# Patient Record
Sex: FEMALE | Race: WHITE | NOT HISPANIC OR LATINO | Employment: OTHER | URBAN - NONMETROPOLITAN AREA
[De-identification: names, ages, dates, MRNs, and addresses within clinical notes are randomized per-mention and may not be internally consistent; named-entity substitution may affect disease eponyms.]

---

## 2017-01-17 ENCOUNTER — ANTICOAGULATION VISIT (OUTPATIENT)
Dept: CARDIOLOGY | Facility: CLINIC | Age: 72
End: 2017-01-17

## 2017-01-17 DIAGNOSIS — I48.0 PAF (PAROXYSMAL ATRIAL FIBRILLATION) (HCC): ICD-10-CM

## 2017-01-19 ENCOUNTER — ANTICOAGULATION VISIT (OUTPATIENT)
Dept: CARDIOLOGY | Facility: CLINIC | Age: 72
End: 2017-01-19

## 2017-01-19 DIAGNOSIS — I48.0 PAF (PAROXYSMAL ATRIAL FIBRILLATION) (HCC): ICD-10-CM

## 2017-01-19 LAB — INR PPP: 2

## 2017-02-28 ENCOUNTER — ANTICOAGULATION VISIT (OUTPATIENT)
Dept: CARDIOLOGY | Facility: CLINIC | Age: 72
End: 2017-02-28

## 2017-02-28 DIAGNOSIS — I48.0 PAF (PAROXYSMAL ATRIAL FIBRILLATION) (HCC): ICD-10-CM

## 2017-03-07 ENCOUNTER — ANTICOAGULATION VISIT (OUTPATIENT)
Dept: CARDIOLOGY | Facility: CLINIC | Age: 72
End: 2017-03-07

## 2017-03-07 DIAGNOSIS — I48.0 PAF (PAROXYSMAL ATRIAL FIBRILLATION) (HCC): ICD-10-CM

## 2017-03-07 LAB — INR PPP: 1.8

## 2017-03-16 ENCOUNTER — ANTICOAGULATION VISIT (OUTPATIENT)
Dept: CARDIOLOGY | Facility: CLINIC | Age: 72
End: 2017-03-16

## 2017-03-16 DIAGNOSIS — I48.0 PAF (PAROXYSMAL ATRIAL FIBRILLATION) (HCC): ICD-10-CM

## 2017-03-20 ENCOUNTER — ANTICOAGULATION VISIT (OUTPATIENT)
Dept: CARDIOLOGY | Facility: CLINIC | Age: 72
End: 2017-03-20

## 2017-03-20 DIAGNOSIS — I48.0 PAF (PAROXYSMAL ATRIAL FIBRILLATION) (HCC): ICD-10-CM

## 2017-03-20 LAB — INR PPP: 2.7

## 2017-03-24 ENCOUNTER — OFFICE VISIT (OUTPATIENT)
Dept: CARDIOLOGY | Facility: CLINIC | Age: 72
End: 2017-03-24

## 2017-03-24 VITALS
WEIGHT: 145 LBS | HEIGHT: 62 IN | BODY MASS INDEX: 26.68 KG/M2 | SYSTOLIC BLOOD PRESSURE: 110 MMHG | DIASTOLIC BLOOD PRESSURE: 54 MMHG | HEART RATE: 68 BPM

## 2017-03-24 DIAGNOSIS — I48.0 PAROXYSMAL ATRIAL FIBRILLATION (HCC): Primary | ICD-10-CM

## 2017-03-24 DIAGNOSIS — Z95.1 S/P CABG X 4: ICD-10-CM

## 2017-03-24 DIAGNOSIS — I10 ESSENTIAL HYPERTENSION: ICD-10-CM

## 2017-03-24 PROCEDURE — 99214 OFFICE O/P EST MOD 30 MIN: CPT | Performed by: INTERNAL MEDICINE

## 2017-03-24 PROCEDURE — 93000 ELECTROCARDIOGRAM COMPLETE: CPT | Performed by: INTERNAL MEDICINE

## 2017-03-24 RX ORDER — CALCITRIOL 0.25 UG/1
CAPSULE, LIQUID FILLED ORAL
Refills: 11 | COMMUNITY
Start: 2017-02-09 | End: 2020-01-03

## 2017-03-24 RX ORDER — WARFARIN SODIUM 5 MG/1
TABLET ORAL
Refills: 11 | COMMUNITY
Start: 2017-03-05 | End: 2017-04-30 | Stop reason: SDUPTHER

## 2017-03-24 NOTE — PROGRESS NOTES
Sondra Connolly  [id number]  1945  72 y.o.  female    Referring Provider: Thomas Astudillo MD    Reason for Follow-up Visit: PAF  Overall doing well  Denies any chest pain  No excessive shortness of breath  Compliant with medications    History of present illness:  Sondra Connolly is a 72 y.o. yo female with history of PAF who presents today for   Chief Complaint   Patient presents with   • Atrial Fibrillation     1 YEAR F/U    .    History  Past Medical History:   Diagnosis Date   • A-fib    • Angina pectoris    • Anxiety    • Arthritis    • Atherosclerosis of coronary artery bypass graft    • CAD (coronary artery disease)    • Carotid artery occlusion without infarction    • Chronic kidney disease    • Chronic renal impairment      unspecified stage   • Diabetes mellitus    • Gastritis    • Hemorrhoids    • Hyperlipidemia    • Hypertension    • Lung disease     chronic   • LV dysfunction    • MI (myocardial infarction)    • MI (myocardial infarction)    • Obesity    • Palpitations    • PVD (peripheral vascular disease)    • Renal failure, acute    ,   Past Surgical History:   Procedure Laterality Date   • APPENDECTOMY     • CARDIAC CATHETERIZATION  05/01/2009    Left-Heart Skin   • CAROTID ENDARTERECTOMY     • CATARACT EXTRACTION     • COLONOSCOPY     • CORONARY ARTERY BYPASS GRAFT  07/2007    Four   • ENDOSCOPY  10/02/2013   • HYSTERECTOMY     • SKIN CANCER EXCISION     • THROMBOENDARTERECTOMY     ,   Family History   Problem Relation Age of Onset   • Heart disease Mother    • Heart disease Father    • Heart disease Brother    • Heart disease Brother    ,   Social History   Substance Use Topics   • Smoking status: Never Smoker   • Smokeless tobacco: Never Used      Comment: Non smoker; no tobacco use   • Alcohol use Yes      Comment: occ   ,     Medications  Current Outpatient Prescriptions   Medication Sig Dispense Refill   • calcium citrate-vitamin d (CITRACAL) 200-250 MG-UNIT tablet tablet Take  by mouth  "Daily.     • cholecalciferol (VITAMIN D3) 1000 UNITS tablet Take 1,000 Units by mouth Daily.     • digoxin (LANOXIN) 250 MCG tablet Take 1 tablet by mouth Daily. 30 tablet 5   • diltiaZEM CD (CARDIZEM CD) 240 MG 24 hr capsule Take 1 capsule by mouth Daily. 30 capsule 5   • FERROUS SULFATE PO Take  by mouth.     • furosemide (LASIX) 20 MG tablet Take 1 tablet by mouth Daily. 30 tablet 5   • metoprolol tartrate (LOPRESSOR) 25 MG tablet Take 1 tablet by mouth 2 (Two) Times a Day. 1/2 tablet 30 tablet 5   • Multiple Vitamins-Minerals (MULTIVITAMIN ADULT PO) Take  by mouth.     • pravastatin (PRAVACHOL) 40 MG tablet Take 1 tablet by mouth Daily. 30 tablet 5   • warfarin (COUMADIN) 5 MG tablet TAKE 1 TABLET EVERY DAY AND 1/5 THE NEXT DAY  11   • calcitriol (ROCALTROL) 0.25 MCG capsule TAKE 1 CAPSULE BY MOUTH EVERY MONDAY, WEDNESDAY, FRIDAY  11     No current facility-administered medications for this visit.        Allergies:  Buffered aspirin and Salicylates    Review of Systems  Review of Systems   Constitution: Negative.   HENT: Negative.    Eyes: Negative.    Cardiovascular: Negative for chest pain, claudication, cyanosis, dyspnea on exertion, irregular heartbeat, leg swelling, near-syncope, orthopnea, palpitations, paroxysmal nocturnal dyspnea and syncope.   Respiratory: Negative.    Endocrine: Negative.    Hematologic/Lymphatic: Negative.    Skin: Negative.    Gastrointestinal: Negative for anorexia.   Genitourinary: Negative.    Neurological: Negative.    Psychiatric/Behavioral: Negative.        Objective     Physical Exam:  /54 (BP Location: Left arm, Patient Position: Sitting, Cuff Size: Adult)  Pulse 68  Ht 62\" (157.5 cm)  Wt 145 lb (65.8 kg)  BMI 26.52 kg/m2  Physical Exam   Constitutional: She appears well-developed.   HENT:   Head: Normocephalic.   Neck: Normal carotid pulses and no JVD present. No tracheal tenderness present. Carotid bruit is not present. No tracheal deviation and no edema present. "   Cardiovascular: Normal pulses.  An irregularly irregular rhythm present.   Murmur heard.   Systolic murmur is present with a grade of 2/6   Pulmonary/Chest: Effort normal. No stridor.   Abdominal: Soft.   Neurological: She is alert. She has normal strength. No cranial nerve deficit or sensory deficit.   Skin: Skin is warm.   Psychiatric: She has a normal mood and affect. Her speech is normal and behavior is normal.       Results Review:       ECG 12 Lead  Date/Time: 3/24/2017 10:39 AM  Performed by: SEEMA MORENO  Authorized by: SEEMA MORENO   Comparison: compared with previous ECG from 1/11/2016  Comparison to previous ECG: Atrial fibrillation replaced sinus rhythm  Rhythm: atrial fibrillation  Rate: normal  T depression: V4, V5 and V6  QRS axis: normal              Assessment/Plan   Patient Active Problem List   Diagnosis   • Paroxysmal atrial fibrillation   • Essential hypertension   • S/P CABG x 4       No palpitations. No significant pedal edema. Compliant with medications and diet. Latest labs and medications reviewed.    Plan:    She does not want DC cardioversion  Close follow up with you as scheduled.  Intensive factor modifications.  See order list.    Counseled regarding disease appropriate diet, fluid, caffeine, stimulants and sodium intake as well as importance of compliance to diet, exercise and regular follow up.  Avoid NSAIDS and COX2 inhibitors. Use Acetaminophen PRN.    Return in about 3 months (around 6/24/2017).

## 2017-04-10 ENCOUNTER — ANTICOAGULATION VISIT (OUTPATIENT)
Dept: CARDIOLOGY | Facility: CLINIC | Age: 72
End: 2017-04-10

## 2017-04-14 ENCOUNTER — ANTICOAGULATION VISIT (OUTPATIENT)
Dept: CARDIOLOGY | Facility: CLINIC | Age: 72
End: 2017-04-14

## 2017-04-14 LAB — INR PPP: 2.6

## 2017-05-01 RX ORDER — WARFARIN SODIUM 5 MG/1
TABLET ORAL
Qty: 30 TABLET | Refills: 9 | Status: SHIPPED | OUTPATIENT
Start: 2017-05-01 | End: 2017-08-07 | Stop reason: SDUPTHER

## 2017-05-03 ENCOUNTER — LAB REQUISITION (OUTPATIENT)
Dept: LAB | Facility: HOSPITAL | Age: 72
End: 2017-05-03

## 2017-05-03 DIAGNOSIS — Z00.00 ENCOUNTER FOR GENERAL ADULT MEDICAL EXAMINATION WITHOUT ABNORMAL FINDINGS: ICD-10-CM

## 2017-05-03 PROCEDURE — 87086 URINE CULTURE/COLONY COUNT: CPT | Performed by: INTERNAL MEDICINE

## 2017-05-05 LAB — BACTERIA SPEC AEROBE CULT: ABNORMAL

## 2017-05-19 ENCOUNTER — ANTICOAGULATION VISIT (OUTPATIENT)
Dept: CARDIOLOGY | Facility: CLINIC | Age: 72
End: 2017-05-19

## 2017-05-31 RX ORDER — DIGOXIN 250 MCG
TABLET ORAL
Qty: 30 TABLET | Refills: 11 | Status: SHIPPED | OUTPATIENT
Start: 2017-05-31 | End: 2017-08-04 | Stop reason: SDUPTHER

## 2017-05-31 RX ORDER — DILTIAZEM HYDROCHLORIDE 240 MG/1
240 CAPSULE, COATED, EXTENDED RELEASE ORAL DAILY
Qty: 30 CAPSULE | Refills: 11 | Status: SHIPPED | OUTPATIENT
Start: 2017-05-31 | End: 2017-08-04 | Stop reason: SDUPTHER

## 2017-05-31 RX ORDER — FUROSEMIDE 20 MG/1
TABLET ORAL
Qty: 30 TABLET | Refills: 11 | Status: SHIPPED | OUTPATIENT
Start: 2017-05-31 | End: 2017-08-04 | Stop reason: SDUPTHER

## 2017-06-05 DIAGNOSIS — I48.91 ATRIAL FIBRILLATION, UNSPECIFIED TYPE (HCC): Primary | ICD-10-CM

## 2017-06-05 LAB — INR PPP: 2.9

## 2017-06-06 ENCOUNTER — ANTICOAGULATION VISIT (OUTPATIENT)
Dept: CARDIOLOGY | Facility: CLINIC | Age: 72
End: 2017-06-06

## 2017-06-23 ENCOUNTER — OFFICE VISIT (OUTPATIENT)
Dept: CARDIOLOGY | Facility: CLINIC | Age: 72
End: 2017-06-23

## 2017-06-23 VITALS
OXYGEN SATURATION: 98 % | WEIGHT: 142 LBS | BODY MASS INDEX: 26.13 KG/M2 | HEART RATE: 61 BPM | DIASTOLIC BLOOD PRESSURE: 68 MMHG | SYSTOLIC BLOOD PRESSURE: 100 MMHG | HEIGHT: 62 IN

## 2017-06-23 DIAGNOSIS — I10 ESSENTIAL HYPERTENSION: ICD-10-CM

## 2017-06-23 DIAGNOSIS — I48.0 PAROXYSMAL ATRIAL FIBRILLATION (HCC): Primary | ICD-10-CM

## 2017-06-23 DIAGNOSIS — I48.20 CHRONIC ATRIAL FIBRILLATION (HCC): ICD-10-CM

## 2017-06-23 DIAGNOSIS — Z95.1 S/P CABG X 4: ICD-10-CM

## 2017-06-23 PROCEDURE — 93000 ELECTROCARDIOGRAM COMPLETE: CPT | Performed by: INTERNAL MEDICINE

## 2017-06-23 PROCEDURE — 99214 OFFICE O/P EST MOD 30 MIN: CPT | Performed by: INTERNAL MEDICINE

## 2017-06-23 NOTE — PROGRESS NOTES
Sondra Connolly  [id number]  1945  72 y.o.  female    Referring Provider: Thomas Astudillo MD    Reason for Follow-up Visit: PAF  Overall doing well  Denies any chest pain  No excessive shortness of breath  Compliant with medications    History of present illness:  Sondra Connolly is a 72 y.o. yo female with history of PAF who presents today for   Chief Complaint   Patient presents with   • Atrial Fibrillation     3 mo f/u   • Coronary Artery Disease   .    History  Past Medical History:   Diagnosis Date   • A-fib    • Angina pectoris    • Anxiety    • Arthritis    • Atherosclerosis of coronary artery bypass graft    • CAD (coronary artery disease)    • Carotid artery occlusion without infarction    • CHF (congestive heart failure)    • Chronic kidney disease    • Chronic renal impairment      unspecified stage   • Colon polyp    • Diabetes mellitus    • Fibrocystic breast disease    • Gastritis    • Gastritis    • GI bleed    • Hemorrhoids    • Hyperlipidemia    • Hypertension    • Lung disease     chronic   • LV dysfunction    • MI (myocardial infarction)    • MI (myocardial infarction)    • Obesity    • Palpitations    • Peptic ulcer    • PVD (peripheral vascular disease)    • Renal failure, acute    ,   Past Surgical History:   Procedure Laterality Date   • APPENDECTOMY     • APPENDECTOMY     • CARDIAC CATHETERIZATION  05/01/2009    Left-Heart Skin   • CAROTID ENDARTERECTOMY     • CATARACT EXTRACTION     • CATARACT EXTRACTION     • COLONOSCOPY     • CORONARY ARTERY BYPASS GRAFT  07/2007    Four   • ENDOSCOPY  10/02/2013   • ENDOSCOPY  10/02/2013   • HYSTERECTOMY     • SKIN CANCER EXCISION     • THROMBOENDARTERECTOMY     • TONSILLECTOMY     ,   Family History   Problem Relation Age of Onset   • Heart disease Mother    • Heart disease Father    • Heart disease Brother    • Heart disease Brother    ,   Social History   Substance Use Topics   • Smoking status: Never Smoker   • Smokeless tobacco: Never Used       "Comment: Non smoker; no tobacco use   • Alcohol use Yes      Comment: occ   ,     Medications  Current Outpatient Prescriptions   Medication Sig Dispense Refill   • calcitriol (ROCALTROL) 0.25 MCG capsule TAKE 1 CAPSULE BY MOUTH EVERY MONDAY, WEDNESDAY, FRIDAY  11   • calcium citrate-vitamin d (CITRACAL) 200-250 MG-UNIT tablet tablet Take  by mouth Daily.     • cholecalciferol (VITAMIN D3) 1000 UNITS tablet Take 1,000 Units by mouth Daily.     • digoxin (LANOXIN) 250 MCG tablet TAKE 1 TABLET BY MOUTH DAILY. 30 tablet 11   • diltiaZEM CD (CARDIZEM CD) 240 MG 24 hr capsule Take 1 capsule by mouth Daily. 30 capsule 11   • FERROUS SULFATE PO Take  by mouth.     • furosemide (LASIX) 20 MG tablet TAKE 1 TABLET BY MOUTH DAILY. 30 tablet 11   • metoprolol tartrate (LOPRESSOR) 25 MG tablet TAKE 1/2 TABLET BY MOUTH TWICE A DAY 30 tablet 11   • Multiple Vitamins-Minerals (MULTIVITAMIN ADULT PO) Take  by mouth.     • pravastatin (PRAVACHOL) 40 MG tablet Take 1 tablet by mouth Daily. 30 tablet 5   • warfarin (COUMADIN) 5 MG tablet TAKE 1 TABLET EVERY DAY 30 tablet 9     No current facility-administered medications for this visit.        Allergies:  Buffered aspirin; Demerol [meperidine]; and Salicylates    Review of Systems  Review of Systems   Constitution: Positive for weakness and malaise/fatigue.   HENT: Negative.    Eyes: Negative.    Cardiovascular: Negative for chest pain, claudication, cyanosis, dyspnea on exertion, irregular heartbeat, leg swelling, near-syncope, orthopnea, palpitations, paroxysmal nocturnal dyspnea and syncope.   Respiratory: Negative.    Endocrine: Negative.    Hematologic/Lymphatic: Negative.    Skin: Negative.    Gastrointestinal: Negative for anorexia.   Genitourinary: Negative.    Psychiatric/Behavioral: Negative.        Objective     Physical Exam:  /68  Pulse 61  Ht 62\" (157.5 cm)  Wt 142 lb (64.4 kg)  SpO2 98%  BMI 25.97 kg/m2  Physical Exam   Constitutional: She appears " well-developed.   HENT:   Head: Normocephalic.   Neck: Normal carotid pulses and no JVD present. No tracheal tenderness present. Carotid bruit is not present. No tracheal deviation and no edema present.   Cardiovascular: Normal pulses.  An irregularly irregular rhythm present.   Murmur heard.   Systolic murmur is present with a grade of 2/6   Pulmonary/Chest: Effort normal. No stridor.   Abdominal: Soft.   Neurological: She is alert. She has normal strength. No cranial nerve deficit or sensory deficit.   Skin: Skin is warm.   Psychiatric: She has a normal mood and affect. Her speech is normal and behavior is normal.       Results Review:       ECG 12 Lead  Date/Time: 6/23/2017 10:34 AM  Performed by: SEEMA MORENO  Authorized by: SEEMA MORENO   Comparison: compared with previous ECG from 3/24/2017  Similar to previous ECG  Rhythm: atrial fibrillation  Rate: normal  T depression: II, III, aVF, V5 and V6  QRS axis: right  Clinical impression: abnormal ECG            Assessment/Plan   Patient Active Problem List   Diagnosis   • Paroxysmal atrial fibrillation   • Essential hypertension   • S/P CABG x 4   • A-fib       No palpitations. No significant pedal edema. Compliant with medications and diet. Latest labs and medications reviewed.    Plan:  Patient is asked to monitor BP at home or work, several times per month and return with written values at next office visit.  Patient is asked to monitor BP at home or work, several times per month and return with written values at next office visit.  Keep LDL below 70 mg/dl   Monitor INR  She does not want DC cardioversion  Close follow up with you as scheduled.  Intensive factor modifications.  See order list.    Counseled regarding disease appropriate diet, fluid, caffeine, stimulants and sodium intake as well as importance of compliance to diet, exercise and regular follow up.  Avoid NSAIDS and COX2 inhibitors. Use Acetaminophen PRN.    Return in about 9 months (around  3/23/2018).

## 2017-07-13 ENCOUNTER — ANTICOAGULATION VISIT (OUTPATIENT)
Dept: CARDIOLOGY | Facility: CLINIC | Age: 72
End: 2017-07-13

## 2017-07-21 ENCOUNTER — ANTICOAGULATION VISIT (OUTPATIENT)
Dept: CARDIOLOGY | Facility: CLINIC | Age: 72
End: 2017-07-21

## 2017-07-25 ENCOUNTER — ANTICOAGULATION VISIT (OUTPATIENT)
Dept: CARDIOLOGY | Facility: CLINIC | Age: 72
End: 2017-07-25

## 2017-07-25 LAB — INR PPP: 3.5

## 2017-08-02 ENCOUNTER — TRANSCRIBE ORDERS (OUTPATIENT)
Dept: ADMINISTRATIVE | Facility: HOSPITAL | Age: 72
End: 2017-08-02

## 2017-08-02 ENCOUNTER — TRANSCRIBE ORDERS (OUTPATIENT)
Dept: GENERAL RADIOLOGY | Facility: HOSPITAL | Age: 72
End: 2017-08-02

## 2017-08-02 ENCOUNTER — HOSPITAL ENCOUNTER (OUTPATIENT)
Dept: GENERAL RADIOLOGY | Facility: HOSPITAL | Age: 72
Discharge: HOME | End: 2017-08-02
Admitting: INTERNAL MEDICINE

## 2017-08-02 DIAGNOSIS — Z12.31 ENCOUNTER FOR SCREENING MAMMOGRAM FOR MALIGNANT NEOPLASM OF BREAST: Primary | ICD-10-CM

## 2017-08-02 DIAGNOSIS — Z78.0 ASYMPTOMATIC POSTMENOPAUSAL STATUS (AGE-RELATED) (NATURAL): Primary | ICD-10-CM

## 2017-08-02 PROCEDURE — 77080 DXA BONE DENSITY AXIAL: CPT

## 2017-08-04 RX ORDER — DIGOXIN 250 MCG
250 TABLET ORAL
Qty: 90 TABLET | Refills: 3 | Status: SHIPPED | OUTPATIENT
Start: 2017-08-04 | End: 2018-09-05 | Stop reason: SDUPTHER

## 2017-08-04 RX ORDER — PRAVASTATIN SODIUM 40 MG
40 TABLET ORAL DAILY
Qty: 90 TABLET | Refills: 3 | Status: SHIPPED | OUTPATIENT
Start: 2017-08-04 | End: 2018-09-05 | Stop reason: SDUPTHER

## 2017-08-04 RX ORDER — DILTIAZEM HYDROCHLORIDE 240 MG/1
240 CAPSULE, COATED, EXTENDED RELEASE ORAL DAILY
Qty: 90 CAPSULE | Refills: 3 | Status: SHIPPED | OUTPATIENT
Start: 2017-08-04 | End: 2018-08-08 | Stop reason: SDUPTHER

## 2017-08-04 RX ORDER — FUROSEMIDE 20 MG/1
20 TABLET ORAL DAILY
Qty: 90 TABLET | Refills: 3 | Status: SHIPPED | OUTPATIENT
Start: 2017-08-04 | End: 2018-09-05 | Stop reason: SDUPTHER

## 2017-08-07 RX ORDER — WARFARIN SODIUM 5 MG/1
5 TABLET ORAL
Qty: 90 TABLET | Refills: 3 | Status: SHIPPED | OUTPATIENT
Start: 2017-08-07 | End: 2018-09-05 | Stop reason: SDUPTHER

## 2017-08-08 ENCOUNTER — APPOINTMENT (OUTPATIENT)
Dept: MAMMOGRAPHY | Facility: HOSPITAL | Age: 72
End: 2017-08-08

## 2017-08-16 ENCOUNTER — ANTICOAGULATION VISIT (OUTPATIENT)
Dept: CARDIOLOGY | Facility: CLINIC | Age: 72
End: 2017-08-16

## 2017-08-25 ENCOUNTER — ANTICOAGULATION VISIT (OUTPATIENT)
Dept: CARDIOLOGY | Facility: CLINIC | Age: 72
End: 2017-08-25

## 2017-08-28 ENCOUNTER — ANTICOAGULATION VISIT (OUTPATIENT)
Dept: CARDIOLOGY | Facility: CLINIC | Age: 72
End: 2017-08-28

## 2017-08-28 LAB — INR PPP: 3.9

## 2017-09-01 ENCOUNTER — ANTICOAGULATION VISIT (OUTPATIENT)
Dept: CARDIOLOGY | Facility: CLINIC | Age: 72
End: 2017-09-01

## 2017-09-06 ENCOUNTER — TRANSCRIBE ORDERS (OUTPATIENT)
Dept: ADMINISTRATIVE | Facility: HOSPITAL | Age: 72
End: 2017-09-06

## 2017-09-06 ENCOUNTER — ANTICOAGULATION VISIT (OUTPATIENT)
Dept: CARDIOLOGY | Facility: CLINIC | Age: 72
End: 2017-09-06

## 2017-09-06 DIAGNOSIS — Z12.31 ENCOUNTER FOR SCREENING MAMMOGRAM FOR BREAST CANCER: Primary | ICD-10-CM

## 2017-09-06 LAB — INR PPP: 1.4

## 2017-09-08 ENCOUNTER — HOSPITAL ENCOUNTER (OUTPATIENT)
Dept: MAMMOGRAPHY | Facility: HOSPITAL | Age: 72
Discharge: HOME | End: 2017-09-08
Admitting: INTERNAL MEDICINE

## 2017-09-08 DIAGNOSIS — Z12.31 ENCOUNTER FOR SCREENING MAMMOGRAM FOR BREAST CANCER: ICD-10-CM

## 2017-09-08 PROCEDURE — G0202 SCR MAMMO BI INCL CAD: HCPCS

## 2017-09-08 PROCEDURE — 77063 BREAST TOMOSYNTHESIS BI: CPT

## 2017-09-13 LAB — INR PPP: 2.7

## 2017-09-22 ENCOUNTER — ANTICOAGULATION VISIT (OUTPATIENT)
Dept: CARDIOLOGY | Facility: CLINIC | Age: 72
End: 2017-09-22

## 2017-09-29 ENCOUNTER — ANTICOAGULATION VISIT (OUTPATIENT)
Dept: CARDIOLOGY | Facility: CLINIC | Age: 72
End: 2017-09-29

## 2017-09-29 LAB — INR PPP: 3.3

## 2017-10-17 LAB — INR PPP: 3.9

## 2017-10-18 ENCOUNTER — ANTICOAGULATION VISIT (OUTPATIENT)
Dept: CARDIOLOGY | Facility: CLINIC | Age: 72
End: 2017-10-18

## 2017-12-21 ENCOUNTER — ANTICOAGULATION VISIT (OUTPATIENT)
Dept: CARDIOLOGY | Facility: CLINIC | Age: 72
End: 2017-12-21

## 2018-01-03 LAB — INR PPP: 3.7

## 2018-01-05 ENCOUNTER — ANTICOAGULATION VISIT (OUTPATIENT)
Dept: CARDIOLOGY | Facility: CLINIC | Age: 73
End: 2018-01-05

## 2018-02-02 ENCOUNTER — ANTICOAGULATION VISIT (OUTPATIENT)
Dept: CARDIOLOGY | Facility: CLINIC | Age: 73
End: 2018-02-02

## 2018-02-02 LAB
INR PPP: 2.8
INR PPP: 2.8

## 2018-03-16 ENCOUNTER — ANTICOAGULATION VISIT (OUTPATIENT)
Dept: CARDIOLOGY | Facility: CLINIC | Age: 73
End: 2018-03-16

## 2018-03-23 ENCOUNTER — ANTICOAGULATION VISIT (OUTPATIENT)
Dept: CARDIOLOGY | Facility: CLINIC | Age: 73
End: 2018-03-23

## 2018-03-23 LAB — INR PPP: 2.3

## 2018-04-17 ENCOUNTER — OFFICE VISIT (OUTPATIENT)
Dept: CARDIOLOGY | Facility: CLINIC | Age: 73
End: 2018-04-17

## 2018-04-17 VITALS
DIASTOLIC BLOOD PRESSURE: 80 MMHG | BODY MASS INDEX: 25.4 KG/M2 | SYSTOLIC BLOOD PRESSURE: 140 MMHG | WEIGHT: 138 LBS | HEIGHT: 62 IN | OXYGEN SATURATION: 98 % | HEART RATE: 101 BPM

## 2018-04-17 DIAGNOSIS — I10 ESSENTIAL HYPERTENSION: ICD-10-CM

## 2018-04-17 DIAGNOSIS — R00.2 PALPITATIONS: ICD-10-CM

## 2018-04-17 DIAGNOSIS — Z95.1 S/P CABG X 4: Primary | ICD-10-CM

## 2018-04-17 DIAGNOSIS — E78.2 MIXED HYPERLIPIDEMIA: ICD-10-CM

## 2018-04-17 DIAGNOSIS — I48.20 CHRONIC ATRIAL FIBRILLATION (HCC): ICD-10-CM

## 2018-04-17 PROBLEM — I48.0 PAROXYSMAL ATRIAL FIBRILLATION (HCC): Status: RESOLVED | Noted: 2017-03-24 | Resolved: 2018-04-17

## 2018-04-17 PROCEDURE — 99214 OFFICE O/P EST MOD 30 MIN: CPT | Performed by: INTERNAL MEDICINE

## 2018-04-17 PROCEDURE — 93000 ELECTROCARDIOGRAM COMPLETE: CPT | Performed by: INTERNAL MEDICINE

## 2018-04-17 NOTE — PROGRESS NOTES
Sondra Connolly  [id number]  1945  73 y.o.  female    Referring Provider: Luis Boyd MD    Reason for Follow-up Visit: Here for routine follow up  chronic atrial fibrillation       Subjective    Overall feeling well   No chest pain or shortness of breath   No palpitations  No significant pedal edema  Compliant with medications and dietary advice  Good effort tolerance  No presyncope or syncope  Compliant with medications  Arthritic pain in small joints     Mentally stressed out about grandson living with her    History of present illness:  Sondra Connolly is a 73 y.o. yo female with history of chronic atrial fibrillation who presents today for   Chief Complaint   Patient presents with   • Atrial Fibrillation     9 mo f/u   .    History  Past Medical History:   Diagnosis Date   • A-fib    • Angina pectoris    • Anxiety    • Arthritis    • Atherosclerosis of coronary artery bypass graft    • CAD (coronary artery disease)    • Carotid artery occlusion without infarction    • CHF (congestive heart failure)    • Chronic kidney disease    • Chronic lung disease    • Chronic renal impairment      unspecified stage   • Colon polyp    • Colon polyp    • Diabetes mellitus    • DJD (degenerative joint disease)    • Fibrocystic breast disease    • Gastritis    • Gastritis    • GI bleed    • Hemorrhoids    • Hemorrhoids    • Hyperlipidemia    • Hypertension    • Lung disease     chronic   • LV dysfunction    • MI (myocardial infarction)    • MI (myocardial infarction)    • Obesity    • Palpitations    • Peptic ulcer    • PUD (peptic ulcer disease)    • PVD (peripheral vascular disease)    • Renal failure, acute    ,   Past Surgical History:   Procedure Laterality Date   • APPENDECTOMY     • APPENDECTOMY     • CARDIAC CATHETERIZATION  05/01/2009    Left-Heart Skin   • CAROTID ENDARTERECTOMY     • CATARACT EXTRACTION     • CATARACT EXTRACTION     • COLONOSCOPY     • CORONARY ARTERY BYPASS GRAFT  07/2007    Four   •  ENDOSCOPY  10/02/2013   • ENDOSCOPY  10/02/2013   • HYSTERECTOMY     • SKIN CANCER EXCISION     • THROMBOENDARTERECTOMY     • TONSILLECTOMY     ,   Family History   Problem Relation Age of Onset   • Heart disease Mother    • Heart disease Father    • Heart disease Brother    • Heart disease Brother    • Breast cancer Maternal Grandmother    ,   Social History   Substance Use Topics   • Smoking status: Never Smoker   • Smokeless tobacco: Never Used      Comment: Non smoker; no tobacco use   • Alcohol use Yes      Comment: occ   ,     Medications  Current Outpatient Prescriptions   Medication Sig Dispense Refill   • calcitriol (ROCALTROL) 0.25 MCG capsule TAKE 1 CAPSULE BY MOUTH EVERY MONDAY, WEDNESDAY, FRIDAY  11   • calcium citrate-vitamin d (CITRACAL) 200-250 MG-UNIT tablet tablet Take  by mouth Daily.     • cholecalciferol (VITAMIN D3) 1000 UNITS tablet Take 1,000 Units by mouth Daily.     • digoxin (LANOXIN) 250 MCG tablet Take 1 tablet by mouth Daily. 90 tablet 3   • diltiaZEM CD (CARDIZEM CD) 240 MG 24 hr capsule Take 1 capsule by mouth Daily. 90 capsule 3   • FERROUS SULFATE PO Take  by mouth.     • furosemide (LASIX) 20 MG tablet Take 1 tablet by mouth Daily. 90 tablet 3   • metoprolol tartrate (LOPRESSOR) 25 MG tablet Take 1 tablet by mouth Daily. (Patient taking differently: Take 25 mg by mouth Every 12 (Twelve) Hours.) 90 tablet 3   • Multiple Vitamins-Minerals (MULTIVITAMIN ADULT PO) Take  by mouth.     • pravastatin (PRAVACHOL) 40 MG tablet Take 1 tablet by mouth Daily. 90 tablet 3   • warfarin (COUMADIN) 5 MG tablet Take 1 tablet by mouth Daily. (Patient taking differently: Take 5 mg by mouth Daily. DR GONZALEZ MONITORS COUMADIN) 90 tablet 3     No current facility-administered medications for this visit.        Allergies:  Buffered aspirin; Demerol [meperidine]; and Salicylates    Review of Systems  Review of Systems   Constitution: Positive for weakness and malaise/fatigue.   HENT: Negative.    Eyes:  "Negative.    Cardiovascular: Positive for irregular heartbeat and palpitations. Negative for chest pain, claudication, cyanosis, dyspnea on exertion, leg swelling, near-syncope, orthopnea, paroxysmal nocturnal dyspnea and syncope.   Respiratory: Negative.    Endocrine: Negative.    Hematologic/Lymphatic: Negative.    Skin: Negative.    Musculoskeletal: Positive for arthritis and joint pain.   Gastrointestinal: Negative for anorexia.   Genitourinary: Negative.    Psychiatric/Behavioral: Negative.        Objective     Physical Exam:  /80   Pulse 101   Ht 157.5 cm (62\")   Wt 62.6 kg (138 lb)   SpO2 98%   BMI 25.24 kg/m²   Physical Exam   Constitutional: She appears well-developed.   HENT:   Head: Normocephalic.   Neck: Normal carotid pulses and no JVD present. No tracheal tenderness present. Carotid bruit is not present. No tracheal deviation and no edema present.   Cardiovascular: Normal pulses.  An irregularly irregular rhythm present.   Murmur heard.   Systolic murmur is present with a grade of 2/6   Pulmonary/Chest: Effort normal. No stridor.   Abdominal: Soft.   Neurological: She is alert. She has normal strength. No cranial nerve deficit or sensory deficit.   Skin: Skin is warm.   Psychiatric: She has a normal mood and affect. Her speech is normal and behavior is normal.       Results Review:  Results for orders placed in visit on 04/01/16   SCANNED - ECHOCARDIOGRAM          ECG 12 Lead  Date/Time: 4/17/2018 12:26 PM  Performed by: SEEMA MORENO  Authorized by: SEEMA MORENO   Comparison: compared with previous ECG from 6/23/2017  Rhythm: atrial fibrillation  Rate: tachycardic  T depression: II, III, aVF, V4, V5 and V6  QRS axis: normal  Clinical impression: abnormal ECG            Assessment/Plan   Patient Active Problem List   Diagnosis   • Essential hypertension   • S/P CABG x 4   • A-fib   • Mixed hyperlipidemia   • Palpitations       No palpitations. No significant pedal edema. Compliant with " "medications and diet. Latest labs and medications reviewed.    Plan:      Target INR 2-3     Low salt/ HTN/ Heart healthy carbohydrate restricted cardiac diet as applicable to this patient's current diagnoses.   This handout has relevant information regarding shopping for food, preparing meals, what to eat at restaurants, tracking of food intake, information regarding sodium intake and salt content, how to read food labels, knowing what to eat, tips reagarding physical activity, calorie count and calorie expenditure. What foods to avoid. Information regarding alcoholic drinks along with \"good\" and \"bad\" fats.  Low vitamin K diet.  knowing what to eat, tips what foods to avoid.     Monitor for any signs of bleeding including red or dark stools. Fall precautions.   Patient is asked to monitor BP at home or work, several times per month and return with written values at next office visit.     The patient is advised to reduce or avoid caffeine or other cardiac stimulants.     The patient was advised that NSAID-type medications have three  very important potential side effects: cardiovascular complications, gastrointestinal irritation including hemorrhage and renal injuries.  Do not use anti-inflammatories such as Motrin/ibuprofen, Alleve/naprosyn, Mobic and like medications Use Tylenol instead.The patient expresses understanding of these issues and questions were answered.   Monitor for any signs of bleeding including red or dark stools. Fall precautions.      Keep LDL below 70 mg/dl. Monitor liver and renal functions.   Monitor CBC, CMP and Lipid Panel by primary      Weigh yourself frequently, at least weekly, preferably daily, call me if more than 2 pounds a day or 5 pounds a week weight gain    No additional cardiac testing required at this point in time.      Return in about 1 year (around 4/17/2019).                "

## 2018-05-09 ENCOUNTER — ANTICOAGULATION VISIT (OUTPATIENT)
Dept: CARDIOLOGY | Facility: CLINIC | Age: 73
End: 2018-05-09

## 2018-05-17 ENCOUNTER — ANTICOAGULATION VISIT (OUTPATIENT)
Dept: CARDIOLOGY | Facility: CLINIC | Age: 73
End: 2018-05-17

## 2018-05-17 LAB — INR PPP: 2.2

## 2018-06-08 ENCOUNTER — ANTICOAGULATION VISIT (OUTPATIENT)
Dept: CARDIOLOGY | Facility: CLINIC | Age: 73
End: 2018-06-08

## 2018-06-08 LAB — INR PPP: 3.5

## 2018-08-03 ENCOUNTER — ANTICOAGULATION VISIT (OUTPATIENT)
Dept: CARDIOLOGY | Facility: CLINIC | Age: 73
End: 2018-08-03

## 2018-08-08 ENCOUNTER — ANTICOAGULATION VISIT (OUTPATIENT)
Dept: CARDIOLOGY | Facility: CLINIC | Age: 73
End: 2018-08-08

## 2018-08-08 LAB — INR PPP: 3.2

## 2018-08-08 RX ORDER — DILTIAZEM HYDROCHLORIDE 240 MG/1
240 CAPSULE, COATED, EXTENDED RELEASE ORAL DAILY
Qty: 90 CAPSULE | Refills: 2 | Status: SHIPPED | OUTPATIENT
Start: 2018-08-08 | End: 2019-05-05 | Stop reason: SDUPTHER

## 2018-08-22 LAB — INR PPP: 2.2

## 2018-08-23 ENCOUNTER — ANTICOAGULATION VISIT (OUTPATIENT)
Dept: CARDIOLOGY | Facility: CLINIC | Age: 73
End: 2018-08-23

## 2018-09-05 RX ORDER — DIGOXIN 250 MCG
250 TABLET ORAL
Qty: 90 TABLET | Refills: 2 | Status: SHIPPED | OUTPATIENT
Start: 2018-09-05 | End: 2019-06-07 | Stop reason: SDUPTHER

## 2018-09-05 RX ORDER — WARFARIN SODIUM 5 MG/1
5 TABLET ORAL
Qty: 90 TABLET | Refills: 2 | Status: SHIPPED | OUTPATIENT
Start: 2018-09-05 | End: 2019-06-07 | Stop reason: SDUPTHER

## 2018-09-05 RX ORDER — PRAVASTATIN SODIUM 40 MG
40 TABLET ORAL DAILY
Qty: 90 TABLET | Refills: 2 | Status: SHIPPED | OUTPATIENT
Start: 2018-09-05 | End: 2019-06-07 | Stop reason: SDUPTHER

## 2018-09-05 RX ORDER — FUROSEMIDE 20 MG/1
20 TABLET ORAL DAILY
Qty: 90 TABLET | Refills: 2 | Status: SHIPPED | OUTPATIENT
Start: 2018-09-05 | End: 2019-06-07 | Stop reason: SDUPTHER

## 2018-09-05 NOTE — TELEPHONE ENCOUNTER
Refill request for:  Digoxin 250 mcg PO daily, Quantity: 90, Refills: 2.   Warfarin 5 mg PO daily, Quantity: 90, Refills: 2.  Lopressor 25 mg PO daily, Quantity: 90, Refills: 2.  Furosemide 20 mg PO daily, Quantity: 90, Refills: 2.  Pravastatin 40 mg PO daily, Quantity: 90, Refills: 2.  HL

## 2018-10-11 ENCOUNTER — TELEPHONE (OUTPATIENT)
Dept: CARDIOLOGY | Facility: CLINIC | Age: 73
End: 2018-10-11

## 2018-10-17 ENCOUNTER — ANTICOAGULATION VISIT (OUTPATIENT)
Dept: CARDIOLOGY | Facility: CLINIC | Age: 73
End: 2018-10-17

## 2018-10-17 LAB — INR PPP: 3.4

## 2019-02-15 ENCOUNTER — ANTICOAGULATION VISIT (OUTPATIENT)
Dept: CARDIOLOGY | Facility: CLINIC | Age: 74
End: 2019-02-15

## 2019-02-21 DIAGNOSIS — I48.91 ATRIAL FIBRILLATION, UNSPECIFIED TYPE (HCC): Primary | ICD-10-CM

## 2019-02-22 ENCOUNTER — ANTICOAGULATION VISIT (OUTPATIENT)
Dept: CARDIOLOGY | Facility: CLINIC | Age: 74
End: 2019-02-22

## 2019-02-26 LAB — INR PPP: 2.9

## 2019-02-27 ENCOUNTER — ANTICOAGULATION VISIT (OUTPATIENT)
Dept: CARDIOLOGY | Facility: CLINIC | Age: 74
End: 2019-02-27

## 2019-05-06 RX ORDER — DILTIAZEM HYDROCHLORIDE 240 MG/1
240 CAPSULE, COATED, EXTENDED RELEASE ORAL DAILY
Qty: 30 CAPSULE | Refills: 0 | Status: SHIPPED | OUTPATIENT
Start: 2019-05-06 | End: 2019-06-10 | Stop reason: SDUPTHER

## 2019-05-14 RX ORDER — DILTIAZEM HYDROCHLORIDE 240 MG/1
240 CAPSULE, COATED, EXTENDED RELEASE ORAL DAILY
Qty: 90 CAPSULE | Refills: 0 | Status: SHIPPED | OUTPATIENT
Start: 2019-05-14 | End: 2019-09-04 | Stop reason: SDUPTHER

## 2019-06-04 ENCOUNTER — ANTICOAGULATION VISIT (OUTPATIENT)
Dept: CARDIOLOGY | Facility: CLINIC | Age: 74
End: 2019-06-04

## 2019-06-05 ENCOUNTER — ANTICOAGULATION VISIT (OUTPATIENT)
Dept: CARDIOLOGY | Facility: CLINIC | Age: 74
End: 2019-06-05

## 2019-06-05 ENCOUNTER — OFFICE VISIT (OUTPATIENT)
Dept: CARDIOLOGY | Facility: CLINIC | Age: 74
End: 2019-06-05

## 2019-06-05 VITALS
SYSTOLIC BLOOD PRESSURE: 120 MMHG | BODY MASS INDEX: 25.21 KG/M2 | DIASTOLIC BLOOD PRESSURE: 80 MMHG | WEIGHT: 137 LBS | HEART RATE: 75 BPM | OXYGEN SATURATION: 98 % | HEIGHT: 62 IN

## 2019-06-05 DIAGNOSIS — R00.2 PALPITATIONS: ICD-10-CM

## 2019-06-05 DIAGNOSIS — I48.20 CHRONIC ATRIAL FIBRILLATION (HCC): ICD-10-CM

## 2019-06-05 DIAGNOSIS — Z95.1 S/P CABG X 4: Primary | ICD-10-CM

## 2019-06-05 DIAGNOSIS — I95.1 AUTONOMIC ORTHOSTATIC HYPOTENSION: ICD-10-CM

## 2019-06-05 DIAGNOSIS — R42 DIZZY SPELLS: ICD-10-CM

## 2019-06-05 DIAGNOSIS — I10 ESSENTIAL HYPERTENSION: ICD-10-CM

## 2019-06-05 DIAGNOSIS — E78.2 MIXED HYPERLIPIDEMIA: ICD-10-CM

## 2019-06-05 LAB — INR PPP: 2.5

## 2019-06-05 PROCEDURE — 93000 ELECTROCARDIOGRAM COMPLETE: CPT | Performed by: INTERNAL MEDICINE

## 2019-06-05 PROCEDURE — 99214 OFFICE O/P EST MOD 30 MIN: CPT | Performed by: INTERNAL MEDICINE

## 2019-06-05 NOTE — PROGRESS NOTES
Sondra Connolly  [id number]  1945  74 y.o.  female    Referring Provider: Luis Boyd MD    Reason for Follow-up Visit: Here for routine follow up  chronic atrial fibrillation         Subjective    Mild chronic exertional shortness of breath on exertion relieved with rest  No significant cough or wheezing  Going on for several months or longer    No palpitations  No associated chest pain  No significant pedal edema    No fever or chills  No significant expectoration    No hemoptysis  No presyncope or syncope     More dizzy spells now   Easy fatiguability     BP supine 122/62 erect 102/62       History of present illness:  Sondra Connolly is a 74 y.o. yo female with history of chronic atrial fibrillation who presents today for   Chief Complaint   Patient presents with   • Atrial Fibrillation     1YR FU   • Coronary Artery Disease   • Fatigue   • Shortness of Breath   .    History  Past Medical History:   Diagnosis Date   • A-fib (CMS/HCC)    • Angina pectoris (CMS/HCC)    • Anxiety    • Arthritis    • Atherosclerosis of coronary artery bypass graft    • CAD (coronary artery disease)    • Carotid artery occlusion without infarction    • CHF (congestive heart failure) (CMS/HCC)    • Chronic kidney disease    • Chronic lung disease    • Chronic renal impairment      unspecified stage   • Colon polyp    • Colon polyp    • Diabetes mellitus (CMS/HCC)    • DJD (degenerative joint disease)    • Fibrocystic breast disease    • Gastritis    • Gastritis    • GI bleed    • Hemorrhoids    • Hemorrhoids    • Hyperlipidemia    • Hypertension    • Lung disease     chronic   • LV dysfunction    • MI (myocardial infarction) (CMS/HCC)    • MI (myocardial infarction) (CMS/HCC)    • Obesity    • Palpitations    • Peptic ulcer    • PUD (peptic ulcer disease)    • PVD (peripheral vascular disease) (CMS/HCC)    • Renal failure, acute (CMS/HCC)    ,   Past Surgical History:   Procedure Laterality Date   • APPENDECTOMY     •  APPENDECTOMY     • CARDIAC CATHETERIZATION  05/01/2009    Left-Heart Skin   • CAROTID ENDARTERECTOMY     • CATARACT EXTRACTION     • CATARACT EXTRACTION     • COLONOSCOPY     • CORONARY ARTERY BYPASS GRAFT  07/2007    Four   • ENDOSCOPY  10/02/2013   • ENDOSCOPY  10/02/2013   • HYSTERECTOMY     • SKIN CANCER EXCISION     • THROMBOENDARTERECTOMY     • TONSILLECTOMY     ,   Family History   Problem Relation Age of Onset   • Heart disease Mother    • Heart disease Father    • Heart disease Brother    • Heart disease Brother    • Breast cancer Maternal Grandmother    ,   Social History     Tobacco Use   • Smoking status: Never Smoker   • Smokeless tobacco: Never Used   • Tobacco comment: Non smoker; no tobacco use   Substance Use Topics   • Alcohol use: Yes     Comment: occ   • Drug use: No   ,     Medications  Current Outpatient Medications   Medication Sig Dispense Refill   • calcitriol (ROCALTROL) 0.25 MCG capsule TAKE 1 CAPSULE BY MOUTH EVERY MONDAY, WEDNESDAY, FRIDAY  11   • calcium citrate-vitamin d (CITRACAL) 200-250 MG-UNIT tablet tablet Take  by mouth Daily.     • cholecalciferol (VITAMIN D3) 1000 UNITS tablet Take 1,000 Units by mouth Daily.     • digoxin (LANOXIN) 250 MCG tablet TAKE 1 TABLET BY MOUTH DAILY. 90 tablet 2   • diltiaZEM CD (CARDIZEM CD) 240 MG 24 hr capsule TAKE 1 CAPSULE BY MOUTH DAILY. 30 capsule 0   • diltiaZEM CD (CARDIZEM CD) 240 MG 24 hr capsule TAKE 1 CAPSULE BY MOUTH DAILY. 90 capsule 0   • FERROUS SULFATE PO Take  by mouth.     • furosemide (LASIX) 20 MG tablet TAKE 1 TABLET BY MOUTH DAILY. 90 tablet 2   • metoprolol tartrate (LOPRESSOR) 25 MG tablet TAKE 1 TABLET BY MOUTH DAILY. 90 tablet 0   • Multiple Vitamins-Minerals (MULTIVITAMIN ADULT PO) Take  by mouth.     • pravastatin (PRAVACHOL) 40 MG tablet TAKE 1 TABLET BY MOUTH DAILY. 90 tablet 2   • warfarin (COUMADIN) 5 MG tablet TAKE 1 TABLET BY MOUTH DAILY. 90 tablet 2     No current facility-administered medications for this visit.   "      Allergies:  Buffered aspirin; Demerol [meperidine]; and Salicylates    Review of Systems  Review of Systems   Constitution: Positive for weakness and malaise/fatigue.   HENT: Negative.    Eyes: Negative.    Cardiovascular: Positive for irregular heartbeat and palpitations. Negative for chest pain, claudication, cyanosis, dyspnea on exertion, leg swelling, near-syncope, orthopnea, paroxysmal nocturnal dyspnea and syncope.   Respiratory: Negative.    Endocrine: Negative.    Hematologic/Lymphatic: Negative.    Skin: Negative.    Musculoskeletal: Positive for arthritis and joint pain.   Gastrointestinal: Negative for anorexia.   Genitourinary: Negative.    Neurological: Positive for dizziness.   Psychiatric/Behavioral: Negative.        Objective     Physical Exam:  /80   Pulse 75   Ht 157.5 cm (62\")   Wt 62.1 kg (137 lb)   SpO2 98%   BMI 25.06 kg/m²   Physical Exam   Constitutional: She appears well-developed.   HENT:   Head: Normocephalic.   Neck: Normal carotid pulses and no JVD present. No tracheal tenderness present. Carotid bruit is not present. No tracheal deviation and no edema present.   Cardiovascular: Normal pulses. An irregularly irregular rhythm present.   Murmur heard.   Systolic murmur is present with a grade of 2/6.  Pulmonary/Chest: Effort normal. No stridor.   Abdominal: Soft.   Neurological: She is alert. She has normal strength. No cranial nerve deficit or sensory deficit.   Skin: Skin is warm.   Psychiatric: She has a normal mood and affect. Her speech is normal and behavior is normal.       Results Review:  Results for orders placed in visit on 04/01/16   SCANNED - ECHOCARDIOGRAM          ECG 12 Lead  Date/Time: 6/5/2019 11:03 AM  Performed by: Gualberto Gallo MD  Authorized by: Gualberto Gallo MD   Comparison: compared with previous ECG from 4/17/2018  Comparison to previous ECG: Ventricular rate decreased from  101    to  60  beats per minute    Rhythm: atrial fibrillation  Rate: " normal  ST Depression: II, III, aVF, V5 and V6  QRS axis: right    Clinical impression: abnormal EKG            Assessment/Plan   Patient Active Problem List   Diagnosis   • Essential hypertension   • S/P CABG x 4   • Atrial fibrillation with slow ventricular response (CMS/HCC)   • Mixed hyperlipidemia   • Palpitations   • Dizzy spells   • Autonomic orthostatic hypotension       Plan    Holter as likely getting jaylen and gets dizzy  May need more liberal control of AF    Stop Digoxin    Compression stockings, increase fluids so that there is clear urine every 2 hours, and use back brace with abdominal binder.   Elevate head end of bed 6 inches while sleeping     Holter in 2 weeks      Orders Placed This Encounter   Procedures   • Holter Monitor - 24 Hour     Standing Status:   Future     Standing Expiration Date:   6/4/2020     Order Specific Question:   Reason for exam?     Answer:   Palpitations     Order Specific Question:   Reason for exam?     Answer:   AFib   • ECG 12 Lead     This order was created via procedure documentation             ____________________________________________________________________________________________________________________________________________  Health maintenance and recommendations      Low vitamin K diet.  knowing what to eat, tips what foods to avoid. Printed dietary instructions with food items and Vitamin K content with websites provided.  Target INR 2-3      Needs regular INR checks   She does this periodically    The patient was advised to avoid long term NSAIDS , use Tylenol PRN instead  Avoid cardiac stimulants including common drugs like Pseudoephedrine or excessive amounts of caffeine    Monitor for any signs of bleeding including red or dark stools. Fall precautions.        Advised staying uptodate with immunizations per established standard guidelines.      Offered to give patient  a copy      Questions were encouraged, asked and answered to the patient's   understanding and satisfaction. Questions if any regarding current medications and side effects, need for refills and importance of compliance to medications stressed.    Reviewed available prior notes, consults, prior visits, laboratory findings, radiology and cardiology relevant reports. Updated chart as applicable. I have reviewed the patient's medical history in detail and updated the computerized patient record as relevant.      Updated patient regarding any new or relevant abnormalities on review of records or any new findings on physical exam. Mentioned to patient about purpose of visit and desirable health short and long term goals and objectives.    Primary to monitor CBC CMP Lipid panel and TSH as applicable    ___________________________________________________________________________________________________________________________________________            Return in about 6 weeks (around 7/17/2019).

## 2019-06-10 RX ORDER — DILTIAZEM HYDROCHLORIDE 240 MG/1
CAPSULE, COATED, EXTENDED RELEASE ORAL
Qty: 30 CAPSULE | Refills: 0 | Status: SHIPPED | OUTPATIENT
Start: 2019-06-10 | End: 2020-01-03

## 2019-06-11 RX ORDER — PRAVASTATIN SODIUM 40 MG
40 TABLET ORAL DAILY
Qty: 90 TABLET | Refills: 2 | Status: SHIPPED | OUTPATIENT
Start: 2019-06-11 | End: 2020-03-29

## 2019-06-11 RX ORDER — DIGOXIN 250 MCG
250 TABLET ORAL
Qty: 90 TABLET | Refills: 2 | Status: SHIPPED | OUTPATIENT
Start: 2019-06-11 | End: 2020-01-03

## 2019-06-11 RX ORDER — FUROSEMIDE 20 MG/1
20 TABLET ORAL DAILY
Qty: 90 TABLET | Refills: 2 | Status: SHIPPED | OUTPATIENT
Start: 2019-06-11 | End: 2020-03-29

## 2019-06-11 RX ORDER — WARFARIN SODIUM 5 MG/1
5 TABLET ORAL
Qty: 90 TABLET | Refills: 2 | Status: SHIPPED | OUTPATIENT
Start: 2019-06-11 | End: 2020-03-29

## 2019-07-17 ENCOUNTER — OFFICE VISIT (OUTPATIENT)
Dept: CARDIOLOGY | Facility: CLINIC | Age: 74
End: 2019-07-17

## 2019-07-17 VITALS
HEIGHT: 62 IN | WEIGHT: 142 LBS | OXYGEN SATURATION: 98 % | DIASTOLIC BLOOD PRESSURE: 62 MMHG | BODY MASS INDEX: 26.13 KG/M2 | SYSTOLIC BLOOD PRESSURE: 108 MMHG | HEART RATE: 98 BPM

## 2019-07-17 DIAGNOSIS — I95.1 AUTONOMIC ORTHOSTATIC HYPOTENSION: ICD-10-CM

## 2019-07-17 DIAGNOSIS — R94.31 ABNORMAL ECG: ICD-10-CM

## 2019-07-17 DIAGNOSIS — I10 ESSENTIAL HYPERTENSION: ICD-10-CM

## 2019-07-17 DIAGNOSIS — R42 DIZZY SPELLS: Primary | ICD-10-CM

## 2019-07-17 DIAGNOSIS — E78.2 MIXED HYPERLIPIDEMIA: ICD-10-CM

## 2019-07-17 DIAGNOSIS — I47.29 NSVT (NONSUSTAINED VENTRICULAR TACHYCARDIA) (HCC): ICD-10-CM

## 2019-07-17 DIAGNOSIS — R06.09 DOE (DYSPNEA ON EXERTION): ICD-10-CM

## 2019-07-17 DIAGNOSIS — Z95.1 S/P CABG X 4: ICD-10-CM

## 2019-07-17 DIAGNOSIS — R00.2 PALPITATIONS: ICD-10-CM

## 2019-07-17 DIAGNOSIS — I48.91 ATRIAL FIBRILLATION WITH SLOW VENTRICULAR RESPONSE (HCC): ICD-10-CM

## 2019-07-17 PROCEDURE — 93000 ELECTROCARDIOGRAM COMPLETE: CPT | Performed by: INTERNAL MEDICINE

## 2019-07-17 PROCEDURE — 99214 OFFICE O/P EST MOD 30 MIN: CPT | Performed by: INTERNAL MEDICINE

## 2019-07-17 NOTE — PROGRESS NOTES
Sondra Connolly  [id number]  1945  74 y.o.  female    Referring Provider: Luis Boyd MD    Reason for Follow-up Visit: Here for routine follow up  chronic atrial fibrillation   Holter as below    Subjective      Similar symptoms as during last visit     More energy now after decreasing rate control agents    Having  non sustained ventricular tachycardia on holter     Mild chronic exertional shortness of breath on exertion relieved with rest  No significant cough or wheezing  Going on for several months or longer    No palpitations  No associated chest pain  No significant pedal edema    No fever or chills  No significant expectoration    No hemoptysis  No presyncope or syncope     Much less dizzy spells now   Easy fatiguability           History of present illness:  Sondra Connolly is a 74 y.o. yo female with history of chronic atrial fibrillation who presents today for   Chief Complaint   Patient presents with   • Atrial Fibrillation     1 MON FU / HOLTER RESULTS   • Palpitations   • Shortness of Breath   .    History  Past Medical History:   Diagnosis Date   • A-fib (CMS/HCC)    • Angina pectoris (CMS/HCC)    • Anxiety    • Arthritis    • Atherosclerosis of coronary artery bypass graft    • CAD (coronary artery disease)    • Carotid artery occlusion without infarction    • CHF (congestive heart failure) (CMS/HCC)    • Chronic kidney disease    • Chronic lung disease    • Chronic renal impairment      unspecified stage   • Colon polyp    • Colon polyp    • Diabetes mellitus (CMS/HCC)    • DJD (degenerative joint disease)    • Fibrocystic breast disease    • Gastritis    • Gastritis    • GI bleed    • Hemorrhoids    • Hemorrhoids    • Hyperlipidemia    • Hypertension    • Lung disease     chronic   • LV dysfunction    • MI (myocardial infarction) (CMS/HCC)    • MI (myocardial infarction) (CMS/HCC)    • Obesity    • Palpitations    • Peptic ulcer    • PUD (peptic ulcer disease)    • PVD (peripheral  vascular disease) (CMS/HCC)    • Renal failure, acute (CMS/HCC)    ,   Past Surgical History:   Procedure Laterality Date   • APPENDECTOMY     • APPENDECTOMY     • CARDIAC CATHETERIZATION  05/01/2009    Left-Heart Skin   • CAROTID ENDARTERECTOMY     • CATARACT EXTRACTION     • CATARACT EXTRACTION     • COLONOSCOPY     • CORONARY ARTERY BYPASS GRAFT  07/2007    Four   • ENDOSCOPY  10/02/2013   • ENDOSCOPY  10/02/2013   • HYSTERECTOMY     • SKIN CANCER EXCISION     • THROMBOENDARTERECTOMY     • TONSILLECTOMY     ,   Family History   Problem Relation Age of Onset   • Heart disease Mother    • Heart disease Father    • Heart disease Brother    • Heart disease Brother    • Breast cancer Maternal Grandmother    ,   Social History     Tobacco Use   • Smoking status: Never Smoker   • Smokeless tobacco: Never Used   • Tobacco comment: Non smoker; no tobacco use   Substance Use Topics   • Alcohol use: Yes     Comment: occ   • Drug use: No   ,     Medications  Current Outpatient Medications   Medication Sig Dispense Refill   • calcitriol (ROCALTROL) 0.25 MCG capsule TAKE 1 CAPSULE BY MOUTH EVERY MONDAY, WEDNESDAY, FRIDAY  11   • calcium citrate-vitamin d (CITRACAL) 200-250 MG-UNIT tablet tablet Take  by mouth Daily.     • cholecalciferol (VITAMIN D3) 1000 UNITS tablet Take 1,000 Units by mouth Daily.     • digoxin (LANOXIN) 250 MCG tablet TAKE 1 TABLET BY MOUTH DAILY. 90 tablet 2   • diltiaZEM CD (CARDIZEM CD) 240 MG 24 hr capsule TAKE 1 CAPSULE BY MOUTH DAILY. 90 capsule 0   • diltiaZEM CD (CARDIZEM CD) 240 MG 24 hr capsule TAKE 1 CAPSULE BY MOUTH EVERY DAY 30 capsule 0   • FERROUS SULFATE PO Take  by mouth.     • furosemide (LASIX) 20 MG tablet TAKE 1 TABLET BY MOUTH DAILY. 90 tablet 2   • metoprolol tartrate (LOPRESSOR) 25 MG tablet TAKE 1 TABLET BY MOUTH DAILY. 90 tablet 3   • Multiple Vitamins-Minerals (MULTIVITAMIN ADULT PO) Take  by mouth.     • pravastatin (PRAVACHOL) 40 MG tablet TAKE 1 TABLET BY MOUTH DAILY. 90  "tablet 2   • warfarin (COUMADIN) 5 MG tablet TAKE 1 TABLET BY MOUTH DAILY. 90 tablet 2     No current facility-administered medications for this visit.        Allergies:  Buffered aspirin; Demerol [meperidine]; and Salicylates    Review of Systems  Review of Systems   Constitution: Positive for weakness and malaise/fatigue.   HENT: Negative.    Eyes: Negative.    Cardiovascular: Positive for irregular heartbeat and palpitations. Negative for chest pain, claudication, cyanosis, dyspnea on exertion, leg swelling, near-syncope, orthopnea, paroxysmal nocturnal dyspnea and syncope.   Respiratory: Negative.    Endocrine: Negative.    Hematologic/Lymphatic: Negative.    Skin: Negative.    Musculoskeletal: Positive for arthritis and joint pain.   Gastrointestinal: Negative for anorexia.   Genitourinary: Negative.    Neurological: Positive for dizziness.   Psychiatric/Behavioral: Negative.        Objective     Physical Exam:  /62   Pulse 98   Ht 157.5 cm (62.01\")   Wt 64.4 kg (142 lb)   SpO2 98%   BMI 25.97 kg/m²   Physical Exam   Constitutional: She appears well-developed.   HENT:   Head: Normocephalic.   Neck: Normal carotid pulses and no JVD present. No tracheal tenderness present. Carotid bruit is not present. No tracheal deviation and no edema present.   Cardiovascular: Normal pulses. An irregularly irregular rhythm present.   Murmur heard.   Systolic murmur is present with a grade of 2/6.  Pulmonary/Chest: Effort normal. No stridor.   Abdominal: Soft.   Neurological: She is alert. She has normal strength. No cranial nerve deficit or sensory deficit.   Skin: Skin is warm.   Psychiatric: She has a normal mood and affect. Her speech is normal and behavior is normal.       Results Review:  Results for orders placed in visit on 04/01/16   SCANNED - ECHOCARDIOGRAM   Holter 6/19      · Average HR: 79. Min HR: 44. Max HR: 146.     · Monitored for ~1 day   · Slowest heart rate at 8:46 AM  · The predominant rhythm " noted during the testing period was atrial flutter   · 2256  premature ventricular contractions: PVC burden:  2%   · 75    non sustained ventricular tachycardia episode longest 9 beats at a maximum rate of 152 beats per minute   · No correlated arrhythmia  · 568 pauses longest 3.3 seconds at 1:54 AM                  ECG 12 Lead  Date/Time: 7/17/2019 12:16 PM  Performed by: Gualberto Gallo MD  Authorized by: Gualberto Gallo MD   Comparison: compared with previous ECG from 6/5/2019  Comparison to previous ECG: Ventricular rate increased from  60    to   101   beats per minute   Rhythm: atrial fibrillation  Rate: tachycardic  Conduction: conduction normal  ST Segments: ST segments normal  T Waves: T waves normal  QRS axis: normal  Other findings: non-specific ST-T wave changes    Clinical impression: abnormal EKG            Assessment/Plan   Patient Active Problem List   Diagnosis   • Essential hypertension   • S/P CABG x 4   • Atrial fibrillation with slow ventricular response (CMS/HCC)   • Mixed hyperlipidemia   • Palpitations   • Dizzy spells   • Autonomic orthostatic hypotension   • NSVT (nonsustained ventricular tachycardia) (CMS/HCC)       Plan          May need pacer for  symptomatic tachy-jaylen syndrome  In future       Orders Placed This Encounter   Procedures   • Stress Test With Myocardial Perfusion One Day     Standing Status:   Future     Standing Expiration Date:   7/16/2020     Order Specific Question:   What stress agent will be used?     Answer:   Regadenoson (Lexiscan)     Order Specific Question:   Difficulty walking criteria?     Answer:   Musculoskeletal (hips, knees, feet, back, amputee)     Order Specific Question:   Reason for exam?     Answer:   Known Coronary Artery Disease     Order Specific Question:   Known CAD specification?     Answer:   Abnormal EKG   • ECG 12 Lead     This order was created via procedure documentation   • Adult Transthoracic Echo Complete W/ Cont if Necessary Per Protocol      Standing Status:   Future     Standing Expiration Date:   7/16/2020     Order Specific Question:   Reason for exam?     Answer:   Dyspnea         ____________________________________________________________________________________________________________________________________________  Health maintenance and recommendations      Similar recommendations as last visit     Offered to give patient  a copy      Questions were encouraged, asked and answered to the patient's  understanding and satisfaction. Questions if any regarding current medications and side effects, need for refills and importance of compliance to medications stressed.    Reviewed available prior notes, consults, prior visits, laboratory findings, radiology and cardiology relevant reports. Updated chart as applicable. I have reviewed the patient's medical history in detail and updated the computerized patient record as relevant.      Updated patient regarding any new or relevant abnormalities on review of records or any new findings on physical exam. Mentioned to patient about purpose of visit and desirable health short and long term goals and objectives.    Primary to monitor CBC CMP Lipid panel and TSH as applicable    ___________________________________________________________________________________________________________________________________________            Return in about 4 weeks (around 8/14/2019).

## 2019-07-25 ENCOUNTER — ANTICOAGULATION VISIT (OUTPATIENT)
Dept: CARDIOLOGY | Facility: CLINIC | Age: 74
End: 2019-07-25

## 2019-07-25 ENCOUNTER — HOSPITAL ENCOUNTER (OUTPATIENT)
Dept: CARDIOLOGY | Facility: HOSPITAL | Age: 74
Discharge: HOME | End: 2019-07-25

## 2019-07-25 ENCOUNTER — HOSPITAL ENCOUNTER (OUTPATIENT)
Dept: CARDIOLOGY | Facility: HOSPITAL | Age: 74
Discharge: HOME | End: 2019-07-25
Admitting: INTERNAL MEDICINE

## 2019-07-25 VITALS
SYSTOLIC BLOOD PRESSURE: 108 MMHG | DIASTOLIC BLOOD PRESSURE: 62 MMHG | HEIGHT: 62 IN | WEIGHT: 142 LBS | BODY MASS INDEX: 26.13 KG/M2

## 2019-07-25 VITALS
SYSTOLIC BLOOD PRESSURE: 113 MMHG | WEIGHT: 141.09 LBS | HEART RATE: 79 BPM | HEIGHT: 62 IN | DIASTOLIC BLOOD PRESSURE: 74 MMHG | BODY MASS INDEX: 25.96 KG/M2

## 2019-07-25 DIAGNOSIS — R06.09 DOE (DYSPNEA ON EXERTION): ICD-10-CM

## 2019-07-25 DIAGNOSIS — R94.31 ABNORMAL ECG: ICD-10-CM

## 2019-07-25 LAB
BH CV NUCLEAR PRIOR STUDY: 3
BH CV STRESS BP STAGE 1: NORMAL
BH CV STRESS COMMENTS STAGE 1: NORMAL
BH CV STRESS DOSE REGADENOSON STAGE 1: 0.4
BH CV STRESS DURATION MIN STAGE 1: 0
BH CV STRESS DURATION SEC STAGE 1: 10
BH CV STRESS HR STAGE 1: 74
BH CV STRESS PROTOCOL 1: NORMAL
BH CV STRESS RECOVERY BP: NORMAL MMHG
BH CV STRESS RECOVERY HR: 84 BPM
BH CV STRESS STAGE 1: 1
INR PPP: 2.1
LV EF NUC BP: 55 %
MAXIMAL PREDICTED HEART RATE: 144 BPM
PERCENT MAX PREDICTED HR: 51.39 %
STRESS BASELINE BP: NORMAL MMHG
STRESS BASELINE HR: 74 BPM
STRESS PERCENT HR: 60 %
STRESS POST EXERCISE DUR MIN: 0 MIN
STRESS POST EXERCISE DUR SEC: 10 SEC
STRESS POST PEAK BP: NORMAL MMHG
STRESS POST PEAK HR: 74 BPM
STRESS TARGET HR: 122 BPM

## 2019-07-25 PROCEDURE — 25010000002 REGADENOSON 0.4 MG/5ML SOLUTION: Performed by: INTERNAL MEDICINE

## 2019-07-25 PROCEDURE — 0 TECHNETIUM SESTAMIBI: Performed by: INTERNAL MEDICINE

## 2019-07-25 PROCEDURE — 93018 CV STRESS TEST I&R ONLY: CPT | Performed by: INTERNAL MEDICINE

## 2019-07-25 PROCEDURE — 78452 HT MUSCLE IMAGE SPECT MULT: CPT | Performed by: INTERNAL MEDICINE

## 2019-07-25 PROCEDURE — A9500 TC99M SESTAMIBI: HCPCS | Performed by: INTERNAL MEDICINE

## 2019-07-25 PROCEDURE — 93306 TTE W/DOPPLER COMPLETE: CPT | Performed by: INTERNAL MEDICINE

## 2019-07-25 PROCEDURE — 93017 CV STRESS TEST TRACING ONLY: CPT

## 2019-07-25 PROCEDURE — 78452 HT MUSCLE IMAGE SPECT MULT: CPT

## 2019-07-25 PROCEDURE — 93306 TTE W/DOPPLER COMPLETE: CPT

## 2019-07-25 RX ADMIN — REGADENOSON 0.4 MG: 0.08 INJECTION, SOLUTION INTRAVENOUS at 09:59

## 2019-07-25 RX ADMIN — TECHNETIUM TC 99M SESTAMIBI 1 DOSE: 1 INJECTION INTRAVENOUS at 10:01

## 2019-07-25 RX ADMIN — TECHNETIUM TC 99M SESTAMIBI 1 DOSE: 1 INJECTION INTRAVENOUS at 08:30

## 2019-07-27 LAB
BH CV ECHO MEAS - AO MAX PG (FULL): 22.3 MMHG
BH CV ECHO MEAS - AO MAX PG: 24.6 MMHG
BH CV ECHO MEAS - AO MEAN PG (FULL): 12 MMHG
BH CV ECHO MEAS - AO MEAN PG: 13 MMHG
BH CV ECHO MEAS - AO ROOT AREA (BSA CORRECTED): 1.9
BH CV ECHO MEAS - AO ROOT AREA: 8 CM^2
BH CV ECHO MEAS - AO ROOT DIAM: 3.2 CM
BH CV ECHO MEAS - AO V2 MAX: 248 CM/SEC
BH CV ECHO MEAS - AO V2 MEAN: 165.5 CM/SEC
BH CV ECHO MEAS - AO V2 VTI: 58.6 CM
BH CV ECHO MEAS - AVA(I,A): 0.86 CM^2
BH CV ECHO MEAS - AVA(I,D): 0.86 CM^2
BH CV ECHO MEAS - AVA(V,A): 0.96 CM^2
BH CV ECHO MEAS - AVA(V,D): 0.96 CM^2
BH CV ECHO MEAS - BSA(HAYCOCK): 1.7 M^2
BH CV ECHO MEAS - BSA: 1.7 M^2
BH CV ECHO MEAS - BZI_BMI: 26 KILOGRAMS/M^2
BH CV ECHO MEAS - BZI_METRIC_HEIGHT: 157.5 CM
BH CV ECHO MEAS - BZI_METRIC_WEIGHT: 64.4 KG
BH CV ECHO MEAS - EDV(CUBED): 131.1 ML
BH CV ECHO MEAS - EDV(MOD-SP4): 82.7 ML
BH CV ECHO MEAS - EDV(TEICH): 122.7 ML
BH CV ECHO MEAS - EF(CUBED): 61.7 %
BH CV ECHO MEAS - EF(MOD-SP4): 53.7 %
BH CV ECHO MEAS - EF(TEICH): 52.9 %
BH CV ECHO MEAS - ESV(CUBED): 50.2 ML
BH CV ECHO MEAS - ESV(MOD-SP4): 38.3 ML
BH CV ECHO MEAS - ESV(TEICH): 57.8 ML
BH CV ECHO MEAS - FS: 27.4 %
BH CV ECHO MEAS - IVS/LVPW: 1.3
BH CV ECHO MEAS - IVSD: 1.1 CM
BH CV ECHO MEAS - LA DIMENSION: 3.8 CM
BH CV ECHO MEAS - LA/AO: 1.2
BH CV ECHO MEAS - LAT PEAK E' VEL: 8.1 CM/SEC
BH CV ECHO MEAS - LV DIASTOLIC VOL/BSA (35-75): 50 ML/M^2
BH CV ECHO MEAS - LV MASS(C)D: 178.7 GRAMS
BH CV ECHO MEAS - LV MASS(C)DI: 108.2 GRAMS/M^2
BH CV ECHO MEAS - LV MAX PG: 2.3 MMHG
BH CV ECHO MEAS - LV MEAN PG: 1 MMHG
BH CV ECHO MEAS - LV SYSTOLIC VOL/BSA (12-30): 23.2 ML/M^2
BH CV ECHO MEAS - LV V1 MAX: 76.1 CM/SEC
BH CV ECHO MEAS - LV V1 MEAN: 48.4 CM/SEC
BH CV ECHO MEAS - LV V1 VTI: 16.1 CM
BH CV ECHO MEAS - LVIDD: 5.1 CM
BH CV ECHO MEAS - LVIDS: 3.7 CM
BH CV ECHO MEAS - LVLD AP4: 7.4 CM
BH CV ECHO MEAS - LVLS AP4: 6.3 CM
BH CV ECHO MEAS - LVOT AREA (M): 3.1 CM^2
BH CV ECHO MEAS - LVOT AREA: 3.1 CM^2
BH CV ECHO MEAS - LVOT DIAM: 2 CM
BH CV ECHO MEAS - LVPWD: 0.86 CM
BH CV ECHO MEAS - MED PEAK E' VEL: 4.8 CM/SEC
BH CV ECHO MEAS - MR MAX PG: 103.6 MMHG
BH CV ECHO MEAS - MR MAX VEL: 508.5 CM/SEC
BH CV ECHO MEAS - MR MEAN PG: 59 MMHG
BH CV ECHO MEAS - MR MEAN VEL: 362 CM/SEC
BH CV ECHO MEAS - MR VTI: 185 CM
BH CV ECHO MEAS - MV DEC SLOPE: 615 CM/SEC^2
BH CV ECHO MEAS - MV DEC TIME: 0.15 SEC
BH CV ECHO MEAS - MV E MAX VEL: 168 CM/SEC
BH CV ECHO MEAS - MV P1/2T MAX VEL: 174 CM/SEC
BH CV ECHO MEAS - MV P1/2T: 82.9 MSEC
BH CV ECHO MEAS - MVA P1/2T LCG: 1.3 CM^2
BH CV ECHO MEAS - MVA(P1/2T): 2.7 CM^2
BH CV ECHO MEAS - PI END-D VEL: 135 CM/SEC
BH CV ECHO MEAS - RAP SYSTOLE: 5 MMHG
BH CV ECHO MEAS - RVSP: 32.7 MMHG
BH CV ECHO MEAS - SI(AO): 285.2 ML/M^2
BH CV ECHO MEAS - SI(CUBED): 48.9 ML/M^2
BH CV ECHO MEAS - SI(LVOT): 30.6 ML/M^2
BH CV ECHO MEAS - SI(MOD-SP4): 26.9 ML/M^2
BH CV ECHO MEAS - SI(TEICH): 39.3 ML/M^2
BH CV ECHO MEAS - SV(AO): 471.3 ML
BH CV ECHO MEAS - SV(CUBED): 80.9 ML
BH CV ECHO MEAS - SV(LVOT): 50.6 ML
BH CV ECHO MEAS - SV(MOD-SP4): 44.4 ML
BH CV ECHO MEAS - SV(TEICH): 64.9 ML
BH CV ECHO MEAS - TR MAX VEL: 263 CM/SEC
BH CV ECHO MEASUREMENTS AVERAGE E/E' RATIO: 26.05
LEFT ATRIUM VOLUME INDEX: 79 ML/M2
LV EF 2D ECHO EST: 55 %
MAXIMAL PREDICTED HEART RATE: 144 BPM
STRESS TARGET HR: 122 BPM

## 2019-08-07 ENCOUNTER — TELEPHONE (OUTPATIENT)
Dept: CARDIOLOGY | Facility: CLINIC | Age: 74
End: 2019-08-07

## 2019-08-11 NOTE — TELEPHONE ENCOUNTER
· medium-sized infarct located in the inferior wall with no significant ischemia noted.  · Mild to moderate mitral regurgitation   · Keep follow up as scheduled or PRN sooner if any new or worsening problem.

## 2019-08-15 ENCOUNTER — NURSE TRIAGE (OUTPATIENT)
Dept: CALL CENTER | Facility: HOSPITAL | Age: 74
End: 2019-08-15

## 2019-08-15 NOTE — TELEPHONE ENCOUNTER
"Caller requesting results of stress tests. Test performed on 07/25/2019. She says she has called Dr. Gallo's office several times and has left messages and no one has called her back. Checked number for Dr. Gallo's office . I explained I would fax note to office. I also called and spoke with Martine in the [facility], she said she would have them to call the patient back.    Reason for Disposition  • [1] Caller requesting NON-URGENT health information AND [2] PCP's office is the best resource    Additional Information  • Negative: [1] Caller is not with the adult (patient) AND [2] reporting urgent symptoms  • Negative: Lab result questions  • Negative: Medication questions  • Negative: Caller cannot be reached by phone  • Negative: Caller has already spoken to PCP or another triager  • Negative: RN needs further essential information from caller in order to complete triage  • Negative: Requesting regular office appointment    Answer Assessment - Initial Assessment Questions  1. REASON FOR CALL or QUESTION: \"What is your reason for calling today?\" or \"How can I best help you?\" or \"What question do you have that I can help answer?\"      See note    Protocols used: INFORMATION ONLY CALL-ADULT-AH      "

## 2019-08-23 ENCOUNTER — OFFICE VISIT (OUTPATIENT)
Dept: CARDIOLOGY | Facility: CLINIC | Age: 74
End: 2019-08-23

## 2019-08-23 VITALS
WEIGHT: 143 LBS | DIASTOLIC BLOOD PRESSURE: 60 MMHG | HEART RATE: 60 BPM | BODY MASS INDEX: 26.31 KG/M2 | OXYGEN SATURATION: 97 % | SYSTOLIC BLOOD PRESSURE: 102 MMHG | HEIGHT: 62 IN

## 2019-08-23 DIAGNOSIS — I10 ESSENTIAL HYPERTENSION: ICD-10-CM

## 2019-08-23 DIAGNOSIS — I47.29 NSVT (NONSUSTAINED VENTRICULAR TACHYCARDIA) (HCC): ICD-10-CM

## 2019-08-23 DIAGNOSIS — I48.91 ATRIAL FIBRILLATION WITH SLOW VENTRICULAR RESPONSE (HCC): ICD-10-CM

## 2019-08-23 DIAGNOSIS — R42 DIZZY SPELLS: ICD-10-CM

## 2019-08-23 DIAGNOSIS — R00.2 PALPITATIONS: Primary | ICD-10-CM

## 2019-08-23 DIAGNOSIS — R94.39 ABNORMAL NUCLEAR STRESS TEST: ICD-10-CM

## 2019-08-23 DIAGNOSIS — E78.2 MIXED HYPERLIPIDEMIA: ICD-10-CM

## 2019-08-23 DIAGNOSIS — I95.1 AUTONOMIC ORTHOSTATIC HYPOTENSION: ICD-10-CM

## 2019-08-23 DIAGNOSIS — Z95.1 S/P CABG X 4: ICD-10-CM

## 2019-08-23 PROCEDURE — 99214 OFFICE O/P EST MOD 30 MIN: CPT | Performed by: INTERNAL MEDICINE

## 2019-08-23 PROCEDURE — 93000 ELECTROCARDIOGRAM COMPLETE: CPT | Performed by: INTERNAL MEDICINE

## 2019-08-23 RX ORDER — NITROGLYCERIN 0.4 MG/1
TABLET SUBLINGUAL
Qty: 100 TABLET | Refills: 11 | Status: SHIPPED | OUTPATIENT
Start: 2019-08-23 | End: 2021-03-17

## 2019-08-23 NOTE — PROGRESS NOTES
Sondra Connolly  [id number]  1945  74 y.o.  female    Referring Provider: Luis Boyd MD    Reason for Follow-up Visit: Here for routine follow up  chronic atrial fibrillation   Holter as below  Cardiac workup test results as below   coronary artery disease Coronary artery bypass grafting > 10 years ago  Nuclear stress test no significant ischemia, has prior infarction    Subjective      Similar symptoms as during last visit     More energy now after decreasing rate control agents    Having  non sustained ventricular tachycardia on holter     Mild chronic exertional shortness of breath on exertion relieved with rest  No significant cough or wheezing  Going on for several months or longer    No palpitations  No associated chest pain  No significant pedal edema    No fever or chills  No significant expectoration    No hemoptysis  No presyncope or syncope     Much less dizzy spells now   Easy fatiguability           History of present illness:  Sondra Connolly is a 74 y.o. yo female with history of chronic atrial fibrillation who presents today for   Chief Complaint   Patient presents with   • Atrial Fibrillation     3 MON FU/ RESULTS    • Palpitations   • Shortness of Breath   .    History  Past Medical History:   Diagnosis Date   • A-fib (CMS/HCC)    • Angina pectoris (CMS/HCC)    • Anxiety    • Arthritis    • Atherosclerosis of coronary artery bypass graft    • CAD (coronary artery disease)    • Carotid artery occlusion without infarction    • CHF (congestive heart failure) (CMS/HCC)    • Chronic kidney disease    • Chronic lung disease    • Chronic renal impairment      unspecified stage   • Colon polyp    • Colon polyp    • Diabetes mellitus (CMS/HCC)    • DJD (degenerative joint disease)    • Fibrocystic breast disease    • Gastritis    • Gastritis    • GI bleed    • Hemorrhoids    • Hemorrhoids    • Hyperlipidemia    • Hypertension    • Lung disease     chronic   • LV dysfunction    • MI  (myocardial infarction) (CMS/HCC)    • MI (myocardial infarction) (CMS/HCC)    • Obesity    • Palpitations    • Peptic ulcer    • PUD (peptic ulcer disease)    • PVD (peripheral vascular disease) (CMS/HCC)    • Renal failure, acute (CMS/HCC)    ,   Past Surgical History:   Procedure Laterality Date   • APPENDECTOMY     • APPENDECTOMY     • CARDIAC CATHETERIZATION  05/01/2009    Left-Heart Skin   • CAROTID ENDARTERECTOMY     • CATARACT EXTRACTION     • CATARACT EXTRACTION     • COLONOSCOPY     • CORONARY ARTERY BYPASS GRAFT  07/2007    Four   • ENDOSCOPY  10/02/2013   • ENDOSCOPY  10/02/2013   • HYSTERECTOMY     • SKIN CANCER EXCISION     • THROMBOENDARTERECTOMY     • TONSILLECTOMY     ,   Family History   Problem Relation Age of Onset   • Heart disease Mother    • Heart disease Father    • Heart disease Brother    • Heart disease Brother    • Breast cancer Maternal Grandmother    ,   Social History     Tobacco Use   • Smoking status: Never Smoker   • Smokeless tobacco: Never Used   • Tobacco comment: Non smoker; no tobacco use   Substance Use Topics   • Alcohol use: Yes     Comment: occ   • Drug use: No   ,     Medications  Current Outpatient Medications   Medication Sig Dispense Refill   • calcitriol (ROCALTROL) 0.25 MCG capsule TAKE 1 CAPSULE BY MOUTH EVERY MONDAY, WEDNESDAY, FRIDAY  11   • calcium citrate-vitamin d (CITRACAL) 200-250 MG-UNIT tablet tablet Take  by mouth Daily.     • cholecalciferol (VITAMIN D3) 1000 UNITS tablet Take 1,000 Units by mouth Daily.     • digoxin (LANOXIN) 250 MCG tablet TAKE 1 TABLET BY MOUTH DAILY. 90 tablet 2   • diltiaZEM CD (CARDIZEM CD) 240 MG 24 hr capsule TAKE 1 CAPSULE BY MOUTH DAILY. 90 capsule 0   • diltiaZEM CD (CARDIZEM CD) 240 MG 24 hr capsule TAKE 1 CAPSULE BY MOUTH EVERY DAY 30 capsule 0   • FERROUS SULFATE PO Take  by mouth.     • furosemide (LASIX) 20 MG tablet TAKE 1 TABLET BY MOUTH DAILY. 90 tablet 2   • metoprolol tartrate (LOPRESSOR) 25 MG tablet TAKE 1 TABLET BY  "MOUTH DAILY. 90 tablet 3   • Multiple Vitamins-Minerals (MULTIVITAMIN ADULT PO) Take  by mouth.     • pravastatin (PRAVACHOL) 40 MG tablet TAKE 1 TABLET BY MOUTH DAILY. 90 tablet 2   • warfarin (COUMADIN) 5 MG tablet TAKE 1 TABLET BY MOUTH DAILY. 90 tablet 2     No current facility-administered medications for this visit.        Allergies:  Buffered aspirin; Demerol [meperidine]; and Salicylates    Review of Systems  Review of Systems   Constitution: Positive for weakness and malaise/fatigue.   HENT: Negative.    Eyes: Negative.    Cardiovascular: Positive for irregular heartbeat and palpitations. Negative for chest pain, claudication, cyanosis, dyspnea on exertion, leg swelling, near-syncope, orthopnea, paroxysmal nocturnal dyspnea and syncope.   Respiratory: Negative.    Endocrine: Negative.    Hematologic/Lymphatic: Negative.    Skin: Negative.    Musculoskeletal: Positive for arthritis and joint pain.   Gastrointestinal: Negative for anorexia.   Genitourinary: Negative.    Neurological: Positive for dizziness.   Psychiatric/Behavioral: Negative.        Objective     Physical Exam:  /60   Pulse 60   Ht 157.5 cm (62.01\")   Wt 64.9 kg (143 lb)   SpO2 97%   BMI 26.15 kg/m²   Physical Exam   Constitutional: She appears well-developed.   HENT:   Head: Normocephalic.   Neck: Normal carotid pulses and no JVD present. No tracheal tenderness present. Carotid bruit is not present. No tracheal deviation and no edema present.   Cardiovascular: Normal pulses. An irregularly irregular rhythm present.   Murmur heard.   Systolic murmur is present with a grade of 2/6.  Pulmonary/Chest: Effort normal. No stridor.   Abdominal: Soft.   Neurological: She is alert. She has normal strength. No cranial nerve deficit or sensory deficit.   Skin: Skin is warm.   Psychiatric: She has a normal mood and affect. Her speech is normal and behavior is normal.       Results Review:  Results for orders placed during the hospital " encounter of 07/25/19   Adult Transthoracic Echo Complete W/ Cont if Necessary Per Protocol    Narrative · Estimated EF = 55%.  · Left ventricular diastolic dysfunction.  · Left atrial cavity size is severely dilated.  · Mild aortic valve stenosis is present.  · Mild-to-moderate mitral valve regurgitation is present  · No evidence of pulmonary hypertension is present.      Holter 6/19      · Average HR: 79. Min HR: 44. Max HR: 146.     · Monitored for ~1 day   · Slowest heart rate at 8:46 AM  · The predominant rhythm noted during the testing period was atrial flutter   · 2256  premature ventricular contractions: PVC burden:  2%   · 75    non sustained ventricular tachycardia episode longest 9 beats at a maximum rate of 152 beats per minute   · No correlated arrhythmia  · 568 pauses longest 3.3 seconds at 1:54 AM    myocardial perfusion scan      · Left ventricular ejection fraction is normal (Calculated EF = 55%).  · Breast attenuation artifact is present.  · Myocardial perfusion imaging indicates a medium-sized infarct located in the inferior wall with no significant ischemia noted.                  ECG 12 Lead  Date/Time: 8/23/2019 12:51 PM  Performed by: Gualberto Gallo MD  Authorized by: Gualberto Gallo MD   Comparison: compared with previous ECG from 7/17/2019  Similar to previous ECG  Rhythm: atrial fibrillation  Rate: tachycardic  Conduction: conduction normal  ST Segments: ST segments normal  QRS axis: right  Other findings: non-specific ST-T wave changes    Clinical impression: abnormal EKG            Assessment/Plan   Patient Active Problem List   Diagnosis   • Essential hypertension   • S/P CABG x 4   • Atrial fibrillation with slow ventricular response (CMS/HCC)   • Mixed hyperlipidemia   • Palpitations   • Dizzy spells   • Autonomic orthostatic hypotension   • NSVT (nonsustained ventricular tachycardia) (CMS/HCC)   • Abnormal nuclear stress test       Plan          Needs pacer for  symptomatic tachy-jaylen  syndrome  Aand treat with rate control agents  She declined     Conservative medical therapy per patient. Risks, benefits and alternatives explained. The patient understands and wishes to proceed with only conservative therapy.  The patient expressively declined any invasive testing or treatment      S/L NTG PRN for chest pain, call me or go to ER if has to use S/L nitroglycerin     Requested Prescriptions     Signed Prescriptions Disp Refills   • nitroglycerin (NITROSTAT) 0.4 MG SL tablet 100 tablet 11     Si under the tongue as needed for angina, may repeat q5mins for up three doses         ____________________________________________________________________________________________________________________________________________  Health maintenance and recommendations      Similar recommendations as last visit     Offered to give patient  a copy      Questions were encouraged, asked and answered to the patient's  understanding and satisfaction. Questions if any regarding current medications and side effects, need for refills and importance of compliance to medications stressed.    Reviewed available prior notes, consults, prior visits, laboratory findings, radiology and cardiology relevant reports. Updated chart as applicable. I have reviewed the patient's medical history in detail and updated the computerized patient record as relevant.      Updated patient regarding any new or relevant abnormalities on review of records or any new findings on physical exam. Mentioned to patient about purpose of visit and desirable health short and long term goals and objectives.    Primary to monitor CBC CMP Lipid panel and TSH as applicable    ___________________________________________________________________________________________________________________________________________            Return in about 4 months (around 2019).

## 2019-09-04 RX ORDER — DILTIAZEM HYDROCHLORIDE 240 MG/1
240 CAPSULE, COATED, EXTENDED RELEASE ORAL DAILY
Qty: 90 CAPSULE | Refills: 0 | Status: SHIPPED | OUTPATIENT
Start: 2019-09-04 | End: 2019-11-26 | Stop reason: SDUPTHER

## 2019-10-21 ENCOUNTER — ANTICOAGULATION VISIT (OUTPATIENT)
Dept: CARDIOLOGY | Facility: CLINIC | Age: 74
End: 2019-10-21

## 2019-10-25 ENCOUNTER — ANTICOAGULATION VISIT (OUTPATIENT)
Dept: CARDIOLOGY | Facility: CLINIC | Age: 74
End: 2019-10-25

## 2019-10-25 LAB — INR PPP: 3

## 2019-11-26 ENCOUNTER — ANTICOAGULATION VISIT (OUTPATIENT)
Dept: CARDIOLOGY | Facility: CLINIC | Age: 74
End: 2019-11-26

## 2019-11-26 RX ORDER — DILTIAZEM HYDROCHLORIDE 240 MG/1
240 CAPSULE, COATED, EXTENDED RELEASE ORAL DAILY
Qty: 90 CAPSULE | Refills: 3 | Status: SHIPPED | OUTPATIENT
Start: 2019-11-26 | End: 2020-01-05 | Stop reason: HOSPADM

## 2019-12-26 ENCOUNTER — ANTICOAGULATION VISIT (OUTPATIENT)
Dept: CARDIOLOGY | Facility: CLINIC | Age: 74
End: 2019-12-26

## 2019-12-27 ENCOUNTER — ANTICOAGULATION VISIT (OUTPATIENT)
Dept: CARDIOLOGY | Facility: CLINIC | Age: 74
End: 2019-12-27

## 2020-01-03 ENCOUNTER — APPOINTMENT (OUTPATIENT)
Dept: GENERAL RADIOLOGY | Facility: HOSPITAL | Age: 75
End: 2020-01-03

## 2020-01-03 ENCOUNTER — ANTICOAGULATION VISIT (OUTPATIENT)
Dept: CARDIOLOGY | Facility: CLINIC | Age: 75
End: 2020-01-03

## 2020-01-03 ENCOUNTER — HOSPITAL ENCOUNTER (INPATIENT)
Facility: HOSPITAL | Age: 75
LOS: 2 days | Discharge: HOME | End: 2020-01-05
Attending: EMERGENCY MEDICINE | Admitting: INTERNAL MEDICINE

## 2020-01-03 DIAGNOSIS — I48.91 ATRIAL FIBRILLATION WITH RAPID VENTRICULAR RESPONSE (HCC): Primary | ICD-10-CM

## 2020-01-03 DIAGNOSIS — I50.9 ACUTE CONGESTIVE HEART FAILURE, UNSPECIFIED HEART FAILURE TYPE (HCC): ICD-10-CM

## 2020-01-03 PROBLEM — I95.9 HYPOTENSION: Status: ACTIVE | Noted: 2019-06-05

## 2020-01-03 PROBLEM — I25.10 CAD (CORONARY ARTERY DISEASE): Status: ACTIVE | Noted: 2020-01-03

## 2020-01-03 PROBLEM — E11.65 TYPE 2 DIABETES MELLITUS WITH HYPERGLYCEMIA, WITHOUT LONG-TERM CURRENT USE OF INSULIN (HCC): Status: ACTIVE | Noted: 2020-01-03

## 2020-01-03 PROBLEM — I50.33 ACUTE ON CHRONIC DIASTOLIC HEART FAILURE: Status: ACTIVE | Noted: 2020-01-03

## 2020-01-03 LAB
ALBUMIN SERPL-MCNC: 3.9 G/DL (ref 3.5–5.2)
ALBUMIN/GLOB SERPL: 1.5 G/DL
ALP SERPL-CCNC: 56 U/L (ref 39–117)
ALT SERPL W P-5'-P-CCNC: 26 U/L (ref 1–33)
ANION GAP SERPL CALCULATED.3IONS-SCNC: 11 MMOL/L (ref 5–15)
AST SERPL-CCNC: 24 U/L (ref 1–32)
BASOPHILS # BLD AUTO: 0.03 10*3/MM3 (ref 0–0.2)
BASOPHILS NFR BLD AUTO: 0.3 % (ref 0–1.5)
BILIRUB SERPL-MCNC: 0.7 MG/DL (ref 0.2–1.2)
BUN BLD-MCNC: 21 MG/DL (ref 8–23)
BUN/CREAT SERPL: 26.9 (ref 7–25)
CALCIUM SPEC-SCNC: 9.2 MG/DL (ref 8.6–10.5)
CHLORIDE SERPL-SCNC: 102 MMOL/L (ref 98–107)
CO2 SERPL-SCNC: 25 MMOL/L (ref 22–29)
CREAT BLD-MCNC: 0.78 MG/DL (ref 0.57–1)
DEPRECATED RDW RBC AUTO: 50.7 FL (ref 37–54)
EOSINOPHIL # BLD AUTO: 0.03 10*3/MM3 (ref 0–0.4)
EOSINOPHIL NFR BLD AUTO: 0.3 % (ref 0.3–6.2)
ERYTHROCYTE [DISTWIDTH] IN BLOOD BY AUTOMATED COUNT: 15.3 % (ref 12.3–15.4)
GFR SERPL CREATININE-BSD FRML MDRD: 72 ML/MIN/1.73
GLOBULIN UR ELPH-MCNC: 2.6 GM/DL
GLUCOSE BLD-MCNC: 173 MG/DL (ref 65–99)
GLUCOSE BLDC GLUCOMTR-MCNC: 136 MG/DL (ref 70–130)
GLUCOSE BLDC GLUCOMTR-MCNC: 145 MG/DL (ref 70–130)
HCT VFR BLD AUTO: 42.2 % (ref 34–46.6)
HGB BLD-MCNC: 14.1 G/DL (ref 12–15.9)
HOLD SPECIMEN: NORMAL
HOLD SPECIMEN: NORMAL
IMM GRANULOCYTES # BLD AUTO: 0.03 10*3/MM3 (ref 0–0.05)
IMM GRANULOCYTES NFR BLD AUTO: 0.3 % (ref 0–0.5)
INR PPP: 2.06 (ref 0.91–1.09)
LYMPHOCYTES # BLD AUTO: 1.32 10*3/MM3 (ref 0.7–3.1)
LYMPHOCYTES NFR BLD AUTO: 13.9 % (ref 19.6–45.3)
MCH RBC QN AUTO: 30.7 PG (ref 26.6–33)
MCHC RBC AUTO-ENTMCNC: 33.4 G/DL (ref 31.5–35.7)
MCV RBC AUTO: 91.9 FL (ref 79–97)
MONOCYTES # BLD AUTO: 0.67 10*3/MM3 (ref 0.1–0.9)
MONOCYTES NFR BLD AUTO: 7.1 % (ref 5–12)
NEUTROPHILS # BLD AUTO: 7.4 10*3/MM3 (ref 1.7–7)
NEUTROPHILS NFR BLD AUTO: 78.1 % (ref 42.7–76)
NRBC BLD AUTO-RTO: 0 /100 WBC (ref 0–0.2)
NT-PROBNP SERPL-MCNC: 3745 PG/ML (ref 5–1800)
PLATELET # BLD AUTO: 224 10*3/MM3 (ref 140–450)
PMV BLD AUTO: 10.5 FL (ref 6–12)
POTASSIUM BLD-SCNC: 4.4 MMOL/L (ref 3.5–5.2)
PROT SERPL-MCNC: 6.5 G/DL (ref 6–8.5)
PROTHROMBIN TIME: 24 SECONDS (ref 11.9–14.6)
RBC # BLD AUTO: 4.59 10*6/MM3 (ref 3.77–5.28)
SODIUM BLD-SCNC: 138 MMOL/L (ref 136–145)
TROPONIN T SERPL-MCNC: <0.01 NG/ML (ref 0–0.03)
TROPONIN T SERPL-MCNC: <0.01 NG/ML (ref 0–0.03)
WBC NRBC COR # BLD: 9.48 10*3/MM3 (ref 3.4–10.8)
WHOLE BLOOD HOLD SPECIMEN: NORMAL
WHOLE BLOOD HOLD SPECIMEN: NORMAL

## 2020-01-03 PROCEDURE — 99285 EMERGENCY DEPT VISIT HI MDM: CPT

## 2020-01-03 PROCEDURE — 83880 ASSAY OF NATRIURETIC PEPTIDE: CPT | Performed by: EMERGENCY MEDICINE

## 2020-01-03 PROCEDURE — 25010000002 FUROSEMIDE PER 20 MG: Performed by: EMERGENCY MEDICINE

## 2020-01-03 PROCEDURE — 82962 GLUCOSE BLOOD TEST: CPT

## 2020-01-03 PROCEDURE — 71045 X-RAY EXAM CHEST 1 VIEW: CPT

## 2020-01-03 PROCEDURE — 93010 ELECTROCARDIOGRAM REPORT: CPT | Performed by: INTERNAL MEDICINE

## 2020-01-03 PROCEDURE — 85025 COMPLETE CBC W/AUTO DIFF WBC: CPT | Performed by: EMERGENCY MEDICINE

## 2020-01-03 PROCEDURE — 84484 ASSAY OF TROPONIN QUANT: CPT | Performed by: EMERGENCY MEDICINE

## 2020-01-03 PROCEDURE — 93005 ELECTROCARDIOGRAM TRACING: CPT | Performed by: EMERGENCY MEDICINE

## 2020-01-03 PROCEDURE — 85610 PROTHROMBIN TIME: CPT | Performed by: EMERGENCY MEDICINE

## 2020-01-03 PROCEDURE — 80053 COMPREHEN METABOLIC PANEL: CPT | Performed by: EMERGENCY MEDICINE

## 2020-01-03 RX ORDER — ACETAMINOPHEN 325 MG/1
650 TABLET ORAL EVERY 4 HOURS PRN
Status: DISCONTINUED | OUTPATIENT
Start: 2020-01-03 | End: 2020-01-05 | Stop reason: HOSPADM

## 2020-01-03 RX ORDER — ATORVASTATIN CALCIUM 10 MG/1
10 TABLET, FILM COATED ORAL NIGHTLY
Status: DISCONTINUED | OUTPATIENT
Start: 2020-01-03 | End: 2020-01-05 | Stop reason: HOSPADM

## 2020-01-03 RX ORDER — DILTIAZEM HYDROCHLORIDE 240 MG/1
240 CAPSULE, COATED, EXTENDED RELEASE ORAL
Status: DISCONTINUED | OUTPATIENT
Start: 2020-01-04 | End: 2020-01-04

## 2020-01-03 RX ORDER — SODIUM CHLORIDE 0.9 % (FLUSH) 0.9 %
10 SYRINGE (ML) INJECTION AS NEEDED
Status: DISCONTINUED | OUTPATIENT
Start: 2020-01-03 | End: 2020-01-05 | Stop reason: HOSPADM

## 2020-01-03 RX ORDER — FUROSEMIDE 10 MG/ML
40 INJECTION INTRAMUSCULAR; INTRAVENOUS EVERY 12 HOURS
Status: DISCONTINUED | OUTPATIENT
Start: 2020-01-04 | End: 2020-01-04

## 2020-01-03 RX ORDER — ACETAMINOPHEN 650 MG/1
650 SUPPOSITORY RECTAL EVERY 4 HOURS PRN
Status: DISCONTINUED | OUTPATIENT
Start: 2020-01-03 | End: 2020-01-05 | Stop reason: HOSPADM

## 2020-01-03 RX ORDER — CALCIUM CARBONATE 200(500)MG
750 TABLET,CHEWABLE ORAL 3 TIMES DAILY PRN
Status: DISCONTINUED | OUTPATIENT
Start: 2020-01-03 | End: 2020-01-03 | Stop reason: SDUPTHER

## 2020-01-03 RX ORDER — SODIUM CHLORIDE 0.9 % (FLUSH) 0.9 %
10 SYRINGE (ML) INJECTION EVERY 12 HOURS SCHEDULED
Status: DISCONTINUED | OUTPATIENT
Start: 2020-01-03 | End: 2020-01-05 | Stop reason: HOSPADM

## 2020-01-03 RX ORDER — WARFARIN SODIUM 5 MG/1
5 TABLET ORAL
Status: DISCONTINUED | OUTPATIENT
Start: 2020-01-04 | End: 2020-01-03

## 2020-01-03 RX ORDER — ONDANSETRON 4 MG/1
4 TABLET, FILM COATED ORAL EVERY 6 HOURS PRN
Status: DISCONTINUED | OUTPATIENT
Start: 2020-01-03 | End: 2020-01-05 | Stop reason: HOSPADM

## 2020-01-03 RX ORDER — CALCIUM CARBONATE 200(500)MG
1.5 TABLET,CHEWABLE ORAL 3 TIMES DAILY PRN
Status: DISCONTINUED | OUTPATIENT
Start: 2020-01-03 | End: 2020-01-05 | Stop reason: HOSPADM

## 2020-01-03 RX ORDER — FUROSEMIDE 10 MG/ML
40 INJECTION INTRAMUSCULAR; INTRAVENOUS ONCE
Status: COMPLETED | OUTPATIENT
Start: 2020-01-03 | End: 2020-01-03

## 2020-01-03 RX ORDER — ACETAMINOPHEN 160 MG/5ML
650 SOLUTION ORAL EVERY 4 HOURS PRN
Status: DISCONTINUED | OUTPATIENT
Start: 2020-01-03 | End: 2020-01-05 | Stop reason: HOSPADM

## 2020-01-03 RX ORDER — WARFARIN SODIUM 5 MG/1
5 TABLET ORAL
Status: DISCONTINUED | OUTPATIENT
Start: 2020-01-03 | End: 2020-01-05 | Stop reason: HOSPADM

## 2020-01-03 RX ORDER — ONDANSETRON 2 MG/ML
4 INJECTION INTRAMUSCULAR; INTRAVENOUS EVERY 6 HOURS PRN
Status: DISCONTINUED | OUTPATIENT
Start: 2020-01-03 | End: 2020-01-05 | Stop reason: HOSPADM

## 2020-01-03 RX ORDER — NICOTINE POLACRILEX 4 MG
15 LOZENGE BUCCAL
Status: DISCONTINUED | OUTPATIENT
Start: 2020-01-03 | End: 2020-01-05 | Stop reason: HOSPADM

## 2020-01-03 RX ORDER — DEXTROSE MONOHYDRATE 25 G/50ML
25 INJECTION, SOLUTION INTRAVENOUS
Status: DISCONTINUED | OUTPATIENT
Start: 2020-01-03 | End: 2020-01-05 | Stop reason: HOSPADM

## 2020-01-03 RX ORDER — NITROGLYCERIN 0.4 MG/1
0.4 TABLET SUBLINGUAL
Status: DISCONTINUED | OUTPATIENT
Start: 2020-01-03 | End: 2020-01-05 | Stop reason: HOSPADM

## 2020-01-03 RX ADMIN — DILTIAZEM HYDROCHLORIDE 5 MG/HR: 5 INJECTION INTRAVENOUS at 12:38

## 2020-01-03 RX ADMIN — WARFARIN SODIUM 5 MG: 5 TABLET ORAL at 17:49

## 2020-01-03 RX ADMIN — ATORVASTATIN CALCIUM 10 MG: 10 TABLET, FILM COATED ORAL at 20:28

## 2020-01-03 RX ADMIN — METOPROLOL TARTRATE 25 MG: 25 TABLET, FILM COATED ORAL at 20:28

## 2020-01-03 RX ADMIN — FUROSEMIDE 40 MG: 10 INJECTION, SOLUTION INTRAMUSCULAR; INTRAVENOUS at 14:13

## 2020-01-03 RX ADMIN — DILTIAZEM HYDROCHLORIDE 15 MG/HR: 5 INJECTION INTRAVENOUS at 23:12

## 2020-01-03 NOTE — H&P
[facility] Medicine Services  HISTORY AND PHYSICAL    Date of Admission: 1/3/2020  Primary Care Physician: Luis Boyd MD    Subjective     Chief Complaint: Shortness of breathing, weakness, palpitations    History of Present Illness  Sondra Connolly is a 74-year-old female with a past medical history of chronic atrial fibrillation, coronary artery disease status post 4 Jose Alejandro artery bypass graft, myocardial infarction, chronic diastolic heart failure with valvular disease followed by Dr. Gualberto Gallo, diabetes mellitus-diet controlled, please see below for complete list.  Patient presented to the emergency department with complaints of shortness of breathing and palpitations starting approximately 3 to 4 weeks ago with decreased activity due to symptoms.  Today she did seek evaluation due to severe shortness of breathing, chest pressure associated with cough, belching that started today.  She has ineffective cough therefore no sputum production.  She is reporting severe weakness worse now that she needs to utilize bedside commode frequently after treatment with Lasix.  She denies fever or overt chest pain.  ER work-up revealed atrial fibrillation with RVR, heart rate  while I was in the room.  Chest x-ray revealed pulmonary edema and heart failure exacerbation.  Patient takes Coumadin and INR is 2.06, glucose is 173, BNP 7545.  Patient does have squamous cell skin cancer left lower extremity currently being followed closely by Dr. Parr with plans for surgical intervention later this month.  She is admitted for further evaluation and treatment.    Review of Systems   A 10 point review of systems was completed, all negative except for those discussed in HPI    Past Medical History:   Past Medical History:   Diagnosis Date   • A-fib (CMS/HCC)    • Angina pectoris (CMS/HCC)    • Anxiety    • Arthritis    • Atherosclerosis of coronary artery bypass graft    • CAD (coronary  artery disease)    • Carotid artery occlusion without infarction    • Chronic diastolic heart failure (CMS/HCC)    • Chronic kidney disease    • Chronic lung disease    • Colon polyp    • Diabetes mellitus (CMS/HCC)    • DJD (degenerative joint disease)    • Fibrocystic breast disease    • Gastritis    • GI bleed    • Hemorrhoids    • Hyperlipidemia    • Hypertension    • Lung disease     chronic   • MI (myocardial infarction) (CMS/HCC)    • Palpitations    • PUD (peptic ulcer disease)    • PVD (peripheral vascular disease) (CMS/HCC)    • Renal failure, acute (CMS/HCC)        Past Surgical History:   Past Surgical History:   Procedure Laterality Date   • APPENDECTOMY     • CARDIAC CATHETERIZATION  05/01/2009    Left-Heart Skin   • CAROTID ENDARTERECTOMY     • CATARACT EXTRACTION     • CATARACT EXTRACTION     • COLONOSCOPY     • CORONARY ARTERY BYPASS GRAFT  07/2007    Four   • ENDOSCOPY  10/02/2013   • ENDOSCOPY  10/02/2013   • HYSTERECTOMY     • SKIN CANCER EXCISION     • THROMBOENDARTERECTOMY     • TONSILLECTOMY         Family History: family history includes Breast cancer in her maternal grandmother; Heart disease in her brother, brother, father, and mother.    Social History:  reports that she has never smoked. She has never used smokeless tobacco. She reports that she drinks alcohol. She reports that she does not use drugs.    Code Status: Full, if unable speak for herself her son or daughter will speak for her      Allergies:  Allergies   Allergen Reactions   • Buffered Aspirin Angioedema   • Salicylates Angioedema   • Demerol [Meperidine] Nausea And Vomiting and Hallucinations       Medications:  No current facility-administered medications on file prior to encounter.      Medication Sig   • calcium citrate-vitamin d (CITRACAL) 200-250 MG-UNIT tablet tablet Take  by mouth Daily.   • cholecalciferol (VITAMIN D3) 1000 UNITS tablet Take 1,000 Units by mouth Daily.   • dilTIAZem CD (CARDIZEM CD) 240 MG 24 hr  "capsule TAKE 1 CAPSULE BY MOUTH DAILY.   • FERROUS SULFATE PO Take  by mouth.   • furosemide (LASIX) 20 MG tablet TAKE 1 TABLET BY MOUTH DAILY.   • metoprolol tartrate (LOPRESSOR) 25 MG tablet TAKE 1 TABLET BY MOUTH DAILY.   • Multiple Vitamins-Minerals (MULTIVITAMIN ADULT PO) Take  by mouth.   • nitroglycerin (NITROSTAT) 0.4 MG SL tablet 1 under the tongue as needed for angina, may repeat q5mins for up three doses   • pravastatin (PRAVACHOL) 40 MG tablet TAKE 1 TABLET BY MOUTH DAILY.   • warfarin (COUMADIN) 5 MG tablet TAKE 1 TABLET BY MOUTH DAILY.       Objective     /90   Pulse 108   Temp 97.9 °F (36.6 °C) (Oral)   Resp (!) 30   Ht 157.5 cm (62\")   Wt 63.5 kg (140 lb)   SpO2 95%   BMI 25.61 kg/m²   Physical Exam   Constitutional: She is oriented to person, place, and time. She appears well-developed and well-nourished. No distress.   HENT:   Head: Normocephalic and atraumatic.   Eyes: Pupils are equal, round, and reactive to light. Conjunctivae and EOM are normal. No scleral icterus.   Neck: Normal range of motion. Neck supple. No JVD present. No tracheal deviation present.   Cardiovascular: Normal heart sounds and intact distal pulses. Exam reveals no gallop.   No murmur heard.  Atrial fibrillation rate 98 and 148 while I was in the room   Pulmonary/Chest: Effort normal. No respiratory distress. She has no wheezes. She has rales ( Diffuse throughout).   Abdominal: Soft. Bowel sounds are normal. She exhibits no distension. There is no tenderness. There is no guarding.   Musculoskeletal: Normal range of motion. She exhibits no edema.   Neurological: She is alert and oriented to person, place, and time.   No obvious deficits noted.   Skin: Skin is warm. No rash noted. She is not diaphoretic. No erythema. No pallor.        Lesion approximately dime size, left lower extremity.  Kaleigh, diaphoretic   Psychiatric: She has a normal mood and affect. Her behavior is normal.   Vitals reviewed.      Pertinent " Data:   Lab Results (last 72 hours)     Procedure Component Value Units Date/Time    Troponin [[id number]]  (Normal) Collected:  01/03/20 1511    Specimen:  Blood from Arm, Left Updated:  01/03/20 1537     Troponin T <0.010 ng/mL     CBC Auto Differential [[id number]]  (Abnormal) Collected:  01/03/20 1202    Specimen:  Blood Updated:  01/03/20 1259     WBC 9.48 10*3/mm3      RBC 4.59 10*6/mm3      Hemoglobin 14.1 g/dL      Hematocrit 42.2 %      MCV 91.9 fL      MCH 30.7 pg      MCHC 33.4 g/dL      RDW 15.3 %      RDW-SD 50.7 fl      MPV 10.5 fL      Platelets 224 10*3/mm3      Neutrophil % 78.1 %      Lymphocyte % 13.9 %      Monocyte % 7.1 %      Eosinophil % 0.3 %      Basophil % 0.3 %      Immature Grans % 0.3 %      Neutrophils, Absolute 7.40 10*3/mm3      Lymphocytes, Absolute 1.32 10*3/mm3      Monocytes, Absolute 0.67 10*3/mm3      Eosinophils, Absolute 0.03 10*3/mm3      Basophils, Absolute 0.03 10*3/mm3      Immature Grans, Absolute 0.03 10*3/mm3      nRBC 0.0 /100 WBC     Protime-INR [[id number]]  (Abnormal) Collected:  01/03/20 1202    Specimen:  Blood Updated:  01/03/20 1251     Protime 24.0 Seconds      INR 2.06    Comprehensive Metabolic Panel [[id number]]  (Abnormal) Collected:  01/03/20 1202    Specimen:  Blood Updated:  01/03/20 1231     Glucose 173 mg/dL      BUN 21 mg/dL      Creatinine 0.78 mg/dL      Sodium 138 mmol/L      Potassium 4.4 mmol/L      Chloride 102 mmol/L      CO2 25.0 mmol/L      Calcium 9.2 mg/dL      Total Protein 6.5 g/dL      Albumin 3.90 g/dL      ALT (SGPT) 26 U/L      AST (SGOT) 24 U/L      Alkaline Phosphatase 56 U/L      Total Bilirubin 0.7 mg/dL      eGFR Non African Amer 72 mL/min/1.73      Globulin 2.6 gm/dL      A/G Ratio 1.5 g/dL      BUN/Creatinine Ratio 26.9     Anion Gap 11.0 mmol/L     Troponin [[id number]]  (Normal) Collected:  01/03/20 1202    Specimen:  Blood Updated:  01/03/20 1231     Troponin T <0.010 ng/mL     BNP [[id number]]  (Abnormal) Collected:  01/03/20  1202    Specimen:  Blood Updated:  01/03/20 1231     proBNP 3,745.0 pg/mL      Imaging Results (Last 24 Hours)     Procedure Component Value Units Date/Time    XR Chest 1 View [[id number]] Collected:  01/03/20 1331     Updated:  01/03/20 1335    Narrative:       Exam:   XR CHEST 1 VW-       Date:  01/03/2020       History:  Female, age  74 years;Chest Pain protocol     COMPARISON:  None.     Findings :     Moderate cardiomegaly. Interstitial edema. Mediastinal wires appear  similar. No focal consolidation. No measurable pneumothorax. No pleural  effusion.  The bones show no acute pathology.         Impression:       Impression:     Fluid overload.     This report was finalized on 01/03/2020 13:31 by Dr. Nely Aceves MD.          I have personally reviewed and interpreted the radiology studies and ECG obtained at time of admission.     Assessment / Plan     Assessment:   Active Hospital Problems    Diagnosis   • **Acute on chronic diastolic heart failure (CMS/HCC)   • Atrial fibrillation with RVR (CMS/HCC)   • Type 2 diabetes mellitus with hyperglycemia, without long-term current use of insulin (CMS/HCC)   • CAD (coronary artery disease)   • Hypotension       Plan:   1.  Admit as inpatient  2.  Continue Cardizem drip  3.  Lasix 40 mg IV every 12 hours  4.  Monitor glucose before meals and at bedtime with regular insulin sliding scale coverage  5.  Consider cardiology consult  6.  Serial troponins and EKGs as needed  7.  Labs in a.m.  8.  Echocardiogram in a.m.    I discussed the patient's findings and my recommendations with: Wolf Castanon DO  Time spent: 40 minutes      RANDA Hickman  01/03/20   4:30 PM     I personally evaluated and examined the patient in conjunction with RANDA Alvarez and agree with the assessment, treatment plan, and disposition of the patient as recorded by her. My history, exam, and further recommendations are:     Seen and discussed with her nurse, Fabio.          Seen and discussed with her son and daughter.     She sees Dr. Gallo for history of atrial fibrillation.  She is chronically anticoagulated with Coumadin.  Her INR is therapeutic at present.  She presents with marked ventricular response and weakness.  Her escalated rates seem to have exacerbated her chronic diastolic heart failure.  Continue diuresis with IV Lasix.  She is currently on 10 mg/hour of a Cardizem drip and has a heart rate in the 100s-110s.  Her rate was extremely variable in the emergency department and her blood pressure was somewhat low initially after starting IV Cardizem, but this has improved.  Her most recent blood pressure was 133/90.  Continue oral Cardizem.  We may need to uptitrate her beta-blocker.  She was previously on digoxin and it sounds like she had problems with digoxin toxicity by her report.  This is confirmed by a digoxin level of 2.5 in December 2015.  If she continues to have problems with rate control her hypertension induced by her rate controlling agents, then we may need to consider elective cardioversion.  I cannot find any records on it, but she describes that she has had to have a cardioversion in the past.  She seems to always be therapeutic with regards to her INR:    We may also have to consider an antiarrhythmic such as amiodarone/dronedarone.    Her last echocardiogram was in July 2019.  Will not repeat for now.    She had 2 troponins in the emergency department that were less than 0.010.  No complaints of chest discomfort.  No suspicion of ischemia.  Stop trending troponin.    Consult cardiology.     Wolf Castanon DO  01/03/20  4:53 PM

## 2020-01-03 NOTE — ED PROVIDER NOTES
Subjective   Patient is a 74-year-old female who presents to the ER with shortness of air and palpitations.  Patient states she has had constant shortness of air and palpitations for the last 3 to 4 weeks.  Patient states that she cannot walk around in her house now without getting short of breath.  Patient states all of her symptoms have been progressively worsening.  Patient says she has also had extreme fatigue.  Patient does have a history of atrial fibrillation and is on Coumadin.  She denies any pain but states she does feel pressure in her chest that has been present for the last 3 to 4 weeks.  She denies any fever, abdominal pain, nausea vomiting diarrhea, urinary changes, neurologic changes.  Patient does complain of mild bilateral lower extremity edema.          Review of Systems   Constitutional: Positive for fatigue.   HENT: Negative.    Eyes: Negative.    Respiratory: Positive for shortness of breath.    Cardiovascular: Positive for chest pain, palpitations and leg swelling.   Gastrointestinal: Negative.    Endocrine: Negative.    Genitourinary: Negative.    Musculoskeletal: Negative.    Skin: Negative.    Allergic/Immunologic: Negative.    Neurological: Negative.    Hematological: Negative.    Psychiatric/Behavioral: Negative.    All other systems reviewed and are negative.      Past Medical History:   Diagnosis Date   • A-fib (CMS/[facility])    • Angina pectoris (CMS/[facility])    • Anxiety    • Arthritis    • Atherosclerosis of coronary artery bypass graft    • CAD (coronary artery disease)    • Carotid artery occlusion without infarction    • CHF (congestive heart failure) (CMS/[facility])    • Chronic kidney disease    • Chronic lung disease    • Chronic renal impairment      unspecified stage   • Colon polyp    • Colon polyp    • Diabetes mellitus (CMS/[facility])    • DJD (degenerative joint disease)    • Fibrocystic breast disease    • Gastritis    • Gastritis    • GI bleed    • Hemorrhoids    • Hemorrhoids    •  Hyperlipidemia    • Hypertension    • Lung disease     chronic   • LV dysfunction    • MI (myocardial infarction) (CMS/HCC)    • MI (myocardial infarction) (CMS/HCC)    • Obesity    • Palpitations    • Peptic ulcer    • PUD (peptic ulcer disease)    • PVD (peripheral vascular disease) (CMS/HCC)    • Renal failure, acute (CMS/HCC)        Allergies   Allergen Reactions   • Buffered Aspirin    • Demerol [Meperidine]    • Salicylates        Past Surgical History:   Procedure Laterality Date   • APPENDECTOMY     • APPENDECTOMY     • CARDIAC CATHETERIZATION  05/01/2009    Left-Heart Skin   • CAROTID ENDARTERECTOMY     • CATARACT EXTRACTION     • CATARACT EXTRACTION     • COLONOSCOPY     • CORONARY ARTERY BYPASS GRAFT  07/2007    Four   • ENDOSCOPY  10/02/2013   • ENDOSCOPY  10/02/2013   • HYSTERECTOMY     • SKIN CANCER EXCISION     • THROMBOENDARTERECTOMY     • TONSILLECTOMY         Family History   Problem Relation Age of Onset   • Heart disease Mother    • Heart disease Father    • Heart disease Brother    • Heart disease Brother    • Breast cancer Maternal Grandmother        Social History     Socioeconomic History   • Marital status:      Spouse name: Not on file   • Number of children: Not on file   • Years of education: Not on file   • Highest education level: Not on file   Tobacco Use   • Smoking status: Never Smoker   • Smokeless tobacco: Never Used   • Tobacco comment: Non smoker; no tobacco use   Substance and Sexual Activity   • Alcohol use: Yes     Comment: occ   • Drug use: No   • Sexual activity: Defer           Objective   Physical Exam   Constitutional: She is oriented to person, place, and time. She appears well-developed and well-nourished.   HENT:   Head: Normocephalic and atraumatic.   Eyes: Pupils are equal, round, and reactive to light. Conjunctivae are normal.   Neck: Normal range of motion.   Cardiovascular: Normal heart sounds. An irregularly irregular rhythm present. Tachycardia present.    Pulmonary/Chest: Effort normal and breath sounds normal.   Abdominal: Soft. There is no tenderness.   Musculoskeletal: Normal range of motion. She exhibits no deformity.   Neurological: She is alert and oriented to person, place, and time. She has normal strength.   Skin: Skin is warm.   Trace edema BLE   Psychiatric: She has a normal mood and affect. Her behavior is normal.   Nursing note and vitals reviewed.      Procedures           ED Course      ECG: Atrial fibrillation with a rate of 142 with RVR, nonspecific T wave changes in V5 and V6    Patient was given diltiazem bolus and drip.  Improving slightly.    Lab Results (last 24 hours)     Procedure Component Value Units Date/Time    CBC & Differential [[id number]] Collected:  01/03/20 1202    Specimen:  Blood Updated:  01/03/20 1259    Narrative:       The following orders were created for panel order CBC & Differential.  Procedure                               Abnormality         Status                     ---------                               -----------         ------                     CBC Auto Differential[[id number]]        Abnormal            Final result                 Please view results for these tests on the individual orders.    Comprehensive Metabolic Panel [[id number]]  (Abnormal) Collected:  01/03/20 1202    Specimen:  Blood Updated:  01/03/20 1231     Glucose 173 mg/dL      BUN 21 mg/dL      Creatinine 0.78 mg/dL      Sodium 138 mmol/L      Potassium 4.4 mmol/L      Chloride 102 mmol/L      CO2 25.0 mmol/L      Calcium 9.2 mg/dL      Total Protein 6.5 g/dL      Albumin 3.90 g/dL      ALT (SGPT) 26 U/L      AST (SGOT) 24 U/L      Alkaline Phosphatase 56 U/L      Total Bilirubin 0.7 mg/dL      eGFR Non African Amer 72 mL/min/1.73      Globulin 2.6 gm/dL      A/G Ratio 1.5 g/dL      BUN/Creatinine Ratio 26.9     Anion Gap 11.0 mmol/L     Narrative:       GFR Normal >60  Chronic Kidney Disease <60  Kidney Failure <15      Troponin [[id number]]   (Normal) Collected:  01/03/20 1202    Specimen:  Blood Updated:  01/03/20 1231     Troponin T <0.010 ng/mL     Narrative:       Troponin T Reference Range:  <= 0.03 ng/mL-   Negative for AMI  >0.03 ng/mL-     Abnormal for myocardial necrosis.  Clinicians would have to utilize clinical acumen, EKG, Troponin and serial changes to determine if it is an Acute Myocardial Infarction or myocardial injury due to an underlying chronic condition.     BNP [[id number]]  (Abnormal) Collected:  01/03/20 1202    Specimen:  Blood Updated:  01/03/20 1231     proBNP 3,745.0 pg/mL     Narrative:       Among patients with dyspnea, NT-proBNP is highly sensitive for the detection of acute congestive heart failure. In addition NT-proBNP of <300 pg/ml effectively rules out acute congestive heart failure with 99% negative predictive value.      Protime-INR [[id number]]  (Abnormal) Collected:  01/03/20 1202    Specimen:  Blood Updated:  01/03/20 1251     Protime 24.0 Seconds      INR 2.06    CBC Auto Differential [[id number]]  (Abnormal) Collected:  01/03/20 1202    Specimen:  Blood Updated:  01/03/20 1259     WBC 9.48 10*3/mm3      RBC 4.59 10*6/mm3      Hemoglobin 14.1 g/dL      Hematocrit 42.2 %      MCV 91.9 fL      MCH 30.7 pg      MCHC 33.4 g/dL      RDW 15.3 %      RDW-SD 50.7 fl      MPV 10.5 fL      Platelets 224 10*3/mm3      Neutrophil % 78.1 %      Lymphocyte % 13.9 %      Monocyte % 7.1 %      Eosinophil % 0.3 %      Basophil % 0.3 %      Immature Grans % 0.3 %      Neutrophils, Absolute 7.40 10*3/mm3      Lymphocytes, Absolute 1.32 10*3/mm3      Monocytes, Absolute 0.67 10*3/mm3      Eosinophils, Absolute 0.03 10*3/mm3      Basophils, Absolute 0.03 10*3/mm3      Immature Grans, Absolute 0.03 10*3/mm3      nRBC 0.0 /100 WBC         XR Chest 1 View   Final Result   Impression:       Fluid overload.       This report was finalized on 01/03/2020 13:31 by Dr. Nely Aceves MD.        Labs showed hyperglycemia and elevated BNP.  Chest  x-ray showed fluid overload consistent with pulmonary edema and CHF exacerbation.  Patient was given Lasix.  Patient was then admitted to the hospitalist service by Dr. Castanon for further treatment.                                         MDM    Final diagnoses:   Atrial fibrillation with rapid ventricular response (CMS/HCC)   Acute congestive heart failure, unspecified heart failure type (CMS/HCC)            Tamara Cuellar MD  01/03/20 1607

## 2020-01-03 NOTE — PLAN OF CARE
Problem: Patient Care Overview  Goal: Plan of Care Review  Outcome: Ongoing (interventions implemented as appropriate)  Flowsheets  Taken 1/3/2020 1715  Progress: no change  Outcome Summary: Admitted from ER with AF RVR and CHF. AF on tele. Cardizem gtt infusing. PureWick in place. Safety maintained. Will cont to monitor and call MD with any changes.  Taken 1/3/2020 1710  Plan of Care Reviewed With: patient;family

## 2020-01-04 PROBLEM — E78.2 MIXED HYPERLIPIDEMIA: Chronic | Status: ACTIVE | Noted: 2018-04-17

## 2020-01-04 PROBLEM — I10 ESSENTIAL HYPERTENSION: Chronic | Status: ACTIVE | Noted: 2017-03-24

## 2020-01-04 PROBLEM — I25.10 CORONARY ARTERY DISEASE INVOLVING NATIVE CORONARY ARTERY OF NATIVE HEART WITHOUT ANGINA PECTORIS: Chronic | Status: ACTIVE | Noted: 2020-01-03

## 2020-01-04 LAB
ANION GAP SERPL CALCULATED.3IONS-SCNC: 11 MMOL/L (ref 5–15)
BUN BLD-MCNC: 22 MG/DL (ref 8–23)
BUN/CREAT SERPL: 28.9 (ref 7–25)
CALCIUM SPEC-SCNC: 9.2 MG/DL (ref 8.6–10.5)
CHLORIDE SERPL-SCNC: 99 MMOL/L (ref 98–107)
CHOLEST SERPL-MCNC: 134 MG/DL (ref 0–200)
CO2 SERPL-SCNC: 28 MMOL/L (ref 22–29)
CREAT BLD-MCNC: 0.76 MG/DL (ref 0.57–1)
DEPRECATED RDW RBC AUTO: 50.1 FL (ref 37–54)
ERYTHROCYTE [DISTWIDTH] IN BLOOD BY AUTOMATED COUNT: 15.1 % (ref 12.3–15.4)
GFR SERPL CREATININE-BSD FRML MDRD: 74 ML/MIN/1.73
GLUCOSE BLD-MCNC: 130 MG/DL (ref 65–99)
GLUCOSE BLDC GLUCOMTR-MCNC: 102 MG/DL (ref 70–130)
GLUCOSE BLDC GLUCOMTR-MCNC: 103 MG/DL (ref 70–130)
GLUCOSE BLDC GLUCOMTR-MCNC: 111 MG/DL (ref 70–130)
GLUCOSE BLDC GLUCOMTR-MCNC: 111 MG/DL (ref 70–130)
HBA1C MFR BLD: 6.3 % (ref 4.8–5.6)
HCT VFR BLD AUTO: 41 % (ref 34–46.6)
HDLC SERPL-MCNC: 46 MG/DL (ref 40–60)
HGB BLD-MCNC: 13.7 G/DL (ref 12–15.9)
INR PPP: 1.96 (ref 0.91–1.09)
LDLC SERPL CALC-MCNC: 74 MG/DL (ref 0–100)
LDLC/HDLC SERPL: 1.6 {RATIO}
MCH RBC QN AUTO: 30.6 PG (ref 26.6–33)
MCHC RBC AUTO-ENTMCNC: 33.4 G/DL (ref 31.5–35.7)
MCV RBC AUTO: 91.5 FL (ref 79–97)
PLATELET # BLD AUTO: 237 10*3/MM3 (ref 140–450)
PMV BLD AUTO: 10.4 FL (ref 6–12)
POTASSIUM BLD-SCNC: 3.5 MMOL/L (ref 3.5–5.2)
PROTHROMBIN TIME: 23 SECONDS (ref 11.9–14.6)
RBC # BLD AUTO: 4.48 10*6/MM3 (ref 3.77–5.28)
SODIUM BLD-SCNC: 138 MMOL/L (ref 136–145)
TRIGL SERPL-MCNC: 71 MG/DL (ref 0–150)
TSH SERPL DL<=0.05 MIU/L-ACNC: 0.87 UIU/ML (ref 0.27–4.2)
VLDLC SERPL-MCNC: 14.2 MG/DL
WBC NRBC COR # BLD: 11.02 10*3/MM3 (ref 3.4–10.8)

## 2020-01-04 PROCEDURE — 94799 UNLISTED PULMONARY SVC/PX: CPT

## 2020-01-04 PROCEDURE — 99222 1ST HOSP IP/OBS MODERATE 55: CPT | Performed by: INTERNAL MEDICINE

## 2020-01-04 PROCEDURE — 82962 GLUCOSE BLOOD TEST: CPT

## 2020-01-04 PROCEDURE — 85610 PROTHROMBIN TIME: CPT | Performed by: NURSE PRACTITIONER

## 2020-01-04 PROCEDURE — 80048 BASIC METABOLIC PNL TOTAL CA: CPT | Performed by: NURSE PRACTITIONER

## 2020-01-04 PROCEDURE — 84443 ASSAY THYROID STIM HORMONE: CPT | Performed by: NURSE PRACTITIONER

## 2020-01-04 PROCEDURE — 94760 N-INVAS EAR/PLS OXIMETRY 1: CPT

## 2020-01-04 PROCEDURE — 83036 HEMOGLOBIN GLYCOSYLATED A1C: CPT | Performed by: NURSE PRACTITIONER

## 2020-01-04 PROCEDURE — 80061 LIPID PANEL: CPT | Performed by: NURSE PRACTITIONER

## 2020-01-04 PROCEDURE — 25010000002 FUROSEMIDE PER 20 MG: Performed by: NURSE PRACTITIONER

## 2020-01-04 PROCEDURE — 85027 COMPLETE CBC AUTOMATED: CPT | Performed by: NURSE PRACTITIONER

## 2020-01-04 RX ORDER — DILTIAZEM HYDROCHLORIDE 120 MG/1
120 CAPSULE, COATED, EXTENDED RELEASE ORAL ONCE
Status: COMPLETED | OUTPATIENT
Start: 2020-01-04 | End: 2020-01-04

## 2020-01-04 RX ORDER — IPRATROPIUM BROMIDE AND ALBUTEROL SULFATE 2.5; .5 MG/3ML; MG/3ML
3 SOLUTION RESPIRATORY (INHALATION) EVERY 4 HOURS PRN
Status: DISCONTINUED | OUTPATIENT
Start: 2020-01-04 | End: 2020-01-05 | Stop reason: HOSPADM

## 2020-01-04 RX ORDER — DILTIAZEM HYDROCHLORIDE 180 MG/1
360 CAPSULE, COATED, EXTENDED RELEASE ORAL
Status: DISCONTINUED | OUTPATIENT
Start: 2020-01-05 | End: 2020-01-05 | Stop reason: HOSPADM

## 2020-01-04 RX ORDER — FUROSEMIDE 40 MG/1
40 TABLET ORAL DAILY
Status: DISCONTINUED | OUTPATIENT
Start: 2020-01-04 | End: 2020-01-04

## 2020-01-04 RX ORDER — POTASSIUM CHLORIDE 750 MG/1
20 CAPSULE, EXTENDED RELEASE ORAL DAILY
Status: DISCONTINUED | OUTPATIENT
Start: 2020-01-04 | End: 2020-01-05 | Stop reason: HOSPADM

## 2020-01-04 RX ORDER — FUROSEMIDE 40 MG/1
40 TABLET ORAL
Status: DISCONTINUED | OUTPATIENT
Start: 2020-01-04 | End: 2020-01-05 | Stop reason: HOSPADM

## 2020-01-04 RX ADMIN — ATORVASTATIN CALCIUM 10 MG: 10 TABLET, FILM COATED ORAL at 21:15

## 2020-01-04 RX ADMIN — WARFARIN SODIUM 5 MG: 5 TABLET ORAL at 18:20

## 2020-01-04 RX ADMIN — DILTIAZEM HYDROCHLORIDE 120 MG: 120 CAPSULE, EXTENDED RELEASE ORAL at 12:42

## 2020-01-04 RX ADMIN — DILTIAZEM HYDROCHLORIDE 240 MG: 240 CAPSULE, EXTENDED RELEASE ORAL at 05:37

## 2020-01-04 RX ADMIN — POTASSIUM CHLORIDE 20 MEQ: 750 CAPSULE, EXTENDED RELEASE ORAL at 10:26

## 2020-01-04 RX ADMIN — SODIUM CHLORIDE, PRESERVATIVE FREE 10 ML: 5 INJECTION INTRAVENOUS at 21:15

## 2020-01-04 RX ADMIN — METOPROLOL TARTRATE 25 MG: 25 TABLET, FILM COATED ORAL at 21:15

## 2020-01-04 RX ADMIN — METOPROLOL TARTRATE 25 MG: 25 TABLET, FILM COATED ORAL at 10:25

## 2020-01-04 RX ADMIN — FUROSEMIDE 40 MG: 10 INJECTION, SOLUTION INTRAMUSCULAR; INTRAVENOUS at 05:41

## 2020-01-04 RX ADMIN — FUROSEMIDE 40 MG: 40 TABLET ORAL at 18:20

## 2020-01-04 NOTE — PLAN OF CARE
Problem: Patient Care Overview  Goal: Plan of Care Review  Outcome: Ongoing (interventions implemented as appropriate)  Flowsheets (Taken 1/4/2020 0321)  Progress: improving  Plan of Care Reviewed With: patient  Outcome Summary: No c/o pain. Cardizem gtt weaned off. PO dose increased to 360mg daily. AF 73-91 on tele. Safety maintained. VSS. Continue to monitor.

## 2020-01-04 NOTE — PLAN OF CARE
Problem: Patient Care Overview  Goal: Plan of Care Review  Outcome: Ongoing (interventions implemented as appropriate)  Flowsheets (Taken 1/4/2020 6175)  Progress: no change  Plan of Care Reviewed With: patient; daughter  Outcome Summary: Initial RDN eval. Pt reports fair appetite; she's on Cardiac/[facility] Diet with PO intake 50%/1meal. Advised Pt of available alt menu selections and encouraged diet compliance. Provided Low Na diet edu. Will continue to follow.

## 2020-01-04 NOTE — PROGRESS NOTES
RT Nebulizer Protocol    Assessment tool to be used for patients with existing breathing treatments ordered by hospitalists                                                                  0  1  2  3  4      Respiratory History   No Smoking x   Smoking History    1 Pack/Day    Pulmonary Disease    Exacerbation      Respiratory Rate   Normal x   20-25    Dyspneic    Accessory Muscles    Severe Dyspnea      Breath Sounds   Clear x   Crackles    Crackles/ Rhonchi    Wheezing    Absent/ Severe Wheezing      Chest   X-ray   Clear x   1 Lobe Infiltration/ Consolidation/ PE    2 Lobe Same Lung Infiltration/ Consolidation/ PE     2 Lobe Infiltration/ Both Lungs/ Consolidation/ PE    Both Lungs/ More Than 1 Lobe/ Atelectasis/ Consolidation/  PE      Cough   Strong Non- Productive x   Excessive Secretions/ Strong Cough    Excessive Secretions/ Weak Cough    Thick Bronchial Secretions/ Weak Cough    Thick Bronchial Secretions/ No Cough      Total Patient Score =  0  0-4=Q4 PRN  5-9=TID and Q4 PRN  10-14=QID and Q3 PRN  15-19=Q4 and Q2 PRN  20=Q3 and Q2 PRN    Bronchopulmonary Hygiene (CPT)   Q4 ATC Copious secretions, dyspnea, unable to sleep, mucus plug    QID & Q4 PRN Moderate secretions    TID Small amounts of secretions w/ poor cough and history of secretions    BID Unable to deep breathe and cough spontaneously       Lung Expansion Therapy (PEP)   Q4 & PRN at night Severe atelectasis, poor oxygenation    QID  High risk for persistent atelectasis, existence of same    TID At risk for developing atelectasis    BID Unable to deep breathe and cough spontaneously     Instruct, 1 follow up Patients able to perform well on their own      duoneb q4prn.

## 2020-01-04 NOTE — CONSULTS
[facility] CONSULT NOTE    Referring Provider: Wolf Castanon DO    Reason for Consultation: Atrial Fibrillation, RVR, CHF    Chief complaint: Shortness of Breath, Palpitations      History of present illness:   Ms. Sondra Connolly is a 74 y.o. female with history of atrial fibrillation, anticoagulated on coumadin, CAD, HTN, DM, hypercholesterolemia, chronic lung disease who presented to the ER with complaints of shortness of breath and palpitations.     Work-up in the ER, she was found to be in atrial fibrillation with RVR; treated with IV cardizem. CXR demonstrated fluid overload. proBNP was elevated. Troponin was negative. INR was therapeutic at 2.06. CMP and CBC were unremarkable. She was started on IV diuretics and admitted to the hospitalist service.     Cardiology consultation for further evaluation and recommendations.       History  Past Medical History:   Diagnosis Date   • A-fib (CMS/HCC)    • Angina pectoris (CMS/HCC)    • Anxiety    • Arthritis    • Atherosclerosis of coronary artery bypass graft    • CAD (coronary artery disease)    • Carotid artery occlusion without infarction    • Chronic diastolic heart failure (CMS/HCC)    • Chronic kidney disease    • Chronic lung disease    • Colon polyp    • Diabetes mellitus (CMS/HCC)    • DJD (degenerative joint disease)    • Fibrocystic breast disease    • Gastritis    • GI bleed    • Hemorrhoids    • Hyperlipidemia    • Hypertension    • Lung disease     chronic   • MI (myocardial infarction) (CMS/HCC)    • Palpitations    • PUD (peptic ulcer disease)    • PVD (peripheral vascular disease) (CMS/HCC)    • Renal failure, acute (CMS/HCC)    ,   Past Surgical History:   Procedure Laterality Date   • APPENDECTOMY     • CARDIAC CATHETERIZATION  05/01/2009    Left-Heart Skin   • CAROTID ENDARTERECTOMY     • CATARACT EXTRACTION     • CATARACT EXTRACTION     • COLONOSCOPY     • CORONARY ARTERY BYPASS GRAFT  07/2007    Four   • ENDOSCOPY   10/02/2013   • ENDOSCOPY  10/02/2013   • HYSTERECTOMY     • SKIN CANCER EXCISION     • THROMBOENDARTERECTOMY     • TONSILLECTOMY     ,   Family History   Problem Relation Age of Onset   • Heart disease Mother    • Heart disease Father    • Heart disease Brother    • Heart disease Brother    • Breast cancer Maternal Grandmother    ,   Social History     Tobacco Use   • Smoking status: Never Smoker   • Smokeless tobacco: Never Used   • Tobacco comment: Non smoker; no tobacco use   Substance Use Topics   • Alcohol use: Yes     Comment: occ   • Drug use: No   ,     Medications  Current Facility-Administered Medications   Medication Dose Route Frequency Provider Last Rate Last Dose   • acetaminophen (TYLENOL) tablet 650 mg  650 mg Oral Q4H PRN Elizabeth Lopez, RANDA        Or   • acetaminophen (TYLENOL) 160 MG/5ML solution 650 mg  650 mg Oral Q4H PRN Elizabeth Lopez APRN        Or   • acetaminophen (TYLENOL) suppository 650 mg  650 mg Rectal Q4H PRN Elizabeth Lopez, APRN       • atorvastatin (LIPITOR) tablet 10 mg  10 mg Oral Nightly Elizabeth Lopez APRN   10 mg at 01/03/20 2028   • calcium carbonate (TUMS) chewable tablet 500 mg (200 mg elemental)  1.5 tablet Oral TID PRN Elizabeth Lopez, APRN       • dextrose (D50W) 25 g/ 50mL Intravenous Solution 25 g  25 g Intravenous Q15 Min PRN Wolf Castanon, DO       • dextrose (GLUTOSE) oral gel 15 g  15 g Oral Q15 Min PRN Wolf Castanon, DO       • dilTIAZem (CARDIZEM) 125 mg in sodium chloride 0.9 % 125 mL (1 mg/mL) infusion  5-15 mg/hr Intravenous Continuous Elizabeth Lopez APRN 5 mL/hr at 01/04/20 0003 5 mg/hr at 01/04/20 0003   • dilTIAZem CD (CARDIZEM CD) 24 hr capsule 240 mg  240 mg Oral Q AM Elizabeth Lopez APRN   240 mg at 01/04/20 0537   • furosemide (LASIX) injection 40 mg  40 mg Intravenous Q12H Elizabeth Lopez APRN   40 mg at 01/04/20 0541   • glucagon (human recombinant) (GLUCAGEN DIAGNOSTIC) injection 1 mg  1 mg Subcutaneous Q15 Min PRN  Wolf Castanon, DO       • insulin lispro (humaLOG) injection 2-7 Units  2-7 Units Subcutaneous 4x Daily With Meals & Nightly Wolf Castanon, DO       • ipratropium-albuterol (DUO-NEB) nebulizer solution 3 mL  3 mL Nebulization Q4H PRN Wolf Castanon, DO       • metoprolol tartrate (LOPRESSOR) tablet 25 mg  25 mg Oral Q12H Wolf Castanon DO   25 mg at 01/04/20 1025   • nitroglycerin (NITROSTAT) SL tablet 0.4 mg  0.4 mg Sublingual Q5 Min PRN Elizabeth Lopez, APRN       • ondansetron (ZOFRAN) tablet 4 mg  4 mg Oral Q6H PRN Elizabeth Lopez APRVERNON        Or   • ondansetron (ZOFRAN) injection 4 mg  4 mg Intravenous Q6H PRN Elizabeth Lopez, APRN       • potassium chloride (MICRO-K) CR capsule 20 mEq  20 mEq Oral Daily Demetria Cervantes APRN   20 mEq at 01/04/20 1026   • sodium chloride 0.9 % flush 10 mL  10 mL Intravenous PRN Elizabeth Lopez, APRN       • sodium chloride 0.9 % flush 10 mL  10 mL Intravenous Q12H Elizabeth Lopez APRN       • sodium chloride 0.9 % flush 10 mL  10 mL Intravenous PRN Elizabeth Lopez, APRN       • warfarin (COUMADIN) tablet 5 mg  5 mg Oral Daily Wolf Castanon, DO   5 mg at 01/03/20 1749       Allergies:  Buffered aspirin; Salicylates; and Demerol [meperidine]    REVIEW OF SYSTEMS:  Constitutional: Denies fever or chills. Denies change in energy level or malaise.  HEENT: Denies headaches or visual disturbances. Denies difficulty swallowing or sore throat.  Respiratory: Denies cough or hoarseness. Progressive shortness of breath.   Cardiovascular: Denies chest pain or pressure. + palpitations. Denies presyncope/syncope. Denies orthopnea/PND. Denies lower extremity edema.   Gastrointestinal: Denies abdominal pain. Denies nausea/vomiting. Denies change in bowel habits or history of recent GI tract blood loss.   Genitourinary: Denies urinary urgency or frequency. Denies dysuria, hematuria.  Musculoskeletal: Denies pain or swelling in joints.  Neurological: Denies paresthesias.  "Denies headache. Denies seizure or stroke symptoms.  Behavioral/Psych: Denies problems with anxiety or depression.      All other systems are negative except where stated above.        Objective     Physical Exam:    /80 (BP Location: Right arm, Patient Position: Lying)   Pulse 116   Temp 98 °F (36.7 °C) (Oral)   Resp 18   Ht 157.5 cm (62\")   Wt 64.1 kg (141 lb 6.4 oz)   SpO2 98%   BMI 25.86 kg/m²        General Appearance:    Alert, cooperative, in no acute distress   HEENT:    Normocephalic. Atraumatic. URIEL.   Neck:   No adenopathy, supple, trachea midline, no thyromegaly, no carotid bruit, no JVD   Lungs:     Clear to auscultation, respirations regular, even and unlabored    Heart:    Irregular rhythm and normal rate, normal S1 and S2, no murmur, no gallop, no rub, no click   Abdomen:     Normal bowel sounds, no masses, no organomegaly, soft non-tender, non-distended, no guarding, no rebound tenderness   Extremities:   Moves all extremities, no edema, no cyanosis, no redness   Pulses:   Pulses palpable and equal bilaterally   Skin:   No bleeding, bruising or rash.    Lymph nodes:   No palpable adenopathy   Neurologic:   Grossly intact                  Results Review:   I reviewed the patient's new clinical results.    Lab Results (last 72 hours)     Procedure Component Value Units Date/Time    POC Glucose Once [[id number]]  (Normal) Collected:  01/04/20 0802    Specimen:  Blood Updated:  01/04/20 0821     Glucose 103 mg/dL      Comment: : [id number] Rich EuraisaMeter ID: [id number]       Lipid Panel [[id number]] Collected:  01/04/20 0305    Specimen:  Blood Updated:  01/04/20 0400     Total Cholesterol 134 mg/dL      Triglycerides 71 mg/dL      HDL Cholesterol 46 mg/dL      LDL Cholesterol  74 mg/dL      VLDL Cholesterol 14.2 mg/dL      LDL/HDL Ratio 1.60    Narrative:       Cholesterol Reference Ranges  (U.S. Department of Health and Human Services ATP III Classifications)    Desirable          " <200 mg/dL  Borderline High    200-239 mg/dL  High Risk          >240 mg/dL      Triglyceride Reference Ranges  (U.S. Department of Health and Human Services ATP III Classifications)    Normal           <150 mg/dL  Borderline High  150-199 mg/dL  High             200-499 mg/dL  Very High        >500 mg/dL    HDL Reference Ranges  (U.S. Department of Health and Human Services ATP III Classifcations)    Low     <40 mg/dl (major risk factor for CHD)  High    >60 mg/dl ('negative' risk factor for CHD)        LDL Reference Ranges  (U.S. Department of Health and Human Services ATP III Classifcations)    Optimal          <100 mg/dL  Near Optimal     100-129 mg/dL  Borderline High  130-159 mg/dL  High             160-189 mg/dL  Very High        >189 mg/dL    Basic Metabolic Panel [[id number]]  (Abnormal) Collected:  01/04/20 0305    Specimen:  Blood Updated:  01/04/20 0400     Glucose 130 mg/dL      BUN 22 mg/dL      Creatinine 0.76 mg/dL      Sodium 138 mmol/L      Potassium 3.5 mmol/L      Chloride 99 mmol/L      CO2 28.0 mmol/L      Calcium 9.2 mg/dL      eGFR Non African Amer 74 mL/min/1.73      BUN/Creatinine Ratio 28.9     Anion Gap 11.0 mmol/L     Narrative:       GFR Normal >60  Chronic Kidney Disease <60  Kidney Failure <15      TSH [[id number]]  (Normal) Collected:  01/04/20 0305    Specimen:  Blood Updated:  01/04/20 0400     TSH 0.869 uIU/mL     Hemoglobin A1c [[id number]]  (Abnormal) Collected:  01/04/20 0305    Specimen:  Blood Updated:  01/04/20 0347     Hemoglobin A1C 6.30 %     Narrative:       Hemoglobin A1C Ranges:    Increased Risk for Diabetes  5.7% to 6.4%  Diabetes                     >= 6.5%  Diabetic Goal                < 7.0%    Protime-INR [[id number]]  (Abnormal) Collected:  01/04/20 0305    Specimen:  Blood Updated:  01/04/20 0343     Protime 23.0 Seconds      INR 1.96    CBC (No Diff) [[id number]]  (Abnormal) Collected:  01/04/20 0305    Specimen:  Blood Updated:  01/04/20 0333     WBC 11.02  10*3/mm3      RBC 4.48 10*6/mm3      Hemoglobin 13.7 g/dL      Hematocrit 41.0 %      MCV 91.5 fL      MCH 30.6 pg      MCHC 33.4 g/dL      RDW 15.1 %      RDW-SD 50.1 fl      MPV 10.4 fL      Platelets 237 10*3/mm3     POC Glucose Once [[id number]]  (Abnormal) Collected:  01/03/20 1925    Specimen:  Blood Updated:  01/03/20 1952     Glucose 145 mg/dL      Comment: : [id number] Sarath WendyMeter ID: [id number]       POC Glucose Once [[id number]]  (Abnormal) Collected:  01/03/20 1715    Specimen:  Blood Updated:  01/03/20 1727     Glucose 136 mg/dL      Comment: : [id number] Rich EuraisaMeter ID: [id number]       Troponin [[id number]]  (Normal) Collected:  01/03/20 1511    Specimen:  Blood from Arm, Left Updated:  01/03/20 1537     Troponin T <0.010 ng/mL     Narrative:       Troponin T Reference Range:  <= 0.03 ng/mL-   Negative for AMI  >0.03 ng/mL-     Abnormal for myocardial necrosis.  Clinicians would have to utilize clinical acumen, EKG, Troponin and serial changes to determine if it is an Acute Myocardial Infarction or myocardial injury due to an underlying chronic condition.     [place] Draw [[id number]] Collected:  01/03/20 1202    Specimen:  Blood Updated:  01/03/20 1315    Narrative:       The following orders were created for panel order [place] Draw.  Procedure                               Abnormality         Status                     ---------                               -----------         ------                     Light Blue Top[[id number]]                                   Final result               Green Top (Gel)[[id number]]                                  Final result               Lavender Top[[id number]]                                     Final result               Red Top[[id number]]                                          Final result                 Please view results for these tests on the individual orders.    Light Blue Top [[id number]] Collected:  01/03/20 1202    Specimen:  Blood  Updated:  01/03/20 1315     Extra Tube hold for add-on     Comment: Auto resulted       Green Top (Gel) [[id number]] Collected:  01/03/20 1202    Specimen:  Blood Updated:  01/03/20 1315     Extra Tube Hold for add-ons.     Comment: Auto resulted.       Lavender Top [[id number]] Collected:  01/03/20 1202    Specimen:  Blood Updated:  01/03/20 1315     Extra Tube hold for add-on     Comment: Auto resulted       Red Top [[id number]] Collected:  01/03/20 1202    Specimen:  Blood Updated:  01/03/20 1315     Extra Tube Hold for add-ons.     Comment: Auto resulted.       CBC & Differential [[id number]] Collected:  01/03/20 1202    Specimen:  Blood Updated:  01/03/20 1259    Narrative:       The following orders were created for panel order CBC & Differential.  Procedure                               Abnormality         Status                     ---------                               -----------         ------                     CBC Auto Differential[[id number]]        Abnormal            Final result                 Please view results for these tests on the individual orders.    CBC Auto Differential [[id number]]  (Abnormal) Collected:  01/03/20 1202    Specimen:  Blood Updated:  01/03/20 1259     WBC 9.48 10*3/mm3      RBC 4.59 10*6/mm3      Hemoglobin 14.1 g/dL      Hematocrit 42.2 %      MCV 91.9 fL      MCH 30.7 pg      MCHC 33.4 g/dL      RDW 15.3 %      RDW-SD 50.7 fl      MPV 10.5 fL      Platelets 224 10*3/mm3      Neutrophil % 78.1 %      Lymphocyte % 13.9 %      Monocyte % 7.1 %      Eosinophil % 0.3 %      Basophil % 0.3 %      Immature Grans % 0.3 %      Neutrophils, Absolute 7.40 10*3/mm3      Lymphocytes, Absolute 1.32 10*3/mm3      Monocytes, Absolute 0.67 10*3/mm3      Eosinophils, Absolute 0.03 10*3/mm3      Basophils, Absolute 0.03 10*3/mm3      Immature Grans, Absolute 0.03 10*3/mm3      nRBC 0.0 /100 WBC     Protime-INR [[id number]]  (Abnormal) Collected:  01/03/20 1202    Specimen:  Blood Updated:   01/03/20 1251     Protime 24.0 Seconds      INR 2.06    Comprehensive Metabolic Panel [[id number]]  (Abnormal) Collected:  01/03/20 1202    Specimen:  Blood Updated:  01/03/20 1231     Glucose 173 mg/dL      BUN 21 mg/dL      Creatinine 0.78 mg/dL      Sodium 138 mmol/L      Potassium 4.4 mmol/L      Chloride 102 mmol/L      CO2 25.0 mmol/L      Calcium 9.2 mg/dL      Total Protein 6.5 g/dL      Albumin 3.90 g/dL      ALT (SGPT) 26 U/L      AST (SGOT) 24 U/L      Alkaline Phosphatase 56 U/L      Total Bilirubin 0.7 mg/dL      eGFR Non African Amer 72 mL/min/1.73      Globulin 2.6 gm/dL      A/G Ratio 1.5 g/dL      BUN/Creatinine Ratio 26.9     Anion Gap 11.0 mmol/L     Narrative:       GFR Normal >60  Chronic Kidney Disease <60  Kidney Failure <15      Troponin [[id number]]  (Normal) Collected:  01/03/20 1202    Specimen:  Blood Updated:  01/03/20 1231     Troponin T <0.010 ng/mL     Narrative:       Troponin T Reference Range:  <= 0.03 ng/mL-   Negative for AMI  >0.03 ng/mL-     Abnormal for myocardial necrosis.  Clinicians would have to utilize clinical acumen, EKG, Troponin and serial changes to determine if it is an Acute Myocardial Infarction or myocardial injury due to an underlying chronic condition.     BNP [[id number]]  (Abnormal) Collected:  01/03/20 1202    Specimen:  Blood Updated:  01/03/20 1231     proBNP 3,745.0 pg/mL     Narrative:       Among patients with dyspnea, NT-proBNP is highly sensitive for the detection of acute congestive heart failure. In addition NT-proBNP of <300 pg/ml effectively rules out acute congestive heart failure with 99% negative predictive value.              Imaging Results (Last 72 Hours)     Procedure Component Value Units Date/Time    XR Chest 1 View [[id number]] Collected:  01/03/20 1331     Updated:  01/03/20 1335    Narrative:       Exam:   XR CHEST 1 VW-       Date:  01/03/2020       History:  Female, age  74 years;Chest Pain protocol     COMPARISON:  None.     Findings  :     Moderate cardiomegaly. Interstitial edema. Mediastinal wires appear  similar. No focal consolidation. No measurable pneumothorax. No pleural  effusion.  The bones show no acute pathology.         Impression:       Impression:     Fluid overload.     This report was finalized on 01/03/2020 13:31 by Dr. Nely Aceves MD.        2D Echocardiogram (07/25/2019):  · Estimated EF = 55%.  · Left ventricular diastolic dysfunction.  · Left atrial cavity size is severely dilated.  · Mild aortic valve stenosis is present.  · Mild-to-moderate mitral valve regurgitation is present  · No evidence of pulmonary hypertension is present.        Assessment/Plan       Acute on chronic diastolic heart failure (CMS/HCC), Likely 2' to Atrial Fibrillation RVR - Improved with Diuretics. On BB.     Atrial fibrillation with RVR (CMS/HCC) - Rate better controlled with IV cardizem. Anticoagulated on Coumadin.     Essential hypertension - Currently Normotensive. On BB    Type 2 diabetes mellitus with hyperglycemia, without long-term current use of insulin (CMS/HCC) - per Hospitalist    Coronary artery disease involving native coronary artery of native heart without angina pectoris - on BB, Statin, Coumadin    Mixed hyperlipidemia - on Statin      D/C IV cardizem. On oral cardizem, will increase. 2D echocardiogram ordered.     Further orders per Dr. Mancia.    Thank you for asking us to follow this patient with you. Please note that this documentation resulted in a face-to-face encounter provided by me.       RANDA Cooney

## 2020-01-04 NOTE — PROGRESS NOTES
[facility]  INPATIENT PROGRESS NOTE    Length of Stay: 1  Date of Admission: 1/3/2020  Primary Care Physician: Luis Boyd MD    Subjective   Chief Complaint: Short of breath, palpitations, leg swelling  HPI   Patient reports shortness of breath and palpitations for the past 3 to 4 weeks with decreased activity due to symptoms.  She reported increased shortness of breath and presented to our facility.  She was found to be atrial fibrillation with RVR with heart rate varying from 100-140.  Chest x-ray revealed pulmonary edema and heart failure exacerbation.  Patient on chronic anticoagulation with Coumadin.  proBNP 7545.  She received Cardizem bolus and drip and Lasix 40 IV in the emergency room.    Sitting up in bed.  Daughter present in room.  Remains atrial fibrillation  on telemetry.  Cardizem drip at 5 mg.  She reports less short of breath.  Oxygen cannula in nose but has been disconnected from wall.  She reports less short of breath and breathing better today.  Does report lower extremity edema.  2525 urine output.    Review of Systems   Constitutional: Positive for activity change and fatigue. Negative for appetite change, chills and fever.   HENT: Negative for congestion and trouble swallowing.    Eyes: Negative for photophobia and visual disturbance.   Respiratory: Positive for shortness of breath. Negative for wheezing.    Cardiovascular: Positive for palpitations and leg swelling. Negative for chest pain.   Gastrointestinal: Negative for blood in stool, constipation, diarrhea, nausea and vomiting.   Endocrine: Negative for cold intolerance, heat intolerance and polyuria.   Genitourinary: Negative for dysuria, frequency and urgency.   Musculoskeletal: Negative for gait problem and joint swelling.   Skin: Negative for color change, pallor, rash and wound.   Allergic/Immunologic: Negative for immunocompromised state.   Neurological:  Positive for weakness.   Hematological: Negative for adenopathy. Does not bruise/bleed easily.   Psychiatric/Behavioral: Negative for agitation, behavioral problems and confusion.     All pertinent negatives and positives are as above. All other systems have been reviewed and are negative unless otherwise stated.     Objective    Temp:  [97.9 °F (36.6 °C)-98.1 °F (36.7 °C)] 98 °F (36.7 °C)  Heart Rate:  [] 116  Resp:  [18-30] 18  BP: ()/(57-99) 118/80  Physical Exam   Constitutional: She is oriented to person, place, and time. She appears well-developed and well-nourished.   No acute distress.  Sitting up in bed.  Daughter present in room.   HENT:   Head: Normocephalic and atraumatic.   Eyes: Pupils are equal, round, and reactive to light. EOM are normal.   Neck: Normal range of motion. Neck supple.   Cardiovascular: Intact distal pulses. Exam reveals no gallop and no friction rub.   No murmur heard.  Irregular rhythm.  Atrial fibrillation  on telemetry.  Cardizem 5 mg   Pulmonary/Chest: Effort normal. She has no wheezes. She has rales (Faint rales posterior bases bilaterally).   Abdominal: Soft. Bowel sounds are normal. She exhibits no distension. There is no tenderness.   Genitourinary:   Genitourinary Comments: Voiding.   Musculoskeletal: Normal range of motion. She exhibits edema (Right ankle and pedal edema bilaterally.  Patient reports improved.). She exhibits no tenderness.   Neurological: She is alert and oriented to person, place, and time.   Skin: Skin is warm and dry. No erythema.   Psychiatric: She has a normal mood and affect. Her behavior is normal. Judgment and thought content normal.     Results Review:  I have reviewed the labs, radiology results, and diagnostic studies.    Laboratory Data:   Results from last 7 days   Lab Units 01/04/20  0305 01/03/20  1202   WBC 10*3/mm3 11.02* 9.48   HEMOGLOBIN g/dL 13.7 14.1   HEMATOCRIT % 41.0 42.2   PLATELETS 10*3/mm3 237 224        Results  from last 7 days   Lab Units 01/04/20  0305 01/03/20  1202   SODIUM mmol/L 138 138   POTASSIUM mmol/L 3.5 4.4   CHLORIDE mmol/L 99 102   CO2 mmol/L 28.0 25.0   BUN mg/dL 22 21   CREATININE mg/dL 0.76 0.78   CALCIUM mg/dL 9.2 9.2   BILIRUBIN mg/dL  --  0.7   ALK PHOS U/L  --  56   ALT (SGPT) U/L  --  26   AST (SGOT) U/L  --  24   GLUCOSE mg/dL 130* 173*     Results from last 7 days   Lab Units 01/04/20  0305 01/03/20  1202   INR  1.96* 2.06*     Imaging Results (All)     Procedure Component Value Units Date/Time    XR Chest 1 View [[id number]] Collected:  01/03/20 1331     Updated:  01/03/20 1335    Narrative:       Exam:   XR CHEST 1 VW-       Date:  01/03/2020       History:  Female, age  74 years;Chest Pain protocol     COMPARISON:  None.     Findings :     Moderate cardiomegaly. Interstitial edema. Mediastinal wires appear  similar. No focal consolidation. No measurable pneumothorax. No pleural  effusion.  The bones show no acute pathology.         Impression:       Impression:     Fluid overload.     This report was finalized on 01/03/2020 13:31 by Dr. Nely Aceves MD.        Intake/Output    Intake/Output Summary (Last 24 hours) at 1/4/2020 1108  Last data filed at 1/4/2020 0721  Gross per 24 hour   Intake 240 ml   Output 3425 ml   Net -3185 ml       Scheduled Meds    atorvastatin 10 mg Oral Nightly   dilTIAZem  mg Oral Q AM   furosemide 40 mg Intravenous Q12H   insulin lispro 2-7 Units Subcutaneous 4x Daily With Meals & Nightly   metoprolol tartrate 25 mg Oral Q12H   potassium chloride 20 mEq Oral Daily   sodium chloride 10 mL Intravenous Q12H   warfarin 5 mg Oral Daily       I have reviewed the patient current medications.     Assessment/Plan     Active Hospital Problems    Diagnosis   • **Acute on chronic diastolic heart failure (CMS/HCC)   • Atrial fibrillation with RVR (CMS/HCC)   • Type 2 diabetes mellitus with hyperglycemia, without long-term current use of insulin (CMS/HCC)   • Coronary artery  disease involving native coronary artery of native heart without angina pectoris   • Hypotension   • Mixed hyperlipidemia   • Essential hypertension     Plan:  1.  Presented with shortness of breath and palpitations worsening over the last 3 weeks.  History of chronic atrial fibrillation on anticoagulation with Coumadin with therapeutic INR.  Found to have atrial fibrillation RVR in the 140s in ER.  Suspect A. fib with RVR has contributed to diastolic heart failure exacerbation.  She received Lasix 40 mg IV in ER and Cardizem drip.  2.  Remains atrial fibrillation  on telemetry.  Cardizem drip at 5 mg.  3.  Cardiology consulted and plans to discontinue IV Cardizem and increase oral Cardizem.  Discussed with RANDA Ceballos  4.  Echo ordered by cardiology  5.  INR 1.96.  Coumadin 5 mg orally daily.  6.  Lasix 40 mg IV every 12 hours.  2525 urine output plus additional 1300.  Potassium 3.5.  Potassium 20 mEq daily.  Basic metabolic panel tomorrow.  7.  Home medications reviewed and resumed if appropriate.  8.  TSH normal.  A1c 6.3.  Lipid panel normal.  Pravachol home medication    Her daughter, Radha will act as her surrogate decision maker.  The above documentation resulted from a face-to-face encounter by me Demteria TOLENTINO, [facility].      Discharge Planning: I expect patient to be discharged to home in 1-2 days.    RANDA Estevez   01/04/20   11:08 AM     I personally evaluated and examined the patient in conjunction with RANDA Lewis and agree with the assessment, treatment plan, and disposition of the patient as recorded by her. My history, exam, and further recommendations are:     Discussed with her nurseVirginia.    Seen and discussed with her daughter.  The patient is currently up on the side of the bed on no oxygen.  Eating a sandwich.  States she is feeling much better and does look better from yesterday.    Cardiology following now.  Oral Cardizem is being titrated.  She also  continues on Lopressor.  She remains anticoagulated on Coumadin.  At the time that I am evaluating her, she is in atrial fibrillation in the 60s and 70s.    Tolerating diuresis without alteration of renal function or electrolyte abnormalities.  She looks more euvolemic on exam. I would like to transition her IV Lasix to oral Lasix today.    Wolf Castanon,   01/04/20  12:17 PM

## 2020-01-04 NOTE — PLAN OF CARE
Problem: Patient Care Overview  Goal: Plan of Care Review  Outcome: Ongoing (interventions implemented as appropriate)  Flowsheets (Taken 1/4/2020 0602)  Progress: improving  Plan of Care Reviewed With: patient  Outcome Summary: pt continues on cardizem drip at 5gtt/hr decreased from 15gtt/hr. TELE AF 74-96 3 runs MF PVC Couplets. pt is pleasant and cooperative, states she feels so good to feel so much better. pure wick still in place pt manages it well. good urine output. VSS will cont to monitor.

## 2020-01-05 VITALS
WEIGHT: 141.19 LBS | HEIGHT: 62 IN | DIASTOLIC BLOOD PRESSURE: 72 MMHG | OXYGEN SATURATION: 98 % | BODY MASS INDEX: 25.98 KG/M2 | RESPIRATION RATE: 16 BRPM | TEMPERATURE: 98.1 F | HEART RATE: 69 BPM | SYSTOLIC BLOOD PRESSURE: 114 MMHG

## 2020-01-05 LAB
ANION GAP SERPL CALCULATED.3IONS-SCNC: 9 MMOL/L (ref 5–15)
BUN BLD-MCNC: 19 MG/DL (ref 8–23)
BUN/CREAT SERPL: 22.4 (ref 7–25)
CALCIUM SPEC-SCNC: 9.1 MG/DL (ref 8.6–10.5)
CHLORIDE SERPL-SCNC: 99 MMOL/L (ref 98–107)
CO2 SERPL-SCNC: 33 MMOL/L (ref 22–29)
CREAT BLD-MCNC: 0.85 MG/DL (ref 0.57–1)
GFR SERPL CREATININE-BSD FRML MDRD: 65 ML/MIN/1.73
GLUCOSE BLD-MCNC: 111 MG/DL (ref 65–99)
GLUCOSE BLDC GLUCOMTR-MCNC: 114 MG/DL (ref 70–130)
GLUCOSE BLDC GLUCOMTR-MCNC: 96 MG/DL (ref 70–130)
INR PPP: 2.22 (ref 0.91–1.09)
POTASSIUM BLD-SCNC: 3.1 MMOL/L (ref 3.5–5.2)
PROTHROMBIN TIME: 25.4 SECONDS (ref 11.9–14.6)
SODIUM BLD-SCNC: 141 MMOL/L (ref 136–145)

## 2020-01-05 PROCEDURE — 85610 PROTHROMBIN TIME: CPT | Performed by: NURSE PRACTITIONER

## 2020-01-05 PROCEDURE — 82962 GLUCOSE BLOOD TEST: CPT

## 2020-01-05 PROCEDURE — 80048 BASIC METABOLIC PNL TOTAL CA: CPT | Performed by: NURSE PRACTITIONER

## 2020-01-05 RX ORDER — DILTIAZEM HYDROCHLORIDE 360 MG/1
360 CAPSULE, EXTENDED RELEASE ORAL DAILY
Qty: 30 CAPSULE | Refills: 1 | Status: SHIPPED | OUTPATIENT
Start: 2020-01-05 | End: 2021-04-08 | Stop reason: HOSPADM

## 2020-01-05 RX ORDER — POTASSIUM CHLORIDE 750 MG/1
10 CAPSULE, EXTENDED RELEASE ORAL DAILY
Qty: 30 CAPSULE | Refills: 1 | Status: ON HOLD | OUTPATIENT
Start: 2020-01-05 | End: 2020-02-24

## 2020-01-05 RX ORDER — POTASSIUM CHLORIDE 750 MG/1
40 CAPSULE, EXTENDED RELEASE ORAL ONCE
Status: COMPLETED | OUTPATIENT
Start: 2020-01-05 | End: 2020-01-05

## 2020-01-05 RX ADMIN — SODIUM CHLORIDE, PRESERVATIVE FREE 10 ML: 5 INJECTION INTRAVENOUS at 08:39

## 2020-01-05 RX ADMIN — DILTIAZEM HYDROCHLORIDE 360 MG: 180 CAPSULE, COATED, EXTENDED RELEASE ORAL at 05:37

## 2020-01-05 RX ADMIN — POTASSIUM CHLORIDE 20 MEQ: 750 CAPSULE, EXTENDED RELEASE ORAL at 08:39

## 2020-01-05 RX ADMIN — FUROSEMIDE 40 MG: 40 TABLET ORAL at 08:38

## 2020-01-05 RX ADMIN — POTASSIUM CHLORIDE 40 MEQ: 750 CAPSULE, EXTENDED RELEASE ORAL at 08:38

## 2020-01-05 RX ADMIN — METOPROLOL TARTRATE 25 MG: 25 TABLET, FILM COATED ORAL at 08:38

## 2020-01-05 NOTE — DISCHARGE SUMMARY
[facility] Medicine Services  DISCHARGE SUMMARY     Date of Admission: 1/3/2020  Date of Discharge:  1/5/2020  Primary Care Physician: Matthieu Bhakta DO    Presenting Problem/History of Present Illness:  Shortness of breath, palpitations.    Discharge Diagnoses:  Active Hospital Problems    Diagnosis   • **Acute on chronic diastolic heart failure (CMS/HCC)   • Atrial fibrillation with RVR (CMS/[facility])   • Type 2 diabetes mellitus with hyperglycemia, without long-term current use of insulin (CMS/[facility])   • Coronary artery disease involving native coronary artery of native heart without angina pectoris   • Hypotension   • Mixed hyperlipidemia   • Essential hypertension     Chief Complaint on Day of Discharge: Feels better.  No palpitations.  No shortness of breath.     History of Present Illness on Day of Discharge:   Lying in bed.  No family in room.  No oxygen in use.  Remains atrial fibrillation  on telemetry.  No fast atrial fibrillation.  Denies shortness of breath.  Denies palpitations.  No peripheral edema noted.  Denies nausea, vomiting or abdominal pain.  Feels better and ready to go home.    Consults: Dr. Mancia, cardiology    Procedures Performed: none    Pertinent Test Results:   Laboratory Data:    Results from last 7 days   Lab Units 01/04/20  0305 01/03/20  1202   WBC 10*3/mm3 11.02* 9.48   HEMOGLOBIN g/dL 13.7 14.1   HEMATOCRIT % 41.0 42.2   PLATELETS 10*3/mm3 237 224        Results from last 7 days   Lab Units 01/05/20  0422 01/04/20  0305 01/03/20  1202   SODIUM mmol/L 141 138 138   POTASSIUM mmol/L 3.1* 3.5 4.4   CHLORIDE mmol/L 99 99 102   CO2 mmol/L 33.0* 28.0 25.0   BUN mg/dL 19 22 21   CREATININE mg/dL 0.85 0.76 0.78   CALCIUM mg/dL 9.1 9.2 9.2   BILIRUBIN mg/dL  --   --  0.7   ALK PHOS U/L  --   --  56   ALT (SGPT) U/L  --   --  26   AST (SGOT) U/L  --   --  24   GLUCOSE mg/dL 111* 130* 173*     Lab Results (all)     Procedure Component Value Units Date/Time      POC Glucose Once [[id number]]  (Normal) Collected:  01/05/20 0723    Specimen:  Blood Updated:  01/05/20 0752     Glucose 96 mg/dL      Comment: : [id number] Audie SquiresaMeter ID: [id number]       Basic Metabolic Panel [[id number]]  (Abnormal) Collected:  01/05/20 0422    Specimen:  Blood Updated:  01/05/20 0515     Glucose 111 mg/dL      BUN 19 mg/dL      Creatinine 0.85 mg/dL      Sodium 141 mmol/L      Potassium 3.1 mmol/L      Chloride 99 mmol/L      CO2 33.0 mmol/L      Calcium 9.1 mg/dL      eGFR Non African Amer 65 mL/min/1.73      BUN/Creatinine Ratio 22.4     Anion Gap 9.0 mmol/L     Narrative:       GFR Normal >60  Chronic Kidney Disease <60  Kidney Failure <15      Protime-INR [[id number]]  (Abnormal) Collected:  01/05/20 0422    Specimen:  Blood Updated:  01/05/20 0507     Protime 25.4 Seconds      INR 2.22    POC Glucose Once [[id number]]  (Normal) Collected:  01/04/20 2110    Specimen:  Blood Updated:  01/04/20 2124     Glucose 111 mg/dL      Comment: : [id number] Marianna BartonyMeter ID: [id number]       POC Glucose Once [[id number]]  (Normal) Collected:  01/04/20 1547    Specimen:  Blood Updated:  01/04/20 1614     Glucose 102 mg/dL      Comment: : [id number] Audie McclellandndaMeter ID: [id number]       POC Glucose Once [[id number]]  (Normal) Collected:  01/04/20 1135    Specimen:  Blood Updated:  01/04/20 1149     Glucose 111 mg/dL      Comment: : [id number] Rich EuraisaMeter ID: [id number]       POC Glucose Once [[id number]]  (Normal) Collected:  01/04/20 0802    Specimen:  Blood Updated:  01/04/20 0821     Glucose 103 mg/dL      Comment: : [id number] Rich EuraisaMeter ID: [id number]       Lipid Panel [[id number]] Collected:  01/04/20 0305    Specimen:  Blood Updated:  01/04/20 0400     Total Cholesterol 134 mg/dL      Triglycerides 71 mg/dL      HDL Cholesterol 46 mg/dL      LDL Cholesterol  74 mg/dL      VLDL Cholesterol 14.2 mg/dL      LDL/HDL Ratio 1.60    Narrative:       Cholesterol  Reference Ranges  (U.S. Department of Health and Human Services ATP III Classifications)    Desirable          <200 mg/dL  Borderline High    200-239 mg/dL  High Risk          >240 mg/dL      Triglyceride Reference Ranges  (U.S. Department of Health and Human Services ATP III Classifications)    Normal           <150 mg/dL  Borderline High  150-199 mg/dL  High             200-499 mg/dL  Very High        >500 mg/dL    HDL Reference Ranges  (U.S. Department of Health and Human Services ATP III Classifcations)    Low     <40 mg/dl (major risk factor for CHD)  High    >60 mg/dl ('negative' risk factor for CHD)        LDL Reference Ranges  (U.S. Department of Health and Human Services ATP III Classifcations)    Optimal          <100 mg/dL  Near Optimal     100-129 mg/dL  Borderline High  130-159 mg/dL  High             160-189 mg/dL  Very High        >189 mg/dL    Basic Metabolic Panel [[id number]]  (Abnormal) Collected:  01/04/20 0305    Specimen:  Blood Updated:  01/04/20 0400     Glucose 130 mg/dL      BUN 22 mg/dL      Creatinine 0.76 mg/dL      Sodium 138 mmol/L      Potassium 3.5 mmol/L      Chloride 99 mmol/L      CO2 28.0 mmol/L      Calcium 9.2 mg/dL      eGFR Non African Amer 74 mL/min/1.73      BUN/Creatinine Ratio 28.9     Anion Gap 11.0 mmol/L     Narrative:       GFR Normal >60  Chronic Kidney Disease <60  Kidney Failure <15      TSH [[id number]]  (Normal) Collected:  01/04/20 0305    Specimen:  Blood Updated:  01/04/20 0400     TSH 0.869 uIU/mL     Hemoglobin A1c [[id number]]  (Abnormal) Collected:  01/04/20 0305    Specimen:  Blood Updated:  01/04/20 0347     Hemoglobin A1C 6.30 %     Narrative:       Hemoglobin A1C Ranges:    Increased Risk for Diabetes  5.7% to 6.4%  Diabetes                     >= 6.5%  Diabetic Goal                < 7.0%    Protime-INR [[id number]]  (Abnormal) Collected:  01/04/20 0305    Specimen:  Blood Updated:  01/04/20 0343     Protime 23.0 Seconds      INR 1.96    CBC (No Diff)  [[id number]]  (Abnormal) Collected:  01/04/20 0305    Specimen:  Blood Updated:  01/04/20 0333     WBC 11.02 10*3/mm3      RBC 4.48 10*6/mm3      Hemoglobin 13.7 g/dL      Hematocrit 41.0 %      MCV 91.5 fL      MCH 30.6 pg      MCHC 33.4 g/dL      RDW 15.1 %      RDW-SD 50.1 fl      MPV 10.4 fL      Platelets 237 10*3/mm3     POC Glucose Once [[id number]]  (Abnormal) Collected:  01/03/20 1925    Specimen:  Blood Updated:  01/03/20 1952     Glucose 145 mg/dL      Comment: : [id number] Sarath WendyMeter ID: [id number]       POC Glucose Once [[id number]]  (Abnormal) Collected:  01/03/20 1715    Specimen:  Blood Updated:  01/03/20 1727     Glucose 136 mg/dL      Comment: : [id number] Rich EuraisaMeter ID: [id number]       Troponin [[id number]]  (Normal) Collected:  01/03/20 1511    Specimen:  Blood from Arm, Left Updated:  01/03/20 1537     Troponin T <0.010 ng/mL     Narrative:       Troponin T Reference Range:  <= 0.03 ng/mL-   Negative for AMI  >0.03 ng/mL-     Abnormal for myocardial necrosis.  Clinicians would have to utilize clinical acumen, EKG, Troponin and serial changes to determine if it is an Acute Myocardial Infarction or myocardial injury due to an underlying chronic condition.     Shaw Draw [[id number]] Collected:  01/03/20 1202    Specimen:  Blood Updated:  01/03/20 1315    Narrative:       The following orders were created for panel order Shaw Draw.  Procedure                               Abnormality         Status                     ---------                               -----------         ------                     Light Blue Top[[id number]]                                   Final result               Green Top (Gel)[[id number]]                                  Final result               Lavender Top[[id number]]                                     Final result               Red Top[[id number]]                                          Final result                 Please view results for these  tests on the individual orders.    Light Blue Top [[id number]] Collected:  01/03/20 1202    Specimen:  Blood Updated:  01/03/20 1315     Extra Tube hold for add-on     Comment: Auto resulted       Green Top (Gel) [[id number]] Collected:  01/03/20 1202    Specimen:  Blood Updated:  01/03/20 1315     Extra Tube Hold for add-ons.     Comment: Auto resulted.       Lavender Top [[id number]] Collected:  01/03/20 1202    Specimen:  Blood Updated:  01/03/20 1315     Extra Tube hold for add-on     Comment: Auto resulted       Red Top [[id number]] Collected:  01/03/20 1202    Specimen:  Blood Updated:  01/03/20 1315     Extra Tube Hold for add-ons.     Comment: Auto resulted.       CBC & Differential [[id number]] Collected:  01/03/20 1202    Specimen:  Blood Updated:  01/03/20 1259    Narrative:       The following orders were created for panel order CBC & Differential.  Procedure                               Abnormality         Status                     ---------                               -----------         ------                     CBC Auto Differential[[id number]]        Abnormal            Final result                 Please view results for these tests on the individual orders.    CBC Auto Differential [[id number]]  (Abnormal) Collected:  01/03/20 1202    Specimen:  Blood Updated:  01/03/20 1259     WBC 9.48 10*3/mm3      RBC 4.59 10*6/mm3      Hemoglobin 14.1 g/dL      Hematocrit 42.2 %      MCV 91.9 fL      MCH 30.7 pg      MCHC 33.4 g/dL      RDW 15.3 %      RDW-SD 50.7 fl      MPV 10.5 fL      Platelets 224 10*3/mm3      Neutrophil % 78.1 %      Lymphocyte % 13.9 %      Monocyte % 7.1 %      Eosinophil % 0.3 %      Basophil % 0.3 %      Immature Grans % 0.3 %      Neutrophils, Absolute 7.40 10*3/mm3      Lymphocytes, Absolute 1.32 10*3/mm3      Monocytes, Absolute 0.67 10*3/mm3      Eosinophils, Absolute 0.03 10*3/mm3      Basophils, Absolute 0.03 10*3/mm3      Immature Grans, Absolute 0.03 10*3/mm3      nRBC 0.0  /100 WBC     Protime-INR [[id number]]  (Abnormal) Collected:  01/03/20 1202    Specimen:  Blood Updated:  01/03/20 1251     Protime 24.0 Seconds      INR 2.06    Comprehensive Metabolic Panel [[id number]]  (Abnormal) Collected:  01/03/20 1202    Specimen:  Blood Updated:  01/03/20 1231     Glucose 173 mg/dL      BUN 21 mg/dL      Creatinine 0.78 mg/dL      Sodium 138 mmol/L      Potassium 4.4 mmol/L      Chloride 102 mmol/L      CO2 25.0 mmol/L      Calcium 9.2 mg/dL      Total Protein 6.5 g/dL      Albumin 3.90 g/dL      ALT (SGPT) 26 U/L      AST (SGOT) 24 U/L      Alkaline Phosphatase 56 U/L      Total Bilirubin 0.7 mg/dL      eGFR Non African Amer 72 mL/min/1.73      Globulin 2.6 gm/dL      A/G Ratio 1.5 g/dL      BUN/Creatinine Ratio 26.9     Anion Gap 11.0 mmol/L     Narrative:       GFR Normal >60  Chronic Kidney Disease <60  Kidney Failure <15      Troponin [[id number]]  (Normal) Collected:  01/03/20 1202    Specimen:  Blood Updated:  01/03/20 1231     Troponin T <0.010 ng/mL     Narrative:       Troponin T Reference Range:  <= 0.03 ng/mL-   Negative for AMI  >0.03 ng/mL-     Abnormal for myocardial necrosis.  Clinicians would have to utilize clinical acumen, EKG, Troponin and serial changes to determine if it is an Acute Myocardial Infarction or myocardial injury due to an underlying chronic condition.     BNP [[id number]]  (Abnormal) Collected:  01/03/20 1202    Specimen:  Blood Updated:  01/03/20 1231     proBNP 3,745.0 pg/mL     Narrative:       Among patients with dyspnea, NT-proBNP is highly sensitive for the detection of acute congestive heart failure. In addition NT-proBNP of <300 pg/ml effectively rules out acute congestive heart failure with 99% negative predictive value.          Imaging Results (All)     Procedure Component Value Units Date/Time    XR Chest 1 View [[id number]] Collected:  01/03/20 1331     Updated:  01/03/20 1335    Narrative:       Exam:   XR CHEST 1 VW-       Date:  01/03/2020          History:  Female, age  74 years;Chest Pain protocol     COMPARISON:  None.     Findings :     Moderate cardiomegaly. Interstitial edema. Mediastinal wires appear  similar. No focal consolidation. No measurable pneumothorax. No pleural  effusion.  The bones show no acute pathology.         Impression:       Impression:     Fluid overload.     This report was finalized on 01/03/2020 13:31 by Dr. Nely Aceves MD.        Culture Data:   No results found for: BLOODCX, URINECX, WOUNDCX, MRSACX, RESPCX, STOOLCX    Hospital Course  Patient is a 74 y.o. female presented to [facility] emergency room 1/3/2020 with complaints of shortness of breath, weakness, palpitations ongoing for the last 3 to 4 weeks associated with decreased activity due to symptoms.  Patient has a history of chronic atrial fibrillation on anticoagulation with Coumadin, chronic diastolic congestive heart failure followed by Dr. Gallo.  Patient reported worsening shortness of breath, chest pressure associated with cough and belching on the day of admission.  She denied cough or sputum production.  She reports worsening weakness but denied fever or chest pain.  She was noted to be atrial fibrillation with rapid ventricular response with heart rate 140.  Chest x-ray noted pulmonary edema and heart failure exacerbation.  INR 2.06, BNP 3745, troponin negative.  She received Cardizem bolus and Cardizem drip, Lasix IV in the emergency room.    She was admitted to the telemetry floor with atrial fibrillation with RVR with exacerbation of diastolic heart failure secondary to atrial fibrillation.  She is followed by Dr. Gallo with cardiology.  In the past she had been on digoxin but developed issues with digoxin toxicity.  This was confirmed by digoxin level of 2.5 December 2015.  INR remains therapeutic on Coumadin.  Troponins negative without complaints of chest discomfort and no suspicion for ischemia.    Cardiology consulted and she was seen by  "Dr. Mancia covering for Dr. Gallo.  Impression acute on chronic diastolic congestive heart failure, chronic atrial fibrillation with RVR.  Suspect heart failure exacerbation secondary to atrial fib RVR.  Cardizem orally had been resumed and was uptitrated to 360 mg orally daily.  Cardizem drip discontinued.  Patient remains atrial fibrillation currently rate controlled  on telemetry with increased dose of Cardizem at 360 mg daily.  She was diuresed with Lasix twice daily.  She had good urine output with diuresis 2525 and 3150.  Lasix transitioned to oral home dose prior to discharge.  She has been instructed on daily weights and to take extra dose of Lasix for weight gain of 2 pounds in 1 day or 5 pounds in 1 week.  She expresses understanding.    Potassium 3.1.  She received 40 mEq of potassium orally. Potassium 10 mEq orally daily at discharge.    Chronic anticoagulation with Coumadin for atrial fibrillation.  INR remains therapeutic at 2.22 on discharge.  No change in Coumadin dose.  Coumadin managed by Dr. Gallo.    Echocardiogram 7/25/2019 reported ejection fraction 55% with left ventricular diastolic dysfunction.  Echocardiogram not repeated.    On 1/5/2020 she is medically stable for discharge.  She has been evaluated by cardiology today and cleared for discharge.  She denies chest pain or palpitations.  She remains atrial fibrillation  on telemetry.  No peripheral edema.  Lungs essentially clear.  She has diuresed almost 6 L since admission.  She has ambulated without shortness of breath.  Recommend follow-up with her primary care provider 1 week after discharge.  She will keep scheduled appointment Dr. Gallo on 1/22 2/20.    Physical Exam on Discharge:  /72 (BP Location: Left arm, Patient Position: Lying)   Pulse 69   Temp 98.1 °F (36.7 °C) (Oral)   Resp 16   Ht 157.5 cm (62\")   Wt 64 kg (141 lb 3 oz)   SpO2 98%   BMI 25.82 kg/m²   Physical Exam   Constitutional: She is oriented to " person, place, and time. She appears well-developed and well-nourished.   Sitting up in bed.  No acute distress.  No oxygen use.  No family in room.   HENT:   Head: Normocephalic.   Eyes: Pupils are equal, round, and reactive to light. EOM are normal.   Neck: Normal range of motion. Neck supple.   Cardiovascular: Normal rate, normal heart sounds and intact distal pulses. Exam reveals no gallop and no friction rub.   No murmur heard.  Irregular rhythm atrial fibrillation  on telemetry.   Pulmonary/Chest: Effort normal and breath sounds normal. She has no wheezes. She has no rales.   No oxygen in use.   Abdominal: Soft. Bowel sounds are normal. She exhibits no distension. There is no tenderness.   Genitourinary:   Genitourinary Comments: Voiding.   Musculoskeletal: Normal range of motion. She exhibits no edema, tenderness or deformity.   Neurological: She is alert and oriented to person, place, and time.   Skin: Skin is warm and dry. No erythema.   Psychiatric: She has a normal mood and affect. Her behavior is normal. Judgment and thought content normal.     Condition on Discharge: Stable    Discharge Disposition: Home    Discharge Diet:   Diet Instructions     Advance Diet As Tolerated          Activity at Discharge:   Activity Instructions     Activity as Tolerated      Measure Weight          Discharge Care Plan / Instructions:   1.  For worsening returning symptoms of palpitations, shortness of breath weight gain seek medical attention.  2.  Cardizem CD increased to 360 mg orally daily per Dr. Mancia.  3.  Daily weight.  For weight gain of 2 pounds in 1 day or 5 pounds in 1 week take extra dose of Lasix.  4.  PCP to monitor PT/INR.  5.  Follow-up with Dr. Bhakta 1 week.  6.  Follow-up with Dr. Mancia or cardiology APRN in 2 to 3 weeks    Discharge Medications:     Discharge Medications      New Medications      Instructions Start Date   potassium chloride 10 MEQ CR capsule  Commonly known as:  MICRO-K   10 mEq,  Oral, Daily         Changes to Medications      Instructions Start Date   dilTIAZem  MG 24 hr capsule  Commonly known as:  CARDIZEM CD  What changed:    · medication strength  · how much to take   360 mg, Oral, Daily      nitroglycerin 0.4 MG SL tablet  Commonly known as:  NITROSTAT  What changed:    · how much to take  · how to take this  · when to take this  · reasons to take this   1 under the tongue as needed for angina, may repeat q5mins for up three doses         Continue These Medications      Instructions Start Date   calcium citrate-vitamin d 200-250 MG-UNIT tablet tablet  Commonly known as:  CITRACAL   1 tablet, Oral, Daily      cholecalciferol 25 MCG (1000 UT) tablet  Commonly known as:  VITAMIN D3   1,000 Units, Oral, Daily      ferrous sulfate 325 (65 FE) MG tablet   1 tablet, Oral, Daily      furosemide 20 MG tablet  Commonly known as:  LASIX   20 mg, Oral, Daily      metoprolol tartrate 25 MG tablet  Commonly known as:  LOPRESSOR   25 mg, Oral, Daily      MULTIVITAMIN ADULT PO   1 tablet, Oral, Daily      pravastatin 40 MG tablet  Commonly known as:  PRAVACHOL   40 mg, Oral, Daily      warfarin 5 MG tablet  Commonly known as:  COUMADIN   5 mg, Oral, Daily Warfarin           Follow-up Appointments:   Follow-up Information     Matthieu Bhakta, DO Follow up.    Specialty:  Internal Medicine  Why:  1 week - WILL NOTIFY PATIENT OF APPOINTMENT   Contact information:  [address] [place] [address]  [phone]             Gualberto Gallo MD Follow up.    Specialty:  Cardiology  Why:  KEEP SCHEDULED APPOINTMENT - JANUARY 22, 2020 AT 12:00 PM - FOR RECENT CHF EXACERBATION  Contact information:  [address] [address]  [phone]                 Future Appointments:  Future Appointments   Date Time Provider Department Center   1/22/2020 12:00 PM Gualberto Gallo MD [facility]       Test Results Pending at Discharge: None    The above documentation resulted from a  face-to-face encounter by me Demetria TOLENTINO, [facility].    RANDA Estevez  01/05/20  11:53 AM    Time: This discharge process required 35 minutes for completion.    Plan discussed with Dr. Castanon, , Juliana Babin, RANDA, and patient.    Time spent in face-to-face evaluation, chart review, planning and education 35 minutes.    I personally evaluated and examined the patient in conjunction with RANDA Lewis and agree with the assessment, treatment plan, and disposition of the patient as recorded by her. My history, exam, and further recommendations are:     Discussed with her nurse, [place].    No events overnight.  She has been rate controlled with adjustment of her Cardizem CD.  No plans for cardioversion at this point in time.  She also needs to continue on her Lopressor.  Continue Coumadin for stroke prophylaxis.    She will resume her previous home dose of Lasix.  It was felt that her exacerbation of her chronic diastolic heart failure was precipitated by her atrial fibrillation with rapid ventricular response.  She was maximally diuresed in the hospital.    She feels well and wants to go home.  Cardiology has cleared her for discharge.  She has an appointment to see Dr. Gallo on 1/22.    Wolf Castanon DO  01/05/20  12:55 PM

## 2020-01-05 NOTE — PROGRESS NOTES
[facility] -  Progress Note     LOS: 2 days   Patient Care Team:  Luis Boyd MD as PCP - General (Emergency Medicine)  Gualberto Gallo MD as Cardiologist (Cardiology)    Chief Complaint: F/U Atrial Fibrillation RVR    Subjective   Resting in bed, stated she feels much better today. Has been up in her room, showered by herself.       REVIEW OF SYSTEMS:  Constitutional: Denies fever or chills. Denies change in energy level or malaise.  HEENT: Denies headaches or visual disturbances. Denies difficulty swallowing or sore throat.  Respiratory: Denies cough or hoarseness. Denies shortness of breath.   Cardiovascular: Denies chest pain or pressure. Denies palpitations. Denies presyncope/syncope. Denies orthopnea/PND. Denies lower extremity edema.   Gastrointestinal: Denies abdominal pain. Denies nausea/vomiting. Denies change in bowel habits or history of recent GI tract blood loss.   Genitourinary: Denies urinary urgency or frequency. Denies dysuria, hematuria.  Musculoskeletal: Denies pain or swelling in joints.  Neurological: Denies paresthesias. Denies headache. Denies seizure or stroke symptoms.  Behavioral/Psych: Denies problems with anxiety or depression.    All other systems are negative except where stated above.      Objective     Vital Sign Min/Max for last 24 hours  Temp  Min: 97.9 °F (36.6 °C)  Max: 98.2 °F (36.8 °C)   BP  Min: 99/51  Max: 126/82   Pulse  Min: 69  Max: 91   Resp  Min: 16  Max: 18   SpO2  Min: 90 %  Max: 98 %   No data recorded   Weight  Min: 64 kg (141 lb 3 oz)  Max: 64 kg (141 lb 3 oz)         01/04/20 2009   Weight: 64 kg (141 lb 3 oz)         Intake/Output Summary (Last 24 hours) at 1/5/2020 1109  Last data filed at 1/5/2020 0900  Gross per 24 hour   Intake 720 ml   Output 1850 ml   Net -1130 ml         Physical Exam:    General Appearance:    Alert, cooperative, in no acute distress   HEENT:    Normocephalic. Atraumatic. URIEL.   Neck:   Supple, trachea  midline, no JVD   Lungs:     Clear to auscultation, respirations regular, even and unlabored    Heart:    Irregular rhythm and normal rate, normal S1 and S2, soft systolic murmur, no gallop, no rub, no click   Abdomen:     Normal bowel sounds, soft, non-tender, non-distended, no guarding, no rebound tenderness   Extremities:   Moves all extremities, no edema, no cyanosis, no redness   Pulses:   Pulses palpable and equal bilaterally   Skin:   No bleeding, bruising or rash.    Neurologic:   Grossly intact              Results Review:   Lab Results (last 72 hours)     Procedure Component Value Units Date/Time    POC Glucose Once [[id number]]  (Normal) Collected:  01/05/20 0723    Specimen:  Blood Updated:  01/05/20 0752     Glucose 96 mg/dL      Comment: : [id number] Audie McclellandndaMeter ID: [id number]       Basic Metabolic Panel [[id number]]  (Abnormal) Collected:  01/05/20 0422    Specimen:  Blood Updated:  01/05/20 0515     Glucose 111 mg/dL      BUN 19 mg/dL      Creatinine 0.85 mg/dL      Sodium 141 mmol/L      Potassium 3.1 mmol/L      Chloride 99 mmol/L      CO2 33.0 mmol/L      Calcium 9.1 mg/dL      eGFR Non African Amer 65 mL/min/1.73      BUN/Creatinine Ratio 22.4     Anion Gap 9.0 mmol/L     Narrative:       GFR Normal >60  Chronic Kidney Disease <60  Kidney Failure <15      Protime-INR [[id number]]  (Abnormal) Collected:  01/05/20 0422    Specimen:  Blood Updated:  01/05/20 0507     Protime 25.4 Seconds      INR 2.22    POC Glucose Once [[id number]]  (Normal) Collected:  01/04/20 2110    Specimen:  Blood Updated:  01/04/20 2124     Glucose 111 mg/dL      Comment: : [id number] Marianna TerryMeter ID: [id number]       POC Glucose Once [[id number]]  (Normal) Collected:  01/04/20 1547    Specimen:  Blood Updated:  01/04/20 1614     Glucose 102 mg/dL      Comment: : [id number] Audie McclellandndaMeter ID: [id number]       POC Glucose Once [[id number]]  (Normal) Collected:  01/04/20 1135    Specimen:  Blood Updated:   01/04/20 1149     Glucose 111 mg/dL      Comment: : [id number] Rich EuraisaMeter ID: [id number]       POC Glucose Once [[id number]]  (Normal) Collected:  01/04/20 0802    Specimen:  Blood Updated:  01/04/20 0821     Glucose 103 mg/dL      Comment: : [id number] Rich EuraisaMeter ID: [id number]       Lipid Panel [[id number]] Collected:  01/04/20 0305    Specimen:  Blood Updated:  01/04/20 0400     Total Cholesterol 134 mg/dL      Triglycerides 71 mg/dL      HDL Cholesterol 46 mg/dL      LDL Cholesterol  74 mg/dL      VLDL Cholesterol 14.2 mg/dL      LDL/HDL Ratio 1.60    Narrative:       Cholesterol Reference Ranges  (U.S. Department of Health and Human Services ATP III Classifications)    Desirable          <200 mg/dL  Borderline High    200-239 mg/dL  High Risk          >240 mg/dL      Triglyceride Reference Ranges  (U.S. Department of Health and Human Services ATP III Classifications)    Normal           <150 mg/dL  Borderline High  150-199 mg/dL  High             200-499 mg/dL  Very High        >500 mg/dL    HDL Reference Ranges  (U.S. Department of Health and Human Services ATP III Classifcations)    Low     <40 mg/dl (major risk factor for CHD)  High    >60 mg/dl ('negative' risk factor for CHD)        LDL Reference Ranges  (U.S. Department of Health and Human Services ATP III Classifcations)    Optimal          <100 mg/dL  Near Optimal     100-129 mg/dL  Borderline High  130-159 mg/dL  High             160-189 mg/dL  Very High        >189 mg/dL    Basic Metabolic Panel [[id number]]  (Abnormal) Collected:  01/04/20 0305    Specimen:  Blood Updated:  01/04/20 0400     Glucose 130 mg/dL      BUN 22 mg/dL      Creatinine 0.76 mg/dL      Sodium 138 mmol/L      Potassium 3.5 mmol/L      Chloride 99 mmol/L      CO2 28.0 mmol/L      Calcium 9.2 mg/dL      eGFR Non African Amer 74 mL/min/1.73      BUN/Creatinine Ratio 28.9     Anion Gap 11.0 mmol/L     Narrative:       GFR Normal >60  Chronic Kidney Disease  <60  Kidney Failure <15      TSH [[id number]]  (Normal) Collected:  01/04/20 0305    Specimen:  Blood Updated:  01/04/20 0400     TSH 0.869 uIU/mL     Hemoglobin A1c [[id number]]  (Abnormal) Collected:  01/04/20 0305    Specimen:  Blood Updated:  01/04/20 0347     Hemoglobin A1C 6.30 %     Narrative:       Hemoglobin A1C Ranges:    Increased Risk for Diabetes  5.7% to 6.4%  Diabetes                     >= 6.5%  Diabetic Goal                < 7.0%    Protime-INR [[id number]]  (Abnormal) Collected:  01/04/20 0305    Specimen:  Blood Updated:  01/04/20 0343     Protime 23.0 Seconds      INR 1.96    CBC (No Diff) [[id number]]  (Abnormal) Collected:  01/04/20 0305    Specimen:  Blood Updated:  01/04/20 0333     WBC 11.02 10*3/mm3      RBC 4.48 10*6/mm3      Hemoglobin 13.7 g/dL      Hematocrit 41.0 %      MCV 91.5 fL      MCH 30.6 pg      MCHC 33.4 g/dL      RDW 15.1 %      RDW-SD 50.1 fl      MPV 10.4 fL      Platelets 237 10*3/mm3     POC Glucose Once [[id number]]  (Abnormal) Collected:  01/03/20 1925    Specimen:  Blood Updated:  01/03/20 1952     Glucose 145 mg/dL      Comment: : [id number] Sarath WendyMeter ID: [id number]       POC Glucose Once [[id number]]  (Abnormal) Collected:  01/03/20 1715    Specimen:  Blood Updated:  01/03/20 1727     Glucose 136 mg/dL      Comment: : [id number] Rich EuraisaMeter ID: [id number]       Troponin [[id number]]  (Normal) Collected:  01/03/20 1511    Specimen:  Blood from Arm, Left Updated:  01/03/20 1537     Troponin T <0.010 ng/mL     Narrative:       Troponin T Reference Range:  <= 0.03 ng/mL-   Negative for AMI  >0.03 ng/mL-     Abnormal for myocardial necrosis.  Clinicians would have to utilize clinical acumen, EKG, Troponin and serial changes to determine if it is an Acute Myocardial Infarction or myocardial injury due to an underlying chronic condition.     [place] Draw [[id number]] Collected:  01/03/20 1202    Specimen:  Blood Updated:  01/03/20 1315    Narrative:       The  following orders were created for panel order [place] Draw.  Procedure                               Abnormality         Status                     ---------                               -----------         ------                     Light Blue Top[[id number]]                                   Final result               Green Top (Gel)[[id number]]                                  Final result               Lavender Top[[id number]]                                     Final result               Red Top[[id number]]                                          Final result                 Please view results for these tests on the individual orders.    Light Blue Top [[id number]] Collected:  01/03/20 1202    Specimen:  Blood Updated:  01/03/20 1315     Extra Tube hold for add-on     Comment: Auto resulted       Green Top (Gel) [[id number]] Collected:  01/03/20 1202    Specimen:  Blood Updated:  01/03/20 1315     Extra Tube Hold for add-ons.     Comment: Auto resulted.       Lavender Top [[id number]] Collected:  01/03/20 1202    Specimen:  Blood Updated:  01/03/20 1315     Extra Tube hold for add-on     Comment: Auto resulted       Red Top [[id number]] Collected:  01/03/20 1202    Specimen:  Blood Updated:  01/03/20 1315     Extra Tube Hold for add-ons.     Comment: Auto resulted.       CBC & Differential [[id number]] Collected:  01/03/20 1202    Specimen:  Blood Updated:  01/03/20 1259    Narrative:       The following orders were created for panel order CBC & Differential.  Procedure                               Abnormality         Status                     ---------                               -----------         ------                     CBC Auto Differential[[id number]]        Abnormal            Final result                 Please view results for these tests on the individual orders.    CBC Auto Differential [[id number]]  (Abnormal) Collected:  01/03/20 1202    Specimen:  Blood Updated:  01/03/20 1259     WBC 9.48 10*3/mm3       RBC 4.59 10*6/mm3      Hemoglobin 14.1 g/dL      Hematocrit 42.2 %      MCV 91.9 fL      MCH 30.7 pg      MCHC 33.4 g/dL      RDW 15.3 %      RDW-SD 50.7 fl      MPV 10.5 fL      Platelets 224 10*3/mm3      Neutrophil % 78.1 %      Lymphocyte % 13.9 %      Monocyte % 7.1 %      Eosinophil % 0.3 %      Basophil % 0.3 %      Immature Grans % 0.3 %      Neutrophils, Absolute 7.40 10*3/mm3      Lymphocytes, Absolute 1.32 10*3/mm3      Monocytes, Absolute 0.67 10*3/mm3      Eosinophils, Absolute 0.03 10*3/mm3      Basophils, Absolute 0.03 10*3/mm3      Immature Grans, Absolute 0.03 10*3/mm3      nRBC 0.0 /100 WBC     Protime-INR [[id number]]  (Abnormal) Collected:  01/03/20 1202    Specimen:  Blood Updated:  01/03/20 1251     Protime 24.0 Seconds      INR 2.06    Comprehensive Metabolic Panel [[id number]]  (Abnormal) Collected:  01/03/20 1202    Specimen:  Blood Updated:  01/03/20 1231     Glucose 173 mg/dL      BUN 21 mg/dL      Creatinine 0.78 mg/dL      Sodium 138 mmol/L      Potassium 4.4 mmol/L      Chloride 102 mmol/L      CO2 25.0 mmol/L      Calcium 9.2 mg/dL      Total Protein 6.5 g/dL      Albumin 3.90 g/dL      ALT (SGPT) 26 U/L      AST (SGOT) 24 U/L      Alkaline Phosphatase 56 U/L      Total Bilirubin 0.7 mg/dL      eGFR Non African Amer 72 mL/min/1.73      Globulin 2.6 gm/dL      A/G Ratio 1.5 g/dL      BUN/Creatinine Ratio 26.9     Anion Gap 11.0 mmol/L     Narrative:       GFR Normal >60  Chronic Kidney Disease <60  Kidney Failure <15      Troponin [[id number]]  (Normal) Collected:  01/03/20 1202    Specimen:  Blood Updated:  01/03/20 1231     Troponin T <0.010 ng/mL     Narrative:       Troponin T Reference Range:  <= 0.03 ng/mL-   Negative for AMI  >0.03 ng/mL-     Abnormal for myocardial necrosis.  Clinicians would have to utilize clinical acumen, EKG, Troponin and serial changes to determine if it is an Acute Myocardial Infarction or myocardial injury due to an underlying chronic condition.     BNP  [[id number]]  (Abnormal) Collected:  01/03/20 1202    Specimen:  Blood Updated:  01/03/20 1231     proBNP 3,745.0 pg/mL     Narrative:       Among patients with dyspnea, NT-proBNP is highly sensitive for the detection of acute congestive heart failure. In addition NT-proBNP of <300 pg/ml effectively rules out acute congestive heart failure with 99% negative predictive value.                   Medication Review: yes  Current Facility-Administered Medications   Medication Dose Route Frequency Provider Last Rate Last Dose   • acetaminophen (TYLENOL) tablet 650 mg  650 mg Oral Q4H PRN Elizabeth Lopez APRN        Or   • acetaminophen (TYLENOL) 160 MG/5ML solution 650 mg  650 mg Oral Q4H PRN Elizabeth Lopez APRN        Or   • acetaminophen (TYLENOL) suppository 650 mg  650 mg Rectal Q4H PRN Elizabeth Lopez APRN       • atorvastatin (LIPITOR) tablet 10 mg  10 mg Oral Nightly Elizabeth Lopez APRN   10 mg at 01/04/20 2115   • calcium carbonate (TUMS) chewable tablet 500 mg (200 mg elemental)  1.5 tablet Oral TID PRN Elizabeth Lopez APRN       • dextrose (D50W) 25 g/ 50mL Intravenous Solution 25 g  25 g Intravenous Q15 Min PRN Wolf Castanon DO       • dextrose (GLUTOSE) oral gel 15 g  15 g Oral Q15 Min PRN Wolf Castanon DO       • dilTIAZem (CARDIZEM) 125 mg in sodium chloride 0.9 % 125 mL (1 mg/mL) infusion  5-15 mg/hr Intravenous Continuous Elizabeth Lopez APRN   Stopped at 01/04/20 1127   • dilTIAZem CD (CARDIZEM CD) 24 hr capsule 360 mg  360 mg Oral Q AM Juliana Babin APRN   360 mg at 01/05/20 0537   • furosemide (LASIX) tablet 40 mg  40 mg Oral BID Wolf Castanon DO   40 mg at 01/05/20 0838   • glucagon (human recombinant) (GLUCAGEN DIAGNOSTIC) injection 1 mg  1 mg Subcutaneous Q15 Min PRN Wolf Castanon DO       • insulin lispro (humaLOG) injection 2-7 Units  2-7 Units Subcutaneous 4x Daily With Meals & Nightly Castanon, Wolf C, DO       • ipratropium-albuterol (DUO-NEB) nebulizer solution  3 mL  3 mL Nebulization Q4H PRN Wolf Castanon,        • metoprolol tartrate (LOPRESSOR) tablet 25 mg  25 mg Oral Q12H Wolf Castanon DO   25 mg at 01/05/20 0838   • nitroglycerin (NITROSTAT) SL tablet 0.4 mg  0.4 mg Sublingual Q5 Min PRN Elizabeth Lopez, APRN       • ondansetron (ZOFRAN) tablet 4 mg  4 mg Oral Q6H PRN Elizabeth Lopez, APRVERNON        Or   • ondansetron (ZOFRAN) injection 4 mg  4 mg Intravenous Q6H PRN Elizabeth Lopez, APRN       • potassium chloride (MICRO-K) CR capsule 20 mEq  20 mEq Oral Daily Demetria Cervantes APRN   20 mEq at 01/05/20 0839   • sodium chloride 0.9 % flush 10 mL  10 mL Intravenous PRN Elizabeth Lopez, APRVERNON       • sodium chloride 0.9 % flush 10 mL  10 mL Intravenous Q12H Elizabeth Lopez APRN   10 mL at 01/05/20 0839   • sodium chloride 0.9 % flush 10 mL  10 mL Intravenous PRN Elizabeth Lopez, APRN       • warfarin (COUMADIN) tablet 5 mg  5 mg Oral Daily Wolf Castanon DO   5 mg at 01/04/20 1820         Assessment/Plan     Acute on chronic diastolic heart failure (CMS/HCC), Likely 2' to Atrial Fibrillation RVR - Appears euvolemic.    Atrial fibrillation with RVR (CMS/HCC) - Rate controlled with increased oral cardizem. Anticoagulated on Coumadin.     Essential hypertension - Controlled. On BB    Type 2 diabetes mellitus with hyperglycemia, without long-term current use of insulin (CMS/HCC) - per Hospitalist    Coronary artery disease involving native coronary artery of native heart without angina pectoris - on BB, Statin, Coumadin    Mixed hyperlipidemia - on Statin    Patient stable for discharge home from cardiology standpoint. Cardiology F/U in 8-12 weeks.      RANDA Cooney  01/05/20  11:09 AM

## 2020-01-05 NOTE — PLAN OF CARE
Problem: Patient Care Overview  Goal: Plan of Care Review  Outcome: Ongoing (interventions implemented as appropriate)  Flowsheets  Taken 1/5/2020 0644  Progress: improving  Taken 1/4/2020 2100  Plan of Care Reviewed With: patient  Note:   No c/o pain. PO cardizem continued. Alfonso d/c'd. VSS. A-fib/flutter  with a peak at 160 and multifocal/paired PVC's on tele.

## 2020-01-05 NOTE — PROGRESS NOTES
RT Nebulizer Protocol    Assessment tool to be used for patients with existing breathing treatments ordered by hospitalists                                                                  0  1  2  3  4      Respiratory History   No Smoking X   Smoking History    1 Pack/Day    Pulmonary Disease    Exacerbation      Respiratory Rate   Normal X   20-25    Dyspneic    Accessory Muscles    Severe Dyspnea      Breath Sounds   Clear X   Crackles    Crackles/ Rhonchi    Wheezing    Absent/ Severe Wheezing      Chest   X-ray   Clear    1 Lobe Infiltration/ Consolidation/ PE    2 Lobe Same Lung Infiltration/ Consolidation/ PE  X   2 Lobe Infiltration/ Both Lungs/ Consolidation/ PE    Both Lungs/ More Than 1 Lobe/ Atelectasis/ Consolidation/  PE      Cough   Strong Non- Productive X   Excessive Secretions/ Strong Cough    Excessive Secretions/ Weak Cough    Thick Bronchial Secretions/ Weak Cough    Thick Bronchial Secretions/ No Cough      Total Patient Score = 2  0-4=Q4 PRN  5-9=TID and Q4 PRN  10-14=QID and Q3 PRN  15-19=Q4 and Q2 PRN  20=Q3 and Q2 PRN    Bronchopulmonary Hygiene (CPT)   Q4 ATC Copious secretions, dyspnea, unable to sleep, mucus plug    QID & Q4 PRN Moderate secretions    TID Small amounts of secretions w/ poor cough and history of secretions    BID Unable to deep breathe and cough spontaneously       Lung Expansion Therapy (PEP)   Q4 & PRN at night Severe atelectasis, poor oxygenation    QID  High risk for persistent atelectasis, existence of same    TID At risk for developing atelectasis    BID Unable to deep breathe and cough spontaneously     Instruct, 1 follow up Patients able to perform well on their own        MAAINTAIN CURRENT THERAPY.

## 2020-01-06 ENCOUNTER — READMISSION MANAGEMENT (OUTPATIENT)
Dept: CALL CENTER | Facility: HOSPITAL | Age: 75
End: 2020-01-06

## 2020-01-06 NOTE — OUTREACH NOTE
Prep Survey      Responses   Facility patient discharged from?  [facility]   Is patient eligible?  Yes   Discharge diagnosis  Acute on chronic diastolic heart failure    Does the patient have one of the following disease processes/diagnoses(primary or secondary)?  CHF   Does the patient have Home health ordered?  No   Is there a DME ordered?  No   Prep survey completed?  Yes          Rayna Melendez RN

## 2020-01-09 ENCOUNTER — READMISSION MANAGEMENT (OUTPATIENT)
Dept: CALL CENTER | Facility: HOSPITAL | Age: 75
End: 2020-01-09

## 2020-01-09 NOTE — OUTREACH NOTE
CHF Week 1 Survey      Responses   Facility patient discharged from?  [facility]   Does the patient have one of the following disease processes/diagnoses(primary or secondary)?  CHF   Is there a successful TCM telephone encounter documented?  No   CHF Week 1 attempt successful?  Yes   Call start time  0824   Call end time  0831   Medication alerts for this patient  reviewed  medicaiton with the patient   Meds reviewed with patient/caregiver?  Yes   Is the patient having any side effects they believe may be caused by any medication additions or changes?  No   Does the patient have all medications ordered at discharge?  Yes   Comments regarding appointments  has appointments scheduled   Does the patient have a primary care provider?   Yes   Does the patient have an appointment with their PCP within 7 days of discharge?  Yes   Comments regarding PCP  appointment with DR. López next weeks and aware of her appointment with Dr. Gallo   Has the patient kept scheduled appointments due by today?  N/A   Has home health visited the patient within 72 hours of discharge?  No   Psychosocial issues?  No   Did the patient receive a copy of their discharge instructions?  Yes   Nursing interventions  Reviewed instructions with patient   What is the patient's perception of their health status since discharge?  Improving   Is the patient weighing daily?  Yes [weigh 135 # today]   Does the patient have scales?  Yes   Is the patient able to teach back Heart Failure diet management?  Yes   Is the patient able to teach back Heart Failure Zones?  No [reviewed the zones with the patient]   Is the patient able to teach back signs and symptoms of worsening condition? (i.e. weight gain, shortness of air, etc.)  Yes   If the patient is a current smoker, are they able to teach back resources for cessation?  -- [non smoker]   Is the patient/caregiver able to teach back the hierarchy of who to call/visit for symptoms/problems? PCP, Specialist, Home  health nurse, Urgent Care, ED, 911  Yes    CHF Week 1 call completed?  Yes   Wrap up additional comments  reviewed the zones with the patient, feels she is in the yellow zone today          Lazara Whitman RN

## 2020-01-17 ENCOUNTER — READMISSION MANAGEMENT (OUTPATIENT)
Dept: CALL CENTER | Facility: HOSPITAL | Age: 75
End: 2020-01-17

## 2020-01-17 NOTE — OUTREACH NOTE
CHF Week 2 Survey      Responses   Facility patient discharged from?  [facility]   Does the patient have one of the following disease processes/diagnoses(primary or secondary)?  CHF   Week 2 attempt successful?  Yes   Call start time  1216   Call end time  1234   Discharge diagnosis  Acute on chronic diastolic heart failure    Is patient permission given to speak with other caregiver?  Yes   List who call center can speak with  Lan   Meds reviewed with patient/caregiver?  Yes   Is the patient having any side effects they believe may be caused by any medication additions or changes?  No   Does the patient have all medications ordered at discharge?  Yes   Is the patient taking all medications as directed (includes completed medication regime)?  Yes   Medication comments  PCP stopped KCL and Metoprolol tartrate to Metoprolol Succinate 25mg daily.    Does the patient have a primary care provider?   Yes   Has the patient kept scheduled appointments due by today?  Yes   Psychosocial issues?  No   Comments  Pt reports doing better still having weak spells, if bend over or have prolonged exertion she still gets SOA and fluttering in chest.    What is the patient's perception of their health status since discharge?  Improving   Is the patient weighing daily?  Yes   Is the patient able to teach back Heart Failure diet management?  Yes   Is the patient able to teach back Heart Failure Zones?  Yes [Pt in green zone per her report. ]   Is the patient able to teach back signs and symptoms of worsening condition? (i.e. weight gain, shortness of air, etc.)  Yes   Is the patient/caregiver able to teach back the hierarchy of who to call/visit for symptoms/problems? PCP, Specialist, Home health nurse, Urgent Care, ED, 911  Yes   CHF Week 2 call completed?  Yes          Stacy Chandler RN

## 2020-01-22 ENCOUNTER — OFFICE VISIT (OUTPATIENT)
Dept: CARDIOLOGY | Facility: CLINIC | Age: 75
End: 2020-01-22

## 2020-01-22 VITALS
HEIGHT: 62 IN | OXYGEN SATURATION: 98 % | SYSTOLIC BLOOD PRESSURE: 130 MMHG | WEIGHT: 140 LBS | DIASTOLIC BLOOD PRESSURE: 70 MMHG | BODY MASS INDEX: 25.76 KG/M2 | HEART RATE: 63 BPM

## 2020-01-22 DIAGNOSIS — R42 DIZZY SPELLS: ICD-10-CM

## 2020-01-22 DIAGNOSIS — Z95.1 S/P CABG X 4: ICD-10-CM

## 2020-01-22 DIAGNOSIS — I25.10 CORONARY ARTERY DISEASE INVOLVING NATIVE CORONARY ARTERY OF NATIVE HEART WITHOUT ANGINA PECTORIS: Chronic | ICD-10-CM

## 2020-01-22 DIAGNOSIS — I10 ESSENTIAL HYPERTENSION: Chronic | ICD-10-CM

## 2020-01-22 DIAGNOSIS — R94.39 ABNORMAL NUCLEAR STRESS TEST: ICD-10-CM

## 2020-01-22 DIAGNOSIS — E11.65 TYPE 2 DIABETES MELLITUS WITH HYPERGLYCEMIA, WITHOUT LONG-TERM CURRENT USE OF INSULIN (HCC): Primary | ICD-10-CM

## 2020-01-22 DIAGNOSIS — I47.29 NSVT (NONSUSTAINED VENTRICULAR TACHYCARDIA) (HCC): ICD-10-CM

## 2020-01-22 DIAGNOSIS — E78.2 MIXED HYPERLIPIDEMIA: Chronic | ICD-10-CM

## 2020-01-22 DIAGNOSIS — R00.2 PALPITATIONS: ICD-10-CM

## 2020-01-22 PROCEDURE — 93000 ELECTROCARDIOGRAM COMPLETE: CPT | Performed by: INTERNAL MEDICINE

## 2020-01-22 PROCEDURE — 99214 OFFICE O/P EST MOD 30 MIN: CPT | Performed by: INTERNAL MEDICINE

## 2020-01-22 NOTE — PROGRESS NOTES
Sondra Connolly  [id number]  1945  75 y.o.  female    Referring Provider: Matthieu Bhakta DO    Reason for Follow-up Visit: Here for routine follow up  chronic atrial fibrillation   Holter as below  Cardiac workup test results as below   coronary artery disease Coronary artery bypass grafting > 10 years ago  Nuclear stress test no significant ischemia, has prior infarction  Prior admission     Subjective      Similar symptoms as during last visit     More energy now after decreasing rate control agents    Having  non sustained ventricular tachycardia on holter     Mild chronic exertional shortness of breath on exertion relieved with rest  No significant cough or wheezing    No palpitations  No associated chest pain  No significant pedal edema    No fever or chills  No significant expectoration    No hemoptysis  No presyncope or syncope     Much less dizzy spells now   Easy fatiguability   anxious   on anticoagulation     Tolerating current medications well with no untoward side effects   BP well controlled at home.             History of present illness:  Sondra Connolly is a 75 y.o. yo female with history of chronic atrial fibrillation who presents today for   Chief Complaint   Patient presents with   • Atrial Fibrillation     2 wk d/c f/u    .    History  Past Medical History:   Diagnosis Date   • A-fib (CMS/HCC)    • Angina pectoris (CMS/HCC)    • Anxiety    • Arthritis    • Atherosclerosis of coronary artery bypass graft    • CAD (coronary artery disease)    • Carotid artery occlusion without infarction    • Chronic diastolic heart failure (CMS/HCC)    • Chronic kidney disease    • Chronic lung disease    • Colon polyp    • Diabetes mellitus (CMS/HCC)    • DJD (degenerative joint disease)    • Fibrocystic breast disease    • Gastritis    • GI bleed    • Hemorrhoids    • Hyperlipidemia    • Hypertension    • Lung disease     chronic   • MI (myocardial infarction) (CMS/HCC)    • Palpitations    • PUD (peptic  ulcer disease)    • PVD (peripheral vascular disease) (CMS/HCC)    • Renal failure, acute (CMS/HCC)    ,   Past Surgical History:   Procedure Laterality Date   • APPENDECTOMY     • CARDIAC CATHETERIZATION  05/01/2009    Left-Heart Skin   • CAROTID ENDARTERECTOMY     • CATARACT EXTRACTION     • CATARACT EXTRACTION     • COLONOSCOPY     • CORONARY ARTERY BYPASS GRAFT  07/2007    Four   • ENDOSCOPY  10/02/2013   • ENDOSCOPY  10/02/2013   • HYSTERECTOMY     • SKIN CANCER EXCISION     • THROMBOENDARTERECTOMY     • TONSILLECTOMY     ,   Family History   Problem Relation Age of Onset   • Heart disease Mother    • Heart disease Father    • Heart disease Brother    • Heart disease Brother    • Breast cancer Maternal Grandmother    ,   Social History     Tobacco Use   • Smoking status: Never Smoker   • Smokeless tobacco: Never Used   • Tobacco comment: Non smoker; no tobacco use   Substance Use Topics   • Alcohol use: Yes     Comment: occ   • Drug use: No   ,     Medications  Current Outpatient Medications   Medication Sig Dispense Refill   • calcium citrate-vitamin d (CITRACAL) 200-250 MG-UNIT tablet tablet Take 1 tablet by mouth Daily.     • cholecalciferol (VITAMIN D3) 1000 UNITS tablet Take 1,000 Units by mouth Daily.     • dilTIAZem CD (CARDIZEM CD) 360 MG 24 hr capsule Take 1 capsule by mouth Daily. 30 capsule 1   • ferrous sulfate 325 (65 FE) MG tablet Take 1 tablet by mouth Daily.     • furosemide (LASIX) 20 MG tablet TAKE 1 TABLET BY MOUTH DAILY. 90 tablet 2   • metoprolol tartrate (LOPRESSOR) 25 MG tablet TAKE 1 TABLET BY MOUTH DAILY. 90 tablet 3   • Multiple Vitamins-Minerals (MULTIVITAMIN ADULT PO) Take 1 tablet by mouth Daily.     • nitroglycerin (NITROSTAT) 0.4 MG SL tablet 1 under the tongue as needed for angina, may repeat q5mins for up three doses (Patient taking differently: Place 0.4 mg under the tongue Every 5 (Five) Minutes As Needed. 1 under the tongue as needed for angina, may repeat q5mins for up three  "doses) 100 tablet 11   • potassium chloride (MICRO-K) 10 MEQ CR capsule Take 1 capsule by mouth Daily. 30 capsule 1   • pravastatin (PRAVACHOL) 40 MG tablet TAKE 1 TABLET BY MOUTH DAILY. 90 tablet 2   • warfarin (COUMADIN) 5 MG tablet TAKE 1 TABLET BY MOUTH DAILY. 90 tablet 2     No current facility-administered medications for this visit.        Allergies:  Buffered aspirin; Salicylates; and Demerol [meperidine]    Review of Systems  Review of Systems   Constitution: Positive for malaise/fatigue.   HENT: Negative.    Eyes: Negative.    Cardiovascular: Positive for dyspnea on exertion, irregular heartbeat and palpitations. Negative for chest pain, claudication, cyanosis, leg swelling, near-syncope, orthopnea, paroxysmal nocturnal dyspnea and syncope.   Respiratory: Negative.    Endocrine: Negative.    Hematologic/Lymphatic: Negative.    Skin: Negative.    Musculoskeletal: Positive for arthritis and joint pain.   Gastrointestinal: Negative for anorexia.   Genitourinary: Negative.    Neurological: Positive for dizziness and weakness.   Psychiatric/Behavioral: Negative.        Objective     Physical Exam:  /70   Pulse 63   Ht 157.5 cm (62\")   Wt 63.5 kg (140 lb)   SpO2 98%   BMI 25.61 kg/m²   Physical Exam   Constitutional: She appears well-developed.   HENT:   Head: Normocephalic.   Neck: Normal carotid pulses and no JVD present. No tracheal tenderness present. Carotid bruit is not present. No tracheal deviation and no edema present.   Cardiovascular: Normal pulses. An irregularly irregular rhythm present.   Murmur heard.   Systolic murmur is present with a grade of 2/6.  Pulmonary/Chest: Effort normal. No stridor.   Abdominal: Soft. She exhibits no distension. There is no hepatosplenomegaly. There is no tenderness.   Neurological: She is alert. She has normal strength. No cranial nerve deficit or sensory deficit.   Skin: Skin is warm.   Psychiatric: She has a normal mood and affect. Her speech is normal " and behavior is normal.       Results Review:  Results for orders placed during the hospital encounter of 19   Adult Transthoracic Echo Complete W/ Cont if Necessary Per Protocol    Narrative · Estimated EF = 55%.  · Left ventricular diastolic dysfunction.  · Left atrial cavity size is severely dilated.  · Mild aortic valve stenosis is present.  · Mild-to-moderate mitral valve regurgitation is present  · No evidence of pulmonary hypertension is present.      Holter       · Average HR: 79. Min HR: 44. Max HR: 146.     · Monitored for ~1 day   · Slowest heart rate at 8:46 AM  · The predominant rhythm noted during the testing period was atrial flutter   · 2256  premature ventricular contractions: PVC burden:  2%   · 75    non sustained ventricular tachycardia episode longest 9 beats at a maximum rate of 152 beats per minute   · No correlated arrhythmia  · 568 pauses longest 3.3 seconds at 1:54 AM           Sondra Connolly   Regadenoson Stress Test With Myocardial Perfusion SPECT (Multi Study)   Order# [id number]   Reading physician: Gualberto Gallo MD Ordering physician: Gualberto Gallo MD Study date: 19   Patient Information     Patient Name  Sondra Connolly MRN  [id number] Sex  Female  (Age)  1945 (75 y.o.)   Interpretation Summary     · Left ventricular ejection fraction is normal (Calculated EF = 55%).  · Breast attenuation artifact is present.  · Myocardial perfusion imaging indicates a medium-sized infarct located in the inferior wall with no significant ischemia noted.                    ECG 12 Lead  Date/Time: 2020 12:55 PM  Performed by: Gualberto Gallo MD  Authorized by: Gualberto Gallo MD   Comparison: compared with previous ECG from 1/3/2020  Comparison to previous ECG: Ventricular rate decreased from  131    to  90   beats per minute    Rhythm: atrial fibrillation  Rate: normal  QRS axis: right  Other findings: non-specific ST-T wave changes    Clinical impression: abnormal  EKG            Assessment/Plan   Patient Active Problem List   Diagnosis   • Essential hypertension   • S/P CABG x 4   • Atrial fibrillation with slow ventricular response (CMS/[facility])   • Mixed hyperlipidemia   • Palpitations   • Dizzy spells   • Hypotension   • NSVT (nonsustained ventricular tachycardia) (CMS/[facility])   • Abnormal nuclear stress test   • Atrial fibrillation with RVR (CMS/[facility])   • Type 2 diabetes mellitus with hyperglycemia, without long-term current use of insulin (CMS/[facility])   • Coronary artery disease involving native coronary artery of native heart without angina pectoris   • Acute on chronic diastolic heart failure (CMS/[facility])       Plan          Needs pacer for  symptomatic tachy-jaylen syndrome  and treat with rate control agents  She declined again    Conservative medical therapy per patient. Risks, benefits and alternatives explained. The patient understands and wishes to proceed with only conservative therapy.  The patient expressively declined any invasive testing or treatment      S/L NTG PRN for chest pain, call me or go to ER if has to use S/L nitroglycerin           ____________________________________________________________________________________________________________________________________________  Health maintenance and recommendations      Similar recommendations as last visit     Offered to give patient  a copy      Questions were encouraged, asked and answered to the patient's  understanding and satisfaction. Questions if any regarding current medications and side effects, need for refills and importance of compliance to medications stressed.    Reviewed available prior notes, consults, prior visits, laboratory findings, radiology and cardiology relevant reports. Updated chart as applicable. I have reviewed the patient's medical history in detail and updated the computerized patient record as relevant.      Updated patient regarding any new or relevant abnormalities on review of  records or any new findings on physical exam. Mentioned to patient about purpose of visit and desirable health short and long term goals and objectives.    Primary to monitor CBC CMP Lipid panel and TSH as applicable    ___________________________________________________________________________________________________________________________________________            Return in about 4 months (around 5/22/2020).

## 2020-01-24 ENCOUNTER — READMISSION MANAGEMENT (OUTPATIENT)
Dept: CALL CENTER | Facility: HOSPITAL | Age: 75
End: 2020-01-24

## 2020-01-24 NOTE — OUTREACH NOTE
CHF Week 3 Survey      Responses   Facility patient discharged from?  [facility]   Does the patient have one of the following disease processes/diagnoses(primary or secondary)?  CHF   Week 3 attempt successful?  Yes   Call start time  1726   Call end time  1730   Discharge diagnosis  Acute on chronic diastolic heart failure    Meds reviewed with patient/caregiver?  Yes   Is the patient having any side effects they believe may be caused by any medication additions or changes?  No   Does the patient have all medications ordered at discharge?  Yes   Is the patient taking all medications as directed (includes completed medication regime)?  Yes   Does the patient have a primary care provider?   Yes   Does the patient have an appointment with their PCP within 7 days of discharge?  Yes   Has the patient kept scheduled appointments due by today?  Yes   Has home health visited the patient within 72 hours of discharge?  N/A   Psychosocial issues?  No   Comments  pt active and improving daily   Did the patient receive a copy of their discharge instructions?  Yes   Nursing interventions  Reviewed instructions with patient   What is the patient's perception of their health status since discharge?  Improving   Nursing interventions  Nurse provided patient education   Is the patient weighing daily?  Yes   Does the patient have scales?  Yes   Is the patient able to teach back Heart Failure diet management?  Yes   Is the patient able to teach back Heart Failure Zones?  Yes   Is the patient able to teach back signs and symptoms of worsening condition? (i.e. weight gain, shortness of air, etc.)  Yes   Is the patient/caregiver able to teach back the hierarchy of who to call/visit for symptoms/problems? PCP, Specialist, Home health nurse, Urgent Care, ED, 911  Yes   CHF Week 3 call completed?  Yes          Naye Ruiz RN

## 2020-02-03 ENCOUNTER — READMISSION MANAGEMENT (OUTPATIENT)
Dept: CALL CENTER | Facility: HOSPITAL | Age: 75
End: 2020-02-03

## 2020-02-03 ENCOUNTER — EPISODE CHANGES (OUTPATIENT)
Dept: CASE MANAGEMENT | Facility: OTHER | Age: 75
End: 2020-02-03

## 2020-02-03 NOTE — OUTREACH NOTE
CHF Week 4 Survey      Responses   Facility patient discharged from?  [facility]   Does the patient have one of the following disease processes/diagnoses(primary or secondary)?  CHF   Week 4 attempt successful?  Yes   Call start time  0748   Call end time  0752   Discharge diagnosis  Acute on chronic diastolic heart failure    Meds reviewed with patient/caregiver?  Yes   Is the patient taking all medications as directed (includes completed medication regime)?  Yes   Has the patient kept scheduled appointments due by today?  Yes   Is the patient still receiving Home Health Services?  N/A   What is the patient's perception of their health status since discharge?  Improving   Is the patient weighing daily?  Yes   Is the patient able to teach back Heart Failure Zones?  Yes   Week 4 Call Completed?  Yes   Would the patient like one additional call?  No   Graduated  Yes   Did the patient feel the follow up calls were helpful during their recovery period?  Yes   Was the number of calls appropriate?  Yes          Chyna Falcon RN

## 2020-02-14 ENCOUNTER — ANTICOAGULATION VISIT (OUTPATIENT)
Dept: CARDIOLOGY | Facility: CLINIC | Age: 75
End: 2020-02-14

## 2020-02-17 ENCOUNTER — ANTICOAGULATION VISIT (OUTPATIENT)
Dept: CARDIOLOGY | Facility: CLINIC | Age: 75
End: 2020-02-17

## 2020-02-17 LAB — INR PPP: 4.6

## 2020-02-24 ENCOUNTER — APPOINTMENT (OUTPATIENT)
Dept: GENERAL RADIOLOGY | Facility: HOSPITAL | Age: 75
End: 2020-02-24

## 2020-02-24 ENCOUNTER — ANTICOAGULATION VISIT (OUTPATIENT)
Dept: CARDIOLOGY | Facility: CLINIC | Age: 75
End: 2020-02-24

## 2020-02-24 ENCOUNTER — HOSPITAL ENCOUNTER (INPATIENT)
Facility: HOSPITAL | Age: 75
LOS: 1 days | Discharge: HOME | End: 2020-02-25
Attending: EMERGENCY MEDICINE | Admitting: INTERNAL MEDICINE

## 2020-02-24 DIAGNOSIS — I48.91 ATRIAL FIBRILLATION WITH RVR (HCC): Primary | ICD-10-CM

## 2020-02-24 PROBLEM — E11.9 DIET-CONTROLLED DIABETES MELLITUS (HCC): Status: ACTIVE | Noted: 2020-01-03

## 2020-02-24 LAB
ALBUMIN SERPL-MCNC: 4.5 G/DL (ref 3.5–5.2)
ALBUMIN/GLOB SERPL: 1.6 G/DL
ALP SERPL-CCNC: 55 U/L (ref 39–117)
ALT SERPL W P-5'-P-CCNC: 24 U/L (ref 1–33)
ANION GAP SERPL CALCULATED.3IONS-SCNC: 14 MMOL/L (ref 5–15)
AST SERPL-CCNC: 24 U/L (ref 1–32)
BASOPHILS # BLD AUTO: 0.03 10*3/MM3 (ref 0–0.2)
BASOPHILS NFR BLD AUTO: 0.3 % (ref 0–1.5)
BILIRUB SERPL-MCNC: 0.6 MG/DL (ref 0.2–1.2)
BUN BLD-MCNC: 19 MG/DL (ref 8–23)
BUN/CREAT SERPL: 24.4 (ref 7–25)
CALCIUM SPEC-SCNC: 9.9 MG/DL (ref 8.6–10.5)
CHLORIDE SERPL-SCNC: 100 MMOL/L (ref 98–107)
CO2 SERPL-SCNC: 25 MMOL/L (ref 22–29)
CREAT BLD-MCNC: 0.78 MG/DL (ref 0.57–1)
DEPRECATED RDW RBC AUTO: 50.6 FL (ref 37–54)
EOSINOPHIL # BLD AUTO: 0 10*3/MM3 (ref 0–0.4)
EOSINOPHIL NFR BLD AUTO: 0 % (ref 0.3–6.2)
ERYTHROCYTE [DISTWIDTH] IN BLOOD BY AUTOMATED COUNT: 15.2 % (ref 12.3–15.4)
GFR SERPL CREATININE-BSD FRML MDRD: 72 ML/MIN/1.73
GLOBULIN UR ELPH-MCNC: 2.8 GM/DL
GLUCOSE BLD-MCNC: 141 MG/DL (ref 65–99)
HCT VFR BLD AUTO: 42.6 % (ref 34–46.6)
HGB BLD-MCNC: 14.2 G/DL (ref 12–15.9)
HOLD SPECIMEN: NORMAL
IMM GRANULOCYTES # BLD AUTO: 0.03 10*3/MM3 (ref 0–0.05)
IMM GRANULOCYTES NFR BLD AUTO: 0.3 % (ref 0–0.5)
INR PPP: 1.98 (ref 0.91–1.09)
LYMPHOCYTES # BLD AUTO: 0.71 10*3/MM3 (ref 0.7–3.1)
LYMPHOCYTES NFR BLD AUTO: 6.7 % (ref 19.6–45.3)
MAGNESIUM SERPL-MCNC: 2.1 MG/DL (ref 1.6–2.4)
MCH RBC QN AUTO: 30.7 PG (ref 26.6–33)
MCHC RBC AUTO-ENTMCNC: 33.3 G/DL (ref 31.5–35.7)
MCV RBC AUTO: 92 FL (ref 79–97)
MONOCYTES # BLD AUTO: 0.54 10*3/MM3 (ref 0.1–0.9)
MONOCYTES NFR BLD AUTO: 5.1 % (ref 5–12)
NEUTROPHILS # BLD AUTO: 9.34 10*3/MM3 (ref 1.7–7)
NEUTROPHILS NFR BLD AUTO: 87.6 % (ref 42.7–76)
NRBC BLD AUTO-RTO: 0 /100 WBC (ref 0–0.2)
NT-PROBNP SERPL-MCNC: 6538 PG/ML (ref 5–1800)
PLATELET # BLD AUTO: 249 10*3/MM3 (ref 140–450)
PMV BLD AUTO: 10 FL (ref 6–12)
POTASSIUM BLD-SCNC: 4.4 MMOL/L (ref 3.5–5.2)
PROT SERPL-MCNC: 7.3 G/DL (ref 6–8.5)
PROTHROMBIN TIME: 22.5 SECONDS (ref 11.9–14.6)
RBC # BLD AUTO: 4.63 10*6/MM3 (ref 3.77–5.28)
SODIUM BLD-SCNC: 139 MMOL/L (ref 136–145)
TROPONIN T SERPL-MCNC: <0.01 NG/ML (ref 0–0.03)
WBC NRBC COR # BLD: 10.65 10*3/MM3 (ref 3.4–10.8)

## 2020-02-24 PROCEDURE — 93005 ELECTROCARDIOGRAM TRACING: CPT | Performed by: EMERGENCY MEDICINE

## 2020-02-24 PROCEDURE — 80053 COMPREHEN METABOLIC PANEL: CPT | Performed by: EMERGENCY MEDICINE

## 2020-02-24 PROCEDURE — 93005 ELECTROCARDIOGRAM TRACING: CPT

## 2020-02-24 PROCEDURE — 83880 ASSAY OF NATRIURETIC PEPTIDE: CPT | Performed by: EMERGENCY MEDICINE

## 2020-02-24 PROCEDURE — 85025 COMPLETE CBC W/AUTO DIFF WBC: CPT | Performed by: EMERGENCY MEDICINE

## 2020-02-24 PROCEDURE — 85610 PROTHROMBIN TIME: CPT | Performed by: EMERGENCY MEDICINE

## 2020-02-24 PROCEDURE — 99284 EMERGENCY DEPT VISIT MOD MDM: CPT

## 2020-02-24 PROCEDURE — 84484 ASSAY OF TROPONIN QUANT: CPT | Performed by: EMERGENCY MEDICINE

## 2020-02-24 PROCEDURE — 83735 ASSAY OF MAGNESIUM: CPT | Performed by: EMERGENCY MEDICINE

## 2020-02-24 PROCEDURE — 93010 ELECTROCARDIOGRAM REPORT: CPT | Performed by: INTERNAL MEDICINE

## 2020-02-24 PROCEDURE — 71045 X-RAY EXAM CHEST 1 VIEW: CPT

## 2020-02-24 RX ORDER — ONDANSETRON 2 MG/ML
4 INJECTION INTRAMUSCULAR; INTRAVENOUS EVERY 6 HOURS PRN
Status: DISCONTINUED | OUTPATIENT
Start: 2020-02-24 | End: 2020-02-25 | Stop reason: HOSPADM

## 2020-02-24 RX ORDER — SODIUM CHLORIDE 0.9 % (FLUSH) 0.9 %
10 SYRINGE (ML) INJECTION EVERY 12 HOURS SCHEDULED
Status: DISCONTINUED | OUTPATIENT
Start: 2020-02-24 | End: 2020-02-25 | Stop reason: HOSPADM

## 2020-02-24 RX ORDER — MULTIVIT,CALC,MINS/IRON/FOLIC 9MG-400MCG
1 TABLET ORAL DAILY
Status: DISCONTINUED | OUTPATIENT
Start: 2020-02-25 | End: 2020-02-25 | Stop reason: HOSPADM

## 2020-02-24 RX ORDER — SODIUM CHLORIDE 0.9 % (FLUSH) 0.9 %
10 SYRINGE (ML) INJECTION AS NEEDED
Status: DISCONTINUED | OUTPATIENT
Start: 2020-02-24 | End: 2020-02-25 | Stop reason: HOSPADM

## 2020-02-24 RX ORDER — METOPROLOL TARTRATE 5 MG/5ML
5 INJECTION INTRAVENOUS ONCE
Status: COMPLETED | OUTPATIENT
Start: 2020-02-24 | End: 2020-02-24

## 2020-02-24 RX ORDER — ACETAMINOPHEN 160 MG/5ML
650 SOLUTION ORAL EVERY 4 HOURS PRN
Status: DISCONTINUED | OUTPATIENT
Start: 2020-02-24 | End: 2020-02-25 | Stop reason: HOSPADM

## 2020-02-24 RX ORDER — FERROUS SULFATE 325(65) MG
325 TABLET ORAL DAILY
Status: DISCONTINUED | OUTPATIENT
Start: 2020-02-25 | End: 2020-02-25 | Stop reason: HOSPADM

## 2020-02-24 RX ORDER — NITROGLYCERIN 0.4 MG/1
0.4 TABLET SUBLINGUAL
Status: DISCONTINUED | OUTPATIENT
Start: 2020-02-24 | End: 2020-02-25 | Stop reason: HOSPADM

## 2020-02-24 RX ORDER — ACETAMINOPHEN 650 MG/1
650 SUPPOSITORY RECTAL EVERY 4 HOURS PRN
Status: DISCONTINUED | OUTPATIENT
Start: 2020-02-24 | End: 2020-02-25 | Stop reason: HOSPADM

## 2020-02-24 RX ORDER — METOPROLOL SUCCINATE 25 MG/1
25 TABLET, EXTENDED RELEASE ORAL DAILY
COMMUNITY
End: 2020-02-25 | Stop reason: HOSPADM

## 2020-02-24 RX ORDER — WARFARIN SODIUM 5 MG/1
5 TABLET ORAL
Status: DISCONTINUED | OUTPATIENT
Start: 2020-02-24 | End: 2020-02-25 | Stop reason: HOSPADM

## 2020-02-24 RX ORDER — PRAVASTATIN SODIUM 20 MG
40 TABLET ORAL DAILY
Status: DISCONTINUED | OUTPATIENT
Start: 2020-02-25 | End: 2020-02-25 | Stop reason: HOSPADM

## 2020-02-24 RX ORDER — FUROSEMIDE 10 MG/ML
40 INJECTION INTRAMUSCULAR; INTRAVENOUS DAILY
Status: DISCONTINUED | OUTPATIENT
Start: 2020-02-25 | End: 2020-02-25 | Stop reason: HOSPADM

## 2020-02-24 RX ORDER — ACETAMINOPHEN 325 MG/1
650 TABLET ORAL EVERY 4 HOURS PRN
Status: DISCONTINUED | OUTPATIENT
Start: 2020-02-24 | End: 2020-02-25 | Stop reason: HOSPADM

## 2020-02-24 RX ADMIN — Medication 1 TABLET: at 20:59

## 2020-02-24 RX ADMIN — DILTIAZEM HYDROCHLORIDE 15 MG/HR: 5 INJECTION INTRAVENOUS at 23:04

## 2020-02-24 RX ADMIN — DILTIAZEM HYDROCHLORIDE 5 MG/HR: 5 INJECTION INTRAVENOUS at 15:32

## 2020-02-24 RX ADMIN — METOPROLOL TARTRATE 5 MG: 5 INJECTION INTRAVENOUS at 15:32

## 2020-02-24 RX ADMIN — SODIUM CHLORIDE, PRESERVATIVE FREE 10 ML: 5 INJECTION INTRAVENOUS at 21:00

## 2020-02-24 RX ADMIN — WARFARIN SODIUM 5 MG: 5 TABLET ORAL at 20:59

## 2020-02-25 ENCOUNTER — ANTICOAGULATION VISIT (OUTPATIENT)
Dept: CARDIOLOGY | Facility: CLINIC | Age: 75
End: 2020-02-25

## 2020-02-25 VITALS
RESPIRATION RATE: 18 BRPM | BODY MASS INDEX: 25.76 KG/M2 | TEMPERATURE: 97.5 F | WEIGHT: 140 LBS | SYSTOLIC BLOOD PRESSURE: 100 MMHG | OXYGEN SATURATION: 95 % | DIASTOLIC BLOOD PRESSURE: 65 MMHG | HEIGHT: 62 IN | HEART RATE: 76 BPM

## 2020-02-25 LAB
ANION GAP SERPL CALCULATED.3IONS-SCNC: 13 MMOL/L (ref 5–15)
BUN BLD-MCNC: 16 MG/DL (ref 8–23)
BUN/CREAT SERPL: 20.3 (ref 7–25)
CALCIUM SPEC-SCNC: 9.6 MG/DL (ref 8.6–10.5)
CHLORIDE SERPL-SCNC: 99 MMOL/L (ref 98–107)
CO2 SERPL-SCNC: 26 MMOL/L (ref 22–29)
CREAT BLD-MCNC: 0.79 MG/DL (ref 0.57–1)
DEPRECATED RDW RBC AUTO: 50.5 FL (ref 37–54)
ERYTHROCYTE [DISTWIDTH] IN BLOOD BY AUTOMATED COUNT: 15 % (ref 12.3–15.4)
GFR SERPL CREATININE-BSD FRML MDRD: 71 ML/MIN/1.73
GLUCOSE BLD-MCNC: 134 MG/DL (ref 65–99)
HCT VFR BLD AUTO: 38.7 % (ref 34–46.6)
HGB BLD-MCNC: 12.8 G/DL (ref 12–15.9)
INR PPP: 1.93 (ref 0.91–1.09)
MCH RBC QN AUTO: 30.5 PG (ref 26.6–33)
MCHC RBC AUTO-ENTMCNC: 33.1 G/DL (ref 31.5–35.7)
MCV RBC AUTO: 92.1 FL (ref 79–97)
PLATELET # BLD AUTO: 219 10*3/MM3 (ref 140–450)
PMV BLD AUTO: 10.4 FL (ref 6–12)
POTASSIUM BLD-SCNC: 3.9 MMOL/L (ref 3.5–5.2)
PROTHROMBIN TIME: 22.1 SECONDS (ref 11.9–14.6)
RBC # BLD AUTO: 4.2 10*6/MM3 (ref 3.77–5.28)
SODIUM BLD-SCNC: 138 MMOL/L (ref 136–145)
WBC NRBC COR # BLD: 11.82 10*3/MM3 (ref 3.4–10.8)

## 2020-02-25 PROCEDURE — 80048 BASIC METABOLIC PNL TOTAL CA: CPT | Performed by: INTERNAL MEDICINE

## 2020-02-25 PROCEDURE — 99222 1ST HOSP IP/OBS MODERATE 55: CPT | Performed by: INTERNAL MEDICINE

## 2020-02-25 PROCEDURE — 85027 COMPLETE CBC AUTOMATED: CPT | Performed by: INTERNAL MEDICINE

## 2020-02-25 PROCEDURE — 85610 PROTHROMBIN TIME: CPT | Performed by: INTERNAL MEDICINE

## 2020-02-25 PROCEDURE — 36415 COLL VENOUS BLD VENIPUNCTURE: CPT | Performed by: INTERNAL MEDICINE

## 2020-02-25 PROCEDURE — 25010000002 FUROSEMIDE PER 20 MG: Performed by: INTERNAL MEDICINE

## 2020-02-25 RX ORDER — METOPROLOL SUCCINATE 50 MG/1
50 TABLET, EXTENDED RELEASE ORAL DAILY
Qty: 30 TABLET | Refills: 0 | Status: SHIPPED | OUTPATIENT
Start: 2020-02-25 | End: 2020-02-29 | Stop reason: SDUPTHER

## 2020-02-25 RX ADMIN — DILTIAZEM HYDROCHLORIDE 360 MG: 240 CAPSULE, EXTENDED RELEASE ORAL at 05:21

## 2020-02-25 RX ADMIN — SODIUM CHLORIDE, PRESERVATIVE FREE 10 ML: 5 INJECTION INTRAVENOUS at 09:52

## 2020-02-25 RX ADMIN — METOPROLOL TARTRATE 25 MG: 25 TABLET, FILM COATED ORAL at 09:51

## 2020-02-25 RX ADMIN — PRAVASTATIN SODIUM 40 MG: 20 TABLET ORAL at 09:51

## 2020-02-25 RX ADMIN — Medication 1 TABLET: at 09:51

## 2020-02-25 RX ADMIN — FUROSEMIDE 40 MG: 10 INJECTION, SOLUTION INTRAVENOUS at 09:51

## 2020-02-26 ENCOUNTER — ANTICOAGULATION VISIT (OUTPATIENT)
Dept: CARDIOLOGY | Facility: CLINIC | Age: 75
End: 2020-02-26

## 2020-02-26 ENCOUNTER — READMISSION MANAGEMENT (OUTPATIENT)
Dept: CALL CENTER | Facility: HOSPITAL | Age: 75
End: 2020-02-26

## 2020-02-26 NOTE — OUTREACH NOTE
Prep Survey      Responses   Facility patient discharged from?  [facility]   Is patient eligible?  Yes   Discharge diagnosis  Afib With RVR, a/c diastolic HF, CAD w/o angina, essential HTN, diet controlled DM , mixed HLD   Does the patient have one of the following disease processes/diagnoses(primary or secondary)?  CHF   Does the patient have Home health ordered?  No   Is there a DME ordered?  No   Comments regarding appointments  See AVS   Prep survey completed?  Yes          Maricarmen Lynn RN

## 2020-02-27 ENCOUNTER — READMISSION MANAGEMENT (OUTPATIENT)
Dept: CALL CENTER | Facility: HOSPITAL | Age: 75
End: 2020-02-27

## 2020-02-27 NOTE — OUTREACH NOTE
CHF Week 1 Survey      Responses   Facility patient discharged from?  [facility]   Does the patient have one of the following disease processes/diagnoses(primary or secondary)?  CHF   Is there a successful TCM telephone encounter documented?  No   CHF Week 1 attempt successful?  Yes   Call start time  1257   Call end time  1308   Discharge diagnosis  Afib With RVR, a/c diastolic HF, CAD w/o angina, essential HTN, diet controlled DM , mixed HLD   Is patient permission given to speak with other caregiver?  Yes   List who call center can speak with  [facility]   Meds reviewed with patient/caregiver?  Yes   Is the patient having any side effects they believe may be caused by any medication additions or changes?  No   Does the patient have all medications ordered at discharge?  Yes   Is the patient taking all medications as directed (includes completed medication regime)?  Yes   Does the patient have a primary care provider?   Yes   Does the patient have an appointment with their PCP within 7 days of discharge?  Yes   Has the patient kept scheduled appointments due by today?  N/A   Has home health visited the patient within 72 hours of discharge?  N/A   Psychosocial issues?  No   Did the patient receive a copy of their discharge instructions?  Yes   What is the patient's perception of their health status since discharge?  Improving   Nursing interventions  Nurse provided patient education   Is the patient weighing daily?  Yes   Does the patient have scales?  Yes   Is the patient able to teach back Heart Failure diet management?  Yes   Is the patient able to teach back Heart Failure Zones?  Yes   Is the patient able to teach back signs and symptoms of worsening condition? (i.e. weight gain, shortness of air, etc.)  Yes   Is the patient/caregiver able to teach back the hierarchy of who to call/visit for symptoms/problems? PCP, Specialist, Home health nurse, Urgent Care, ED, 911  Yes    CHF Week 1 call completed?  Yes   Wrap up  additional comments  Doing well.  Was not in CHF this admission but well educated on CHF management.          Lacey Eastman RN

## 2020-02-29 ENCOUNTER — APPOINTMENT (OUTPATIENT)
Dept: GENERAL RADIOLOGY | Facility: HOSPITAL | Age: 75
End: 2020-02-29

## 2020-02-29 ENCOUNTER — HOSPITAL ENCOUNTER (EMERGENCY)
Facility: HOSPITAL | Age: 75
Discharge: HOME | End: 2020-02-29
Admitting: EMERGENCY MEDICINE

## 2020-02-29 VITALS
OXYGEN SATURATION: 98 % | BODY MASS INDEX: 25.87 KG/M2 | TEMPERATURE: 97.8 F | HEIGHT: 63 IN | SYSTOLIC BLOOD PRESSURE: 110 MMHG | RESPIRATION RATE: 20 BRPM | HEART RATE: 85 BPM | WEIGHT: 146 LBS | DIASTOLIC BLOOD PRESSURE: 60 MMHG

## 2020-02-29 DIAGNOSIS — I48.91 ATRIAL FIBRILLATION WITH SLOW VENTRICULAR RESPONSE (HCC): Primary | ICD-10-CM

## 2020-02-29 LAB
ALBUMIN SERPL-MCNC: 4.1 G/DL (ref 3.5–5.2)
ALBUMIN/GLOB SERPL: 1.6 G/DL
ALP SERPL-CCNC: 60 U/L (ref 39–117)
ALT SERPL W P-5'-P-CCNC: 28 U/L (ref 1–33)
ANION GAP SERPL CALCULATED.3IONS-SCNC: 12 MMOL/L (ref 5–15)
APTT PPP: 33.9 SECONDS (ref 24.1–35)
AST SERPL-CCNC: 28 U/L (ref 1–32)
BASOPHILS # BLD AUTO: 0.05 10*3/MM3 (ref 0–0.2)
BASOPHILS NFR BLD AUTO: 0.4 % (ref 0–1.5)
BILIRUB SERPL-MCNC: 0.4 MG/DL (ref 0.2–1.2)
BUN BLD-MCNC: 28 MG/DL (ref 8–23)
BUN/CREAT SERPL: 23.5 (ref 7–25)
CALCIUM SPEC-SCNC: 9.5 MG/DL (ref 8.6–10.5)
CHLORIDE SERPL-SCNC: 97 MMOL/L (ref 98–107)
CO2 SERPL-SCNC: 26 MMOL/L (ref 22–29)
CREAT BLD-MCNC: 1.19 MG/DL (ref 0.57–1)
DEPRECATED RDW RBC AUTO: 49.8 FL (ref 37–54)
EOSINOPHIL # BLD AUTO: 0.15 10*3/MM3 (ref 0–0.4)
EOSINOPHIL NFR BLD AUTO: 1.3 % (ref 0.3–6.2)
ERYTHROCYTE [DISTWIDTH] IN BLOOD BY AUTOMATED COUNT: 15 % (ref 12.3–15.4)
GFR SERPL CREATININE-BSD FRML MDRD: 44 ML/MIN/1.73
GLOBULIN UR ELPH-MCNC: 2.6 GM/DL
GLUCOSE BLD-MCNC: 126 MG/DL (ref 65–99)
HCT VFR BLD AUTO: 41.4 % (ref 34–46.6)
HGB BLD-MCNC: 13.6 G/DL (ref 12–15.9)
IMM GRANULOCYTES # BLD AUTO: 0.14 10*3/MM3 (ref 0–0.05)
IMM GRANULOCYTES NFR BLD AUTO: 1.2 % (ref 0–0.5)
INR PPP: 1.88 (ref 0.91–1.09)
LYMPHOCYTES # BLD AUTO: 2.23 10*3/MM3 (ref 0.7–3.1)
LYMPHOCYTES NFR BLD AUTO: 19.2 % (ref 19.6–45.3)
MCH RBC QN AUTO: 30.2 PG (ref 26.6–33)
MCHC RBC AUTO-ENTMCNC: 32.9 G/DL (ref 31.5–35.7)
MCV RBC AUTO: 91.8 FL (ref 79–97)
MONOCYTES # BLD AUTO: 1.19 10*3/MM3 (ref 0.1–0.9)
MONOCYTES NFR BLD AUTO: 10.2 % (ref 5–12)
NEUTROPHILS # BLD AUTO: 7.86 10*3/MM3 (ref 1.7–7)
NEUTROPHILS NFR BLD AUTO: 67.7 % (ref 42.7–76)
NRBC BLD AUTO-RTO: 0 /100 WBC (ref 0–0.2)
PLATELET # BLD AUTO: 249 10*3/MM3 (ref 140–450)
PMV BLD AUTO: 10.1 FL (ref 6–12)
POTASSIUM BLD-SCNC: 4.7 MMOL/L (ref 3.5–5.2)
PROT SERPL-MCNC: 6.7 G/DL (ref 6–8.5)
PROTHROMBIN TIME: 21.6 SECONDS (ref 11.9–14.6)
RBC # BLD AUTO: 4.51 10*6/MM3 (ref 3.77–5.28)
SODIUM BLD-SCNC: 135 MMOL/L (ref 136–145)
TROPONIN T SERPL-MCNC: <0.01 NG/ML (ref 0–0.03)
WBC NRBC COR # BLD: 11.62 10*3/MM3 (ref 3.4–10.8)

## 2020-02-29 PROCEDURE — 85730 THROMBOPLASTIN TIME PARTIAL: CPT | Performed by: PHYSICIAN ASSISTANT

## 2020-02-29 PROCEDURE — 85610 PROTHROMBIN TIME: CPT | Performed by: PHYSICIAN ASSISTANT

## 2020-02-29 PROCEDURE — 36415 COLL VENOUS BLD VENIPUNCTURE: CPT

## 2020-02-29 PROCEDURE — 84484 ASSAY OF TROPONIN QUANT: CPT | Performed by: PHYSICIAN ASSISTANT

## 2020-02-29 PROCEDURE — 93010 ELECTROCARDIOGRAM REPORT: CPT | Performed by: INTERNAL MEDICINE

## 2020-02-29 PROCEDURE — 80053 COMPREHEN METABOLIC PANEL: CPT | Performed by: PHYSICIAN ASSISTANT

## 2020-02-29 PROCEDURE — 99285 EMERGENCY DEPT VISIT HI MDM: CPT

## 2020-02-29 PROCEDURE — 93005 ELECTROCARDIOGRAM TRACING: CPT

## 2020-02-29 PROCEDURE — 85025 COMPLETE CBC W/AUTO DIFF WBC: CPT | Performed by: PHYSICIAN ASSISTANT

## 2020-02-29 PROCEDURE — 71045 X-RAY EXAM CHEST 1 VIEW: CPT

## 2020-02-29 RX ORDER — METOPROLOL SUCCINATE 50 MG/1
25 TABLET, EXTENDED RELEASE ORAL DAILY
Qty: 30 TABLET | Refills: 0 | Status: ON HOLD | OUTPATIENT
Start: 2020-02-29 | End: 2021-02-01

## 2020-02-29 RX ADMIN — SODIUM CHLORIDE 500 ML: 9 INJECTION, SOLUTION INTRAVENOUS at 19:22

## 2020-03-01 NOTE — ED PROVIDER NOTES
Subjective   History of Present Illness  75-year-old female presents with a chief complaint of weakness lightheadedness fatigue.  Patient started taking metoprolol recently after being discharged from the hospital with new onset A. fib.  She had increase her metoprolol to 50 mg prior to discharge.  She has been taking to 25 mg tablets daily.  She has no chest pain no cough hemoptysis shortness of breath nausea vomiting diarrhea or abdominal pain she has been afebrile.  Review of Systems   All other systems reviewed and are negative.      Past Medical History:   Diagnosis Date   • A-fib (CMS/HCC)    • Angina pectoris (CMS/HCC)    • Anxiety    • Arthritis    • Atherosclerosis of coronary artery bypass graft    • CAD (coronary artery disease)    • Carotid artery occlusion without infarction    • Chronic diastolic heart failure (CMS/HCC)    • Chronic kidney disease    • Chronic lung disease    • Colon polyp    • Diabetes mellitus (CMS/HCC)    • DJD (degenerative joint disease)    • Fibrocystic breast disease    • Gastritis    • GI bleed    • Hemorrhoids    • Hyperlipidemia    • Hypertension    • Lung disease     chronic   • MI (myocardial infarction) (CMS/HCC)    • Palpitations    • PUD (peptic ulcer disease)    • PVD (peripheral vascular disease) (CMS/HCC)    • Renal failure, acute (CMS/HCC)        Allergies   Allergen Reactions   • Buffered Aspirin Angioedema     Patient has taken tums in the past with no problem.   • Salicylates Angioedema   • Demerol [Meperidine] Nausea And Vomiting and Hallucinations       Past Surgical History:   Procedure Laterality Date   • APPENDECTOMY     • CARDIAC CATHETERIZATION  05/01/2009    Left-Heart Skin   • CAROTID ENDARTERECTOMY     • CATARACT EXTRACTION     • CATARACT EXTRACTION     • COLONOSCOPY     • CORONARY ARTERY BYPASS GRAFT  07/2007    Four   • ENDOSCOPY  10/02/2013   • ENDOSCOPY  10/02/2013   • HYSTERECTOMY     • SKIN CANCER EXCISION     • THROMBOENDARTERECTOMY     •  TONSILLECTOMY         Family History   Problem Relation Age of Onset   • Heart disease Mother    • Heart disease Father    • Heart disease Brother    • Heart disease Brother    • Breast cancer Maternal Grandmother        Social History     Socioeconomic History   • Marital status:      Spouse name: Not on file   • Number of children: Not on file   • Years of education: Not on file   • Highest education level: Not on file   Tobacco Use   • Smoking status: Never Smoker   • Smokeless tobacco: Never Used   • Tobacco comment: Non smoker; no tobacco use   Substance and Sexual Activity   • Alcohol use: Yes     Comment: occ   • Drug use: No   • Sexual activity: Defer           Objective   Physical Exam   Constitutional: She is oriented to person, place, and time. She appears well-developed and well-nourished.   HENT:   Head: Normocephalic and atraumatic.   Eyes: Pupils are equal, round, and reactive to light. EOM are normal.   Neck: Normal range of motion. Neck supple.   Cardiovascular: An irregularly irregular rhythm present. Bradycardia present.   Pulmonary/Chest: Effort normal and breath sounds normal.   Abdominal: Soft. Bowel sounds are normal.   Musculoskeletal: Normal range of motion.   Neurological: She is alert and oriented to person, place, and time. No cranial nerve deficit. Coordination normal.   Skin: Skin is warm. Capillary refill takes less than 2 seconds.   Psychiatric: She has a normal mood and affect. Her behavior is normal.   Nursing note and vitals reviewed.      Procedures           ED Course                 Labs Reviewed   COMPREHENSIVE METABOLIC PANEL - Abnormal; Notable for the following components:       Result Value    Glucose 126 (*)     BUN 28 (*)     Creatinine 1.19 (*)     Sodium 135 (*)     Chloride 97 (*)     eGFR Non  Amer 44 (*)     All other components within normal limits    Narrative:     GFR Normal >60  Chronic Kidney Disease <60  Kidney Failure <15     PROTIME-INR -  Abnormal; Notable for the following components:    Protime 21.6 (*)     INR 1.88 (*)     All other components within normal limits   CBC WITH AUTO DIFFERENTIAL - Abnormal; Notable for the following components:    WBC 11.62 (*)     Lymphocyte % 19.2 (*)     Immature Grans % 1.2 (*)     Neutrophils, Absolute 7.86 (*)     Monocytes, Absolute 1.19 (*)     Immature Grans, Absolute 0.14 (*)     All other components within normal limits   APTT - Normal   TROPONIN (IN-HOUSE) - Normal    Narrative:     Troponin T Reference Range:  <= 0.03 ng/mL-   Negative for AMI  >0.03 ng/mL-     Abnormal for myocardial necrosis.  Clinicians would have to utilize clinical acumen, EKG, Troponin and serial changes to determine if it is an Acute Myocardial Infarction or myocardial injury due to an underlying chronic condition.       Results may be falsely decreased if patient taking Biotin.     CBC AND DIFFERENTIAL    Narrative:     The following orders were created for panel order CBC & Differential.  Procedure                               Abnormality         Status                     ---------                               -----------         ------                     CBC Auto Differential[[id number]]        Abnormal            Final result                 Please view results for these tests on the individual orders.     XR Chest 1 View   Final Result   1. Findings most suggestive of pulmonary edema.           This report was finalized on 02/29/2020 19:28 by Dr. Bud Paulino MD.                                     MDM  Number of Diagnoses or Management Options  Diagnosis management comments: Consulted Dr. Gallo, he request patient reduce metoprolol to 25 mg and for patient to go home and follow-up with him in office.  Patient and family updated on plan.  She will be discharged in stable condition       Amount and/or Complexity of Data Reviewed  Clinical lab tests: reviewed and ordered  Tests in the radiology section of CPT®: ordered and  reviewed  Tests in the medicine section of CPT®: ordered and reviewed  Decide to obtain previous medical records or to obtain history from someone other than the patient: yes    Risk of Complications, Morbidity, and/or Mortality  Presenting problems: moderate  Diagnostic procedures: moderate  Management options: moderate    Patient Progress  Patient progress: stable      Final diagnoses:   Atrial fibrillation with slow ventricular response (CMS/HCC)            Truman Joshua PA-C  02/29/20 2015

## 2020-03-02 ENCOUNTER — EPISODE CHANGES (OUTPATIENT)
Dept: CASE MANAGEMENT | Facility: OTHER | Age: 75
End: 2020-03-02

## 2020-03-04 ENCOUNTER — ANTICOAGULATION VISIT (OUTPATIENT)
Dept: CARDIOLOGY | Facility: CLINIC | Age: 75
End: 2020-03-04

## 2020-03-04 LAB — INR PPP: 1.7

## 2020-03-05 ENCOUNTER — READMISSION MANAGEMENT (OUTPATIENT)
Dept: CALL CENTER | Facility: HOSPITAL | Age: 75
End: 2020-03-05

## 2020-03-05 NOTE — OUTREACH NOTE
CHF Week 2 Survey      Responses   [facility]   Does the patient have one of the following disease processes/diagnoses(primary or secondary)?  CHF   Week 2 attempt successful?  Yes   Call start time  1521   Call end time  1524   Discharge diagnosis  Afib With RVR, a/c diastolic HF, CAD w/o angina, essential HTN, diet controlled DM , mixed HLD   Person spoke with today (if not patient) and relationship  daughterRadha reviewed with patient/caregiver?  Yes   Is the patient having any side effects they believe may be caused by any medication additions or changes?  No   Does the patient have all medications ordered at discharge?  Yes   Is the patient taking all medications as directed (includes completed medication regime)?  Yes   Does the patient have a primary care provider?   Yes   Does the patient have an appointment with their PCP within 7 days of discharge?  Yes   Has the patient kept scheduled appointments due by today?  Yes   Has home health visited the patient within 72 hours of discharge?  N/A   Psychosocial issues?  No   Did the patient receive a copy of their discharge instructions?  Yes   Nursing interventions  Reviewed instructions with patient   What is the patient's perception of their health status since discharge?  Improving   Nursing interventions  Nurse provided patient education   Is the patient weighing daily?  Yes   Does the patient have scales?  Yes   Is the patient able to teach back Heart Failure diet management?  Yes   Is the patient able to teach back Heart Failure Zones?  Yes   Is the patient able to teach back signs and symptoms of worsening condition? (i.e. weight gain, shortness of air, etc.)  Yes   Is the patient/caregiver able to teach back the hierarchy of who to call/visit for symptoms/problems? PCP, Specialist, Home health nurse, Urgent Care, ED, 911  Yes   CHF Week 2 call completed?  Yes          Naye Ruiz RN

## 2020-03-11 ENCOUNTER — OFFICE VISIT (OUTPATIENT)
Dept: CARDIOLOGY | Facility: CLINIC | Age: 75
End: 2020-03-11

## 2020-03-11 ENCOUNTER — LAB (OUTPATIENT)
Dept: LAB | Facility: HOSPITAL | Age: 75
End: 2020-03-11

## 2020-03-11 ENCOUNTER — READMISSION MANAGEMENT (OUTPATIENT)
Dept: CALL CENTER | Facility: HOSPITAL | Age: 75
End: 2020-03-11

## 2020-03-11 VITALS
OXYGEN SATURATION: 98 % | HEART RATE: 78 BPM | DIASTOLIC BLOOD PRESSURE: 76 MMHG | SYSTOLIC BLOOD PRESSURE: 110 MMHG | WEIGHT: 138 LBS | HEIGHT: 62 IN | BODY MASS INDEX: 25.4 KG/M2

## 2020-03-11 DIAGNOSIS — I50.33 ACUTE ON CHRONIC DIASTOLIC HEART FAILURE (HCC): Primary | ICD-10-CM

## 2020-03-11 DIAGNOSIS — I10 ESSENTIAL HYPERTENSION: Chronic | ICD-10-CM

## 2020-03-11 DIAGNOSIS — R00.2 PALPITATIONS: ICD-10-CM

## 2020-03-11 DIAGNOSIS — Z95.1 S/P CABG X 4: ICD-10-CM

## 2020-03-11 DIAGNOSIS — I48.91 ATRIAL FIBRILLATION WITH RVR (HCC): ICD-10-CM

## 2020-03-11 DIAGNOSIS — I48.91 ATRIAL FIBRILLATION WITH RVR (HCC): Primary | ICD-10-CM

## 2020-03-11 DIAGNOSIS — E78.2 MIXED HYPERLIPIDEMIA: Chronic | ICD-10-CM

## 2020-03-11 DIAGNOSIS — I47.29 NSVT (NONSUSTAINED VENTRICULAR TACHYCARDIA) (HCC): ICD-10-CM

## 2020-03-11 DIAGNOSIS — E11.9 DIET-CONTROLLED DIABETES MELLITUS (HCC): ICD-10-CM

## 2020-03-11 DIAGNOSIS — I25.10 CORONARY ARTERY DISEASE INVOLVING NATIVE CORONARY ARTERY OF NATIVE HEART WITHOUT ANGINA PECTORIS: Chronic | ICD-10-CM

## 2020-03-11 DIAGNOSIS — R42 DIZZY SPELLS: ICD-10-CM

## 2020-03-11 DIAGNOSIS — I48.91 ATRIAL FIBRILLATION WITH SLOW VENTRICULAR RESPONSE (HCC): ICD-10-CM

## 2020-03-11 LAB
INR PPP: 1.9 (ref 0.91–1.09)
PROTHROMBIN TIME: 21.8 SECONDS (ref 11.9–14.6)

## 2020-03-11 PROCEDURE — 36415 COLL VENOUS BLD VENIPUNCTURE: CPT

## 2020-03-11 PROCEDURE — 99214 OFFICE O/P EST MOD 30 MIN: CPT | Performed by: INTERNAL MEDICINE

## 2020-03-11 PROCEDURE — 85610 PROTHROMBIN TIME: CPT | Performed by: INTERNAL MEDICINE

## 2020-03-11 NOTE — PROGRESS NOTES
Sondra Connolly  [id number]  1945  75 y.o.  female    Referring Provider: Matthieu Bhakta DO    Reason for Follow-up Visit: Here for routine follow up  chronic atrial fibrillation   Holter as below  Cardiac workup test results as below   coronary artery disease Coronary artery bypass grafting > 10 years ago  Nuclear stress test no significant ischemia, has prior infarction  Prior admission     Subjective    Overall feels well    No new events or complaints since last visit   Overall the patient feels no major change from baseline symptoms   Similar symptoms as during last visit     Tolerating current medications well with no untoward side effects   Compliant with prescribed medication regimen. Tries to adhere to cardiac diet.      Intermittent dizzy and weak spells, no presyncope or syncope     Having  non sustained ventricular tachycardia on holter     Mild chronic exertional shortness of breath on exertion relieved with rest  No significant cough or wheezing    No palpitations  No associated chest pain  No significant pedal edema    No fever or chills  No significant expectoration    No hemoptysis  No presyncope or syncope     Easy fatiguability   anxious   on anticoagulation     Tolerating current medications well with no untoward side effects   BP well controlled at home.             History of present illness:  Sondra Connolly is a 75 y.o. yo female with history of chronic atrial fibrillation who presents today for   Chief Complaint   Patient presents with   • Atrial Fibrillation     1 mo follow up    .    History  Past Medical History:   Diagnosis Date   • A-fib (CMS/HCC)    • Angina pectoris (CMS/HCC)    • Anxiety    • Arthritis    • Atherosclerosis of coronary artery bypass graft    • CAD (coronary artery disease)    • Carotid artery occlusion without infarction    • Chronic diastolic heart failure (CMS/HCC)    • Chronic kidney disease    • Chronic lung disease    • Colon polyp    • Diabetes mellitus  (CMS/HCC)    • DJD (degenerative joint disease)    • Fibrocystic breast disease    • Gastritis    • GI bleed    • Hemorrhoids    • Hyperlipidemia    • Hypertension    • Lung disease     chronic   • MI (myocardial infarction) (CMS/HCC)    • Palpitations    • PUD (peptic ulcer disease)    • PVD (peripheral vascular disease) (CMS/HCC)    • Renal failure, acute (CMS/HCC)    ,   Past Surgical History:   Procedure Laterality Date   • APPENDECTOMY     • CARDIAC CATHETERIZATION  05/01/2009    Left-Heart Skin   • CAROTID ENDARTERECTOMY     • CATARACT EXTRACTION     • CATARACT EXTRACTION     • COLONOSCOPY     • CORONARY ARTERY BYPASS GRAFT  07/2007    Four   • ENDOSCOPY  10/02/2013   • ENDOSCOPY  10/02/2013   • HYSTERECTOMY     • SKIN CANCER EXCISION     • THROMBOENDARTERECTOMY     • TONSILLECTOMY     ,   Family History   Problem Relation Age of Onset   • Heart disease Mother    • Heart disease Father    • Heart disease Brother    • Heart disease Brother    • Breast cancer Maternal Grandmother    ,   Social History     Tobacco Use   • Smoking status: Never Smoker   • Smokeless tobacco: Never Used   • Tobacco comment: Non smoker; no tobacco use   Substance Use Topics   • Alcohol use: Yes     Comment: occ   • Drug use: No   ,     Medications  Current Outpatient Medications   Medication Sig Dispense Refill   • dilTIAZem CD (CARDIZEM CD) 360 MG 24 hr capsule Take 1 capsule by mouth Daily. 30 capsule 1   • furosemide (LASIX) 20 MG tablet TAKE 1 TABLET BY MOUTH DAILY. 90 tablet 2   • metoprolol succinate XL (TOPROL XL) 50 MG 24 hr tablet Take 0.5 tablets by mouth Daily. 30 tablet 0   • Multiple Vitamins-Minerals (MULTIVITAMIN ADULT PO) Take 1 tablet by mouth Daily.     • nitroglycerin (NITROSTAT) 0.4 MG SL tablet 1 under the tongue as needed for angina, may repeat q5mins for up three doses (Patient taking differently: Place 0.4 mg under the tongue Every 5 (Five) Minutes As Needed. 1 under the tongue as needed for angina, may repeat  "q5mins for up three doses) 100 tablet 11   • pravastatin (PRAVACHOL) 40 MG tablet TAKE 1 TABLET BY MOUTH DAILY. (Patient taking differently: Take 40 mg by mouth Every Night.) 90 tablet 2   • warfarin (COUMADIN) 5 MG tablet TAKE 1 TABLET BY MOUTH DAILY. (Patient taking differently: Take 5 mg by mouth Every Night.) 90 tablet 2     No current facility-administered medications for this visit.        Allergies:  Buffered aspirin; Salicylates; and Demerol [meperidine]    Review of Systems  Review of Systems   Constitution: Positive for malaise/fatigue.   HENT: Negative.    Eyes: Negative.    Cardiovascular: Positive for dyspnea on exertion, irregular heartbeat and palpitations. Negative for chest pain, claudication, cyanosis, leg swelling, near-syncope, orthopnea, paroxysmal nocturnal dyspnea and syncope.   Respiratory: Negative.    Endocrine: Negative.    Hematologic/Lymphatic: Negative.    Skin: Negative.    Musculoskeletal: Positive for arthritis and joint pain.   Gastrointestinal: Negative for anorexia.   Genitourinary: Negative.    Neurological: Positive for dizziness and weakness.   Psychiatric/Behavioral: Negative.        Objective     Physical Exam:  /76   Pulse 78   Ht 157.5 cm (62\")   Wt 62.6 kg (138 lb)   SpO2 98%   BMI 25.24 kg/m²   Physical Exam   Constitutional: She appears well-developed.   HENT:   Head: Normocephalic.   Neck: Normal carotid pulses and no JVD present. No tracheal tenderness present. Carotid bruit is not present. No tracheal deviation and no edema present.   Cardiovascular: Normal pulses. An irregularly irregular rhythm present.   Murmur heard.   Systolic murmur is present with a grade of 2/6.  Pulmonary/Chest: Effort normal. No stridor.   Abdominal: Soft. She exhibits no distension. There is no hepatosplenomegaly. There is no tenderness.   Neurological: She is alert. She has normal strength. No cranial nerve deficit or sensory deficit.   Skin: Skin is warm.   Psychiatric: She has " a normal mood and affect. Her speech is normal and behavior is normal.       Results Review:  Results for orders placed during the hospital encounter of 19   Adult Transthoracic Echo Complete W/ Cont if Necessary Per Protocol    Narrative · Estimated EF = 55%.  · Left ventricular diastolic dysfunction.  · Left atrial cavity size is severely dilated.  · Mild aortic valve stenosis is present.  · Mild-to-moderate mitral valve regurgitation is present  · No evidence of pulmonary hypertension is present.      Holter       · Average HR: 79. Min HR: 44. Max HR: 146.     · Monitored for ~1 day   · Slowest heart rate at 8:46 AM  · The predominant rhythm noted during the testing period was atrial flutter   · 2256  premature ventricular contractions: PVC burden:  2%   · 75    non sustained ventricular tachycardia episode longest 9 beats at a maximum rate of 152 beats per minute   · No correlated arrhythmia  · 568 pauses longest 3.3 seconds at 1:54 AM           Sondra Connolly   Regadenoson Stress Test With Myocardial Perfusion SPECT (Multi Study)   Order# [id number]   Reading physician: Gualberto Gallo MD Ordering physician: Gualberto Gallo MD Study date: 19   Patient Information     Patient Name  Sondra Connolly MRN  [id number] Sex  Female  (Age)  1945 (75 y.o.)   Interpretation Summary     · Left ventricular ejection fraction is normal (Calculated EF = 55%).  · Breast attenuation artifact is present.  · Myocardial perfusion imaging indicates a medium-sized infarct located in the inferior wall with no significant ischemia noted.                  Procedures    Assessment/Plan   Patient Active Problem List   Diagnosis   • Essential hypertension   • S/P CABG x 4   • Atrial fibrillation with slow ventricular response (CMS/HCC)   • Mixed hyperlipidemia   • Palpitations   • Dizzy spells   • Hypotension   • NSVT (nonsustained ventricular tachycardia) (CMS/HCC)   • Abnormal nuclear stress test   • Atrial fibrillation  with RVR (CMS/[facility])   • Diet-controlled diabetes mellitus (CMS/[facility])   • Coronary artery disease involving native coronary artery of native heart without angina pectoris   • Acute on chronic diastolic heart failure (CMS/[facility])       Plan          Orders Placed This Encounter   Procedures   • Holter Monitor - 72 Hour Up To 21 Days     Standing Status:   Future     Standing Expiration Date:   3/11/2021     Order Specific Question:   Reason for exam?     Answer:   AFib      Keep F/U Dr Hawk 4/2/20  She and daughter wants to defer  pacer   S/L NTG PRN for chest pain, call me or go to ER if has to use S/L nitroglycerin      ____________________________________________________________________________________________________________________________________________  Health maintenance and recommendations      Similar recommendations as last visit     Offered to give patient  a copy      Questions were encouraged, asked and answered to the patient's  understanding and satisfaction. Questions if any regarding current medications and side effects, need for refills and importance of compliance to medications stressed.    Reviewed available prior notes, consults, prior visits, laboratory findings, radiology and cardiology relevant reports. Updated chart as applicable. I have reviewed the patient's medical history in detail and updated the computerized patient record as relevant.      Updated patient regarding any new or relevant abnormalities on review of records or any new findings on physical exam. Mentioned to patient about purpose of visit and desirable health short and long term goals and objectives.    Primary to monitor CBC CMP Lipid panel and TSH as applicable    ___________________________________________________________________________________________________________________________________________            Return in about 6 weeks (around 4/22/2020).

## 2020-03-23 ENCOUNTER — EPISODE CHANGES (OUTPATIENT)
Dept: CASE MANAGEMENT | Facility: OTHER | Age: 75
End: 2020-03-23

## 2020-03-29 RX ORDER — FUROSEMIDE 20 MG/1
20 TABLET ORAL DAILY
Qty: 90 TABLET | Refills: 4 | Status: ON HOLD | OUTPATIENT
Start: 2020-03-29 | End: 2021-02-01

## 2020-03-29 RX ORDER — WARFARIN SODIUM 5 MG/1
5 TABLET ORAL
Qty: 90 TABLET | Refills: 4 | Status: SHIPPED | OUTPATIENT
Start: 2020-03-29 | End: 2021-02-06 | Stop reason: HOSPADM

## 2020-03-29 RX ORDER — PRAVASTATIN SODIUM 40 MG
40 TABLET ORAL DAILY
Qty: 90 TABLET | Refills: 4 | Status: ON HOLD | OUTPATIENT
Start: 2020-03-29 | End: 2021-04-06

## 2020-04-01 ENCOUNTER — TELEPHONE (OUTPATIENT)
Dept: CARDIOLOGY | Facility: CLINIC | Age: 75
End: 2020-04-01

## 2020-04-01 NOTE — TELEPHONE ENCOUNTER
Dwain Shannon called to make us aware the patient's Holter results have been finalized and the report has been posted.  Can someone pull for Dr. Gallo's review?  Thank you!

## 2020-04-27 ENCOUNTER — PATIENT OUTREACH (OUTPATIENT)
Dept: CASE MANAGEMENT | Facility: OTHER | Age: 75
End: 2020-04-27

## 2020-04-27 NOTE — OUTREACH NOTE
"Patient Outreach Note    Patient discharged from [facility] 2- for Afib. She received calls from the call center. ACM outreach with patient. ACM asked how she was feeling and she stated I'm feeling real good other than this virus it's got everybody temper on edge and if we don't get rid of trump we're gonna be in this mess again thank you for calling bye\".     Ira Monroe RN  Ambulatory     4/27/2020, 15:25      "

## 2020-05-11 ENCOUNTER — EPISODE CHANGES (OUTPATIENT)
Dept: CASE MANAGEMENT | Facility: OTHER | Age: 75
End: 2020-05-11

## 2020-06-22 ENCOUNTER — ANTICOAGULATION VISIT (OUTPATIENT)
Dept: CARDIOLOGY | Facility: CLINIC | Age: 75
End: 2020-06-22

## 2020-06-22 DIAGNOSIS — I48.91 ATRIAL FIBRILLATION WITH RVR (HCC): Primary | ICD-10-CM

## 2020-06-24 LAB — INR PPP: 2.4

## 2020-06-25 ENCOUNTER — ANTICOAGULATION VISIT (OUTPATIENT)
Dept: CARDIOLOGY | Facility: CLINIC | Age: 75
End: 2020-06-25

## 2020-07-20 ENCOUNTER — TRANSCRIBE ORDERS (OUTPATIENT)
Dept: ADMINISTRATIVE | Facility: HOSPITAL | Age: 75
End: 2020-07-20

## 2020-07-20 DIAGNOSIS — Z12.31 ENCOUNTER FOR SCREENING MAMMOGRAM FOR MALIGNANT NEOPLASM OF BREAST: Primary | ICD-10-CM

## 2020-07-23 ENCOUNTER — HOSPITAL ENCOUNTER (OUTPATIENT)
Dept: MAMMOGRAPHY | Facility: HOSPITAL | Age: 75
Discharge: HOME | End: 2020-07-23
Admitting: INTERNAL MEDICINE

## 2020-07-23 DIAGNOSIS — Z12.31 ENCOUNTER FOR SCREENING MAMMOGRAM FOR MALIGNANT NEOPLASM OF BREAST: ICD-10-CM

## 2020-07-23 PROCEDURE — 77067 SCR MAMMO BI INCL CAD: CPT

## 2020-07-23 PROCEDURE — 77063 BREAST TOMOSYNTHESIS BI: CPT

## 2020-08-06 ENCOUNTER — ANTICOAGULATION VISIT (OUTPATIENT)
Dept: CARDIOLOGY | Facility: CLINIC | Age: 75
End: 2020-08-06

## 2020-08-13 ENCOUNTER — ANTICOAGULATION VISIT (OUTPATIENT)
Dept: CARDIOLOGY | Facility: CLINIC | Age: 75
End: 2020-08-13

## 2020-08-13 LAB — INR PPP: 1.5

## 2020-08-28 ENCOUNTER — ANTICOAGULATION VISIT (OUTPATIENT)
Dept: CARDIOLOGY | Facility: CLINIC | Age: 75
End: 2020-08-28

## 2020-09-02 ENCOUNTER — ANTICOAGULATION VISIT (OUTPATIENT)
Dept: CARDIOLOGY | Facility: CLINIC | Age: 75
End: 2020-09-02

## 2020-09-02 LAB — INR PPP: 1.9

## 2020-10-01 ENCOUNTER — ANTICOAGULATION VISIT (OUTPATIENT)
Dept: CARDIOLOGY | Facility: CLINIC | Age: 75
End: 2020-10-01

## 2020-10-01 LAB — INR PPP: 1.5

## 2020-10-09 ENCOUNTER — ANTICOAGULATION VISIT (OUTPATIENT)
Dept: CARDIOLOGY | Facility: CLINIC | Age: 75
End: 2020-10-09

## 2020-10-09 LAB — INR PPP: 2.4

## 2020-11-03 ENCOUNTER — ANTICOAGULATION VISIT (OUTPATIENT)
Dept: CARDIOLOGY | Facility: CLINIC | Age: 75
End: 2020-11-03

## 2020-11-20 ENCOUNTER — ANTICOAGULATION VISIT (OUTPATIENT)
Dept: CARDIOLOGY | Facility: CLINIC | Age: 75
End: 2020-11-20

## 2020-11-20 LAB — INR PPP: 1.6

## 2020-11-23 ENCOUNTER — ANTICOAGULATION VISIT (OUTPATIENT)
Dept: CARDIOLOGY | Facility: CLINIC | Age: 75
End: 2020-11-23

## 2020-11-23 LAB — INR PPP: 2.3

## 2020-12-07 ENCOUNTER — ANTICOAGULATION VISIT (OUTPATIENT)
Dept: CARDIOLOGY | Facility: CLINIC | Age: 75
End: 2020-12-07

## 2020-12-14 LAB — INR PPP: 2.6

## 2020-12-15 ENCOUNTER — ANTICOAGULATION VISIT (OUTPATIENT)
Dept: CARDIOLOGY | Facility: CLINIC | Age: 75
End: 2020-12-15

## 2021-01-11 ENCOUNTER — ANTICOAGULATION VISIT (OUTPATIENT)
Dept: CARDIOLOGY | Facility: CLINIC | Age: 76
End: 2021-01-11

## 2021-01-11 LAB — INR PPP: 2.8

## 2021-01-31 ENCOUNTER — APPOINTMENT (OUTPATIENT)
Dept: GENERAL RADIOLOGY | Facility: HOSPITAL | Age: 76
End: 2021-01-31

## 2021-01-31 ENCOUNTER — HOSPITAL ENCOUNTER (INPATIENT)
Facility: HOSPITAL | Age: 76
LOS: 3 days | Discharge: HOME | End: 2021-02-06
Attending: INTERNAL MEDICINE | Admitting: FAMILY MEDICINE

## 2021-01-31 DIAGNOSIS — I48.11 LONGSTANDING PERSISTENT ATRIAL FIBRILLATION (HCC): Primary | ICD-10-CM

## 2021-01-31 DIAGNOSIS — R50.9 FEVER OF UNKNOWN ORIGIN: ICD-10-CM

## 2021-01-31 DIAGNOSIS — I25.10 CORONARY ARTERY DISEASE INVOLVING NATIVE CORONARY ARTERY OF NATIVE HEART WITHOUT ANGINA PECTORIS: ICD-10-CM

## 2021-01-31 DIAGNOSIS — R06.00 DYSPNEA, UNSPECIFIED TYPE: ICD-10-CM

## 2021-01-31 DIAGNOSIS — R07.9 CHEST PAIN, UNSPECIFIED TYPE: ICD-10-CM

## 2021-01-31 DIAGNOSIS — R07.2 PRECORDIAL PAIN: ICD-10-CM

## 2021-01-31 DIAGNOSIS — R13.10 DYSPHAGIA, UNSPECIFIED TYPE: ICD-10-CM

## 2021-01-31 LAB
ABO GROUP BLD: NORMAL
ALBUMIN SERPL-MCNC: 4.5 G/DL (ref 3.5–5.2)
ALBUMIN/GLOB SERPL: 1.5 G/DL
ALP SERPL-CCNC: 80 U/L (ref 39–117)
ALT SERPL W P-5'-P-CCNC: 16 U/L (ref 1–33)
ANION GAP SERPL CALCULATED.3IONS-SCNC: 9 MMOL/L (ref 5–15)
APTT PPP: 42.2 SECONDS (ref 24.1–35)
ARTERIAL PATENCY WRIST A: ABNORMAL
AST SERPL-CCNC: 18 U/L (ref 1–32)
ATMOSPHERIC PRESS: 751 MMHG
BACTERIA UR QL AUTO: ABNORMAL /HPF
BASE EXCESS BLDA CALC-SCNC: 4.9 MMOL/L (ref 0–2)
BASOPHILS # BLD AUTO: 0.02 10*3/MM3 (ref 0–0.2)
BASOPHILS NFR BLD AUTO: 0.2 % (ref 0–1.5)
BDY SITE: ABNORMAL
BILIRUB SERPL-MCNC: 0.3 MG/DL (ref 0–1.2)
BILIRUB UR QL STRIP: NEGATIVE
BLD GP AB SCN SERPL QL: NEGATIVE
BODY TEMPERATURE: 37 C
BUN SERPL-MCNC: 22 MG/DL (ref 8–23)
BUN/CREAT SERPL: 25.9 (ref 7–25)
CALCIUM SPEC-SCNC: 9.7 MG/DL (ref 8.6–10.5)
CHLORIDE SERPL-SCNC: 100 MMOL/L (ref 98–107)
CLARITY UR: CLEAR
CO2 SERPL-SCNC: 27 MMOL/L (ref 22–29)
COLOR UR: YELLOW
CREAT SERPL-MCNC: 0.85 MG/DL (ref 0.57–1)
CRP SERPL-MCNC: 0.31 MG/DL (ref 0–0.5)
D DIMER PPP FEU-MCNC: 0.24 MG/L (FEU) (ref 0–0.5)
D-LACTATE SERPL-SCNC: 1.8 MMOL/L (ref 0.5–2)
DEPRECATED RDW RBC AUTO: 48.2 FL (ref 37–54)
EOSINOPHIL # BLD AUTO: 0 10*3/MM3 (ref 0–0.4)
EOSINOPHIL NFR BLD AUTO: 0 % (ref 0.3–6.2)
ERYTHROCYTE [DISTWIDTH] IN BLOOD BY AUTOMATED COUNT: 14.6 % (ref 12.3–15.4)
FERRITIN SERPL-MCNC: 509.3 NG/ML (ref 13–150)
GFR SERPL CREATININE-BSD FRML MDRD: 65 ML/MIN/1.73
GLOBULIN UR ELPH-MCNC: 3 GM/DL
GLUCOSE SERPL-MCNC: 161 MG/DL (ref 65–99)
GLUCOSE UR STRIP-MCNC: NEGATIVE MG/DL
HCO3 BLDA-SCNC: 29 MMOL/L (ref 20–26)
HCT VFR BLD AUTO: 43.9 % (ref 34–46.6)
HGB BLD-MCNC: 15 G/DL (ref 12–15.9)
HGB UR QL STRIP.AUTO: NEGATIVE
HOLD SPECIMEN: NORMAL
HYALINE CASTS UR QL AUTO: ABNORMAL /LPF
IMM GRANULOCYTES # BLD AUTO: 0.05 10*3/MM3 (ref 0–0.05)
IMM GRANULOCYTES NFR BLD AUTO: 0.4 % (ref 0–0.5)
INR PPP: 2.55 (ref 0.91–1.09)
KETONES UR QL STRIP: NEGATIVE
LDH SERPL-CCNC: 170 U/L (ref 135–214)
LEUKOCYTE ESTERASE UR QL STRIP.AUTO: ABNORMAL
LYMPHOCYTES # BLD AUTO: 1.15 10*3/MM3 (ref 0.7–3.1)
LYMPHOCYTES NFR BLD AUTO: 10.3 % (ref 19.6–45.3)
Lab: ABNORMAL
MCH RBC QN AUTO: 30.7 PG (ref 26.6–33)
MCHC RBC AUTO-ENTMCNC: 34.2 G/DL (ref 31.5–35.7)
MCV RBC AUTO: 90 FL (ref 79–97)
MODALITY: ABNORMAL
MONOCYTES # BLD AUTO: 0.53 10*3/MM3 (ref 0.1–0.9)
MONOCYTES NFR BLD AUTO: 4.7 % (ref 5–12)
NEUTROPHILS NFR BLD AUTO: 84.4 % (ref 42.7–76)
NEUTROPHILS NFR BLD AUTO: 9.46 10*3/MM3 (ref 1.7–7)
NITRITE UR QL STRIP: NEGATIVE
NRBC BLD AUTO-RTO: 0 /100 WBC (ref 0–0.2)
NT-PROBNP SERPL-MCNC: 2726 PG/ML (ref 0–1800)
PCO2 BLDA: 39.8 MM HG (ref 35–45)
PCO2 TEMP ADJ BLD: 39.8 MM HG (ref 35–45)
PH BLDA: 7.47 PH UNITS (ref 7.35–7.45)
PH UR STRIP.AUTO: 6.5 [PH] (ref 5–8)
PH, TEMP CORRECTED: 7.47 PH UNITS (ref 7.35–7.45)
PLATELET # BLD AUTO: 252 10*3/MM3 (ref 140–450)
PMV BLD AUTO: 9.9 FL (ref 6–12)
PO2 BLDA: 83.6 MM HG (ref 83–108)
PO2 TEMP ADJ BLD: 83.6 MM HG (ref 83–108)
POTASSIUM SERPL-SCNC: 4.1 MMOL/L (ref 3.5–5.2)
PROCALCITONIN SERPL-MCNC: 0.04 NG/ML (ref 0–0.25)
PROT SERPL-MCNC: 7.5 G/DL (ref 6–8.5)
PROT UR QL STRIP: ABNORMAL
PROTHROMBIN TIME: 27.4 SECONDS (ref 11.9–14.6)
RBC # BLD AUTO: 4.88 10*6/MM3 (ref 3.77–5.28)
RBC # UR: ABNORMAL /HPF
REF LAB TEST METHOD: ABNORMAL
RH BLD: POSITIVE
S PNEUM AG SPEC QL LA: NEGATIVE
SAO2 % BLDCOA: 97.8 % (ref 94–99)
SARS-COV-2 RDRP RESP QL NAA+PROBE: NORMAL
SODIUM SERPL-SCNC: 136 MMOL/L (ref 136–145)
SP GR UR STRIP: 1.02 (ref 1–1.03)
SQUAMOUS #/AREA URNS HPF: ABNORMAL /HPF
T&S EXPIRATION DATE: NORMAL
TROPONIN T SERPL-MCNC: <0.01 NG/ML (ref 0–0.03)
TROPONIN T SERPL-MCNC: <0.01 NG/ML (ref 0–0.03)
UROBILINOGEN UR QL STRIP: ABNORMAL
VENTILATOR MODE: ABNORMAL
WBC # BLD AUTO: 11.21 10*3/MM3 (ref 3.4–10.8)
WBC UR QL AUTO: ABNORMAL /HPF
WHOLE BLOOD HOLD SPECIMEN: NORMAL
WHOLE BLOOD HOLD SPECIMEN: NORMAL

## 2021-01-31 PROCEDURE — 87899 AGENT NOS ASSAY W/OPTIC: CPT | Performed by: NURSE PRACTITIONER

## 2021-01-31 PROCEDURE — 82803 BLOOD GASES ANY COMBINATION: CPT

## 2021-01-31 PROCEDURE — 86900 BLOOD TYPING SEROLOGIC ABO: CPT | Performed by: NURSE PRACTITIONER

## 2021-01-31 PROCEDURE — 36415 COLL VENOUS BLD VENIPUNCTURE: CPT

## 2021-01-31 PROCEDURE — 85730 THROMBOPLASTIN TIME PARTIAL: CPT | Performed by: NURSE PRACTITIONER

## 2021-01-31 PROCEDURE — 83605 ASSAY OF LACTIC ACID: CPT | Performed by: NURSE PRACTITIONER

## 2021-01-31 PROCEDURE — 84145 PROCALCITONIN (PCT): CPT | Performed by: NURSE PRACTITIONER

## 2021-01-31 PROCEDURE — 80053 COMPREHEN METABOLIC PANEL: CPT | Performed by: NURSE PRACTITIONER

## 2021-01-31 PROCEDURE — 82728 ASSAY OF FERRITIN: CPT | Performed by: NURSE PRACTITIONER

## 2021-01-31 PROCEDURE — 81001 URINALYSIS AUTO W/SCOPE: CPT | Performed by: NURSE PRACTITIONER

## 2021-01-31 PROCEDURE — 85379 FIBRIN DEGRADATION QUANT: CPT | Performed by: NURSE PRACTITIONER

## 2021-01-31 PROCEDURE — 93005 ELECTROCARDIOGRAM TRACING: CPT | Performed by: NURSE PRACTITIONER

## 2021-01-31 PROCEDURE — 71045 X-RAY EXAM CHEST 1 VIEW: CPT

## 2021-01-31 PROCEDURE — 87040 BLOOD CULTURE FOR BACTERIA: CPT | Performed by: NURSE PRACTITIONER

## 2021-01-31 PROCEDURE — 93010 ELECTROCARDIOGRAM REPORT: CPT | Performed by: EMERGENCY MEDICINE

## 2021-01-31 PROCEDURE — 84484 ASSAY OF TROPONIN QUANT: CPT | Performed by: NURSE PRACTITIONER

## 2021-01-31 PROCEDURE — 85610 PROTHROMBIN TIME: CPT | Performed by: NURSE PRACTITIONER

## 2021-01-31 PROCEDURE — 83880 ASSAY OF NATRIURETIC PEPTIDE: CPT | Performed by: NURSE PRACTITIONER

## 2021-01-31 PROCEDURE — 36600 WITHDRAWAL OF ARTERIAL BLOOD: CPT

## 2021-01-31 PROCEDURE — 86901 BLOOD TYPING SEROLOGIC RH(D): CPT | Performed by: NURSE PRACTITIONER

## 2021-01-31 PROCEDURE — 86140 C-REACTIVE PROTEIN: CPT | Performed by: NURSE PRACTITIONER

## 2021-01-31 PROCEDURE — 87635 SARS-COV-2 COVID-19 AMP PRB: CPT | Performed by: NURSE PRACTITIONER

## 2021-01-31 PROCEDURE — 83615 LACTATE (LD) (LDH) ENZYME: CPT | Performed by: NURSE PRACTITIONER

## 2021-01-31 PROCEDURE — 86850 RBC ANTIBODY SCREEN: CPT | Performed by: NURSE PRACTITIONER

## 2021-01-31 PROCEDURE — 99285 EMERGENCY DEPT VISIT HI MDM: CPT

## 2021-01-31 PROCEDURE — 85025 COMPLETE CBC W/AUTO DIFF WBC: CPT | Performed by: NURSE PRACTITIONER

## 2021-01-31 RX ORDER — SODIUM CHLORIDE 0.9 % (FLUSH) 0.9 %
10 SYRINGE (ML) INJECTION AS NEEDED
Status: DISCONTINUED | OUTPATIENT
Start: 2021-01-31 | End: 2021-02-06 | Stop reason: HOSPADM

## 2021-01-31 RX ORDER — DILTIAZEM HYDROCHLORIDE 180 MG/1
180 CAPSULE, COATED, EXTENDED RELEASE ORAL
Status: DISCONTINUED | OUTPATIENT
Start: 2021-01-31 | End: 2021-02-01 | Stop reason: SDUPTHER

## 2021-01-31 RX ORDER — CEFDINIR 300 MG/1
300 CAPSULE ORAL 2 TIMES DAILY
Qty: 14 CAPSULE | Refills: 0 | Status: SHIPPED | OUTPATIENT
Start: 2021-01-31 | End: 2021-02-07

## 2021-01-31 RX ADMIN — DILTIAZEM HYDROCHLORIDE 180 MG: 180 CAPSULE, COATED, EXTENDED RELEASE ORAL at 20:29

## 2021-02-01 ENCOUNTER — APPOINTMENT (OUTPATIENT)
Dept: CARDIOLOGY | Facility: HOSPITAL | Age: 76
End: 2021-02-01

## 2021-02-01 PROBLEM — I50.33 ACUTE ON CHRONIC DIASTOLIC HEART FAILURE (HCC): Status: RESOLVED | Noted: 2020-01-03 | Resolved: 2021-02-01

## 2021-02-01 PROBLEM — I48.91 ATRIAL FIBRILLATION WITH RVR: Status: RESOLVED | Noted: 2020-01-03 | Resolved: 2021-02-01

## 2021-02-01 PROBLEM — R07.2 PRECORDIAL PAIN: Status: ACTIVE | Noted: 2021-02-01

## 2021-02-01 PROBLEM — I10 ESSENTIAL HYPERTENSION: Status: ACTIVE | Noted: 2017-03-24

## 2021-02-01 PROBLEM — F41.8 ANXIETY ABOUT HEALTH: Status: ACTIVE | Noted: 2021-02-01

## 2021-02-01 PROBLEM — R45.89 ANXIETY ABOUT HEALTH: Status: ACTIVE | Noted: 2021-02-01

## 2021-02-01 PROBLEM — I47.29 NSVT (NONSUSTAINED VENTRICULAR TACHYCARDIA): Status: RESOLVED | Noted: 2019-07-17 | Resolved: 2021-02-01

## 2021-02-01 PROBLEM — I48.20 CHRONIC ATRIAL FIBRILLATION: Status: ACTIVE | Noted: 2021-02-01

## 2021-02-01 LAB
ALBUMIN SERPL-MCNC: 4.3 G/DL (ref 3.5–5.2)
ALBUMIN/GLOB SERPL: 1.5 G/DL
ALP SERPL-CCNC: 70 U/L (ref 39–117)
ALT SERPL W P-5'-P-CCNC: 15 U/L (ref 1–33)
ANION GAP SERPL CALCULATED.3IONS-SCNC: 11 MMOL/L (ref 5–15)
AST SERPL-CCNC: 17 U/L (ref 1–32)
BASOPHILS # BLD AUTO: 0.01 10*3/MM3 (ref 0–0.2)
BASOPHILS NFR BLD AUTO: 0.1 % (ref 0–1.5)
BH CV ECHO MEAS - AO MAX PG (FULL): 34 MMHG
BH CV ECHO MEAS - AO MAX PG: 36.2 MMHG
BH CV ECHO MEAS - AO MEAN PG (FULL): 14 MMHG
BH CV ECHO MEAS - AO MEAN PG: 15 MMHG
BH CV ECHO MEAS - AO ROOT AREA (BSA CORRECTED): 1.7
BH CV ECHO MEAS - AO ROOT AREA: 5.7 CM^2
BH CV ECHO MEAS - AO ROOT DIAM: 2.7 CM
BH CV ECHO MEAS - AO V2 MAX: 301 CM/SEC
BH CV ECHO MEAS - AO V2 MEAN: 180 CM/SEC
BH CV ECHO MEAS - AO V2 VTI: 57.9 CM
BH CV ECHO MEAS - AVA(I,A): 1.1 CM^2
BH CV ECHO MEAS - AVA(I,D): 1.1 CM^2
BH CV ECHO MEAS - AVA(V,A): 1 CM^2
BH CV ECHO MEAS - AVA(V,D): 1 CM^2
BH CV ECHO MEAS - BSA(HAYCOCK): 1.6 M^2
BH CV ECHO MEAS - BSA: 1.6 M^2
BH CV ECHO MEAS - BZI_BMI: 24.5 KILOGRAMS/M^2
BH CV ECHO MEAS - BZI_METRIC_HEIGHT: 157.5 CM
BH CV ECHO MEAS - BZI_METRIC_WEIGHT: 60.8 KG
BH CV ECHO MEAS - EDV(CUBED): 144.7 ML
BH CV ECHO MEAS - EDV(MOD-SP2): 116 ML
BH CV ECHO MEAS - EDV(MOD-SP4): 86.8 ML
BH CV ECHO MEAS - EDV(TEICH): 132.4 ML
BH CV ECHO MEAS - EF(CUBED): 61.5 %
BH CV ECHO MEAS - EF(MOD-SP2): 26 %
BH CV ECHO MEAS - EF(MOD-SP4): 45.4 %
BH CV ECHO MEAS - EF(TEICH): 52.6 %
BH CV ECHO MEAS - ESV(CUBED): 55.7 ML
BH CV ECHO MEAS - ESV(MOD-SP2): 85.8 ML
BH CV ECHO MEAS - ESV(MOD-SP4): 47.4 ML
BH CV ECHO MEAS - ESV(TEICH): 62.7 ML
BH CV ECHO MEAS - FS: 27.2 %
BH CV ECHO MEAS - IVS/LVPW: 1.6
BH CV ECHO MEAS - IVSD: 1.2 CM
BH CV ECHO MEAS - LA DIMENSION: 4.5 CM
BH CV ECHO MEAS - LA/AO: 1.7
BH CV ECHO MEAS - LV DIASTOLIC VOL/BSA (35-75): 53.8 ML/M^2
BH CV ECHO MEAS - LV MASS(C)D: 190.5 GRAMS
BH CV ECHO MEAS - LV MASS(C)DI: 118.1 GRAMS/M^2
BH CV ECHO MEAS - LV MAX PG: 2.3 MMHG
BH CV ECHO MEAS - LV MEAN PG: 1 MMHG
BH CV ECHO MEAS - LV SYSTOLIC VOL/BSA (12-30): 29.4 ML/M^2
BH CV ECHO MEAS - LV V1 MAX: 75.4 CM/SEC
BH CV ECHO MEAS - LV V1 MEAN: 50.1 CM/SEC
BH CV ECHO MEAS - LV V1 VTI: 15.8 CM
BH CV ECHO MEAS - LVIDD: 5.3 CM
BH CV ECHO MEAS - LVIDS: 3.8 CM
BH CV ECHO MEAS - LVLD AP2: 7.1 CM
BH CV ECHO MEAS - LVLD AP4: 6.8 CM
BH CV ECHO MEAS - LVLS AP2: 6.5 CM
BH CV ECHO MEAS - LVLS AP4: 5.8 CM
BH CV ECHO MEAS - LVOT AREA (M): 4.2 CM^2
BH CV ECHO MEAS - LVOT AREA: 4.2 CM^2
BH CV ECHO MEAS - LVOT DIAM: 2.3 CM
BH CV ECHO MEAS - LVPWD: 0.75 CM
BH CV ECHO MEAS - MR MAX PG: 112 MMHG
BH CV ECHO MEAS - MR MAX VEL: 528 CM/SEC
BH CV ECHO MEAS - MR MEAN PG: 69.5 MMHG
BH CV ECHO MEAS - MR MEAN VEL: 385.8 CM/SEC
BH CV ECHO MEAS - MR VTI: 165.3 CM
BH CV ECHO MEAS - MV AREA (1 DIAM): 2.5 CM^2
BH CV ECHO MEAS - MV DIAM: 1.8 CM
BH CV ECHO MEAS - MV FLOW AREA(1DIAM): 2.5 CM^2
BH CV ECHO MEAS - RAP SYSTOLE: 5 MMHG
BH CV ECHO MEAS - RVSP: 40.3 MMHG
BH CV ECHO MEAS - SI(AO): 205.7 ML/M^2
BH CV ECHO MEAS - SI(CUBED): 55.2 ML/M^2
BH CV ECHO MEAS - SI(LVOT): 40.7 ML/M^2
BH CV ECHO MEAS - SI(MOD-SP2): 18.7 ML/M^2
BH CV ECHO MEAS - SI(MOD-SP4): 24.4 ML/M^2
BH CV ECHO MEAS - SI(TEICH): 43.2 ML/M^2
BH CV ECHO MEAS - SV(AO): 331.7 ML
BH CV ECHO MEAS - SV(CUBED): 89 ML
BH CV ECHO MEAS - SV(LVOT): 65.6 ML
BH CV ECHO MEAS - SV(MOD-SP2): 30.2 ML
BH CV ECHO MEAS - SV(MOD-SP4): 39.4 ML
BH CV ECHO MEAS - SV(TEICH): 69.7 ML
BH CV ECHO MEAS - TR MAX VEL: 297 CM/SEC
BH CV STRESS BP STAGE 1: NORMAL
BH CV STRESS COMMENTS STAGE 1: NORMAL
BH CV STRESS DOSE REGADENOSON STAGE 1: 0.4
BH CV STRESS DURATION MIN STAGE 1: 0
BH CV STRESS DURATION SEC STAGE 1: 10
BH CV STRESS HR STAGE 1: 93
BH CV STRESS PROTOCOL 1: NORMAL
BH CV STRESS RECOVERY BP: NORMAL MMHG
BH CV STRESS RECOVERY HR: 69 BPM
BH CV STRESS STAGE 1: 1
BILIRUB SERPL-MCNC: 0.4 MG/DL (ref 0–1.2)
BUN SERPL-MCNC: 20 MG/DL (ref 8–23)
BUN/CREAT SERPL: 24.7 (ref 7–25)
CALCIUM SPEC-SCNC: 9.6 MG/DL (ref 8.6–10.5)
CHLORIDE SERPL-SCNC: 101 MMOL/L (ref 98–107)
CHOLEST SERPL-MCNC: 182 MG/DL (ref 0–200)
CO2 SERPL-SCNC: 27 MMOL/L (ref 22–29)
CREAT SERPL-MCNC: 0.81 MG/DL (ref 0.57–1)
DEPRECATED RDW RBC AUTO: 47.9 FL (ref 37–54)
EOSINOPHIL # BLD AUTO: 0 10*3/MM3 (ref 0–0.4)
EOSINOPHIL NFR BLD AUTO: 0 % (ref 0.3–6.2)
ERYTHROCYTE [DISTWIDTH] IN BLOOD BY AUTOMATED COUNT: 14.6 % (ref 12.3–15.4)
GFR SERPL CREATININE-BSD FRML MDRD: 68 ML/MIN/1.73
GLOBULIN UR ELPH-MCNC: 2.9 GM/DL
GLUCOSE SERPL-MCNC: 141 MG/DL (ref 65–99)
HBA1C MFR BLD: 5.9 % (ref 4.8–5.6)
HCT VFR BLD AUTO: 43.9 % (ref 34–46.6)
HDLC SERPL-MCNC: 57 MG/DL (ref 40–60)
HGB BLD-MCNC: 14.6 G/DL (ref 12–15.9)
IMM GRANULOCYTES # BLD AUTO: 0.04 10*3/MM3 (ref 0–0.05)
IMM GRANULOCYTES NFR BLD AUTO: 0.3 % (ref 0–0.5)
INR PPP: 2.75 (ref 0.91–1.09)
INR PPP: 2.89 (ref 0.91–1.09)
LDLC SERPL CALC-MCNC: 112 MG/DL (ref 0–100)
LDLC/HDLC SERPL: 1.95 {RATIO}
LEFT ATRIUM VOLUME INDEX: 75.8 ML/M2
LEFT ATRIUM VOLUME: 122 CM3
LV EF NUC BP: 45 %
LYMPHOCYTES # BLD AUTO: 1.47 10*3/MM3 (ref 0.7–3.1)
LYMPHOCYTES NFR BLD AUTO: 11.1 % (ref 19.6–45.3)
MAGNESIUM SERPL-MCNC: 2.3 MG/DL (ref 1.6–2.4)
MAXIMAL PREDICTED HEART RATE: 142 BPM
MAXIMAL PREDICTED HEART RATE: 142 BPM
MCH RBC QN AUTO: 30.2 PG (ref 26.6–33)
MCHC RBC AUTO-ENTMCNC: 33.3 G/DL (ref 31.5–35.7)
MCV RBC AUTO: 90.7 FL (ref 79–97)
MONOCYTES # BLD AUTO: 0.68 10*3/MM3 (ref 0.1–0.9)
MONOCYTES NFR BLD AUTO: 5.1 % (ref 5–12)
NEUTROPHILS NFR BLD AUTO: 11.05 10*3/MM3 (ref 1.7–7)
NEUTROPHILS NFR BLD AUTO: 83.4 % (ref 42.7–76)
NRBC BLD AUTO-RTO: 0 /100 WBC (ref 0–0.2)
PERCENT MAX PREDICTED HR: 65.49 %
PHOSPHATE SERPL-MCNC: 3.8 MG/DL (ref 2.5–4.5)
PLATELET # BLD AUTO: 264 10*3/MM3 (ref 140–450)
PMV BLD AUTO: 10 FL (ref 6–12)
POTASSIUM SERPL-SCNC: 4.1 MMOL/L (ref 3.5–5.2)
PROT SERPL-MCNC: 7.2 G/DL (ref 6–8.5)
PROTHROMBIN TIME: 29.1 SECONDS (ref 11.9–14.6)
PROTHROMBIN TIME: 30.3 SECONDS (ref 11.9–14.6)
QT INTERVAL: 346 MS
QT INTERVAL: 368 MS
QTC INTERVAL: 445 MS
QTC INTERVAL: 454 MS
RBC # BLD AUTO: 4.84 10*6/MM3 (ref 3.77–5.28)
SODIUM SERPL-SCNC: 139 MMOL/L (ref 136–145)
STRESS BASELINE BP: NORMAL MMHG
STRESS BASELINE HR: 89 BPM
STRESS PERCENT HR: 77 %
STRESS POST EXERCISE DUR SEC: 10 SEC
STRESS POST PEAK BP: NORMAL MMHG
STRESS POST PEAK HR: 93 BPM
STRESS TARGET HR: 121 BPM
STRESS TARGET HR: 121 BPM
TRIGL SERPL-MCNC: 70 MG/DL (ref 0–150)
TROPONIN T SERPL-MCNC: <0.01 NG/ML (ref 0–0.03)
TROPONIN T SERPL-MCNC: <0.01 NG/ML (ref 0–0.03)
VLDLC SERPL-MCNC: 13 MG/DL (ref 5–40)
WBC # BLD AUTO: 13.25 10*3/MM3 (ref 3.4–10.8)

## 2021-02-01 PROCEDURE — 99214 OFFICE O/P EST MOD 30 MIN: CPT | Performed by: NURSE PRACTITIONER

## 2021-02-01 PROCEDURE — 25010000002 PERFLUTREN 6.52 MG/ML SUSPENSION: Performed by: FAMILY MEDICINE

## 2021-02-01 PROCEDURE — G0378 HOSPITAL OBSERVATION PER HR: HCPCS

## 2021-02-01 PROCEDURE — 25010000002 REGADENOSON 0.4 MG/5ML SOLUTION: Performed by: INTERNAL MEDICINE

## 2021-02-01 PROCEDURE — A9500 TC99M SESTAMIBI: HCPCS | Performed by: FAMILY MEDICINE

## 2021-02-01 PROCEDURE — 80061 LIPID PANEL: CPT | Performed by: INTERNAL MEDICINE

## 2021-02-01 PROCEDURE — 83036 HEMOGLOBIN GLYCOSYLATED A1C: CPT | Performed by: INTERNAL MEDICINE

## 2021-02-01 PROCEDURE — 85025 COMPLETE CBC W/AUTO DIFF WBC: CPT | Performed by: INTERNAL MEDICINE

## 2021-02-01 PROCEDURE — 80053 COMPREHEN METABOLIC PANEL: CPT | Performed by: INTERNAL MEDICINE

## 2021-02-01 PROCEDURE — 93356 MYOCRD STRAIN IMG SPCKL TRCK: CPT | Performed by: INTERNAL MEDICINE

## 2021-02-01 PROCEDURE — 93017 CV STRESS TEST TRACING ONLY: CPT

## 2021-02-01 PROCEDURE — 0 TECHNETIUM SESTAMIBI: Performed by: FAMILY MEDICINE

## 2021-02-01 PROCEDURE — 84100 ASSAY OF PHOSPHORUS: CPT | Performed by: INTERNAL MEDICINE

## 2021-02-01 PROCEDURE — 93306 TTE W/DOPPLER COMPLETE: CPT | Performed by: INTERNAL MEDICINE

## 2021-02-01 PROCEDURE — 93016 CV STRESS TEST SUPVJ ONLY: CPT | Performed by: INTERNAL MEDICINE

## 2021-02-01 PROCEDURE — 93356 MYOCRD STRAIN IMG SPCKL TRCK: CPT

## 2021-02-01 PROCEDURE — 36415 COLL VENOUS BLD VENIPUNCTURE: CPT | Performed by: INTERNAL MEDICINE

## 2021-02-01 PROCEDURE — 84484 ASSAY OF TROPONIN QUANT: CPT | Performed by: INTERNAL MEDICINE

## 2021-02-01 PROCEDURE — 25010000002 CEFTRIAXONE PER 250 MG: Performed by: INTERNAL MEDICINE

## 2021-02-01 PROCEDURE — 93018 CV STRESS TEST I&R ONLY: CPT | Performed by: INTERNAL MEDICINE

## 2021-02-01 PROCEDURE — 25010000002 AMINOPHYLLINE PER 250 MG: Performed by: INTERNAL MEDICINE

## 2021-02-01 PROCEDURE — 78452 HT MUSCLE IMAGE SPECT MULT: CPT | Performed by: INTERNAL MEDICINE

## 2021-02-01 PROCEDURE — 85610 PROTHROMBIN TIME: CPT | Performed by: INTERNAL MEDICINE

## 2021-02-01 PROCEDURE — 83735 ASSAY OF MAGNESIUM: CPT | Performed by: INTERNAL MEDICINE

## 2021-02-01 PROCEDURE — 78452 HT MUSCLE IMAGE SPECT MULT: CPT

## 2021-02-01 PROCEDURE — 93306 TTE W/DOPPLER COMPLETE: CPT

## 2021-02-01 RX ORDER — SODIUM CHLORIDE 0.9 % (FLUSH) 0.9 %
10 SYRINGE (ML) INJECTION AS NEEDED
Status: DISCONTINUED | OUTPATIENT
Start: 2021-02-01 | End: 2021-02-04 | Stop reason: SDUPTHER

## 2021-02-01 RX ORDER — ONDANSETRON 2 MG/ML
4 INJECTION INTRAMUSCULAR; INTRAVENOUS EVERY 6 HOURS PRN
Status: DISCONTINUED | OUTPATIENT
Start: 2021-02-01 | End: 2021-02-06 | Stop reason: HOSPADM

## 2021-02-01 RX ORDER — FUROSEMIDE 20 MG/1
TABLET ORAL
COMMUNITY
End: 2021-04-29 | Stop reason: SDUPTHER

## 2021-02-01 RX ORDER — MULTIPLE VITAMINS W/ MINERALS TAB 9MG-400MCG
1 TAB ORAL DAILY
Status: DISCONTINUED | OUTPATIENT
Start: 2021-02-01 | End: 2021-02-06 | Stop reason: HOSPADM

## 2021-02-01 RX ORDER — SODIUM CHLORIDE 0.9 % (FLUSH) 0.9 %
10 SYRINGE (ML) INJECTION EVERY 12 HOURS SCHEDULED
Status: DISCONTINUED | OUTPATIENT
Start: 2021-02-01 | End: 2021-02-06 | Stop reason: HOSPADM

## 2021-02-01 RX ORDER — DOCUSATE SODIUM 100 MG/1
100 CAPSULE, LIQUID FILLED ORAL 2 TIMES DAILY
Status: DISCONTINUED | OUTPATIENT
Start: 2021-02-01 | End: 2021-02-06 | Stop reason: HOSPADM

## 2021-02-01 RX ORDER — ACETAMINOPHEN 325 MG/1
650 TABLET ORAL EVERY 4 HOURS PRN
Status: DISCONTINUED | OUTPATIENT
Start: 2021-02-01 | End: 2021-02-06 | Stop reason: HOSPADM

## 2021-02-01 RX ORDER — METOPROLOL SUCCINATE 25 MG/1
25 TABLET, EXTENDED RELEASE ORAL DAILY
COMMUNITY
End: 2021-02-06 | Stop reason: HOSPADM

## 2021-02-01 RX ORDER — METOPROLOL SUCCINATE 50 MG/1
50 TABLET, EXTENDED RELEASE ORAL DAILY
Status: DISCONTINUED | OUTPATIENT
Start: 2021-02-02 | End: 2021-02-06 | Stop reason: HOSPADM

## 2021-02-01 RX ORDER — PRAVASTATIN SODIUM 20 MG
40 TABLET ORAL DAILY
Status: DISCONTINUED | OUTPATIENT
Start: 2021-02-01 | End: 2021-02-06 | Stop reason: HOSPADM

## 2021-02-01 RX ORDER — NITROGLYCERIN 0.4 MG/1
0.4 TABLET SUBLINGUAL
Status: DISCONTINUED | OUTPATIENT
Start: 2021-02-01 | End: 2021-02-06 | Stop reason: HOSPADM

## 2021-02-01 RX ORDER — FUROSEMIDE 20 MG/1
20 TABLET ORAL DAILY
Status: DISCONTINUED | OUTPATIENT
Start: 2021-02-01 | End: 2021-02-06 | Stop reason: HOSPADM

## 2021-02-01 RX ORDER — METOPROLOL SUCCINATE 25 MG/1
25 TABLET, EXTENDED RELEASE ORAL ONCE
Status: COMPLETED | OUTPATIENT
Start: 2021-02-01 | End: 2021-02-01

## 2021-02-01 RX ORDER — WARFARIN SODIUM 5 MG/1
5 TABLET ORAL
Status: DISCONTINUED | OUTPATIENT
Start: 2021-02-01 | End: 2021-02-01

## 2021-02-01 RX ORDER — AMINOPHYLLINE DIHYDRATE 25 MG/ML
150 INJECTION, SOLUTION INTRAVENOUS ONCE
Status: COMPLETED | OUTPATIENT
Start: 2021-02-01 | End: 2021-02-01

## 2021-02-01 RX ORDER — METOPROLOL SUCCINATE 25 MG/1
25 TABLET, EXTENDED RELEASE ORAL DAILY
Status: DISCONTINUED | OUTPATIENT
Start: 2021-02-01 | End: 2021-02-01

## 2021-02-01 RX ADMIN — DOCUSATE SODIUM 100 MG: 100 CAPSULE ORAL at 20:12

## 2021-02-01 RX ADMIN — PERFLUTREN: 6.52 INJECTION, SUSPENSION INTRAVENOUS at 14:21

## 2021-02-01 RX ADMIN — CEFTRIAXONE SODIUM 1 G: 1 INJECTION, POWDER, FOR SOLUTION INTRAMUSCULAR; INTRAVENOUS at 01:16

## 2021-02-01 RX ADMIN — METOPROLOL SUCCINATE 25 MG: 25 TABLET, EXTENDED RELEASE ORAL at 09:47

## 2021-02-01 RX ADMIN — SODIUM CHLORIDE, PRESERVATIVE FREE 10 ML: 5 INJECTION INTRAVENOUS at 20:12

## 2021-02-01 RX ADMIN — TECHNETIUM TC 99M SESTAMIBI 1 DOSE: 1 INJECTION INTRAVENOUS at 12:30

## 2021-02-01 RX ADMIN — FUROSEMIDE 20 MG: 40 TABLET ORAL at 09:46

## 2021-02-01 RX ADMIN — TECHNETIUM TC 99M SESTAMIBI 1 DOSE: 1 INJECTION INTRAVENOUS at 13:22

## 2021-02-01 RX ADMIN — WARFARIN SODIUM 5 MG: 5 TABLET ORAL at 17:09

## 2021-02-01 RX ADMIN — PRAVASTATIN SODIUM 40 MG: 20 TABLET ORAL at 09:46

## 2021-02-01 RX ADMIN — AMINOPHYLLINE 150 MG: 25 INJECTION, SOLUTION INTRAVENOUS at 13:21

## 2021-02-01 RX ADMIN — Medication 1 TABLET: at 09:47

## 2021-02-01 RX ADMIN — REGADENOSON 0.4 MG: 0.08 INJECTION, SOLUTION INTRAVENOUS at 13:12

## 2021-02-01 RX ADMIN — DILTIAZEM HYDROCHLORIDE 360 MG: 240 CAPSULE, EXTENDED RELEASE ORAL at 09:46

## 2021-02-01 RX ADMIN — DOCUSATE SODIUM 100 MG: 100 CAPSULE ORAL at 09:47

## 2021-02-01 RX ADMIN — METOPROLOL SUCCINATE 25 MG: 25 TABLET, EXTENDED RELEASE ORAL at 17:09

## 2021-02-01 RX ADMIN — SODIUM CHLORIDE, PRESERVATIVE FREE 10 ML: 5 INJECTION INTRAVENOUS at 09:47

## 2021-02-01 NOTE — PROGRESS NOTES
Discharge Planning Assessment  [facility]     Patient Name: Sondra Connolly  MRN: [id number]  Today's Date: 2/1/2021    Admit Date: 1/31/2021    Discharge Needs Assessment     Row Name 02/01/21 1024       Living Environment    Lives With  child(noelle), adult    Name(s) of Who Lives With Patient  DaughterSourav Arvizu    Current Living Arrangements  home/apartment/condo    Primary Care Provided by  self    Provides Primary Care For  no one    Family Caregiver if Needed  child(noelle), adult    Quality of Family Relationships  helpful;involved;supportive    Able to Return to Prior Arrangements  yes       Resource/Environmental Concerns    Resource/Environmental Concerns  none       Transition Planning    Patient/Family Anticipates Transition to  home with family    Patient/Family Anticipated Services at Transition  none    Transportation Anticipated  family or friend will provide       Discharge Needs Assessment    Readmission Within the Last 30 Days  no previous admission in last 30 days    Equipment Currently Used at Home  none    Concerns to be Addressed  no discharge needs identified    Anticipated Changes Related to Illness  none    Equipment Needed After Discharge  none    Discharge Coordination/Progress  Pt has PCP and RX coverage.  Pt can afford medications.  Pt denies any dc needs and does not want  services.        Discharge Plan    No documentation.       Continued Care and Services - Admitted Since 1/31/2021    Coordination has not been started for this encounter.         Demographic Summary    No documentation.       Functional Status    No documentation.       Psychosocial    No documentation.       Abuse/Neglect    No documentation.       Legal    No documentation.       Substance Abuse    No documentation.       Patient Forms    No documentation.           YIMI BryantW

## 2021-02-01 NOTE — ED PROVIDER NOTES
"Subjective   Patient is a 76-year-old female that presents to the ER today with complaint of shortness of breath, fever and shaking.  The patient states that this began earlier today when she was sitting in her chair at home.  She states that she went to stand up and felt very weak all over.  She states that she started shaking.  Patient states that she then felt that it was hard to take a deep breath.  The patient initially denied chest pain but then stated that she was having a \"cramping \"type pain in her mid chest.  Patient son reports the patient had chest pain yesterday.  The patient reports that she is not currently have any chest pain.  She states that it just feels hard to take a deep breath.  Patient does have a history of chronic atrial fibrillation and follows with Dr. Gallo.  She is on metoprolol, Cardizem, and Eliquis for this.  Patient reports compliance with her medication and states that she last took this at 8 AM.  Patient reports that she is concerned about Covid.  Upon EMS arrival the patient's temperature was 100.8.  She has not taken any Tylenol or Motrin.  Here her temperature is 98.9.  The patient reports that 2 weeks ago it was her birthday and they had an outdoor party.  She states that she was around quite a lot of people but they did have a mask on.  She states that last week her brother passed away unexpectedly from a massive heart attack.  She states that he was having similar symptoms such as this prior to this occurring.  The patien states that she was at a  for her brother and that there were some people not wearing mask.  The patient presents to the ER today for further evaluation      History provided by:  Patient   used: No    Shortness of Breath  Severity:  Mild  Onset quality:  Sudden  Duration:  1 day  Timing:  Constant  Progression:  Resolved  Chronicity:  New  Context: not activity, not animal exposure, not emotional upset, not fumes, not known " allergens, not occupational exposure, not pollens, not smoke exposure, not strong odors, not URI and not weather changes    Relieved by:  Nothing  Worsened by:  Nothing  Ineffective treatments:  None tried  Associated symptoms: chest pain, claudication and fever    Associated symptoms: no abdominal pain, no cough, no diaphoresis, no ear pain, no headaches, no hemoptysis, no neck pain, no PND, no rash, no sore throat, no sputum production, no syncope, no swollen glands, no vomiting and no wheezing    Risk factors: no recent alcohol use, no family hx of DVT, no hx of cancer, no hx of PE/DVT, no obesity, no oral contraceptive use, no prolonged immobilization, no recent surgery and no tobacco use        Review of Systems   Constitutional: Positive for fever. Negative for diaphoresis.   HENT: Negative for ear pain and sore throat.    Respiratory: Positive for shortness of breath. Negative for cough, hemoptysis, sputum production and wheezing.    Cardiovascular: Positive for chest pain and claudication. Negative for syncope and PND.   Gastrointestinal: Negative for abdominal pain and vomiting.   Musculoskeletal: Negative for neck pain.   Skin: Negative for rash.   Neurological: Negative for headaches.   All other systems reviewed and are negative.      Past Medical History:   Diagnosis Date   • A-fib (CMS/HCC)    • Angina pectoris (CMS/HCC)    • Anxiety    • Arthritis    • Atherosclerosis of coronary artery bypass graft    • CAD (coronary artery disease)    • Carotid artery occlusion without infarction    • Chronic diastolic heart failure (CMS/HCC)    • Chronic kidney disease    • Chronic lung disease    • Colon polyp    • Diabetes mellitus (CMS/HCC)    • DJD (degenerative joint disease)    • Fibrocystic breast disease    • Gastritis    • GI bleed    • Hemorrhoids    • Hyperlipidemia    • Hypertension    • Lung disease     chronic   • MI (myocardial infarction) (CMS/HCC)    • Palpitations    • PUD (peptic ulcer disease)     • PVD (peripheral vascular disease) (CMS/HCC)    • Renal failure, acute (CMS/HCC)        Allergies   Allergen Reactions   • Buffered Aspirin Angioedema     Patient has taken tums in the past with no problem.   • Salicylates Angioedema   • Demerol [Meperidine] Nausea And Vomiting and Hallucinations       Past Surgical History:   Procedure Laterality Date   • APPENDECTOMY     • CARDIAC CATHETERIZATION  05/01/2009    Left-Heart Skin   • CAROTID ENDARTERECTOMY     • CATARACT EXTRACTION     • CATARACT EXTRACTION     • COLONOSCOPY     • CORONARY ARTERY BYPASS GRAFT  07/2007    Four   • ENDOSCOPY  10/02/2013   • ENDOSCOPY  10/02/2013   • HYSTERECTOMY     • SKIN CANCER EXCISION     • THROMBOENDARTERECTOMY     • TONSILLECTOMY         Family History   Problem Relation Age of Onset   • Heart disease Mother    • Breast cancer Mother    • Heart disease Father    • Heart disease Brother    • Heart disease Brother    • Breast cancer Maternal Grandmother        Social History     Socioeconomic History   • Marital status:      Spouse name: Not on file   • Number of children: Not on file   • Years of education: Not on file   • Highest education level: Not on file   Tobacco Use   • Smoking status: Never Smoker   • Smokeless tobacco: Never Used   • Tobacco comment: Non smoker; no tobacco use   Substance and Sexual Activity   • Alcohol use: Yes     Comment: occ   • Drug use: No   • Sexual activity: Defer           Objective   Physical Exam  Vitals signs and nursing note reviewed.   Constitutional:       Appearance: Normal appearance.   HENT:      Head: Normocephalic and atraumatic.   Eyes:      Conjunctiva/sclera: Conjunctivae normal.   Cardiovascular:      Rate and Rhythm: Normal rate and regular rhythm.      Pulses: Normal pulses.      Heart sounds: Normal heart sounds.   Pulmonary:      Effort: Pulmonary effort is normal.      Breath sounds: Normal breath sounds.   Abdominal:      General: Bowel sounds are normal.       Palpations: Abdomen is soft.   Skin:     General: Skin is warm and dry.   Neurological:      General: No focal deficit present.      Mental Status: She is alert.   Psychiatric:         Mood and Affect: Mood normal.         Procedures           ED Course  ED Course as of Jan 31 2358   Sun Jan 31, 2021 2345 The patient's case was reviewed with Dr. Babcock who has also seen the patient.  The patient initially came in states that she was having shortness of breath and shaking.  The sound consistent with rigors.  Patient's chest x-ray showed no evidence of pneumonia.  There is no infection in the urine.  Her Covid swab was negative.  Urinalysis was negative for strep pneumonia.    [LF]   2346 Patient was reported to have a fever at home of 100.8 however here it was normal.  He has had 2 - troponins.  Patient is in atrial fibrillation.  She has a history of being in chronic A. fib.  She is on Cardizem and Eliquis as well as metoprolol and reports that she did take all of her medications this morning.    [LF]   2346 Patient's heart rate did get up to the 130s.  I did give the patient p.o. Cardizem after discussing this with Dr. Babcock.  Patient's rate did come back down its in the 70s to 80s at this time.  Patient does have new EKG changes and V45 and 6.  This was not present on her previous EKG from February 2020.  The patient reports to me that her brother passed away last week from a massive heart attack and was having the same symptoms that she is currently having.  Due to this I did recommend that the patient be admitted.  The patient is comfortable with this plan of care and states that she does feel better staying here this evening and being monitored.  I have called and discussed this with Dr. Hammer.  She agrees with admission.  The patient will be admitted at this time in stable condition.  I also called the patient's son and daughter per the patient's request and advised him of the findings and they are  thankful and agreeable to plan of care.  The patient will be admitted at this time in stable condition.    [LF]   2352 Patient ambulated throughout the ER prior to admission earlier in the evening.  Her oxygen saturation did not drop below 93% on room air however heart rate did increase to 140 and she  became dyspneic with this.    [LF]      ED Course User Index  [LF] Laxmi Cool, APRN                                   XR Chest AP   Final Result   Impression:       1.  No consolidation to suggest pneumonia.   2.  Central vascular congestion without pulmonary edema.    3.  Stable cardiomegaly.   This report was finalized on 01/31/2021 18:01 by Dr. gO Mackenzie MD.        Labs Reviewed   COMPREHENSIVE METABOLIC PANEL - Abnormal; Notable for the following components:       Result Value    Glucose 161 (*)     BUN/Creatinine Ratio 25.9 (*)     All other components within normal limits    Narrative:     GFR Normal >60  Chronic Kidney Disease <60  Kidney Failure <15     PROTIME-INR - Abnormal; Notable for the following components:    Protime 27.4 (*)     INR 2.55 (*)     All other components within normal limits   APTT - Abnormal; Notable for the following components:    PTT 42.2 (*)     All other components within normal limits   URINALYSIS W/ CULTURE IF INDICATED - Abnormal; Notable for the following components:    Protein, UA Trace (*)     Leuk Esterase, UA Small (1+) (*)     All other components within normal limits   BNP (IN-HOUSE) - Abnormal; Notable for the following components:    proBNP 2,726.0 (*)     All other components within normal limits    Narrative:     Among patients with dyspnea, NT-proBNP is highly sensitive for the detection of acute congestive heart failure. In addition NT-proBNP of <300 pg/ml effectively rules out acute congestive heart failure with 99% negative predictive value.    Results may be falsely decreased if patient taking Biotin.     FERRITIN - Abnormal; Notable for the following  components:    Ferritin 509.30 (*)     All other components within normal limits    Narrative:     Results may be falsely decreased if patient taking Biotin.     CBC WITH AUTO DIFFERENTIAL - Abnormal; Notable for the following components:    WBC 11.21 (*)     Neutrophil % 84.4 (*)     Lymphocyte % 10.3 (*)     Monocyte % 4.7 (*)     Eosinophil % 0.0 (*)     Neutrophils, Absolute 9.46 (*)     All other components within normal limits   BLOOD GAS, ARTERIAL - Abnormal; Notable for the following components:    pH, Arterial 7.470 (*)     HCO3, Arterial 29.0 (*)     Base Excess, Arterial 4.9 (*)     pH, Temp Corrected 7.470 (*)     All other components within normal limits   URINALYSIS, MICROSCOPIC ONLY - Abnormal; Notable for the following components:    RBC, UA 0-2 (*)     WBC, UA 3-5 (*)     All other components within normal limits   COVID-19,ABBOTT IN-HOUSE,NASAL SWAB (NO TRANSPORT MEDIA) 2 HR TAT - Normal    Narrative:     Fact sheet for providers: https://www.fda.gov/media/729899/download     Fact sheet for patients: https://www.fda.gov/media/657307/download    Test performed by PCR.  If inconsistent with clinical signs and symptoms patient should be tested with different authorized molecular test.   STREP PNEUMO AG, URINE OR CSF - Normal   TROPONIN (IN-HOUSE) - Normal    Narrative:     Troponin T Reference Range:  <= 0.03 ng/mL-   Negative for AMI  >0.03 ng/mL-     Abnormal for myocardial necrosis.  Clinicians would have to utilize clinical acumen, EKG, Troponin and serial changes to determine if it is an Acute Myocardial Infarction or myocardial injury due to an underlying chronic condition.       Results may be falsely decreased if patient taking Biotin.     D-DIMER, QUANTITATIVE - Normal    Narrative:     Reference Range is 0-0.50 mg/L FEU. However, results <0.50 mg/L FEU tends to rule out DVT or PE. Results >0.50 mg/L FEU are not useful in predicting absence or presence of DVT or PE.     LACTATE DEHYDROGENASE  "- Normal   PROCALCITONIN - Normal    Narrative:     As a Marker for Sepsis (Non-Neonates):   1. <0.5 ng/mL represents a low risk of severe sepsis and/or septic shock.  1. >2 ng/mL represents a high risk of severe sepsis and/or septic shock.    As a Marker for Lower Respiratory Tract Infections that require antibiotic therapy:  PCT on Admission     Antibiotic Therapy             6-12 Hrs later  > 0.5                Strongly Recommended            >0.25 - <0.5         Recommended  0.1 - 0.25           Discouraged                   Remeasure/reassess PCT  <0.1                 Strongly Discouraged          Remeasure/reassess PCT      As 28 day mortality risk marker: \"Change in Procalcitonin Result\" (> 80 % or <=80 %) if Day 0 (or Day 1) and Day 4 values are available. Refer to http://www.[employer] Brandspct-calculator.com/   Change in PCT <=80 %   A decrease of PCT levels below or equal to 80 % defines a positive change in PCT test result representing a higher risk for 28-day all-cause mortality of patients diagnosed with severe sepsis or septic shock.  Change in PCT > 80 %   A decrease of PCT levels of more than 80 % defines a negative change in PCT result representing a lower risk for 28-day all-cause mortality of patients diagnosed with severe sepsis or septic shock.                Results may be falsely decreased if patient taking Biotin.    C-REACTIVE PROTEIN - Normal   LACTIC ACID, PLASMA - Normal   TROPONIN (IN-HOUSE) - Normal    Narrative:     Troponin T Reference Range:  <= 0.03 ng/mL-   Negative for AMI  >0.03 ng/mL-     Abnormal for myocardial necrosis.  Clinicians would have to utilize clinical acumen, EKG, Troponin and serial changes to determine if it is an Acute Myocardial Infarction or myocardial injury due to an underlying chronic condition.       Results may be falsely decreased if patient taking Biotin.     BLOOD CULTURE   BLOOD CULTURE   RAINBOW DRAW    Narrative:     The following orders were created for " panel order Katy Draw.  Procedure                               Abnormality         Status                     ---------                               -----------         ------                     Light Blue Top[[id number]]                                   Final result               Green Top (Gel)[[id number]]                                  Final result               Lavender Top[[id number]]                                     Final result               Red Top[[id number]]                                          Final result               Katy Blood Culture Dany...[[id number]]                      Final result               Gray Top - Ice[[id number]]                                   Final result                 Please view results for these tests on the individual orders.   GRAY TOP - ICE   BLOOD GAS, ARTERIAL   TYPE AND SCREEN   LIGHT BLUE TOP   GREEN TOP   LAVENDER TOP   RED TOP   RAINBOW BLOOD CULTURE BOTTLES - 1 SET   CBC AND DIFFERENTIAL    Narrative:     The following orders were created for panel order CBC & Differential.  Procedure                               Abnormality         Status                     ---------                               -----------         ------                     CBC Auto Differential[[id number]]        Abnormal            Final result                 Please view results for these tests on the individual orders.             HEART Score (for prediction of 6-week risk of major adverse cardiac event) reviewed and/or performed as part of the patient evaluation and treatment planning process.  The result associated with this review/performance is: 6       MDM  Number of Diagnoses or Management Options  Chest pain, unspecified type: new and requires workup  Dyspnea, unspecified type: new and requires workup  Fever of unknown origin: new and requires workup  Longstanding persistent atrial fibrillation (CMS/HCC): new and requires workup     Amount and/or Complexity of Data  Reviewed  Clinical lab tests: reviewed and ordered  Tests in the radiology section of CPT®: ordered and reviewed  Tests in the medicine section of CPT®: ordered and reviewed  Decide to obtain previous medical records or to obtain history from someone other than the patient: yes  Discuss the patient with other providers: yes    Patient Progress  Patient progress: stable      Final diagnoses:   Longstanding persistent atrial fibrillation (CMS/HCC)   Dyspnea, unspecified type   Fever of unknown origin   Chest pain, unspecified type            Laxmi Cool, RANDA  01/31/21 [id number]       Laxmi Cool, RANDA  01/31/21 [id number]

## 2021-02-01 NOTE — H&P
"    [facility] Medicine Services  HISTORY AND PHYSICAL    Date of Admission: 2021  Primary Care Physician: Matthieu Bhakta DO    Subjective     Chief Complaint: Chest pain    History of Present Illness  76-year-old female presents to the emergency department because she was initially concerned about having Covid.  Her brother just passed away from a \"massive heart attack \"at the end of last week.  She attended the .  She states some people had a mask on and some did not.  This evening when she got up from her chair she was shaking all over and had to take a deep breath because she felt dyspneic.  The patient did have a temperature in the emergency department of 100.8.  She then relays that this morning when she woke up, she sat up on the side of the bed and had an instant chest pain.  She takes her finger and pokes it in the air in order to describe the pain.  She states that she has been generally short of breath.  She has chronic atrial fibrillation and is on Coumadin and Cardizem.  She did get slightly tachycardic in the ED, and was given a one-time dose of p.o. Cardizem.  EDMD states compared to previous EKG dated 2020, there are changes in lead 4, 5 and 6.  Patient states she is chronically constipated, but has no acute bowel changes.  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  She does follow with Dr. Gallo.  She has an extensive family history of heart disease.        Review of Systems   Chest pain  Shortness of breath  Constipation    Otherwise complete ROS reviewed and negative except as mentioned in the HPI.    Past Medical History:   Past Medical History:   Diagnosis Date   • A-fib (CMS/HCC)    • Angina pectoris (CMS/HCC)    • Anxiety    • Arthritis    • Atherosclerosis of coronary artery bypass graft    • CAD (coronary artery disease)    • Carotid artery occlusion without infarction    • Chronic diastolic " heart failure (CMS/HCC)    • Chronic kidney disease    • Chronic lung disease    • Colon polyp    • Diabetes mellitus (CMS/HCC)    • DJD (degenerative joint disease)    • Fibrocystic breast disease    • Gastritis    • GI bleed    • Hemorrhoids    • Hyperlipidemia    • Hypertension    • Lung disease     chronic   • MI (myocardial infarction) (CMS/HCC)    • Palpitations    • PUD (peptic ulcer disease)    • PVD (peripheral vascular disease) (CMS/HCC)    • Renal failure, acute (CMS/HCC)      Past Surgical History:  Past Surgical History:   Procedure Laterality Date   • APPENDECTOMY     • CARDIAC CATHETERIZATION  05/01/2009    Left-Heart Skin   • CAROTID ENDARTERECTOMY     • CATARACT EXTRACTION     • CATARACT EXTRACTION     • COLONOSCOPY     • CORONARY ARTERY BYPASS GRAFT  07/2007    Four   • ENDOSCOPY  10/02/2013   • ENDOSCOPY  10/02/2013   • HYSTERECTOMY     • SKIN CANCER EXCISION     • THROMBOENDARTERECTOMY     • TONSILLECTOMY       Social History:  reports that she has never smoked. She has never used smokeless tobacco. She reports current alcohol use. She reports that she does not use drugs.    Family History: family history includes Breast cancer in her maternal grandmother and mother; Heart disease in her brother, brother, father, and mother.       Allergies:  Allergies   Allergen Reactions   • Buffered Aspirin Angioedema     Patient has taken tums in the past with no problem.   • Salicylates Angioedema   • Demerol [Meperidine] Nausea And Vomiting and Hallucinations     Medications:  Prior to Admission medications    Medication Sig Start Date End Date Taking? Authorizing Provider   dilTIAZem CD (CARDIZEM CD) 360 MG 24 hr capsule Take 1 capsule by mouth Daily. 1/5/20  Yes Demetria Cervantes APRN   furosemide (LASIX) 20 MG tablet TAKE 1 TABLET BY MOUTH DAILY. 3/29/20  Yes Gualberto Gallo MD   metoprolol succinate XL (TOPROL XL) 50 MG 24 hr tablet Take 0.5 tablets by mouth Daily. 2/29/20  Yes Truman Joshua PA-C  "  Multiple Vitamins-Minerals (MULTIVITAMIN ADULT PO) Take 1 tablet by mouth Daily.   Yes Provider, MD Ronald   nitroglycerin (NITROSTAT) 0.4 MG SL tablet 1 under the tongue as needed for angina, may repeat q5mins for up three doses  Patient taking differently: Place 0.4 mg under the tongue Every 5 (Five) Minutes As Needed. 1 under the tongue as needed for angina, may repeat q5mins for up three doses 8/23/19  Yes Gualberto Gallo MD   pravastatin (PRAVACHOL) 40 MG tablet TAKE 1 TABLET BY MOUTH DAILY. 3/29/20  Yes Gualberto Gallo MD   warfarin (COUMADIN) 5 MG tablet TAKE 1 TABLET BY MOUTH DAILY.  Patient taking differently: Take 5 mg by mouth Daily. Two days a week pt takes 2.5 mg 3/29/20  Yes Gualberto Gallo MD   cefdinir (OMNICEF) 300 MG capsule Take 1 capsule by mouth 2 (Two) Times a Day for 7 days. 1/31/21 2/7/21  Laxmi Cool APRN     Objective     Vital Signs: /63   Pulse 85   Temp 98.8 °F (37.1 °C) (Oral)   Resp 18   Ht 157.5 cm (62\")   Wt 61.7 kg (136 lb)   SpO2 93%   BMI 24.87 kg/m²   Physical Exam  Vitals signs reviewed.   Constitutional:       Appearance: Normal appearance.   HENT:      Head: Normocephalic and atraumatic.      Nose: Nose normal.   Eyes:      General: No scleral icterus.     Conjunctiva/sclera: Conjunctivae normal.   Neck:      Musculoskeletal: Normal range of motion and neck supple.   Cardiovascular:      Rate and Rhythm: Normal rate and regular rhythm.      Heart sounds: Normal heart sounds.   Pulmonary:      Effort: Pulmonary effort is normal.      Breath sounds: Normal breath sounds.   Abdominal:      General: Bowel sounds are normal.      Palpations: Abdomen is soft.   Musculoskeletal:         General: Deformity present. No tenderness.      Comments: Abnormal toes bilaterally   Skin:     General: Skin is warm and dry.   Neurological:      General: No focal deficit present.      Mental Status: She is alert.      Cranial Nerves: No cranial nerve deficit.   Psychiatric:   "       Mood and Affect: Mood normal.         Behavior: Behavior normal.       Results Reviewed:  Lab Results (last 24 hours)     Procedure Component Value Units Date/Time    Troponin [[id number]]  (Normal) Collected: 01/31/21 1921    Specimen: Blood Updated: 01/31/21 1957     Troponin T <0.010 ng/mL     Narrative:      Troponin T Reference Range:  <= 0.03 ng/mL-   Negative for AMI  >0.03 ng/mL-     Abnormal for myocardial necrosis.  Clinicians would have to utilize clinical acumen, EKG, Troponin and serial changes to determine if it is an Acute Myocardial Infarction or myocardial injury due to an underlying chronic condition.       Results may be falsely decreased if patient taking Biotin.      Blood Culture - Blood, Arm, Right [[id number]] Collected: 01/31/21 1852    Specimen: Blood from Arm, Right Updated: 01/31/21 1921    Urinalysis With Culture If Indicated - Urine, Clean Catch [[id number]]  (Abnormal) Collected: 01/31/21 1833    Specimen: Urine, Clean Catch Updated: 01/31/21 1904     Color, UA Yellow     Appearance, UA Clear     pH, UA 6.5     Specific Gravity, UA 1.017     Glucose, UA Negative     Ketones, UA Negative     Bilirubin, UA Negative     Blood, UA Negative     Protein, UA Trace     Leuk Esterase, UA Small (1+)     Nitrite, UA Negative     Urobilinogen, UA 0.2 E.U./dL    Urinalysis, Microscopic Only - Urine, Clean Catch [[id number]]  (Abnormal) Collected: 01/31/21 1833    Specimen: Urine, Clean Catch Updated: 01/31/21 1904     RBC, UA 0-2 /HPF      WBC, UA 3-5 /HPF      Bacteria, UA None Seen /HPF      Squamous Epithelial Cells, UA 0-2 /HPF      Hyaline Casts, UA None Seen /LPF      Methodology Automated Microscopy    S. Pneumo Ag Urine or CSF - Urine, Urine, Clean Catch [[id number]]  (Normal) Collected: 01/31/21 1833    Specimen: Urine, Clean Catch Updated: 01/31/21 1904     Strep Pneumo Ag Negative    Blood Culture - Blood, Arm, Right [[id number]] Collected: 01/31/21 1716    Specimen: Blood from Arm,  Right Updated: 01/31/21 1837    [place] Draw [[id number]] Collected: 01/31/21 1716    Specimen: Blood Updated: 01/31/21 1830    Narrative:      The following orders were created for panel order [place] Draw.  Procedure                               Abnormality         Status                     ---------                               -----------         ------                     Light Blue Top[[id number]]                                   Final result               Green Top (Gel)[[id number]]                                  Final result               Lavender Top[[id number]]                                     Final result               Red Top[[id number]]                                          Final result               [place] Blood Culture Dany...[[id number]]                      Final result               Gray Top - Ice[[id number]]                                   Final result                 Please view results for these tests on the individual orders.    [place] Blood Culture Bottle Set [[id number]] Collected: 01/31/21 1716    Specimen: Blood from Arm, Right Updated: 01/31/21 1830     Extra Tube Hold for add-ons.     Comment: Auto resulted.       Light Blue Top [[id number]] Collected: 01/31/21 1716    Specimen: Blood Updated: 01/31/21 1830     Extra Tube hold for add-on     Comment: Auto resulted       Green Top (Gel) [[id number]] Collected: 01/31/21 1716    Specimen: Blood Updated: 01/31/21 1830     Extra Tube Hold for add-ons.     Comment: Auto resulted.       Lavender Top [[id number]] Collected: 01/31/21 1716    Specimen: Blood Updated: 01/31/21 1830     Extra Tube hold for add-on     Comment: Auto resulted       Red Top [[id number]] Collected: 01/31/21 1716    Specimen: Blood Updated: 01/31/21 1830     Extra Tube Hold for add-ons.     Comment: Auto resulted.       Gray Top - Ice [[id number]] Collected: 01/31/21 1716    Specimen: Blood Updated: 01/31/21 1830     Extra Tube Hold for add-ons.     Comment: Auto  "resulted.       Troponin [[id number]]  (Normal) Collected: 01/31/21 1716    Specimen: Blood Updated: 01/31/21 1825     Troponin T <0.010 ng/mL     Narrative:      Troponin T Reference Range:  <= 0.03 ng/mL-   Negative for AMI  >0.03 ng/mL-     Abnormal for myocardial necrosis.  Clinicians would have to utilize clinical acumen, EKG, Troponin and serial changes to determine if it is an Acute Myocardial Infarction or myocardial injury due to an underlying chronic condition.       Results may be falsely decreased if patient taking Biotin.      COVID-19, [facility]OTT IN-HOUSE,NASAL Swab (NO TRANSPORT MEDIA) 2 HR TAT - Swab, Nasopharynx [[id number]]  (Normal) Collected: 01/31/21 1729    Specimen: Swab from Nasopharynx Updated: 01/31/21 1805     COVID19 Presumptive Negative    Narrative:      Fact sheet for providers: https://www.fda.gov/media/494978/download     Fact sheet for patients: https://www.fda.gov/media/818345/download    Test performed by PCR.  If inconsistent with clinical signs and symptoms patient should be tested with different authorized molecular test.    Procalcitonin [[id number]]  (Normal) Collected: 01/31/21 1716    Specimen: Blood Updated: 01/31/21 1805     Procalcitonin 0.04 ng/mL     Narrative:      As a Marker for Sepsis (Non-Neonates):   1. <0.5 ng/mL represents a low risk of severe sepsis and/or septic shock.  1. >2 ng/mL represents a high risk of severe sepsis and/or septic shock.    As a Marker for Lower Respiratory Tract Infections that require antibiotic therapy:  PCT on Admission     Antibiotic Therapy             6-12 Hrs later  > 0.5                Strongly Recommended            >0.25 - <0.5         Recommended  0.1 - 0.25           Discouraged                   Remeasure/reassess PCT  <0.1                 Strongly Discouraged          Remeasure/reassess PCT      As 28 day mortality risk marker: \"Change in Procalcitonin Result\" (> 80 % or <=80 %) if Day 0 (or Day 1) and Day 4 values are available. " Refer to http://www.[facility]-pct-calculator.com/   Change in PCT <=80 %   A decrease of PCT levels below or equal to 80 % defines a positive change in PCT test result representing a higher risk for 28-day all-cause mortality of patients diagnosed with severe sepsis or septic shock.  Change in PCT > 80 %   A decrease of PCT levels of more than 80 % defines a negative change in PCT result representing a lower risk for 28-day all-cause mortality of patients diagnosed with severe sepsis or septic shock.                Results may be falsely decreased if patient taking Biotin.     Ferritin [[id number]]  (Abnormal) Collected: 01/31/21 1716    Specimen: Blood Updated: 01/31/21 1805     Ferritin 509.30 ng/mL     Narrative:      Results may be falsely decreased if patient taking Biotin.      Comprehensive Metabolic Panel [[id number]]  (Abnormal) Collected: 01/31/21 1716    Specimen: Blood Updated: 01/31/21 1800     Glucose 161 mg/dL      BUN 22 mg/dL      Creatinine 0.85 mg/dL      Sodium 136 mmol/L      Potassium 4.1 mmol/L      Chloride 100 mmol/L      CO2 27.0 mmol/L      Calcium 9.7 mg/dL      Total Protein 7.5 g/dL      Albumin 4.50 g/dL      ALT (SGPT) 16 U/L      AST (SGOT) 18 U/L      Alkaline Phosphatase 80 U/L      Total Bilirubin 0.3 mg/dL      eGFR Non African Amer 65 mL/min/1.73      Globulin 3.0 gm/dL      A/G Ratio 1.5 g/dL      BUN/Creatinine Ratio 25.9     Anion Gap 9.0 mmol/L     Narrative:      GFR Normal >60  Chronic Kidney Disease <60  Kidney Failure <15      Lactate Dehydrogenase [[id number]]  (Normal) Collected: 01/31/21 1716    Specimen: Blood Updated: 01/31/21 1759      U/L     C-reactive Protein [[id number]]  (Normal) Collected: 01/31/21 1716    Specimen: Blood Updated: 01/31/21 1759     C-Reactive Protein 0.31 mg/dL     BNP [[id number]]  (Abnormal) Collected: 01/31/21 1716    Specimen: Blood Updated: 01/31/21 1758     proBNP 2,726.0 pg/mL     Narrative:      Among patients with dyspnea, NT-proBNP  is highly sensitive for the detection of acute congestive heart failure. In addition NT-proBNP of <300 pg/ml effectively rules out acute congestive heart failure with 99% negative predictive value.    Results may be falsely decreased if patient taking Biotin.      Lactic Acid, Plasma [[id number]]  (Normal) Collected: 01/31/21 1716    Specimen: Blood Updated: 01/31/21 1756     Lactate 1.8 mmol/L     Blood Gas, Arterial - [[id number]]  (Abnormal) Collected: 01/31/21 1745    Specimen: Arterial Blood Updated: 01/31/21 1755     Site Right Brachial     Ramses's Test N/A     pH, Arterial 7.470 pH units      Comment: 83 Value above reference range        pCO2, Arterial 39.8 mm Hg      pO2, Arterial 83.6 mm Hg      HCO3, Arterial 29.0 mmol/L      Comment: 83 Value above reference range        Base Excess, Arterial 4.9 mmol/L      Comment: 83 Value above reference range        O2 Saturation, Arterial 97.8 %      Temperature 37.0 C      Barometric Pressure for Blood Gas 751 mmHg      Modality Room Air     Ventilator Mode NA     Collected by [id number]     Comment: Meter: [id number]     :  [id number]        pCO2, Temperature Corrected 39.8 mm Hg      pH, Temp Corrected 7.470 pH Units      pO2, Temperature Corrected 83.6 mm Hg     Protime-INR [[id number]]  (Abnormal) Collected: 01/31/21 1716    Specimen: Blood Updated: 01/31/21 1752     Protime 27.4 Seconds      INR 2.55    aPTT [[id number]]  (Abnormal) Collected: 01/31/21 1716    Specimen: Blood Updated: 01/31/21 1752     PTT 42.2 seconds     D-dimer, Quantitative [[id number]]  (Normal) Collected: 01/31/21 1716    Specimen: Blood Updated: 01/31/21 1752     D-Dimer, Quantitative 0.24 mg/L (FEU)     Narrative:      Reference Range is 0-0.50 mg/L FEU. However, results <0.50 mg/L FEU tends to rule out DVT or PE. Results >0.50 mg/L FEU are not useful in predicting absence or presence of DVT or PE.      CBC & Differential [[id number]]  (Abnormal) Collected: 01/31/21 1716     Specimen: Blood Updated: 01/31/21 1744    Narrative:      The following orders were created for panel order CBC & Differential.  Procedure                               Abnormality         Status                     ---------                               -----------         ------                     CBC Auto Differential[[id number]]        Abnormal            Final result                 Please view results for these tests on the individual orders.    CBC Auto Differential [[id number]]  (Abnormal) Collected: 01/31/21 1716    Specimen: Blood Updated: 01/31/21 1744     WBC 11.21 10*3/mm3      RBC 4.88 10*6/mm3      Hemoglobin 15.0 g/dL      Hematocrit 43.9 %      MCV 90.0 fL      MCH 30.7 pg      MCHC 34.2 g/dL      RDW 14.6 %      RDW-SD 48.2 fl      MPV 9.9 fL      Platelets 252 10*3/mm3      Neutrophil % 84.4 %      Lymphocyte % 10.3 %      Monocyte % 4.7 %      Eosinophil % 0.0 %      Basophil % 0.2 %      Immature Grans % 0.4 %      Neutrophils, Absolute 9.46 10*3/mm3      Lymphocytes, Absolute 1.15 10*3/mm3      Monocytes, Absolute 0.53 10*3/mm3      Eosinophils, Absolute 0.00 10*3/mm3      Basophils, Absolute 0.02 10*3/mm3      Immature Grans, Absolute 0.05 10*3/mm3      nRBC 0.0 /100 WBC         Imaging Results (Last 24 Hours)     Procedure Component Value Units Date/Time    XR Chest AP [[id number]] Collected: 01/31/21 1800     Updated: 01/31/21 1804    Narrative:      Exam: XR CHEST AP-     Indication: COVID Evaluation, Cough, Fever     Comparison: 2/29/2020     Findings:     Moderate cardiomegaly is unchanged from multiple prior studies. Hazy  LEFT basilar opacity is also unchanged from prior studies and may  represent atelectasis. Mild central vascular congestion, similar to  prior exams. No large pleural effusion, visible pneumothorax, or focal  consolidation. Prior median sternotomy and CABG. No acute osseous  finding.       Impression:      Impression:     1.  No consolidation to suggest pneumonia.  2.   Central vascular congestion without pulmonary edema.   3.  Stable cardiomegaly.  This report was finalized on 01/31/2021 18:01 by Dr. Og Mackenzie MD.        I have personally reviewed and interpreted the radiology studies and ECG obtained at time of admission.     Assessment / Plan     Assessment:   Active Hospital Problems    Diagnosis   • Longstanding persistent atrial fibrillation (CMS/[facility])     Impression:  1.  Chest pain with dyspnea  2.  Atrial fibrillation chronically anticoagulated  3.  Fever with leukocytosis and neutrophilic shift  4.  Dyslipidemia  5.  Mild urinary tract infection    Plan:   1.  Repeat EKG in the morning  2.  Telemetry  3.  Serial cardiac markers   4.  Consult cardiology, question stress test versus cath  5.  Rocephin   6.  Resume appropriate home medications       Code Status: Full     I discussed the patient's findings and my recommendations with the patient    Estimated length of stay 1 to 2 days    Patient seen and examined by me on 1/31/2021.  Patient seen before midnight.    Electronically signed by Pamela Nicholas DO, 02/01/21, 00:08 CST.

## 2021-02-01 NOTE — CONSULTS
[facility] HEART GROUP CONSULT NOTE    Referring Provider: Pamela Nciholas DO    Reason for Consultation: Abnormal EKG     Chief complaint:   Chief Complaint   Patient presents with   • Shortness of Breath       Subjective .     History of present illness:  Sondra Connolly is a 76 y.o. female with history of coronary artery disease with remote history of CABG, Permanent Atrial Fibrillation, Nonsustained V-Tach, PVCs, Carotid Disease, Anxiety, Hypertension, Hyperlipidemia, and Diastolic Congestive Heart Failure. Patient presented to the ER yesterday with several complaints. She came to the ER with complaints of shortness of breath, fever, shaking, chest pain, weakness, swelling of her left foot and fear she had contracted COVID at her  Brothers  or an outdoor birthday party she had. Patient follows with Dr. Gallo for her cardiac history. She has a history of permanent atrial fibrillation with rates controlled- that she was thought to be asymptomatic to. She also had NSVT and frequent PVCs. She was referred to EP after an 14 day Zio Patch in March but notes due to COVID she never got in to see Dr. Hawk- her initial appointment was cancelled. She complains of chest pain that is  Better now. She complains of a tremor and shaking. Upon sitting up in bed patient is shaking She notes she has been under a lot of stress. She complains of left foot swelling that is better now. We have been consulted due to EKG changes on her EKG. Her last EKG was done last evening and showed ST depression that was not noted on her presenting EKG. However her repeat EKG was done while her heart rate was roughly 20 beats faster.     History  Past Medical History:   Diagnosis Date   • A-fib (CMS/HCC)    • Abnormal nuclear stress test 2019   • Angina pectoris (CMS/HCC)    • Anxiety    • Arthritis    • Atherosclerosis of coronary artery bypass graft    • CAD (coronary artery disease)    • Carotid artery occlusion without  infarction    • Chronic atrial fibrillation (CMS/HCC) 2/1/2021   • Chronic diastolic heart failure (CMS/HCC)    • Chronic kidney disease    • Chronic lung disease    • Colon polyp    • Diabetes mellitus (CMS/HCC)    • DJD (degenerative joint disease)    • Essential hypertension 3/24/2017   • Fibrocystic breast disease    • Gastritis    • GI bleed    • Hemorrhoids    • Hyperlipidemia    • Hypertension    • Lung disease     chronic   • MI (myocardial infarction) (CMS/HCC)    • Palpitations    • PUD (peptic ulcer disease)    • PVD (peripheral vascular disease) (CMS/HCC)    • Renal failure, acute (CMS/HCC)    • 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   ,   Past Surgical History:   Procedure Laterality Date   • APPENDECTOMY     • CARDIAC CATHETERIZATION  05/01/2009    Left-Heart Skin   • CAROTID ENDARTERECTOMY     • CATARACT EXTRACTION     • CATARACT EXTRACTION     • COLONOSCOPY     • CORONARY ARTERY BYPASS GRAFT  07/2007    Four   • ENDOSCOPY  10/02/2013   • ENDOSCOPY  10/02/2013   • HYSTERECTOMY     • SKIN CANCER EXCISION     • THROMBOENDARTERECTOMY     • TONSILLECTOMY     ,   Family History   Problem Relation Age of Onset   • Heart disease Mother    • Breast cancer Mother    • Heart disease Father    • Heart disease Brother    • Heart disease Brother    • Breast cancer Maternal Grandmother    ,   Social History     Tobacco Use   • Smoking status: Never Smoker   • Smokeless tobacco: Never Used   • Tobacco comment: Non smoker; no tobacco use   Substance Use Topics   • Alcohol use: Yes     Comment: occ   • Drug use: No   ,     Medications    Prior to Admission medications    Medication Sig Start Date End Date Taking? Authorizing Provider   dilTIAZem CD (CARDIZEM CD) 360 MG 24 hr capsule Take 1 capsule by mouth Daily. 1/5/20  Yes Demetria Cervantes APRN   furosemide (LASIX) 20 MG tablet TAKE 1 TABLET BY MOUTH DAILY. 3/29/20  Yes Gualberto Gallo MD   metoprolol succinate XL (TOPROL XL) 50 MG 24 hr tablet Take 0.5 tablets by mouth Daily.  2/29/20  Yes Truman Joshua PA-C   Multiple Vitamins-Minerals (MULTIVITAMIN ADULT PO) Take 1 tablet by mouth Daily.   Yes Provider, MD Ronald   nitroglycerin (NITROSTAT) 0.4 MG SL tablet 1 under the tongue as needed for angina, may repeat q5mins for up three doses  Patient taking differently: Place 0.4 mg under the tongue Every 5 (Five) Minutes As Needed. 1 under the tongue as needed for angina, may repeat q5mins for up three doses 8/23/19  Yes Gualberto Gallo MD   pravastatin (PRAVACHOL) 40 MG tablet TAKE 1 TABLET BY MOUTH DAILY. 3/29/20  Yes Gualberto Gallo MD   warfarin (COUMADIN) 5 MG tablet TAKE 1 TABLET BY MOUTH DAILY.  Patient taking differently: Take 5 mg by mouth Daily. Two days a week pt takes 2.5 mg 3/29/20  Yes Gualberto Gallo MD   cefdinir (OMNICEF) 300 MG capsule Take 1 capsule by mouth 2 (Two) Times a Day for 7 days. 1/31/21 2/7/21  Laxmi Cool APRN       Current Facility-Administered Medications   Medication Dose Route Frequency Provider Last Rate Last Admin   • acetaminophen (TYLENOL) tablet 650 mg  650 mg Oral Q4H PRN Pamela Nicholas DO       • cefTRIAXone (ROCEPHIN) 1 g/100 mL 0.9% NS (MBP)  1 g Intravenous Q24H Pamela Nicholas DO   Stopped at 02/01/21 0200   • dilTIAZem CD (CARDIZEM CD) 24 hr capsule 360 mg  360 mg Oral Q24H Pamela Nicholas DO   360 mg at 02/01/21 0946   • docusate sodium (COLACE) capsule 100 mg  100 mg Oral BID Pamela Nicholas DO   100 mg at 02/01/21 0947   • furosemide (LASIX) tablet 20 mg  20 mg Oral Daily Pamela Nicholas DO   20 mg at 02/01/21 0946   • [START ON 2/2/2021] metoprolol succinate XL (TOPROL-XL) 24 hr tablet 50 mg  50 mg Oral Daily Yaya Cobian APRN       • multivitamin with minerals 1 tablet  1 tablet Oral Daily Pamela Nicholas DO   1 tablet at 02/01/21 0947   • nitroglycerin (NITROSTAT) SL tablet 0.4 mg  0.4 mg Sublingual Q5 Min PRN Pamela Nicholas,        • ondansetron (ZOFRAN) injection 4 mg  4 mg Intravenous Q6H  PRN Pamela Nicholas DO       • pravastatin (PRAVACHOL) tablet 40 mg  40 mg Oral Daily Pamela Nicholas DO   40 mg at 02/01/21 0946   • sodium chloride 0.9 % flush 10 mL  10 mL Intravenous PRN Laxmi Cool APRN       • sodium chloride 0.9 % flush 10 mL  10 mL Intravenous Q12H Pamela Nicholas DO   10 mL at 02/01/21 0947   • sodium chloride 0.9 % flush 10 mL  10 mL Intravenous PRN Pamela Nicholas DO       • warfarin (COUMADIN) tablet 5 mg  5 mg Oral Daily Pamela Nicholas DO           Allergies:  Buffered aspirin, Salicylates, and Demerol [meperidine]    Review of Systems  Review of Systems   Constitution: Positive for fever and malaise/fatigue. Negative for chills, decreased appetite, weight gain and weight loss.   HENT: Negative for nosebleeds.    Eyes: Negative for visual disturbance.   Cardiovascular: Positive for chest pain, dyspnea on exertion and leg swelling. Negative for near-syncope, orthopnea, palpitations, paroxysmal nocturnal dyspnea and syncope.   Respiratory: Positive for shortness of breath. Negative for cough, hemoptysis and snoring.    Endocrine: Negative for cold intolerance and heat intolerance.   Hematologic/Lymphatic: Negative for bleeding problem. Does not bruise/bleed easily.   Skin: Negative for rash.   Musculoskeletal: Negative for back pain and falls.   Gastrointestinal: Negative for abdominal pain, constipation, diarrhea, heartburn, melena, nausea and vomiting.   Genitourinary: Negative for hematuria.   Neurological: Positive for tremors. Negative for dizziness, headaches and light-headedness.   Psychiatric/Behavioral: Negative for altered mental status.   Allergic/Immunologic: Negative for persistent infections.       Objective     Physical Exam:  Patient Vitals for the past 24 hrs:   BP Temp Temp src Pulse Resp SpO2 Height Weight   02/01/21 0946 -- -- -- 120 -- -- -- --   02/01/21 0834 109/84 -- -- 50 14 93 % -- --   02/01/21 0519 106/82 98.5 °F (36.9 °C) Oral --  "18 97 % -- --   02/01/21 0034 114/76 98.8 °F (37.1 °C) Oral 81 16 95 % 157.5 cm (62\") 60.9 kg (134 lb 3.2 oz)   02/01/21 0000 125/85 -- -- 91 19 92 % -- --   01/31/21 2355 -- 98.8 °F (37.1 °C) Oral -- -- -- -- --   01/31/21 2345 112/63 -- -- 85 18 93 % -- --   01/31/21 2330 136/85 -- -- 89 19 97 % -- --   01/31/21 2302 120/87 -- -- 96 -- 94 % -- --   01/31/21 2230 118/93 -- -- 90 18 94 % -- --   01/31/21 2217 103/73 -- -- 100 19 93 % -- --   01/31/21 2200 124/66 -- -- 90 18 93 % -- --   01/31/21 2145 129/72 -- -- 95 -- 93 % -- --   01/31/21 2115 131/70 -- -- 80 18 94 % -- --   01/31/21 2045 116/67 -- -- 83 18 96 % -- --   01/31/21 2016 113/85 -- -- 82 18 94 % -- --   01/31/21 2010 -- -- -- 93 20 94 % -- --   01/31/21 1944 -- -- -- 120 -- 94 % -- --   01/31/21 1924 -- -- -- 105 -- 94 % -- --   01/31/21 1714 -- 98.9 °F (37.2 °C) -- -- -- -- -- --   01/31/21 1712 137/74 -- -- 96 19 95 % -- --   01/31/21 1708 -- -- -- -- -- -- 157.5 cm (62\") 61.7 kg (136 lb)     Physical Exam    Results Review:   I reviewed the patient's new clinical results.    Lab Results (last 72 hours)     Procedure Component Value Units Date/Time    Troponin [[id number]]  (Normal) Collected: 02/01/21 0222    Specimen: Blood Updated: 02/01/21 0316     Troponin T <0.010 ng/mL     Narrative:      Troponin T Reference Range:  <= 0.03 ng/mL-   Negative for AMI  >0.03 ng/mL-     Abnormal for myocardial necrosis.  Clinicians would have to utilize clinical acumen, EKG, Troponin and serial changes to determine if it is an Acute Myocardial Infarction or myocardial injury due to an underlying chronic condition.       Results may be falsely decreased if patient taking Biotin.      Protime-INR [[id number]]  (Abnormal) Collected: 02/01/21 0222    Specimen: Blood Updated: 02/01/21 0307     Protime 30.3 Seconds      INR 2.89    Troponin [[id number]]  (Normal) Collected: 02/01/21 0036    Specimen: Blood Updated: 02/01/21 0120     Troponin T <0.010 ng/mL     Narrative: "      Troponin T Reference Range:  <= 0.03 ng/mL-   Negative for AMI  >0.03 ng/mL-     Abnormal for myocardial necrosis.  Clinicians would have to utilize clinical acumen, EKG, Troponin and serial changes to determine if it is an Acute Myocardial Infarction or myocardial injury due to an underlying chronic condition.       Results may be falsely decreased if patient taking Biotin.      Comprehensive Metabolic Panel [[id number]]  (Abnormal) Collected: 02/01/21 0036    Specimen: Blood Updated: 02/01/21 0120     Glucose 141 mg/dL      BUN 20 mg/dL      Creatinine 0.81 mg/dL      Sodium 139 mmol/L      Potassium 4.1 mmol/L      Chloride 101 mmol/L      CO2 27.0 mmol/L      Calcium 9.6 mg/dL      Total Protein 7.2 g/dL      Albumin 4.30 g/dL      ALT (SGPT) 15 U/L      AST (SGOT) 17 U/L      Alkaline Phosphatase 70 U/L      Total Bilirubin 0.4 mg/dL      eGFR Non African Amer 68 mL/min/1.73      Globulin 2.9 gm/dL      A/G Ratio 1.5 g/dL      BUN/Creatinine Ratio 24.7     Anion Gap 11.0 mmol/L     Narrative:      GFR Normal >60  Chronic Kidney Disease <60  Kidney Failure <15      Lipid Panel [[id number]]  (Abnormal) Collected: 02/01/21 0036    Specimen: Blood Updated: 02/01/21 0120     Total Cholesterol 182 mg/dL      Triglycerides 70 mg/dL      HDL Cholesterol 57 mg/dL      LDL Cholesterol  112 mg/dL      VLDL Cholesterol 13 mg/dL      LDL/HDL Ratio 1.95    Narrative:      Cholesterol Reference Ranges  (U.S. Department of Health and Human Services ATP III Classifications)    Desirable          <200 mg/dL  Borderline High    200-239 mg/dL  High Risk          >240 mg/dL      Triglyceride Reference Ranges  (U.S. Department of Health and Human Services ATP III Classifications)    Normal           <150 mg/dL  Borderline High  150-199 mg/dL  High             200-499 mg/dL  Very High        >500 mg/dL    HDL Reference Ranges  (U.S. Department of Health and Human Services ATP III Classifcations)    Low     <40 mg/dl (major risk  factor for CHD)  High    >60 mg/dl ('negative' risk factor for CHD)        LDL Reference Ranges  (U.S. Department of Health and Human Services ATP III Classifcations)    Optimal          <100 mg/dL  Near Optimal     100-129 mg/dL  Borderline High  130-159 mg/dL  High             160-189 mg/dL  Very High        >189 mg/dL    Magnesium [[id number]]  (Normal) Collected: 02/01/21 0036    Specimen: Blood Updated: 02/01/21 0120     Magnesium 2.3 mg/dL     Phosphorus [[id number]]  (Normal) Collected: 02/01/21 0036    Specimen: Blood Updated: 02/01/21 0120     Phosphorus 3.8 mg/dL     Hemoglobin A1c [[id number]]  (Abnormal) Collected: 02/01/21 0036    Specimen: Blood Updated: 02/01/21 0110     Hemoglobin A1C 5.90 %     Narrative:      Hemoglobin A1C Ranges:    Increased Risk for Diabetes  5.7% to 6.4%  Diabetes                     >= 6.5%  Diabetic Goal                < 7.0%    Protime-INR [[id number]]  (Abnormal) Collected: 02/01/21 0036    Specimen: Blood Updated: 02/01/21 0106     Protime 29.1 Seconds      INR 2.75    CBC Auto Differential [[id number]]  (Abnormal) Collected: 02/01/21 0036    Specimen: Blood Updated: 02/01/21 0059     WBC 13.25 10*3/mm3      RBC 4.84 10*6/mm3      Hemoglobin 14.6 g/dL      Hematocrit 43.9 %      MCV 90.7 fL      MCH 30.2 pg      MCHC 33.3 g/dL      RDW 14.6 %      RDW-SD 47.9 fl      MPV 10.0 fL      Platelets 264 10*3/mm3      Neutrophil % 83.4 %      Lymphocyte % 11.1 %      Monocyte % 5.1 %      Eosinophil % 0.0 %      Basophil % 0.1 %      Immature Grans % 0.3 %      Neutrophils, Absolute 11.05 10*3/mm3      Lymphocytes, Absolute 1.47 10*3/mm3      Monocytes, Absolute 0.68 10*3/mm3      Eosinophils, Absolute 0.00 10*3/mm3      Basophils, Absolute 0.01 10*3/mm3      Immature Grans, Absolute 0.04 10*3/mm3      nRBC 0.0 /100 WBC     Troponin [[id number]]  (Normal) Collected: 01/31/21 1921    Specimen: Blood Updated: 01/31/21 1957     Troponin T <0.010 ng/mL     Narrative:      Troponin T  Reference Range:  <= 0.03 ng/mL-   Negative for AMI  >0.03 ng/mL-     Abnormal for myocardial necrosis.  Clinicians would have to utilize clinical acumen, EKG, Troponin and serial changes to determine if it is an Acute Myocardial Infarction or myocardial injury due to an underlying chronic condition.       Results may be falsely decreased if patient taking Biotin.      Blood Culture - Blood, Arm, Right [[id number]] Collected: 01/31/21 1852    Specimen: Blood from Arm, Right Updated: 01/31/21 1921    Urinalysis With Culture If Indicated - Urine, Clean Catch [[id number]]  (Abnormal) Collected: 01/31/21 1833    Specimen: Urine, Clean Catch Updated: 01/31/21 1904     Color, UA Yellow     Appearance, UA Clear     pH, UA 6.5     Specific Gravity, UA 1.017     Glucose, UA Negative     Ketones, UA Negative     Bilirubin, UA Negative     Blood, UA Negative     Protein, UA Trace     Leuk Esterase, UA Small (1+)     Nitrite, UA Negative     Urobilinogen, UA 0.2 E.U./dL    Urinalysis, Microscopic Only - Urine, Clean Catch [[id number]]  (Abnormal) Collected: 01/31/21 1833    Specimen: Urine, Clean Catch Updated: 01/31/21 1904     RBC, UA 0-2 /HPF      WBC, UA 3-5 /HPF      Bacteria, UA None Seen /HPF      Squamous Epithelial Cells, UA 0-2 /HPF      Hyaline Casts, UA None Seen /LPF      Methodology Automated Microscopy    S. Pneumo Ag Urine or CSF - Urine, Urine, Clean Catch [[id number]]  (Normal) Collected: 01/31/21 1833    Specimen: Urine, Clean Catch Updated: 01/31/21 1904     Strep Pneumo Ag Negative    Blood Culture - Blood, Arm, Right [[id number]] Collected: 01/31/21 1716    Specimen: Blood from Arm, Right Updated: 01/31/21 1837    [place] Draw [[id number]] Collected: 01/31/21 1716    Specimen: Blood Updated: 01/31/21 1830    Narrative:      The following orders were created for panel order [place] Draw.  Procedure                               Abnormality         Status                     ---------                                -----------         ------                     Light Blue Top[[id number]]                                   Final result               Green Top (Gel)[[id number]]                                  Final result               Lavender Top[[id number]]                                     Final result               Red Top[[id number]]                                          Final result               [place] Blood Culture Dany...[[id number]]                      Final result               Gray Top - Ice[[id number]]                                   Final result                 Please view results for these tests on the individual orders.    [place] Blood Culture Bottle Set [[id number]] Collected: 01/31/21 1716    Specimen: Blood from Arm, Right Updated: 01/31/21 1830     Extra Tube Hold for add-ons.     Comment: Auto resulted.       Light Blue Top [[id number]] Collected: 01/31/21 1716    Specimen: Blood Updated: 01/31/21 1830     Extra Tube hold for add-on     Comment: Auto resulted       Green Top (Gel) [[id number]] Collected: 01/31/21 1716    Specimen: Blood Updated: 01/31/21 1830     Extra Tube Hold for add-ons.     Comment: Auto resulted.       Lavender Top [[id number]] Collected: 01/31/21 1716    Specimen: Blood Updated: 01/31/21 1830     Extra Tube hold for add-on     Comment: Auto resulted       Red Top [[id number]] Collected: 01/31/21 1716    Specimen: Blood Updated: 01/31/21 1830     Extra Tube Hold for add-ons.     Comment: Auto resulted.       Gray Top - Ice [[id number]] Collected: 01/31/21 1716    Specimen: Blood Updated: 01/31/21 1830     Extra Tube Hold for add-ons.     Comment: Auto resulted.       Troponin [[id number]]  (Normal) Collected: 01/31/21 1716    Specimen: Blood Updated: 01/31/21 1825     Troponin T <0.010 ng/mL     Narrative:      Troponin T Reference Range:  <= 0.03 ng/mL-   Negative for AMI  >0.03 ng/mL-     Abnormal for myocardial necrosis.  Clinicians would have to utilize clinical acumen, EKG, Troponin  "and serial changes to determine if it is an Acute Myocardial Infarction or myocardial injury due to an underlying chronic condition.       Results may be falsely decreased if patient taking Biotin.      COVID-19, [facility] IN-HOUSE,NASAL Swab (NO TRANSPORT MEDIA) 2 HR TAT - Swab, Nasopharynx [[id number]]  (Normal) Collected: 01/31/21 1729    Specimen: Swab from Nasopharynx Updated: 01/31/21 1805     COVID19 Presumptive Negative    Narrative:      Fact sheet for providers: https://www.fda.gov/media/686954/download     Fact sheet for patients: https://www.fda.gov/media/738907/download    Test performed by PCR.  If inconsistent with clinical signs and symptoms patient should be tested with different authorized molecular test.    Procalcitonin [[id number]]  (Normal) Collected: 01/31/21 1716    Specimen: Blood Updated: 01/31/21 1805     Procalcitonin 0.04 ng/mL     Narrative:      As a Marker for Sepsis (Non-Neonates):   1. <0.5 ng/mL represents a low risk of severe sepsis and/or septic shock.  1. >2 ng/mL represents a high risk of severe sepsis and/or septic shock.    As a Marker for Lower Respiratory Tract Infections that require antibiotic therapy:  PCT on Admission     Antibiotic Therapy             6-12 Hrs later  > 0.5                Strongly Recommended            >0.25 - <0.5         Recommended  0.1 - 0.25           Discouraged                   Remeasure/reassess PCT  <0.1                 Strongly Discouraged          Remeasure/reassess PCT      As 28 day mortality risk marker: \"Change in Procalcitonin Result\" (> 80 % or <=80 %) if Day 0 (or Day 1) and Day 4 values are available. Refer to http://www.vufinds-pct-calculator.com/   Change in PCT <=80 %   A decrease of PCT levels below or equal to 80 % defines a positive change in PCT test result representing a higher risk for 28-day all-cause mortality of patients diagnosed with severe sepsis or septic shock.  Change in PCT > 80 %   A decrease of PCT levels of more than " 80 % defines a negative change in PCT result representing a lower risk for 28-day all-cause mortality of patients diagnosed with severe sepsis or septic shock.                Results may be falsely decreased if patient taking Biotin.     Ferritin [[id number]]  (Abnormal) Collected: 01/31/21 1716    Specimen: Blood Updated: 01/31/21 1805     Ferritin 509.30 ng/mL     Narrative:      Results may be falsely decreased if patient taking Biotin.      Comprehensive Metabolic Panel [[id number]]  (Abnormal) Collected: 01/31/21 1716    Specimen: Blood Updated: 01/31/21 1800     Glucose 161 mg/dL      BUN 22 mg/dL      Creatinine 0.85 mg/dL      Sodium 136 mmol/L      Potassium 4.1 mmol/L      Chloride 100 mmol/L      CO2 27.0 mmol/L      Calcium 9.7 mg/dL      Total Protein 7.5 g/dL      Albumin 4.50 g/dL      ALT (SGPT) 16 U/L      AST (SGOT) 18 U/L      Alkaline Phosphatase 80 U/L      Total Bilirubin 0.3 mg/dL      eGFR Non African Amer 65 mL/min/1.73      Globulin 3.0 gm/dL      A/G Ratio 1.5 g/dL      BUN/Creatinine Ratio 25.9     Anion Gap 9.0 mmol/L     Narrative:      GFR Normal >60  Chronic Kidney Disease <60  Kidney Failure <15      Lactate Dehydrogenase [[id number]]  (Normal) Collected: 01/31/21 1716    Specimen: Blood Updated: 01/31/21 1759      U/L     C-reactive Protein [[id number]]  (Normal) Collected: 01/31/21 1716    Specimen: Blood Updated: 01/31/21 1759     C-Reactive Protein 0.31 mg/dL     BNP [[id number]]  (Abnormal) Collected: 01/31/21 1716    Specimen: Blood Updated: 01/31/21 1758     proBNP 2,726.0 pg/mL     Narrative:      Among patients with dyspnea, NT-proBNP is highly sensitive for the detection of acute congestive heart failure. In addition NT-proBNP of <300 pg/ml effectively rules out acute congestive heart failure with 99% negative predictive value.    Results may be falsely decreased if patient taking Biotin.      Lactic Acid, Plasma [[id number]]  (Normal) Collected: 01/31/21 1716    Specimen:  Blood Updated: 01/31/21 1756     Lactate 1.8 mmol/L     Blood Gas, Arterial - [[id number]]  (Abnormal) Collected: 01/31/21 1745    Specimen: Arterial Blood Updated: 01/31/21 1755     Site Right Brachial     Ramses's Test N/A     pH, Arterial 7.470 pH units      Comment: 83 Value above reference range        pCO2, Arterial 39.8 mm Hg      pO2, Arterial 83.6 mm Hg      HCO3, Arterial 29.0 mmol/L      Comment: 83 Value above reference range        Base Excess, Arterial 4.9 mmol/L      Comment: 83 Value above reference range        O2 Saturation, Arterial 97.8 %      Temperature 37.0 C      Barometric Pressure for Blood Gas 751 mmHg      Modality Room Air     Ventilator Mode NA     Collected by [id number]     Comment: Meter: [id number]     :  [id number]        pCO2, Temperature Corrected 39.8 mm Hg      pH, Temp Corrected 7.470 pH Units      pO2, Temperature Corrected 83.6 mm Hg     Protime-INR [[id number]]  (Abnormal) Collected: 01/31/21 1716    Specimen: Blood Updated: 01/31/21 1752     Protime 27.4 Seconds      INR 2.55    aPTT [[id number]]  (Abnormal) Collected: 01/31/21 1716    Specimen: Blood Updated: 01/31/21 1752     PTT 42.2 seconds     D-dimer, Quantitative [[id number]]  (Normal) Collected: 01/31/21 1716    Specimen: Blood Updated: 01/31/21 1752     D-Dimer, Quantitative 0.24 mg/L (FEU)     Narrative:      Reference Range is 0-0.50 mg/L FEU. However, results <0.50 mg/L FEU tends to rule out DVT or PE. Results >0.50 mg/L FEU are not useful in predicting absence or presence of DVT or PE.      CBC & Differential [[id number]]  (Abnormal) Collected: 01/31/21 1716    Specimen: Blood Updated: 01/31/21 1744    Narrative:      The following orders were created for panel order CBC & Differential.  Procedure                               Abnormality         Status                     ---------                               -----------         ------                     CBC Auto Differential[[id number]]        Abnormal             Final result                 Please view results for these tests on the individual orders.    CBC Auto Differential [[id number]]  (Abnormal) Collected: 01/31/21 1716    Specimen: Blood Updated: 01/31/21 1744     WBC 11.21 10*3/mm3      RBC 4.88 10*6/mm3      Hemoglobin 15.0 g/dL      Hematocrit 43.9 %      MCV 90.0 fL      MCH 30.7 pg      MCHC 34.2 g/dL      RDW 14.6 %      RDW-SD 48.2 fl      MPV 9.9 fL      Platelets 252 10*3/mm3      Neutrophil % 84.4 %      Lymphocyte % 10.3 %      Monocyte % 4.7 %      Eosinophil % 0.0 %      Basophil % 0.2 %      Immature Grans % 0.4 %      Neutrophils, Absolute 9.46 10*3/mm3      Lymphocytes, Absolute 1.15 10*3/mm3      Monocytes, Absolute 0.53 10*3/mm3      Eosinophils, Absolute 0.00 10*3/mm3      Basophils, Absolute 0.02 10*3/mm3      Immature Grans, Absolute 0.05 10*3/mm3      nRBC 0.0 /100 WBC           Lab Results   Component Value Date    ECHOEFEST 55 07/25/2019       Imaging Results (Last 72 Hours)     Procedure Component Value Units Date/Time    XR Chest AP [[id number]] Collected: 01/31/21 1800     Updated: 01/31/21 1804    Narrative:      Exam: XR CHEST AP-     Indication: COVID Evaluation, Cough, Fever     Comparison: 2/29/2020     Findings:     Moderate cardiomegaly is unchanged from multiple prior studies. Hazy  LEFT basilar opacity is also unchanged from prior studies and may  represent atelectasis. Mild central vascular congestion, similar to  prior exams. No large pleural effusion, visible pneumothorax, or focal  consolidation. Prior median sternotomy and CABG. No acute osseous  finding.       Impression:      Impression:     1.  No consolidation to suggest pneumonia.  2.  Central vascular congestion without pulmonary edema.   3.  Stable cardiomegaly.  This report was finalized on 01/31/2021 18:01 by Dr. Og Mackenzie MD.          Assessment/Plan       Precordial pain    Mixed hyperlipidemia    Coronary artery disease involving native coronary artery  of native heart without angina pectoris    Longstanding persistent atrial fibrillation (CMS/HCC)    Chronic atrial fibrillation (CMS/HCC)    Essential hypertension    Anxiety about health    Plan:  CHest Pain- EKG changes with A-fib RVR. Repeat EKG with rates controlled. Agree with Nuclear stress ordered by primary team. Negative Enzymes. Remote history of CABG. No ischemic noted on last EKG.     NSVT- awaiting eval from Dr. Marrero with EP. Needs to get back in to see as she missed her initial appointment due to COVID    A-fib rates are mostly controlled- will increase Toprol. Anticoagulated. Referral to EP pending    Diastolic Congestive Heart Failure- appears euvolemic. Check Echo. Low Salt diet.       Further orders per Dr. Abrams     Thank you for asking us to follow this patient with you.       Electronically signed by RANDA Rdz, 02/01/21, 11:07 AM CST.

## 2021-02-02 LAB
INR PPP: 3.56 (ref 0.91–1.09)
PROTHROMBIN TIME: 35.9 SECONDS (ref 11.9–14.6)

## 2021-02-02 PROCEDURE — G0378 HOSPITAL OBSERVATION PER HR: HCPCS

## 2021-02-02 PROCEDURE — 36415 COLL VENOUS BLD VENIPUNCTURE: CPT | Performed by: INTERNAL MEDICINE

## 2021-02-02 PROCEDURE — 85610 PROTHROMBIN TIME: CPT | Performed by: INTERNAL MEDICINE

## 2021-02-02 PROCEDURE — 99214 OFFICE O/P EST MOD 30 MIN: CPT | Performed by: NURSE PRACTITIONER

## 2021-02-02 RX ADMIN — DILTIAZEM HYDROCHLORIDE 360 MG: 240 CAPSULE, EXTENDED RELEASE ORAL at 08:36

## 2021-02-02 RX ADMIN — PRAVASTATIN SODIUM 40 MG: 20 TABLET ORAL at 08:35

## 2021-02-02 RX ADMIN — FUROSEMIDE 20 MG: 40 TABLET ORAL at 08:36

## 2021-02-02 RX ADMIN — SODIUM CHLORIDE, PRESERVATIVE FREE 10 ML: 5 INJECTION INTRAVENOUS at 21:27

## 2021-02-02 RX ADMIN — Medication 1 TABLET: at 08:36

## 2021-02-02 RX ADMIN — SODIUM CHLORIDE, PRESERVATIVE FREE 10 ML: 5 INJECTION INTRAVENOUS at 08:36

## 2021-02-02 RX ADMIN — METOPROLOL SUCCINATE 50 MG: 50 TABLET, FILM COATED, EXTENDED RELEASE ORAL at 08:35

## 2021-02-02 RX ADMIN — DOCUSATE SODIUM 100 MG: 100 CAPSULE ORAL at 08:36

## 2021-02-02 RX ADMIN — DOCUSATE SODIUM 100 MG: 100 CAPSULE ORAL at 21:27

## 2021-02-02 NOTE — PROGRESS NOTES
[facility] Medicine Services  INPATIENT PROGRESS NOTE    Patient Name: Sondra Connolly  Date of Admission: 1/31/2021  Today's Date: 02/02/21  Length of Stay: 0  Primary Care Physician: Matthieu Bhakta DO    Subjective   Chief Complaint: Chest discomfort  HPI   Patient presented on 1/31 with complaints of chest pain and shortness of breath.  She follows with Dr. Gallo for cardiac history.  She has a history of chronic atrial fibrillation anticoagulated on Coumadin, nonsustained supraventricular tachycardia and frequent PVCs referred to Dr. Hawk.  Serial troponins negative.  Nuclear stress test on 2/1 showed intermediate risk study.  Transthoracic echocardiogram on 2/1 showed drop in left ventricular ejection fraction to 41-45%.  Moderate to severe aortic valve stenosis.  Mild pulmonary hypertension.  Ischemic work-up ongoing -she will need a heart cath once INR is improved.    Patient denies any further chest discomfort, pain or pressure.  Denies shortness of breath.  She is tolerating room air.    Review of Systems   All pertinent negatives and positives are as above. All other systems have been reviewed and are negative unless otherwise stated.     Objective    Temp:  [97.8 °F (36.6 °C)-98.6 °F (37 °C)] 98.6 °F (37 °C)  Heart Rate:  [] 79  Resp:  [14-18] 18  BP: (103-116)/(52-83) 105/52  Physical Exam  Constitutional:       Appearance: She is well-developed.      Comments: Sitting up in bed.  No acute distress.  Tolerating room air.  Family member at bedside.   HENT:      Head: Normocephalic and atraumatic.   Eyes:      General: No scleral icterus.     Conjunctiva/sclera: Conjunctivae normal.      Pupils: Pupils are equal, round, and reactive to light.   Neck:      Musculoskeletal: Neck supple.      Vascular: No JVD.   Cardiovascular:      Rate and Rhythm: Normal rate. Rhythm irregular.      Heart sounds: Normal heart sounds.      Comments: Atrial fibrillation    Pulmonary:      Effort: Pulmonary effort is normal.      Breath sounds: Normal breath sounds. No wheezing or rales.   Abdominal:      General: Bowel sounds are normal. There is no distension.      Palpations: Abdomen is soft. There is no mass.      Tenderness: There is no abdominal tenderness. There is no guarding or rebound.   Musculoskeletal: Normal range of motion.   Lymphadenopathy:      Cervical: No cervical adenopathy.   Skin:     General: Skin is warm and dry.   Neurological:      Mental Status: She is alert and oriented to person, place, and time.      Cranial Nerves: No cranial nerve deficit.   Psychiatric:         Behavior: Behavior normal.       Results Review:  I have reviewed the labs, radiology results, and diagnostic studies.    Laboratory Data:   Results from last 7 days   Lab Units 02/01/21  0036 01/31/21  1716   WBC 10*3/mm3 13.25* 11.21*   HEMOGLOBIN g/dL 14.6 15.0   HEMATOCRIT % 43.9 43.9   PLATELETS 10*3/mm3 264 252        Results from last 7 days   Lab Units 02/01/21  0036 01/31/21  1716   SODIUM mmol/L 139 136   POTASSIUM mmol/L 4.1 4.1   CHLORIDE mmol/L 101 100   CO2 mmol/L 27.0 27.0   BUN mg/dL 20 22   CREATININE mg/dL 0.81 0.85   CALCIUM mg/dL 9.6 9.7   BILIRUBIN mg/dL 0.4 0.3   ALK PHOS U/L 70 80   ALT (SGPT) U/L 15 16   AST (SGOT) U/L 17 18   GLUCOSE mg/dL 141* 161*     I have reviewed the patient's current medications.     Assessment/Plan     Active Hospital Problems    Diagnosis   • **Precordial pain   • Chronic atrial fibrillation (CMS/HCC)   • Anxiety about health   • Longstanding persistent atrial fibrillation (CMS/HCC)   • Coronary artery disease involving native coronary artery of native heart without angina pectoris   • Mixed hyperlipidemia   • Essential hypertension     Plan:  1.  INR remains elevated at 3.56.  Continue to hold Coumadin.  Cardiology planning for cardiac catheterization when PT/INR is more favorable.  2.  Permanent atrial fibrillation -rates controlled  with increased dose of Toprol.  Continue Cardizem CD.  3.  Hyperlipidemia -continue pravastatin.  4.  Sequential compression devices for DVT prophylaxis.  5.  Encourage ambulation.  6.  Daily PT/INR.    Discharge Planning: Cardiac catheterization per cardiology when PT/INR is more favorable.    Electronically signed by RANDA Figueroa, 02/02/21, 14:31 CST.

## 2021-02-02 NOTE — PROGRESS NOTES
[facility] Medicine Services  INPATIENT PROGRESS NOTE    Length of Stay: 0  Date of Admission: 1/31/2021  Primary Care Physician: Matthieu Bhakta DO    Subjective     Chief Complaint:     Chest discomfort    HPI     The patient has had no further chest discomfort.  Cardiology has seen and evaluated the patient.  A nuclear stress test was performed today which was intermediate risk for ischemia.  Cardiology plans to hold Coumadin and to proceed with cardiac catheterization when PT/INR is more favorable.    Review of Systems     All pertinent negatives and positives are as above. All other systems have been reviewed and are negative unless otherwise stated.     Objective    Temp:  [98.5 °F (36.9 °C)-98.8 °F (37.1 °C)] 98.5 °F (36.9 °C)  Heart Rate:  [] 66  Resp:  [14-20] 14  BP: (103-136)/(63-93) 116/69    Lab Results (last 24 hours)     Procedure Component Value Units Date/Time    Blood Culture - Blood, Arm, Right [[id number]] Collected: 01/31/21 1716    Specimen: Blood from Arm, Right Updated: 02/01/21 1845     Blood Culture No growth at 24 hours    Troponin [[id number]]  (Normal) Collected: 02/01/21 0222    Specimen: Blood Updated: 02/01/21 0316     Troponin T <0.010 ng/mL     Narrative:      Troponin T Reference Range:  <= 0.03 ng/mL-   Negative for AMI  >0.03 ng/mL-     Abnormal for myocardial necrosis.  Clinicians would have to utilize clinical acumen, EKG, Troponin and serial changes to determine if it is an Acute Myocardial Infarction or myocardial injury due to an underlying chronic condition.       Results may be falsely decreased if patient taking Biotin.      Protime-INR [[id number]]  (Abnormal) Collected: 02/01/21 0222    Specimen: Blood Updated: 02/01/21 0307     Protime 30.3 Seconds      INR 2.89    Troponin [[id number]]  (Normal) Collected: 02/01/21 0036    Specimen: Blood Updated: 02/01/21 0120     Troponin T <0.010 ng/mL     Narrative:      Troponin T Reference  Range:  <= 0.03 ng/mL-   Negative for AMI  >0.03 ng/mL-     Abnormal for myocardial necrosis.  Clinicians would have to utilize clinical acumen, EKG, Troponin and serial changes to determine if it is an Acute Myocardial Infarction or myocardial injury due to an underlying chronic condition.       Results may be falsely decreased if patient taking Biotin.      Comprehensive Metabolic Panel [[id number]]  (Abnormal) Collected: 02/01/21 0036    Specimen: Blood Updated: 02/01/21 0120     Glucose 141 mg/dL      BUN 20 mg/dL      Creatinine 0.81 mg/dL      Sodium 139 mmol/L      Potassium 4.1 mmol/L      Chloride 101 mmol/L      CO2 27.0 mmol/L      Calcium 9.6 mg/dL      Total Protein 7.2 g/dL      Albumin 4.30 g/dL      ALT (SGPT) 15 U/L      AST (SGOT) 17 U/L      Alkaline Phosphatase 70 U/L      Total Bilirubin 0.4 mg/dL      eGFR Non African Amer 68 mL/min/1.73      Globulin 2.9 gm/dL      A/G Ratio 1.5 g/dL      BUN/Creatinine Ratio 24.7     Anion Gap 11.0 mmol/L     Narrative:      GFR Normal >60  Chronic Kidney Disease <60  Kidney Failure <15      Lipid Panel [[id number]]  (Abnormal) Collected: 02/01/21 0036    Specimen: Blood Updated: 02/01/21 0120     Total Cholesterol 182 mg/dL      Triglycerides 70 mg/dL      HDL Cholesterol 57 mg/dL      LDL Cholesterol  112 mg/dL      VLDL Cholesterol 13 mg/dL      LDL/HDL Ratio 1.95    Narrative:      Cholesterol Reference Ranges  (U.S. Department of Health and Human Services ATP III Classifications)    Desirable          <200 mg/dL  Borderline High    200-239 mg/dL  High Risk          >240 mg/dL      Triglyceride Reference Ranges  (U.S. Department of Health and Human Services ATP III Classifications)    Normal           <150 mg/dL  Borderline High  150-199 mg/dL  High             200-499 mg/dL  Very High        >500 mg/dL    HDL Reference Ranges  (U.S. Department of Health and Human Services ATP III Classifcations)    Low     <40 mg/dl (major risk factor for CHD)  High    >60  mg/dl ('negative' risk factor for CHD)        LDL Reference Ranges  (U.S. Department of Health and Human Services ATP III Classifcations)    Optimal          <100 mg/dL  Near Optimal     100-129 mg/dL  Borderline High  130-159 mg/dL  High             160-189 mg/dL  Very High        >189 mg/dL    Magnesium [[id number]]  (Normal) Collected: 02/01/21 0036    Specimen: Blood Updated: 02/01/21 0120     Magnesium 2.3 mg/dL     Phosphorus [[id number]]  (Normal) Collected: 02/01/21 0036    Specimen: Blood Updated: 02/01/21 0120     Phosphorus 3.8 mg/dL     Hemoglobin A1c [[id number]]  (Abnormal) Collected: 02/01/21 0036    Specimen: Blood Updated: 02/01/21 0110     Hemoglobin A1C 5.90 %     Narrative:      Hemoglobin A1C Ranges:    Increased Risk for Diabetes  5.7% to 6.4%  Diabetes                     >= 6.5%  Diabetic Goal                < 7.0%    Protime-INR [[id number]]  (Abnormal) Collected: 02/01/21 0036    Specimen: Blood Updated: 02/01/21 0106     Protime 29.1 Seconds      INR 2.75    CBC Auto Differential [[id number]]  (Abnormal) Collected: 02/01/21 0036    Specimen: Blood Updated: 02/01/21 0059     WBC 13.25 10*3/mm3      RBC 4.84 10*6/mm3      Hemoglobin 14.6 g/dL      Hematocrit 43.9 %      MCV 90.7 fL      MCH 30.2 pg      MCHC 33.3 g/dL      RDW 14.6 %      RDW-SD 47.9 fl      MPV 10.0 fL      Platelets 264 10*3/mm3      Neutrophil % 83.4 %      Lymphocyte % 11.1 %      Monocyte % 5.1 %      Eosinophil % 0.0 %      Basophil % 0.1 %      Immature Grans % 0.3 %      Neutrophils, Absolute 11.05 10*3/mm3      Lymphocytes, Absolute 1.47 10*3/mm3      Monocytes, Absolute 0.68 10*3/mm3      Eosinophils, Absolute 0.00 10*3/mm3      Basophils, Absolute 0.01 10*3/mm3      Immature Grans, Absolute 0.04 10*3/mm3      nRBC 0.0 /100 WBC     Troponin [[id number]]  (Normal) Collected: 01/31/21 1921    Specimen: Blood Updated: 01/31/21 1957     Troponin T <0.010 ng/mL     Narrative:      Troponin T Reference Range:  <= 0.03 ng/mL-    Negative for AMI  >0.03 ng/mL-     Abnormal for myocardial necrosis.  Clinicians would have to utilize clinical acumen, EKG, Troponin and serial changes to determine if it is an Acute Myocardial Infarction or myocardial injury due to an underlying chronic condition.       Results may be falsely decreased if patient taking Biotin.      Blood Culture - Blood, Arm, Right [[id number]] Collected: 01/31/21 1852    Specimen: Blood from Arm, Right Updated: 01/31/21 1921          Imaging Results (Last 24 Hours)     ** No results found for the last 24 hours. **             Intake/Output Summary (Last 24 hours) at 2/1/2021 1915  Last data filed at 2/1/2021 1700  Gross per 24 hour   Intake 360 ml   Output 750 ml   Net -390 ml       Physical Exam  Constitutional:       Appearance: Normal appearance.   HENT:      Head: Normocephalic and atraumatic.      Right Ear: External ear normal.      Left Ear: External ear normal.      Mouth/Throat:      Mouth: Mucous membranes are moist.      Pharynx: Oropharynx is clear.   Eyes:      General: No scleral icterus.     Conjunctiva/sclera: Conjunctivae normal.   Neck:      Musculoskeletal: Normal range of motion and neck supple.   Cardiovascular:      Rate and Rhythm: Normal rate and regular rhythm.      Pulses: Normal pulses.      Heart sounds: Normal heart sounds.   Pulmonary:      Effort: Pulmonary effort is normal.      Breath sounds: Normal breath sounds.   Abdominal:      General: Abdomen is flat.      Palpations: Abdomen is soft.      Tenderness: There is no abdominal tenderness.   Musculoskeletal: Normal range of motion.      Right lower leg: No edema.      Left lower leg: No edema.   Lymphadenopathy:      Cervical: No cervical adenopathy.   Skin:     General: Skin is warm and dry.   Neurological:      General: No focal deficit present.      Mental Status: She is alert and oriented to person, place, and time.   Psychiatric:         Mood and Affect: Mood normal.       Results Review:  I  have reviewed the labs, radiology results, and diagnostic studies since my last progress note and made treatment changes reflective of the results.   I have reviewed the current medications.    Assessment/Plan     Active Hospital Problems    Diagnosis   • **Precordial pain   • Chronic atrial fibrillation (CMS/HCC)   • Anxiety about health   • Longstanding persistent atrial fibrillation (CMS/HCC)   • Coronary artery disease involving native coronary artery of native heart without angina pectoris   • Mixed hyperlipidemia   • Essential hypertension       PLAN:  Hold Coumadin  Cardiac catheterization per cardiology when PT/INR is more favorable    Electronically signed by Sravan Ross DO, 02/01/21, 19:15 CST.

## 2021-02-02 NOTE — PROGRESS NOTES
[facility] -  Progress Note     LOS: 0 days   Patient Care Team:  Matthieu Bhakta DO as PCP - General (Internal Medicine)  Gualberto Gallo MD as Cardiologist (Cardiology)    Chief Complaint:Chest Pain, Shortness of Breath     Subjective     Interval History:     Review of Systems:   Chest Pain and Shortness of Breath stable. INR 3.56 today. Needs heart cath once INR is improved. Coumadin on hold. Toprol increased yesterday with no room for increase due to BP    Review of Systems   Respiratory: Positive for shortness of breath.    Cardiovascular: Positive for chest pain.     Objective     Vital Sign Min/Max for last 24 hours  Temp  Min: 97.8 °F (36.6 °C)  Max: 98.6 °F (37 °C)   BP  Min: 103/83  Max: 116/69   Pulse  Min: 65  Max: 114   Resp  Min: 14  Max: 18   SpO2  Min: 94 %  Max: 97 %   No data recorded   No data recorded         02/01/21  1311   Weight: 60.8 kg (134 lb)       Telemetry: A-fib        Physical Exam:    Constitutional:       Appearance: Frail.   HENT:      Nose: Nose normal.    Mouth/Throat:      Pharynx: Oropharynx is clear.   Neck:      Musculoskeletal: Normal range of motion and neck supple.   Pulmonary:      Effort: Pulmonary effort is normal.      Breath sounds: Normal breath sounds.   Cardiovascular:      Normal rate. Irregular rhythm.      Murmurs: There is no murmur.   Edema:     Peripheral edema absent.   Abdominal:      General: Bowel sounds are normal.      Palpations: Abdomen is soft.   Musculoskeletal: Normal range of motion.   Skin:     General: Skin is warm and dry.   Neurological:      Mental Status: Alert and oriented to person, place and time.       Results Review:   Lab Results (last 72 hours)     Procedure Component Value Units Date/Time    Protime-INR [[id number]]  (Abnormal) Collected: 02/02/21 0252    Specimen: Blood Updated: 02/02/21 0405     Protime 35.9 Seconds      INR 3.56    Blood Culture - Blood, Arm, Right [[id number]] Collected: 01/31/21 8278     Specimen: Blood from Arm, Right Updated: 02/01/21 1930     Blood Culture No growth at 24 hours    Blood Culture - Blood, Arm, Right [[id number]] Collected: 01/31/21 1716    Specimen: Blood from Arm, Right Updated: 02/01/21 1845     Blood Culture No growth at 24 hours    Troponin [[id number]]  (Normal) Collected: 02/01/21 0222    Specimen: Blood Updated: 02/01/21 0316     Troponin T <0.010 ng/mL     Narrative:      Troponin T Reference Range:  <= 0.03 ng/mL-   Negative for AMI  >0.03 ng/mL-     Abnormal for myocardial necrosis.  Clinicians would have to utilize clinical acumen, EKG, Troponin and serial changes to determine if it is an Acute Myocardial Infarction or myocardial injury due to an underlying chronic condition.       Results may be falsely decreased if patient taking Biotin.      Protime-INR [[id number]]  (Abnormal) Collected: 02/01/21 0222    Specimen: Blood Updated: 02/01/21 0307     Protime 30.3 Seconds      INR 2.89    Troponin [[id number]]  (Normal) Collected: 02/01/21 0036    Specimen: Blood Updated: 02/01/21 0120     Troponin T <0.010 ng/mL     Narrative:      Troponin T Reference Range:  <= 0.03 ng/mL-   Negative for AMI  >0.03 ng/mL-     Abnormal for myocardial necrosis.  Clinicians would have to utilize clinical acumen, EKG, Troponin and serial changes to determine if it is an Acute Myocardial Infarction or myocardial injury due to an underlying chronic condition.       Results may be falsely decreased if patient taking Biotin.      Comprehensive Metabolic Panel [[id number]]  (Abnormal) Collected: 02/01/21 0036    Specimen: Blood Updated: 02/01/21 0120     Glucose 141 mg/dL      BUN 20 mg/dL      Creatinine 0.81 mg/dL      Sodium 139 mmol/L      Potassium 4.1 mmol/L      Chloride 101 mmol/L      CO2 27.0 mmol/L      Calcium 9.6 mg/dL      Total Protein 7.2 g/dL      Albumin 4.30 g/dL      ALT (SGPT) 15 U/L      AST (SGOT) 17 U/L      Alkaline Phosphatase 70 U/L      Total Bilirubin 0.4 mg/dL       eGFR Non African Amer 68 mL/min/1.73      Globulin 2.9 gm/dL      A/G Ratio 1.5 g/dL      BUN/Creatinine Ratio 24.7     Anion Gap 11.0 mmol/L     Narrative:      GFR Normal >60  Chronic Kidney Disease <60  Kidney Failure <15      Lipid Panel [[id number]]  (Abnormal) Collected: 02/01/21 0036    Specimen: Blood Updated: 02/01/21 0120     Total Cholesterol 182 mg/dL      Triglycerides 70 mg/dL      HDL Cholesterol 57 mg/dL      LDL Cholesterol  112 mg/dL      VLDL Cholesterol 13 mg/dL      LDL/HDL Ratio 1.95    Narrative:      Cholesterol Reference Ranges  (U.S. Department of Health and Human Services ATP III Classifications)    Desirable          <200 mg/dL  Borderline High    200-239 mg/dL  High Risk          >240 mg/dL      Triglyceride Reference Ranges  (U.S. Department of Health and Human Services ATP III Classifications)    Normal           <150 mg/dL  Borderline High  150-199 mg/dL  High             200-499 mg/dL  Very High        >500 mg/dL    HDL Reference Ranges  (U.S. Department of Health and Human Services ATP III Classifcations)    Low     <40 mg/dl (major risk factor for CHD)  High    >60 mg/dl ('negative' risk factor for CHD)        LDL Reference Ranges  (U.S. Department of Health and Human Services ATP III Classifcations)    Optimal          <100 mg/dL  Near Optimal     100-129 mg/dL  Borderline High  130-159 mg/dL  High             160-189 mg/dL  Very High        >189 mg/dL    Magnesium [[id number]]  (Normal) Collected: 02/01/21 0036    Specimen: Blood Updated: 02/01/21 0120     Magnesium 2.3 mg/dL     Phosphorus [[id number]]  (Normal) Collected: 02/01/21 0036    Specimen: Blood Updated: 02/01/21 0120     Phosphorus 3.8 mg/dL     Hemoglobin A1c [[id number]]  (Abnormal) Collected: 02/01/21 0036    Specimen: Blood Updated: 02/01/21 0110     Hemoglobin A1C 5.90 %     Narrative:      Hemoglobin A1C Ranges:    Increased Risk for Diabetes  5.7% to 6.4%  Diabetes                     >= 6.5%  Diabetic Goal                 < 7.0%    Protime-INR [[id number]]  (Abnormal) Collected: 02/01/21 0036    Specimen: Blood Updated: 02/01/21 0106     Protime 29.1 Seconds      INR 2.75    CBC Auto Differential [[id number]]  (Abnormal) Collected: 02/01/21 0036    Specimen: Blood Updated: 02/01/21 0059     WBC 13.25 10*3/mm3      RBC 4.84 10*6/mm3      Hemoglobin 14.6 g/dL      Hematocrit 43.9 %      MCV 90.7 fL      MCH 30.2 pg      MCHC 33.3 g/dL      RDW 14.6 %      RDW-SD 47.9 fl      MPV 10.0 fL      Platelets 264 10*3/mm3      Neutrophil % 83.4 %      Lymphocyte % 11.1 %      Monocyte % 5.1 %      Eosinophil % 0.0 %      Basophil % 0.1 %      Immature Grans % 0.3 %      Neutrophils, Absolute 11.05 10*3/mm3      Lymphocytes, Absolute 1.47 10*3/mm3      Monocytes, Absolute 0.68 10*3/mm3      Eosinophils, Absolute 0.00 10*3/mm3      Basophils, Absolute 0.01 10*3/mm3      Immature Grans, Absolute 0.04 10*3/mm3      nRBC 0.0 /100 WBC     Troponin [[id number]]  (Normal) Collected: 01/31/21 1921    Specimen: Blood Updated: 01/31/21 1957     Troponin T <0.010 ng/mL     Narrative:      Troponin T Reference Range:  <= 0.03 ng/mL-   Negative for AMI  >0.03 ng/mL-     Abnormal for myocardial necrosis.  Clinicians would have to utilize clinical acumen, EKG, Troponin and serial changes to determine if it is an Acute Myocardial Infarction or myocardial injury due to an underlying chronic condition.       Results may be falsely decreased if patient taking Biotin.      Urinalysis With Culture If Indicated - Urine, Clean Catch [[id number]]  (Abnormal) Collected: 01/31/21 1833    Specimen: Urine, Clean Catch Updated: 01/31/21 1904     Color, UA Yellow     Appearance, UA Clear     pH, UA 6.5     Specific Gravity, UA 1.017     Glucose, UA Negative     Ketones, UA Negative     Bilirubin, UA Negative     Blood, UA Negative     Protein, UA Trace     Leuk Esterase, UA Small (1+)     Nitrite, UA Negative     Urobilinogen, UA 0.2 E.U./dL    Urinalysis, Microscopic  Only - Urine, Clean Catch [[id number]]  (Abnormal) Collected: 01/31/21 1833    Specimen: Urine, Clean Catch Updated: 01/31/21 1904     RBC, UA 0-2 /HPF      WBC, UA 3-5 /HPF      Bacteria, UA None Seen /HPF      Squamous Epithelial Cells, UA 0-2 /HPF      Hyaline Casts, UA None Seen /LPF      Methodology Automated Microscopy    S. Pneumo Ag Urine or CSF - Urine, Urine, Clean Catch [[id number]]  (Normal) Collected: 01/31/21 1833    Specimen: Urine, Clean Catch Updated: 01/31/21 1904     Strep Pneumo Ag Negative    [place] Draw [[id number]] Collected: 01/31/21 1716    Specimen: Blood Updated: 01/31/21 1830    Narrative:      The following orders were created for panel order [place] Draw.  Procedure                               Abnormality         Status                     ---------                               -----------         ------                     Light Blue Top[[id number]]                                   Final result               Green Top (Gel)[[id number]]                                  Final result               Lavender Top[[id number]]                                     Final result               Red Top[[id number]]                                          Final result               [place] Blood Culture Dany...[[id number]]                      Final result               Gray Top - Ice[[id number]]                                   Final result                 Please view results for these tests on the individual orders.    [place] Blood Culture Bottle Set [[id number]] Collected: 01/31/21 1716    Specimen: Blood from Arm, Right Updated: 01/31/21 1830     Extra Tube Hold for add-ons.     Comment: Auto resulted.       Light Blue Top [[id number]] Collected: 01/31/21 1716    Specimen: Blood Updated: 01/31/21 1830     Extra Tube hold for add-on     Comment: Auto resulted       Green Top (Gel) [[id number]] Collected: 01/31/21 1716    Specimen: Blood Updated: 01/31/21 1830     Extra Tube Hold for add-ons.     Comment:  Auto resulted.       Lavender Top [[id number]] Collected: 01/31/21 1716    Specimen: Blood Updated: 01/31/21 1830     Extra Tube hold for add-on     Comment: Auto resulted       Red Top [[id number]] Collected: 01/31/21 1716    Specimen: Blood Updated: 01/31/21 1830     Extra Tube Hold for add-ons.     Comment: Auto resulted.       Gray Top - Ice [[id number]] Collected: 01/31/21 1716    Specimen: Blood Updated: 01/31/21 1830     Extra Tube Hold for add-ons.     Comment: Auto resulted.       Troponin [[id number]]  (Normal) Collected: 01/31/21 1716    Specimen: Blood Updated: 01/31/21 1825     Troponin T <0.010 ng/mL     Narrative:      Troponin T Reference Range:  <= 0.03 ng/mL-   Negative for AMI  >0.03 ng/mL-     Abnormal for myocardial necrosis.  Clinicians would have to utilize clinical acumen, EKG, Troponin and serial changes to determine if it is an Acute Myocardial Infarction or myocardial injury due to an underlying chronic condition.       Results may be falsely decreased if patient taking Biotin.      COVID-19, [facility] IN-HOUSE,NASAL Swab (NO TRANSPORT MEDIA) 2 HR TAT - Swab, Nasopharynx [[id number]]  (Normal) Collected: 01/31/21 1729    Specimen: Swab from Nasopharynx Updated: 01/31/21 1805     COVID19 Presumptive Negative    Narrative:      Fact sheet for providers: https://www.fda.gov/media/471876/download     Fact sheet for patients: https://www.fda.gov/media/304490/download    Test performed by PCR.  If inconsistent with clinical signs and symptoms patient should be tested with different authorized molecular test.    Procalcitonin [[id number]]  (Normal) Collected: 01/31/21 1716    Specimen: Blood Updated: 01/31/21 1805     Procalcitonin 0.04 ng/mL     Narrative:      As a Marker for Sepsis (Non-Neonates):   1. <0.5 ng/mL represents a low risk of severe sepsis and/or septic shock.  1. >2 ng/mL represents a high risk of severe sepsis and/or septic shock.    As a Marker for Lower Respiratory Tract Infections that  "require antibiotic therapy:  PCT on Admission     Antibiotic Therapy             6-12 Hrs later  > 0.5                Strongly Recommended            >0.25 - <0.5         Recommended  0.1 - 0.25           Discouraged                   Remeasure/reassess PCT  <0.1                 Strongly Discouraged          Remeasure/reassess PCT      As 28 day mortality risk marker: \"Change in Procalcitonin Result\" (> 80 % or <=80 %) if Day 0 (or Day 1) and Day 4 values are available. Refer to [email or web address]   Change in PCT <=80 %   A decrease of PCT levels below or equal to 80 % defines a positive change in PCT test result representing a higher risk for 28-day all-cause mortality of patients diagnosed with severe sepsis or septic shock.  Change in PCT > 80 %   A decrease of PCT levels of more than 80 % defines a negative change in PCT result representing a lower risk for 28-day all-cause mortality of patients diagnosed with severe sepsis or septic shock.                Results may be falsely decreased if patient taking Biotin.     Ferritin [[id number]]  (Abnormal) Collected: 01/31/21 1716    Specimen: Blood Updated: 01/31/21 1805     Ferritin 509.30 ng/mL     Narrative:      Results may be falsely decreased if patient taking Biotin.      Comprehensive Metabolic Panel [[id number]]  (Abnormal) Collected: 01/31/21 1716    Specimen: Blood Updated: 01/31/21 1800     Glucose 161 mg/dL      BUN 22 mg/dL      Creatinine 0.85 mg/dL      Sodium 136 mmol/L      Potassium 4.1 mmol/L      Chloride 100 mmol/L      CO2 27.0 mmol/L      Calcium 9.7 mg/dL      Total Protein 7.5 g/dL      Albumin 4.50 g/dL      ALT (SGPT) 16 U/L      AST (SGOT) 18 U/L      Alkaline Phosphatase 80 U/L      Total Bilirubin 0.3 mg/dL      eGFR Non African Amer 65 mL/min/1.73      Globulin 3.0 gm/dL      A/G Ratio 1.5 g/dL      BUN/Creatinine Ratio 25.9     Anion Gap 9.0 mmol/L     Narrative:      GFR Normal >60  Chronic Kidney Disease <60  Kidney " Failure <15      Lactate Dehydrogenase [[id number]]  (Normal) Collected: 01/31/21 1716    Specimen: Blood Updated: 01/31/21 1759      U/L     C-reactive Protein [[id number]]  (Normal) Collected: 01/31/21 1716    Specimen: Blood Updated: 01/31/21 1759     C-Reactive Protein 0.31 mg/dL     BNP [[id number]]  (Abnormal) Collected: 01/31/21 1716    Specimen: Blood Updated: 01/31/21 1758     proBNP 2,726.0 pg/mL     Narrative:      Among patients with dyspnea, NT-proBNP is highly sensitive for the detection of acute congestive heart failure. In addition NT-proBNP of <300 pg/ml effectively rules out acute congestive heart failure with 99% negative predictive value.    Results may be falsely decreased if patient taking Biotin.      Lactic Acid, Plasma [[id number]]  (Normal) Collected: 01/31/21 1716    Specimen: Blood Updated: 01/31/21 1756     Lactate 1.8 mmol/L     Blood Gas, Arterial - [[id number]]  (Abnormal) Collected: 01/31/21 1745    Specimen: Arterial Blood Updated: 01/31/21 1755     Site Right Brachial     Ramses's Test N/A     pH, Arterial 7.470 pH units      Comment: 83 Value above reference range        pCO2, Arterial 39.8 mm Hg      pO2, Arterial 83.6 mm Hg      HCO3, Arterial 29.0 mmol/L      Comment: 83 Value above reference range        Base Excess, Arterial 4.9 mmol/L      Comment: 83 Value above reference range        O2 Saturation, Arterial 97.8 %      Temperature 37.0 C      Barometric Pressure for Blood Gas 751 mmHg      Modality Room Air     Ventilator Mode NA     Collected by [id number]     Comment: Meter: [id number]     :  [id number]        pCO2, Temperature Corrected 39.8 mm Hg      pH, Temp Corrected 7.470 pH Units      pO2, Temperature Corrected 83.6 mm Hg     Protime-INR [[id number]]  (Abnormal) Collected: 01/31/21 1716    Specimen: Blood Updated: 01/31/21 1752     Protime 27.4 Seconds      INR 2.55    aPTT [[id number]]  (Abnormal) Collected: 01/31/21 1716    Specimen: Blood Updated:  01/31/21 1752     PTT 42.2 seconds     D-dimer, Quantitative [[id number]]  (Normal) Collected: 01/31/21 1716    Specimen: Blood Updated: 01/31/21 1752     D-Dimer, Quantitative 0.24 mg/L (FEU)     Narrative:      Reference Range is 0-0.50 mg/L FEU. However, results <0.50 mg/L FEU tends to rule out DVT or PE. Results >0.50 mg/L FEU are not useful in predicting absence or presence of DVT or PE.      CBC & Differential [[id number]]  (Abnormal) Collected: 01/31/21 1716    Specimen: Blood Updated: 01/31/21 1744    Narrative:      The following orders were created for panel order CBC & Differential.  Procedure                               Abnormality         Status                     ---------                               -----------         ------                     CBC Auto Differential[[id number]]        Abnormal            Final result                 Please view results for these tests on the individual orders.    CBC Auto Differential [[id number]]  (Abnormal) Collected: 01/31/21 1716    Specimen: Blood Updated: 01/31/21 1744     WBC 11.21 10*3/mm3      RBC 4.88 10*6/mm3      Hemoglobin 15.0 g/dL      Hematocrit 43.9 %      MCV 90.0 fL      MCH 30.7 pg      MCHC 34.2 g/dL      RDW 14.6 %      RDW-SD 48.2 fl      MPV 9.9 fL      Platelets 252 10*3/mm3      Neutrophil % 84.4 %      Lymphocyte % 10.3 %      Monocyte % 4.7 %      Eosinophil % 0.0 %      Basophil % 0.2 %      Immature Grans % 0.4 %      Neutrophils, Absolute 9.46 10*3/mm3      Lymphocytes, Absolute 1.15 10*3/mm3      Monocytes, Absolute 0.53 10*3/mm3      Eosinophils, Absolute 0.00 10*3/mm3      Basophils, Absolute 0.02 10*3/mm3      Immature Grans, Absolute 0.05 10*3/mm3      nRBC 0.0 /100 WBC               Echo EF Estimated  Lab Results   Component Value Date    ECHOEFEST 55 07/25/2019         Cath Ejection Fraction Quantitative  No results found for: CATHEF        Medication Review: yes  Current Facility-Administered Medications   Medication Dose Route  Frequency Provider Last Rate Last Admin   • acetaminophen (TYLENOL) tablet 650 mg  650 mg Oral Q4H PRN Pamela Nicholas DO       • dilTIAZem CD (CARDIZEM CD) 24 hr capsule 360 mg  360 mg Oral Q24H Pamela Nicholas DO   360 mg at 02/02/21 0836   • docusate sodium (COLACE) capsule 100 mg  100 mg Oral BID Pamela Nicholas DO   100 mg at 02/02/21 0836   • furosemide (LASIX) tablet 20 mg  20 mg Oral Daily Pamela Nicholas DO   20 mg at 02/02/21 0836   • metoprolol succinate XL (TOPROL-XL) 24 hr tablet 50 mg  50 mg Oral Daily Yaya Cobian APRN   50 mg at 02/02/21 0835   • multivitamin with minerals 1 tablet  1 tablet Oral Daily Pamela Nicholas DO   1 tablet at 02/02/21 0836   • nitroglycerin (NITROSTAT) SL tablet 0.4 mg  0.4 mg Sublingual Q5 Min PRN Pamela Nicholas DO       • ondansetron (ZOFRAN) injection 4 mg  4 mg Intravenous Q6H PRN Pamela Nicholas DO       • pravastatin (PRAVACHOL) tablet 40 mg  40 mg Oral Daily Pamela Nicholas DO   40 mg at 02/02/21 0835   • sodium chloride 0.9 % flush 10 mL  10 mL Intravenous PRN Laxmi Cool, APRN       • sodium chloride 0.9 % flush 10 mL  10 mL Intravenous Q12H Pamela Nicholas DO   10 mL at 02/02/21 0836   • sodium chloride 0.9 % flush 10 mL  10 mL Intravenous PRN Pamela Nicholas DO             Assessment/Plan       Precordial pain    Mixed hyperlipidemia    Coronary artery disease involving native coronary artery of native heart without angina pectoris    Longstanding persistent atrial fibrillation (CMS/HCC)    Chronic atrial fibrillation (CMS/HCC)    Essential hypertension    Anxiety about health    Plan:  Chest Pain- with drop in LVEF and intermediate stress. Needs heart cath once INR improved    Cardiomyopathy- drop in LVEF 41-45%- ischemic work up ongoing. On Toprol. Add ACE or ARB if BP will allow in future. Low Salt. No obvious heart failure symptoms    Coronary Artery Disease- remote history of CABG    A-fib- permanent A-fib.  Rates reasonably controlled with increase in toprol    V-tach- history of NSVT- EP referral was delayed due to COVID needs to follow back up with EP at discharge    Elevated INR- coumadin on hold. Daily INR.       Electronically signed by RANDA Rdz, 02/02/21, 2:26 PM CST.

## 2021-02-02 NOTE — PLAN OF CARE
Goal Outcome Evaluation:  Plan of Care Reviewed With: patient  Progress: no change  Outcome Summary: Patient went for stress this shift. EF 41-45%, intermediate risk study. Plans for heart cath when PT/INR stable. Coumadin to be held. AF  on tele. Safety maintained, no falls. Will cont to monitor and notify MD of any changes.

## 2021-02-02 NOTE — PLAN OF CARE
Goal Outcome Evaluation:  Plan of Care Reviewed With: patient  Progress: no change  Outcome Summary: VSS. No c/o pain. Pt. needs heart cath but will wait until INR is stable, 3.56 this AM. Coumadin on hold. Safety maintained. Afib on tele . Will continue to monitor.

## 2021-02-03 PROBLEM — T45.511A COUMADIN TOXICITY: Status: ACTIVE | Noted: 2021-02-03

## 2021-02-03 LAB
ANION GAP SERPL CALCULATED.3IONS-SCNC: 9 MMOL/L (ref 5–15)
BUN SERPL-MCNC: 16 MG/DL (ref 8–23)
BUN/CREAT SERPL: 18.4 (ref 7–25)
CALCIUM SPEC-SCNC: 9.5 MG/DL (ref 8.6–10.5)
CHLORIDE SERPL-SCNC: 102 MMOL/L (ref 98–107)
CO2 SERPL-SCNC: 29 MMOL/L (ref 22–29)
CREAT SERPL-MCNC: 0.87 MG/DL (ref 0.57–1)
GFR SERPL CREATININE-BSD FRML MDRD: 63 ML/MIN/1.73
GLUCOSE BLDC GLUCOMTR-MCNC: 92 MG/DL (ref 70–130)
GLUCOSE SERPL-MCNC: 111 MG/DL (ref 65–99)
INR PPP: 3.43 (ref 0.91–1.09)
MAGNESIUM SERPL-MCNC: 2.2 MG/DL (ref 1.6–2.4)
POTASSIUM SERPL-SCNC: 3.7 MMOL/L (ref 3.5–5.2)
PROTHROMBIN TIME: 34.9 SECONDS (ref 11.9–14.6)
SODIUM SERPL-SCNC: 140 MMOL/L (ref 136–145)

## 2021-02-03 PROCEDURE — 25010000003 PHYTONADIONE 10 MG/ML SOLUTION 1 ML AMPULE: Performed by: EMERGENCY MEDICINE

## 2021-02-03 PROCEDURE — 82962 GLUCOSE BLOOD TEST: CPT

## 2021-02-03 PROCEDURE — 99233 SBSQ HOSP IP/OBS HIGH 50: CPT | Performed by: EMERGENCY MEDICINE

## 2021-02-03 PROCEDURE — 36430 TRANSFUSION BLD/BLD COMPNT: CPT

## 2021-02-03 PROCEDURE — 85610 PROTHROMBIN TIME: CPT | Performed by: INTERNAL MEDICINE

## 2021-02-03 PROCEDURE — 86927 PLASMA FRESH FROZEN: CPT

## 2021-02-03 PROCEDURE — 83735 ASSAY OF MAGNESIUM: CPT | Performed by: NURSE PRACTITIONER

## 2021-02-03 PROCEDURE — 92610 EVALUATE SWALLOWING FUNCTION: CPT | Performed by: SPEECH-LANGUAGE PATHOLOGIST

## 2021-02-03 PROCEDURE — 80048 BASIC METABOLIC PNL TOTAL CA: CPT | Performed by: NURSE PRACTITIONER

## 2021-02-03 PROCEDURE — P9017 PLASMA 1 DONOR FRZ W/IN 8 HR: HCPCS

## 2021-02-03 PROCEDURE — 36415 COLL VENOUS BLD VENIPUNCTURE: CPT | Performed by: INTERNAL MEDICINE

## 2021-02-03 RX ORDER — POTASSIUM CHLORIDE 750 MG/1
30 CAPSULE, EXTENDED RELEASE ORAL ONCE
Status: COMPLETED | OUTPATIENT
Start: 2021-02-03 | End: 2021-02-03

## 2021-02-03 RX ORDER — PHYTONADIONE 10 MG/ML
10 INJECTION, EMULSION INTRAMUSCULAR; INTRAVENOUS; SUBCUTANEOUS ONCE
Status: DISCONTINUED | OUTPATIENT
Start: 2021-02-03 | End: 2021-02-03 | Stop reason: SDUPTHER

## 2021-02-03 RX ADMIN — PRAVASTATIN SODIUM 40 MG: 20 TABLET ORAL at 11:36

## 2021-02-03 RX ADMIN — Medication 1 TABLET: at 11:35

## 2021-02-03 RX ADMIN — DILTIAZEM HYDROCHLORIDE 360 MG: 240 CAPSULE, EXTENDED RELEASE ORAL at 11:32

## 2021-02-03 RX ADMIN — SODIUM CHLORIDE, PRESERVATIVE FREE 10 ML: 5 INJECTION INTRAVENOUS at 11:36

## 2021-02-03 RX ADMIN — DOCUSATE SODIUM 100 MG: 100 CAPSULE ORAL at 21:41

## 2021-02-03 RX ADMIN — DOCUSATE SODIUM 100 MG: 100 CAPSULE ORAL at 11:34

## 2021-02-03 RX ADMIN — FUROSEMIDE 20 MG: 40 TABLET ORAL at 11:34

## 2021-02-03 RX ADMIN — POTASSIUM CHLORIDE 30 MEQ: 750 CAPSULE, EXTENDED RELEASE ORAL at 14:43

## 2021-02-03 RX ADMIN — SODIUM CHLORIDE, PRESERVATIVE FREE 10 ML: 5 INJECTION INTRAVENOUS at 21:41

## 2021-02-03 RX ADMIN — PHYTONADIONE 10 MG: 10 INJECTION, EMULSION INTRAMUSCULAR; INTRAVENOUS; SUBCUTANEOUS at 14:44

## 2021-02-03 RX ADMIN — METOPROLOL SUCCINATE 50 MG: 50 TABLET, FILM COATED, EXTENDED RELEASE ORAL at 11:35

## 2021-02-03 NOTE — PROGRESS NOTES
I think patient would be a good candidate for a novel oral anticoagulant on discharge instead of continuing her Coumadin as she states she has been on this for a long time and it has caused a great deal of problems over the years having to deal with the constant changes in the dose and getting her blood levels checked.  She does have moderate to severe mitral regurgitation but I do not think this is the inciting factor for her atrial fibrillation.  Eliquis would likely be a good option.            [facility] -  Progress Note     LOS: 0 days   Patient Care Team:  Matthieu Bhakta DO as PCP - General (Internal Medicine)  Gualberto Gallo MD as Cardiologist (Cardiology)        Subjective     Interval History:     Patient seen and examined at bedside.  No new problems overnight reported.    Review of Systems:  Review of Systems   Constitution: Negative for diaphoresis, fever and malaise/fatigue.   HENT: Negative for congestion.    Eyes: Negative for vision loss in left eye and vision loss in right eye.   Cardiovascular: Negative for chest pain, claudication, dyspnea on exertion, irregular heartbeat, leg swelling, orthopnea, palpitations and syncope.   Respiratory: Negative for cough, shortness of breath and wheezing.    Hematologic/Lymphatic: Negative for adenopathy.   Skin: Negative for rash.   Musculoskeletal: Negative for joint pain and joint swelling.   Gastrointestinal: Negative for abdominal pain, diarrhea, nausea and vomiting.   Neurological: Negative for excessive daytime sleepiness, dizziness, focal weakness, light-headedness, numbness and weakness.   Psychiatric/Behavioral: Negative for depression. The patient does not have insomnia.        Vital Sign Min/Max for last 24 hours  Temp  Min: 97.8 °F (36.6 °C)  Max: 98.9 °F (37.2 °C)   BP  Min: 112/76  Max: 131/67   Pulse  Min: 52  Max: 94   Resp  Min: 16  Max: 18   SpO2  Min: 94 %  Max: 99 %   No data recorded   Weight  Min: 61.4 kg (135 lb  6.4 oz)  Max: 61.4 kg (135 lb 6.4 oz)         02/02/21 1932   Weight: 61.4 kg (135 lb 6.4 oz)       Physical Exam:   Vitals signs and nursing note reviewed.   Constitutional:       Appearance: Normal and healthy appearance. Well-developed and not in distress.   Eyes:      Extraocular Movements: Extraocular movements intact.      Pupils: Pupils are equal, round, and reactive to light.   HENT:      Head: Normocephalic and atraumatic.    Mouth/Throat:      Pharynx: Oropharynx is clear.   Neck:      Musculoskeletal: Normal range of motion and neck supple.      Vascular: JVD normal.      Trachea: Trachea normal.   Pulmonary:      Effort: Pulmonary effort is normal.      Breath sounds: Normal breath sounds. No wheezing. No rhonchi. No rales.   Cardiovascular:      PMI at left midclavicular line. Normal rate. Regular rhythm. Normal S1. Normal S2.      Murmurs: There is a grade 2/6 systolic murmur.      No gallop. No click. No rub.   Pulses:     Dorsalis pedis: 2+ bilaterally.     Posterior tibial: 2+ bilaterally.  Abdominal:      General: Bowel sounds are normal.      Palpations: Abdomen is soft.      Tenderness: There is no abdominal tenderness.   Musculoskeletal: Normal range of motion.   Skin:     General: Skin is warm and dry.      Capillary Refill: Capillary refill takes less than 2 seconds.   Feet:      Right foot:      Skin integrity: Skin integrity normal.      Left foot:      Skin integrity: Skin integrity normal.   Neurological:      Mental Status: Alert and oriented to person, place and time.      Cranial Nerves: Cranial nerves are intact.      Sensory: Sensation is intact.      Motor: Motor function is intact.      Coordination: Coordination is intact.   Psychiatric:         Speech: Speech normal.         Behavior: Behavior is cooperative.         Medication Review: yes  Current Facility-Administered Medications   Medication Dose Route Frequency Provider Last Rate Last Admin   • acetaminophen (TYLENOL) tablet  650 mg  650 mg Oral Q4H PRN Pamela Nicholas DO       • dilTIAZem CD (CARDIZEM CD) 24 hr capsule 360 mg  360 mg Oral Q24H Pamela Nicholas DO   360 mg at 02/03/21 1132   • docusate sodium (COLACE) capsule 100 mg  100 mg Oral BID Pamela Nicholas DO   100 mg at 02/03/21 1134   • furosemide (LASIX) tablet 20 mg  20 mg Oral Daily Pamela Nicholas DO   20 mg at 02/03/21 1134   • metoprolol succinate XL (TOPROL-XL) 24 hr tablet 50 mg  50 mg Oral Daily Yaya Cobian APRN   50 mg at 02/03/21 1135   • multivitamin with minerals 1 tablet  1 tablet Oral Daily Pamela Nicholas DO   1 tablet at 02/03/21 1135   • nitroglycerin (NITROSTAT) SL tablet 0.4 mg  0.4 mg Sublingual Q5 Min PRN Pamela Nicholas DO       • ondansetron (ZOFRAN) injection 4 mg  4 mg Intravenous Q6H PRN Pamela Nicholas DO       • potassium chloride (MICRO-K) CR capsule 30 mEq  30 mEq Oral Once Dulce Maria Vasquez APRN       • pravastatin (PRAVACHOL) tablet 40 mg  40 mg Oral Daily Pamela Nicholas DO   40 mg at 02/03/21 1136   • sodium chloride 0.9 % flush 10 mL  10 mL Intravenous PRN Laxmi Cool, APRN       • sodium chloride 0.9 % flush 10 mL  10 mL Intravenous Q12H Pamela Nicholas DO   10 mL at 02/03/21 1136   • sodium chloride 0.9 % flush 10 mL  10 mL Intravenous PRN Pamela Nicholas DO               Assessment/Plan       Precordial pain    Mixed hyperlipidemia    Coronary artery disease involving native coronary artery of native heart without angina pectoris    Longstanding persistent atrial fibrillation (CMS/HCC)    Chronic atrial fibrillation (CMS/HCC)    Essential hypertension    Anxiety about health    Coumadin toxicity      Plan:  Patient's echocardiogram performed showed moderate to severe aortic stenosis and moderate to severe mitral regurgitation with an ejection fraction mildly decreased at 40 to 45%.  Stress test findings consistent with intermediate risk study.  Plan is for diagnostic left heart catheterization  once patient's INR back to within a safe range to proceed.  INR decreased slightly from 3.56 yesterday to 3.43 today.    I am going to give her a dose of vitamin K 10 IV x 1 now and recheck INR tomorrow.  Tentatively plan for the diagnostic left heart catheterization tomorrow to assess integrity of her SVGs placed > 10 yrs ago.    Tristan Cabello, DO   Interventional cardiology  02/03/21  12:38 CST

## 2021-02-03 NOTE — PLAN OF CARE
Goal Outcome Evaluation:  Plan of Care Reviewed With: (P) patient, other (see comments)(MEDARDO Freeman)  Progress: (P) (Initial Swallow Evaluation)  Outcome Summary: (P) See note.    Clinical bedside swallow evaluation performed. Patient was alert and cooperative. Patient admitted for chest pain, high temperature, and shortness of breath. See H & P for full medical history. Per RN report, patient was coughing after thin liquid with night RN. Patient stated that she coughs after liquids and other consistencies but that has been the normal since she was a child.     Oral mech exam was performed. Patient was WFL. Patient given all consistencies except mech soft. Patient did cough after thin consistency 1x  but again stated that this was normal for her. Her vocal quality remained clear throughout. At times patient would state that she knew she was going to have trouble with the item before even placing it in her mouth, but then tolerated item given. Patient did show signs of possible esophageal problems and she stated that more acidic consistencies gave her reflux.     SLP recommendations:   1) Regular diet with thin liquids  2) Meds whole with thin liquids, as tolerated  3) Oral care and standard swallow precautions    ST will follow up to ensure diet tolerance. Option given to complete VFSS in radiology if concerns continue.     Completed by Lucia Wilson, SLP Student       Lucia Wilson, Speech Therapy Student

## 2021-02-03 NOTE — PLAN OF CARE
Goal Outcome Evaluation:  Plan of Care Reviewed With: patient  Progress: improving  Outcome Summary: VSS. No c/o pain. Pt resting well. Labs to be drawn this AM to see if INR is improving. Afib on tele, . Pt reports she is having some difficulty swallowing liquids and certain foods. Speech eval ordered. Safety maintained. Will continue to monitor.

## 2021-02-03 NOTE — THERAPY EVALUATION
Acute Care - Speech Language Pathology   Swallow Initial Evaluation [facility]     Patient Name: Sondra Connolly  : 1945  MRN: [id number]  Today's Date: 2/3/2021               Admit Date: 2021  Clinical bedside swallow evaluation performed. Patient was alert and cooperative. Patient admitted for chest pain, high temperature, and shortness of breath. See H & P for full medical history. Per RN report, patient was coughing after thin liquid with night RN. Patient stated that she coughs after liquids and other consistencies but that has been the normal since she was a child.     Oral mech exam was performed. Patient was WFL. Patient given all consistencies except mech soft. Patient did cough after thin consistency 1x  but again stated that this was normal for her. Her vocal quality remained clear throughout. At times patient would state that she knew she was going to have trouble with the item before even placing it in her mouth, but then tolerated item given. Patient did show signs of possible esophageal problems and she stated that more acidic consistencies gave her reflux.     SLP recommendations:   1) Regular diet with thin liquids  2) Meds whole with thin liquids, as tolerated  3) Oral care and standard swallow precautions    ST will follow up to ensure diet tolerance. Option given to complete VFSS in radiology if concerns continue.     Completed by Lucia Wilson, SLP Student       Visit Dx:     ICD-10-CM ICD-9-CM   1. Longstanding persistent atrial fibrillation (CMS/HCC)  I48.11 427.31   2. Dyspnea, unspecified type  R06.00 786.09   3. Fever of unknown origin  R50.9 780.60   4. Chest pain, unspecified type  R07.9 786.50   5. Dysphagia, unspecified type  R13.10 787.20     Patient Active Problem List   Diagnosis   • Mixed hyperlipidemia   • Coronary artery disease involving native coronary artery of native heart without angina pectoris   • Longstanding persistent atrial fibrillation (CMS/HCC)   •  Chronic atrial fibrillation (CMS/HCC)   • Precordial pain   • Essential hypertension   • Anxiety about health   • Coumadin toxicity     Past Medical History:   Diagnosis Date   • A-fib (CMS/HCC)    • Abnormal nuclear stress test 8/23/2019   • Angina pectoris (CMS/HCC)    • Anxiety    • Arthritis    • Atherosclerosis of coronary artery bypass graft    • CAD (coronary artery disease)    • Carotid artery occlusion without infarction    • Chronic atrial fibrillation (CMS/HCC) 2/1/2021   • Chronic diastolic heart failure (CMS/HCC)    • Chronic kidney disease    • Chronic lung disease    • Colon polyp    • Diabetes mellitus (CMS/HCC)    • DJD (degenerative joint disease)    • Essential hypertension 3/24/2017   • Fibrocystic breast disease    • Gastritis    • GI bleed    • Hemorrhoids    • Hyperlipidemia    • Hypertension    • Lung disease     chronic   • MI (myocardial infarction) (CMS/HCC)    • Palpitations    • PUD (peptic ulcer disease)    • PVD (peripheral vascular disease) (CMS/HCC)    • Renal failure, acute (CMS/HCC)    • 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     Past Surgical History:   Procedure Laterality Date   • APPENDECTOMY     • CARDIAC CATHETERIZATION  05/01/2009    Left-Heart Skin   • CAROTID ENDARTERECTOMY     • CATARACT EXTRACTION     • CATARACT EXTRACTION     • COLONOSCOPY     • CORONARY ARTERY BYPASS GRAFT  07/2007    Four   • ENDOSCOPY  10/02/2013   • ENDOSCOPY  10/02/2013   • HYSTERECTOMY     • SKIN CANCER EXCISION     • THROMBOENDARTERECTOMY     • TONSILLECTOMY          SWALLOW EVALUATION (last 72 hours)      SLP Adult Swallow Evaluation     Row Name 02/03/21 0855                   Rehab Evaluation    Document Type  evaluation  -MM (r) SS (t) MM (c)        Subjective Information  no complaints  -MM (r) SS (t) MM (c)        Patient Observations  alert;cooperative;agree to therapy  -MM (r) SS (t) MM (c)        Patient/Family/Caregiver Comments/Observations  No family present  -MM (r) SS (t) MM (c)        Care Plan  Review  evaluation/treatment results reviewed  -MM (r) SS (t) MM (c)        Patient Effort  good  -MM (r) SS (t) MM (c)        Symptoms Noted During/After Treatment  none  -MM (r) SS (t) MM (c)           General Information    Patient Profile Reviewed  yes  -MM (r) SS (t) MM (c)        Pertinent History Of Current Problem  PP: chest pain, high temp, shortness of breath; MH: afib, angina pectoris, atherosclerosis of coronary artery bypass graft, CAD, carotid artery occlusion w/o infacrtion, DJD, gastritis, GI bleed, hypertension, MI, PVD, CABG  -MM (r) SS (t) MM (c)        Current Method of Nutrition  regular textures;thin liquids  -MM (r) SS (t) MM (c)        Precautions/Limitations, Vision  WFL;for purposes of eval  -MM (r) SS (t) MM (c)        Precautions/Limitations, Hearing  WFL;for purposes of eval  -MM (r) SS (t) MM (c)        Prior Level of Function-Communication  WFL  -MM (r) SS (t) MM (c)        Prior Level of Function-Swallowing  no diet consistency restrictions  -MM (r) SS (t) MM (c)        Plans/Goals Discussed with  patient;other (see comments) MEDARDO Linton  -MM (r) SS (t) MM (c)        Barriers to Rehab  none identified  -MM (r) SS (t) MM (c)        Patient's Goals for Discharge  return home;return to all previous roles/activities  -MM (r) SS (t) MM (c)           Pain    Additional Documentation  Pain Scale: FACES Pre/Post-Treatment (Group)  -MM (r) SS (t) MM (c)           Pain Scale: FACES Pre/Post-Treatment    Pain: FACES Scale, Pretreatment  0-->no hurt  -MM (r) SS (t) MM (c)        Posttreatment Pain Rating  0-->no hurt  -MM (r) SS (t) MM (c)           Oral Motor Structure and Function    Oral Lesions or Structural Abnormalities and/or variants  None  -MM (r) SS (t) MM (c)        Dentition Assessment  upper dentures/partial in place;lower dentures/partial in place  -MM (r) SS (t) MM (c)        Secretion Management  WNL/WFL  -MM (r) SS (t) MM (c)        Mucosal Quality  dry  -MM (r) SS (t) MM (c)         Volitional Swallow  WFL  -MM (r) SS (t) MM (c)           Oral Musculature and Cranial Nerve Assessment    Oral Motor General Assessment  WFL  -MM (r) SS (t) MM (c)           General Eating/Swallowing Observations    Respiratory Support Currently in Use  room air  -MM (r) SS (t) MM (c)        Eating/Swallowing Skills  self-fed  -MM (r) SS (t) MM (c)        Positioning During Eating  upright in bed  -MM (r) SS (t) MM (c)        Utensils Used  spoon;straw  -MM (r) SS (t) MM (c)        Consistencies Trialed  pureed;honey-thick liquids;nectar/syrup-thick liquids;thin liquids;regular textures  -MM (r) SS (t) MM (c)           Respiratory    Respiratory Status  WFL  -MM (r) SS (t) MM (c)           Clinical Swallow Eval    Oral Prep Phase  WFL  -MM (r) SS (t) MM (c)        Oral Transit  WFL  -MM (r) SS (t) MM (c)        Oral Residue  WFL  -MM (r) SS (t) MM (c)        Pharyngeal Phase  no overt signs/symptoms of pharyngeal impairment  -MM (r) SS (t) MM (c)        Esophageal Phase  unremarkable  -MM (r) SS (t) MM (c)        Clinical Swallow Evaluation Summary  See note.  -MM (r) SS (t) MM (c)           Clinical Impression    Barriers to Overall Progress (SLP)  None  -MM (r) SS (t) MM (c)        SLP Swallowing Diagnosis  R/O pharyngeal dysphagia  -MM        Functional Impact  risk of aspiration/pneumonia  -MM        Swallow Criteria for Skilled Therapeutic Interventions Met  demonstrates skilled criteria  -MM (r) SS (t) MM (c)        Plan for Continued Treatment (SLP)  Follow for diet tolerance  -MM (r) SS (t) MM (c)           Recommendations    Therapy Frequency (Swallow)  at least;PRN  -MM (r) SS (t) MM (c)        Predicted Duration Therapy Intervention (Days)  until discharge  -MM (r) SS (t) MM (c)        SLP Diet Recommendation  regular textures;thin liquids  -MM (r) SS (t) MM (c)        Recommended Precautions and Strategies  upright posture during/after eating;small bites of food and sips of liquid;general aspiration  precautions  -MM (r) SS (t) MM (c)        Oral Care Recommendations  Oral Care BID/PRN;Toothbrush  -MM (r) SS (t) MM (c)        SLP Rec. for Method of Medication Administration  meds whole;with thin liquids;as tolerated  -MM (r) SS (t) MM (c)        Monitor for Signs of Aspiration  yes;notify SLP if any concerns  -MM (r) SS (t) MM (c)        Anticipated Discharge Disposition (SLP)  home with assist  -MM (r) SS (t) MM (c)           Swallow Goals (SLP)    Oral Nutrition/Hydration Goal Selection (SLP)  oral nutrition/hydration, SLP goal 1  -MM (r) SS (t) MM (c)           Oral Nutrition/Hydration Goal 1 (SLP)    Oral Nutrition/Hydration Goal 1, SLP  Pt will tolerate LRD with no overt s/s of aspiration.  -MM (r) SS (t) MM (c)        Time Frame (Oral Nutrition/Hydration Goal 1, SLP)  by discharge  -MM (r) SS (t) MM (c)        Barriers (Oral Nutrition/Hydration Goal 1, SLP)  None  -MM (r) SS (t) MM (c)        Progress/Outcomes (Oral Nutrition/Hydration Goal 1, SLP)  goal ongoing  -MM (r) SS (t) MM (c)          User Key  (r) = Recorded By, (t) = Taken By, (c) = Cosigned By    Initials Name Effective Dates    Jenny Griffin, MS CCC-SLP 07/12/20 -     Lucia Rodas, Speech Therapy Student 12/11/20 -           EDUCATION  The patient has been educated in the following areas:   Dysphagia (Swallowing Impairment) Oral Care/Hydration.    SLP Recommendation and Plan  SLP Swallowing Diagnosis: R/O pharyngeal dysphagia                                                               SLP GOALS     Row Name 02/03/21 0855             Oral Nutrition/Hydration Goal 1 (SLP)    Oral Nutrition/Hydration Goal 1, SLP  Pt will tolerate LRD with no overt s/s of aspiration.  -MM (r) SS (t) MM (c)      Time Frame (Oral Nutrition/Hydration Goal 1, SLP)  by discharge  -MM (r) SS (t) MM (c)      Barriers (Oral Nutrition/Hydration Goal 1, SLP)  None  -MM (r) SS (t) MM (c)      Progress/Outcomes (Oral Nutrition/Hydration Goal 1, SLP)  goal  ongoing  -MM (r) SS (t) MM (c)        User Key  (r) = Recorded By, (t) = Taken By, (c) = Cosigned By    Initials Name Provider Type    Jenny Griffin MS CCC-SLP Speech and Language Pathologist    [facility], Speech Therapy Student Speech Therapy Student             Time Calculation:   Time Calculation- SLP     Row Name 02/03/21 0944             Time Calculation- SLP    SLP Start Time  0742  -MM (r) SS (t) MM (c)      SLP Stop Time  0837  -MM (r) SS (t) MM (c)      SLP Time Calculation (min)  55 min  -MM (r) SS (t)      SLP Received On  02/03/21  -MM (r) SS (t) MM (c)      SLP Goal Re-Cert Due Date  02/13/21  -MM (r) SS (t) MM (c)        User Key  (r) = Recorded By, (t) = Taken By, (c) = Cosigned By    Initials Name Provider Type    Jenny Griffin MS CCC-SLP Speech and Language Pathologist    [facility], Speech Therapy Student Speech Therapy Student          Therapy Charges for Today     Code Description Service Date Service Provider Modifiers Qty    [id number] HC ST EVAL ORAL PHARYNG SWALLOW 4 2/3/2021 Jenny Cuba, MS CCC-SLP GN 1               Jenny Cuba MS CCC-TOSHIA  2/3/2021

## 2021-02-03 NOTE — PLAN OF CARE
Problem: Adult Inpatient Plan of Care  Goal: Plan of Care Review  2/2/2021 1804 by Haylie Yin RN  Outcome: Ongoing, Progressing  Flowsheets  Taken 2/2/2021 1804 by Haylie Yin RN  Progress: improving  Outcome Summary: VSS. No c/o pain. Patient needs a heart cath, INR 3.56, continue to hold coumadin, labs ordered for AM. Safety maintained.  Taken 2/2/2021 0553 by Verónica Rosado RN  Plan of Care Reviewed With: patient  2/2/2021 1803 by Haylie Yin RN  Outcome: Ongoing, Progressing  Goal: Patient-Specific Goal (Individualized)  2/2/2021 1804 by Haylie Yin RN  Outcome: Ongoing, Progressing  2/2/2021 1803 by Haylie Yin RN  Outcome: Ongoing, Progressing  Goal: Absence of Hospital-Acquired Illness or Injury  2/2/2021 1804 by Haylie Yin RN  Outcome: Ongoing, Progressing  2/2/2021 1803 by Haylie Yin RN  Outcome: Ongoing, Progressing  Intervention: Identify and Manage Fall Risk  Recent Flowsheet Documentation  Taken 2/2/2021 1750 by Haylie Yin RN  Safety Promotion/Fall Prevention: safety round/check completed  Taken 2/2/2021 1600 by Haylie Yin RN  Safety Promotion/Fall Prevention: safety round/check completed  Taken 2/2/2021 1357 by Haylie Yin RN  Safety Promotion/Fall Prevention: safety round/check completed  Taken 2/2/2021 1200 by Haylie Yin RN  Safety Promotion/Fall Prevention: safety round/check completed  Taken 2/2/2021 1000 by Haylie Yin RN  Safety Promotion/Fall Prevention: safety round/check completed  Taken 2/2/2021 0800 by Haylie Yin RN  Safety Promotion/Fall Prevention: safety round/check completed  Intervention: Prevent Skin Injury  Recent Flowsheet Documentation  Taken 2/2/2021 1750 by Haylie Yin RN  Body Position:   position changed independently   dangle, side of bed  Taken 2/2/2021 1600 by Haylie Yin RN  Body Position:   position changed independently   sitting up in bed  Taken 2/2/20212/2021 1357 by Haylie Yin RN  Body  Position:   position changed independently   sitting up in bed  Taken 2/2/2021 1200 by Haylie Yin RN  Body Position:   position changed independently   sitting up in bed  Taken 2/2/2021 1000 by Haylie Yin RN  Body Position:   position changed independently   sitting up in bed  Taken 2/2/2021 0800 by Haylie Yin RN  Body Position:   position changed independently   dangle, side of bed  Goal: Optimal Comfort and Wellbeing  2/2/2021 1804 by Haylie Yin RN  Outcome: Ongoing, Progressing  2/2/2021 1803 by Haylie Yin RN  Outcome: Ongoing, Progressing  Intervention: Provide Person-Centered Care  Recent Flowsheet Documentation  Taken 2/2/2021 0800 by Haylie Yin RN  Trust Relationship/Rapport: care explained  Goal: Readiness for Transition of Care  2/2/2021 1804 by Haylie Yin RN  Outcome: Ongoing, Progressing  2/2/2021 1803 by Haylie Yin RN  Outcome: Ongoing, Progressing     Problem: Chest Pain  Goal: Resolution of Chest Pain Symptoms  2/2/2021 1804 by Haylie Yin RN  Outcome: Ongoing, Progressing  2/2/2021 1803 by Haylie Yin RN  Outcome: Ongoing, Progressing     Problem: Fall Injury Risk  Goal: Absence of Fall and Fall-Related Injury  Outcome: Ongoing, Progressing  Intervention: Promote Injury-Free Environment  Recent Flowsheet Documentation  Taken 2/2/2021 1750 by Haylie Yin RN  Safety Promotion/Fall Prevention: safety round/check completed  Taken 2/2/2021 1600 by Haylie Yin RN  Safety Promotion/Fall Prevention: safety round/check completed  Taken 2/2/2021 1357 by Haylie Yin RN  Safety Promotion/Fall Prevention: safety round/check completed  Taken 2/2/2021 1200 by Haylie Yin RN  Safety Promotion/Fall Prevention: safety round/check completed  Taken 2/2/2021 1000 by Haylie Yin RN  Safety Promotion/Fall Prevention: safety round/check completed  Taken 2/2/2021 0800 by Haylie Yin RN  Safety Promotion/Fall Prevention: safety round/check  completed   Goal Outcome Evaluation:     Progress: improving  Outcome Summary: VSS. No c/o pain. Patient needs a heart cath, INR 3.56, continue to hold coumadin, labs ordered for AM. Safety maintained.

## 2021-02-04 LAB
ACT BLD: 252 SECONDS (ref 82–152)
ACT BLD: 362 SECONDS (ref 82–152)
BH BB BLOOD EXPIRATION DATE: NORMAL
BH BB BLOOD TYPE BARCODE: 5100
BH BB DISPENSE STATUS: NORMAL
BH BB PRODUCT CODE: NORMAL
BH BB UNIT NUMBER: NORMAL
INR PPP: 1.48 (ref 0.91–1.09)
PROTHROMBIN TIME: 17.6 SECONDS (ref 11.9–14.6)
SARS-COV-2 RDRP RESP QL NAA+PROBE: NORMAL
UNIT  ABO: NORMAL
UNIT  RH: NORMAL

## 2021-02-04 PROCEDURE — 25010000002 FENTANYL CITRATE (PF) 100 MCG/2ML SOLUTION: Performed by: EMERGENCY MEDICINE

## 2021-02-04 PROCEDURE — 93459 L HRT ART/GRFT ANGIO: CPT | Performed by: EMERGENCY MEDICINE

## 2021-02-04 PROCEDURE — C1725 CATH, TRANSLUMIN NON-LASER: HCPCS | Performed by: EMERGENCY MEDICINE

## 2021-02-04 PROCEDURE — 25010000002 HEPARIN (PORCINE) PER 1000 UNITS: Performed by: EMERGENCY MEDICINE

## 2021-02-04 PROCEDURE — 25010000002 HEPARIN (PORCINE) 2000-0.9 UNIT/L-% SOLUTION: Performed by: EMERGENCY MEDICINE

## 2021-02-04 PROCEDURE — C1769 GUIDE WIRE: HCPCS | Performed by: EMERGENCY MEDICINE

## 2021-02-04 PROCEDURE — 99152 MOD SED SAME PHYS/QHP 5/>YRS: CPT | Performed by: EMERGENCY MEDICINE

## 2021-02-04 PROCEDURE — 25010000002 DIPHENHYDRAMINE PER 50 MG: Performed by: EMERGENCY MEDICINE

## 2021-02-04 PROCEDURE — C1894 INTRO/SHEATH, NON-LASER: HCPCS | Performed by: EMERGENCY MEDICINE

## 2021-02-04 PROCEDURE — 85610 PROTHROMBIN TIME: CPT | Performed by: INTERNAL MEDICINE

## 2021-02-04 PROCEDURE — 99153 MOD SED SAME PHYS/QHP EA: CPT | Performed by: EMERGENCY MEDICINE

## 2021-02-04 PROCEDURE — 25010000002 MIDAZOLAM HCL (PF) 5 MG/5ML SOLUTION: Performed by: EMERGENCY MEDICINE

## 2021-02-04 PROCEDURE — 85347 COAGULATION TIME ACTIVATED: CPT

## 2021-02-04 PROCEDURE — C1760 CLOSURE DEV, VASC: HCPCS | Performed by: EMERGENCY MEDICINE

## 2021-02-04 PROCEDURE — 87635 SARS-COV-2 COVID-19 AMP PRB: CPT | Performed by: EMERGENCY MEDICINE

## 2021-02-04 PROCEDURE — 92920 PRQ TRLUML C ANGIOP 1ART&/BR: CPT | Performed by: EMERGENCY MEDICINE

## 2021-02-04 PROCEDURE — C1887 CATHETER, GUIDING: HCPCS | Performed by: EMERGENCY MEDICINE

## 2021-02-04 PROCEDURE — 25010000002 HEPARIN (PORCINE) 1000-0.9 UT/500ML-% SOLUTION: Performed by: EMERGENCY MEDICINE

## 2021-02-04 PROCEDURE — 0 IOPAMIDOL PER 1 ML: Performed by: EMERGENCY MEDICINE

## 2021-02-04 RX ORDER — HEPARIN SODIUM 200 [USP'U]/100ML
INJECTION, SOLUTION INTRAVENOUS AS NEEDED
Status: DISCONTINUED | OUTPATIENT
Start: 2021-02-04 | End: 2021-02-04 | Stop reason: HOSPADM

## 2021-02-04 RX ORDER — FENTANYL CITRATE 50 UG/ML
INJECTION, SOLUTION INTRAMUSCULAR; INTRAVENOUS AS NEEDED
Status: DISCONTINUED | OUTPATIENT
Start: 2021-02-04 | End: 2021-02-04 | Stop reason: HOSPADM

## 2021-02-04 RX ORDER — MIDAZOLAM HYDROCHLORIDE 1 MG/ML
INJECTION, SOLUTION INTRAMUSCULAR; INTRAVENOUS AS NEEDED
Status: DISCONTINUED | OUTPATIENT
Start: 2021-02-04 | End: 2021-02-04 | Stop reason: HOSPADM

## 2021-02-04 RX ORDER — HEPARIN SODIUM 5000 [USP'U]/ML
INJECTION, SOLUTION INTRAVENOUS; SUBCUTANEOUS AS NEEDED
Status: DISCONTINUED | OUTPATIENT
Start: 2021-02-04 | End: 2021-02-04 | Stop reason: HOSPADM

## 2021-02-04 RX ORDER — DIPHENHYDRAMINE HYDROCHLORIDE 50 MG/ML
INJECTION INTRAMUSCULAR; INTRAVENOUS AS NEEDED
Status: DISCONTINUED | OUTPATIENT
Start: 2021-02-04 | End: 2021-02-04 | Stop reason: HOSPADM

## 2021-02-04 RX ADMIN — DOCUSATE SODIUM 100 MG: 100 CAPSULE ORAL at 20:57

## 2021-02-04 RX ADMIN — DILTIAZEM HYDROCHLORIDE 360 MG: 240 CAPSULE, EXTENDED RELEASE ORAL at 08:00

## 2021-02-04 RX ADMIN — PRAVASTATIN SODIUM 40 MG: 20 TABLET ORAL at 08:00

## 2021-02-04 RX ADMIN — SODIUM CHLORIDE, PRESERVATIVE FREE 10 ML: 5 INJECTION INTRAVENOUS at 20:57

## 2021-02-04 RX ADMIN — FUROSEMIDE 20 MG: 40 TABLET ORAL at 08:00

## 2021-02-04 RX ADMIN — SODIUM CHLORIDE, PRESERVATIVE FREE 10 ML: 5 INJECTION INTRAVENOUS at 08:00

## 2021-02-04 RX ADMIN — METOPROLOL SUCCINATE 50 MG: 50 TABLET, FILM COATED, EXTENDED RELEASE ORAL at 08:00

## 2021-02-04 RX ADMIN — Medication 1 TABLET: at 08:00

## 2021-02-04 RX ADMIN — DOCUSATE SODIUM 100 MG: 100 CAPSULE ORAL at 08:00

## 2021-02-04 NOTE — PLAN OF CARE
Goal Outcome Evaluation:  Plan of Care Reviewed With: patient  Progress: improving  Outcome Summary: Resting with HOB up, denies pain or nausea, ambulating to BR with steady gait, up in hallway X one. Family at bedside, no injury , skin intact . Two units of FFP infused and vit k given. NPO at midnoc schedule for poss heart cath in the am. Speech eval for c/o difficukty swallowing and no deficiet found. Tele reads Afib with controlled rate. Will cont to monitor.

## 2021-02-04 NOTE — PLAN OF CARE
Goal Outcome Evaluation:  Plan of Care Reviewed With: patient  Progress: improving  Outcome Summary: Pt went for heart cath today, R uriel CDI/soft. Stent unsuccessful per [facility] RN. Pt to go back for another heart cath tomorrow. AF on tele, rate controlled. VSS. Safety maintained.

## 2021-02-04 NOTE — PLAN OF CARE
Problem: Adult Inpatient Plan of Care  Goal: Plan of Care Review  Outcome: Ongoing, Progressing  Flowsheets (Taken 2/4/2021 1405)  Plan of Care Reviewed With: (RN)   family   other (see comments)  Outcome Summary:  SLP attempted to visit this AM, yet off floor for cardiac cath. Revisited in PM, yet pt required to lay flat for 45 min as precaution following cath. Therefore, pt not currently appropriate for PO trials to follow up with pt c/o dysphagia though she was felt to be safely tolerating a regular solid diet consistency with regular/thin liquid consistency following initial eval on 02/03/2021. Will follow up on 02/05/21 as able to r/o new concerns  ;however, when ok to resume a PO diet per MD, SLP feels pt will be safe to resume a regular solid diet consistency diet with thin liquids, with meds provided whole with thin liquids. Thanks!

## 2021-02-04 NOTE — PROGRESS NOTES
Continued Stay Note   [facility]     Patient Name: Sondra Connolly  MRN: [id number]  Today's Date: 2/4/2021    Admit Date: 1/31/2021    Discharge Plan     Row Name 02/04/21 1634       Plan    Plan  HOME    Patient/Family in Agreement with Plan  yes    Plan Comments  REVIEWED CHART; EVENTS NOTED.  WILL CONT. TO FOLLOW FOR ANY D/C PLANNING NEEDS THAT MAY ARISE.        Discharge Codes    No documentation.             MARIA ELENA Barclay

## 2021-02-04 NOTE — PLAN OF CARE
Goal Outcome Evaluation:  Plan of Care Reviewed With: patient  Progress: improving  Outcome Summary: VSS. No c/o pain. Heart cath this AM if INR improved. NPO since midnight. Afib on tele. Safety maintained. Will continue to monitor.

## 2021-02-04 NOTE — OP NOTE
[facility]  Date of procedure: February 4, 2021     Procedures performed:     1. Access to the right femoral artery via modified Seldinger technique and ultrasound guidance  2. Left heart catheterization with retrograde crossing of the aortic valve into the left ventricle  3. Selective bilateral coronary angiography  4. SVG angiography x3  5. LIMA angiography  6. Multiple attempts at PTCA on the proximal and mid right coronary artery with different balloons with minimal success, please see below for details  7. Selective right iliofemoral angiogram for possible device closure  8. Patent hemostasis achieved in the right femoral artery via Angio-Seal closure device to the right one sheath was pulled  9. Conscious sedation with continuous hemodynamic monitoring for 1 hour 36 minutes        Risk, Benefits, and Alternatives discussed with the patient and/or family.  Plan is for moderate sedation, and the patient agrees to proceed with the procedure.  An immediate assessment was done prior to the administration of moderate sedation     Indication: Abnormal stress test with typical angina, coronary artery disease status post CABG in the past   premedication: Versed, Fentanyl  Contrast: Isovue 300 cc   radiation: Flouro time= 29 minutes. Air Kerma= 1527 mGy  Catheters: 6Fr JL4, 6Fr JR4, 6Fr angled pigtail, 3 DRC, IM, guide Chris,  PCI Hardware: BMW wire    Procedural details:  The patient was brought to the cath lab and prepped and draped in the usual fashion. Access was obtained in the right femoral artery via modified Seldinger technique and ultrasound guidance. A JL4 catheter was inserted and advanced and used to engage the left main coronary artery. Selective left coronary angiography was performed in multiple views. This catheter was then exchanged for a JR4 catheter which was used to engage the right coronary artery. Selective right coronary angiography was performed in multiple views. Next,  the JR4 catheter was used to engage the saphenous vein grafts x3. Selective SVG x3 was performed in multiple views. This catheter was then exchanged for an IM catheter which was used to engage the LIMA graft. Selective LIMA angiography was performed in multiple views. This catheter was then exchanged for a pigtail catheter which was used to cross the aortic valve to the left ventricle where pressures were recorded. Cath was withdrawn back and a gram pressures were recorded in the ascending aorta. Catheter was then exchanged for a 3 DRC catheter which was used to engage the right coronary artery. A BMW wire was inserted and advanced into the distal right coronary artery. A trek Rx 2.5 x 30 mm balloon was inserted and advanced into the mid RCA where was inflated to 14 mk for 45 seconds. It was exchanged for a emerge MR 2 x 15 mm balloon which was inserted and advanced into the mid RCA where it was inflated to 12 mk for 30 seconds. Next, a mini Rx 1.5 x 15 mm balloon was inserted and advanced to the mid RCA with it was inflated to 17 mk for 30 seconds. It was repositioned and reinflated to 10 mk for 30 seconds. It was repositioned 5 more times and reinflated anywhere from 12 mk to 18 mk. Next, this balloon was removed over the wire and a Guidezilla catheter was inserted and advanced to the proximal RCA for extra-support. We then inserted an emergent MR 2 x 15 mm balloon into the mid RCA was inflated to 14 mk for 30 seconds it was repositioned and reinflated to 14 mk for 30 seconds. Post intervention angiography was performed and we had only minimal improvement in flow distally. Everything was removed with the sheath and the sheath was flushed. Selective right iliofemoral angiography was performed for possible device closure. Hemostasis was achieved in the right femoral artery access site using a 6 [place]ian Angio-Seal closure device. The patient tolerated the procedure well without any complications. She left the  Cath Lab in stable condition.    I supervised the administration of conscious sedation by nursing staff throughout the case.  First dose was given at 1056 and the end of my face-to-face encounter was at 1232, accounting for a total of 1 hour and 36 minutes of supervision.  During the case, continuous pulse oximetry, heart rate, blood pressure, and patient status were monitored.     Findings:    Hemodynamics:    Aorta: 139/62 mmHg  /1 mmHg  LVEDP: 10 mmHg    Left ventriculography:  Not performed due to dye load for intervention    Selective coronary angiography:     Left Main coronary artery: Left main is a large-caliber vessel that bifurcates into the LAD and left circumflex arteries, mild luminal irregularities distally, JOSE-3 flow  LAD: The LAD is a large-caliber vessel that is diffusely diseased, the proximal segment has a 40 to 50% stenotic lesion, there is a subtotal occlusion in the mid segment after a dominance septal , the distal segment has JOSE-3 flow from the LIMA graft  Diagonals: First diagonal is small caliber and diffusely diseased, the second diagonal is moderate caliber with a subtotal occlusion in its proximal segment  Left circumflex: Left circumflex appears to be a moderate caliber vessel that is 100% stenotic shortly after its origin from the left main  Obtuse marginals: An OM branch fills via collateralization is from septal perforators filled retrograde from LIMA graft to LAD  RCA: RCA is a large caliber vessel that is severely, diffusely diseased, there is a 20 to 30% stenosis in the proximal segment, there is a subtotal, 99% occlusion in the mid segment, there is a 70 to 80% stenosis also in the mid segment, the distal segment is diffusely diseased, JOSE II flow. 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 Severe native coronary artery disease as described above as well as SVG to obtuse marginal system totally occluded at the origin     Plan:   1. We will bring the patient back tomorrow and perform Rotablator rotational atherectomy on the right coronary artery with Dr. Jose Alberto Cabello, DO   Interventional cardiology  February 4, 2021

## 2021-02-04 NOTE — PLAN OF CARE
Full procedure note to follow.    Severe native vessel coronary artery disease including the LAD, left circumflex and right coronary artery. LIMA to LAD widely patent. SVG to diagonal widely patent. SVG to RCA occluded at the origin. SVG to PDA diffusely diseased.    We attempted intervention to the mid RCA but ultimately were unable to stent the significant lesions.    Dr. Abrams and I will take the patient back to the Cath Lab tomorrow afternoon to undergo Rotablator to the right coronary artery significant calcified lesions. We may also possibly intervene on the saphenous vein graft to the PDA.    N.p.o. after breakfast.

## 2021-02-04 NOTE — PROGRESS NOTES
[facility] Medicine Services  INPATIENT PROGRESS NOTE    Patient Name: Sondra Connolly  Date of Admission: 1/31/2021  Today's Date: 02/04/21  Length of Stay: 1  Primary Care Physician: Matthieu Bhakta DO    Subjective   Chief Complaint: Chest discomfort on day of admission  HPI   Patient seen and examined at bedside.  INR 1.48 today.  She has just returned from heart cath.  Discussed with her nurse Jazz.  Heart cath was unsuccessful.  Planning to proceed with another heart cath tomorrow.  Patient otherwise denies any acute complaints.  She is asking if she can eat and would like to use the bed pan.    Review of Systems   All pertinent negatives and positives are as above. All other systems have been reviewed and are negative unless otherwise stated.     Objective    Temp:  [97.8 °F (36.6 °C)-99 °F (37.2 °C)] 98.3 °F (36.8 °C)  Heart Rate:  [] 63  Resp:  [16-18] 18  BP: (107-128)/(60-93) 107/93  Physical Exam  Constitutional:       Appearance: She is well-developed.      Comments: Lying in bed.  No acute distress.  Tolerating room air.  Daughter at bedside.  HENT:      Head: Normocephalic and atraumatic.   Eyes:      General: No scleral icterus.     Conjunctiva/sclera: Conjunctivae normal.      Pupils: Pupils are equal, round, and reactive to light.   Neck:      Musculoskeletal: Neck supple.      Vascular: No JVD.   Cardiovascular:      Rate and Rhythm: Normal rate. Rhythm irregular.      Heart sounds: Normal heart sounds.      Comments: Atrial fibrillation   Pulmonary:      Effort: Pulmonary effort is normal.      Breath sounds: Normal breath sounds. No wheezing or rales.   Abdominal:      General: Bowel sounds are normal. There is no distension.      Palpations: Abdomen is soft. There is no mass.      Tenderness: There is no abdominal tenderness. There is no guarding or rebound.   Musculoskeletal: Normal range of motion.   Lymphadenopathy:      Cervical: No cervical  adenopathy.   Skin:     General: Skin is warm and dry.      Comments: Right groin site clean/dry/intact.  Neurological:      Mental Status: She is alert and oriented to person, place, and time.      Cranial Nerves: No cranial nerve deficit.   Psychiatric:         Behavior: Behavior normal.     Results Review:  I have reviewed the labs, radiology results, and diagnostic studies.    Laboratory Data:   Results from last 7 days   Lab Units 02/01/21  0036 01/31/21  1716   WBC 10*3/mm3 13.25* 11.21*   HEMOGLOBIN g/dL 14.6 15.0   HEMATOCRIT % 43.9 43.9   PLATELETS 10*3/mm3 264 252        Results from last 7 days   Lab Units 02/03/21  1122 02/01/21  0036 01/31/21  1716   SODIUM mmol/L 140 139 136   POTASSIUM mmol/L 3.7 4.1 4.1   CHLORIDE mmol/L 102 101 100   CO2 mmol/L 29.0 27.0 27.0   BUN mg/dL 16 20 22   CREATININE mg/dL 0.87 0.81 0.85   CALCIUM mg/dL 9.5 9.6 9.7   BILIRUBIN mg/dL  --  0.4 0.3   ALK PHOS U/L  --  70 80   ALT (SGPT) U/L  --  15 16   AST (SGOT) U/L  --  17 18   GLUCOSE mg/dL 111* 141* 161*     I have reviewed the patient's current medications.     Assessment/Plan     Active Hospital Problems    Diagnosis   • **Precordial pain   • Coumadin toxicity   • Chronic atrial fibrillation (CMS/HCC)   • Anxiety about health   • Longstanding persistent atrial fibrillation (CMS/HCC)   • Coronary artery disease involving native coronary artery of native heart without angina pectoris   • Mixed hyperlipidemia   • Essential hypertension     Plan:  1.  INR today is 1.48 s/p two units FFP.  Heart cath was unsuccessful today.  Planning to proceed with another heart cath tomorrow as per nurseJazz.  2.  Permanent atrial fibrillation.  Toprol-XL was increased on 2/2.  Continue Cardizem CD.  3.  Hyperlipidemia -continue pravastatin.  4.  Sequential compression devices for DVT prophylaxis.  Encourage ambulation after bedrest is up.  5.  Daily PT/INR.     Discharge Planning: Cardiac catheterization today.    Electronically signed  by Dulce Maria Vasquez, APRN, 02/04/21, 08:29 CST.

## 2021-02-05 LAB
ANION GAP SERPL CALCULATED.3IONS-SCNC: 8 MMOL/L (ref 5–15)
BACTERIA SPEC AEROBE CULT: NORMAL
BACTERIA SPEC AEROBE CULT: NORMAL
BUN SERPL-MCNC: 16 MG/DL (ref 8–23)
BUN/CREAT SERPL: 18.8 (ref 7–25)
CALCIUM SPEC-SCNC: 8.8 MG/DL (ref 8.6–10.5)
CHLORIDE SERPL-SCNC: 99 MMOL/L (ref 98–107)
CO2 SERPL-SCNC: 28 MMOL/L (ref 22–29)
CREAT SERPL-MCNC: 0.85 MG/DL (ref 0.57–1)
GFR SERPL CREATININE-BSD FRML MDRD: 65 ML/MIN/1.73
GLUCOSE SERPL-MCNC: 99 MG/DL (ref 65–99)
INR PPP: 1.48 (ref 0.91–1.09)
POTASSIUM SERPL-SCNC: 3.6 MMOL/L (ref 3.5–5.2)
PROTHROMBIN TIME: 17.6 SECONDS (ref 11.9–14.6)
SODIUM SERPL-SCNC: 135 MMOL/L (ref 136–145)

## 2021-02-05 PROCEDURE — 4A023N7 MEASUREMENT OF CARDIAC SAMPLING AND PRESSURE, LEFT HEART, PERCUTANEOUS APPROACH: ICD-10-PCS | Performed by: INTERNAL MEDICINE

## 2021-02-05 PROCEDURE — 99152 MOD SED SAME PHYS/QHP 5/>YRS: CPT | Performed by: EMERGENCY MEDICINE

## 2021-02-05 PROCEDURE — 25010000002 DIPHENHYDRAMINE PER 50 MG: Performed by: EMERGENCY MEDICINE

## 2021-02-05 PROCEDURE — C1725 CATH, TRANSLUMIN NON-LASER: HCPCS | Performed by: EMERGENCY MEDICINE

## 2021-02-05 PROCEDURE — C1887 CATHETER, GUIDING: HCPCS | Performed by: EMERGENCY MEDICINE

## 2021-02-05 PROCEDURE — 25010000002 HEPARIN (PORCINE) PER 1000 UNITS: Performed by: EMERGENCY MEDICINE

## 2021-02-05 PROCEDURE — 99153 MOD SED SAME PHYS/QHP EA: CPT | Performed by: EMERGENCY MEDICINE

## 2021-02-05 PROCEDURE — C9602 PERC D-E COR STENT ATHER S: HCPCS | Performed by: EMERGENCY MEDICINE

## 2021-02-05 PROCEDURE — C1894 INTRO/SHEATH, NON-LASER: HCPCS | Performed by: EMERGENCY MEDICINE

## 2021-02-05 PROCEDURE — 80048 BASIC METABOLIC PNL TOTAL CA: CPT | Performed by: NURSE PRACTITIONER

## 2021-02-05 PROCEDURE — B2131ZZ FLUOROSCOPY OF MULTIPLE CORONARY ARTERY BYPASS GRAFTS USING LOW OSMOLAR CONTRAST: ICD-10-PCS | Performed by: INTERNAL MEDICINE

## 2021-02-05 PROCEDURE — C1769 GUIDE WIRE: HCPCS | Performed by: EMERGENCY MEDICINE

## 2021-02-05 PROCEDURE — 25010000002 AMINOPHYLLINE PER 250 MG: Performed by: INTERNAL MEDICINE

## 2021-02-05 PROCEDURE — 25010000002 HEPARIN (PORCINE): Performed by: EMERGENCY MEDICINE

## 2021-02-05 PROCEDURE — 92933 PRQ TRLML C ATHRC ST ANGIOP1: CPT | Performed by: EMERGENCY MEDICINE

## 2021-02-05 PROCEDURE — 02C03ZZ EXTIRPATION OF MATTER FROM CORONARY ARTERY, ONE ARTERY, PERCUTANEOUS APPROACH: ICD-10-PCS | Performed by: EMERGENCY MEDICINE

## 2021-02-05 PROCEDURE — 25010000002 FENTANYL CITRATE (PF) 100 MCG/2ML SOLUTION: Performed by: EMERGENCY MEDICINE

## 2021-02-05 PROCEDURE — 02703ZZ DILATION OF CORONARY ARTERY, ONE ARTERY, PERCUTANEOUS APPROACH: ICD-10-PCS | Performed by: EMERGENCY MEDICINE

## 2021-02-05 PROCEDURE — B2111ZZ FLUOROSCOPY OF MULTIPLE CORONARY ARTERIES USING LOW OSMOLAR CONTRAST: ICD-10-PCS | Performed by: INTERNAL MEDICINE

## 2021-02-05 PROCEDURE — C1724 CATH, TRANS ATHEREC,ROTATION: HCPCS | Performed by: EMERGENCY MEDICINE

## 2021-02-05 PROCEDURE — 36415 COLL VENOUS BLD VENIPUNCTURE: CPT | Performed by: EMERGENCY MEDICINE

## 2021-02-05 PROCEDURE — 25010000002 HEPARIN (PORCINE) 500 ML FLEX CONT: Performed by: EMERGENCY MEDICINE

## 2021-02-05 PROCEDURE — 85347 COAGULATION TIME ACTIVATED: CPT

## 2021-02-05 PROCEDURE — 85610 PROTHROMBIN TIME: CPT | Performed by: EMERGENCY MEDICINE

## 2021-02-05 PROCEDURE — 0 IOPAMIDOL PER 1 ML: Performed by: EMERGENCY MEDICINE

## 2021-02-05 PROCEDURE — C1874 STENT, COATED/COV W/DEL SYS: HCPCS | Performed by: EMERGENCY MEDICINE

## 2021-02-05 PROCEDURE — 25010000002 HEPARIN (PORCINE) 1000-0.9 UT/500ML-% SOLUTION: Performed by: EMERGENCY MEDICINE

## 2021-02-05 PROCEDURE — 25010000002 MIDAZOLAM HCL (PF) 5 MG/5ML SOLUTION: Performed by: EMERGENCY MEDICINE

## 2021-02-05 PROCEDURE — B2181ZZ FLUOROSCOPY OF LEFT INTERNAL MAMMARY BYPASS GRAFT USING LOW OSMOLAR CONTRAST: ICD-10-PCS | Performed by: INTERNAL MEDICINE

## 2021-02-05 PROCEDURE — 25010000002 HEPARIN (PORCINE) 2000-0.9 UNIT/L-% SOLUTION: Performed by: EMERGENCY MEDICINE

## 2021-02-05 DEVICE — XIENCE SIERRA™ EVEROLIMUS ELUTING CORONARY STENT SYSTEM 2.50 MM X 28 MM / RAPID-EXCHANGE
Type: IMPLANTABLE DEVICE | Status: FUNCTIONAL
Brand: XIENCE SIERRA™

## 2021-02-05 RX ORDER — DILTIAZEM HYDROCHLORIDE 5 MG/ML
10 INJECTION INTRAVENOUS ONCE
Status: COMPLETED | OUTPATIENT
Start: 2021-02-05 | End: 2021-02-05

## 2021-02-05 RX ORDER — HEPARIN SODIUM 200 [USP'U]/100ML
INJECTION, SOLUTION INTRAVENOUS AS NEEDED
Status: DISCONTINUED | OUTPATIENT
Start: 2021-02-05 | End: 2021-02-05 | Stop reason: HOSPADM

## 2021-02-05 RX ORDER — FENTANYL CITRATE 50 UG/ML
INJECTION, SOLUTION INTRAMUSCULAR; INTRAVENOUS AS NEEDED
Status: DISCONTINUED | OUTPATIENT
Start: 2021-02-05 | End: 2021-02-05 | Stop reason: HOSPADM

## 2021-02-05 RX ORDER — HEPARIN SODIUM 5000 [USP'U]/ML
INJECTION, SOLUTION INTRAVENOUS; SUBCUTANEOUS AS NEEDED
Status: DISCONTINUED | OUTPATIENT
Start: 2021-02-05 | End: 2021-02-05 | Stop reason: HOSPADM

## 2021-02-05 RX ORDER — DIPHENHYDRAMINE HYDROCHLORIDE 50 MG/ML
INJECTION INTRAMUSCULAR; INTRAVENOUS AS NEEDED
Status: DISCONTINUED | OUTPATIENT
Start: 2021-02-05 | End: 2021-02-05 | Stop reason: HOSPADM

## 2021-02-05 RX ORDER — METOPROLOL TARTRATE 5 MG/5ML
5 INJECTION INTRAVENOUS EVERY 6 HOURS PRN
Status: DISCONTINUED | OUTPATIENT
Start: 2021-02-05 | End: 2021-02-06 | Stop reason: HOSPADM

## 2021-02-05 RX ORDER — AMINOPHYLLINE DIHYDRATE 25 MG/ML
INJECTION, SOLUTION INTRAVENOUS AS NEEDED
Status: DISCONTINUED | OUTPATIENT
Start: 2021-02-05 | End: 2021-02-05 | Stop reason: HOSPADM

## 2021-02-05 RX ORDER — MIDAZOLAM HYDROCHLORIDE 1 MG/ML
INJECTION, SOLUTION INTRAMUSCULAR; INTRAVENOUS AS NEEDED
Status: DISCONTINUED | OUTPATIENT
Start: 2021-02-05 | End: 2021-02-05 | Stop reason: HOSPADM

## 2021-02-05 RX ADMIN — DOCUSATE SODIUM 100 MG: 100 CAPSULE ORAL at 21:18

## 2021-02-05 RX ADMIN — TICAGRELOR 180 MG: 90 TABLET ORAL at 13:21

## 2021-02-05 RX ADMIN — DILTIAZEM HYDROCHLORIDE 360 MG: 240 CAPSULE, EXTENDED RELEASE ORAL at 16:19

## 2021-02-05 RX ADMIN — METOPROLOL SUCCINATE 50 MG: 50 TABLET, FILM COATED, EXTENDED RELEASE ORAL at 16:19

## 2021-02-05 RX ADMIN — ACETAMINOPHEN 650 MG: 325 TABLET, FILM COATED ORAL at 16:36

## 2021-02-05 RX ADMIN — SODIUM CHLORIDE, PRESERVATIVE FREE 10 ML: 5 INJECTION INTRAVENOUS at 21:18

## 2021-02-05 RX ADMIN — DILTIAZEM HYDROCHLORIDE 10 MG: 5 INJECTION INTRAVENOUS at 19:31

## 2021-02-05 NOTE — PLAN OF CARE
Goal Outcome Evaluation:  Plan of Care Reviewed With: patient  Progress: improving  Outcome Summary: VSS. Consent signed for second heart cath today. Pt is NPO. R groin soft, yg CDI. No c/o pain. Afib on tele, rate controlled. Safety maintained. Will continue to monitor.

## 2021-02-05 NOTE — PROGRESS NOTES
[facility] Medicine Services  INPATIENT PROGRESS NOTE    Patient Name: Sondra Connolly  Date of Admission: 1/31/2021  Today's Date: 02/05/21  Length of Stay: 2  Primary Care Physician: Matthieu Bhakta DO    Subjective   Chief Complaint: anxiety  HPI   Patient seen and examined at bedside in CU 8.  Daughter at bedside.  Cardiac catheterization went well, Rotablator + stent to the RCA.  Patient denies shortness of breath, chest pain or pressure.  She states she does feel anxious as she has to lie flat.  She did not have any of her home medications this morning.  She is in atrial fibrillation rate 130-140.  Blood pressure stable.  She is tolerating room air.    Review of Systems   All pertinent negatives and positives are as above. All other systems have been reviewed and are negative unless otherwise stated.     Objective    Temp:  [97.6 °F (36.4 °C)-98.6 °F (37 °C)] 98.4 °F (36.9 °C)  Heart Rate:  [] 122  Resp:  [15-18] 15  BP: ()/(59-98) 109/98  Physical Exam  Constitutional:       Appearance: She is well-developed.      Comments: Lying in bed.  No acute distress.  Tolerating room air.  HENT:      Head: Normocephalic and atraumatic.   Eyes:      General: No scleral icterus.     Conjunctiva/sclera: Conjunctivae normal.      Pupils: Pupils are equal, round, and reactive to light.   Neck:      Musculoskeletal: Neck supple.      Vascular: No JVD.   Cardiovascular:      Rate and Rhythm: Tacycardia present. Rhythm irregular.      Heart sounds: Normal heart sounds.      Comments: Atrial fibrillation 130-140  Pulmonary:      Effort: Pulmonary effort is normal.      Breath sounds: Normal breath sounds. No wheezing or rales.   Abdominal:      General: Bowel sounds are normal. There is no distension.      Palpations: Abdomen is soft. There is no mass.      Tenderness: There is no abdominal tenderness. There is no guarding or rebound.   Musculoskeletal: Normal range of motion.    Lymphadenopathy:      Cervical: No cervical adenopathy.   Skin:     General: Skin is warm and dry.   Neurological:      Mental Status: She is alert and oriented to person, place, and time.      Cranial Nerves: No cranial nerve deficit.   Psychiatric:         Behavior: Behavior normal.     Results Review:  I have reviewed the labs, radiology results, and diagnostic studies.    Laboratory Data:   Results from last 7 days   Lab Units 02/01/21  0036 01/31/21  1716   WBC 10*3/mm3 13.25* 11.21*   HEMOGLOBIN g/dL 14.6 15.0   HEMATOCRIT % 43.9 43.9   PLATELETS 10*3/mm3 264 252        Results from last 7 days   Lab Units 02/05/21  0303 02/03/21  1122 02/01/21  0036 01/31/21  1716   SODIUM mmol/L 135* 140 139 136   POTASSIUM mmol/L 3.6 3.7 4.1 4.1   CHLORIDE mmol/L 99 102 101 100   CO2 mmol/L 28.0 29.0 27.0 27.0   BUN mg/dL 16 16 20 22   CREATININE mg/dL 0.85 0.87 0.81 0.85   CALCIUM mg/dL 8.8 9.5 9.6 9.7   BILIRUBIN mg/dL  --   --  0.4 0.3   ALK PHOS U/L  --   --  70 80   ALT (SGPT) U/L  --   --  15 16   AST (SGOT) U/L  --   --  17 18   GLUCOSE mg/dL 99 111* 141* 161*     I have reviewed the patient's current medications.     Assessment/Plan     Active Hospital Problems    Diagnosis   • **Precordial pain   • Coumadin toxicity   • Chronic atrial fibrillation (CMS/HCC)   • Anxiety about health   • Longstanding persistent atrial fibrillation (CMS/HCC)   • Coronary artery disease involving native coronary artery of native heart without angina pectoris   • Mixed hyperlipidemia   • Essential hypertension     Plan:  1.  Heart cath today successful - Rotablator + stent to the RCA.  2.  Permanent atrial fibrillation.  Toprol-XL was increased on 2/2.  Continue Cardizem CD.  3.  Hyperlipidemia -continue pravastatin.  4.  Sequential compression devices for DVT prophylaxis.  Encourage ambulation after bedrest is up.  5.  Daily PT/INR.     Discharge Planning: I expect the patient to be discharged to home likely  tomorrow.    Electronically signed by RANDA Figueroa, 02/05/21, 12:13 CST.

## 2021-02-06 VITALS
RESPIRATION RATE: 14 BRPM | SYSTOLIC BLOOD PRESSURE: 97 MMHG | TEMPERATURE: 98 F | BODY MASS INDEX: 24.8 KG/M2 | WEIGHT: 134.8 LBS | DIASTOLIC BLOOD PRESSURE: 68 MMHG | HEIGHT: 62 IN | HEART RATE: 121 BPM | OXYGEN SATURATION: 100 %

## 2021-02-06 LAB
HOLD SPECIMEN: NORMAL
INR PPP: 1.5 (ref 0.91–1.09)
PROTHROMBIN TIME: 17.8 SECONDS (ref 11.9–14.6)
WHOLE BLOOD HOLD SPECIMEN: NORMAL

## 2021-02-06 PROCEDURE — 36415 COLL VENOUS BLD VENIPUNCTURE: CPT | Performed by: EMERGENCY MEDICINE

## 2021-02-06 PROCEDURE — 85610 PROTHROMBIN TIME: CPT | Performed by: EMERGENCY MEDICINE

## 2021-02-06 PROCEDURE — 99233 SBSQ HOSP IP/OBS HIGH 50: CPT | Performed by: PHYSICIAN ASSISTANT

## 2021-02-06 RX ORDER — METOPROLOL SUCCINATE 50 MG/1
50 TABLET, EXTENDED RELEASE ORAL DAILY
Qty: 30 TABLET | Refills: 2 | Status: SHIPPED | OUTPATIENT
Start: 2021-02-07 | End: 2021-04-08 | Stop reason: HOSPADM

## 2021-02-06 RX ORDER — ASPIRIN 81 MG/1
81 TABLET ORAL DAILY
Qty: 30 TABLET | Refills: 0 | Status: SHIPPED | OUTPATIENT
Start: 2021-02-06 | End: 2021-02-06 | Stop reason: ALTCHOICE

## 2021-02-06 RX ADMIN — Medication 1 TABLET: at 09:26

## 2021-02-06 RX ADMIN — TICAGRELOR 90 MG: 90 TABLET ORAL at 15:06

## 2021-02-06 RX ADMIN — DILTIAZEM HYDROCHLORIDE 360 MG: 240 CAPSULE, EXTENDED RELEASE ORAL at 09:26

## 2021-02-06 RX ADMIN — METOPROLOL SUCCINATE 50 MG: 50 TABLET, FILM COATED, EXTENDED RELEASE ORAL at 09:26

## 2021-02-06 RX ADMIN — DOCUSATE SODIUM 100 MG: 100 CAPSULE ORAL at 09:26

## 2021-02-06 RX ADMIN — SODIUM CHLORIDE, PRESERVATIVE FREE 10 ML: 5 INJECTION INTRAVENOUS at 09:26

## 2021-02-06 RX ADMIN — FUROSEMIDE 20 MG: 40 TABLET ORAL at 09:26

## 2021-02-06 RX ADMIN — PRAVASTATIN SODIUM 40 MG: 20 TABLET ORAL at 09:26

## 2021-02-06 NOTE — PROGRESS NOTES
"[facility], [facility] Progress Note     LOS: 3 days   Patient Care Team:  Matthieu Bhakta DO as PCP - General (Internal Medicine)  Gualberto Gallo MD as Cardiologist (Cardiology)    Chief Complaint:  cp    Subjective     No further CP.  Groin site doing well.    Review of Systems:   A 10-point review of systems is obtained and negative except for otherwise mentioned above.    Objective     Vital Sign Min/Max for last 24 hours  Temp  Min: 97.8 °F (36.6 °C)  Max: 98.3 °F (36.8 °C)   BP  Min: 97/68  Max: 128/101   Pulse  Min: 98  Max: 149   Resp  Min: 14  Max: 29   SpO2  Min: 91 %  Max: 100 %   No data recorded   No data recorded     Flowsheet Rows      First Filed Value   Admission Height  157.5 cm (62\") Documented at 01/31/2021 1708   Admission Weight  61.7 kg (136 lb) Documented at 01/31/2021 1708          Physical Exam:   General Appearance: NAD  Lungs: CTAB  Heart: RRR  Extremities: no edema, groin site ok     Results Review:     I reviewed the patient's new clinical results.    Results from last 7 days   Lab Units 02/01/21  0036   WBC 10*3/mm3 13.25*   HEMOGLOBIN g/dL 14.6   HEMATOCRIT % 43.9   PLATELETS 10*3/mm3 264     Results from last 7 days   Lab Units 02/05/21  0303   SODIUM mmol/L 135*   POTASSIUM mmol/L 3.6   CHLORIDE mmol/L 99   CO2 mmol/L 28.0   BUN mg/dL 16   CREATININE mg/dL 0.85   GLUCOSE mg/dL 99   CALCIUM mg/dL 8.8     Results from last 7 days   Lab Units 02/05/21  0303  02/01/21  0036   SODIUM mmol/L 135*   < > 139   POTASSIUM mmol/L 3.6   < > 4.1   CHLORIDE mmol/L 99   < > 101   CO2 mmol/L 28.0   < > 27.0   BUN mg/dL 16   < > 20   CREATININE mg/dL 0.85   < > 0.81   CALCIUM mg/dL 8.8   < > 9.6   BILIRUBIN mg/dL  --   --  0.4   ALK PHOS U/L  --   --  70   ALT (SGPT) U/L  --   --  15   AST (SGOT) U/L  --   --  17   GLUCOSE mg/dL 99   < > 141*    < > = values in this interval not displayed.     Results from last 7 days   Lab Units 02/01/21  0222 02/01/21  0036 " 01/31/21  1921   TROPONIN T ng/mL <0.010 <0.010 <0.010         Results from last 7 days   Lab Units 02/01/21  0036   CHOLESTEROL mg/dL 182   TRIGLYCERIDES mg/dL 70   HDL CHOL mg/dL 57   LDL CHOL mg/dL 112*       Medication Review: yes    Assessment/Plan       Precordial pain    Mixed hyperlipidemia    Coronary artery disease involving native coronary artery of native heart without angina pectoris, s/p PORTIA x 2    Longstanding persistent atrial fibrillation (CMS/HCC)    Chronic atrial fibrillation (CMS/HCC)    Essential hypertension    Anxiety about health    Coumadin toxicity      Pt should continue DAPT minimum one year.  She should be on Eliquis at d/c.  ASA x 1 month.  Conitnue BB and statin.  F/u with cardiology in 4 weeks.    Elise Rizo PA-C  02/06/21  14:03 CST

## 2021-02-06 NOTE — PROGRESS NOTES
PHARMACY PATIENT COUNSELING COMPLETED    Sondra Connolly  1945 - 76 y.o.  [id number]  [address] [place] [address]    Active Hospital Problems    Diagnosis POA    **Precordial pain [R07.2] Yes    Coumadin toxicity [T45.511A] Yes    Chronic atrial fibrillation (CMS/HCC) [I48.20] Yes    Anxiety about health [F41.8] Yes    Longstanding persistent atrial fibrillation (CMS/HCC) [I48.11] Yes    Coronary artery disease involving native coronary artery of native heart without angina pectoris [I25.10] Yes    Mixed hyperlipidemia [E78.2] Yes    Essential hypertension [I10] Yes         Focused on new medications: Eliquis, Brilinta - STOP COUMADIN, dose change on Toprol XL  Focused on disease states: Atrial fibrillation, chest pain, HLD, HTN, CAD, others as related to hospitalization  Discussed relation of medication to disease state  Other concerns expressed by patient: COST - prescriptions for 30 DS of Brilinta and Eliquis (total) are $518. I gave the pharmacy free month trial cards for both of these drugs and they will be covered for the initial month at zero charge to the patient. She has a deductible on her Medicare part D plan. The amount of her deductible we are unsure of. If it is just $500 for example, the patient can pay this cost one time and meet the deductible then perhaps have affordable copays thereafter depending on the Tier level of these drugs. We discussed this in detail. Her and her son will investigate her deductible to determine affordability of these medications going forward.   Requested discharge pharmacy input correct? Yes, [facility]. I have spoken with them.   Able to get medications from pharmacy in timely manner? Someone is picking up Rxs for the patient since she will not make it in time before they close.   Patient verbalized understanding of discussion  Encouraged patient to call with questions    Other notes: ASPIRIN ALLERGY - patient reports it was 30+ years ago she was taking  aspirin for migraine (full dose) and she experienced tongue and facial swelling and nausea. The symptoms per her report resolved on their own but she presented to a medical office for evaluation. Since aspirin could not be ruled out as a cause, she was told to not take aspirin again. Dr. Cabello is aware of this, I spoke with him 02/06/21 prior to patient discharge. She can continue on Brilinta and Eliquis and not take aspirin. The aspirin order in the chart has been discontinued. Patient and her son verbalized understanding of this.        Discharge Medications        New Medications        Instructions Start Date   apixaban 5 MG tablet tablet  Commonly known as: ELIQUIS   5 mg, Oral, Every 12 Hours Scheduled      cefdinir 300 MG capsule  Commonly known as: OMNICEF   300 mg, Oral, 2 Times Daily      ticagrelor 90 MG tablet tablet  Commonly known as: BRILINTA   90 mg, Oral, 2 Times Daily             Changes to Medications        Instructions Start Date   metoprolol succinate XL 50 MG 24 hr tablet  Commonly known as: Toprol XL  What changed:   how much to take  Another medication with the same name was removed. Continue taking this medication, and follow the directions you see here.   50 mg, Oral, Daily   Start Date: February 7, 2021     nitroglycerin 0.4 MG SL tablet  Commonly known as: NITROSTAT  What changed:   how much to take  how to take this  when to take this  reasons to take this   1 under the tongue as needed for angina, may repeat q5mins for up three doses             Continue These Medications        Instructions Start Date   dilTIAZem  MG 24 hr capsule  Commonly known as: CARDIZEM CD   360 mg, Oral, Daily      furosemide 20 MG tablet  Commonly known as: LASIX   20 mg, Oral, Take As Directed, Take one tablet by mouth daily on Tuesday, Thursday, Saturday, Sunday. Take two tablets by mouth daily on Monday, Wednesday, Friday.      multivitamin with minerals tablet tablet   1 tablet, Oral, Daily       pravastatin 40 MG tablet  Commonly known as: PRAVACHOL   40 mg, Oral, Daily             Stop These Medications      warfarin 5 MG tablet  Commonly known as: COUMADIN                    Gerard Sanchez, PharmD  16:52 CST  02/06/21

## 2021-02-06 NOTE — OP NOTE
Date of Procedure: 2/5/2021     Procedures Performed:     1. Access was obtained in the right femoral artery via modified Seldinger technique and ultrasound guidance  2. Active right coronary angiography  3. Left heart catheterization without crossing the aortic valve  4. Successful Rotablator rotational atherectomy to the RCA times multiple passes with a 1.5 mm roger  5. Successful PTCA to the RCA with a trek Rx 2.5 x 12 mm balloon times multiple inflations  6. Successful PCI to the RCA with a Xience Korin 2.5 x 28 mm drug-eluting stent  7. Successful NC PTCA to the RCA with a trek NC Rx 2.25 x 15 mm balloon  8. Successful NC PTCA to the RCA with a trek NC Rx 2.5 x 12 mm balloon  9. Patent hemostasis achieved to the right femoral access site using manual pressure held to the right groin once the sheath was pulled  10. Conscious sedation with continuous hemodynamic monitoring for 2 hours         Indication: Chest pain, abnormal stress test, known coronary artery disease status post CABG    Risk, Benefits, and Alternatives discussed with the patient and/or family.  Plan is for moderate sedation, and the patient agrees to proceed with the procedure.  An immediate assessment was done prior to the administration of moderate sedation.    Access: Right femoral  diagnostic Catheters: JR4 guide     Procedural Details: After written and informed consent was obtained, the patient was brought to the cardiac catheterization lab in a fasting state. The skin overlying the patient's right groin was prepped and draped in the usual sterile fashion. Timeout was taken to confirm the correct patient and procedure. IV Versed and fentanyl were used to achieve conscious sedation. Lidocaine was administered for local anesthesia over the right femoral artery. Access was obtained in the right femoral artery via modified Seldinger technique and ultrasound guidance. A 6 Armenian sheath was placed into the artery and flushed. Next, a JR4 catheter  was inserted and advanced into the right coronary artery. Selective right coronary angiography was performed in multiple views. Next, a Rotablator rotational atherectomy catheter was inserted and advanced into the proximal right coronary artery. Rotational atherectomy was performed on the proximal and mid right coronary artery times multiple passes. Post intervention angiography was performed and showed significant improvement in flow distally. Next, a trek Rx 2.5 x 12 mm balloon was inserted and advanced into the mid RCA where it was inflated to 14 mk for 30 seconds. He was repositioned and reinflated to 14 mk for 30 seconds. It was repositioned a third and fourth time of both which were inflated to 16 mk for 30 seconds. This balloon was then removed over the wire and a Xience Korin 2.5 x 28 mm drug-eluting stent was inserted into the mid RCA where was inflated to 20 mk for 60 seconds. Stent delivery device was then removed over the wire and a trek NC Rx 2.25 x 15 mm balloon was inserted and advanced into the mid RCA which was inflated to 16 mk for 30 seconds. It was repositioned and reinflated to 18 mk for 30 seconds. It was repositioned a third time and reinflated to 20 mk for 30 seconds. It was repositioned a fourth time and reinflated to 22 mk for 30 seconds. This balloon was then removed over the wire and a trek NC Rx 2.5 x 12 mm balloon was inserted and advanced into the occipital/mid RCA where it was inflated to 20 mk for 35 seconds. It was repositioned more proximally and reinflated to 20 mk for 30 seconds. Post intervention angiography was performed and showed return of JOSE-3 flow distally with no evidence of dissection or perforation. Everything was removed with the sheath and the sheath was flushed. Patent hemostasis was achieved in the right femoral artery access site using manual pressure held to the right groin was the sheath was pulled. Patient tolerated the procedure well without any  complications. She left the Cath Lab in stable condition and will be returned to the floor for close monitoring.    During the procedure, I supervised the administration and monitoring of conscious sedation.  This included monitoring of the patient's status, oximetry, hemodynamics.  The patient received the first dose of IV sedation at 1004 and I left the room at 1209, accounting for a total of 2 hours and 5 minutes of face-to-face time with the patient in the cath lab today.     Results:  Aorta: 103/81 mmHg       Selective Coronary Angiography:     RCA: RCA is a large caliber vessel that is severely, diffusely diseased, there is a 20 to 30% stenosis in the proximal segment, there is a 90% occlusion in the mid segment, there is a 70 to 80% stenosis also in the mid segment, the distal segment is diffusely diseased, JOSE II flow.  Status post successful rotational atherectomy, PTCA and PCI we had return of JOSE-3 flow distally with less than 5% residual stenosis in both of the significant occlusions in the mid RCA  PDA:/GULSHAN: Appear to be small caliber vessels with diffuse disease, fill antegrade and retrograde via native RCA and SVG to RCA       Total contrast: 100 mL     Fluoroscopy time: 16 minutes      X-ray exposure: 527 mGy    Estimated Blood Loss: 5 cc    Specimens: None    Complications: None     Impression:  1.  Coronary artery disease as described above status post successful PTCA and PCI after rotational atherectomy with return of JOSE-3 flow in the distal right coronary artery with improvement in the 90% stenosis and 70 to 80% stenosis to less than 5% stenosis both     Plan:  1. Routine post procedure orders  2.  Dual antiplatelet therapy for 1 year  3.  High intensity statin  4.  Referral for cardiac rehab  5.  ACE inhibitor and beta-blocker as vitals tolerate  6.  Close follow-up with cardiology as an outpatient

## 2021-02-06 NOTE — PROGRESS NOTES
"    [facility]  DISCHARGE SUMMARY       Date of Admission: 2021  Date of Discharge:  2021  Primary Care Physician: Matthieu Bhakta,     Discharge Diagnoses:  Active Hospital Problems    Diagnosis   • **Precordial pain   • Coumadin toxicity   • Chronic atrial fibrillation (CMS/HCC)   • Anxiety about health   • Longstanding persistent atrial fibrillation (CMS/HCC)   • Coronary artery disease involving native coronary artery of native heart without angina pectoris   • Mixed hyperlipidemia   • Essential hypertension         Presenting Problem/History of Present Illness:  Longstanding persistent atrial fibrillation (CMS/HCC) [I48.11]  Longstanding persistent atrial fibrillation (CMS/HCC) [I48.11]     Chief Complaint on Day of Discharge:   No complaint    History of Present Illness on Day of Discharge:   The patient is doing well today.  Cardiology states that the patient should continue DAPT for a of minimum for 1 year.  She will continue on Eliquis.  She will also need to take aspirin for 1 month and follow-up with cardiology in 4 weeks.    Hospital Course  76-year-old female presents to the emergency department because she was initially concerned about having Covid.  Her brother just passed away from a \"massive heart attack \"at the end of last week.  She attended the .  She states some people had a mask on and some did not.  This evening when she got up from her chair she was shaking all over and had to take a deep breath because she felt dyspneic.  The patient did have a temperature in the emergency department of 100.8.  She then relays that this morning when she woke up, she sat up on the side of the bed and had an instant chest pain.  She takes her finger and pokes it in the air in order to describe the pain.  She states that she has been generally short of breath.  She has chronic atrial fibrillation and is on Coumadin and Cardizem.  She did get " slightly tachycardic in the ED, and was given a one-time dose of p.o. Cardizem.  EDMD states compared to previous EKG dated February 2020, there are changes in lead 4, 5 and 6.  Patient states she is chronically constipated, but has no acute bowel changes.  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  She does follow with Dr. Gallo.  She has an extensive family history of heart disease.  Plan:   1.  Repeat EKG in the morning  2.  Telemetry  3.  Serial cardiac markers   4.  Consult cardiology, question stress test versus cath  5.  Rocephin   6.  Resume appropriate home medications     On the day after admission cardiology saw and evaluated the patient.  Nuclear stress test was performed and was intermediate risk for ischemia.  Coumadin was placed on hold and cardiology was going to proceed with cardiac catheterization when PT/INR is more favorable.  After 48 hours the patient's INR was still unfavorable.  She was given 2 units of FFP with plan to transition to Eliquis rather than back to Coumadin when anticoagulant was restarted.  Cardiology agreed with the plan to change to NOAC at discharge because she has had a great deal of difficulty with constant doses changing and blood draws for reassessment.  On 2/4 the patient was taken to the cardiac catheterization lab where severe native vessel coronary artery disease including LAD, left circumflex and right coronary artery was noted.  Saphenous vein graft to the RCA was occluded at the origin.  Saphenous vein graft to PDA was diffusely diseased.  Intervention was attempted in the mid RCA but ultimately was unsuccessful.  The patient was returned to the catheterization lab on the following day and underwent Rotablator to right coronary artery as well as stenting.  Full report of the procedure is not yet available.  On the day after the procedure the patient was doing well.  She was feeling much better and much stronger and  anxious for discharge.  Both services agree the patient is stable for discharge home today    Procedures Performed:   Cardiac catheterization with Rotablator treatment and stent deployment    Consults:   Cardiology:  Plan:  CHest Pain- EKG changes with A-fib RVR. Repeat EKG with rates controlled. Agree with Nuclear stress ordered by primary team. Negative Enzymes. Remote history of CABG. No ischemic noted on last EKG.      NSVT- awaiting eval from Dr. Marrero with EP. Needs to get back in to see as she missed her initial appointment due to COVID     A-fib rates are mostly controlled- will increase Toprol. Anticoagulated. Referral to EP pending     Diastolic Congestive Heart Failure- appears euvolemic. Check Echo. Low Salt diet.     Pertinent Test Results:    Protime-INR [[id number]]  (Abnormal) Collected: 02/06/21 0345    Specimen: Blood Updated: 02/06/21 0426     Protime 17.8 Seconds      INR 1.50    Blood Culture - Blood, Arm, Right [[id number]] Collected: 01/31/21 1852    Specimen: Blood from Arm, Right Updated: 02/05/21 1931     Blood Culture No growth at 5 days    Blood Culture - Blood, Arm, Right [[id number]] Collected: 01/31/21 1716    Specimen: Blood from Arm, Right Updated: 02/05/21 1846     Blood Culture No growth at 5 days    Basic Metabolic Panel [[id number]]  (Abnormal) Collected: 02/05/21 0303    Specimen: Blood Updated: 02/05/21 0332     Glucose 99 mg/dL      BUN 16 mg/dL      Creatinine 0.85 mg/dL      Sodium 135 mmol/L      Potassium 3.6 mmol/L      Chloride 99 mmol/L      CO2 28.0 mmol/L      Calcium 8.8 mg/dL      eGFR Non African Amer 65 mL/min/1.73      BUN/Creatinine Ratio 18.8     Anion Gap 8.0 mmol/L     Narrative:      GFR Normal >60  Chronic Kidney Disease <60  Kidney Failure <15      Protime-INR [[id number]]  (Abnormal) Collected: 02/05/21 0303    Specimen: Blood Updated: 02/05/21 0321     Protime 17.6 Seconds      INR 1.48    POC Activated Clotting Time [[id number]]  (Abnormal) Collected: 02/04/21  1139    Specimen: Blood Updated: 02/04/21 1351     Activated Clotting Time  252 Seconds      Comment: Serial Number: [id number]:  [id number]       POC Activated Clotting Time [[id number]]  (Abnormal) Collected: 02/04/21 1211    Specimen: Blood Updated: 02/04/21 1351     Activated Clotting Time  362 Seconds      Comment: Serial Number: [id number]:  [id number]       COVID-19, ABBOTT IN-HOUSE,NASAL Swab (NO TRANSPORT MEDIA) 2 HR TAT - Swab, Nasopharynx [[id number]]  (Normal) Collected: 02/04/21 0939    Specimen: Swab from Nasopharynx Updated: 02/04/21 1009     COVID19 Presumptive Negative    Protime-INR [[id number]]  (Abnormal) Collected: 02/04/21 0327    Specimen: Blood Updated: 02/04/21 0441     Protime 17.6 Seconds      INR 1.48    Basic Metabolic Panel [[id number]]  (Abnormal) Collected: 02/03/21 1122    Specimen: Blood Updated: 02/03/21 1203     Glucose 111 mg/dL      BUN 16 mg/dL      Creatinine 0.87 mg/dL      Sodium 140 mmol/L      Potassium 3.7 mmol/L      Chloride 102 mmol/L      CO2 29.0 mmol/L      Calcium 9.5 mg/dL      eGFR Non African Amer 63 mL/min/1.73      BUN/Creatinine Ratio 18.4     Anion Gap 9.0 mmol/L     Narrative:      GFR Normal >60  Chronic Kidney Disease <60  Kidney Failure <15      Magnesium [[id number]]  (Normal) Collected: 02/03/21 1122    Specimen: Blood Updated: 02/03/21 1203     Magnesium 2.2 mg/dL     Protime-INR [[id number]]  (Abnormal) Collected: 02/03/21 0403    Specimen: Blood Updated: 02/03/21 0439     Protime 34.9 Seconds      INR 3.43    Protime-INR [[id number]]  (Abnormal) Collected: 02/02/21 0252    Specimen: Blood Updated: 02/02/21 0405     Protime 35.9 Seconds      INR 3.56    Troponin [[id number]]  (Normal) Collected: 02/01/21 0222    Specimen: Blood Updated: 02/01/21 0316     Troponin T <0.010 ng/mL     Protime-INR [[id number]]  (Abnormal) Collected: 02/01/21 0222    Specimen: Blood Updated: 02/01/21 0307     Protime 30.3 Seconds      INR 2.89    Troponin [[id number]]  (Normal)  Collected: 02/01/21 0036    Specimen: Blood Updated: 02/01/21 0120     Troponin T <0.010 ng/mL     Comprehensive Metabolic Panel [[id number]]  (Abnormal) Collected: 02/01/21 0036    Specimen: Blood Updated: 02/01/21 0120     Glucose 141 mg/dL      BUN 20 mg/dL      Creatinine 0.81 mg/dL      Sodium 139 mmol/L      Potassium 4.1 mmol/L      Chloride 101 mmol/L      CO2 27.0 mmol/L      Calcium 9.6 mg/dL      Total Protein 7.2 g/dL      Albumin 4.30 g/dL      ALT (SGPT) 15 U/L      AST (SGOT) 17 U/L      Alkaline Phosphatase 70 U/L      Total Bilirubin 0.4 mg/dL      eGFR Non African Amer 68 mL/min/1.73      Globulin 2.9 gm/dL      A/G Ratio 1.5 g/dL      BUN/Creatinine Ratio 24.7     Anion Gap 11.0 mmol/L     Lipid Panel [[id number]]  (Abnormal) Collected: 02/01/21 0036    Specimen: Blood Updated: 02/01/21 0120     Total Cholesterol 182 mg/dL      Triglycerides 70 mg/dL      HDL Cholesterol 57 mg/dL      LDL Cholesterol  112 mg/dL      VLDL Cholesterol 13 mg/dL      LDL/HDL Ratio 1.95    Magnesium [[id number]]  (Normal) Collected: 02/01/21 0036    Specimen: Blood Updated: 02/01/21 0120     Magnesium 2.3 mg/dL     Phosphorus [[id number]]  (Normal) Collected: 02/01/21 0036    Specimen: Blood Updated: 02/01/21 0120     Phosphorus 3.8 mg/dL     Hemoglobin A1c [[id number]]  (Abnormal) Collected: 02/01/21 0036    Specimen: Blood Updated: 02/01/21 0110     Hemoglobin A1C 5.90 %     Protime-INR [[id number]]  (Abnormal) Collected: 02/01/21 0036    Specimen: Blood Updated: 02/01/21 0106     Protime 29.1 Seconds      INR 2.75    CBC Auto Differential [[id number]]  (Abnormal) Collected: 02/01/21 0036    Specimen: Blood Updated: 02/01/21 0059     WBC 13.25 10*3/mm3      RBC 4.84 10*6/mm3      Hemoglobin 14.6 g/dL      Hematocrit 43.9 %      MCV 90.7 fL      MCH 30.2 pg      MCHC 33.3 g/dL      RDW 14.6 %      RDW-SD 47.9 fl      MPV 10.0 fL      Platelets 264 10*3/mm3      Neutrophil % 83.4 %      Lymphocyte % 11.1 %      Monocyte % 5.1  %      Eosinophil % 0.0 %      Basophil % 0.1 %      Immature Grans % 0.3 %      Neutrophils, Absolute 11.05 10*3/mm3      Lymphocytes, Absolute 1.47 10*3/mm3      Monocytes, Absolute 0.68 10*3/mm3      Eosinophils, Absolute 0.00 10*3/mm3      Basophils, Absolute 0.01 10*3/mm3      Immature Grans, Absolute 0.04 10*3/mm3      nRBC 0.0 /100 WBC     Troponin [[id number]]  (Normal) Collected: 01/31/21 1921    Specimen: Blood Updated: 01/31/21 1957     Troponin T <0.010 ng/mL     Urinalysis With Culture If Indicated - Urine, Clean Catch [[id number]]  (Abnormal) Collected: 01/31/21 1833    Specimen: Urine, Clean Catch Updated: 01/31/21 1904     Color, UA Yellow     Appearance, UA Clear     pH, UA 6.5     Specific Gravity, UA 1.017     Glucose, UA Negative     Ketones, UA Negative     Bilirubin, UA Negative     Blood, UA Negative     Protein, UA Trace     Leuk Esterase, UA Small (1+)     Nitrite, UA Negative     Urobilinogen, UA 0.2 E.U./dL    Urinalysis, Microscopic Only - Urine, Clean Catch [[id number]]  (Abnormal) Collected: 01/31/21 1833    Specimen: Urine, Clean Catch Updated: 01/31/21 1904     RBC, UA 0-2 /HPF      WBC, UA 3-5 /HPF      Bacteria, UA None Seen /HPF      Squamous Epithelial Cells, UA 0-2 /HPF      Hyaline Casts, UA None Seen /LPF      Methodology Automated Microscopy    S. Pneumo Ag Urine or CSF - Urine, Urine, Clean Catch [[id number]]  (Normal) Collected: 01/31/21 1833    Specimen: Urine, Clean Catch Updated: 01/31/21 1904     Strep Pneumo Ag Negative    Troponin [[id number]]  (Normal) Collected: 01/31/21 1716    Specimen: Blood Updated: 01/31/21 1825     Troponin T <0.010 ng/mL     COVID-19, ABBOTT IN-HOUSE,NASAL Swab (NO TRANSPORT MEDIA) 2 HR TAT - Swab, Nasopharynx [[id number]]  (Normal) Collected: 01/31/21 1729    Specimen: Swab from Nasopharynx Updated: 01/31/21 1805     COVID19 Presumptive Negative    Ferritin [[id number]]  (Abnormal) Collected: 01/31/21 1716    Specimen: Blood Updated: 01/31/21 1141      Ferritin 509.30 ng/mL     Narrative:      Results may be falsely decreased if patient taking Biotin.      Comprehensive Metabolic Panel [[id number]]  (Abnormal) Collected: 01/31/21 1716    Specimen: Blood Updated: 01/31/21 1800     Glucose 161 mg/dL      BUN 22 mg/dL      Creatinine 0.85 mg/dL      Sodium 136 mmol/L      Potassium 4.1 mmol/L      Chloride 100 mmol/L      CO2 27.0 mmol/L      Calcium 9.7 mg/dL      Total Protein 7.5 g/dL      Albumin 4.50 g/dL      ALT (SGPT) 16 U/L      AST (SGOT) 18 U/L      Alkaline Phosphatase 80 U/L      Total Bilirubin 0.3 mg/dL      eGFR Non African Amer 65 mL/min/1.73      Globulin 3.0 gm/dL      A/G Ratio 1.5 g/dL      BUN/Creatinine Ratio 25.9     Anion Gap 9.0 mmol/L     Narrative:      GFR Normal >60  Chronic Kidney Disease <60  Kidney Failure <15      Lactate Dehydrogenase [[id number]]  (Normal) Collected: 01/31/21 1716    Specimen: Blood Updated: 01/31/21 1759      U/L     C-reactive Protein [[id number]]  (Normal) Collected: 01/31/21 1716    Specimen: Blood Updated: 01/31/21 1759     C-Reactive Protein 0.31 mg/dL     BNP [[id number]]  (Abnormal) Collected: 01/31/21 1716    Specimen: Blood Updated: 01/31/21 1758     proBNP 2,726.0 pg/mL     Lactic Acid, Plasma [[id number]]  (Normal) Collected: 01/31/21 1716    Specimen: Blood Updated: 01/31/21 1756     Lactate 1.8 mmol/L     Blood Gas, Arterial - [[id number]]  (Abnormal) Collected: 01/31/21 1745    Specimen: Arterial Blood Updated: 01/31/21 1755     Site Right Brachial     Ramses's Test N/A     pH, Arterial 7.470 pH units      Comment: 83 Value above reference range        pCO2, Arterial 39.8 mm Hg      pO2, Arterial 83.6 mm Hg      HCO3, Arterial 29.0 mmol/L      Comment: 83 Value above reference range        Base Excess, Arterial 4.9 mmol/L      Comment: 83 Value above reference range        O2 Saturation, Arterial 97.8 %      Temperature 37.0 C      Barometric Pressure for Blood Gas 751 mmHg      Modality Room Air      Ventilator Mode NA     Collected by [id number]     Comment: Meter: [id number]     :  [id number]        pCO2, Temperature Corrected 39.8 mm Hg      pH, Temp Corrected 7.470 pH Units      pO2, Temperature Corrected 83.6 mm Hg     Protime-INR [[id number]]  (Abnormal) Collected: 01/31/21 1716    Specimen: Blood Updated: 01/31/21 1752     Protime 27.4 Seconds      INR 2.55    aPTT [[id number]]  (Abnormal) Collected: 01/31/21 1716    Specimen: Blood Updated: 01/31/21 1752     PTT 42.2 seconds     D-dimer, Quantitative [[id number]]  (Normal) Collected: 01/31/21 1716    Specimen: Blood Updated: 01/31/21 1752     D-Dimer, Quantitative 0.24 mg/L (FEU)     CBC Auto Differential [[id number]]  (Abnormal) Collected: 01/31/21 1716    Specimen: Blood Updated: 01/31/21 1744     WBC 11.21 10*3/mm3      RBC 4.88 10*6/mm3      Hemoglobin 15.0 g/dL      Hematocrit 43.9 %      MCV 90.0 fL      MCH 30.7 pg      MCHC 34.2 g/dL      RDW 14.6 %      RDW-SD 48.2 fl      MPV 9.9 fL      Platelets 252 10*3/mm3      Neutrophil % 84.4 %      Lymphocyte % 10.3 %      Monocyte % 4.7 %      Eosinophil % 0.0 %      Basophil % 0.2 %      Immature Grans % 0.4 %      Neutrophils, Absolute 9.46 10*3/mm3      Lymphocytes, Absolute 1.15 10*3/mm3      Monocytes, Absolute 0.53 10*3/mm3      Eosinophils, Absolute 0.00 10*3/mm3      Basophils, Absolute 0.02 10*3/mm3      Immature Grans, Absolute 0.05 10*3/mm3      nRBC 0.0 /100 WBC         Imaging Results (Last 7 Days)     Procedure Component Value Units Date/Time    XR Chest AP [[id number]] Collected: 01/31/21 1800     Updated: 01/31/21 1804    Narrative:      Exam: XR CHEST AP-     Indication: COVID Evaluation, Cough, Fever     Comparison: 2/29/2020     Findings:     Moderate cardiomegaly is unchanged from multiple prior studies. Hazy  LEFT basilar opacity is also unchanged from prior studies and may  represent atelectasis. Mild central vascular congestion, similar to  prior exams. No large pleural  "effusion, visible pneumothorax, or focal  consolidation. Prior median sternotomy and CABG. No acute osseous  finding.       Impression:      Impression:     1.  No consolidation to suggest pneumonia.  2.  Central vascular congestion without pulmonary edema.   3.  Stable cardiomegaly.  This report was finalized on 01/31/2021 18:01 by Dr. Og Mackenzie MD.            Condition on Discharge:    Stable    Physical Exam on Discharge:  BP 97/68 (BP Location: Right arm, Patient Position: Lying)   Pulse (!) 121   Temp 98 °F (36.7 °C) (Oral)   Resp 14   Ht 157.5 cm (62\")   Wt 61.1 kg (134 lb 12.8 oz)   SpO2 100%   BMI 24.66 kg/m²   Physical Exam     Constitutional:       Appearance: She is well-developed.      Comments: Lying in bed.  No acute distress.  Tolerating room air.  HENT:      Head: Normocephalic and atraumatic.   Eyes:      General: No scleral icterus.     Conjunctiva/sclera: Conjunctivae normal.      Pupils: Pupils are equal, round, and reactive to light.   Neck:      Musculoskeletal: Neck supple.      Vascular: No JVD.   Cardiovascular:      Rate and Rhythm:  Rhythm irregular.      Heart sounds: Normal heart sounds.   Pulmonary:      Effort: Pulmonary effort is normal.      Breath sounds: Normal breath sounds. No wheezing or rales.   Abdominal:      General: Bowel sounds are normal. There is no distension.      Palpations: Abdomen is soft. There is no mass.      Tenderness: There is no abdominal tenderness. There is no guarding or rebound.   Musculoskeletal: Normal range of motion.   Lymphadenopathy:      Cervical: No cervical adenopathy.   Skin:     General: Skin is warm and dry.   Neurological:      Mental Status: She is alert and oriented to person, place, and time.      Cranial Nerves: No cranial nerve deficit.   Psychiatric:         Behavior: Behavior normal.     Discharge Disposition:  Home or Self Care    Discharge Medications:     Discharge Medications      New Medications      Instructions Start " Date   apixaban 5 MG tablet tablet  Commonly known as: ELIQUIS   5 mg, Oral, Every 12 Hours Scheduled      aspirin 81 MG EC tablet   81 mg, Oral, Daily      cefdinir 300 MG capsule  Commonly known as: OMNICEF   300 mg, Oral, 2 Times Daily      ticagrelor 90 MG tablet tablet  Commonly known as: BRILINTA   90 mg, Oral, 2 Times Daily         Changes to Medications      Instructions Start Date   metoprolol succinate XL 50 MG 24 hr tablet  Commonly known as: Toprol XL  What changed:   · how much to take  · Another medication with the same name was removed. Continue taking this medication, and follow the directions you see here.   50 mg, Oral, Daily   Start Date: February 7, 2021     nitroglycerin 0.4 MG SL tablet  Commonly known as: NITROSTAT  What changed:   · how much to take  · how to take this  · when to take this  · reasons to take this   1 under the tongue as needed for angina, may repeat q5mins for up three doses         Continue These Medications      Instructions Start Date   dilTIAZem  MG 24 hr capsule  Commonly known as: CARDIZEM CD   360 mg, Oral, Daily      furosemide 20 MG tablet  Commonly known as: LASIX   20 mg, Oral, Take As Directed, Take one tablet by mouth daily on Tuesday, Thursday, Saturday, Sunday. Take two tablets by mouth daily on Monday, Wednesday, Friday.      multivitamin with minerals tablet tablet   1 tablet, Oral, Daily      pravastatin 40 MG tablet  Commonly known as: PRAVACHOL   40 mg, Oral, Daily         Stop These Medications    warfarin 5 MG tablet  Commonly known as: COUMADIN            Discharge Diet:   Diet Instructions     Diet: Regular; Thin      Discharge Diet: Regular    Fluid Consistency: Thin          Discharge Care Plan / Instructions:   Discharge home    Activity at Discharge:   Activity Instructions     Activity as Tolerated            Follow-up Appointments:  Follow-up with PCP next week  Follow-up with cardiology in 1 month       Electronically signed by Sravan SIMPSON  DO Cody, 02/06/21, 14:59 CST.    Time: Discharge overv 30 min    Part of this note may be an electronic transcription/translation of spoken language to printed text using the Dragon Dictation system.

## 2021-02-06 NOTE — PLAN OF CARE
Problem: Adult Inpatient Plan of Care  Goal: Plan of Care Review  Outcome: Ongoing, Progressing  Flowsheets (Taken 2/6/2021 0972)  Progress: improving  Plan of Care Reviewed With: patient  Outcome Summary: Pt has had no complaints of pain throughout the night. R groin site C/D/I, soft and nontender but bruising is present. VSS, Afib  on tele, HR increases when ambulating to restroom but typically recovers fairly well when she's back in bed. Assist x1 when ambulating, pt tolerating well. Pt was consistently in the 150s at shift change, following one time dose IV cardizem HR seems to be slightly more controlled. Safety maintained, will update MD as needed

## 2021-02-06 NOTE — NURSING NOTE
Pt returned form cou at 1620, heart rate afib 150 to 160 with short runs of vt. Po cardizem and lopressor given, Dr. Vences, and Dr Ross notified. No new orders at this time.   
Patient is 73 yo female with hx of HTN, HLD, CVA, and Atrial fib presenting with Slurred speech, expressive aphagia, and left facial droop that started today around 5.30, and resolved by the time patient was seen.  Patient reports that she was with her daughter in the Car, when patient asked to open the door, patient noticed to be confused with slurred speech, and expressive aphasia.  911 was called, and patient presented to the ER.  At this time, symptoms resolved.

## 2021-02-07 ENCOUNTER — READMISSION MANAGEMENT (OUTPATIENT)
Dept: CALL CENTER | Facility: HOSPITAL | Age: 76
End: 2021-02-07

## 2021-02-07 NOTE — THERAPY DISCHARGE NOTE
Acute Care - Speech Language Pathology Discharge Summary  [facility]       Patient Name: Sondra Connolly  : 1945  MRN: [id number]    Today's Date: 2021                   Admit Date: 2021      SLP Recommendation and Plan  Regular diet with thin liquids. Meds whole with thin liquids. Continue SLP services for dysphagia are not warranted at this time. If concerns arise, MD to order outpatient VFSS if felt warranted. Thanks!   Nichole Corea CCC-SLP 2021 13:49 CST    Visit Dx:    ICD-10-CM ICD-9-CM   1. Longstanding persistent atrial fibrillation (CMS/HCC)  I48.11 427.31   2. Dyspnea, unspecified type  R06.00 786.09   3. Fever of unknown origin  R50.9 780.60   4. Chest pain, unspecified type  R07.9 786.50   5. Dysphagia, unspecified type  R13.10 787.20   6. Coronary artery disease involving native coronary artery of native heart without angina pectoris  I25.10 414.01   7. Precordial pain  R07.2 786.51               SLP GOALS     Row Name 21 1300             Oral Nutrition/Hydration Goal 1 (SLP)    Oral Nutrition/Hydration Goal 1, SLP  Pt will tolerate LRD with no overt s/s of aspiration.  -TM      Time Frame (Oral Nutrition/Hydration Goal 1, SLP)  by discharge  -TM      Barriers (Oral Nutrition/Hydration Goal 1, SLP)  None  -TM      Progress/Outcomes (Oral Nutrition/Hydration Goal 1, SLP)  goal not met;discharged from facility  -TM        User Key  (r) = Recorded By, (t) = Taken By, (c) = Cosigned By    Initials Name Provider Type    TM Nichole Corea CCC-SLP Speech and Language Pathologist                  SLP Discharge Summary  Anticipated Discharge Disposition (SLP): home  Reason for Discharge: discharge from this facility  Progress Toward Achieving Short/long Term Goals: other (see comments)(Unable to receive tx following initial eval)  Discharge Destination: home      Nichole Corea CCC-SLP  2021

## 2021-02-07 NOTE — OUTREACH NOTE
Prep Survey      Responses   [facility] facility patient discharged from?  [place]   Is LACE score < 7 ?  No   Emergency Room discharge w/ pulse ox?  No   Eligibility  Readm Mgmt   Discharge diagnosis  Persistent A fib,  Precordial Pain   Does the patient have one of the following disease processes/diagnoses(primary or secondary)?  Other   Does the patient have Home health ordered?  No   Is there a DME ordered?  No   Prep survey completed?  Yes          Xochitl Che RN

## 2021-02-08 ENCOUNTER — ANTICOAGULATION VISIT (OUTPATIENT)
Dept: CARDIOLOGY | Facility: CLINIC | Age: 76
End: 2021-02-08

## 2021-02-08 LAB
ACT BLD: 235 SECONDS (ref 82–152)
ACT BLD: 241 SECONDS (ref 82–152)

## 2021-02-10 ENCOUNTER — READMISSION MANAGEMENT (OUTPATIENT)
Dept: CALL CENTER | Facility: HOSPITAL | Age: 76
End: 2021-02-10

## 2021-02-10 NOTE — OUTREACH NOTE
Medical Week 1 Survey      Responses   [facility] patient discharged from?  [place]   Does the patient have one of the following disease processes/diagnoses(primary or secondary)?  Other   Week 1 attempt successful?  Yes   Call start time  0919   Call end time  0925   Discharge diagnosis  Persistent A fib,  Precordial Pain   Meds reviewed with patient/caregiver?  Yes   Is the patient having any side effects they believe may be caused by any medication additions or changes?  No   Does the patient have all medications ordered at discharge?  Yes   Is the patient taking all medications as directed (includes completed medication regime)?  Yes   Does the patient have a primary care provider?   Yes   Does the patient have an appointment with their PCP within 7 days of discharge?  Yes   Has the patient kept scheduled appointments due by today?  N/A   Comments  02/15/2021 PCP     Has home health visited the patient within 72 hours of discharge?  N/A   Psychosocial issues?  No   Did the patient receive a copy of their discharge instructions?  Yes   Nursing interventions  Reviewed instructions with patient   What is the patient's perception of their health status since discharge?  Improving [Very fatigued]   Is the patient/caregiver able to teach back signs and symptoms related to disease process for when to call PCP?  Yes   Is the patient/caregiver able to teach back signs and symptoms related to disease process for when to call 911?  Yes   If the patient is a current smoker, are they able to teach back resources for cessation?  Not a smoker   Additional teach back comments  Bruising at cath site.   Week 1 call completed?  Yes          Chyna Falcon RN

## 2021-02-16 ENCOUNTER — HOSPITAL ENCOUNTER (EMERGENCY)
Facility: HOSPITAL | Age: 76
Discharge: HOME | End: 2021-02-16
Attending: EMERGENCY MEDICINE | Admitting: EMERGENCY MEDICINE

## 2021-02-16 ENCOUNTER — READMISSION MANAGEMENT (OUTPATIENT)
Dept: CALL CENTER | Facility: HOSPITAL | Age: 76
End: 2021-02-16

## 2021-02-16 ENCOUNTER — APPOINTMENT (OUTPATIENT)
Dept: GENERAL RADIOLOGY | Facility: HOSPITAL | Age: 76
End: 2021-02-16

## 2021-02-16 VITALS
RESPIRATION RATE: 16 BRPM | BODY MASS INDEX: 23.74 KG/M2 | HEART RATE: 70 BPM | DIASTOLIC BLOOD PRESSURE: 80 MMHG | SYSTOLIC BLOOD PRESSURE: 126 MMHG | OXYGEN SATURATION: 98 % | WEIGHT: 129 LBS | TEMPERATURE: 97.8 F | HEIGHT: 62 IN

## 2021-02-16 DIAGNOSIS — R06.02 SHORTNESS OF BREATH: Primary | ICD-10-CM

## 2021-02-16 DIAGNOSIS — I48.20 CHRONIC ATRIAL FIBRILLATION (HCC): ICD-10-CM

## 2021-02-16 LAB
ALBUMIN SERPL-MCNC: 3.9 G/DL (ref 3.5–5.2)
ALBUMIN/GLOB SERPL: 1.4 G/DL
ALP SERPL-CCNC: 68 U/L (ref 39–117)
ALT SERPL W P-5'-P-CCNC: 13 U/L (ref 1–33)
ANION GAP SERPL CALCULATED.3IONS-SCNC: 9 MMOL/L (ref 5–15)
AST SERPL-CCNC: 17 U/L (ref 1–32)
BASOPHILS # BLD AUTO: 0.07 10*3/MM3 (ref 0–0.2)
BASOPHILS NFR BLD AUTO: 0.7 % (ref 0–1.5)
BILIRUB SERPL-MCNC: 0.5 MG/DL (ref 0–1.2)
BUN SERPL-MCNC: 23 MG/DL (ref 8–23)
BUN/CREAT SERPL: 22.8 (ref 7–25)
CALCIUM SPEC-SCNC: 9.4 MG/DL (ref 8.6–10.5)
CHLORIDE SERPL-SCNC: 101 MMOL/L (ref 98–107)
CO2 SERPL-SCNC: 26 MMOL/L (ref 22–29)
CREAT SERPL-MCNC: 1.01 MG/DL (ref 0.57–1)
DEPRECATED RDW RBC AUTO: 48.3 FL (ref 37–54)
EOSINOPHIL # BLD AUTO: 0.36 10*3/MM3 (ref 0–0.4)
EOSINOPHIL NFR BLD AUTO: 3.4 % (ref 0.3–6.2)
ERYTHROCYTE [DISTWIDTH] IN BLOOD BY AUTOMATED COUNT: 15.1 % (ref 12.3–15.4)
GFR SERPL CREATININE-BSD FRML MDRD: 53 ML/MIN/1.73
GLOBULIN UR ELPH-MCNC: 2.7 GM/DL
GLUCOSE SERPL-MCNC: 106 MG/DL (ref 65–99)
HCT VFR BLD AUTO: 39.5 % (ref 34–46.6)
HGB BLD-MCNC: 13.7 G/DL (ref 12–15.9)
IMM GRANULOCYTES # BLD AUTO: 0.07 10*3/MM3 (ref 0–0.05)
IMM GRANULOCYTES NFR BLD AUTO: 0.7 % (ref 0–0.5)
INR PPP: 1.89 (ref 0.91–1.09)
LYMPHOCYTES # BLD AUTO: 1.56 10*3/MM3 (ref 0.7–3.1)
LYMPHOCYTES NFR BLD AUTO: 14.7 % (ref 19.6–45.3)
MCH RBC QN AUTO: 31 PG (ref 26.6–33)
MCHC RBC AUTO-ENTMCNC: 34.7 G/DL (ref 31.5–35.7)
MCV RBC AUTO: 89.4 FL (ref 79–97)
MONOCYTES # BLD AUTO: 1.19 10*3/MM3 (ref 0.1–0.9)
MONOCYTES NFR BLD AUTO: 11.2 % (ref 5–12)
NEUTROPHILS NFR BLD AUTO: 69.3 % (ref 42.7–76)
NEUTROPHILS NFR BLD AUTO: 7.39 10*3/MM3 (ref 1.7–7)
NRBC BLD AUTO-RTO: 0 /100 WBC (ref 0–0.2)
NT-PROBNP SERPL-MCNC: 3452 PG/ML (ref 0–1800)
PLATELET # BLD AUTO: 317 10*3/MM3 (ref 140–450)
PMV BLD AUTO: 10.3 FL (ref 6–12)
POTASSIUM SERPL-SCNC: 4 MMOL/L (ref 3.5–5.2)
PROT SERPL-MCNC: 6.6 G/DL (ref 6–8.5)
PROTHROMBIN TIME: 21.5 SECONDS (ref 11.9–14.6)
RBC # BLD AUTO: 4.42 10*6/MM3 (ref 3.77–5.28)
SARS-COV-2 RDRP RESP QL NAA+PROBE: NORMAL
SODIUM SERPL-SCNC: 136 MMOL/L (ref 136–145)
TROPONIN T SERPL-MCNC: 0.02 NG/ML (ref 0–0.03)
TROPONIN T SERPL-MCNC: 0.02 NG/ML (ref 0–0.03)
WBC # BLD AUTO: 10.64 10*3/MM3 (ref 3.4–10.8)

## 2021-02-16 PROCEDURE — 71045 X-RAY EXAM CHEST 1 VIEW: CPT

## 2021-02-16 PROCEDURE — 93010 ELECTROCARDIOGRAM REPORT: CPT | Performed by: INTERNAL MEDICINE

## 2021-02-16 PROCEDURE — 80053 COMPREHEN METABOLIC PANEL: CPT | Performed by: EMERGENCY MEDICINE

## 2021-02-16 PROCEDURE — 83880 ASSAY OF NATRIURETIC PEPTIDE: CPT | Performed by: EMERGENCY MEDICINE

## 2021-02-16 PROCEDURE — 93005 ELECTROCARDIOGRAM TRACING: CPT | Performed by: EMERGENCY MEDICINE

## 2021-02-16 PROCEDURE — 85025 COMPLETE CBC W/AUTO DIFF WBC: CPT | Performed by: EMERGENCY MEDICINE

## 2021-02-16 PROCEDURE — 87635 SARS-COV-2 COVID-19 AMP PRB: CPT | Performed by: EMERGENCY MEDICINE

## 2021-02-16 PROCEDURE — 84484 ASSAY OF TROPONIN QUANT: CPT | Performed by: EMERGENCY MEDICINE

## 2021-02-16 PROCEDURE — 85610 PROTHROMBIN TIME: CPT | Performed by: EMERGENCY MEDICINE

## 2021-02-16 PROCEDURE — 99284 EMERGENCY DEPT VISIT MOD MDM: CPT

## 2021-02-16 RX ORDER — SODIUM CHLORIDE 0.9 % (FLUSH) 0.9 %
10 SYRINGE (ML) INJECTION AS NEEDED
Status: DISCONTINUED | OUTPATIENT
Start: 2021-02-16 | End: 2021-02-16 | Stop reason: HOSPADM

## 2021-02-16 NOTE — OUTREACH NOTE
Medical Week 2 Survey      Responses   [facility] patient discharged from?  [place]   Does the patient have one of the following disease processes/diagnoses(primary or secondary)?  Other   Week 2 attempt successful?  Yes   Call start time  1718   Rescheduled  Rescheduled-pt requested [patient is in the ED at [facility] at this time]   Call end time  1720          Lazara Whitman RN

## 2021-02-16 NOTE — ED PROVIDER NOTES
Subjective   76-year-old female presents to the emergency department with complaint of shortness of breath.  History of coronary disease status post bypass, chronic A. fib, hypertension, hyperlipidemia.  She reports onset of shortness of breath over the past few weeks.  She was asked admitted to the hospital around onset of her symptoms, had a fever at that time but Covid was negative.  During her hospitalization she underwent left heart cath that showed SVG to RCA with 80 to 90% stenosis in the mid RCA.  Stent was placed patient was discharged 2/6/2021.  She continues to endorse shortness of breath and states her symptoms have not significant improved.  She does have significant anxiety about her overall health.  She denies having any fever, sore throat, runny nose, chest pain, abdominal pain, nausea or vomiting.  Symptoms moderate severity with no relieving factors.      History provided by:  Patient      Review of Systems   All other systems reviewed and are negative.      Past Medical History:   Diagnosis Date   • A-fib (CMS/[facility])    • Abnormal nuclear stress test 8/23/2019   • Angina pectoris (CMS/[facility])    • Anxiety    • Arthritis    • Atherosclerosis of coronary artery bypass graft    • CAD (coronary artery disease)    • Carotid artery occlusion without infarction    • Chronic atrial fibrillation (CMS/[facility]) 2/1/2021   • Chronic diastolic heart failure (CMS/[facility])    • Chronic kidney disease    • Chronic lung disease    • Colon polyp    • Diabetes mellitus (CMS/[facility])    • DJD (degenerative joint disease)    • Essential hypertension 3/24/2017   • Fibrocystic breast disease    • Gastritis    • GI bleed    • Hemorrhoids    • Hyperlipidemia    • Hypertension    • Lung disease     chronic   • MI (myocardial infarction) (CMS/[facility])    • Palpitations    • PUD (peptic ulcer disease)    • PVD (peripheral vascular disease) (CMS/[facility])    • Renal failure, acute (CMS/[facility])    • 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       Allergies   Allergen  Reactions   • Buffered Aspirin Angioedema     Patient has taken tums in the past with no problem.   • Salicylates Angioedema   • Demerol [Meperidine] Nausea And Vomiting and Hallucinations       Past Surgical History:   Procedure Laterality Date   • APPENDECTOMY     • CARDIAC CATHETERIZATION  05/01/2009    Left-Heart Skin   • CARDIAC CATHETERIZATION N/A 2/4/2021    Procedure: Left Heart Cath;  Surgeon: Tristan Cabello DO;  Location:  [facility] CATH INVASIVE LOCATION;  Service: Cardiology;  Laterality: N/A;   • CARDIAC CATHETERIZATION Right 2/5/2021    Procedure: Left Heart Cath, rotablator to RCA with Dr. Abrams at 1PM;  Surgeon: Tristan Cabello DO;  Location:  [facility] CATH INVAS[facility] LOC[facility];  Service: Cardiology;  Laterality: Right;   • CAROTID ENDARTERECTOMY     • CATARACT EXTRACTION     • CATARACT EXTRACTION     • COLONOSCOPY     • CORONARY ARTERY BYPASS GRAFT  07/2007    Four   • ENDOSCOPY  10/02/2013   • ENDOSCOPY  10/02/2013   • HYSTERECTOMY     • SKIN CANCER EXCISION     • THROMBOENDARTERECTOMY     • TONSILLECTOMY         Family History   Problem Relation Age of Onset   • Heart disease Mother    • Breast cancer Mother    • Heart disease Father    • Heart disease Brother    • Heart disease Brother    • Breast cancer Maternal Grandmother        Social History     Socioeconomic History   • Marital status:      Spouse name: Not on file   • Number of children: Not on file   • Years of education: Not on file   • Highest education level: Not on file   Tobacco Use   • Smoking status: Never Smoker   • Smokeless tobacco: Never Used   • Tobacco comment: Non smoker; no tobacco use   Substance and Sexual Activity   • Alcohol use: Yes     Comment: occ   • Drug use: No   • Sexual activity: Defer           Objective   Physical Exam  Vitals signs and nursing note reviewed.   Constitutional:       General: She is not in acute distress.     Appearance: Normal appearance. She is normal weight.   HENT:       Head: Normocephalic and atraumatic.      Nose: Nose normal.      Mouth/Throat:      Mouth: Mucous membranes are moist.      Pharynx: Oropharynx is clear. No oropharyngeal exudate or posterior oropharyngeal erythema.   Eyes:      Extraocular Movements: Extraocular movements intact.      Conjunctiva/sclera: Conjunctivae normal.      Pupils: Pupils are equal, round, and reactive to light.   Neck:      Musculoskeletal: Normal range of motion and neck supple. No neck rigidity or muscular tenderness.   Cardiovascular:      Rate and Rhythm: Normal rate and regular rhythm.      Pulses: Normal pulses.      Heart sounds: Normal heart sounds.   Pulmonary:      Effort: Pulmonary effort is normal.      Breath sounds: Normal breath sounds. No wheezing, rhonchi or rales.   Abdominal:      General: Abdomen is flat. Bowel sounds are normal. There is no distension.      Palpations: Abdomen is soft.      Tenderness: There is no abdominal tenderness.   Musculoskeletal: Normal range of motion.         General: No tenderness.      Right lower leg: No edema.      Left lower leg: No edema.   Skin:     General: Skin is warm and dry.      Capillary Refill: Capillary refill takes less than 2 seconds.      Findings: No rash.   Neurological:      General: No focal deficit present.      Mental Status: She is alert and oriented to person, place, and time. Mental status is at baseline.      Cranial Nerves: No cranial nerve deficit.      Sensory: No sensory deficit.      Motor: No weakness.   Psychiatric:         Mood and Affect: Mood normal.         Behavior: Behavior normal.         Thought Content: Thought content normal.         ECG 12 Lead      Date/Time: 2/16/2021 5:47 PM  Performed by: Vincent Dacosta MD  Authorized by: Vincent Dacosta MD   Interpreted by physician  Comparison: compared with previous ECG   Similar to previous ECG  Comments: Atrial fibrillation at a rate of 64, with PVCs or aberrantly conducted complexes,  nonspecific ST-T wave changes that are unchanged from previous EKG, no acute ischemic changes, abnormal EKG                 ED Course  ED Course as of Feb 16 1751 Tue Feb 16, 2021   1744 76-year-old female presents to the emergency department complaint of shortness of breath while at rest, no change with exertion.    Does have a history of coronary disease and had a recent stent placed 2/6/2021.  She also has A. fib but she is rate controlled, chronic kidney disease and chronic lung disease.      Patient well-appearing on exam, lungs are clear.  Room air sats have remained greater than 95% during her ER evaluation.    Opponent delta troponin within normal limits.  EKG negative for acute ischemia.  H&H normal, CMP unremarkable.    Chest x-ray showed cardiomegaly this unchanged, no pulmonary edema or infiltrates.  Did not check a D-dimer as the patient is already on Eliquis.    At this point, no emergency medical condition identified, patient safe for discharge home with instruction follow-up with her primary care doctor as an outpatient.      [AW]      ED Course User Index  [AW] Vincent Dacosta MD      Lab Results (last 24 hours)     Procedure Component Value Units Date/Time    COVID-19, ABBOTT IN-HOUSE,NASAL Swab (NO TRANSPORT MEDIA) 2 HR TAT - Swab, Nasopharynx [[id number]]  (Normal) Collected: 02/16/21 1416    Specimen: Swab from Nasopharynx Updated: 02/16/21 1539     COVID19 Presumptive Negative    Narrative:      Fact sheet for providers: https://www.fda.gov/media/680366/download     Fact sheet for patients: https://www.fda.gov/media/470889/download    Test performed by PCR.  If inconsistent with clinical signs and symptoms patient should be tested with different authorized molecular test.    CBC & Differential [[id number]]  (Abnormal) Collected: 02/16/21 1421    Specimen: Blood Updated: 02/16/21 1517    Narrative:      The following orders were created for panel order CBC & Differential.  Procedure                                Abnormality         Status                     ---------                               -----------         ------                     CBC Auto Differential[[id number]]        Abnormal            Final result                 Please view results for these tests on the individual orders.    Protime-INR [[id number]]  (Abnormal) Collected: 02/16/21 1421    Specimen: Blood Updated: 02/16/21 1526     Protime 21.5 Seconds      INR 1.89    CBC Auto Differential [[id number]]  (Abnormal) Collected: 02/16/21 1421    Specimen: Blood Updated: 02/16/21 1517     WBC 10.64 10*3/mm3      RBC 4.42 10*6/mm3      Hemoglobin 13.7 g/dL      Hematocrit 39.5 %      MCV 89.4 fL      MCH 31.0 pg      MCHC 34.7 g/dL      RDW 15.1 %      RDW-SD 48.3 fl      MPV 10.3 fL      Platelets 317 10*3/mm3      Neutrophil % 69.3 %      Lymphocyte % 14.7 %      Monocyte % 11.2 %      Eosinophil % 3.4 %      Basophil % 0.7 %      Immature Grans % 0.7 %      Neutrophils, Absolute 7.39 10*3/mm3      Lymphocytes, Absolute 1.56 10*3/mm3      Monocytes, Absolute 1.19 10*3/mm3      Eosinophils, Absolute 0.36 10*3/mm3      Basophils, Absolute 0.07 10*3/mm3      Immature Grans, Absolute 0.07 10*3/mm3      nRBC 0.0 /100 WBC     Comprehensive Metabolic Panel [[id number]]  (Abnormal) Collected: 02/16/21 1527    Specimen: Blood Updated: 02/16/21 1556     Glucose 106 mg/dL      BUN 23 mg/dL      Creatinine 1.01 mg/dL      Sodium 136 mmol/L      Potassium 4.0 mmol/L      Chloride 101 mmol/L      CO2 26.0 mmol/L      Calcium 9.4 mg/dL      Total Protein 6.6 g/dL      Albumin 3.90 g/dL      ALT (SGPT) 13 U/L      AST (SGOT) 17 U/L      Alkaline Phosphatase 68 U/L      Total Bilirubin 0.5 mg/dL      eGFR Non African Amer 53 mL/min/1.73      Globulin 2.7 gm/dL      A/G Ratio 1.4 g/dL      BUN/Creatinine Ratio 22.8     Anion Gap 9.0 mmol/L     Narrative:      GFR Normal >60  Chronic Kidney Disease <60  Kidney Failure <15      BNP [[id number]]  (Abnormal)  Collected: 02/16/21 1527    Specimen: Blood Updated: 02/16/21 1553     proBNP 3,452.0 pg/mL     Narrative:      Among patients with dyspnea, NT-proBNP is highly sensitive for the detection of acute congestive heart failure. In addition NT-proBNP of <300 pg/ml effectively rules out acute congestive heart failure with 99% negative predictive value.    Results may be falsely decreased if patient taking Biotin.      Troponin [[id number]]  (Normal) Collected: 02/16/21 1527    Specimen: Blood Updated: 02/16/21 1554     Troponin T 0.024 ng/mL     Narrative:      Troponin T Reference Range:  <= 0.03 ng/mL-   Negative for AMI  >0.03 ng/mL-     Abnormal for myocardial necrosis.  Clinicians would have to utilize clinical acumen, EKG, Troponin and serial changes to determine if it is an Acute Myocardial Infarction or myocardial injury due to an underlying chronic condition.       Results may be falsely decreased if patient taking Biotin.      Troponin [[id number]]  (Normal) Collected: 02/16/21 1633    Specimen: Blood Updated: 02/16/21 1705     Troponin T 0.023 ng/mL     Narrative:      Troponin T Reference Range:  <= 0.03 ng/mL-   Negative for AMI  >0.03 ng/mL-     Abnormal for myocardial necrosis.  Clinicians would have to utilize clinical acumen, EKG, Troponin and serial changes to determine if it is an Acute Myocardial Infarction or myocardial injury due to an underlying chronic condition.       Results may be falsely decreased if patient taking Biotin.        Xr Chest 1 View    Result Date: 2/16/2021  EXAMINATION: XR CHEST 1 VW-  2/16/2021 2:26 PM CST 1 view  HISTORY: dyspnea  COMPARISON: 01/31/2021.  FINDINGS: Unchanged cardiomegaly. No pulmonary edema. No pneumothorax. No definite airspace consolidation. There is atherosclerotic vascular disease in the aorta. Median sternotomy wires are noted.  The osseous structures and surrounding soft tissues demonstrate no acute abnormality.        1.  Unchanged cardiomegaly.  2.  No  acute findings.   This report was finalized on 02/16/2021 14:27 by Dr. Thomas Smith MD.                                        MDM  Number of Diagnoses or Management Options  Chronic atrial fibrillation (CMS/HCC): new and requires workup  Shortness of breath: new and requires workup     Amount and/or Complexity of Data Reviewed  Clinical lab tests: reviewed  Tests in the radiology section of CPT®: reviewed  Tests in the medicine section of CPT®: reviewed  Decide to obtain previous medical records or to obtain history from someone other than the patient: yes    Risk of Complications, Morbidity, and/or Mortality  Presenting problems: moderate  Diagnostic procedures: moderate  Management options: moderate    Patient Progress  Patient progress: improved      Final diagnoses:   Shortness of breath   Chronic atrial fibrillation (CMS/HCC)            Vincent Dacosta MD  02/16/21 3232

## 2021-02-17 ENCOUNTER — READMISSION MANAGEMENT (OUTPATIENT)
Dept: CALL CENTER | Facility: HOSPITAL | Age: 76
End: 2021-02-17

## 2021-02-18 LAB
QT INTERVAL: 412 MS
QTC INTERVAL: 425 MS

## 2021-02-23 ENCOUNTER — READMISSION MANAGEMENT (OUTPATIENT)
Dept: CALL CENTER | Facility: HOSPITAL | Age: 76
End: 2021-02-23

## 2021-02-23 NOTE — OUTREACH NOTE
Medical Week 3 Survey      Responses   [facility] patient discharged from?  [place]   Does the patient have one of the following disease processes/diagnoses(primary or secondary)?  Other   Week 3 attempt successful?  Yes   Call start time  1735   Call end time  1739   Discharge diagnosis  Persistent A fib,  Precordial Pain   Meds reviewed with patient/caregiver?  Yes   Is the patient having any side effects they believe may be caused by any medication additions or changes?  No   Does the patient have all medications ordered at discharge?  Yes   Is the patient taking all medications as directed (includes completed medication regime)?  Yes   Does the patient have a primary care provider?   Yes   Does the patient have an appointment with their PCP within 7 days of discharge?  Yes   Has the patient kept scheduled appointments due by today?  Yes   Comments  dr López tomorrow   Psychosocial issues?  No   Did the patient receive a copy of their discharge instructions?  Yes   Nursing interventions  Reviewed instructions with patient   What is the patient's perception of their health status since discharge?  Improving   Is the patient/caregiver able to teach back signs and symptoms related to disease process for when to call PCP?  Yes   Is the patient/caregiver able to teach back signs and symptoms related to disease process for when to call 911?  Yes   Is the patient/caregiver able to teach back the hierarchy of who to call/visit for symptoms/problems? PCP, Specialist, Home health nurse, Urgent Care, ED, 911  Yes   If the patient is a current smoker, are they able to teach back resources for cessation?  Not a smoker   Week 3 Call Completed?  Yes   Wrap up additional comments  doing ok, still has a bruise, and she is SOB when she does too much. Has appt in morning          Latrice Bocanegra RN

## 2021-03-03 ENCOUNTER — READMISSION MANAGEMENT (OUTPATIENT)
Dept: CALL CENTER | Facility: HOSPITAL | Age: 76
End: 2021-03-03

## 2021-03-03 NOTE — OUTREACH NOTE
Medical Week 4 Survey      Responses   [facility] patient discharged from?  [place]   Does the patient have one of the following disease processes/diagnoses(primary or secondary)?  Other   Week 4 attempt successful?  Yes   Call start time  1556   Call end time  1605   Meds reviewed with patient/caregiver?  Yes   Is the patient having any side effects they believe may be caused by any medication additions or changes?  No   Is the patient taking all medications as directed (includes completed medication regime)?  Yes   Has the patient kept scheduled appointments due by today?  Yes   Comments  States appt with cardiologist 03/08/21.   Is the patient still receiving Home Health Services?  N/A   Psychosocial issues?  No   What is the patient's perception of their health status since discharge?  Improving   Is the patient/caregiver able to teach back signs and symptoms related to disease process for when to call PCP?  Yes   Is the patient/caregiver able to teach back signs and symptoms related to disease process for when to call 911?  Yes   Is the patient/caregiver able to teach back the hierarchy of who to call/visit for symptoms/problems? PCP, Specialist, Home health nurse, Urgent Care, ED, 911  Yes   Additional teach back comments  States weighs daily.   Week 4 Call Completed?  Yes   Would the patient like one additional call?  No   Graduated  Yes   Is the patient interested in additional calls from an ambulatory ?  NOTE:  applies to high risk patients requiring additional follow-up.  No   Wrap up additional comments  Patient states is feeling stronger-states continues to tire easily, some SOA on exertion continues. Denies any edema or weight gain. States will see Dr Gallo on Monday. Denies any needs.          Farideh Burnett RN

## 2021-03-08 ENCOUNTER — OFFICE VISIT (OUTPATIENT)
Dept: CARDIOLOGY | Facility: CLINIC | Age: 76
End: 2021-03-08

## 2021-03-08 VITALS
HEART RATE: 89 BPM | OXYGEN SATURATION: 98 % | SYSTOLIC BLOOD PRESSURE: 105 MMHG | DIASTOLIC BLOOD PRESSURE: 60 MMHG | WEIGHT: 129 LBS | HEIGHT: 62 IN | BODY MASS INDEX: 23.74 KG/M2

## 2021-03-08 DIAGNOSIS — I25.10 CORONARY ARTERY DISEASE INVOLVING NATIVE CORONARY ARTERY OF NATIVE HEART WITHOUT ANGINA PECTORIS: Chronic | ICD-10-CM

## 2021-03-08 DIAGNOSIS — R42 POSTURAL DIZZINESS WITH PRESYNCOPE: ICD-10-CM

## 2021-03-08 DIAGNOSIS — E78.2 MIXED HYPERLIPIDEMIA: Chronic | ICD-10-CM

## 2021-03-08 DIAGNOSIS — R55 POSTURAL DIZZINESS WITH PRESYNCOPE: ICD-10-CM

## 2021-03-08 DIAGNOSIS — I48.20 CHRONIC ATRIAL FIBRILLATION (HCC): Primary | ICD-10-CM

## 2021-03-08 DIAGNOSIS — I10 ESSENTIAL HYPERTENSION: ICD-10-CM

## 2021-03-08 DIAGNOSIS — I48.11 LONGSTANDING PERSISTENT ATRIAL FIBRILLATION (HCC): ICD-10-CM

## 2021-03-08 PROCEDURE — 93000 ELECTROCARDIOGRAM COMPLETE: CPT | Performed by: INTERNAL MEDICINE

## 2021-03-08 PROCEDURE — 99214 OFFICE O/P EST MOD 30 MIN: CPT | Performed by: INTERNAL MEDICINE

## 2021-03-08 NOTE — PROGRESS NOTES
Sondra Connolly  [id number]  1945  76 y.o.  female    Referring Provider: Matthieu Bhakta DO    Reason for Follow-up Visit: Here for routine follow up  chronic atrial fibrillation   Holter as below  Cardiac workup test results as below   coronary artery disease Coronary artery bypass grafting > 10 years ago  Nuclear stress test no significant ischemia, has prior infarction  Prior admission   Recent percutaneous coronary intervention drug eluting stent to right coronary artery Dr Cabello       Subjective    Moderate chronic exertional shortness of breath on exertion relieved with rest  No significant cough or wheezing    No palpitations  No associated chest pain  No significant pedal edema    No fever or chills  No significant expectoration    No hemoptysis  No syncope    Tolerating current medications well with no untoward side effects   Compliant with prescribed medication regimen. Tries to adhere to cardiac diet.     Intermittent presyncope   Profound weakness spells   Several times a day for less than 10 seconds     No bleeding, excessive bruising, gait instability or fall risks    BP well controlled at home.       Due to see Dr Hawk          History of present illness:  Sondra Connolly is a 76 y.o. yo female with history of chronic atrial fibrillation who presents today for   Chief Complaint   Patient presents with   • Coronary Artery Disease     1 month follow up   • Nose Bleed     Bleeding from the nose and the ears.    .    History  Past Medical History:   Diagnosis Date   • A-fib (CMS/HCC)    • Abnormal nuclear stress test 8/23/2019   • Angina pectoris (CMS/HCC)    • Anxiety    • Arthritis    • Atherosclerosis of coronary artery bypass graft    • CAD (coronary artery disease)    • Carotid artery occlusion without infarction    • Chronic atrial fibrillation (CMS/HCC) 2/1/2021   • Chronic diastolic heart failure (CMS/HCC)    • Chronic kidney disease    • Chronic lung disease    • Colon polyp    • Diabetes  mellitus (CMS/[facility])    • DJD (degenerative joint disease)    • Essential hypertension 3/24/2017   • Fibrocystic breast disease    • Gastritis    • GI bleed    • Hemorrhoids    • Hyperlipidemia    • Hypertension    • Lung disease     chronic   • MI (myocardial infarction) (CMS/[facility])    • Palpitations    • PUD (peptic ulcer disease)    • PVD (peripheral vascular disease) (CMS/[facility])    • Renal failure, acute (CMS/[facility])    • 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   ,   Past Surgical History:   Procedure Laterality Date   • APPENDECTOMY     • CARDIAC CATHETERIZATION  05/01/2009    Left-Heart Skin   • CARDIAC CATHETERIZATION N/A 2/4/2021    Procedure: Left Heart Cath;  Surgeon: Tristan Cabello DO;  Location:  [facility];  Service: Cardiology;  Laterality: N/A;   • CARDIAC CATHETERIZATION Right 2/5/2021    Procedure: Left Heart Cath, rotablator to RCA with Dr. Abrams at 1PM;  Surgeon: Tristan Cabello DO;  Location:  [facility];  Service: Cardiology;  Laterality: Right;   • CAROTID ENDARTERECTOMY     • CATARACT EXTRACTION     • CATARACT EXTRACTION     • COLONOSCOPY     • CORONARY ARTERY BYPASS GRAFT  07/2007    Four   • ENDOSCOPY  10/02/2013   • ENDOSCOPY  10/02/2013   • HYSTERECTOMY     • SKIN CANCER EXCISION     • THROMBOENDARTERECTOMY     • TONSILLECTOMY     ,   Family History   Problem Relation Age of Onset   • Heart disease Mother    • Breast cancer Mother    • Heart disease Father    • Heart disease Brother    • Heart disease Brother    • Breast cancer Maternal Grandmother    ,   Social History     Tobacco Use   • Smoking status: Never Smoker   • Smokeless tobacco: Never Used   • Tobacco comment: Non smoker; no tobacco use   Vaping Use   • Vaping Use: Never used   Substance Use Topics   • Alcohol use: Yes     Comment: occ   • Drug use: No   ,     Medications  Current Outpatient Medications   Medication Sig Dispense Refill   • apixaban (ELIQUIS) 5 MG tablet tablet Take 1 tablet by  "mouth Every 12 (Twelve) Hours. 60 tablet 2   • dilTIAZem CD (CARDIZEM CD) 360 MG 24 hr capsule Take 1 capsule by mouth Daily. 30 capsule 1   • furosemide (LASIX) 20 MG tablet Take 20 mg by mouth Take As Directed. Take one tablet by mouth daily on Tuesday, Thursday, Saturday, Sunday. Take two tablets by mouth daily on Monday, Wednesday, Friday.     • metoprolol succinate XL (Toprol XL) 50 MG 24 hr tablet Take 1 tablet by mouth Daily. 30 tablet 2   • Multiple Vitamins-Minerals (MULTIVITAMIN ADULT PO) Take 1 tablet by mouth Daily.     • nitroglycerin (NITROSTAT) 0.4 MG SL tablet 1 under the tongue as needed for angina, may repeat q5mins for up three doses (Patient taking differently: Place 0.4 mg under the tongue Every 5 (Five) Minutes As Needed. 1 under the tongue as needed for angina, may repeat q5mins for up three doses) 100 tablet 11   • pravastatin (PRAVACHOL) 40 MG tablet TAKE 1 TABLET BY MOUTH DAILY. 90 tablet 4   • ticagrelor (BRILINTA) 90 MG tablet tablet Take 1 tablet by mouth 2 (Two) Times a Day. 60 tablet 2     No current facility-administered medications for this visit.       Allergies:  Buffered aspirin, Salicylates, and Demerol [meperidine]    Review of Systems  Review of Systems   Constitutional: Positive for malaise/fatigue.   HENT: Negative.    Eyes: Negative.    Cardiovascular: Positive for dyspnea on exertion, irregular heartbeat, near-syncope and palpitations. Negative for chest pain, claudication, cyanosis, leg swelling, orthopnea, paroxysmal nocturnal dyspnea and syncope.   Respiratory: Negative.    Endocrine: Negative.    Hematologic/Lymphatic: Negative.    Skin: Negative.    Musculoskeletal: Positive for arthritis and joint pain.   Gastrointestinal: Negative for anorexia.   Genitourinary: Negative.    Neurological: Positive for dizziness and weakness.   Psychiatric/Behavioral: Negative.        Objective     Physical Exam:  /60   Pulse 89   Ht 157.5 cm (62.01\")   Wt 58.5 kg (129 lb)   " SpO2 98%   BMI 23.59 kg/m²   Physical Exam   Constitutional: She appears well-developed.   HENT:   Head: Normocephalic.   Neck: Normal carotid pulses and no JVD present. No tracheal tenderness present. Carotid bruit is not present. No tracheal deviation present.   Cardiovascular: Normal pulses. An irregularly irregular rhythm present.   Murmur heard.   Systolic murmur is present with a grade of 2/6.  Pulmonary/Chest: Effort normal. No stridor.   Abdominal: Soft. She exhibits no distension. There is no abdominal tenderness.   Neurological: She is alert. No cranial nerve deficit or sensory deficit.   Skin: Skin is warm.   Psychiatric: Her speech is normal and behavior is normal.       Results Review:    Results for orders placed during the hospital encounter of 01/31/21    Adult Transthoracic Echo Complete W/ Cont if Necessary Per Protocol    Interpretation Summary  · The right atrial cavity is moderately dilated.  · Moderate to severe aortic valve stenosis is present.  · Moderate to severe mitral valve regurgitation is present.  · Estimated right ventricular systolic pressure from tricuspid regurgitation is mildly elevated (35-45 mmHg).  · The left ventricular cavity is borderline dilated.  · Left ventricular ejection fraction appears to be 41 - 45%. Left ventricular systolic function is low normal.         2/4/21      Tristan Cabello DO (Primary) Pamela Nicholas DO Cole, Chelsea L, RANDA   Indications    Coronary artery disease involving native coronary artery of native heart without angina pectoris [I25.10 (ICD-10-CM)]       Conclusion    Date of procedure: February 4, 2021     Procedures performed:     1. Access to the right femoral artery via modified Seldinger technique and ultrasound guidance  2. Left heart catheterization with retrograde crossing of the aortic valve into the left ventricle  3. Selective bilateral coronary angiography  4. SVG angiography x3  5. LIMA angiography  6. Multiple  attempts at PTCA on the proximal and mid right coronary artery with different balloons with minimal success, please see below for details  7. Selective right iliofemoral angiogram for possible device closure  8. Patent hemostasis achieved in the right femoral artery via Angio-Seal closure device to the right one sheath was pulled  9. Conscious sedation with continuous hemodynamic monitoring for 1 hour 36 minutes              Impression:  1.  Coronary artery disease as described above status post successful PTCA and PCI after rotational atherectomy with return of JOSE-3 flow in the distal right coronary artery with improvement in the 90% stenosis and 70 to 80% stenosis to less than 5% stenosis both     Plan:  1. Routine post procedure orders  2.  Dual antiplatelet therapy for 1 year  3.  High intensity statin  4.  Referral for cardiac rehab  5.  ACE inhibitor and beta-blocker as vitals tolerate  6.  Close follow-up with cardiology as an outpatient      Results for orders placed during the hospital encounter of 01/31/21    Adult Transthoracic Echo Complete W/ Cont if Necessary Per Protocol    Interpretation Summary  · The right atrial cavity is moderately dilated.  · Moderate to severe aortic valve stenosis is present.  · Moderate to severe mitral valve regurgitation is present.  · Estimated right ventricular systolic pressure from tricuspid regurgitation is mildly elevated (35-45 mmHg).  · The left ventricular cavity is borderline dilated.  · Left ventricular ejection fraction appears to be 41 - 45%. Left ventricular systolic function is low normal.  Holter 6/19      · Average HR: 79. Min HR: 44. Max HR: 146.     · Monitored for ~1 day   · Slowest heart rate at 8:46 AM  · The predominant rhythm noted during the testing period was atrial flutter   · 2256  premature ventricular contractions: PVC burden:  2%   · 75    non sustained ventricular tachycardia episode longest 9 beats at a maximum rate of 152 beats per minute    · No correlated arrhythmia  · 568 pauses longest 3.3 seconds at 1:54 AM           Sondra Connolly   Regadenoson Stress Test With Myocardial Perfusion SPECT (Multi Study)   Order# [id number]   Reading physician: Gualberto Gallo MD Ordering physician: Gualberto Gallo MD Study date: 19   Patient Information     Patient Name  Sondra Connolly MRN  [id number] Sex  Female  (Age)  1945 (75 y.o.)   Interpretation Summary     · Left ventricular ejection fraction is normal (Calculated EF = 55%).  · Breast attenuation artifact is present.  · Myocardial perfusion imaging indicates a medium-sized infarct located in the inferior wall with no significant ischemia noted.                    ECG 12 Lead    Date/Time: 3/8/2021 1:46 PM  Performed by: Gualberto Gallo MD  Authorized by: Gualberto Gallo MD   Comparison: compared with previous ECG from 2021  Comparison to previous ECG: Ventricular rate increased from 64  to 89  beats per minute    Rhythm: atrial fibrillation  Rate: normal  T inversion: II, III, aVF, V5 and V6  QRS axis: right    Clinical impression: abnormal EKG            Assessment/Plan   Patient Active Problem List   Diagnosis   • Mixed hyperlipidemia   • Coronary artery disease involving native coronary artery of native heart without angina pectoris   • Longstanding persistent atrial fibrillation (CMS/HCC)   • Chronic atrial fibrillation (CMS/HCC)   • Precordial pain   • Essential hypertension   • Anxiety about health   • Coumadin toxicity       Plan       Patient expressed understanding  Encouraged and answered all questions   Discussed with the patient and all questioned fully answered. She will call me if any problems arise.   Discussed results of prior testing with patient: echo and cardiac cath     S/L NTG PRN for chest pain, call me or go to ER if has to use S/L nitroglycerin     I support the patient's decision to take the Covid -19 vaccine    Orders Placed This Encounter   Procedures   • Holter Monitor  - 72 Hour Up To 15 Days     7 days     Standing Status:   Future     Standing Expiration Date:   3/8/2022     Order Specific Question:   Reason for exam?     Answer:   Palpitations     Order Specific Question:   Reason for exam?     Answer:   Presyncope or Syncope   • ECG 12 Lead     This order was created via procedure documentation        Continue Brilinta and Eliquis     Monitor for any signs of bleeding including red or dark stools as well as easy bruisabilty. Fall precautions.      Check BP and heart rates twice daily at least 3x / week, week a month  at home and bring a recording for me to review next visit  If BP >130/85 or < 100/60 persistently over 3 reading 30 mins apart call sooner       Once I review the e patch can determine if medication adjustment is required   Likely some shortness of breath is due to Brilinta             Return in about 4 weeks (around 4/5/2021).

## 2021-03-17 ENCOUNTER — HOSPITAL ENCOUNTER (EMERGENCY)
Facility: HOSPITAL | Age: 76
Discharge: HOME | End: 2021-03-17
Attending: INTERNAL MEDICINE | Admitting: EMERGENCY MEDICINE

## 2021-03-17 ENCOUNTER — APPOINTMENT (OUTPATIENT)
Dept: GENERAL RADIOLOGY | Facility: HOSPITAL | Age: 76
End: 2021-03-17

## 2021-03-17 VITALS
TEMPERATURE: 97.9 F | WEIGHT: 123 LBS | HEIGHT: 62 IN | BODY MASS INDEX: 22.63 KG/M2 | SYSTOLIC BLOOD PRESSURE: 135 MMHG | HEART RATE: 81 BPM | DIASTOLIC BLOOD PRESSURE: 76 MMHG | RESPIRATION RATE: 20 BRPM | OXYGEN SATURATION: 100 %

## 2021-03-17 DIAGNOSIS — R79.89 ELEVATED BRAIN NATRIURETIC PEPTIDE (BNP) LEVEL: ICD-10-CM

## 2021-03-17 DIAGNOSIS — I48.0 PAROXYSMAL ATRIAL FIBRILLATION (HCC): Primary | ICD-10-CM

## 2021-03-17 LAB
ANION GAP SERPL CALCULATED.3IONS-SCNC: 11 MMOL/L (ref 5–15)
BASOPHILS # BLD AUTO: 0.05 10*3/MM3 (ref 0–0.2)
BASOPHILS NFR BLD AUTO: 0.5 % (ref 0–1.5)
BUN SERPL-MCNC: 23 MG/DL (ref 8–23)
BUN/CREAT SERPL: 22.3 (ref 7–25)
CALCIUM SPEC-SCNC: 9.9 MG/DL (ref 8.6–10.5)
CHLORIDE SERPL-SCNC: 98 MMOL/L (ref 98–107)
CO2 SERPL-SCNC: 26 MMOL/L (ref 22–29)
CREAT SERPL-MCNC: 1.03 MG/DL (ref 0.57–1)
D DIMER PPP FEU-MCNC: 0.37 MG/L (FEU) (ref 0–0.5)
DEPRECATED RDW RBC AUTO: 49.7 FL (ref 37–54)
EOSINOPHIL # BLD AUTO: 0.43 10*3/MM3 (ref 0–0.4)
EOSINOPHIL NFR BLD AUTO: 4.1 % (ref 0.3–6.2)
ERYTHROCYTE [DISTWIDTH] IN BLOOD BY AUTOMATED COUNT: 15.1 % (ref 12.3–15.4)
GFR SERPL CREATININE-BSD FRML MDRD: 52 ML/MIN/1.73
GLUCOSE SERPL-MCNC: 97 MG/DL (ref 65–99)
HCT VFR BLD AUTO: 39.3 % (ref 34–46.6)
HGB BLD-MCNC: 13.2 G/DL (ref 12–15.9)
HOLD SPECIMEN: NORMAL
HOLD SPECIMEN: NORMAL
IMM GRANULOCYTES # BLD AUTO: 0.06 10*3/MM3 (ref 0–0.05)
IMM GRANULOCYTES NFR BLD AUTO: 0.6 % (ref 0–0.5)
LYMPHOCYTES # BLD AUTO: 2.21 10*3/MM3 (ref 0.7–3.1)
LYMPHOCYTES NFR BLD AUTO: 21.3 % (ref 19.6–45.3)
MCH RBC QN AUTO: 30.4 PG (ref 26.6–33)
MCHC RBC AUTO-ENTMCNC: 33.6 G/DL (ref 31.5–35.7)
MCV RBC AUTO: 90.6 FL (ref 79–97)
MONOCYTES # BLD AUTO: 0.99 10*3/MM3 (ref 0.1–0.9)
MONOCYTES NFR BLD AUTO: 9.5 % (ref 5–12)
NEUTROPHILS NFR BLD AUTO: 6.64 10*3/MM3 (ref 1.7–7)
NEUTROPHILS NFR BLD AUTO: 64 % (ref 42.7–76)
NRBC BLD AUTO-RTO: 0 /100 WBC (ref 0–0.2)
NT-PROBNP SERPL-MCNC: 3655 PG/ML (ref 0–1800)
PLATELET # BLD AUTO: 259 10*3/MM3 (ref 140–450)
PMV BLD AUTO: 10 FL (ref 6–12)
POTASSIUM SERPL-SCNC: 4 MMOL/L (ref 3.5–5.2)
RBC # BLD AUTO: 4.34 10*6/MM3 (ref 3.77–5.28)
SARS-COV-2 RNA PNL SPEC NAA+PROBE: NOT DETECTED
SODIUM SERPL-SCNC: 135 MMOL/L (ref 136–145)
TROPONIN T SERPL-MCNC: <0.01 NG/ML (ref 0–0.03)
TROPONIN T SERPL-MCNC: <0.01 NG/ML (ref 0–0.03)
WBC # BLD AUTO: 10.38 10*3/MM3 (ref 3.4–10.8)
WHOLE BLOOD HOLD SPECIMEN: NORMAL
WHOLE BLOOD HOLD SPECIMEN: NORMAL

## 2021-03-17 PROCEDURE — 84484 ASSAY OF TROPONIN QUANT: CPT | Performed by: INTERNAL MEDICINE

## 2021-03-17 PROCEDURE — 71045 X-RAY EXAM CHEST 1 VIEW: CPT

## 2021-03-17 PROCEDURE — 99284 EMERGENCY DEPT VISIT MOD MDM: CPT

## 2021-03-17 PROCEDURE — 85379 FIBRIN DEGRADATION QUANT: CPT | Performed by: INTERNAL MEDICINE

## 2021-03-17 PROCEDURE — 85025 COMPLETE CBC W/AUTO DIFF WBC: CPT | Performed by: INTERNAL MEDICINE

## 2021-03-17 PROCEDURE — 87635 SARS-COV-2 COVID-19 AMP PRB: CPT | Performed by: INTERNAL MEDICINE

## 2021-03-17 PROCEDURE — 93005 ELECTROCARDIOGRAM TRACING: CPT | Performed by: INTERNAL MEDICINE

## 2021-03-17 PROCEDURE — 80048 BASIC METABOLIC PNL TOTAL CA: CPT | Performed by: INTERNAL MEDICINE

## 2021-03-17 PROCEDURE — 93010 ELECTROCARDIOGRAM REPORT: CPT | Performed by: INTERNAL MEDICINE

## 2021-03-17 PROCEDURE — 36415 COLL VENOUS BLD VENIPUNCTURE: CPT

## 2021-03-17 PROCEDURE — 83880 ASSAY OF NATRIURETIC PEPTIDE: CPT | Performed by: INTERNAL MEDICINE

## 2021-03-17 PROCEDURE — 96374 THER/PROPH/DIAG INJ IV PUSH: CPT

## 2021-03-17 PROCEDURE — 25010000002 FUROSEMIDE PER 20 MG: Performed by: EMERGENCY MEDICINE

## 2021-03-17 RX ORDER — FUROSEMIDE 10 MG/ML
40 INJECTION INTRAMUSCULAR; INTRAVENOUS ONCE
Status: COMPLETED | OUTPATIENT
Start: 2021-03-17 | End: 2021-03-17

## 2021-03-17 RX ORDER — SODIUM CHLORIDE 0.9 % (FLUSH) 0.9 %
10 SYRINGE (ML) INJECTION AS NEEDED
Status: DISCONTINUED | OUTPATIENT
Start: 2021-03-17 | End: 2021-03-17 | Stop reason: HOSPADM

## 2021-03-17 RX ADMIN — FUROSEMIDE 40 MG: 10 INJECTION, SOLUTION INTRAMUSCULAR; INTRAVENOUS at 10:30

## 2021-03-17 NOTE — ED PROVIDER NOTES
Subjective   History of Present Illness    Review of Systems    Past Medical History:   Diagnosis Date   • A-fib (CMS/[facility])    • Abnormal nuclear stress test 8/23/2019   • Angina pectoris (CMS/[facility])    • Anxiety    • Arthritis    • Atherosclerosis of coronary artery bypass graft    • CAD (coronary artery disease)    • Carotid artery occlusion without infarction    • Chronic atrial fibrillation (CMS/[facility]) 2/1/2021   • Chronic diastolic heart failure (CMS/[facility])    • Chronic kidney disease    • Chronic lung disease    • Colon polyp    • Diabetes mellitus (CMS/[facility])    • DJD (degenerative joint disease)    • Essential hypertension 3/24/2017   • Fibrocystic breast disease    • Gastritis    • GI bleed    • Hemorrhoids    • Hyperlipidemia    • Hypertension    • Lung disease     chronic   • MI (myocardial infarction) (CMS/[facility])    • Palpitations    • PUD (peptic ulcer disease)    • PVD (peripheral vascular disease) (CMS/HCC)    • Renal failure, acute (CMS/[facility])    • 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       Allergies   Allergen Reactions   • Buffered Aspirin Angioedema     Patient has taken tums in the past with no problem.   • Salicylates Angioedema   • Demerol [Meperidine] Nausea And Vomiting and Hallucinations       Past Surgical History:   Procedure Laterality Date   • APPENDECTOMY     • CARDIAC CATHETERIZATION  05/01/2009    Left-Heart Skin   • CARDIAC CATHETERIZATION N/A 2/4/2021    Procedure: Left Heart Cath;  Surgeon: Tristan Cabello DO;  Location:  [facility];  Service: Cardiology;  Laterality: N/A;   • CARDIAC CATHETERIZATION Right 2/5/2021    Procedure: Left Heart Cath, rotablator to RCA with Dr. Abrams at 1PM;  Surgeon: Tristan Cabello DO;  Location:  [facility];  Service: Cardiology;  Laterality: Right;   • CAROTID ENDARTERECTOMY     • CATARACT EXTRACTION     • CATARACT EXTRACTION     • COLONOSCOPY     • CORONARY ARTERY BYPASS GRAFT  07/2007    Four   • ENDOSCOPY  10/02/2013    • ENDOSCOPY  10/02/2013   • HYSTERECTOMY     • SKIN CANCER EXCISION     • THROMBOENDARTERECTOMY     • TONSILLECTOMY         Family History   Problem Relation Age of Onset   • Heart disease Mother    • Breast cancer Mother    • Heart disease Father    • Heart disease Brother    • Heart disease Brother    • Breast cancer Maternal Grandmother        Social History     Socioeconomic History   • Marital status:      Spouse name: Not on file   • Number of children: Not on file   • Years of education: Not on file   • Highest education level: Not on file   Tobacco Use   • Smoking status: Never Smoker   • Smokeless tobacco: Never Used   • Tobacco comment: Non smoker; no tobacco use   Vaping Use   • Vaping Use: Never used   Substance and Sexual Activity   • Alcohol use: Yes     Comment: occ   • Drug use: No   • Sexual activity: Defer           Objective   Physical Exam    Procedures           ED Course  ED Course as of Mar 17 1108   Wed Mar 17, 2021   0624 Care of the patient will be turned over to Anant Kwon MD at 6:30 AM awaiting labs and imaging.    [RW]   1106 As we discussed the patient her heart rate is down to 83bpm she had an episode of aberrant conduction on the rhythm but no broad complex tach she has been asymptomatic I have discussed this case with Dr. Gallo who reviewed the EKGs the patient was given Lasix per his recommendation and is going to follow-up with him as an outpatient I have discussed this case at length with the patient she does not want to be admitted she wants to go home    [TS]   1107 Risks and benefits of treatments given and alternative treatment options discussed with patient/family. I answered all the questions in simple, plain language, and there was voiced understanding and agreement with plan of care. There were no further questions. Differential diagnosis discussed. Patient/family was advised that the practice of medicine is not always an exact science, and sometimes tests,  physical exam, or history may not show the underlying conditions with certainty. Additionally, the condition may change or show itself later after initial presentation. There was also expressed understanding and agreement with this limitation of emergency medicine practice. Patient/family was asked to return to ED if any problem or issues or if condition worsens or does not improved. Patient/family agreed to follow up with PCP/specialist as advised, or return to ED if unable to see a provider in a timely fashion for continued symptoms.     [TS]      ED Course User Index  [RW] Gurwinder Helton MD  [TS] Anant Kwon MD                                           [facility]    Final diagnoses:   Paroxysmal atrial fibrillation (CMS/HCC)   Elevated brain natriuretic peptide (BNP) level       ED Disposition  ED Disposition     ED Disposition Condition Comment    Discharge Stable           Gualberto Gallo MD  [address] [id number] [phone]    In 2 days           Medication List      Stop    nitroglycerin 0.4 MG SL tablet  Commonly known as: NITROSTAT             Anant Kwon MD  03/17/21 [id number]

## 2021-03-17 NOTE — ED PROVIDER NOTES
Subjective   Patient is a 76-year-old female who has been battling atrial fibrillation and has an appointment set up with Dr. Cueto to consider ablation versus devices for her atrial fibrillation he states that over the last several weeks she has been getting more dyspneic.  She states that this morning she became extremely dyspneic and felt as though she was going to pass out.  She was just extremely lightheaded.  She states nothing really seems to make it better or worse and there is no associations this morning it came on she was at rest lying in bed sometimes it comes on activity.  She denies any chest pain she is not had any fevers or chills.          Review of Systems   Constitutional: Negative for chills, fatigue and fever.   HENT: Negative for congestion and facial swelling.    Eyes: Negative for photophobia, discharge and visual disturbance.   Respiratory: Positive for shortness of breath. Negative for cough and wheezing.    Cardiovascular: Negative for chest pain, palpitations and leg swelling.   Gastrointestinal: Negative for abdominal pain, diarrhea, nausea and vomiting.   Endocrine: Negative for cold intolerance and heat intolerance.   Genitourinary: Negative for difficulty urinating and urgency.   Musculoskeletal: Negative for arthralgias, joint swelling and myalgias.   Skin: Negative for color change and pallor.   Neurological: Negative for dizziness and light-headedness.   Hematological: Negative for adenopathy. Does not bruise/bleed easily.   Psychiatric/Behavioral: Negative for agitation, behavioral problems and confusion.       Past Medical History:   Diagnosis Date   • A-fib (CMS/[facility])    • Abnormal nuclear stress test 8/23/2019   • Angina pectoris (CMS/[facility])    • Anxiety    • Arthritis    • Atherosclerosis of coronary artery bypass graft    • CAD (coronary artery disease)    • Carotid artery occlusion without infarction    • Chronic atrial fibrillation (CMS/[facility]) 2/1/2021   • Chronic diastolic  heart failure (CMS/HCC)    • Chronic kidney disease    • Chronic lung disease    • Colon polyp    • Diabetes mellitus (CMS/HCC)    • DJD (degenerative joint disease)    • Essential hypertension 3/24/2017   • Fibrocystic breast disease    • Gastritis    • GI bleed    • Hemorrhoids    • Hyperlipidemia    • Hypertension    • Lung disease     chronic   • MI (myocardial infarction) (CMS/[facility])    • Palpitations    • PUD (peptic ulcer disease)    • PVD (peripheral vascular disease) (CMS/[facility])    • Renal failure, acute (CMS/[facility])    • 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       Allergies   Allergen Reactions   • Buffered Aspirin Angioedema     Patient has taken tums in the past with no problem.   • Salicylates Angioedema   • Demerol [Meperidine] Nausea And Vomiting and Hallucinations       Past Surgical History:   Procedure Laterality Date   • APPENDECTOMY     • CARDIAC CATHETERIZATION  05/01/2009    Left-Heart Skin   • CARDIAC CATHETERIZATION N/A 2/4/2021    Procedure: Left Heart Cath;  Surgeon: Tristan Cabello DO;  Location:  [facility] CATH [facility];  Service: Cardiology;  Laterality: N/A;   • CARDIAC CATHETERIZATION Right 2/5/2021    Procedure: Left Heart Cath, rotablator to RCA with Dr. Abrams at 1PM;  Surgeon: Tristan Cabello DO;  Location:  [facility] CATH INV[facility];  Service: Cardiology;  Laterality: Right;   • CAROTID ENDARTERECTOMY     • CATARACT EXTRACTION     • CATARACT EXTRACTION     • COLONOSCOPY     • CORONARY ARTERY BYPASS GRAFT  07/2007    Four   • ENDOSCOPY  10/02/2013   • ENDOSCOPY  10/02/2013   • HYSTERECTOMY     • SKIN CANCER EXCISION     • THROMBOENDARTERECTOMY     • TONSILLECTOMY         Family History   Problem Relation Age of Onset   • Heart disease Mother    • Breast cancer Mother    • Heart disease Father    • Heart disease Brother    • Heart disease Brother    • Breast cancer Maternal Grandmother        Social History     Socioeconomic History   • Marital status:      Spouse name:  Not on file   • Number of children: Not on file   • Years of education: Not on file   • Highest education level: Not on file   Tobacco Use   • Smoking status: Never Smoker   • Smokeless tobacco: Never Used   • Tobacco comment: Non smoker; no tobacco use   Vaping Use   • Vaping Use: Never used   Substance and Sexual Activity   • Alcohol use: Yes     Comment: occ   • Drug use: No   • Sexual activity: Defer           Objective   Physical Exam  Vitals and nursing note reviewed.   Constitutional:       Appearance: Normal appearance. She is well-developed.   HENT:      Head: Normocephalic and atraumatic.   Eyes:      Extraocular Movements: Extraocular movements intact.      Conjunctiva/sclera: Conjunctivae normal.      Pupils: Pupils are equal, round, and reactive to light.   Cardiovascular:      Rate and Rhythm: Normal rate. Rhythm irregular.      Heart sounds: Normal heart sounds.   Pulmonary:      Effort: Pulmonary effort is normal.      Breath sounds: Normal breath sounds.   Abdominal:      General: Bowel sounds are normal. There is no distension.      Palpations: Abdomen is soft.   Musculoskeletal:         General: Normal range of motion.      Cervical back: Normal range of motion and neck supple.   Skin:     General: Skin is warm and dry.   Neurological:      General: No focal deficit present.      Mental Status: She is alert and oriented to person, place, and time.      Cranial Nerves: No cranial nerve deficit.   Psychiatric:         Behavior: Behavior normal.         Thought Content: Thought content normal.         Procedures           ED Course  ED Course as of Mar 17 2050   Wed Mar 17, 2021   0624 Care of the patient will be turned over to Anant Kwon MD at 6:30 AM awaiting labs and imaging.    [RW]   1106 As we discussed the patient her heart rate is down to 83bpm she had an episode of aberrant conduction on the rhythm but no broad complex tach she has been asymptomatic I have discussed this case with   Cleo who reviewed the EKGs the patient was given Lasix per his recommendation and is going to follow-up with him as an outpatient I have discussed this case at length with the patient she does not want to be admitted she wants to go home    [TS]   1107 Risks and benefits of treatments given and alternative treatment options discussed with patient/family. I answered all the questions in simple, plain language, and there was voiced understanding and agreement with plan of care. There were no further questions. Differential diagnosis discussed. Patient/family was advised that the practice of medicine is not always an exact science, and sometimes tests, physical exam, or history may not show the underlying conditions with certainty. Additionally, the condition may change or show itself later after initial presentation. There was also expressed understanding and agreement with this limitation of emergency medicine practice. Patient/family was asked to return to ED if any problem or issues or if condition worsens or does not improved. Patient/family agreed to follow up with PCP/specialist as advised, or return to ED if unable to see a provider in a timely fashion for continued symptoms.     [TS]      ED Course User Index  [RW] Gurwinder Helton MD  [TS] Anant Kwon MD                                           [facility]    Final diagnoses:   Paroxysmal atrial fibrillation (CMS/HCC)   Elevated brain natriuretic peptide (BNP) level       ED Disposition  ED Disposition     ED Disposition Condition Comment    Discharge Stable           Gualberto Gallo MD  [address] [address]  [phone]    In 2 days           Medication List      Stop    nitroglycerin 0.4 MG SL tablet  Commonly known as: NITROSTAT             Gurwinder Helton MD  03/17/21 0649

## 2021-03-18 LAB
QT INTERVAL: 376 MS
QTC INTERVAL: 477 MS

## 2021-04-05 ENCOUNTER — APPOINTMENT (OUTPATIENT)
Dept: GENERAL RADIOLOGY | Facility: HOSPITAL | Age: 76
End: 2021-04-05

## 2021-04-05 ENCOUNTER — HOSPITAL ENCOUNTER (OUTPATIENT)
Facility: HOSPITAL | Age: 76
Setting detail: OBSERVATION
LOS: 1 days | Discharge: HOME | End: 2021-04-08
Attending: EMERGENCY MEDICINE | Admitting: INTERNAL MEDICINE

## 2021-04-05 DIAGNOSIS — I50.9 CONGESTIVE HEART FAILURE, UNSPECIFIED HF CHRONICITY, UNSPECIFIED HEART FAILURE TYPE (HCC): ICD-10-CM

## 2021-04-05 DIAGNOSIS — I48.91 ATRIAL FIBRILLATION WITH RVR (HCC): Primary | ICD-10-CM

## 2021-04-05 PROBLEM — I50.23 ACUTE ON CHRONIC SYSTOLIC CHF (CONGESTIVE HEART FAILURE) (HCC): Status: ACTIVE | Noted: 2021-04-05

## 2021-04-05 LAB
ALBUMIN SERPL-MCNC: 4.1 G/DL (ref 3.5–5.2)
ALBUMIN/GLOB SERPL: 1.6 G/DL
ALP SERPL-CCNC: 68 U/L (ref 39–117)
ALT SERPL W P-5'-P-CCNC: 15 U/L (ref 1–33)
ANION GAP SERPL CALCULATED.3IONS-SCNC: 10 MMOL/L (ref 5–15)
AST SERPL-CCNC: 21 U/L (ref 1–32)
BASOPHILS # BLD AUTO: 0.05 10*3/MM3 (ref 0–0.2)
BASOPHILS NFR BLD AUTO: 0.4 % (ref 0–1.5)
BILIRUB SERPL-MCNC: 0.6 MG/DL (ref 0–1.2)
BUN SERPL-MCNC: 20 MG/DL (ref 8–23)
BUN/CREAT SERPL: 21.1 (ref 7–25)
CALCIUM SPEC-SCNC: 9.4 MG/DL (ref 8.6–10.5)
CHLORIDE SERPL-SCNC: 100 MMOL/L (ref 98–107)
CO2 SERPL-SCNC: 28 MMOL/L (ref 22–29)
CREAT SERPL-MCNC: 0.95 MG/DL (ref 0.57–1)
D DIMER PPP FEU-MCNC: 0.82 MG/L (FEU) (ref 0–0.5)
D-LACTATE SERPL-SCNC: 1.1 MMOL/L (ref 0.5–2)
DEPRECATED RDW RBC AUTO: 54.6 FL (ref 37–54)
EOSINOPHIL # BLD AUTO: 0.41 10*3/MM3 (ref 0–0.4)
EOSINOPHIL NFR BLD AUTO: 3 % (ref 0.3–6.2)
ERYTHROCYTE [DISTWIDTH] IN BLOOD BY AUTOMATED COUNT: 16 % (ref 12.3–15.4)
GFR SERPL CREATININE-BSD FRML MDRD: 57 ML/MIN/1.73
GLOBULIN UR ELPH-MCNC: 2.6 GM/DL
GLUCOSE SERPL-MCNC: 107 MG/DL (ref 65–99)
HCT VFR BLD AUTO: 34.9 % (ref 34–46.6)
HGB BLD-MCNC: 11.4 G/DL (ref 12–15.9)
HOLD SPECIMEN: NORMAL
HOLD SPECIMEN: NORMAL
IMM GRANULOCYTES # BLD AUTO: 0.06 10*3/MM3 (ref 0–0.05)
IMM GRANULOCYTES NFR BLD AUTO: 0.4 % (ref 0–0.5)
INR PPP: 1.67 (ref 0.91–1.09)
LYMPHOCYTES # BLD AUTO: 1.79 10*3/MM3 (ref 0.7–3.1)
LYMPHOCYTES NFR BLD AUTO: 13.3 % (ref 19.6–45.3)
MAGNESIUM SERPL-MCNC: 2.2 MG/DL (ref 1.6–2.4)
MCH RBC QN AUTO: 30.5 PG (ref 26.6–33)
MCHC RBC AUTO-ENTMCNC: 32.7 G/DL (ref 31.5–35.7)
MCV RBC AUTO: 93.3 FL (ref 79–97)
MONOCYTES # BLD AUTO: 1.11 10*3/MM3 (ref 0.1–0.9)
MONOCYTES NFR BLD AUTO: 8.2 % (ref 5–12)
NEUTROPHILS NFR BLD AUTO: 10.08 10*3/MM3 (ref 1.7–7)
NEUTROPHILS NFR BLD AUTO: 74.7 % (ref 42.7–76)
NRBC BLD AUTO-RTO: 0 /100 WBC (ref 0–0.2)
NT-PROBNP SERPL-MCNC: 4160 PG/ML (ref 0–1800)
PLATELET # BLD AUTO: 263 10*3/MM3 (ref 140–450)
PMV BLD AUTO: 9.8 FL (ref 6–12)
POTASSIUM SERPL-SCNC: 4.3 MMOL/L (ref 3.5–5.2)
PROT SERPL-MCNC: 6.7 G/DL (ref 6–8.5)
PROTHROMBIN TIME: 19.4 SECONDS (ref 11.9–14.6)
RBC # BLD AUTO: 3.74 10*6/MM3 (ref 3.77–5.28)
SARS-COV-2 RNA PNL SPEC NAA+PROBE: NOT DETECTED
SODIUM SERPL-SCNC: 138 MMOL/L (ref 136–145)
TROPONIN T SERPL-MCNC: <0.01 NG/ML (ref 0–0.03)
TROPONIN T SERPL-MCNC: <0.01 NG/ML (ref 0–0.03)
WBC # BLD AUTO: 13.5 10*3/MM3 (ref 3.4–10.8)
WHOLE BLOOD HOLD SPECIMEN: NORMAL
WHOLE BLOOD HOLD SPECIMEN: NORMAL

## 2021-04-05 PROCEDURE — 85379 FIBRIN DEGRADATION QUANT: CPT | Performed by: EMERGENCY MEDICINE

## 2021-04-05 PROCEDURE — 83735 ASSAY OF MAGNESIUM: CPT | Performed by: EMERGENCY MEDICINE

## 2021-04-05 PROCEDURE — 85610 PROTHROMBIN TIME: CPT | Performed by: EMERGENCY MEDICINE

## 2021-04-05 PROCEDURE — 99214 OFFICE O/P EST MOD 30 MIN: CPT | Performed by: INTERNAL MEDICINE

## 2021-04-05 PROCEDURE — 83880 ASSAY OF NATRIURETIC PEPTIDE: CPT | Performed by: EMERGENCY MEDICINE

## 2021-04-05 PROCEDURE — 96375 TX/PRO/DX INJ NEW DRUG ADDON: CPT

## 2021-04-05 PROCEDURE — C9803 HOPD COVID-19 SPEC COLLECT: HCPCS

## 2021-04-05 PROCEDURE — 80053 COMPREHEN METABOLIC PANEL: CPT | Performed by: EMERGENCY MEDICINE

## 2021-04-05 PROCEDURE — G0378 HOSPITAL OBSERVATION PER HR: HCPCS

## 2021-04-05 PROCEDURE — 83605 ASSAY OF LACTIC ACID: CPT | Performed by: EMERGENCY MEDICINE

## 2021-04-05 PROCEDURE — 93005 ELECTROCARDIOGRAM TRACING: CPT | Performed by: EMERGENCY MEDICINE

## 2021-04-05 PROCEDURE — 25010000002 FUROSEMIDE PER 20 MG: Performed by: EMERGENCY MEDICINE

## 2021-04-05 PROCEDURE — 93010 ELECTROCARDIOGRAM REPORT: CPT | Performed by: INTERNAL MEDICINE

## 2021-04-05 PROCEDURE — 84484 ASSAY OF TROPONIN QUANT: CPT | Performed by: EMERGENCY MEDICINE

## 2021-04-05 PROCEDURE — 99285 EMERGENCY DEPT VISIT HI MDM: CPT

## 2021-04-05 PROCEDURE — 71045 X-RAY EXAM CHEST 1 VIEW: CPT

## 2021-04-05 PROCEDURE — 85025 COMPLETE CBC W/AUTO DIFF WBC: CPT | Performed by: EMERGENCY MEDICINE

## 2021-04-05 PROCEDURE — 96374 THER/PROPH/DIAG INJ IV PUSH: CPT

## 2021-04-05 PROCEDURE — 87635 SARS-COV-2 COVID-19 AMP PRB: CPT | Performed by: EMERGENCY MEDICINE

## 2021-04-05 PROCEDURE — 87040 BLOOD CULTURE FOR BACTERIA: CPT | Performed by: EMERGENCY MEDICINE

## 2021-04-05 RX ORDER — DILTIAZEM HYDROCHLORIDE 240 MG/1
240 CAPSULE, COATED, EXTENDED RELEASE ORAL
Status: DISCONTINUED | OUTPATIENT
Start: 2021-04-06 | End: 2021-04-07

## 2021-04-05 RX ORDER — FUROSEMIDE 10 MG/ML
40 INJECTION INTRAMUSCULAR; INTRAVENOUS DAILY
Status: DISCONTINUED | OUTPATIENT
Start: 2021-04-06 | End: 2021-04-06

## 2021-04-05 RX ORDER — SODIUM CHLORIDE 0.9 % (FLUSH) 0.9 %
10 SYRINGE (ML) INJECTION AS NEEDED
Status: DISCONTINUED | OUTPATIENT
Start: 2021-04-05 | End: 2021-04-08 | Stop reason: HOSPADM

## 2021-04-05 RX ORDER — SODIUM CHLORIDE 0.9 % (FLUSH) 0.9 %
10 SYRINGE (ML) INJECTION AS NEEDED
Status: DISCONTINUED | OUTPATIENT
Start: 2021-04-05 | End: 2021-04-05 | Stop reason: SDUPTHER

## 2021-04-05 RX ORDER — DIGOXIN 250 MCG
500 TABLET ORAL ONCE
Status: COMPLETED | OUTPATIENT
Start: 2021-04-05 | End: 2021-04-05

## 2021-04-05 RX ORDER — DIGOXIN 125 MCG
125 TABLET ORAL DAILY
Status: DISCONTINUED | OUTPATIENT
Start: 2021-04-07 | End: 2021-04-07

## 2021-04-05 RX ORDER — PRAVASTATIN SODIUM 20 MG
40 TABLET ORAL DAILY
Status: DISCONTINUED | OUTPATIENT
Start: 2021-04-06 | End: 2021-04-08 | Stop reason: HOSPADM

## 2021-04-05 RX ORDER — METOPROLOL SUCCINATE 50 MG/1
50 TABLET, EXTENDED RELEASE ORAL DAILY
Status: DISCONTINUED | OUTPATIENT
Start: 2021-04-06 | End: 2021-04-08

## 2021-04-05 RX ORDER — DIGOXIN 250 MCG
500 TABLET ORAL
Status: DISCONTINUED | OUTPATIENT
Start: 2021-04-05 | End: 2021-04-05

## 2021-04-05 RX ORDER — METOPROLOL TARTRATE 5 MG/5ML
5 INJECTION INTRAVENOUS ONCE
Status: COMPLETED | OUTPATIENT
Start: 2021-04-05 | End: 2021-04-05

## 2021-04-05 RX ORDER — FUROSEMIDE 10 MG/ML
40 INJECTION INTRAMUSCULAR; INTRAVENOUS ONCE
Status: COMPLETED | OUTPATIENT
Start: 2021-04-05 | End: 2021-04-05

## 2021-04-05 RX ORDER — SODIUM CHLORIDE 0.9 % (FLUSH) 0.9 %
10 SYRINGE (ML) INJECTION EVERY 12 HOURS SCHEDULED
Status: DISCONTINUED | OUTPATIENT
Start: 2021-04-05 | End: 2021-04-08 | Stop reason: HOSPADM

## 2021-04-05 RX ORDER — DIGOXIN 250 MCG
250 TABLET ORAL EVERY 6 HOURS
Status: DISCONTINUED | OUTPATIENT
Start: 2021-04-05 | End: 2021-04-07

## 2021-04-05 RX ORDER — LISINOPRIL 5 MG/1
5 TABLET ORAL
Status: DISCONTINUED | OUTPATIENT
Start: 2021-04-05 | End: 2021-04-08 | Stop reason: HOSPADM

## 2021-04-05 RX ADMIN — LISINOPRIL 5 MG: 5 TABLET ORAL at 13:18

## 2021-04-05 RX ADMIN — SODIUM CHLORIDE, PRESERVATIVE FREE 10 ML: 5 INJECTION INTRAVENOUS at 20:06

## 2021-04-05 RX ADMIN — METOPROLOL TARTRATE 5 MG: 1 INJECTION, SOLUTION INTRAVENOUS at 07:22

## 2021-04-05 RX ADMIN — DIGOXIN 500 MCG: 250 TABLET ORAL at 13:17

## 2021-04-05 RX ADMIN — TICAGRELOR 90 MG: 90 TABLET ORAL at 20:06

## 2021-04-05 RX ADMIN — FUROSEMIDE 40 MG: 10 INJECTION, SOLUTION INTRAVENOUS at 08:55

## 2021-04-05 RX ADMIN — DIGOXIN 250 MCG: 250 TABLET ORAL at 20:05

## 2021-04-05 RX ADMIN — APIXABAN 5 MG: 5 TABLET, FILM COATED ORAL at 20:05

## 2021-04-05 NOTE — H&P
[facility] Medicine Services  HISTORY AND PHYSICAL    Date of Admission: 4/5/2021  Primary Care Physician: Matthieu Bhakta DO    Subjective     Chief Complaint: Shortness of breath    History of Present Illness  Patient is a 76-year-old female with a history of atrial fibrillation on metoprolol/Cardizem/Eliquis, CAD with recent PCI in February 21 currently on Eliquis and Brilinta.  She presents into the hospital for several days of increasing shortness of breath and dyspnea on exertion.  Only other complaint is decreased appetite.  She denies any fevers or chills.  No chest pain or nausea.  No sputum.  No diarrhea.  No focal numbness tingling or weakness.  In the ER she was found to be in A. fib with rates going from .  Patient states last she knows she was in sinus rhythm.  EKGs from February and March however show A. fib.  Chest x-ray showing some potential early vascular congestion.  In ER she was given 5 IV Lopressor and some IV Lasix.  She has urinated already in the ER and feels better.  She is on room air.  Rates currently  when I saw her.  She already took her morning meds to include her Cardizem, Lopressor, Eliquis, Brilinta.        Review of Systems   Otherwise complete ROS reviewed and negative except as mentioned in the HPI.    Past Medical History:   Past Medical History:   Diagnosis Date   • A-fib (CMS/H[facility])    • Abnormal nuclear stress test 8/23/2019   • Angina pectoris (CMS/HCC)    • Anxiety    • Arthritis    • Atherosclerosis of coronary artery bypass graft    • CAD (coronary artery disease)    • Carotid artery occlusion without infarction    • Chronic atrial fibrillation (CMS/HCC) 2/1/2021   • Chronic diastolic heart failure (CMS/HCC)    • Chronic kidney disease    • Chronic lung disease    • Colon polyp    • Diabetes mellitus (CMS/HCC)    • DJD (degenerative joint disease)    • Essential hypertension 3/24/2017   • Fibrocystic breast disease    •  Gastritis    • GI bleed    • Hemorrhoids    • Hyperlipidemia    • Hypertension    • Lung disease     chronic   • MI (myocardial infarction) (CMS/[facility])    • Palpitations    • PUD (peptic ulcer disease)    • PVD (peripheral vascular disease) (CMS/[facility])    • Renal failure, acute (CMS/[facility])    • 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     Past Surgical History:  Past Surgical History:   Procedure Laterality Date   • APPENDECTOMY     • CARDIAC CATHETERIZATION  05/01/2009    Left-Heart Skin   • CARDIAC CATHETERIZATION N/A 2/4/2021    Procedure: Left Heart Cath;  Surgeon: Tristan Cabello DO;  Location:  [facility] CATH INV[facility];  Service: Cardiology;  Laterality: N/A;   • CARDIAC CATHETERIZATION Right 2/5/2021    Procedure: Left Heart Cath, rotablator to RCA with Dr. Abrams at 1PM;  Surgeon: Tristan Cabello DO;  Location:  [facility] CATH [facility];  Service: Cardiology;  Laterality: Right;   • CAROTID ENDARTERECTOMY     • CATARACT EXTRACTION     • CATARACT EXTRACTION     • COLONOSCOPY     • CORONARY ARTERY BYPASS GRAFT  07/2007    Four   • ENDOSCOPY  10/02/2013   • ENDOSCOPY  10/02/2013   • HYSTERECTOMY     • SKIN CANCER EXCISION     • THROMBOENDARTERECTOMY     • TONSILLECTOMY       Social History:  reports that she has never smoked. She has never used smokeless tobacco. She reports current alcohol use. She reports that she does not use drugs.    Family History: family history includes Breast cancer in her maternal grandmother and mother; Heart disease in her brother, brother, father, and mother.     Allergies:  Allergies   Allergen Reactions   • Buffered Aspirin Angioedema     Patient has taken tums in the past with no problem.   • Salicylates Angioedema   • Demerol [Meperidine] Nausea And Vomiting and Hallucinations     Medications:  Prior to Admission medications    Medication Sig Start Date End Date Taking? Authorizing Provider   apixaban (ELIQUIS) 5 MG tablet tablet Take 1 tablet by mouth Every 12 (Twelve)  "Hours. 2/6/21   Sravan Ross DO   dilTIAZem CD (CARDIZEM CD) 360 MG 24 hr capsule Take 1 capsule by mouth Daily. 1/5/20   Demetria Cervantes APRN   furosemide (LASIX) 20 MG tablet Take 20 mg by mouth Take As Directed. Take one tablet by mouth daily on Tuesday, Thursday, Saturday, Sunday. Take two tablets by mouth daily on Monday, Wednesday, Friday.    ProviderRonald MD   metoprolol succinate XL (Toprol XL) 50 MG 24 hr tablet Take 1 tablet by mouth Daily. 2/7/21   Sravan Ross DO   Multiple Vitamins-Minerals (MULTIVITAMIN ADULT PO) Take 1 tablet by mouth Daily.    ProviderRonald MD   pravastatin (PRAVACHOL) 40 MG tablet TAKE 1 TABLET BY MOUTH DAILY. 3/29/20   Gualberto Gallo MD   ticagrelor (BRILINTA) 90 MG tablet tablet Take 1 tablet by mouth 2 (Two) Times a Day. 2/6/21   Sravan Ross DO     Objective     Vital Signs: /71   Pulse 103   Temp 97.6 °F (36.4 °C) (Oral)   Resp 20   Ht 157.5 cm (62\")   Wt 57.6 kg (127 lb)   SpO2 98%   BMI 23.23 kg/m²   Physical Exam   GEN: Awake, alert, interactive, in NAD  HEENT:  PERRLA, EOMI, Anicteric, Trachea midline  Lungs: faint bibasilar crackles, no wheezing  Heart: irreg/irreg, +S1/s2, no rub  ABD: soft, nt/nd, +BS, no guarding/rebound  Extremities: no cyanosis, no edema  Skin: no rashes or lesions  Neuro: AAOx3, no focal deficits  Psych: normal mood & affect        Results Reviewed:  Lab Results (last 24 hours)     Procedure Component Value Units Date/Time    Troponin [[id number]]  (Normal) Collected: 04/05/21 0852    Specimen: Blood Updated: 04/05/21 0917     Troponin T <0.010 ng/mL     Narrative:      Troponin T Reference Range:  <= 0.03 ng/mL-   Negative for AMI  >0.03 ng/mL-     Abnormal for myocardial necrosis.  Clinicians would have to utilize clinical acumen, EKG, Troponin and serial changes to determine if it is an Acute Myocardial Infarction or myocardial injury due to an underlying chronic condition.       Results may be " falsely decreased if patient taking Biotin.      COVID PRE-OP / PRE-PROCEDURE SCREENING ORDER (NO ISOLATION) - Swab, Nasal Cavity [[id number]] Collected: 04/05/21 0853    Specimen: Swab from Nasal Cavity Updated: 04/05/21 0905    Narrative:      The following orders were created for panel order COVID PRE-OP / PRE-PROCEDURE SCREENING ORDER (NO ISOLATION) - Swab, Nasal Cavity.  Procedure                               Abnormality         Status                     ---------                               -----------         ------                     COVID-19,[facility]...[[id number]]                      In process                   Please view results for these tests on the individual orders.    COVID-19,[facility] IN-HOUSE,Nasal Swab No Transport Media 3-4 HR TAT - Swab, Nasal Cavity [[id number]] Collected: 04/05/21 0853    Specimen: Swab from Nasal Cavity Updated: 04/05/21 0905    [place] Draw [[id number]] Collected: 04/05/21 0649    Specimen: Blood Updated: 04/05/21 0800    Narrative:      The following orders were created for panel order [place] Draw.  Procedure                               Abnormality         Status                     ---------                               -----------         ------                     Light Blue Top[[id number]]                                   Final result               Green Top (Gel)[[id number]]                                  Final result               Lavender Top[[id number]]                                     Final result               Red Top[[id number]]                                          Final result                 Please view results for these tests on the individual orders.    Red Top [[id number]] Collected: 04/05/21 0649    Specimen: Blood Updated: 04/05/21 0800     Extra Tube Hold for add-ons.     Comment: Auto resulted.       Green Top (Gel) [[id number]] Collected: 04/05/21 0649    Specimen: Blood Updated: 04/05/21 0800     Extra Tube Hold for add-ons.      Comment: Auto resulted.       Lavender Top [[id number]] Collected: 04/05/21 0649    Specimen: Blood Updated: 04/05/21 0800     Extra Tube hold for add-on     Comment: Auto resulted       Light Blue Top [[id number]] Collected: 04/05/21 0649    Specimen: Blood Updated: 04/05/21 0800     Extra Tube hold for add-on     Comment: Auto resulted       CBC & Differential [[id number]]  (Abnormal) Collected: 04/05/21 0649    Specimen: Blood Updated: 04/05/21 0747    Narrative:      The following orders were created for panel order CBC & Differential.  Procedure                               Abnormality         Status                     ---------                               -----------         ------                     CBC Auto Differential[[id number]]        Abnormal            Final result                 Please view results for these tests on the individual orders.    CBC Auto Differential [[id number]]  (Abnormal) Collected: 04/05/21 0649    Specimen: Blood Updated: 04/05/21 0747     WBC 13.50 10*3/mm3      RBC 3.74 10*6/mm3      Hemoglobin 11.4 g/dL      Hematocrit 34.9 %      MCV 93.3 fL      MCH 30.5 pg      MCHC 32.7 g/dL      RDW 16.0 %      RDW-SD 54.6 fl      MPV 9.8 fL      Platelets 263 10*3/mm3      Neutrophil % 74.7 %      Lymphocyte % 13.3 %      Monocyte % 8.2 %      Eosinophil % 3.0 %      Basophil % 0.4 %      Immature Grans % 0.4 %      Neutrophils, Absolute 10.08 10*3/mm3      Lymphocytes, Absolute 1.79 10*3/mm3      Monocytes, Absolute 1.11 10*3/mm3      Eosinophils, Absolute 0.41 10*3/mm3      Basophils, Absolute 0.05 10*3/mm3      Immature Grans, Absolute 0.06 10*3/mm3      nRBC 0.0 /100 WBC     Blood Culture - Blood, Arm, Right [[id number]] Collected: 04/05/21 0700    Specimen: Blood from Arm, Right Updated: 04/05/21 0722    Lactic Acid, Plasma [[id number]]  (Normal) Collected: 04/05/21 0700    Specimen: Blood Updated: 04/05/21 0722     Lactate 1.1 mmol/L     Magnesium [[id number]]  (Normal) Collected:  04/05/21 0649    Specimen: Blood Updated: 04/05/21 0722     Magnesium 2.2 mg/dL     Blood Culture - Blood, Arm, Left [[id number]] Collected: 04/05/21 0650    Specimen: Blood from Arm, Left Updated: 04/05/21 0722    D-dimer, Quantitative [[id number]]  (Abnormal) Collected: 04/05/21 0649    Specimen: Blood Updated: 04/05/21 0721     D-Dimer, Quantitative 0.82 mg/L (FEU)     Narrative:      Reference Range is 0-0.50 mg/L FEU. However, results <0.50 mg/L FEU tends to rule out DVT or PE. Results >0.50 mg/L FEU are not useful in predicting absence or presence of DVT or PE.      Protime-INR [[id number]]  (Abnormal) Collected: 04/05/21 0649    Specimen: Blood Updated: 04/05/21 0720     Protime 19.4 Seconds      INR 1.67    Comprehensive Metabolic Panel [[id number]]  (Abnormal) Collected: 04/05/21 0649    Specimen: Blood Updated: 04/05/21 0717     Glucose 107 mg/dL      BUN 20 mg/dL      Creatinine 0.95 mg/dL      Sodium 138 mmol/L      Potassium 4.3 mmol/L      Chloride 100 mmol/L      CO2 28.0 mmol/L      Calcium 9.4 mg/dL      Total Protein 6.7 g/dL      Albumin 4.10 g/dL      ALT (SGPT) 15 U/L      AST (SGOT) 21 U/L      Alkaline Phosphatase 68 U/L      Total Bilirubin 0.6 mg/dL      eGFR Non African Amer 57 mL/min/1.73      Globulin 2.6 gm/dL      A/G Ratio 1.6 g/dL      BUN/Creatinine Ratio 21.1     Anion Gap 10.0 mmol/L     Narrative:      GFR Normal >60  Chronic Kidney Disease <60  Kidney Failure <15      Troponin [[id number]]  (Normal) Collected: 04/05/21 0649    Specimen: Blood Updated: 04/05/21 0715     Troponin T <0.010 ng/mL     Narrative:      Troponin T Reference Range:  <= 0.03 ng/mL-   Negative for AMI  >0.03 ng/mL-     Abnormal for myocardial necrosis.  Clinicians would have to utilize clinical acumen, EKG, Troponin and serial changes to determine if it is an Acute Myocardial Infarction or myocardial injury due to an underlying chronic condition.       Results may be falsely decreased if patient taking Biotin.       proBNP [[id number]]  (Abnormal) Collected: 04/05/21 0649    Specimen: Blood Updated: 04/05/21 0714     proBNP 4,160.0 pg/mL     Narrative:      Among patients with dyspnea, NT-proBNP is highly sensitive for the detection of acute congestive heart failure. In addition NT-proBNP of <300 pg/ml effectively rules out acute congestive heart failure with 99% negative predictive value.    Results may be falsely decreased if patient taking Biotin.          Imaging Results (Last 24 Hours)     Procedure Component Value Units Date/Time    XR Chest 1 View [[id number]] Collected: 04/05/21 0751     Updated: 04/05/21 0755    Narrative:      Exam: XR CHEST 1 VW-     Indication: Chest Pain Triage Protocol     Comparison: 3/17/2020     Findings:     Cardiac silhouette is enlarged but stable. Prior median sternotomy and  CABG. No large pleural effusion or visible pneumothorax. Central  vascular congestion with mild diffuse interstitial opacity. No focal  consolidation. No acute osseous finding. Surgical clips in the RIGHT  neck soft tissues.       Impression:         Central vascular congestion and mild diffuse interstitial opacity.  Correlate for mild pulmonary edema.  This report was finalized on 04/05/2021 07:52 by Dr. Og Mackenzie MD.        I have personally reviewed and interpreted the radiology studies and ECG obtained at time of admission.     Assessment / Plan     Assessment:   Active Hospital Problems    Diagnosis    • **Atrial fibrillation with RVR (CMS/HCC)    • Acute on chronic systolic CHF (congestive heart failure) (CMS/HCC)    • Coronary artery disease involving native coronary artery of native heart without angina pectoris    • Mixed hyperlipidemia    • Essential hypertension          Plan:   #1  Atrial fibrillation with RVR -patient with known history and is already on Cardizem 360 CD and Lopressor 50 XL.  Also on Eliquis.  Rates currently  when I saw her but per ER will spike up to 130.  Seems to be fairly  well controlled at this time.  Will admit the patient to observation and ask cardiology to see her for any further med adjustments felt necessary.  Patient had appointment coming up with cardiology to couple days and requested to see them here.    #2  Acute on chronic systolic heart failure -patient had an EF of 41 to 45% on her last hospitalization that required cardiac cath.  She is on Toprol-XL.  May benefit from an ACE I at some point as well if her blood pressure will allow.  Currently soft.  Question need for a limited echo to reevaluate her EF post PCI.  Will defer to cardiology.  She was given a dose of IV Lasix in the ER.  Will monitor outputs and daily weights.  Plan for another dose of IV Lasix tomorrow morning.  She does not look grossly overloaded on exam.    #3 CAD -she has dyspnea but felt related to #1 and 2 above.  She has no active chest pain.  Troponin negative x2.  Status post recent PCI in February.  Continue beta-blocker, Eliquis, Brilinta, statin.    #4 hypertension -pressure currently soft.  Monitor on current meds with potential need for adjustments.    #5 hyperlipidemia -statin      Code Status: Full Code     I discussed the patient's findings and my recommendations with the patient directly at bedside as well as her daughter Radha who will be decision-maker if patient cannot.    Estimated length of stay less than 2 days    Patient seen and examined by me on 4/5/21 at 9:40 AM.    Electronically signed by Samson Stout DO, 04/05/21, 10:04 CDT.

## 2021-04-05 NOTE — ED PROVIDER NOTES
Subjective   Patient says she woke up yesterday morning just feeling fatigued.  As the day went on she does seem to be increasingly fatigued but no other specific symptoms.  She went to bed early last night and then woke up during the night feeling short of breath.  She says she tried to lay in different positions but could not get it to go away so she came in to be checked out here.  She denies any significant cough or congestion.  She says she has a little cough because of a dry throat after breathing so hard.  She denies any chest pains or fever or chills or vomiting or diarrhea.  She says she has had this several times before and is always when her atrial fib accept.  She does have atrial for but says she is not normally in it.  She has seen a cardiologist and there are plans to send her to an electrophysiologist.      History provided by:  Patient   used: No    Shortness of Breath  Severity:  Moderate  Onset quality:  Gradual  Duration:  12 hours  Timing:  Constant  Progression:  Worsening  Chronicity:  Recurrent  Context: not activity, not animal exposure, not emotional upset, not fumes, not known allergens, not occupational exposure, not pollens, not smoke exposure, not strong odors, not URI and not weather changes    Relieved by:  Nothing  Worsened by:  Nothing  Ineffective treatments:  None tried  Associated symptoms: no abdominal pain, no chest pain, no claudication, no cough, no diaphoresis, no ear pain, no fever, no headaches, no hemoptysis, no neck pain, no PND, no rash, no sore throat, no sputum production, no syncope, no swollen glands, no vomiting and no wheezing    Risk factors: no recent alcohol use, no family hx of DVT, no hx of cancer, no hx of PE/DVT, no obesity, no oral contraceptive use, no prolonged immobilization and no recent surgery        Review of Systems   Constitutional: Negative.  Negative for diaphoresis and fever.   HENT: Negative.  Negative for ear pain and sore  throat.    Respiratory: Positive for shortness of breath. Negative for cough, hemoptysis, sputum production and wheezing.    Cardiovascular: Negative.  Negative for chest pain, claudication, syncope and PND.   Gastrointestinal: Negative.  Negative for abdominal pain and vomiting.   Genitourinary: Negative.    Musculoskeletal: Negative.  Negative for neck pain.   Skin: Negative.  Negative for rash.   Neurological: Negative.  Negative for headaches.   Hematological: Negative.    Psychiatric/Behavioral: Negative.    All other systems reviewed and are negative.      Past Medical History:   Diagnosis Date   • A-fib (CMS/[facility])    • Abnormal nuclear stress test 8/23/2019   • Angina pectoris (CMS/[facility])    • Anxiety    • Arthritis    • Atherosclerosis of coronary artery bypass graft    • CAD (coronary artery disease)    • Carotid artery occlusion without infarction    • Chronic atrial fibrillation (CMS/[facility]) 2/1/2021   • Chronic diastolic heart failure (CMS/[facility])    • Chronic kidney disease    • Chronic lung disease    • Colon polyp    • Diabetes mellitus (CMS/[facility])    • DJD (degenerative joint disease)    • Essential hypertension 3/24/2017   • Fibrocystic breast disease    • Gastritis    • GI bleed    • Hemorrhoids    • Hyperlipidemia    • Hypertension    • Lung disease     chronic   • MI (myocardial infarction) (CMS/[facility])    • Palpitations    • PUD (peptic ulcer disease)    • PVD (peripheral vascular disease) (CMS/[facility])    • Renal failure, acute (CMS/[facility])    • 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       Allergies   Allergen Reactions   • Buffered Aspirin Angioedema     Patient has taken tums in the past with no problem.   • Salicylates Angioedema   • Demerol [Meperidine] Nausea And Vomiting and Hallucinations       Past Surgical History:   Procedure Laterality Date   • APPENDECTOMY     • CARDIAC CATHETERIZATION  05/01/2009    Left-Heart Skin   • CARDIAC CATHETERIZATION N/A 2/4/2021    Procedure: Left Heart Cath;  Surgeon: Tristan Cabello  DO Samson;  Location:  [facility] CATH [facility];  Service: Cardiology;  Laterality: N/A;   • CARDIAC CATHETERIZATION Right 2/5/2021    Procedure: Left Heart Cath, rotablator to RCA with Dr. Abrams at 1PM;  Surgeon: Tristan Cabello DO;  Location:  [facility] CATH INV[facility];  Service: Cardiology;  Laterality: Right;   • CAROTID ENDARTERECTOMY     • CATARACT EXTRACTION     • CATARACT EXTRACTION     • COLONOSCOPY     • CORONARY ARTERY BYPASS GRAFT  07/2007    Four   • ENDOSCOPY  10/02/2013   • ENDOSCOPY  10/02/2013   • HYSTERECTOMY     • SKIN CANCER EXCISION     • THROMBOENDARTERECTOMY     • TONSILLECTOMY         Family History   Problem Relation Age of Onset   • Heart disease Mother    • Breast cancer Mother    • Heart disease Father    • Heart disease Brother    • Heart disease Brother    • Breast cancer Maternal Grandmother        Social History     Socioeconomic History   • Marital status:      Spouse name: Not on file   • Number of children: Not on file   • Years of education: Not on file   • Highest education level: Not on file   Tobacco Use   • Smoking status: Never Smoker   • Smokeless tobacco: Never Used   • Tobacco comment: Non smoker; no tobacco use   Vaping Use   • Vaping Use: Never used   Substance and Sexual Activity   • Alcohol use: Yes     Comment: occ   • Drug use: No   • Sexual activity: Defer       Prior to Admission medications    Medication Sig Start Date End Date Taking? Authorizing Provider   apixaban (ELIQUIS) 5 MG tablet tablet Take 1 tablet by mouth Every 12 (Twelve) Hours. 2/6/21   Sravan Ross DO   dilTIAZem CD (CARDIZEM CD) 360 MG 24 hr capsule Take 1 capsule by mouth Daily. 1/5/20   Demetria Cervantes APRN   furosemide (LASIX) 20 MG tablet Take 20 mg by mouth Take As Directed. Take one tablet by mouth daily on Tuesday, Thursday, Saturday, Sunday. Take two tablets by mouth daily on Monday, Wednesday, Friday.    Provider, MD Ronald   metoprolol succinate  XL (Toprol XL) 50 MG 24 hr tablet Take 1 tablet by mouth Daily. 2/7/21   Sravan Ross, DO   Multiple Vitamins-Minerals (MULTIVITAMIN ADULT PO) Take 1 tablet by mouth Daily.    Provider, MD Ronald   pravastatin (PRAVACHOL) 40 MG tablet TAKE 1 TABLET BY MOUTH DAILY. 3/29/20   Gualberto Gallo MD   ticagrelor (BRILINTA) 90 MG tablet tablet Take 1 tablet by mouth 2 (Two) Times a Day. 2/6/21   Sravan Ross, DO       Medications   sodium chloride 0.9 % flush 10 mL (has no administration in time range)   apixaban (ELIQUIS) tablet 5 mg (has no administration in time range)   dilTIAZem CD (CARDIZEM CD) 24 hr capsule 360 mg (has no administration in time range)   metoprolol succinate XL (TOPROL-XL) 24 hr tablet 50 mg (has no administration in time range)   pravastatin (PRAVACHOL) tablet 40 mg (has no administration in time range)   ticagrelor (BRILINTA) tablet 90 mg (has no administration in time range)   sodium chloride 0.9 % flush 10 mL (has no administration in time range)   sodium chloride 0.9 % flush 10 mL (has no administration in time range)   furosemide (LASIX) injection 40 mg (has no administration in time range)   metoprolol tartrate (LOPRESSOR) injection 5 mg (5 mg Intravenous Given 4/5/21 0722)   furosemide (LASIX) injection 40 mg (40 mg Intravenous Given 4/5/21 0855)       Vitals:    04/05/21 1015   BP: (!) 120/106   Pulse: 94   Resp:    Temp:    SpO2: (!) 87%         Objective   Physical Exam  Vitals and nursing note reviewed.   Constitutional:       Appearance: She is well-developed.   HENT:      Head: Normocephalic and atraumatic.   Eyes:      Extraocular Movements: Extraocular movements intact.      Pupils: Pupils are equal, round, and reactive to light.   Cardiovascular:      Rate and Rhythm: Tachycardia present. Rhythm irregular.      Heart sounds: Murmur heard.        Comments: Patient is mildly tachycardic.  She has an irregular rhythm.  She has a 3/6 systolic ejection murmur.  Pulmonary:       Effort: Pulmonary effort is normal.      Breath sounds: Normal breath sounds.   Abdominal:      General: Bowel sounds are normal.      Palpations: Abdomen is soft.   Musculoskeletal:         General: Normal range of motion.      Cervical back: Normal range of motion and neck supple.   Skin:     General: Skin is warm and dry.   Neurological:      General: No focal deficit present.      Mental Status: She is alert and oriented to person, place, and time.   Psychiatric:         Mood and Affect: Mood normal.         Behavior: Behavior normal.         Procedures         Lab Results (last 24 hours)     Procedure Component Value Units Date/Time    CBC & Differential [[id number]]  (Abnormal) Collected: 04/05/21 0649    Specimen: Blood Updated: 04/05/21 0747    Narrative:      The following orders were created for panel order CBC & Differential.  Procedure                               Abnormality         Status                     ---------                               -----------         ------                     CBC Auto Differential[[id number]]        Abnormal            Final result                 Please view results for these tests on the individual orders.    Comprehensive Metabolic Panel [[id number]]  (Abnormal) Collected: 04/05/21 0649    Specimen: Blood Updated: 04/05/21 0717     Glucose 107 mg/dL      BUN 20 mg/dL      Creatinine 0.95 mg/dL      Sodium 138 mmol/L      Potassium 4.3 mmol/L      Chloride 100 mmol/L      CO2 28.0 mmol/L      Calcium 9.4 mg/dL      Total Protein 6.7 g/dL      Albumin 4.10 g/dL      ALT (SGPT) 15 U/L      AST (SGOT) 21 U/L      Alkaline Phosphatase 68 U/L      Total Bilirubin 0.6 mg/dL      eGFR Non African Amer 57 mL/min/1.73      Globulin 2.6 gm/dL      A/G Ratio 1.6 g/dL      BUN/Creatinine Ratio 21.1     Anion Gap 10.0 mmol/L     Narrative:      GFR Normal >60  Chronic Kidney Disease <60  Kidney Failure <15      Troponin [[id number]]  (Normal) Collected: 04/05/21 0649     Specimen: Blood Updated: 04/05/21 0715     Troponin T <0.010 ng/mL     Narrative:      Troponin T Reference Range:  <= 0.03 ng/mL-   Negative for AMI  >0.03 ng/mL-     Abnormal for myocardial necrosis.  Clinicians would have to utilize clinical acumen, EKG, Troponin and serial changes to determine if it is an Acute Myocardial Infarction or myocardial injury due to an underlying chronic condition.       Results may be falsely decreased if patient taking Biotin.      Protime-INR [[id number]]  (Abnormal) Collected: 04/05/21 0649    Specimen: Blood Updated: 04/05/21 0720     Protime 19.4 Seconds      INR 1.67    proBNP [[id number]]  (Abnormal) Collected: 04/05/21 0649    Specimen: Blood Updated: 04/05/21 0714     proBNP 4,160.0 pg/mL     Narrative:      Among patients with dyspnea, NT-proBNP is highly sensitive for the detection of acute congestive heart failure. In addition NT-proBNP of <300 pg/ml effectively rules out acute congestive heart failure with 99% negative predictive value.    Results may be falsely decreased if patient taking Biotin.      CBC Auto Differential [[id number]]  (Abnormal) Collected: 04/05/21 0649    Specimen: Blood Updated: 04/05/21 0747     WBC 13.50 10*3/mm3      RBC 3.74 10*6/mm3      Hemoglobin 11.4 g/dL      Hematocrit 34.9 %      MCV 93.3 fL      MCH 30.5 pg      MCHC 32.7 g/dL      RDW 16.0 %      RDW-SD 54.6 fl      MPV 9.8 fL      Platelets 263 10*3/mm3      Neutrophil % 74.7 %      Lymphocyte % 13.3 %      Monocyte % 8.2 %      Eosinophil % 3.0 %      Basophil % 0.4 %      Immature Grans % 0.4 %      Neutrophils, Absolute 10.08 10*3/mm3      Lymphocytes, Absolute 1.79 10*3/mm3      Monocytes, Absolute 1.11 10*3/mm3      Eosinophils, Absolute 0.41 10*3/mm3      Basophils, Absolute 0.05 10*3/mm3      Immature Grans, Absolute 0.06 10*3/mm3      nRBC 0.0 /100 WBC     D-dimer, Quantitative [[id number]]  (Abnormal) Collected: 04/05/21 0649    Specimen: Blood Updated: 04/05/21 0721     D-Dimer,  Quantitative 0.82 mg/L (FEU)     Narrative:      Reference Range is 0-0.50 mg/L FEU. However, results <0.50 mg/L FEU tends to rule out DVT or PE. Results >0.50 mg/L FEU are not useful in predicting absence or presence of DVT or PE.      Magnesium [[id number]]  (Normal) Collected: 04/05/21 0649    Specimen: Blood Updated: 04/05/21 0722     Magnesium 2.2 mg/dL     Blood Culture - Blood, Arm, Left [[id number]] Collected: 04/05/21 0650    Specimen: Blood from Arm, Left Updated: 04/05/21 0722    Blood Culture - Blood, Arm, Right [[id number]] Collected: 04/05/21 0700    Specimen: Blood from Arm, Right Updated: 04/05/21 0722    Lactic Acid, Plasma [[id number]]  (Normal) Collected: 04/05/21 0700    Specimen: Blood Updated: 04/05/21 0722     Lactate 1.1 mmol/L     Troponin [[id number]]  (Normal) Collected: 04/05/21 0852    Specimen: Blood Updated: 04/05/21 0917     Troponin T <0.010 ng/mL     Narrative:      Troponin T Reference Range:  <= 0.03 ng/mL-   Negative for AMI  >0.03 ng/mL-     Abnormal for myocardial necrosis.  Clinicians would have to utilize clinical acumen, EKG, Troponin and serial changes to determine if it is an Acute Myocardial Infarction or myocardial injury due to an underlying chronic condition.       Results may be falsely decreased if patient taking Biotin.      COVID PRE-OP / PRE-PROCEDURE SCREENING ORDER (NO ISOLATION) - Swab, Nasal Cavity [[id number]]  (Normal) Collected: 04/05/21 0853    Specimen: Swab from Nasal Cavity Updated: 04/05/21 1019    Narrative:      The following orders were created for panel order COVID PRE-OP / PRE-PROCEDURE SCREENING ORDER (NO ISOLATION) - Swab, Nasal Cavity.  Procedure                               Abnormality         Status                     ---------                               -----------         ------                     COVID-19,[facility]...[[id number]]  Normal              Final result                 Please view results for these tests on the individual  orders.    COVID-19,[facility] IN-HOUSE,Nasal Swab No Transport Media 3-4 HR TAT - Swab, Nasal Cavity [[id number]]  (Normal) Collected: 04/05/21 0853    Specimen: Swab from Nasal Cavity Updated: 04/05/21 1019     COVID19 Not Detected    Narrative:      Fact sheet for providers: https://www.fda.gov/media/065731/download     Fact sheet for patients: https://www.fda.gov/media/389647/download    Test performed by PCR.    Consider negative results in combination with clinical observations, patient history, and epidemiological information.          XR Chest 1 View   Final Result       Central vascular congestion and mild diffuse interstitial opacity.   Correlate for mild pulmonary edema.   This report was finalized on 04/05/2021 07:52 by Dr. Og Mackenzie MD.          ED Course          MDM  Number of Diagnoses or Management Options  Atrial fibrillation with RVR (CMS/HCC): new and requires workup  Congestive heart failure, unspecified HF chronicity, unspecified heart failure type (CMS/HCC): new and requires workup     Amount and/or Complexity of Data Reviewed  Clinical lab tests: ordered and reviewed  Tests in the radiology section of CPT®: ordered and reviewed  Tests in the medicine section of CPT®: ordered and reviewed  Decide to obtain previous medical records or to obtain history from someone other than the patient: yes    Risk of Complications, Morbidity, and/or Mortality  Presenting problems: moderate  Diagnostic procedures: moderate  Management options: moderate    Patient Progress  Patient progress: stable      Final diagnoses:   Atrial fibrillation with RVR (CMS/HCC)   Congestive heart failure, unspecified HF chronicity, unspecified heart failure type (CMS/HCC)          JCarlos Barcenas Jr., MD  04/05/21 1047

## 2021-04-05 NOTE — PLAN OF CARE
Goal Outcome Evaluation:  Plan of Care Reviewed With: patient, daughter  Progress: improving  Outcome Summary: Patient still has some soa but improving. Patient is in Afib  up to 142 and frequent runs of pvc (12 R) and couplet per tele. Cardioversion for tomorrow morning. Npo after midnight. Patient started on digoxin per Cardio. Has good urine output as charted. BP in the soft side this afternoon. Continue to monitor pt and notify MD of any changes.

## 2021-04-05 NOTE — CONSULTS
[facility], [facility] Consult Note    Referring Provider: Girish  Reason for Consultation: AF, RVR, SOB    Patient Care Team:  Matthieu Bhakta DO as PCP - General (Internal Medicine)  Gualberto Gallo MD as Cardiologist (Cardiology)    Chief complaint SOB, heart palpitations, tremors    Subjective .     History of present illness:  This patient is a 75 y/o with h/o CAD, s/p recent PCI with PORTIA.  She also has AF.  She is on both Brilinta and Eliquis.  She presents with SOB, tremors, and dizziness.  She is found to be in AF with RVR.  She was given IV Lopressor and IV Lasix.  She is now feeling better and rate is now better controlled.  For the past month, she has been experiencing the above symptoms about qod.      Review of Systems  A 10-point review of systems is obtained and negative except for otherwise mentioned above.    History  Past Medical History:   Diagnosis Date   • A-fib (CMS/[facility])    • Abnormal nuclear stress test 8/23/2019   • Angina pectoris (CMS/[facility])    • Anxiety    • Arthritis    • Atherosclerosis of coronary artery bypass graft    • CAD (coronary artery disease)    • Carotid artery occlusion without infarction    • Chronic atrial fibrillation (CMS/[facility]) 2/1/2021   • Chronic diastolic heart failure (CMS/[facility])    • Chronic kidney disease    • Chronic lung disease    • Colon polyp    • Diabetes mellitus (CMS/[facility])    • DJD (degenerative joint disease)    • Essential hypertension 3/24/2017   • Fibrocystic breast disease    • Gastritis    • GI bleed    • Hemorrhoids    • Hyperlipidemia    • Hypertension    • Lung disease     chronic   • MI (myocardial infarction) (CMS/[facility])    • Palpitations    • PUD (peptic ulcer disease)    • PVD (peripheral vascular disease) (CMS/[facility])    • Renal failure, acute (CMS/[facility])    • 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   ,   Past Surgical History:   Procedure Laterality Date   • APPENDECTOMY     • CARDIAC CATHETERIZATION  05/01/2009    Left-Heart Skin   •  CARDIAC CATHETERIZATION N/A 2/4/2021    Procedure: Left Heart Cath;  Surgeon: Tristan Cabello DO;  Location:  [facility] CATH [facility]VAS[facility];  Service: Cardiology;  Laterality: N/A;   • CARDIAC CATHETERIZATION Right 2/5/2021    Procedure: Left Heart Cath, rotablator to RCA with Dr. Abrams at 1PM;  Surgeon: Tristan Cabello DO;  Location:  [facility] CATH [facility];  Service: Cardiology;  Laterality: Right;   • CAROTID ENDARTERECTOMY     • CATARACT EXTRACTION     • CATARACT EXTRACTION     • COLONOSCOPY     • CORONARY ARTERY BYPASS GRAFT  07/2007    Four   • ENDOSCOPY  10/02/2013   • ENDOSCOPY  10/02/2013   • HYSTERECTOMY     • SKIN CANCER EXCISION     • THROMBOENDARTERECTOMY     • TONSILLECTOMY     ,   Family History   Problem Relation Age of Onset   • Heart disease Mother    • Breast cancer Mother    • Heart disease Father    • Heart disease Brother    • Heart disease Brother    • Breast cancer Maternal Grandmother    ,   Social History     Tobacco Use   • Smoking status: Never Smoker   • Smokeless tobacco: Never Used   • Tobacco comment: Non smoker; no tobacco use   Vaping Use   • Vaping Use: Never used   Substance Use Topics   • Alcohol use: Yes     Comment: occ   • Drug use: No   ,   Medications Prior to Admission   Medication Sig Dispense Refill Last Dose   • apixaban (ELIQUIS) 5 MG tablet tablet Take 1 tablet by mouth Every 12 (Twelve) Hours. 60 tablet 2 4/5/2021 at Unknown time   • dilTIAZem CD (CARDIZEM CD) 360 MG 24 hr capsule Take 1 capsule by mouth Daily. 30 capsule 1 4/5/2021 at Unknown time   • furosemide (LASIX) 20 MG tablet Take 20 mg by mouth Take As Directed. Take one tablet by mouth daily on Tuesday, Thursday, Saturday, Sunday. Take two tablets by mouth daily on Monday, Wednesday, Friday.   4/5/2021 at Unknown time   • metoprolol succinate XL (Toprol XL) 50 MG 24 hr tablet Take 1 tablet by mouth Daily. 30 tablet 2 4/5/2021 at Unknown time   • Multiple Vitamins-Minerals  "(MULTIVITAMIN ADULT PO) Take 1 tablet by mouth Daily.   4/5/2021 at Unknown time   • pravastatin (PRAVACHOL) 40 MG tablet TAKE 1 TABLET BY MOUTH DAILY. 90 tablet 4 4/5/2021 at Unknown time   • ticagrelor (BRILINTA) 90 MG tablet tablet Take 1 tablet by mouth 2 (Two) Times a Day. 60 tablet 2 4/5/2021 at Unknown time    and Allergies:  Buffered aspirin, Salicylates, and Demerol [meperidine]    Objective     Vital Sign Min/Max for last 24 hours  Temp  Min: 97.6 °F (36.4 °C)  Max: 98.7 °F (37.1 °C)   BP  Min: 98/86  Max: 138/82   Pulse  Min: 75  Max: 126   Resp  Min: 16  Max: 20   SpO2  Min: 87 %  Max: 99 %   No data recorded   Weight  Min: 57.6 kg (127 lb)  Max: 58.5 kg (128 lb 14.4 oz)     Flowsheet Rows      First Filed Value   Admission Height  157.5 cm (62\") Documented at 04/05/2021 0642   Admission Weight  57.6 kg (127 lb) Documented at 04/05/2021 0642             Ejection Fraction  Lab Results   Component Value Date    EF 45 02/01/2021       Echo EF Estimated  Lab Results   Component Value Date    ECHOEFEST 55 07/25/2019       Physical Exam:     General Appearance:    Alert, cooperative, in no acute distress   Head:    Normocephalic, without obvious abnormality, atraumatic   Eyes:            Lids and lashes normal, conjunctivae and sclerae normal, no   icterus, PERRLA, EOMI   Throat:   Oral mucosa pink and moist   Neck:   No adenopathy, supple, trachea midline, no thyromegaly, no   carotid bruit, no JVD   Lungs:     Clear to auscultation bilaterally,respirations regular, even     and unlabored    Heart:    Irregular rhythm and normal rate, normal S1 and S2, no            murmur, no gallop, no rub, no click   Chest Wall:    No abnormalities observed   Abdomen:     Normal bowel sounds present in all four quadrants, no       masses, no organomegaly, soft non-tender, non-distended    Extremities:   No edema, no cyanosis, no clubbing   Pulses:   Pulses palpable and equal bilaterally   Skin:   No bleeding, bruising or " rash   Psychiatric:   Displays appropriate mood and affect           Results Review:    I reviewed the patient's new clinical results.    Results from last 7 days   Lab Units 04/05/21  0649   WBC 10*3/mm3 13.50*   HEMOGLOBIN g/dL 11.4*   HEMATOCRIT % 34.9   PLATELETS 10*3/mm3 263     Results from last 7 days   Lab Units 04/05/21  0649   SODIUM mmol/L 138   POTASSIUM mmol/L 4.3   CHLORIDE mmol/L 100   CO2 mmol/L 28.0   BUN mg/dL 20   CREATININE mg/dL 0.95   GLUCOSE mg/dL 107*   CALCIUM mg/dL 9.4     Results from last 7 days   Lab Units 04/05/21  0649   SODIUM mmol/L 138   POTASSIUM mmol/L 4.3   CHLORIDE mmol/L 100   CO2 mmol/L 28.0   BUN mg/dL 20   CREATININE mg/dL 0.95   CALCIUM mg/dL 9.4   BILIRUBIN mg/dL 0.6   ALK PHOS U/L 68   ALT (SGPT) U/L 15   AST (SGOT) U/L 21   GLUCOSE mg/dL 107*     Results from last 7 days   Lab Units 04/05/21  0852 04/05/21  0649   TROPONIN T ng/mL <0.010 <0.010     Assessment/Plan       Atrial fibrillation with RVR (CMS/[facility])    Mixed hyperlipidemia    Coronary artery disease involving native coronary artery of native heart without angina pectoris    Essential hypertension    Acute on chronic systolic CHF (congestive heart failure) (CMS/[facility])  s/p recent PORTIA    I will start this patient on digoxin and further monitor on telemetry.  Agree with Lasix, daily weight, I&O.  Monitor BMP.  I will decrease Cardizem CD so we can also add lisinopril.  Thank you for the consult.  Cardiology will gladly follow with you.  Further recommendations to follow from Dr. Abrams.    I discussed the patient's findings and my recommendations with patient, family and nursing staff    Elise Rizo PA-C  04/05/21  12:12 CDT

## 2021-04-06 ENCOUNTER — ANESTHESIA (OUTPATIENT)
Dept: CARDIOLOGY | Facility: HOSPITAL | Age: 76
End: 2021-04-06

## 2021-04-06 ENCOUNTER — APPOINTMENT (OUTPATIENT)
Dept: CARDIOLOGY | Facility: HOSPITAL | Age: 76
End: 2021-04-06

## 2021-04-06 ENCOUNTER — ANESTHESIA EVENT (OUTPATIENT)
Dept: CARDIOLOGY | Facility: HOSPITAL | Age: 76
End: 2021-04-06

## 2021-04-06 LAB
ANION GAP SERPL CALCULATED.3IONS-SCNC: 8 MMOL/L (ref 5–15)
BASOPHILS # BLD AUTO: 0.04 10*3/MM3 (ref 0–0.2)
BASOPHILS NFR BLD AUTO: 0.5 % (ref 0–1.5)
BUN SERPL-MCNC: 17 MG/DL (ref 8–23)
BUN/CREAT SERPL: 17.7 (ref 7–25)
CALCIUM SPEC-SCNC: 9.2 MG/DL (ref 8.6–10.5)
CHLORIDE SERPL-SCNC: 101 MMOL/L (ref 98–107)
CO2 SERPL-SCNC: 29 MMOL/L (ref 22–29)
CREAT SERPL-MCNC: 0.96 MG/DL (ref 0.57–1)
DEPRECATED RDW RBC AUTO: 52.8 FL (ref 37–54)
EOSINOPHIL # BLD AUTO: 0.31 10*3/MM3 (ref 0–0.4)
EOSINOPHIL NFR BLD AUTO: 3.8 % (ref 0.3–6.2)
ERYTHROCYTE [DISTWIDTH] IN BLOOD BY AUTOMATED COUNT: 15.9 % (ref 12.3–15.4)
GFR SERPL CREATININE-BSD FRML MDRD: 56 ML/MIN/1.73
GLUCOSE SERPL-MCNC: 89 MG/DL (ref 65–99)
HCT VFR BLD AUTO: 34.7 % (ref 34–46.6)
HGB BLD-MCNC: 11.5 G/DL (ref 12–15.9)
IMM GRANULOCYTES # BLD AUTO: 0.04 10*3/MM3 (ref 0–0.05)
IMM GRANULOCYTES NFR BLD AUTO: 0.5 % (ref 0–0.5)
LYMPHOCYTES # BLD AUTO: 1 10*3/MM3 (ref 0.7–3.1)
LYMPHOCYTES NFR BLD AUTO: 12.4 % (ref 19.6–45.3)
MAGNESIUM SERPL-MCNC: 2.3 MG/DL (ref 1.6–2.4)
MCH RBC QN AUTO: 30.5 PG (ref 26.6–33)
MCHC RBC AUTO-ENTMCNC: 33.1 G/DL (ref 31.5–35.7)
MCV RBC AUTO: 92 FL (ref 79–97)
MONOCYTES # BLD AUTO: 0.92 10*3/MM3 (ref 0.1–0.9)
MONOCYTES NFR BLD AUTO: 11.4 % (ref 5–12)
NEUTROPHILS NFR BLD AUTO: 5.77 10*3/MM3 (ref 1.7–7)
NEUTROPHILS NFR BLD AUTO: 71.4 % (ref 42.7–76)
NRBC BLD AUTO-RTO: 0 /100 WBC (ref 0–0.2)
PLATELET # BLD AUTO: 238 10*3/MM3 (ref 140–450)
PMV BLD AUTO: 9.8 FL (ref 6–12)
POTASSIUM SERPL-SCNC: 3.6 MMOL/L (ref 3.5–5.2)
QT INTERVAL: 366 MS
QTC INTERVAL: 450 MS
RBC # BLD AUTO: 3.77 10*6/MM3 (ref 3.77–5.28)
SODIUM SERPL-SCNC: 138 MMOL/L (ref 136–145)
WBC # BLD AUTO: 8.08 10*3/MM3 (ref 3.4–10.8)

## 2021-04-06 PROCEDURE — 25010000002 ATROPINE PER 0.01 MG: Performed by: INTERNAL MEDICINE

## 2021-04-06 PROCEDURE — 25010000003 ATROPINE PER 0.01 MG: Performed by: NURSE ANESTHETIST, CERTIFIED REGISTERED

## 2021-04-06 PROCEDURE — 92960 CARDIOVERSION ELECTRIC EXT: CPT

## 2021-04-06 PROCEDURE — 92960 CARDIOVERSION ELECTRIC EXT: CPT | Performed by: INTERNAL MEDICINE

## 2021-04-06 PROCEDURE — 83735 ASSAY OF MAGNESIUM: CPT | Performed by: INTERNAL MEDICINE

## 2021-04-06 PROCEDURE — 80048 BASIC METABOLIC PNL TOTAL CA: CPT | Performed by: INTERNAL MEDICINE

## 2021-04-06 PROCEDURE — 85025 COMPLETE CBC W/AUTO DIFF WBC: CPT | Performed by: INTERNAL MEDICINE

## 2021-04-06 PROCEDURE — 25010000002 FUROSEMIDE PER 20 MG: Performed by: INTERNAL MEDICINE

## 2021-04-06 PROCEDURE — 36415 COLL VENOUS BLD VENIPUNCTURE: CPT | Performed by: INTERNAL MEDICINE

## 2021-04-06 PROCEDURE — G0378 HOSPITAL OBSERVATION PER HR: HCPCS

## 2021-04-06 PROCEDURE — 25010000002 PROPOFOL 10 MG/ML EMULSION: Performed by: NURSE ANESTHETIST, CERTIFIED REGISTERED

## 2021-04-06 PROCEDURE — 96376 TX/PRO/DX INJ SAME DRUG ADON: CPT

## 2021-04-06 RX ORDER — SODIUM CHLORIDE 0.9 % (FLUSH) 0.9 %
3 SYRINGE (ML) INJECTION EVERY 12 HOURS SCHEDULED
Status: CANCELLED | OUTPATIENT
Start: 2021-04-06

## 2021-04-06 RX ORDER — EPHEDRINE SULFATE 50 MG/ML
INJECTION, SOLUTION INTRAVENOUS AS NEEDED
Status: DISCONTINUED | OUTPATIENT
Start: 2021-04-06 | End: 2021-04-06 | Stop reason: SURG

## 2021-04-06 RX ORDER — LIDOCAINE HYDROCHLORIDE 20 MG/ML
INJECTION, SOLUTION EPIDURAL; INFILTRATION; INTRACAUDAL; PERINEURAL AS NEEDED
Status: DISCONTINUED | OUTPATIENT
Start: 2021-04-06 | End: 2021-04-06 | Stop reason: SURG

## 2021-04-06 RX ORDER — FUROSEMIDE 20 MG/1
20 TABLET ORAL DAILY
Status: DISCONTINUED | OUTPATIENT
Start: 2021-04-07 | End: 2021-04-08 | Stop reason: HOSPADM

## 2021-04-06 RX ORDER — PRAVASTATIN SODIUM 40 MG
40 TABLET ORAL DAILY
COMMUNITY
End: 2021-05-04

## 2021-04-06 RX ORDER — POTASSIUM CHLORIDE 750 MG/1
40 CAPSULE, EXTENDED RELEASE ORAL ONCE
Status: COMPLETED | OUTPATIENT
Start: 2021-04-06 | End: 2021-04-06

## 2021-04-06 RX ORDER — ATROPINE SULFATE 0.4 MG/ML
AMPUL (ML) INJECTION AS NEEDED
Status: DISCONTINUED | OUTPATIENT
Start: 2021-04-06 | End: 2021-04-06 | Stop reason: SURG

## 2021-04-06 RX ORDER — SODIUM CHLORIDE 9 MG/ML
INJECTION, SOLUTION INTRAVENOUS CONTINUOUS PRN
Status: DISCONTINUED | OUTPATIENT
Start: 2021-04-06 | End: 2021-04-06 | Stop reason: SURG

## 2021-04-06 RX ORDER — ATROPINE SULFATE 1 MG/ML
INJECTION, SOLUTION INTRAMUSCULAR; INTRAVENOUS; SUBCUTANEOUS
Status: COMPLETED | OUTPATIENT
Start: 2021-04-06 | End: 2021-04-06

## 2021-04-06 RX ORDER — SODIUM CHLORIDE 0.9 % (FLUSH) 0.9 %
3-10 SYRINGE (ML) INJECTION AS NEEDED
Status: CANCELLED | OUTPATIENT
Start: 2021-04-06

## 2021-04-06 RX ORDER — PROPOFOL 10 MG/ML
VIAL (ML) INTRAVENOUS AS NEEDED
Status: DISCONTINUED | OUTPATIENT
Start: 2021-04-06 | End: 2021-04-06 | Stop reason: SURG

## 2021-04-06 RX ORDER — SODIUM CHLORIDE, SODIUM LACTATE, POTASSIUM CHLORIDE, CALCIUM CHLORIDE 600; 310; 30; 20 MG/100ML; MG/100ML; MG/100ML; MG/100ML
100 INJECTION, SOLUTION INTRAVENOUS CONTINUOUS
Status: CANCELLED | OUTPATIENT
Start: 2021-04-06

## 2021-04-06 RX ORDER — LIDOCAINE HYDROCHLORIDE 10 MG/ML
0.5 INJECTION, SOLUTION EPIDURAL; INFILTRATION; INTRACAUDAL; PERINEURAL ONCE AS NEEDED
Status: CANCELLED | OUTPATIENT
Start: 2021-04-06

## 2021-04-06 RX ADMIN — SODIUM CHLORIDE, PRESERVATIVE FREE 10 ML: 5 INJECTION INTRAVENOUS at 08:25

## 2021-04-06 RX ADMIN — LIDOCAINE HYDROCHLORIDE 40 MG: 20 INJECTION, SOLUTION EPIDURAL; INFILTRATION; INTRACAUDAL; PERINEURAL at 14:13

## 2021-04-06 RX ADMIN — LISINOPRIL 5 MG: 5 TABLET ORAL at 08:23

## 2021-04-06 RX ADMIN — Medication 0.5 MG: at 14:17

## 2021-04-06 RX ADMIN — SODIUM CHLORIDE, PRESERVATIVE FREE 10 ML: 5 INJECTION INTRAVENOUS at 20:02

## 2021-04-06 RX ADMIN — APIXABAN 5 MG: 5 TABLET, FILM COATED ORAL at 08:24

## 2021-04-06 RX ADMIN — PRAVASTATIN SODIUM 40 MG: 20 TABLET ORAL at 08:23

## 2021-04-06 RX ADMIN — ATROPINE SULFATE 0.5 MG: 1 INJECTION, SOLUTION INTRAMUSCULAR; INTRAVENOUS; SUBCUTANEOUS at 14:18

## 2021-04-06 RX ADMIN — FUROSEMIDE 40 MG: 10 INJECTION, SOLUTION INTRAMUSCULAR; INTRAVENOUS at 05:33

## 2021-04-06 RX ADMIN — SODIUM CHLORIDE: 9 INJECTION, SOLUTION INTRAVENOUS at 14:03

## 2021-04-06 RX ADMIN — METOPROLOL SUCCINATE 50 MG: 50 TABLET, FILM COATED, EXTENDED RELEASE ORAL at 08:23

## 2021-04-06 RX ADMIN — DIGOXIN 250 MCG: 250 TABLET ORAL at 08:24

## 2021-04-06 RX ADMIN — PROPOFOL 50 MG: 10 INJECTION, EMULSION INTRAVENOUS at 14:14

## 2021-04-06 RX ADMIN — TICAGRELOR 90 MG: 90 TABLET ORAL at 20:02

## 2021-04-06 RX ADMIN — DILTIAZEM HYDROCHLORIDE 240 MG: 240 CAPSULE, EXTENDED RELEASE ORAL at 08:23

## 2021-04-06 RX ADMIN — POTASSIUM CHLORIDE 40 MEQ: 750 CAPSULE, EXTENDED RELEASE ORAL at 10:24

## 2021-04-06 RX ADMIN — EPHEDRINE SULFATE 10 MG: 50 INJECTION INTRAVENOUS at 14:23

## 2021-04-06 RX ADMIN — TICAGRELOR 90 MG: 90 TABLET ORAL at 08:23

## 2021-04-06 RX ADMIN — DIGOXIN 250 MCG: 250 TABLET ORAL at 20:03

## 2021-04-06 RX ADMIN — DIGOXIN 250 MCG: 250 TABLET ORAL at 01:34

## 2021-04-06 RX ADMIN — APIXABAN 5 MG: 5 TABLET, FILM COATED ORAL at 20:01

## 2021-04-06 RX ADMIN — EPHEDRINE SULFATE 10 MG: 50 INJECTION INTRAVENOUS at 14:34

## 2021-04-06 NOTE — PROGRESS NOTES
[facility] Medicine Services  INPATIENT PROGRESS NOTE    Patient Name: Sondra Connolly  Date of Admission: 4/5/2021  Today's Date: 04/06/21  Length of Stay: 1  Primary Care Physician: Matthieu Bhakta DO    Subjective   Chief Complaint: Follow-up A. fib    HPI   Patient seen and examined earlier this morning prior to cardioversion.  She was feeling anxious for the cardioversion but overall feeling much better than yesterday.  She had good urinary response to Lasix.  Her swelling is decreased.  She is not short of breath.  Denies chest pain.        Review of Systems   All pertinent negatives and positives are as above. All other systems have been reviewed and are negative unless otherwise stated.     Objective    Temp:  [97.5 °F (36.4 °C)-98.1 °F (36.7 °C)] 97.6 °F (36.4 °C)  Heart Rate:  [] 71  Resp:  [15-20] 20  BP: ()/() 126/74  Physical Exam  GEN: Awake, alert, interactive, in NAD  HEENT: PERRLA, EOMI, Anicteric, Trachea midline  Lungs: CTAB, no wheezing/rales/rhonchi  Heart: irreg/irreg, +S1/s2, no rub  ABD: soft, nt/nd, +BS, no guarding/rebound  Extremities: atraumatic, no cyanosis, no edema  Skin: no rashes or lesions  Neuro: AAOx3, no focal deficits  Psych: normal mood & affect      Results Review:  I have reviewed the labs, radiology results, and diagnostic studies.    Laboratory Data:   Results from last 7 days   Lab Units 04/06/21  0441 04/05/21  0649   WBC 10*3/mm3 8.08 13.50*   HEMOGLOBIN g/dL 11.5* 11.4*   HEMATOCRIT % 34.7 34.9   PLATELETS 10*3/mm3 238 263        Results from last 7 days   Lab Units 04/06/21  0441 04/05/21  0649   SODIUM mmol/L 138 138   POTASSIUM mmol/L 3.6 4.3   CHLORIDE mmol/L 101 100   CO2 mmol/L 29.0 28.0   BUN mg/dL 17 20   CREATININE mg/dL 0.96 0.95   CALCIUM mg/dL 9.2 9.4   BILIRUBIN mg/dL  --  0.6   ALK PHOS U/L  --  68   ALT (SGPT) U/L  --  15   AST (SGOT) U/L  --  21   GLUCOSE mg/dL 89 107*       Culture Data:   Blood  Culture   Date Value Ref Range Status   04/05/2021 No growth at 24 hours  Preliminary   04/05/2021 No growth at 24 hours  Preliminary       Radiology Data:   Imaging Results (Last 24 Hours)     ** No results found for the last 24 hours. **          I have reviewed the patient's current medications.     Assessment/Plan     Active Hospital Problems    Diagnosis    • **Atrial fibrillation with RVR (CMS/[facility])    • Acute on chronic systolic CHF (congestive heart failure) (CMS/[facility])    • Coronary artery disease involving native coronary artery of native heart without angina pectoris    • Mixed hyperlipidemia    • Essential hypertension        #1 A. fib with RVR -admitted yesterday.  Continued on blood thinner.  Cardizem was decreased to 240 CD daily.  Continued on Toprol-XL.  Started on digoxin.  Patient went for cardioversion today apparently had some arrhythmia/event post cardioversion.  Full details unclear at this time.  Reports however are to continue monitoring overnight and no plan to discharge home today.    #2 acute on chronic systolic heart failure -last known EF 41 to 45%.  She is on Toprol-XL.  Low-dose ACE inhibitor was added on this day by cardiology.  She was given IV Lasix yesterday and this morning with good results.  Appears more euvolemic.  Plan to start oral diuretics back tomorrow.    #3 CAD -no chest pain or active issues on arrival.  Recently underwent PCI.  Is on Eliquis, Brilinta, beta-blocker, statin.    #4 essential hypertension -blood pressure stable on current regimen.  Monitor closely.    #5 hyperlipidemia -statin      Discharge Planning: We will monitor patient overnight post cardioversion as per cardiology request.  We will follow-up tomorrow.  Hopefully DC home tomorrow if stable and okay with cardiology.    Electronically signed by Samson Stout DO, 04/06/21, 16:18 CDT.

## 2021-04-06 NOTE — PLAN OF CARE
Goal Outcome Evaluation:  Plan of Care Reviewed With: patient      Pt is alert and oriented x 4.  Tolerating po diet at this time.  Denies any pain or discomfort at this time.  Tentative discharge in am

## 2021-04-06 NOTE — PROGRESS NOTES
Continued Stay Note   [facility]     Patient Name: Sondra Connolly  MRN: [id number]  Today's Date: 4/6/2021    Admit Date: 4/5/2021    Discharge Plan     Row Name 04/06/21 1507       Plan    Plan Comments  ATTEMPTED TO SEE PT. FOR D/C PLANNING ASSESSMENT, HOWEVER PT. OFF THE FLOOR FOR CARDIOVERSION.  WILL FOLLOW UP.        Discharge Codes    No documentation.             MARIA ELENA Barclay

## 2021-04-06 NOTE — PLAN OF CARE
Goal Outcome Evaluation:      Nutrition: Assessment completed. Pt is currently NPO for testing. Pt was on cardiac regular diet, average PO intake 50% of 1 meal. Weight loss noted due to poor PO intake. Weight loss report indicates 7 pounds since 2/2/21. RDN will continue to follow and encourage PO intake.

## 2021-04-06 NOTE — PLAN OF CARE
Goal Outcome Evaluation:  Plan of Care Reviewed With: patient  Progress: no change  Outcome Summary: No c/o pain pt npo for Cardioversion this am pt cont afib with pvc  14 beat run cont to monitor , up ad [place] steady , no c/o SOB .

## 2021-04-06 NOTE — ANESTHESIA PREPROCEDURE EVALUATION
Anesthesia Evaluation     NPO Solid Status: > 8 hours  NPO Liquid Status: > 8 hours           Airway   Mallampati: II  TM distance: >3 FB  Neck ROM: limited  No difficulty expected  Dental      Pulmonary - normal exam   Cardiovascular     Rhythm: irregular  Rate: normal    (+) hypertension, past MI  >12 months, CABG, dysrhythmias Atrial Fib, angina, CHF , hyperlipidemia,       Neuro/Psych  GI/Hepatic/Renal/Endo    (+)  PUD,  renal disease, diabetes mellitus,     Musculoskeletal     Abdominal    Substance History      OB/GYN          Other                        Anesthesia Plan    ASA 3     MAC     intravenous induction     Anesthetic plan, all risks, benefits, and alternatives have been provided, discussed and informed consent has been obtained with: patient.

## 2021-04-06 NOTE — ANESTHESIA POSTPROCEDURE EVALUATION
Patient: Sondra Connolly    Procedure Summary     Date: 04/06/21 Room / Location: [facility] CATH LAB    Anesthesia Start: 1412 Anesthesia Stop: 1444    Procedure: CARDIOVERSION EXTERNAL IN CARDIOLOGY DEPARTMENT Diagnosis: (afib)    Scheduled Providers: Audie Abrams MD Provider: Samson Mercado CRNA    Anesthesia Type: MAC ASA Status: 3          Anesthesia Type: MAC    Vitals  No vitals data found for the desired time range.          Post Anesthesia Care and Evaluation    Patient location during evaluation: PHASE II  Patient participation: complete - patient participated  Level of consciousness: awake  Pain score: 0  Pain management: adequate  Airway patency: patent  Anesthetic complications: No anesthetic complications  PONV Status: none  Cardiovascular status: acceptable and bradycardic  Respiratory status: acceptable and nasal cannula  Hydration status: acceptable    Comments: Pt cardioverted  juctional  Externally paced  Pacer off now  Rate 55 bp stable  Pt awake oriented anf talking

## 2021-04-07 LAB
ANION GAP SERPL CALCULATED.3IONS-SCNC: 7 MMOL/L (ref 5–15)
BUN SERPL-MCNC: 16 MG/DL (ref 8–23)
BUN/CREAT SERPL: 19 (ref 7–25)
CALCIUM SPEC-SCNC: 9.5 MG/DL (ref 8.6–10.5)
CHLORIDE SERPL-SCNC: 100 MMOL/L (ref 98–107)
CO2 SERPL-SCNC: 28 MMOL/L (ref 22–29)
CREAT SERPL-MCNC: 0.84 MG/DL (ref 0.57–1)
GFR SERPL CREATININE-BSD FRML MDRD: 66 ML/MIN/1.73
GLUCOSE BLDC GLUCOMTR-MCNC: 121 MG/DL (ref 70–130)
GLUCOSE SERPL-MCNC: 84 MG/DL (ref 65–99)
POTASSIUM SERPL-SCNC: 4.4 MMOL/L (ref 3.5–5.2)
SODIUM SERPL-SCNC: 135 MMOL/L (ref 136–145)

## 2021-04-07 PROCEDURE — 82962 GLUCOSE BLOOD TEST: CPT

## 2021-04-07 PROCEDURE — 80048 BASIC METABOLIC PNL TOTAL CA: CPT | Performed by: INTERNAL MEDICINE

## 2021-04-07 PROCEDURE — 25010000002 ONDANSETRON PER 1 MG: Performed by: INTERNAL MEDICINE

## 2021-04-07 PROCEDURE — 99214 OFFICE O/P EST MOD 30 MIN: CPT | Performed by: NURSE PRACTITIONER

## 2021-04-07 PROCEDURE — G0378 HOSPITAL OBSERVATION PER HR: HCPCS

## 2021-04-07 PROCEDURE — 96375 TX/PRO/DX INJ NEW DRUG ADDON: CPT

## 2021-04-07 RX ORDER — LABETALOL HYDROCHLORIDE 5 MG/ML
5 INJECTION, SOLUTION INTRAVENOUS
Status: DISCONTINUED | OUTPATIENT
Start: 2021-04-07 | End: 2021-04-07 | Stop reason: HOSPADM

## 2021-04-07 RX ORDER — ONDANSETRON 2 MG/ML
4 INJECTION INTRAMUSCULAR; INTRAVENOUS EVERY 6 HOURS PRN
Status: DISCONTINUED | OUTPATIENT
Start: 2021-04-07 | End: 2021-04-08 | Stop reason: HOSPADM

## 2021-04-07 RX ORDER — ATROPINE SULFATE 1 MG/ML
0.5 INJECTION, SOLUTION INTRAMUSCULAR; INTRAVENOUS; SUBCUTANEOUS ONCE AS NEEDED
Status: DISCONTINUED | OUTPATIENT
Start: 2021-04-07 | End: 2021-04-07 | Stop reason: HOSPADM

## 2021-04-07 RX ORDER — NALOXONE HCL 0.4 MG/ML
0.04 VIAL (ML) INJECTION AS NEEDED
Status: DISCONTINUED | OUTPATIENT
Start: 2021-04-07 | End: 2021-04-07 | Stop reason: HOSPADM

## 2021-04-07 RX ORDER — ONDANSETRON 2 MG/ML
4 INJECTION INTRAMUSCULAR; INTRAVENOUS AS NEEDED
Status: DISCONTINUED | OUTPATIENT
Start: 2021-04-07 | End: 2021-04-07 | Stop reason: HOSPADM

## 2021-04-07 RX ORDER — FLUMAZENIL 0.1 MG/ML
0.2 INJECTION INTRAVENOUS AS NEEDED
Status: DISCONTINUED | OUTPATIENT
Start: 2021-04-07 | End: 2021-04-07 | Stop reason: HOSPADM

## 2021-04-07 RX ADMIN — APIXABAN 5 MG: 5 TABLET, FILM COATED ORAL at 08:17

## 2021-04-07 RX ADMIN — FUROSEMIDE 20 MG: 20 TABLET ORAL at 08:17

## 2021-04-07 RX ADMIN — TICAGRELOR 90 MG: 90 TABLET ORAL at 20:09

## 2021-04-07 RX ADMIN — DIGOXIN 250 MCG: 250 TABLET ORAL at 01:31

## 2021-04-07 RX ADMIN — SODIUM CHLORIDE, PRESERVATIVE FREE 10 ML: 5 INJECTION INTRAVENOUS at 20:09

## 2021-04-07 RX ADMIN — PRAVASTATIN SODIUM 40 MG: 20 TABLET ORAL at 08:19

## 2021-04-07 RX ADMIN — METOPROLOL SUCCINATE 50 MG: 50 TABLET, FILM COATED, EXTENDED RELEASE ORAL at 08:17

## 2021-04-07 RX ADMIN — TICAGRELOR 90 MG: 90 TABLET ORAL at 08:17

## 2021-04-07 RX ADMIN — SODIUM CHLORIDE, PRESERVATIVE FREE 10 ML: 5 INJECTION INTRAVENOUS at 08:17

## 2021-04-07 RX ADMIN — DILTIAZEM HYDROCHLORIDE 240 MG: 240 CAPSULE, EXTENDED RELEASE ORAL at 08:17

## 2021-04-07 RX ADMIN — APIXABAN 5 MG: 5 TABLET, FILM COATED ORAL at 20:09

## 2021-04-07 RX ADMIN — ONDANSETRON HYDROCHLORIDE 4 MG: 2 SOLUTION INTRAMUSCULAR; INTRAVENOUS at 12:00

## 2021-04-07 RX ADMIN — DIGOXIN 125 MCG: 125 TABLET ORAL at 08:20

## 2021-04-07 RX ADMIN — LISINOPRIL 5 MG: 5 TABLET ORAL at 08:17

## 2021-04-07 NOTE — PROGRESS NOTES
[facility] Medicine Services  INPATIENT PROGRESS NOTE    Patient Name: Sondra Connolly  Date of Admission: 4/5/2021  Today's Date: 04/07/21  Length of Stay: 1  Primary Care Physician: Matthieu Bhakta DO    Subjective   Chief Complaint: Follow-up A. fib    HPI   Patient with some nausea this morning but improved.  No vomiting.  Overnight had some events of questionable junctional rhythms and sinus pauses.  She denies any chest pain or dizziness.  No syncope.  She has otherwise maintained sinus rhythm since cardioversion and has not come back into A. Fib.        Review of Systems   All pertinent negatives and positives are as above. All other systems have been reviewed and are negative unless otherwise stated.     Objective    Temp:  [97.3 °F (36.3 °C)-98.1 °F (36.7 °C)] 98.1 °F (36.7 °C)  Heart Rate:  [67-85] 70  Resp:  [18-20] 18  BP: (105-134)/(41-74) 118/71  Physical Exam  GEN: Awake, alert, interactive, in NAD  HEENT: PERRLA, EOMI, Anicteric, Trachea midline  Lungs: CTAB, no wheezing/rales/rhonchi  Heart: RRR, +S1/s2, no rub  ABD: soft, nt/nd, +BS, no guarding/rebound  Extremities: atraumatic, no cyanosis, no edema  Skin: no rashes or lesions  Neuro: AAOx3, no focal deficits  Psych: normal mood & affect      Results Review:  I have reviewed the labs, radiology results, and diagnostic studies.    Laboratory Data:   Results from last 7 days   Lab Units 04/06/21  0441 04/05/21  0649   WBC 10*3/mm3 8.08 13.50*   HEMOGLOBIN g/dL 11.5* 11.4*   HEMATOCRIT % 34.7 34.9   PLATELETS 10*3/mm3 238 263        Results from last 7 days   Lab Units 04/07/21  0538 04/06/21  0441 04/05/21  0649   SODIUM mmol/L 135* 138 138   POTASSIUM mmol/L 4.4 3.6 4.3   CHLORIDE mmol/L 100 101 100   CO2 mmol/L 28.0 29.0 28.0   BUN mg/dL 16 17 20   CREATININE mg/dL 0.84 0.96 0.95   CALCIUM mg/dL 9.5 9.2 9.4   BILIRUBIN mg/dL  --   --  0.6   ALK PHOS U/L  --   --  68   ALT (SGPT) U/L  --   --  15   AST (SGOT) U/L   --   --  21   GLUCOSE mg/dL 84 89 107*       Culture Data:   Blood Culture   Date Value Ref Range Status   04/05/2021 No growth at 24 hours  Preliminary   04/05/2021 No growth at 24 hours  Preliminary       Radiology Data:   Imaging Results (Last 24 Hours)     ** No results found for the last 24 hours. **          I have reviewed the patient's current medications.     Assessment/Plan     Active Hospital Problems    Diagnosis    • **Atrial fibrillation with RVR (CMS/[facility])    • Acute on chronic systolic CHF (congestive heart failure) (CMS/[facility])    • Coronary artery disease involving native coronary artery of native heart without angina pectoris    • Mixed hyperlipidemia    • Essential hypertension        #1 A. fib with RVR -status post cardioversion on 4/6.  Continued on blood thinner.  Currently on Cardizem, Toprol, digoxin.  Some questions about sinus pauses and/or junctional rhythms post cardioversion.  Question need to decrease taj blockers versus discharging with Holter monitor.  Cards following.  Will follow with recommendations.    #2 acute on chronic systolic heart failure -last known EF 41 to 45%.  She is on Toprol-XL.  Low-dose ACE inhibitor was added during this stay and patient seems to be tolerating.  Good results with IV Lasix and now back on oral diuretics.  Appears euvolemic.    #3 CAD -no chest pain or active issues on arrival.  Recently underwent PCI.  Is on Eliquis, Brilinta, beta-blocker, statin.    #4 essential hypertension -blood pressure stable on current regimen.  Monitor closely.    #5 hyperlipidemia -statin      Discharge Planning: We will continue to monitor at this time.  We will follow cardiac recommendations for disposition.    Electronically signed by Samson Stout DO, 04/07/21, 15:13 CDT.

## 2021-04-07 NOTE — PROGRESS NOTES
Discharge Planning Assessment  [facility]     Patient Name: Sondra Connolly  MRN: [id number]  Today's Date: 4/7/2021    Admit Date: 4/5/2021    Discharge Needs Assessment     Row Name 04/07/21 1018       Living Environment    Lives With  child(noelle), adult    Name(s) of Who Lives With Patient  Herlinda    Current Living Arrangements  home/apartment/condo    Primary Care Provided by  self    Provides Primary Care For  no one    Family Caregiver if Needed  child(noelle), adult    Quality of Family Relationships  helpful;involved;supportive    Able to Return to Prior Arrangements  yes       Resource/Environmental Concerns    Resource/Environmental Concerns  none       Transition Planning    Patient/Family Anticipates Transition to  home with family    Patient/Family Anticipated Services at Transition  none    Transportation Anticipated  family or friend will provide       Discharge Needs Assessment    Readmission Within the Last 30 Days  no previous admission in last 30 days    Equipment Currently Used at Home  cane, quad;cane, straight    Concerns to be Addressed  no discharge needs identified    Anticipated Changes Related to Illness  none    Equipment Needed After Discharge  none    Discharge Coordination/Progress  Pt resides with adult daughter, Herlinda.  Pt states she has canes at home but doesn't use them.  Pt has PCP and RX coverage.  Pt can afford medications.  Pt denies any dc needs and does not want home health services.        Discharge Plan    No documentation.       Continued Care and Services - Admitted Since 4/5/2021    Coordination has not been started for this encounter.         Demographic Summary    No documentation.       Functional Status    No documentation.       Psychosocial    No documentation.       Abuse/Neglect    No documentation.       Legal    No documentation.       Substance Abuse    No documentation.       Patient Forms    No documentation.           Lisa Mendenhall, YIMIW

## 2021-04-07 NOTE — PLAN OF CARE
Goal Outcome Evaluation:  Plan of Care Reviewed With: patient      Pt is alert and oriented x 4.  Pt states she feels much better this evening, no other n/v since this am.  Pt was actually able to tolerate po diet this evening

## 2021-04-07 NOTE — PROGRESS NOTES
[facility] -  Progress Note     LOS: 1 day   Patient Care Team:  Matthieu Bhakta DO as PCP - General (Internal Medicine)  Gualberto Gallo MD as Cardiologist (Cardiology)    Chief Complaint: AF follow up    Subjective     Interval History: Status post cardioversion 4/6/2021.  Patient reports weakness and nausea this morning.  She has had intermittent junctional beats noted on telemetry.  She has had pauses noted this morning, 2.5 seconds.  She has not felt well overall this a.m. but denies any worsening symptoms during pauses.  Her medications were continued after cardioversion including diltiazem, Toprol-XL, digoxin.  The patient has received her medications this morning.    This afternoon she has improved with resolution of her nausea.  She is up in the chair with family in the room.  Overall feels better.  Sinus on telemetry with heart rates in the 50s and 60s.      Review of Systems:     Review of Systems   Constitutional: Positive for fatigue.   Respiratory: Negative for chest tightness and shortness of breath.    Cardiovascular: Negative for chest pain, palpitations and leg swelling.   Gastrointestinal: Positive for nausea. Negative for abdominal distention and abdominal pain.   Genitourinary: Negative for difficulty urinating.   Neurological: Negative for dizziness and light-headedness.   Psychiatric/Behavioral: Negative for confusion.     Objective     Vital Sign Min/Max for last 24 hours  Temp  Min: 97.3 °F (36.3 °C)  Max: 98.1 °F (36.7 °C)   BP  Min: 105/62  Max: 134/73   Pulse  Min: 67  Max: 85   Resp  Min: 18  Max: 20   SpO2  Min: 94 %  Max: 100 %   No data recorded   Weight  Min: 58.1 kg (128 lb 2 oz)  Max: 58.1 kg (128 lb 2 oz)         04/06/21 2027   Weight: 58.1 kg (128 lb 2 oz)       Physical Exam:    Vitals reviewed.   Constitutional:       Appearance: Well-developed and well-groomed. Chronically ill-appearing.   Pulmonary:      Effort: Pulmonary effort is normal.      Breath  sounds: Normal breath sounds.   Cardiovascular:      Normal rate. Regular rhythm.      Murmurs: There is a systolic murmur.   Edema:     Peripheral edema absent.   Abdominal:      General: Bowel sounds are normal.   Musculoskeletal: Normal range of motion.      Cervical back: Normal range of motion and neck supple. Skin:     General: Skin is warm and dry.   Neurological:      Mental Status: Alert and oriented to person, place and time.   Psychiatric:         Attention and Perception: Attention normal.         Mood and Affect: Mood normal.         Behavior: Behavior is cooperative.         Cognition and Memory: Cognition and memory normal.       Results Review:   Lab Results (last 72 hours)     Procedure Component Value Units Date/Time    Blood Culture - Blood, Arm, Right [[id number]] Collected: 04/05/21 0700    Specimen: Blood from Arm, Right Updated: 04/07/21 0730     Blood Culture No growth at 2 days    Blood Culture - Blood, Arm, Left [[id number]] Collected: 04/05/21 0650    Specimen: Blood from Arm, Left Updated: 04/07/21 0730     Blood Culture No growth at 2 days    Basic Metabolic Panel [[id number]]  (Abnormal) Collected: 04/07/21 0538    Specimen: Blood Updated: 04/07/21 0659     Glucose 84 mg/dL      BUN 16 mg/dL      Creatinine 0.84 mg/dL      Sodium 135 mmol/L      Potassium 4.4 mmol/L      Chloride 100 mmol/L      CO2 28.0 mmol/L      Calcium 9.5 mg/dL      eGFR Non African Amer 66 mL/min/1.73      BUN/Creatinine Ratio 19.0     Anion Gap 7.0 mmol/L     Narrative:      GFR Normal >60  Chronic Kidney Disease <60  Kidney Failure <15      Basic Metabolic Panel [[id number]]  (Abnormal) Collected: 04/06/21 0441    Specimen: Blood Updated: 04/06/21 0532     Glucose 89 mg/dL      BUN 17 mg/dL      Creatinine 0.96 mg/dL      Sodium 138 mmol/L      Potassium 3.6 mmol/L      Chloride 101 mmol/L      CO2 29.0 mmol/L      Calcium 9.2 mg/dL      eGFR Non African Amer 56 mL/min/1.73      BUN/Creatinine Ratio 17.7     Anion  Gap 8.0 mmol/L     Narrative:      GFR Normal >60  Chronic Kidney Disease <60  Kidney Failure <15      Magnesium [[id number]]  (Normal) Collected: 04/06/21 0441    Specimen: Blood Updated: 04/06/21 0532     Magnesium 2.3 mg/dL     CBC & Differential [[id number]]  (Abnormal) Collected: 04/06/21 0441    Specimen: Blood Updated: 04/06/21 0518    Narrative:      The following orders were created for panel order CBC & Differential.  Procedure                               Abnormality         Status                     ---------                               -----------         ------                     CBC Auto Differential[[id number]]        Abnormal            Final result                 Please view results for these tests on the individual orders.    CBC Auto Differential [[id number]]  (Abnormal) Collected: 04/06/21 0441    Specimen: Blood Updated: 04/06/21 0518     WBC 8.08 10*3/mm3      RBC 3.77 10*6/mm3      Hemoglobin 11.5 g/dL      Hematocrit 34.7 %      MCV 92.0 fL      MCH 30.5 pg      MCHC 33.1 g/dL      RDW 15.9 %      RDW-SD 52.8 fl      MPV 9.8 fL      Platelets 238 10*3/mm3      Neutrophil % 71.4 %      Lymphocyte % 12.4 %      Monocyte % 11.4 %      Eosinophil % 3.8 %      Basophil % 0.5 %      Immature Grans % 0.5 %      Neutrophils, Absolute 5.77 10*3/mm3      Lymphocytes, Absolute 1.00 10*3/mm3      Monocytes, Absolute 0.92 10*3/mm3      Eosinophils, Absolute 0.31 10*3/mm3      Basophils, Absolute 0.04 10*3/mm3      Immature Grans, Absolute 0.04 10*3/mm3      nRBC 0.0 /100 WBC     COVID PRE-OP / PRE-PROCEDURE SCREENING ORDER (NO ISOLATION) - Swab, Nasal Cavity [[id number]]  (Normal) Collected: 04/05/21 0853    Specimen: Swab from Nasal Cavity Updated: 04/05/21 1019    Narrative:      The following orders were created for panel order COVID PRE-OP / PRE-PROCEDURE SCREENING ORDER (NO ISOLATION) - Swab, Nasal Cavity.  Procedure                               Abnormality         Status                      ---------                               -----------         ------                     COVID-19,[facility]...[[id number]]  Normal              Final result                 Please view results for these tests on the individual orders.    COVID-19,[facility] IN-HOUSE,Nasal Swab No Transport Media 3-4 HR TAT - Swab, Nasal Cavity [[id number]]  (Normal) Collected: 04/05/21 0853    Specimen: Swab from Nasal Cavity Updated: 04/05/21 1019     COVID19 Not Detected    Narrative:      Fact sheet for providers: https://www.fda.gov/media/262402/download     Fact sheet for patients: https://www.fda.gov/media/869766/download    Test performed by PCR.    Consider negative results in combination with clinical observations, patient history, and epidemiological information.    Troponin [[id number]]  (Normal) Collected: 04/05/21 0852    Specimen: Blood Updated: 04/05/21 0917     Troponin T <0.010 ng/mL     Narrative:      Troponin T Reference Range:  <= 0.03 ng/mL-   Negative for AMI  >0.03 ng/mL-     Abnormal for myocardial necrosis.  Clinicians would have to utilize clinical acumen, EKG, Troponin and serial changes to determine if it is an Acute Myocardial Infarction or myocardial injury due to an underlying chronic condition.       Results may be falsely decreased if patient taking Biotin.      CBC & Differential [[id number]]  (Abnormal) Collected: 04/05/21 0649    Specimen: Blood Updated: 04/05/21 0747    Narrative:      The following orders were created for panel order CBC & Differential.  Procedure                               Abnormality         Status                     ---------                               -----------         ------                     CBC Auto Differential[[id number]]        Abnormal            Final result                 Please view results for these tests on the individual orders.    CBC Auto Differential [[id number]]  (Abnormal) Collected: 04/05/21 0649    Specimen: Blood Updated: 04/05/21 0747     WBC  13.50 10*3/mm3      RBC 3.74 10*6/mm3      Hemoglobin 11.4 g/dL      Hematocrit 34.9 %      MCV 93.3 fL      MCH 30.5 pg      MCHC 32.7 g/dL      RDW 16.0 %      RDW-SD 54.6 fl      MPV 9.8 fL      Platelets 263 10*3/mm3      Neutrophil % 74.7 %      Lymphocyte % 13.3 %      Monocyte % 8.2 %      Eosinophil % 3.0 %      Basophil % 0.4 %      Immature Grans % 0.4 %      Neutrophils, Absolute 10.08 10*3/mm3      Lymphocytes, Absolute 1.79 10*3/mm3      Monocytes, Absolute 1.11 10*3/mm3      Eosinophils, Absolute 0.41 10*3/mm3      Basophils, Absolute 0.05 10*3/mm3      Immature Grans, Absolute 0.06 10*3/mm3      nRBC 0.0 /100 WBC     Lactic Acid, Plasma [[id number]]  (Normal) Collected: 04/05/21 0700    Specimen: Blood Updated: 04/05/21 0722     Lactate 1.1 mmol/L     Magnesium [[id number]]  (Normal) Collected: 04/05/21 0649    Specimen: Blood Updated: 04/05/21 0722     Magnesium 2.2 mg/dL     D-dimer, Quantitative [[id number]]  (Abnormal) Collected: 04/05/21 0649    Specimen: Blood Updated: 04/05/21 0721     D-Dimer, Quantitative 0.82 mg/L (FEU)     Narrative:      Reference Range is 0-0.50 mg/L FEU. However, results <0.50 mg/L FEU tends to rule out DVT or PE. Results >0.50 mg/L FEU are not useful in predicting absence or presence of DVT or PE.      Protime-INR [[id number]]  (Abnormal) Collected: 04/05/21 0649    Specimen: Blood Updated: 04/05/21 0720     Protime 19.4 Seconds      INR 1.67    Comprehensive Metabolic Panel [[id number]]  (Abnormal) Collected: 04/05/21 0649    Specimen: Blood Updated: 04/05/21 0717     Glucose 107 mg/dL      BUN 20 mg/dL      Creatinine 0.95 mg/dL      Sodium 138 mmol/L      Potassium 4.3 mmol/L      Chloride 100 mmol/L      CO2 28.0 mmol/L      Calcium 9.4 mg/dL      Total Protein 6.7 g/dL      Albumin 4.10 g/dL      ALT (SGPT) 15 U/L      AST (SGOT) 21 U/L      Alkaline Phosphatase 68 U/L      Total Bilirubin 0.6 mg/dL      eGFR Non African Amer 57 mL/min/1.73      Globulin 2.6 gm/dL       A/G Ratio 1.6 g/dL      BUN/Creatinine Ratio 21.1     Anion Gap 10.0 mmol/L     Narrative:      GFR Normal >60  Chronic Kidney Disease <60  Kidney Failure <15      Troponin [[id number]]  (Normal) Collected: 04/05/21 0649    Specimen: Blood Updated: 04/05/21 0715     Troponin T <0.010 ng/mL     Narrative:      Troponin T Reference Range:  <= 0.03 ng/mL-   Negative for AMI  >0.03 ng/mL-     Abnormal for myocardial necrosis.  Clinicians would have to utilize clinical acumen, EKG, Troponin and serial changes to determine if it is an Acute Myocardial Infarction or myocardial injury due to an underlying chronic condition.       Results may be falsely decreased if patient taking Biotin.      proBNP [[id number]]  (Abnormal) Collected: 04/05/21 0649    Specimen: Blood Updated: 04/05/21 0714     proBNP 4,160.0 pg/mL     Narrative:      Among patients with dyspnea, NT-proBNP is highly sensitive for the detection of acute congestive heart failure. In addition NT-proBNP of <300 pg/ml effectively rules out acute congestive heart failure with 99% negative predictive value.    Results may be falsely decreased if patient taking Biotin.                Echo EF Estimated  Lab Results   Component Value Date    ECHOEFEST 55 07/25/2019     Medication Review: yes  Current Facility-Administered Medications   Medication Dose Route Frequency Provider Last Rate Last Admin   • apixaban (ELIQUIS) tablet 5 mg  5 mg Oral Q12H Audie Abrams MD   5 mg at 04/07/21 0817   • digoxin (LANOXIN) tablet 125 mcg  125 mcg Oral Daily Audie Abrams MD   125 mcg at 04/07/21 0820   • furosemide (LASIX) tablet 20 mg  20 mg Oral Daily Samson Stout DO   20 mg at 04/07/21 0817   • lisinopril (PRINIVIL,ZESTRIL) tablet 5 mg  5 mg Oral Q24H Audie Abrams MD   5 mg at 04/07/21 0817   • metoprolol succinate XL (TOPROL-XL) 24 hr tablet 50 mg  50 mg Oral Daily Audie Abrams MD   50 mg at 04/07/21 0817   • ondansetron (ZOFRAN) injection 4 mg  4 mg Intravenous Q6H PRN  Samson Stout DO   4 mg at 04/07/21 1200   • pravastatin (PRAVACHOL) tablet 40 mg  40 mg Oral Daily Samson Stout DO   40 mg at 04/07/21 0819   • sodium chloride 0.9 % flush 10 mL  10 mL Intravenous Q12H Audie Abrams MD   10 mL at 04/07/21 0817   • sodium chloride 0.9 % flush 10 mL  10 mL Intravenous PRN Audie Abrams MD       • ticagrelor (BRILINTA) tablet 90 mg  90 mg Oral BID Audie Abrams MD   90 mg at 04/07/21 0817         Assessment/Plan     1. AF RVR: s/p cardioversion. Sinus Rhythm with intermittent junctional beats and pauses. Monitor with med changes. Hold AM meds.  Will consider initiating short acting doses of medications to better evaluate what she needs to be discharged home on long-term.  Continue to monitor telemetry.  Continue anticoagulation.  The patient received long-acting diltiazem, Toprol-XL, and digoxin today. We will follow telemetry with further recommendations on med changes.  We will discontinue digoxin.  Hold beta-blocker and calcium channel blocker.    2. CAD: Stable. NHistory of coronary artery bypass and recent PCI in February 2021.  She is managed on Brilinta 90 mg twice daily and anticoagulation.  She is not on aspirin.    3.  HTN: chronic stable. Continue medication currently.  We may need to make adjustments as we titrate or medications due to recent pauses.    4.  Hyperlipidemia: Chronic established problem.  Stable.  The patient is on statin therapy with pravastatin 40 mg daily.        Electronically signed by RANDA Solano, 04/07/21, 3:44 PM CDT.

## 2021-04-07 NOTE — PLAN OF CARE
Problem: Adult Inpatient Plan of Care  Goal: Plan of Care Review  Outcome: Ongoing, Progressing  Flowsheets (Taken 4/7/2021 0430)  Progress: no change  Plan of Care Reviewed With: patient  Outcome Summary: pt A&O X4. No c/o pain. NSR on tele, 60-80s. Voiding.  on room air. Will continue to monitor.   Goal Outcome Evaluation:

## 2021-04-08 ENCOUNTER — APPOINTMENT (OUTPATIENT)
Dept: CARDIOLOGY | Facility: HOSPITAL | Age: 76
End: 2021-04-08

## 2021-04-08 VITALS
RESPIRATION RATE: 16 BRPM | DIASTOLIC BLOOD PRESSURE: 72 MMHG | BODY MASS INDEX: 23.04 KG/M2 | WEIGHT: 125.22 LBS | HEART RATE: 57 BPM | OXYGEN SATURATION: 99 % | HEIGHT: 62 IN | SYSTOLIC BLOOD PRESSURE: 118 MMHG | TEMPERATURE: 97.9 F

## 2021-04-08 LAB
GLUCOSE BLDC GLUCOMTR-MCNC: 72 MG/DL (ref 70–130)
GLUCOSE BLDC GLUCOMTR-MCNC: 90 MG/DL (ref 70–130)

## 2021-04-08 PROCEDURE — 99213 OFFICE O/P EST LOW 20 MIN: CPT | Performed by: NURSE PRACTITIONER

## 2021-04-08 PROCEDURE — G0378 HOSPITAL OBSERVATION PER HR: HCPCS

## 2021-04-08 PROCEDURE — 82962 GLUCOSE BLOOD TEST: CPT

## 2021-04-08 RX ORDER — LISINOPRIL 5 MG/1
5 TABLET ORAL
Qty: 30 TABLET | Refills: 0 | Status: SHIPPED | OUTPATIENT
Start: 2021-04-09 | End: 2021-04-29

## 2021-04-08 RX ADMIN — LISINOPRIL 5 MG: 5 TABLET ORAL at 13:44

## 2021-04-08 RX ADMIN — PRAVASTATIN SODIUM 40 MG: 20 TABLET ORAL at 13:44

## 2021-04-08 RX ADMIN — TICAGRELOR 90 MG: 90 TABLET ORAL at 13:44

## 2021-04-08 RX ADMIN — APIXABAN 5 MG: 5 TABLET, FILM COATED ORAL at 13:44

## 2021-04-08 RX ADMIN — FUROSEMIDE 20 MG: 20 TABLET ORAL at 13:44

## 2021-04-08 NOTE — DISCHARGE SUMMARY
[facility] Medicine Services  DISCHARGE SUMMARY       Date of Admission: 4/5/2021  Date of Discharge:  4/8/2021  Primary Care Physician: Matthieu Bhakta DO    Presenting Problem/History of Present Illness:  Atrial fibrillation with RVR (CMS/HCC) [I48.91]     Final Discharge Diagnoses:  Active Hospital Problems    Diagnosis    • **Atrial fibrillation with RVR (CMS/HCC)    • Acute on chronic systolic CHF (congestive heart failure) (CMS/[facility])    • Coronary artery disease involving native coronary artery of native heart without angina pectoris    • Mixed hyperlipidemia    • Essential hypertension        Consults:   #1 Dr. Abrams, cardiology    Procedures Performed:   #1 cardioversion by Dr. Abrams on 4/6    Pertinent Test Results:   Procedure Component Value Units Date/Time   XR Chest 1 View [[id number]] Ramana as Reviewed   Order Status: Completed Collected: 04/05/21 0751    Updated: 04/05/21 0755   Narrative:     Exam: XR CHEST 1 VW-       Indication: Chest Pain Triage Protocol       Comparison: 3/17/2020       Findings:       Cardiac silhouette is enlarged but stable. Prior median sternotomy and   CABG. No large pleural effusion or visible pneumothorax. Central   vascular congestion with mild diffuse interstitial opacity. No focal   consolidation. No acute osseous finding. Surgical clips in the RIGHT   neck soft tissues.       Impression:         Central vascular congestion and mild diffuse interstitial opacity.   Correlate for mild pulmonary edema.   This report was finalized on 04/05/2021 07:52 by Dr. Og Mackenzie MD.         Chief Complaint on Day of Discharge:   Follow-up A. fib    History of Present Illness on Day of Discharge:   Patient is feeling better today.  No nausea or vomiting.  No chest pain or dizziness.  No shortness of breath.  Is maintained in sinus rhythm since cardioversion some occasional sinus pauses.    Hospital Course:  The patient is a 76 y.o. female with a  "history of atrial fibrillation on metoprolol, Cardizem, Eliquis.  Also history of CAD post PCI in February and on Brilinta in conjunction with Eliquis mentioned above.  Presented to the ER for shortness of breath on 4/5 and was found to be in atrial fibrillation with RVR.  She was made to the hospital given a dose of Lasix and started back on her meds.  Was seen by cardiology and subsequently underwent cardioversion on 4/6.  After her cardioversion she had some junctional rhythm and bradycardia.  Due to this she was monitored.  Continued to have some episodes of sinus bradycardia and junctional rhythms.  Her Cardizem was discontinued.  Her digoxin was discontinued.  Her metoprolol was held.  At this point she is doing much better and feeling better.  Maintained in a sinus rhythm.  Recommendations for cardiology are to continue to hold Cardizem at discharge but to restart a low-dose short acting beta-blocker.  We will also discharge her with a Holter monitor.  She is to follow-up with cardiology in 3 weeks for ongoing care and med adjustments.    Condition on Discharge: NSR, stable on room air    Physical Exam on Discharge:  /63 (BP Location: Right arm, Patient Position: Lying)   Pulse 57   Temp 97.9 °F (36.6 °C) (Oral)   Resp 16   Ht 157.5 cm (62.01\")   Wt 56.8 kg (125 lb 3.5 oz)   SpO2 99%   BMI 22.90 kg/m²   Physical Exam  GEN: Awake, alert, interactive, in NAD  HEENT: PERRLA, EOMI, Anicteric, Trachea midline  Lungs: CTAB, no wheezing/rales/rhonchi  Heart: RRR, +S1/s2, no rub  ABD: soft, nt/nd, +BS, no guarding/rebound  Extremities: atraumatic, no cyanosis, no edema  Skin: no rashes or lesions  Neuro: AAOx3, no focal deficits  Psych: normal mood & affect    Discharge Disposition:  Home or Self Care    Discharge Medications:     Discharge Medications      New Medications      Instructions Start Date   lisinopril 5 MG tablet  Commonly known as: PRINIVIL,ZESTRIL   5 mg, Oral, Every 24 Hours Scheduled   " Start Date: April 9, 2021     metoprolol tartrate 25 MG tablet  Commonly known as: LOPRESSOR   25 mg, Oral, Every 12 Hours Scheduled         Continue These Medications      Instructions Start Date   apixaban 5 MG tablet tablet  Commonly known as: ELIQUIS   5 mg, Oral, Every 12 Hours Scheduled      furosemide 20 MG tablet  Commonly known as: LASIX   Take one tablet by mouth daily on Tuesday, Thursday, Saturday, Sunday. Take two tablets by mouth daily on Monday, Wednesday, Friday.      multivitamin with minerals tablet tablet   1 tablet, Oral, Daily      pravastatin 40 MG tablet  Commonly known as: PRAVACHOL   40 mg, Oral, Daily      ticagrelor 90 MG tablet tablet  Commonly known as: BRILINTA   90 mg, Oral, 2 Times Daily         Stop These Medications    dilTIAZem  MG 24 hr capsule  Commonly known as: CARDIZEM CD     metoprolol succinate XL 50 MG 24 hr tablet  Commonly known as: Toprol XL            Discharge Diet:    Dietary Orders (From admission, onward)     Start     Ordered    04/08/21 1800  DIET MESSAGE Ice cream - please send with dinner tray tonight please per pt request.  Daily With Dinner     Comments: Ice cream - please send with dinner tray tonight please per pt request.    04/08/21 1326    04/06/21 1658  Diet Regular; Cardiac  Diet Effective Now     Question Answer Comment   Diet Texture / Consistency Regular    Common Modifiers Cardiac        04/06/21 1657                  Activity at Discharge:    Increase to baseline as tolerated    Discharge Care Plan/Instructions:   Take medications as prescribed.  Wear your event monitor and follow protocol for returning it.  Follow-up with PCP.  Follow-up cardiology.  If any return or worsening symptoms seek urgent medical evaluation.    Follow-up Appointments:   Future Appointments   Date Time Provider Department Center   4/27/2021 11:15 AM Magda Allen APRN [facility] CD  [facility]       Test Results Pending at Discharge: None    Electronically signed by  Samson Stout DO, 04/08/21, 14:05 CDT.    Time: 33 minutes

## 2021-04-08 NOTE — PROGRESS NOTES
[facility] -  Progress Note     LOS: 1 day   Patient Care Team:  Matthieu Bhakta DO as PCP - General (Internal Medicine)  Gualberto Gallo MD as Cardiologist (Cardiology)    Chief Complaint: AF follow up     Subjective     Interval History: tele has remained stable, but has still had some junctional beats and pauses through the night. She has been asymptomatic to all of tele findings. She feels better today compared to yesterday. She denies weakness, nausea, dizziness, lightheadedness, near syncope.       Review of Systems:     Review of Systems   Constitutional: Negative for fatigue.   HENT: Negative for congestion.    Respiratory: Negative for chest tightness, shortness of breath and wheezing.    Cardiovascular: Negative for chest pain, palpitations and leg swelling.   Gastrointestinal: Negative for nausea and vomiting.   Genitourinary: Negative for difficulty urinating.   Neurological: Negative for dizziness and weakness.   Hematological: Bruises/bleeds easily.   Psychiatric/Behavioral: Negative for agitation and confusion.     Objective     Vital Sign Min/Max for last 24 hours  Temp  Min: 97.4 °F (36.3 °C)  Max: 98.3 °F (36.8 °C)   BP  Min: 94/52  Max: 121/65   Pulse  Min: 57  Max: 84   Resp  Min: 16  Max: 18   SpO2  Min: 96 %  Max: 99 %   No data recorded   Weight  Min: 56.8 kg (125 lb 3.2 oz)  Max: 56.8 kg (125 lb 3.5 oz)         04/08/21  0753   Weight: 56.8 kg (125 lb 3.5 oz)       Physical Exam:    Vitals reviewed.   Constitutional:       Appearance: Not in distress. Chronically ill-appearing.   Pulmonary:      Effort: Pulmonary effort is normal.      Breath sounds: Normal breath sounds.   Cardiovascular:      Normal rate. Regular rhythm.      Murmurs: There is a systolic murmur.   Edema:     Peripheral edema absent.   Abdominal:      General: Bowel sounds are normal.      Palpations: Abdomen is soft.   Musculoskeletal:      Cervical back: Normal range of motion and neck supple. Skin:      General: Skin is warm and dry.   Neurological:      Mental Status: Alert and oriented to person, place and time.   Psychiatric:         Attention and Perception: Attention normal.         Mood and Affect: Mood normal.         Behavior: Behavior normal. Behavior is cooperative.         Cognition and Memory: Cognition normal.       Results Review:   Lab Results (last 72 hours)     Procedure Component Value Units Date/Time    POC Glucose Once [[id number]]  (Normal) Collected: 04/08/21 1050    Specimen: Blood Updated: 04/08/21 1120     Glucose 90 mg/dL      Comment: : [id number] Connolly AddisonMeter ID: [id number]       POC Glucose Once [[id number]]  (Normal) Collected: 04/08/21 0738    Specimen: Blood Updated: 04/08/21 0823     Glucose 72 mg/dL      Comment: : [id number] Connolly AddisonMeter ID: [id number]       Blood Culture - Blood, Arm, Right [[id number]] Collected: 04/05/21 0700    Specimen: Blood from Arm, Right Updated: 04/08/21 0730     Blood Culture No growth at 3 days    Blood Culture - Blood, Arm, Left [[id number]] Collected: 04/05/21 0650    Specimen: Blood from Arm, Left Updated: 04/08/21 0730     Blood Culture No growth at 3 days    POC Glucose Once [[id number]]  (Normal) Collected: 04/07/21 1542    Specimen: Blood Updated: 04/07/21 1612     Glucose 121 mg/dL      Comment: : [id number] David VerasssMeter ID: [id number]       Basic Metabolic Panel [[id number]]  (Abnormal) Collected: 04/07/21 0538    Specimen: Blood Updated: 04/07/21 0659     Glucose 84 mg/dL      BUN 16 mg/dL      Creatinine 0.84 mg/dL      Sodium 135 mmol/L      Potassium 4.4 mmol/L      Chloride 100 mmol/L      CO2 28.0 mmol/L      Calcium 9.5 mg/dL      eGFR Non African Amer 66 mL/min/1.73      BUN/Creatinine Ratio 19.0     Anion Gap 7.0 mmol/L     Narrative:      GFR Normal >60  Chronic Kidney Disease <60  Kidney Failure <15      Basic Metabolic Panel [[id number]]  (Abnormal) Collected: 04/06/21 0441    Specimen: Blood Updated:  04/06/21 0532     Glucose 89 mg/dL      BUN 17 mg/dL      Creatinine 0.96 mg/dL      Sodium 138 mmol/L      Potassium 3.6 mmol/L      Chloride 101 mmol/L      CO2 29.0 mmol/L      Calcium 9.2 mg/dL      eGFR Non African Amer 56 mL/min/1.73      BUN/Creatinine Ratio 17.7     Anion Gap 8.0 mmol/L     Narrative:      GFR Normal >60  Chronic Kidney Disease <60  Kidney Failure <15      Magnesium [[id number]]  (Normal) Collected: 04/06/21 0441    Specimen: Blood Updated: 04/06/21 0532     Magnesium 2.3 mg/dL     CBC & Differential [[id number]]  (Abnormal) Collected: 04/06/21 0441    Specimen: Blood Updated: 04/06/21 0518    Narrative:      The following orders were created for panel order CBC & Differential.  Procedure                               Abnormality         Status                     ---------                               -----------         ------                     CBC Auto Differential[[id number]]        Abnormal            Final result                 Please view results for these tests on the individual orders.    CBC Auto Differential [[id number]]  (Abnormal) Collected: 04/06/21 0441    Specimen: Blood Updated: 04/06/21 0518     WBC 8.08 10*3/mm3      RBC 3.77 10*6/mm3      Hemoglobin 11.5 g/dL      Hematocrit 34.7 %      MCV 92.0 fL      MCH 30.5 pg      MCHC 33.1 g/dL      RDW 15.9 %      RDW-SD 52.8 fl      MPV 9.8 fL      Platelets 238 10*3/mm3      Neutrophil % 71.4 %      Lymphocyte % 12.4 %      Monocyte % 11.4 %      Eosinophil % 3.8 %      Basophil % 0.5 %      Immature Grans % 0.5 %      Neutrophils, Absolute 5.77 10*3/mm3      Lymphocytes, Absolute 1.00 10*3/mm3      Monocytes, Absolute 0.92 10*3/mm3      Eosinophils, Absolute 0.31 10*3/mm3      Basophils, Absolute 0.04 10*3/mm3      Immature Grans, Absolute 0.04 10*3/mm3      nRBC 0.0 /100 WBC               Echo EF Estimated  Lab Results   Component Value Date    ECHOEFEST 55 07/25/2019       Medication Review: yes  Current Facility-Administered  Medications   Medication Dose Route Frequency Provider Last Rate Last Admin   • apixaban (ELIQUIS) tablet 5 mg  5 mg Oral Q12H Audie Abrams MD   5 mg at 04/08/21 1344   • furosemide (LASIX) tablet 20 mg  20 mg Oral Daily Samson Stout DO   20 mg at 04/08/21 1344   • lisinopril (PRINIVIL,ZESTRIL) tablet 5 mg  5 mg Oral Q24H Audie Abrams MD   5 mg at 04/08/21 1344   • metoprolol tartrate (LOPRESSOR) tablet 25 mg  25 mg Oral Q12H Teresita Saucedo APRN       • ondansetron (ZOFRAN) injection 4 mg  4 mg Intravenous Q6H PRN Samson Stout DO   4 mg at 04/07/21 1200   • pravastatin (PRAVACHOL) tablet 40 mg  40 mg Oral Daily Samson Stout DO   40 mg at 04/08/21 1344   • sodium chloride 0.9 % flush 10 mL  10 mL Intravenous Q12H Audie Abrams MD   10 mL at 04/07/21 2009   • sodium chloride 0.9 % flush 10 mL  10 mL Intravenous PRN Audie Abrams MD       • ticagrelor (BRILINTA) tablet 90 mg  90 mg Oral BID Audie Abrams MD   90 mg at 04/08/21 1344         Assessment/Plan     1. AF RVR: s/p cardioversion. Sinus Rhythm with intermittent junctional beats and pauses yesterday and overnight. Digoxin, beta blocker and Diltiazem were placed on hold. Tele monitoring this morning revealed improvement. No further pauses or junctional beats today. Rates have been stable 70-80.  We will restart a low dose, short acting beta blocker at discharge (Lopressor 25 mg BID).  She was previously on Toprol XL 50 daily.  Place holter at discharge. Follow up in 3 weeks with RANDA Bonilla.  She may require other med titrations for rate or blood pressure control at follow up. Continue anticoagulation.     2. CAD: Stable. History of coronary artery bypass and recent PCI in February 2021.  She is managed on Brilinta 90 mg twice daily and anticoagulation.  She is not on aspirin.     3.  HTN: chronic stable. Continue medication currently.     4.  Hyperlipidemia: Chronic established problem.  Stable.  The patient is on statin therapy  with pravastatin 40 mg daily.      Ok for discharge home.  I have discussed with the patient and family.  I have discussed with attending, Dr. Stout.  Zio patch 14 day. Restart beta blocker, Lopressor 25 mg BID.          Electronically signed by RANDA Solano, 04/08/21, 1:52 PM CDT.

## 2021-04-08 NOTE — PLAN OF CARE
Goal Outcome Evaluation:         Nutrition: follow up completed. Pt has advanced to cardiac regular, average PO intake 25% of 2 meals. RDN added Boost BID to help increase kcal and protein intake. RDN will continue to follow pt and encourage PO intake.

## 2021-04-08 NOTE — PLAN OF CARE
Goal Outcome Evaluation:  Plan of Care Reviewed With: patient  Progress: improving  Outcome Summary: No c/o pain up bathroom prn , pt s 64-80 on monitor with a 2.78 pause , hr drop down to 30  cont to monitor

## 2021-04-09 ENCOUNTER — READMISSION MANAGEMENT (OUTPATIENT)
Dept: CALL CENTER | Facility: HOSPITAL | Age: 76
End: 2021-04-09

## 2021-04-09 NOTE — OUTREACH NOTE
Prep Survey      Responses   [facility] patient discharged from?  [place]   Is LACE score < 7 ?  No   Emergency Room discharge w/ pulse ox?  No   Eligibility  Readm Mgmt   Discharge diagnosis  AFIB,  cardioversion   Does the patient have one of the following disease processes/diagnoses(primary or secondary)?  Other   Does the patient have Home health ordered?  No   Is there a DME ordered?  No   Comments regarding appointments  see AVS   Medication alerts for this patient  see AVS   Prep survey completed?  Yes          Monie Davis RN

## 2021-04-10 LAB
BACTERIA SPEC AEROBE CULT: NORMAL
BACTERIA SPEC AEROBE CULT: NORMAL

## 2021-04-13 ENCOUNTER — READMISSION MANAGEMENT (OUTPATIENT)
Dept: CALL CENTER | Facility: HOSPITAL | Age: 76
End: 2021-04-13

## 2021-04-13 NOTE — OUTREACH NOTE
Medical Week 1 Survey      Responses   [facility] patient discharged from?  [place]   Does the patient have one of the following disease processes/diagnoses(primary or secondary)?  Other   Week 1 attempt successful?  Yes   Call start time  1244   Call end time  1247   Discharge diagnosis  AFIB,  cardioversion   Meds reviewed with patient/caregiver?  Yes   Is the patient having any side effects they believe may be caused by any medication additions or changes?  No   Is the patient taking all medications as directed (includes completed medication regime)?  Yes   Does the patient have a primary care provider?   Yes   Does the patient have an appointment with their PCP within 7 days of discharge?  No   Comments regarding PCP  Dr. Bhakta   What is preventing the patient from scheduling follow up appointments within 7 days of discharge?  Haven't had time   Nursing Interventions  Advised patient to make appointment   Psychosocial issues?  No   Comments  Pt is wearing heart monitor   Did the patient receive a copy of their discharge instructions?  Yes   Nursing interventions  Reviewed instructions with patient   What is the patient's perception of their health status since discharge?  Improving   Is the patient/caregiver able to teach back signs and symptoms related to disease process for when to call PCP?  Yes   Is the patient/caregiver able to teach back signs and symptoms related to disease process for when to call 911?  Yes   Is the patient/caregiver able to teach back the hierarchy of who to call/visit for symptoms/problems? PCP, Specialist, Home health nurse, Urgent Care, ED, 911  Yes   If the patient is a current smoker, are they able to teach back resources for cessation?  Not a smoker   Week 1 call completed?  Yes   Wrap up additional comments  PT reports she feel good.  She is tired, but feels this is normal..  She denies needs during this call.           Gay Pearce RN

## 2021-04-20 ENCOUNTER — READMISSION MANAGEMENT (OUTPATIENT)
Dept: CALL CENTER | Facility: HOSPITAL | Age: 76
End: 2021-04-20

## 2021-04-20 NOTE — OUTREACH NOTE
Medical Week 2 Survey      Responses   [facility] patient discharged from?  [place]   Does the patient have one of the following disease processes/diagnoses(primary or secondary)?  Other   Week 2 attempt successful?  Yes   Call start time  1823   Call end time  1825   Meds reviewed with patient/caregiver?  Yes   Is the patient taking all medications as directed (includes completed medication regime)?  Yes   Has the patient kept scheduled appointments due by today?  Yes   Comments  gaining strength, will send heart monitor Thursday, wears till 4/22   What is the patient's perception of their health status since discharge?  Improving   Week 2 Call Completed?  Yes   Wrap up additional comments  she states is doing well, no new issues          Tita Alejandre, RN

## 2021-04-28 ENCOUNTER — READMISSION MANAGEMENT (OUTPATIENT)
Dept: CALL CENTER | Facility: HOSPITAL | Age: 76
End: 2021-04-28

## 2021-04-29 ENCOUNTER — OFFICE VISIT (OUTPATIENT)
Dept: CARDIOLOGY | Facility: CLINIC | Age: 76
End: 2021-04-29

## 2021-04-29 VITALS
HEART RATE: 60 BPM | HEIGHT: 62 IN | SYSTOLIC BLOOD PRESSURE: 124 MMHG | DIASTOLIC BLOOD PRESSURE: 82 MMHG | WEIGHT: 125 LBS | OXYGEN SATURATION: 98 % | BODY MASS INDEX: 23 KG/M2

## 2021-04-29 DIAGNOSIS — Z51.81 ANTICOAGULATION GOAL OF INR 2 TO 3: ICD-10-CM

## 2021-04-29 DIAGNOSIS — Z95.5 STATUS POST INSERTION OF DRUG-ELUTING STENT INTO RIGHT CORONARY ARTERY FOR CORONARY ARTERY DISEASE: ICD-10-CM

## 2021-04-29 DIAGNOSIS — I48.0 PAROXYSMAL ATRIAL FIBRILLATION (HCC): ICD-10-CM

## 2021-04-29 DIAGNOSIS — I35.0 AORTIC VALVE STENOSIS, ETIOLOGY OF CARDIAC VALVE DISEASE UNSPECIFIED: ICD-10-CM

## 2021-04-29 DIAGNOSIS — Z98.890 HISTORY OF CARDIOVERSION: ICD-10-CM

## 2021-04-29 DIAGNOSIS — I25.810 CORONARY ARTERY DISEASE INVOLVING CORONARY BYPASS GRAFT OF NATIVE HEART WITHOUT ANGINA PECTORIS: ICD-10-CM

## 2021-04-29 DIAGNOSIS — E78.2 MIXED HYPERLIPIDEMIA: Chronic | ICD-10-CM

## 2021-04-29 DIAGNOSIS — I34.0 MITRAL VALVE INSUFFICIENCY, UNSPECIFIED ETIOLOGY: ICD-10-CM

## 2021-04-29 DIAGNOSIS — Z79.01 ANTICOAGULATION GOAL OF INR 2 TO 3: ICD-10-CM

## 2021-04-29 DIAGNOSIS — E11.59 TYPE 2 DIABETES MELLITUS WITH OTHER CIRCULATORY COMPLICATION, WITHOUT LONG-TERM CURRENT USE OF INSULIN (HCC): ICD-10-CM

## 2021-04-29 DIAGNOSIS — I10 ESSENTIAL HYPERTENSION: Chronic | ICD-10-CM

## 2021-04-29 DIAGNOSIS — I50.42 CHRONIC COMBINED SYSTOLIC AND DIASTOLIC CONGESTIVE HEART FAILURE (HCC): Primary | Chronic | ICD-10-CM

## 2021-04-29 DIAGNOSIS — Z95.1 S/P CABG X 4: ICD-10-CM

## 2021-04-29 PROBLEM — Z92.89 HISTORY OF CARDIOVERSION: Status: ACTIVE | Noted: 2017-03-24

## 2021-04-29 PROCEDURE — 99214 OFFICE O/P EST MOD 30 MIN: CPT | Performed by: NURSE PRACTITIONER

## 2021-04-29 PROCEDURE — 93000 ELECTROCARDIOGRAM COMPLETE: CPT | Performed by: NURSE PRACTITIONER

## 2021-04-29 RX ORDER — SACUBITRIL AND VALSARTAN 24; 26 MG/1; MG/1
1 TABLET, FILM COATED ORAL 2 TIMES DAILY
Qty: 60 TABLET | Refills: 11 | Status: SHIPPED | OUTPATIENT
Start: 2021-05-01 | End: 2022-05-16

## 2021-04-29 RX ORDER — METOPROLOL SUCCINATE 50 MG/1
50 TABLET, EXTENDED RELEASE ORAL DAILY
Qty: 90 TABLET | Refills: 3 | Status: SHIPPED | OUTPATIENT
Start: 2021-04-29 | End: 2022-12-09

## 2021-04-29 RX ORDER — FUROSEMIDE 20 MG/1
20 TABLET ORAL DAILY PRN
Qty: 90 TABLET | Refills: 3 | Status: SHIPPED | OUTPATIENT
Start: 2021-04-29

## 2021-04-29 NOTE — PROGRESS NOTES
Chief Complaint  Atrial Fibrillation (S/P cardioversion. States is feeling much better. No feelings of being in Afib. )    Subjective          Sondra Connolly presents to [facility] for routine follow-up of hospital discharge on 4/8/2021 for atrial fibrillation with rapid ventricular response with acute on chronic combined systolic and diastolic congestive heart failure. She was cardioverted during that admission and had return to normal sinus rhythm. She was discharged with a holter monitor, which has results pending. She has a history of chronic combined systolic and diastolic congestive heart failure, coronary artery disease status post CABG x4 with subsequent Rotablator rotational atherectomy, PTCA and 2.5 x 28 mm Xience Korin drug-eluting stent placement to the right coronary artery 2/5/2021, moderate to severe aortic valve stenosis, moderate to severe mitral valve regurgitation, paroxysmal atrial fibrillation status post cardioversion 4/6/2021 on chronic anticoagulation, diet-controlled type 2 diabetes mellitus, hypertension and hyperlipidemia.  Patient denies chest pain, shortness of breath, palpitations, dizziness, syncope, orthopnea, PND, edema or decreased stamina.  Patient denies any signs of bleeding.  Congestive Heart Failure  Presents for follow-up visit. Pertinent negatives include no abdominal pain, chest pain, chest pressure, claudication, edema, fatigue, muscle weakness, near-syncope, nocturia, orthopnea, palpitations, paroxysmal nocturnal dyspnea, shortness of breath or unexpected weight change. The symptoms have been stable. Her past medical history is significant for CAD. Compliance with total regimen is 51-75%. Compliance with diet is 51-75%. Compliance with medications is %.   Coronary Artery Disease  Presents for follow-up visit. Pertinent negatives include no chest pain, chest pressure, chest tightness, dizziness, leg swelling, muscle weakness,  "palpitations, shortness of breath or weight gain. Risk factors include hyperlipidemia. Risk factors do not include hypertension. Her past medical history is significant for CHF. The symptoms have been stable. Compliance with diet is variable. Compliance with exercise is variable. Compliance with medications is good.   Atrial Fibrillation  Presents for follow-up visit. Symptoms are negative for an AICD problem, bradycardia, chest pain, dizziness, hemodynamic instability, hypertension, hypotension, pacemaker problem, palpitations, shortness of breath, syncope, tachycardia and weakness. The symptoms have been stable. Past medical history includes atrial fibrillation, CAD, CHF and hyperlipidemia. There are no medication compliance problems.   Hypertension  This is a chronic problem. The current episode started more than 1 year ago. The problem is controlled. Pertinent negatives include no anxiety, blurred vision, chest pain, headaches, malaise/fatigue, neck pain, orthopnea, palpitations, peripheral edema, PND, shortness of breath or sweats. Risk factors for coronary artery disease include diabetes mellitus, dyslipidemia and post-menopausal state. Current antihypertension treatment includes ACE inhibitors and beta blockers. The current treatment provides significant improvement. Hypertensive end-organ damage includes CAD/MI and heart failure.   Hyperlipidemia  This is a chronic problem. The current episode started more than 1 year ago. Pertinent negatives include no chest pain or shortness of breath. Current antihyperlipidemic treatment includes statins. Risk factors for coronary artery disease include post-menopausal, hypertension, dyslipidemia and diabetes mellitus.       Objective   Vital Signs:   /82   Pulse 60   Ht 157.5 cm (62\")   Wt 56.7 kg (125 lb)   SpO2 98%   BMI 22.86 kg/m²     Vitals and nursing note reviewed.   Constitutional:       General: Not in acute distress.     Appearance: Normal and " healthy appearance. Well-developed, normal weight and not in distress. Not diaphoretic.   Eyes:      General: Lids are normal.         Right eye: No discharge.         Left eye: No discharge.      Conjunctiva/sclera: Conjunctivae normal.      Pupils: Pupils are equal, round, and reactive to light.   HENT:      Head: Normocephalic and atraumatic.      Jaw: There is normal jaw occlusion.      Right Ear: External ear normal.      Left Ear: External ear normal.      Nose: Nose normal.   Neck:      Thyroid: No thyromegaly.      Vascular: No carotid bruit, JVD or JVR. JVD normal.      Trachea: Trachea normal. No tracheal deviation.   Pulmonary:      Effort: Pulmonary effort is normal. No respiratory distress.      Breath sounds: Normal breath sounds. No decreased breath sounds. No wheezing. No rhonchi. No rales.   Chest:      Chest wall: Not tender to palpatation.   Cardiovascular:      PMI at left midclavicular line. Normal rate. Regular rhythm. Normal S1. Normal S2.      Murmurs: There is a grade 2/6 harsh midsystolic murmur at the URSB, radiating to the neck.      No gallop. No click. No rub.   Pulses:     Intact distal pulses. No decreased pulses.   Edema:     Peripheral edema absent.   Abdominal:      General: Bowel sounds are normal. There is no distension.      Palpations: Abdomen is soft.      Tenderness: There is no abdominal tenderness.   Musculoskeletal: Normal range of motion.         General: No tenderness or deformity.      Cervical back: Normal range of motion and neck supple. Skin:     General: Skin is warm and dry.      Coloration: Skin is not pale.      Findings: No erythema or rash.   Neurological:      General: No focal deficit present.      Mental Status: Alert, oriented to person, place, and time and oriented to person, place and time.   Psychiatric:         Attention and Perception: Attention and perception normal.         Mood and Affect: Mood and affect normal.         Speech: Speech normal.          Behavior: Behavior normal.         Thought Content: Thought content normal.         Cognition and Memory: Cognition and memory normal.         Judgment: Judgment normal.        Result Review :   The following data was reviewed by: RANDA Dietrich on 04/29/2021:  Common labs    Common Labsle 4/5/21 4/5/21 4/6/21 4/6/21 4/7/21    0649 0649 0441 0441    Glucose 107 (A)   89 84   BUN 20   17 16   Creatinine 0.95   0.96 0.84   eGFR Non African Am 57 (A)   56 (A) 66   Sodium 138   138 135 (A)   Potassium 4.3   3.6 4.4   Chloride 100   101 100   Calcium 9.4   9.2 9.5   Albumin 4.10       Total Bilirubin 0.6       Alkaline Phosphatase 68       AST (SGOT) 21       ALT (SGPT) 15       WBC  13.50 (A) 8.08     Hemoglobin  11.4 (A) 11.5 (A)     Hematocrit  34.9 34.7     Platelets  263 238     (A) Abnormal value            Data reviewed: Cardiology studies 2D echo 2/1/21 and left heart catheterization 2/11/21     ECG 12 Lead    Date/Time: 4/29/2021 2:48 PM  Performed by: Magda Allen APRN  Authorized by: Magda Allen APRN   Comparison: compared with previous ECG from 4/5/2021  Rhythm: sinus rhythm  Rate: normal  BPM: 60  Q waves: III and aVF    QRS axis: right    Clinical impression: abnormal EKG              Assessment and Plan    Diagnoses and all orders for this visit:    1. Chronic combined systolic and diastolic congestive heart failure (CMS/HCC) (Primary)-NYHA class II.  Compensated.  EF 41 to 45% on 2D echo 2/1/2021.  Stop metoprolol tartrate.  Start metoprolol succinate 50 mg daily.  Stop lisinopril.  Start Entresto 24/26 mg twice daily on the morning of Saturday 5/1/2021 following 36-hour washout.  BMP in 1 week. Reviewed signs and symptoms of CHF and what to report with the patient. Patient instructed to restrict sodium and weigh daily. Report weight gain of greater than 2 lbs overnight or 5 lbs in 1 week. Pt verbalized understanding of instructions and plan of care. Consider addition of  spironolactone, if BP and renal function will tolerate. Could consider addition of Farixa and/or up titration of Entresto in the future.     2. Coronary artery disease involving coronary bypass graft of native heart without angina pectoris-no clinical signs of ischemia.    3. 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     4. Status post insertion of drug-eluting stent into right coronary artery for coronary artery disease- 2.5 x 28 mm Xience Korin drug-eluting stent placement to the right coronary artery 2/5/2021. Pt continues on Brilinta. Denies bleeding.     5. Paroxysmal atrial fibrillation (CMS/[facility])-status post cardioversion 4/6/21.  In sinus rhythm today.  Anticoagulated. She is scheduled for ablation with Dr. Hawk 6/2/21.     6. History of cardioversion- 4/6/21.     7. Anticoagulation goal of INR 2 to 3-patient continues on Coumadin.  Management per primary care provider.    8. Aortic valve stenosis, etiology of cardiac valve disease unspecified-moderate to severe on 2D echo 2/1/2021.    9. Mitral valve insufficiency, unspecified etiology-moderate to severe on 2D echo 2/1/2021.    10. Type 2 diabetes mellitus with other circulatory complication, without long-term current use of insulin (CMS/[facility])-diet controlled.    11. Essential hypertension-blood pressure well controlled.  Monitor and record daily blood pressure. Report readings consistently higher than 140/90 or consistently lower than 100/60.     12. Mixed hyperlipidemia-management per primary care provider.  LDL elevated outside of goal range of less than 70 at 112 on 2/1/2021. Pt is not on statin.         Follow Up   Return in about 4  weeks (around 5/27/2021) for Next scheduled follow up.  Patient was given instructions and counseling regarding her condition or for health maintenance advice. Please see specific information pulled into the AVS if appropriate.

## 2021-05-03 ENCOUNTER — READMISSION MANAGEMENT (OUTPATIENT)
Dept: CALL CENTER | Facility: HOSPITAL | Age: 76
End: 2021-05-03

## 2021-05-03 NOTE — OUTREACH NOTE
Medical Week 3 Survey      Responses   [facility] patient discharged from?  [place]   Does the patient have one of the following disease processes/diagnoses(primary or secondary)?  Other   Week 3 attempt successful?  Yes   Call start time  1541   Call end time  1545   Discharge diagnosis  AFIB,  cardioversion   Person spoke with today (if not patient) and relationship  patient   Meds reviewed with patient/caregiver?  Yes   Is the patient having any side effects they believe may be caused by any medication additions or changes?  No   Does the patient have all medications ordered at discharge?  Yes   Is the patient taking all medications as directed (includes completed medication regime)?  Yes   Does the patient have a primary care provider?   Yes   Does the patient have an appointment with their PCP within 7 days of discharge?  No   Nursing Interventions  Verified appointment date/time/provider   Has the patient kept scheduled appointments due by today?  Yes   Psychosocial issues?  No   What is the patient's perception of their health status since discharge?  Improving   Is the patient/caregiver able to teach back signs and symptoms related to disease process for when to call PCP?  Yes   Is the patient/caregiver able to teach back signs and symptoms related to disease process for when to call 911?  Yes   Is the patient/caregiver able to teach back the hierarchy of who to call/visit for symptoms/problems? PCP, Specialist, Home health nurse, Urgent Care, ED, 911  Yes   Week 3 Call Completed?  Yes   Wrap up additional comments  -- [Pt states she feels good. Upcoming surgery to have ablation r/t afib. Pt states she would not have ablation if she would stay the way she feels now. No questions/concerns at this time.]          Macarena Chow RN

## 2021-05-04 RX ORDER — PRAVASTATIN SODIUM 40 MG
TABLET ORAL
Qty: 90 TABLET | Refills: 4 | Status: SHIPPED | OUTPATIENT
Start: 2021-05-04 | End: 2022-05-17

## 2021-05-06 ENCOUNTER — HOSPITAL ENCOUNTER (OUTPATIENT)
Dept: GENERAL RADIOLOGY | Facility: HOSPITAL | Age: 76
Discharge: HOME | End: 2021-05-06
Admitting: INTERNAL MEDICINE

## 2021-05-06 ENCOUNTER — TRANSCRIBE ORDERS (OUTPATIENT)
Dept: GENERAL RADIOLOGY | Facility: HOSPITAL | Age: 76
End: 2021-05-06

## 2021-05-06 DIAGNOSIS — Z78.0 POST-MENOPAUSAL: Primary | ICD-10-CM

## 2021-05-06 DIAGNOSIS — Z78.0 POST-MENOPAUSAL: ICD-10-CM

## 2021-05-06 PROCEDURE — 77080 DXA BONE DENSITY AXIAL: CPT

## 2021-05-07 ENCOUNTER — DOCUMENTATION (OUTPATIENT)
Dept: CARDIOLOGY | Facility: CLINIC | Age: 76
End: 2021-05-07

## 2021-05-07 DIAGNOSIS — I35.0 AORTIC STENOSIS, SEVERE: Primary | ICD-10-CM

## 2021-05-11 ENCOUNTER — TELEPHONE (OUTPATIENT)
Dept: CARDIOLOGY | Facility: CLINIC | Age: 76
End: 2021-05-11

## 2021-05-11 NOTE — TELEPHONE ENCOUNTER
----- Message from RANDA Dietrich sent at 5/10/2021 12:53 PM CDT -----  Regarding: FW: Abnormal ZIO Patch report  Please see if we can get Mrs. Connolly scheduled sooner. Thank you.   ----- Message -----  From: Gualberto Gallo MD  Sent: 5/10/2021   6:28 AM CDT  To: RANDA Dietrich  Subject: Abnormal ZIO Patch report                        Patient has significant tachybradycardia syndromeI would recommend decreasing or discontinuing beta-blocker therapyShe has had a recent stent placement after atherectomy and holding Brilinta for pacemaker placement may increase her risk of stent thrombosisAfter decreasing or discontinuing beta-blocker therapy would recommend repeating cardiac monitorIf you could see her sooner

## 2021-05-11 NOTE — TELEPHONE ENCOUNTER
Notified patient that we needed to see sooner to go over holter results and possible medication adjustments. Magda's schedule is full. Was placed on Dr. Gallo for 5/14

## 2021-05-19 NOTE — TELEPHONE ENCOUNTER
NOTIFIED BY PATIENTS DAUGHTER, LIZ, THAT PT HAS BEEN OUT OF ELIQUIS & BRILLINTA SINCE Sunday.    PLEASE SIGN

## 2021-05-21 ENCOUNTER — OFFICE VISIT (OUTPATIENT)
Dept: CARDIOLOGY | Facility: CLINIC | Age: 76
End: 2021-05-21

## 2021-05-21 VITALS
SYSTOLIC BLOOD PRESSURE: 159 MMHG | DIASTOLIC BLOOD PRESSURE: 73 MMHG | BODY MASS INDEX: 23.19 KG/M2 | HEART RATE: 63 BPM | HEIGHT: 62 IN | WEIGHT: 126 LBS

## 2021-05-21 DIAGNOSIS — Z79.01 ANTICOAGULATION GOAL OF INR 2 TO 3: ICD-10-CM

## 2021-05-21 DIAGNOSIS — I48.0 PAROXYSMAL ATRIAL FIBRILLATION (HCC): Chronic | ICD-10-CM

## 2021-05-21 DIAGNOSIS — I25.810 CORONARY ARTERY DISEASE INVOLVING CORONARY BYPASS GRAFT OF NATIVE HEART WITHOUT ANGINA PECTORIS: Chronic | ICD-10-CM

## 2021-05-21 DIAGNOSIS — R04.0 EPISTAXIS: ICD-10-CM

## 2021-05-21 DIAGNOSIS — Z51.81 ANTICOAGULATION GOAL OF INR 2 TO 3: ICD-10-CM

## 2021-05-21 DIAGNOSIS — Z95.1 S/P CABG X 4: Primary | ICD-10-CM

## 2021-05-21 DIAGNOSIS — I34.0 MITRAL VALVE INSUFFICIENCY, UNSPECIFIED ETIOLOGY: ICD-10-CM

## 2021-05-21 DIAGNOSIS — E78.2 MIXED HYPERLIPIDEMIA: Chronic | ICD-10-CM

## 2021-05-21 DIAGNOSIS — I50.42 CHRONIC COMBINED SYSTOLIC AND DIASTOLIC CONGESTIVE HEART FAILURE (HCC): Chronic | ICD-10-CM

## 2021-05-21 DIAGNOSIS — I35.0 AORTIC VALVE STENOSIS, ETIOLOGY OF CARDIAC VALVE DISEASE UNSPECIFIED: ICD-10-CM

## 2021-05-21 DIAGNOSIS — E11.59 TYPE 2 DIABETES MELLITUS WITH OTHER CIRCULATORY COMPLICATION, WITHOUT LONG-TERM CURRENT USE OF INSULIN (HCC): Chronic | ICD-10-CM

## 2021-05-21 DIAGNOSIS — I10 ESSENTIAL HYPERTENSION: Chronic | ICD-10-CM

## 2021-05-21 PROCEDURE — 99214 OFFICE O/P EST MOD 30 MIN: CPT | Performed by: INTERNAL MEDICINE

## 2021-05-21 RX ORDER — AMIODARONE HYDROCHLORIDE 200 MG/1
200 TABLET ORAL DAILY
COMMUNITY
Start: 2021-04-26 | End: 2021-11-22

## 2021-05-21 NOTE — PROGRESS NOTES
Sondra Connolly  [id number]  1945  76 y.o.  female    Referring Provider: Matthieu Bhakta DO    Reason for Follow-up Visit: Here for routine follow up  chronic atrial fibrillation   Holter as below  Cardiac workup test results as below   coronary artery disease Coronary artery bypass grafting > 10 years ago  Nuclear stress test no significant ischemia, has prior infarction  Prior admission   Recent percutaneous coronary intervention drug eluting stent to right coronary artery Dr Cabello   Cardiac workup test results as below: 14-day outpatient cardiac telemetry     Subjective      Overall feels the same   Minor epistaxis periodically  Overall the patient feels no major change from baseline symptoms   Similar symptoms as during last visit     Tolerating current medications well with no untoward side effects   Compliant with prescribed medication regimen. Tries to adhere to cardiac diet.      Moderate chronic exertional shortness of breath on exertion relieved with rest  No significant cough or wheezing    No palpitations  No associated chest pain  No significant pedal edema    No fever or chills  No significant expectoration    No hemoptysis  No syncope    Tolerating current medications well with no untoward side effects   Compliant with prescribed medication regimen. Tries to adhere to cardiac diet.     No further presyncope or syncope   No bleeding, excessive bruising, gait instability or fall risks    BP well controlled at home.       Due to see Dr Hawk for atrial fibrillation ablation     No bleeding, excessive bruising, gait instability or fall risks         History of present illness:  Sondra Connolly is a 76 y.o. yo female with history of chronic atrial fibrillation who presents today for   Chief Complaint   Patient presents with   • Coronary Artery Disease     1 mo f/u   • Atrial Fibrillation     has upcoming ablation with Dr Hawk on 6/2/21   .    History  Past Medical History:   Diagnosis Date   •  A-fib (CMS/[facility])    • Abnormal nuclear stress test 8/23/2019   • Angina pectoris (CMS/[facility])    • Anxiety    • Arthritis    • Atherosclerosis of coronary artery bypass graft    • Carotid artery occlusion without infarction    • Chronic kidney disease    • Chronic lung disease    • Colon polyp    • Diabetes mellitus (CMS/[facility])    • DJD (degenerative joint disease)    • Essential hypertension 3/24/2017   • Fibrocystic breast disease    • Gastritis    • GI bleed    • Hemorrhoids    • Hyperlipidemia    • Hypertension    • Lung disease     chronic   • MI (myocardial infarction) (CMS/[facility])    • Palpitations    • PUD (peptic ulcer disease)    • PVD (peripheral vascular disease) (CMS/[facility])    • Renal failure, acute (CMS/[facility])    • 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   ,   Past Surgical History:   Procedure Laterality Date   • APPENDECTOMY     • CARDIAC CATHETERIZATION  05/01/2009    Left-Heart Skin   • CARDIAC CATHETERIZATION N/A 2/4/2021    Procedure: Left Heart Cath;  Surgeon: Tristan Cabello DO;  Location:  [facility] CATH INVASIVE LOCATION;  Service: Cardiology;  Laterality: N/A;   • CARDIAC CATHETERIZATION Right 2/5/2021    Procedure: Left Heart Cath, rotablator to RCA with Dr. Abrams at 1PM;  Surgeon: Tristan Cabello DO;  Location:  [facility] CATH INVASIVE LOC[facility];  Service: Cardiology;  Laterality: Right;   • CAROTID ENDARTERECTOMY     • CATARACT EXTRACTION     • CATARACT EXTRACTION     • COLONOSCOPY     • CORONARY ARTERY BYPASS GRAFT  07/2007    Four   • ENDOSCOPY  10/02/2013   • ENDOSCOPY  10/02/2013   • HYSTERECTOMY     • SKIN CANCER EXCISION     • THROMBOENDARTERECTOMY     • TONSILLECTOMY     ,   Family History   Problem Relation Age of Onset   • Heart disease Mother    • Breast cancer Mother    • Heart disease Father    • Heart disease Brother    • Heart disease Brother    • Breast cancer Maternal Grandmother    ,   Social History     Tobacco Use   • Smoking status: Never Smoker   • Smokeless tobacco: Never Used   •  Tobacco comment: Non smoker; no tobacco use   Vaping Use   • Vaping Use: Never used   Substance Use Topics   • Alcohol use: Yes     Comment: occ   • Drug use: No   ,     Medications  Current Outpatient Medications   Medication Sig Dispense Refill   • amiodarone (PACERONE) 200 MG tablet Take 200 mg by mouth Daily.     • apixaban (ELIQUIS) 5 MG tablet tablet Take 1 tablet by mouth Every 12 (Twelve) Hours. 60 tablet 2   • furosemide (LASIX) 20 MG tablet Take 1 tablet by mouth Daily As Needed (as needed for increased swelling, shortness of breath and/or weight gain). 90 tablet 3   • metoprolol succinate XL (TOPROL-XL) 50 MG 24 hr tablet Take 1 tablet by mouth Daily. 90 tablet 3   • Multiple Vitamins-Minerals (MULTIVITAMIN ADULT PO) Take 1 tablet by mouth Daily.     • pravastatin (PRAVACHOL) 40 MG tablet TAKE 1 TABLET BY MOUTH DAILY. 90 tablet 4   • sacubitril-valsartan (Entresto) 24-26 MG tablet Take 1 tablet by mouth 2 (Two) Times a Day. 60 tablet 11   • ticagrelor (BRILINTA) 90 MG tablet tablet Take 1 tablet by mouth 2 (Two) Times a Day. 60 tablet 2     No current facility-administered medications for this visit.       Allergies:  Buffered aspirin, Salicylates, and Demerol [meperidine]    Review of Systems  Review of Systems   Constitutional: Positive for malaise/fatigue.   HENT: Negative.    Eyes: Negative.    Cardiovascular: Positive for dyspnea on exertion, irregular heartbeat, near-syncope and palpitations. Negative for chest pain, claudication, cyanosis, leg swelling, orthopnea, paroxysmal nocturnal dyspnea and syncope.   Respiratory: Negative.    Endocrine: Negative.    Hematologic/Lymphatic: Negative.    Skin: Negative.    Musculoskeletal: Positive for arthritis and joint pain.   Gastrointestinal: Negative for anorexia.   Genitourinary: Negative.    Neurological: Positive for dizziness and weakness.   Psychiatric/Behavioral: Negative.        Objective     Physical Exam:  /73   Pulse 63   Ht 157.5 cm  "(62\")   Wt 57.2 kg (126 lb)   BMI 23.05 kg/m²   Physical Exam   Constitutional: She appears well-developed.   HENT:   Head: Normocephalic.   Neck: Normal carotid pulses and no JVD present. No tracheal tenderness present. Carotid bruit is not present. No tracheal deviation present.   Cardiovascular: Normal pulses. An irregularly irregular rhythm present.   Murmur heard.   Systolic murmur is present with a grade of 2/6.  Pulmonary/Chest: Effort normal. No stridor.   Abdominal: Soft. She exhibits no distension. There is no abdominal tenderness.   Neurological: She is alert. No cranial nerve deficit or sensory deficit.   Skin: Skin is warm.   Psychiatric: Her speech is normal and behavior is normal.       Results Review:    Results for orders placed during the hospital encounter of 01/31/21    Adult Transthoracic Echo Complete W/ Cont if Necessary Per Protocol    Interpretation Summary  · The right atrial cavity is moderately dilated.  · Moderate to severe aortic valve stenosis is present.  · Moderate to severe mitral valve regurgitation is present.  · Estimated right ventricular systolic pressure from tricuspid regurgitation is mildly elevated (35-45 mmHg).  · The left ventricular cavity is borderline dilated.  · Left ventricular ejection fraction appears to be 41 - 45%. Left ventricular systolic function is low normal.         2/4/21      · Average HR: 69. Min HR: 29. Max HR: 162.     ·  Entire report was reviewed.  Monitoring in days: ~14   · The predominant rhythm noted during the testing period was sinus rhythm.     · Rare supraventricular ectopics with an APC burden of: <  1 %    · Occasional premature ventricular contractions with a PVC burden of: 1.4 %     · No correlated arrhythmia  · No significant pauses                  Conclusion:      Baseline rhythm is sinus.  Lowest rate of 29 bpm at 9:19 AM   Occasional premature contractions with a PVC burden of 1.4%.  9 runs of supraventricular tachycardia longest " 14 beats at a maximum rate of 162 bpm and an average of 127 bpm  2 pauses longest 3.1 seconds which was a compensatory pause after a PVC  Longest pause at 5:48 AM and second longest pause of 3 seconds at 8:45 AM first pause due to compensatory pause after PVC and second pause is due to a sinus pause of 3 seconds at 8:45 AM  Intermittent junctional beats with a rate of 29 bpm at 9:20 AM  9 runs of supraventricular tachycardia longest 14 beats at a maximum rate of 162 bpm  Significant sick sinus syndrome with tachybradycardia syndrome         Tristan Cabello DO (Primary) Pamela Nicholas DO Cole, Chelsea L, APRN   Indications    Coronary artery disease involving native coronary artery of native heart without angina pectoris [I25.10 (ICD-10-CM)]       Conclusion    Date of procedure: February 4, 2021     Procedures performed:     1. Access to the right femoral artery via modified Seldinger technique and ultrasound guidance  2. Left heart catheterization with retrograde crossing of the aortic valve into the left ventricle  3. Selective bilateral coronary angiography  4. SVG angiography x3  5. LIMA angiography  6. Multiple attempts at PTCA on the proximal and mid right coronary artery with different balloons with minimal success, please see below for details  7. Selective right iliofemoral angiogram for possible device closure  8. Patent hemostasis achieved in the right femoral artery via Angio-Seal closure device to the right one sheath was pulled  9. Conscious sedation with continuous hemodynamic monitoring for 1 hour 36 minutes              Impression:  1.  Coronary artery disease as described above status post successful PTCA and PCI after rotational atherectomy with return of JOSE-3 flow in the distal right coronary artery with improvement in the 90% stenosis and 70 to 80% stenosis to less than 5% stenosis both     Plan:  1. Routine post procedure orders  2.  Dual antiplatelet therapy for 1 year  3.   High intensity statin  4.  Referral for cardiac rehab  5.  ACE inhibitor and beta-blocker as vitals tolerate  6.  Close follow-up with cardiology as an outpatient      Results for orders placed during the hospital encounter of 21    Adult Transthoracic Echo Complete W/ Cont if Necessary Per Protocol    Interpretation Summary  · The right atrial cavity is moderately dilated.  · Moderate to severe aortic valve stenosis is present.  · Moderate to severe mitral valve regurgitation is present.  · Estimated right ventricular systolic pressure from tricuspid regurgitation is mildly elevated (35-45 mmHg).  · The left ventricular cavity is borderline dilated.  · Left ventricular ejection fraction appears to be 41 - 45%. Left ventricular systolic function is low normal.  Holter       · Average HR: 79. Min HR: 44. Max HR: 146.     · Monitored for ~1 day   · Slowest heart rate at 8:46 AM  · The predominant rhythm noted during the testing period was atrial flutter   · 2256  premature ventricular contractions: PVC burden:  2%   · 75    non sustained ventricular tachycardia episode longest 9 beats at a maximum rate of 152 beats per minute   · No correlated arrhythmia  · 568 pauses longest 3.3 seconds at 1:54 AM           Sondra Connolly   Regadenoson Stress Test With Myocardial Perfusion SPECT (Multi Study)   Order# [id number]   Reading physician: Gualberto Gallo MD Ordering physician: Gualberto Gallo MD Study date: 19   Patient Information     Patient Name  Sondra Connolly MRN  [id number] Sex  Female  (Age)  1945 (75 y.o.)   Interpretation Summary     · Left ventricular ejection fraction is normal (Calculated EF = 55%).  · Breast attenuation artifact is present.  · Myocardial perfusion imaging indicates a medium-sized infarct located in the inferior wall with no significant ischemia noted.                  Procedures    Assessment/Plan   Patient Active Problem List   Diagnosis   • Mixed hyperlipidemia   •  Coronary artery disease involving coronary bypass graft of native heart without angina pectoris   • Paroxysmal atrial fibrillation (CMS/HCC)   • Essential hypertension   • Anxiety about health   • Coumadin toxicity   • Atrial fibrillation with RVR (CMS/HCC)   • Chronic combined systolic and diastolic congestive heart failure (CMS/HCC)   • S/P CABG x 4   • Status post insertion of drug-eluting stent into right coronary artery for coronary artery disease   • History of cardioversion   • Anticoagulation goal of INR 2 to 3   • Aortic valve stenosis   • Mitral valve regurgitation   • Diabetes mellitus (CMS/HCC)       Plan       Patient expressed understanding  Encouraged and answered all questions   Discussed with the patient and all questioned fully answered. She will call me if any problems arise.   Discussed results of prior testing with patient: 14-day outpatient cardiac telemetry     Due to epistaxis will refer to ENT   Orders Placed This Encounter   Procedures   • Ambulatory Referral to ENT (Otolaryngology)     Referral Priority:   Routine     Referral Type:   Consultation     Referral Reason:   Specialty Services Required     Requested Specialty:   Otolaryngology     Number of Visits Requested:   1      Decrease  beta blocker therapy BY 50%     Due for atrial fibrillation ablation Dr Hawk   S/L NTG PRN for chest pain, call me or go to ER if has to use S/L nitroglycerin     I support the patient's decision to take the Covid -19 vaccine      Continue Brilinta and Eliquis     Monitor for any signs of bleeding including red or dark stools as well as easy bruisabilty. Fall precautions.      Check BP and heart rates twice daily at least 3x / week, week a month  at home and bring a recording for me to review next visit  If BP >130/85 or < 100/60 persistently over 3 reading 30 mins apart call sooner      I support the patient's decision to take the Covid -19 vaccine   Had both dose   No major issues   Now fully immunized               Return in about 3 months (around 8/21/2021).

## 2021-05-25 ENCOUNTER — TELEPHONE (OUTPATIENT)
Dept: CARDIOLOGY | Facility: CLINIC | Age: 76
End: 2021-05-25

## 2021-05-25 NOTE — TELEPHONE ENCOUNTER
This pt called to let you know that she cancelled the ENT referral you ordered since she is not having anymore nose bleeds since you seen her last which was 5/21/21. Just TRISH

## 2021-08-02 ENCOUNTER — HOSPITAL ENCOUNTER (OUTPATIENT)
Dept: CARDIOLOGY | Facility: HOSPITAL | Age: 76
Discharge: HOME | End: 2021-08-02
Admitting: NURSE PRACTITIONER

## 2021-08-02 VITALS
HEIGHT: 62 IN | WEIGHT: 126 LBS | SYSTOLIC BLOOD PRESSURE: 102 MMHG | DIASTOLIC BLOOD PRESSURE: 51 MMHG | BODY MASS INDEX: 23.19 KG/M2

## 2021-08-02 DIAGNOSIS — I35.0 AORTIC STENOSIS, SEVERE: ICD-10-CM

## 2021-08-02 PROCEDURE — 93325 DOPPLER ECHO COLOR FLOW MAPG: CPT | Performed by: INTERNAL MEDICINE

## 2021-08-02 PROCEDURE — 93321 DOPPLER ECHO F-UP/LMTD STD: CPT | Performed by: INTERNAL MEDICINE

## 2021-08-02 PROCEDURE — 93308 TTE F-UP OR LMTD: CPT | Performed by: INTERNAL MEDICINE

## 2021-08-02 PROCEDURE — 93356 MYOCRD STRAIN IMG SPCKL TRCK: CPT | Performed by: INTERNAL MEDICINE

## 2021-08-02 PROCEDURE — 93308 TTE F-UP OR LMTD: CPT

## 2021-08-02 PROCEDURE — 93321 DOPPLER ECHO F-UP/LMTD STD: CPT

## 2021-08-02 PROCEDURE — 93356 MYOCRD STRAIN IMG SPCKL TRCK: CPT

## 2021-08-02 PROCEDURE — 93325 DOPPLER ECHO COLOR FLOW MAPG: CPT

## 2021-08-04 LAB
BH CV ECHO MEAS - AO MAX PG (FULL): 28.5 MMHG
BH CV ECHO MEAS - AO MAX PG: 30.3 MMHG
BH CV ECHO MEAS - AO MEAN PG (FULL): 14.4 MMHG
BH CV ECHO MEAS - AO MEAN PG: 15.4 MMHG
BH CV ECHO MEAS - AO ROOT AREA (BSA CORRECTED): 2
BH CV ECHO MEAS - AO ROOT AREA: 7.5 CM^2
BH CV ECHO MEAS - AO ROOT DIAM: 3.1 CM
BH CV ECHO MEAS - AO V2 MAX: 275 CM/SEC
BH CV ECHO MEAS - AO V2 MEAN: 183.8 CM/SEC
BH CV ECHO MEAS - AO V2 VTI: 66.9 CM
BH CV ECHO MEAS - AVA(I,A): 0.8 CM^2
BH CV ECHO MEAS - AVA(I,D): 0.8 CM^2
BH CV ECHO MEAS - AVA(V,A): 0.76 CM^2
BH CV ECHO MEAS - AVA(V,D): 0.76 CM^2
BH CV ECHO MEAS - BSA(HAYCOCK): 1.6 M^2
BH CV ECHO MEAS - BSA: 1.6 M^2
BH CV ECHO MEAS - BZI_BMI: 23 KILOGRAMS/M^2
BH CV ECHO MEAS - BZI_METRIC_HEIGHT: 157.5 CM
BH CV ECHO MEAS - BZI_METRIC_WEIGHT: 57.2 KG
BH CV ECHO MEAS - EDV(CUBED): 109.2 ML
BH CV ECHO MEAS - EDV(MOD-SP4): 87 ML
BH CV ECHO MEAS - EDV(TEICH): 106.5 ML
BH CV ECHO MEAS - EF(CUBED): 56.2 %
BH CV ECHO MEAS - EF(MOD-SP4): 46.4 %
BH CV ECHO MEAS - EF(TEICH): 47.9 %
BH CV ECHO MEAS - ESV(CUBED): 47.8 ML
BH CV ECHO MEAS - ESV(MOD-SP4): 46.6 ML
BH CV ECHO MEAS - ESV(TEICH): 55.5 ML
BH CV ECHO MEAS - FS: 24.1 %
BH CV ECHO MEAS - IVS/LVPW: 0.85
BH CV ECHO MEAS - IVSD: 0.98 CM
BH CV ECHO MEAS - LAT PEAK E' VEL: 8.6 CM/SEC
BH CV ECHO MEAS - LV DIASTOLIC VOL/BSA (35-75): 55.4 ML/M^2
BH CV ECHO MEAS - LV MASS(C)D: 183.6 GRAMS
BH CV ECHO MEAS - LV MASS(C)DI: 116.9 GRAMS/M^2
BH CV ECHO MEAS - LV MAX PG: 1.8 MMHG
BH CV ECHO MEAS - LV MEAN PG: 1 MMHG
BH CV ECHO MEAS - LV SYSTOLIC VOL/BSA (12-30): 29.7 ML/M^2
BH CV ECHO MEAS - LV V1 MAX: 66.5 CM/SEC
BH CV ECHO MEAS - LV V1 MEAN: 42.5 CM/SEC
BH CV ECHO MEAS - LV V1 VTI: 17 CM
BH CV ECHO MEAS - LVIDD: 4.8 CM
BH CV ECHO MEAS - LVIDS: 3.6 CM
BH CV ECHO MEAS - LVLD AP4: 6.9 CM
BH CV ECHO MEAS - LVLS AP4: 6 CM
BH CV ECHO MEAS - LVOT AREA (M): 3.1 CM^2
BH CV ECHO MEAS - LVOT AREA: 3.1 CM^2
BH CV ECHO MEAS - LVOT DIAM: 2 CM
BH CV ECHO MEAS - LVPWD: 1.2 CM
BH CV ECHO MEAS - MED PEAK E' VEL: 4.46 CM/SEC
BH CV ECHO MEAS - MR MAX PG: 104.9 MMHG
BH CV ECHO MEAS - MR MAX VEL: 512 CM/SEC
BH CV ECHO MEAS - MR MEAN PG: 65 MMHG
BH CV ECHO MEAS - MR MEAN VEL: 374 CM/SEC
BH CV ECHO MEAS - MR VTI: 203 CM
BH CV ECHO MEAS - MV A MAX VEL: 73.2 CM/SEC
BH CV ECHO MEAS - MV DEC TIME: 0.26 SEC
BH CV ECHO MEAS - MV E MAX VEL: 80 CM/SEC
BH CV ECHO MEAS - MV E/A: 1.1
BH CV ECHO MEAS - RAP SYSTOLE: 5 MMHG
BH CV ECHO MEAS - RVSP: 30 MMHG
BH CV ECHO MEAS - SI(AO): 321.5 ML/M^2
BH CV ECHO MEAS - SI(CUBED): 39.1 ML/M^2
BH CV ECHO MEAS - SI(LVOT): 34 ML/M^2
BH CV ECHO MEAS - SI(MOD-SP4): 25.7 ML/M^2
BH CV ECHO MEAS - SI(TEICH): 32.4 ML/M^2
BH CV ECHO MEAS - SV(AO): 504.9 ML
BH CV ECHO MEAS - SV(CUBED): 61.4 ML
BH CV ECHO MEAS - SV(LVOT): 53.4 ML
BH CV ECHO MEAS - SV(MOD-SP4): 40.4 ML
BH CV ECHO MEAS - SV(TEICH): 51 ML
BH CV ECHO MEAS - TR MAX VEL: 250 CM/SEC
BH CV ECHO MEASUREMENTS AVERAGE E/E' RATIO: 12.25
LEFT ATRIUM VOLUME INDEX: 52 ML/M2
LEFT ATRIUM VOLUME: 70 CM3

## 2021-08-26 ENCOUNTER — OFFICE VISIT (OUTPATIENT)
Dept: CARDIOLOGY | Facility: CLINIC | Age: 76
End: 2021-08-26

## 2021-08-26 VITALS
DIASTOLIC BLOOD PRESSURE: 88 MMHG | BODY MASS INDEX: 21.16 KG/M2 | OXYGEN SATURATION: 98 % | HEART RATE: 52 BPM | HEIGHT: 62 IN | SYSTOLIC BLOOD PRESSURE: 140 MMHG | WEIGHT: 115 LBS

## 2021-08-26 DIAGNOSIS — Z79.01 ANTICOAGULATION GOAL OF INR 2 TO 3: ICD-10-CM

## 2021-08-26 DIAGNOSIS — E11.59 TYPE 2 DIABETES MELLITUS WITH OTHER CIRCULATORY COMPLICATION, WITHOUT LONG-TERM CURRENT USE OF INSULIN (HCC): Chronic | ICD-10-CM

## 2021-08-26 DIAGNOSIS — I50.42 CHRONIC COMBINED SYSTOLIC AND DIASTOLIC CONGESTIVE HEART FAILURE (HCC): Chronic | ICD-10-CM

## 2021-08-26 DIAGNOSIS — Z95.1 S/P CABG X 4: ICD-10-CM

## 2021-08-26 DIAGNOSIS — I48.0 PAROXYSMAL ATRIAL FIBRILLATION (HCC): Chronic | ICD-10-CM

## 2021-08-26 DIAGNOSIS — Z98.890 HISTORY OF CARDIOVERSION: ICD-10-CM

## 2021-08-26 DIAGNOSIS — I35.0 AORTIC VALVE STENOSIS, ETIOLOGY OF CARDIAC VALVE DISEASE UNSPECIFIED: ICD-10-CM

## 2021-08-26 DIAGNOSIS — Z51.81 ANTICOAGULATION GOAL OF INR 2 TO 3: ICD-10-CM

## 2021-08-26 DIAGNOSIS — E78.2 MIXED HYPERLIPIDEMIA: Primary | Chronic | ICD-10-CM

## 2021-08-26 DIAGNOSIS — I10 ESSENTIAL HYPERTENSION: Chronic | ICD-10-CM

## 2021-08-26 DIAGNOSIS — I34.0 MITRAL VALVE INSUFFICIENCY, UNSPECIFIED ETIOLOGY: ICD-10-CM

## 2021-08-26 DIAGNOSIS — I25.810 CORONARY ARTERY DISEASE INVOLVING CORONARY BYPASS GRAFT OF NATIVE HEART WITHOUT ANGINA PECTORIS: Chronic | ICD-10-CM

## 2021-08-26 PROCEDURE — 93000 ELECTROCARDIOGRAM COMPLETE: CPT | Performed by: INTERNAL MEDICINE

## 2021-08-26 PROCEDURE — 99214 OFFICE O/P EST MOD 30 MIN: CPT | Performed by: INTERNAL MEDICINE

## 2021-08-26 NOTE — PROGRESS NOTES
Sondra Connolly  [id number]  1945  76 y.o.  female    Referring Provider: Matthieu Bhakta DO    Reason for Follow-up Visit: Here for routine follow up  chronic atrial fibrillation   Holter as below  Cardiac workup test results as below   coronary artery disease Coronary artery bypass grafting > 10 years ago  Nuclear stress test no significant ischemia, has prior infarction  Prior admission   Recent percutaneous coronary intervention drug eluting stent to right coronary artery Dr Cabello   Cardiac workup test results as below: 14-day outpatient cardiac telemetry     Subjective    Much better   easy bruisability   Overall feels the same   Minor epistaxis periodically in past now resolved  Overall the patient feels no major change from baseline symptoms   Similar symptoms as during last visit     Tolerating current medications well with no untoward side effects   Compliant with prescribed medication regimen. Tries to adhere to cardiac diet.      Much less  exertional shortness of breath    No significant cough or wheezing    No palpitations  No associated chest pain  No significant pedal edema    No fever or chills  No significant expectoration    No hemoptysis  No syncope    Tolerating current medications well with no untoward side effects   Compliant with prescribed medication regimen. Tries to adhere to cardiac diet.     No further presyncope or syncope   No bleeding, excessive bruising, gait instability or fall risks    BP well controlled at home.       Saw Dr Hawk for atrial fibrillation ablation s/p ablation   Due to see him back in Nov 2021  No bleeding, excessive bruising, gait instability or fall risks         History of present illness:  Sondra Connolly is a 76 y.o. yo female with history of chronic atrial fibrillation who presents today for   Chief Complaint   Patient presents with   • Coronary Artery Disease     3 mo f/u   .    History  Past Medical History:   Diagnosis Date   • A-fib (CMS/HCC)    •  Abnormal nuclear stress test 8/23/2019   • Angina pectoris (CMS/[facility])    • Anxiety    • Arthritis    • Atherosclerosis of coronary artery bypass graft    • Carotid artery occlusion without infarction    • Chronic kidney disease    • Chronic lung disease    • Colon polyp    • Diabetes mellitus (CMS/[facility])    • DJD (degenerative joint disease)    • Essential hypertension 3/24/2017   • Fibrocystic breast disease    • Gastritis    • GI bleed    • Hemorrhoids    • Hyperlipidemia    • Hypertension    • Lung disease     chronic   • MI (myocardial infarction) (CMS/[facility])    • Palpitations    • PUD (peptic ulcer disease)    • PVD (peripheral vascular disease) (CMS/[facility])    • Renal failure, acute (CMS/[facility])    • 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   ,   Past Surgical History:   Procedure Laterality Date   • APPENDECTOMY     • CARDIAC CATHETERIZATION  05/01/2009    Left-Heart Skin   • CARDIAC CATHETERIZATION N/A 2/4/2021    Procedure: Left Heart Cath;  Surgeon: Tristan Cabello DO;  Location:  [facility] CATH [facility]AS[facility];  Service: Cardiology;  Laterality: N/A;   • CARDIAC CATHETERIZATION Right 2/5/2021    Procedure: Left Heart Cath, rotablator to RCA with Dr. Abrams at 1PM;  Surgeon: Tristan Cabello DO;  Location:  [facility] CATH [facility];  Service: Cardiology;  Laterality: Right;   • CAROTID ENDARTERECTOMY     • CATARACT EXTRACTION     • CATARACT EXTRACTION     • COLONOSCOPY     • CORONARY ARTERY BYPASS GRAFT  07/2007    Four   • ENDOSCOPY  10/02/2013   • ENDOSCOPY  10/02/2013   • HYSTERECTOMY     • SKIN CANCER EXCISION     • THROMBOENDARTERECTOMY     • TONSILLECTOMY     ,   Family History   Problem Relation Age of Onset   • Heart disease Mother    • Breast cancer Mother    • Heart disease Father    • Heart disease Brother    • Heart disease Brother    • Breast cancer Maternal Grandmother    ,   Social History     Tobacco Use   • Smoking status: Never Smoker   • Smokeless tobacco: Never Used   • Tobacco comment: Non  smoker; no tobacco use   Vaping Use   • Vaping Use: Never used   Substance Use Topics   • Alcohol use: Yes     Comment: occ   • Drug use: No   ,     Medications  Current Outpatient Medications   Medication Sig Dispense Refill   • amiodarone (PACERONE) 200 MG tablet Take 200 mg by mouth Daily.     • apixaban (ELIQUIS) 5 MG tablet tablet Take 1 tablet by mouth Every 12 (Twelve) Hours. 180 tablet 4   • furosemide (LASIX) 20 MG tablet Take 1 tablet by mouth Daily As Needed (as needed for increased swelling, shortness of breath and/or weight gain). 90 tablet 3   • metoprolol succinate XL (TOPROL-XL) 50 MG 24 hr tablet Take 1 tablet by mouth Daily. 90 tablet 3   • Multiple Vitamins-Minerals (MULTIVITAMIN ADULT PO) Take 1 tablet by mouth Daily.     • pravastatin (PRAVACHOL) 40 MG tablet TAKE 1 TABLET BY MOUTH DAILY. 90 tablet 4   • sacubitril-valsartan (Entresto) 24-26 MG tablet Take 1 tablet by mouth 2 (Two) Times a Day. 60 tablet 11   • ticagrelor (BRILINTA) 90 MG tablet tablet Take 1 tablet by mouth 2 (Two) Times a Day. 180 tablet 4     No current facility-administered medications for this visit.       Allergies:  Buffered aspirin, Salicylates, Shellfish-derived products, and Demerol [meperidine]    Review of Systems  Review of Systems   Constitutional: Positive for malaise/fatigue.   HENT: Negative.    Eyes: Negative.    Cardiovascular: Positive for dyspnea on exertion. Negative for chest pain, claudication, cyanosis, irregular heartbeat, leg swelling, near-syncope, orthopnea, palpitations, paroxysmal nocturnal dyspnea and syncope.   Respiratory: Negative.    Endocrine: Negative.    Hematologic/Lymphatic: Negative.    Skin: Negative.    Musculoskeletal: Positive for arthritis and joint pain.   Gastrointestinal: Negative for anorexia.   Genitourinary: Negative.    Neurological: Positive for dizziness and weakness.   Psychiatric/Behavioral: Negative.        Objective     Physical Exam:  /88   Pulse 52   Ht 157.5  "cm (62\")   Wt 52.2 kg (115 lb)   SpO2 98%   BMI 21.03 kg/m²   Physical Exam   Constitutional: She appears well-developed.   HENT:   Head: Normocephalic.   Neck: Normal carotid pulses and no JVD present. No tracheal tenderness present. Carotid bruit is not present. No tracheal deviation present.   Cardiovascular: Regular rhythm and normal pulses.   Murmur heard.   Systolic murmur is present with a grade of 2/6.  Pulmonary/Chest: Effort normal. No stridor.   Abdominal: Soft. She exhibits no distension. There is no abdominal tenderness.   Neurological: She is alert. No cranial nerve deficit or sensory deficit.   Skin: Skin is warm.   Psychiatric: Her speech is normal and behavior is normal.       Results Review:    Results for orders placed during the hospital encounter of 08/02/21    Adult Transthoracic Echo Limited W/ Cont if Necessary Per Protocol    Interpretation Summary  · Left ventricular ejection fraction appears to be 56 - 60%. Left ventricular systolic function is normal.  · Left atrial volume is severely increased.  · Left ventricular diastolic function is consistent with (grade I) impaired relaxation.  · The following left ventricular wall segments are hypokinetic: basal inferior.  · Moderate aortic valve stenosis is present.  · Estimated right ventricular systolic pressure from tricuspid regurgitation is normal (<35 mmHg).         2/4/21      · Average HR: 69. Min HR: 29. Max HR: 162.     ·  Entire report was reviewed.  Monitoring in days: ~14   · The predominant rhythm noted during the testing period was sinus rhythm.     · Rare supraventricular ectopics with an APC burden of: <  1 %    · Occasional premature ventricular contractions with a PVC burden of: 1.4 %     · No correlated arrhythmia  · No significant pauses                  Conclusion:      Baseline rhythm is sinus.  Lowest rate of 29 bpm at 9:19 AM   Occasional premature contractions with a PVC burden of 1.4%.  9 runs of supraventricular " tachycardia longest 14 beats at a maximum rate of 162 bpm and an average of 127 bpm  2 pauses longest 3.1 seconds which was a compensatory pause after a PVC  Longest pause at 5:48 AM and second longest pause of 3 seconds at 8:45 AM first pause due to compensatory pause after PVC and second pause is due to a sinus pause of 3 seconds at 8:45 AM  Intermittent junctional beats with a rate of 29 bpm at 9:20 AM  9 runs of supraventricular tachycardia longest 14 beats at a maximum rate of 162 bpm  Significant sick sinus syndrome with tachybradycardia syndrome         Tristan Cabello DO (Primary) Pamela Nicholas DO Cole, Chelsea L, APRN   Indications    Coronary artery disease involving native coronary artery of native heart without angina pectoris [I25.10 (ICD-10-CM)]       Conclusion    Date of procedure: February 4, 2021     Procedures performed:     1. Access to the right femoral artery via modified Seldinger technique and ultrasound guidance  2. Left heart catheterization with retrograde crossing of the aortic valve into the left ventricle  3. Selective bilateral coronary angiography  4. SVG angiography x3  5. LIMA angiography  6. Multiple attempts at PTCA on the proximal and mid right coronary artery with different balloons with minimal success, please see below for details  7. Selective right iliofemoral angiogram for possible device closure  8. Patent hemostasis achieved in the right femoral artery via Angio-Seal closure device to the right one sheath was pulled  9. Conscious sedation with continuous hemodynamic monitoring for 1 hour 36 minutes              Impression:  1.  Coronary artery disease as described above status post successful PTCA and PCI after rotational atherectomy with return of JOSE-3 flow in the distal right coronary artery with improvement in the 90% stenosis and 70 to 80% stenosis to less than 5% stenosis both     Plan:  1. Routine post procedure orders  2.  Dual antiplatelet  therapy for 1 year  3.  High intensity statin  4.  Referral for cardiac rehab  5.  ACE inhibitor and beta-blocker as vitals tolerate  6.  Close follow-up with cardiology as an outpatient      Results for orders placed during the hospital encounter of 21    Adult Transthoracic Echo Limited W/ Cont if Necessary Per Protocol    Interpretation Summary  · Left ventricular ejection fraction appears to be 56 - 60%. Left ventricular systolic function is normal.  · Left atrial volume is severely increased.  · Left ventricular diastolic function is consistent with (grade I) impaired relaxation.  · The following left ventricular wall segments are hypokinetic: basal inferior.  · Moderate aortic valve stenosis is present.  · Estimated right ventricular systolic pressure from tricuspid regurgitation is normal (<35 mmHg).  Holter       · Average HR: 79. Min HR: 44. Max HR: 146.     · Monitored for ~1 day   · Slowest heart rate at 8:46 AM  · The predominant rhythm noted during the testing period was atrial flutter   · 2256  premature ventricular contractions: PVC burden:  2%   · 75    non sustained ventricular tachycardia episode longest 9 beats at a maximum rate of 152 beats per minute   · No correlated arrhythmia  · 568 pauses longest 3.3 seconds at 1:54 AM           Sondra Connolly   Regadenoson Stress Test With Myocardial Perfusion SPECT (Multi Study)   Order# [id number]   Reading physician: Gualberto Gallo MD Ordering physician: Gualberto Gallo MD Study date: 19   Patient Information     Patient Name  Sondra Connolly MRN  [id number] Sex  Female  (Age)  1945 (75 y.o.)   Interpretation Summary     · Left ventricular ejection fraction is normal (Calculated EF = 55%).  · Breast attenuation artifact is present.  · Myocardial perfusion imaging indicates a medium-sized infarct located in the inferior wall with no significant ischemia noted.                    ECG 12 Lead    Date/Time: 2021 12:25 PM  Performed by:  Gualberto Gallo MD  Authorized by: Gualberto Gallo MD   Comparison: compared with previous ECG from 4/29/2021  Comparison to previous ECG: Ventricular rate changed from   60   to  52   beats per minute    Rhythm: sinus bradycardia  Rate: bradycardic  Conduction: conduction normal  ST Segments: ST segments normal  T Waves: T waves normal  Other findings: non-specific ST-T wave changes    Clinical impression: abnormal EKG            Assessment/Plan   Patient Active Problem List   Diagnosis   • Mixed hyperlipidemia   • Coronary artery disease involving coronary bypass graft of native heart without angina pectoris   • Paroxysmal atrial fibrillation (CMS/HCC)   • Essential hypertension   • Anxiety about health   • Coumadin toxicity   • Atrial fibrillation with RVR (CMS/HCC)   • Chronic combined systolic and diastolic congestive heart failure (CMS/HCC)   • S/P CABG x 4   • Status post insertion of drug-eluting stent into right coronary artery for coronary artery disease   • History of cardioversion   • Anticoagulation goal of INR 2 to 3   • Aortic valve stenosis   • Mitral valve regurgitation   • Diabetes mellitus (CMS/HCC)       Plan     Decrease Amiodarone to 100 mg daily     Patient expressed understanding  Encouraged and answered all questions   Discussed with the patient and all questioned fully answered. She will call me if any problems arise.   Discussed results of prior testing with patient : echo   as well electrocardiogram from today       S/L NTG PRN for chest pain, call me or go to ER if has to use S/L nitroglycerin     I support the patient's decision to take the Covid -19 vaccine   Had both dose   No major issues   Now fully immunized    Recommend booster    Continue Brilinta and Eliquis     Monitor for any signs of bleeding including red or dark stools as well as easy bruisabilty. Fall precautions.      Check BP and heart rates twice daily at least 3x / week, week a month  at home and bring a recording for me  to review next visit  If BP >130/85 or < 100/60 persistently over 3 reading 30 mins apart call sooner      Overall doing well no new cardiovascular symptoms and therefore no additional cardiac testing is required   If any interim issues arise will call me for further evaluation.             Return in about 3 months (around 11/26/2021).

## 2021-11-22 ENCOUNTER — OFFICE VISIT (OUTPATIENT)
Dept: CARDIOLOGY | Facility: CLINIC | Age: 76
End: 2021-11-22

## 2021-11-22 VITALS
WEIGHT: 115 LBS | HEIGHT: 62 IN | DIASTOLIC BLOOD PRESSURE: 68 MMHG | OXYGEN SATURATION: 95 % | BODY MASS INDEX: 21.16 KG/M2 | SYSTOLIC BLOOD PRESSURE: 110 MMHG | HEART RATE: 54 BPM

## 2021-11-22 DIAGNOSIS — E78.2 MIXED HYPERLIPIDEMIA: Chronic | ICD-10-CM

## 2021-11-22 DIAGNOSIS — I10 ESSENTIAL HYPERTENSION: Chronic | ICD-10-CM

## 2021-11-22 DIAGNOSIS — I48.0 PAROXYSMAL ATRIAL FIBRILLATION (HCC): Chronic | ICD-10-CM

## 2021-11-22 DIAGNOSIS — Z95.1 S/P CABG X 4: Primary | ICD-10-CM

## 2021-11-22 DIAGNOSIS — I25.810 CORONARY ARTERY DISEASE INVOLVING CORONARY BYPASS GRAFT OF NATIVE HEART WITHOUT ANGINA PECTORIS: Chronic | ICD-10-CM

## 2021-11-22 PROCEDURE — 99214 OFFICE O/P EST MOD 30 MIN: CPT | Performed by: INTERNAL MEDICINE

## 2021-11-22 NOTE — PROGRESS NOTES
Sondra Connolly  [id number]  1945  76 y.o.  female    Referring Provider: Matthieu Bhakta DO    Reason for Follow-up Visit: Here for routine follow up  chronic atrial fibrillation   Holter as below  Cardiac workup test results as below   coronary artery disease Coronary artery bypass grafting > 10 years ago  Nuclear stress test no significant ischemia, has prior infarction  Prior admission   Recent percutaneous coronary intervention drug eluting stent to right coronary artery Dr aCbello   Cardiac workup test results as below: 14-day outpatient cardiac telemetry     Subjective    Overall feels the same   No new events or complaints since last visit   Overall the patient feels no major change from baseline symptoms   Similar symptoms as during last visit   BP well controlled at home.        Much better   Easy bruisability   Overall feels the same   Minor epistaxis periodically in past now resolved  Tolerating current medications well with no untoward side effects   Compliant with prescribed medication regimen. Tries to adhere to cardiac diet.      Much less  exertional shortness of breath    No significant cough or wheezing    No palpitations  No associated chest pain  No significant pedal edema    No fever or chills  No significant expectoration    No hemoptysis  No syncope    Tolerating current medications well with no untoward side effects   Compliant with prescribed medication regimen. Tries to adhere to cardiac diet.     No further presyncope or syncope   No bleeding, excessive bruising, gait instability or fall risks       Saw Dr Hawk for atrial fibrillation ablation s/p ablation   Saw her back and was told to talk to me about on anticoagulation  No bleeding, excessive bruising, gait instability or fall risks         History of present illness:  Sondra Connolly is a 76 y.o. yo female with  chronic atrial fibrillation  who presents today for   Chief Complaint   Patient presents with   • Coronary Artery  Disease     2 month    .    History  Past Medical History:   Diagnosis Date   • A-fib (HCC)    • Abnormal nuclear stress test 8/23/2019   • Angina pectoris (HCC)    • Anxiety    • Arthritis    • Atherosclerosis of coronary artery bypass graft    • Carotid artery occlusion without infarction    • Chronic kidney disease    • Chronic lung disease    • Colon polyp    • Diabetes mellitus (HCC)    • DJD (degenerative joint disease)    • Essential hypertension 3/24/2017   • Fibrocystic breast disease    • Gastritis    • GI bleed    • Hemorrhoids    • Hyperlipidemia    • Hypertension    • Lung disease     chronic   • MI (myocardial infarction) (HCC)    • Palpitations    • PUD (peptic ulcer disease)    • PVD (peripheral vascular disease) (HCC)    • Renal failure, acute (HCC)    • 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   ,   Past Surgical History:   Procedure Laterality Date   • ABLATION OF DYSRHYTHMIC FOCUS      May 2021 in [place]     • APPENDECTOMY     • CARDIAC CATHETERIZATION  05/01/2009    Left-Heart Skin   • CARDIAC CATHETERIZATION N/A 2/4/2021    Procedure: Left Heart Cath;  Surgeon: Tristan Cabello DO;  Location:  [facility];  Service: Cardiology;  Laterality: N/A;   • CARDIAC CATHETERIZATION Right 2/5/2021    Procedure: Left Heart Cath, rotablator to RCA with Dr. Abrams at 1PM;  Surgeon: Tristan Cabello DO;  Location:  [facility]ATH [facility];  Service: Cardiology;  Laterality: Right;   • CAROTID ENDARTERECTOMY     • CATARACT EXTRACTION     • CATARACT EXTRACTION     • COLONOSCOPY     • CORONARY ARTERY BYPASS GRAFT  07/2007    Four   • ENDOSCOPY  10/02/2013   • ENDOSCOPY  10/02/2013   • HYSTERECTOMY     • SKIN CANCER EXCISION     • THROMBOENDARTERECTOMY     • TONSILLECTOMY     ,   Family History   Problem Relation Age of Onset   • Heart disease Mother    • Breast cancer Mother    • Heart disease Father    • Heart disease Brother    • Heart disease Brother    • Breast cancer  Maternal Grandmother    ,   Social History     Tobacco Use   • Smoking status: Never Smoker   • Smokeless tobacco: Never Used   • Tobacco comment: Non smoker; no tobacco use   Vaping Use   • Vaping Use: Never used   Substance Use Topics   • Alcohol use: Yes     Comment: occ   • Drug use: No   ,     Medications  Current Outpatient Medications   Medication Sig Dispense Refill   • apixaban (ELIQUIS) 5 MG tablet tablet Take 1 tablet by mouth Every 12 (Twelve) Hours. 180 tablet 4   • furosemide (LASIX) 20 MG tablet Take 1 tablet by mouth Daily As Needed (as needed for increased swelling, shortness of breath and/or weight gain). 90 tablet 3   • metoprolol succinate XL (TOPROL-XL) 50 MG 24 hr tablet Take 1 tablet by mouth Daily. (Patient taking differently: Take 50 mg by mouth Daily. Take 25mg) 90 tablet 3   • Multiple Vitamins-Minerals (MULTIVITAMIN ADULT PO) Take 1 tablet by mouth Daily.     • pravastatin (PRAVACHOL) 40 MG tablet TAKE 1 TABLET BY MOUTH DAILY. 90 tablet 4   • sacubitril-valsartan (Entresto) 24-26 MG tablet Take 1 tablet by mouth 2 (Two) Times a Day. 60 tablet 11   • ticagrelor (BRILINTA) 90 MG tablet tablet Take 1 tablet by mouth 2 (Two) Times a Day. 180 tablet 4     No current facility-administered medications for this visit.       Allergies:  Buffered aspirin, Salicylates, Shellfish-derived products, and Demerol [meperidine]    Review of Systems  Review of Systems   Constitutional: Positive for malaise/fatigue.   HENT: Negative.    Eyes: Negative.    Cardiovascular: Positive for dyspnea on exertion. Negative for chest pain, claudication, cyanosis, irregular heartbeat, leg swelling, near-syncope, orthopnea, palpitations, paroxysmal nocturnal dyspnea and syncope.   Respiratory: Negative.    Endocrine: Negative.    Hematologic/Lymphatic: Negative.    Skin: Negative.    Musculoskeletal: Positive for arthritis and joint pain.   Gastrointestinal: Negative for anorexia.   Genitourinary: Negative.   "  Neurological: Positive for dizziness and weakness.   Psychiatric/Behavioral: Negative.        Objective     Physical Exam:  /68   Pulse 54   Ht 157.5 cm (62\")   Wt 52.2 kg (115 lb)   SpO2 95%   BMI 21.03 kg/m²   Physical Exam   Constitutional: She appears well-developed.   HENT:   Head: Normocephalic.   Neck: Normal carotid pulses and no JVD present. No tracheal tenderness present. Carotid bruit is not present. No tracheal deviation present.   Cardiovascular: Regular rhythm and normal pulses.   Murmur heard.   Systolic murmur is present with a grade of 2/6.  Pulmonary/Chest: Effort normal. No stridor.   Abdominal: Soft. She exhibits no distension. There is no abdominal tenderness.   Neurological: She is alert. No cranial nerve deficit or sensory deficit.   Skin: Skin is warm.   Psychiatric: Her speech is normal and behavior is normal.       Results Review:    Results for orders placed during the hospital encounter of 08/02/21    Adult Transthoracic Echo Limited W/ Cont if Necessary Per Protocol    Interpretation Summary  · Left ventricular ejection fraction appears to be 56 - 60%. Left ventricular systolic function is normal.  · Left atrial volume is severely increased.  · Left ventricular diastolic function is consistent with (grade I) impaired relaxation.  · The following left ventricular wall segments are hypokinetic: basal inferior.  · Moderate aortic valve stenosis is present.  · Estimated right ventricular systolic pressure from tricuspid regurgitation is normal (<35 mmHg).         2/4/21      · Average HR: 69. Min HR: 29. Max HR: 162.     ·  Entire report was reviewed.  Monitoring in days: ~14   · The predominant rhythm noted during the testing period was sinus rhythm.     · Rare supraventricular ectopics with an APC burden of: <  1 %    · Occasional premature ventricular contractions with a PVC burden of: 1.4 %     · No correlated arrhythmia  · No significant pauses                  Conclusion: "      Baseline rhythm is sinus.  Lowest rate of 29 bpm at 9:19 AM   Occasional premature contractions with a PVC burden of 1.4%.  9 runs of supraventricular tachycardia longest 14 beats at a maximum rate of 162 bpm and an average of 127 bpm  2 pauses longest 3.1 seconds which was a compensatory pause after a PVC  Longest pause at 5:48 AM and second longest pause of 3 seconds at 8:45 AM first pause due to compensatory pause after PVC and second pause is due to a sinus pause of 3 seconds at 8:45 AM  Intermittent junctional beats with a rate of 29 bpm at 9:20 AM  9 runs of supraventricular tachycardia longest 14 beats at a maximum rate of 162 bpm  Significant sick sinus syndrome with tachybradycardia syndrome         Tristan Cabello DO (Primary) Pamela Nicholas DO Cole, Chelsea L, RANDA   Indications    Coronary artery disease involving native coronary artery of native heart without angina pectoris [I25.10 (ICD-10-CM)]       Conclusion    Date of procedure: February 4, 2021     Procedures performed:     1. Access to the right femoral artery via modified Seldinger technique and ultrasound guidance  2. Left heart catheterization with retrograde crossing of the aortic valve into the left ventricle  3. Selective bilateral coronary angiography  4. SVG angiography x3  5. LIMA angiography  6. Multiple attempts at PTCA on the proximal and mid right coronary artery with different balloons with minimal success, please see below for details  7. Selective right iliofemoral angiogram for possible device closure  8. Patent hemostasis achieved in the right femoral artery via Angio-Seal closure device to the right one sheath was pulled  9. Conscious sedation with continuous hemodynamic monitoring for 1 hour 36 minutes              Impression:  1.  Coronary artery disease as described above status post successful PTCA and PCI after rotational atherectomy with return of JOSE-3 flow in the distal right coronary artery with  improvement in the 90% stenosis and 70 to 80% stenosis to less than 5% stenosis both     Plan:  1. Routine post procedure orders  2.  Dual antiplatelet therapy for 1 year  3.  High intensity statin  4.  Referral for cardiac rehab  5.  ACE inhibitor and beta-blocker as vitals tolerate  6.  Close follow-up with cardiology as an outpatient      Holter       · Average HR: 79. Min HR: 44. Max HR: 146.     · Monitored for ~1 day   · Slowest heart rate at 8:46 AM  · The predominant rhythm noted during the testing period was atrial flutter   · 2256  premature ventricular contractions: PVC burden:  2%   · 75    non sustained ventricular tachycardia episode longest 9 beats at a maximum rate of 152 beats per minute   · No correlated arrhythmia  · 568 pauses longest 3.3 seconds at 1:54 AM           Sondra Connolly   Regadenoson Stress Test With Myocardial Perfusion SPECT (Multi Study)   Order# [id number]   Reading physician: Gualberto Gallo MD Ordering physician: Gualberto Gallo MD Study date: 19   Patient Information     Patient Name  Sondra Connolly MRN  [id number] Sex  Female  (Age)  1945 (75 y.o.)   Interpretation Summary     · Left ventricular ejection fraction is normal (Calculated EF = 55%).  · Breast attenuation artifact is present.  · Myocardial perfusion imaging indicates a medium-sized infarct located in the inferior wall with no significant ischemia noted.             Diagnoses and all orders for this visit:    1. S/P CABG x 4  Dr YADIEL Lau (Primary)    2. Mixed hyperlipidemia    3. Paroxysmal atrial fibrillation (HCC)    4. Essential hypertension    5. Coronary artery disease involving coronary bypass graft of native heart without angina pectoris           Plan     Check BP and heart rates twice daily at least 3x / week, week a month  at home and bring a recording for me to review next visit  If BP >130/85 or < 100/60 persistently over 3 reading 30 mins apart call sooner    Stopped Amiodarone      Patient expressed understanding  Encouraged and answered all questions   Discussed with the patient and all questioned fully answered. She will call me if any problems arise.   Discussed results of prior testing with patient : echo and outpatient cardiac telemetry      S/L NTG PRN for chest pain, call me or go to ER if has to use S/L nitroglycerin     I support the patient's decision to take the Covid -19 vaccine   Had required complete course   No major issues   Now fully immunized    Had booster too      Continue Brilinta and Eliquis   In future may decrease Eliquis to 2.5 mg BID, for now 5 mg BID   Given high Chads Vasc score will continue on anticoagulation  Monitor for any signs of bleeding including red or dark stools as well as easy bruisabilty. Fall precautions.     She has a nodule right arm and will see dermatologist soon   OK to hold Eliquis for 48 hours if surgery needed     Overall doing well no new cardiovascular symptoms and therefore no additional cardiac testing is required   If any interim issues arise will call me for further evaluation.         Return in about 6 months (around 5/22/2022).

## 2021-12-20 ENCOUNTER — APPOINTMENT (OUTPATIENT)
Dept: GENERAL RADIOLOGY | Facility: HOSPITAL | Age: 76
End: 2021-12-20

## 2021-12-20 ENCOUNTER — HOSPITAL ENCOUNTER (EMERGENCY)
Facility: HOSPITAL | Age: 76
Discharge: HOME | End: 2021-12-21
Attending: INTERNAL MEDICINE | Admitting: INTERNAL MEDICINE

## 2021-12-20 DIAGNOSIS — N30.00 ACUTE CYSTITIS WITHOUT HEMATURIA: Primary | ICD-10-CM

## 2021-12-20 LAB
ALBUMIN SERPL-MCNC: 4 G/DL (ref 3.5–5.2)
ALBUMIN/GLOB SERPL: 1.3 G/DL
ALP SERPL-CCNC: 65 U/L (ref 39–117)
ALT SERPL W P-5'-P-CCNC: 29 U/L (ref 1–33)
ANION GAP SERPL CALCULATED.3IONS-SCNC: 11 MMOL/L (ref 5–15)
AST SERPL-CCNC: 30 U/L (ref 1–32)
BACTERIA UR QL AUTO: ABNORMAL /HPF
BASOPHILS # BLD AUTO: 0.02 10*3/MM3 (ref 0–0.2)
BASOPHILS NFR BLD AUTO: 0.1 % (ref 0–1.5)
BILIRUB SERPL-MCNC: 0.3 MG/DL (ref 0–1.2)
BILIRUB UR QL STRIP: NEGATIVE
BUN SERPL-MCNC: 24 MG/DL (ref 8–23)
BUN/CREAT SERPL: 20.5 (ref 7–25)
CALCIUM SPEC-SCNC: 9.1 MG/DL (ref 8.6–10.5)
CHLORIDE SERPL-SCNC: 100 MMOL/L (ref 98–107)
CLARITY UR: ABNORMAL
CO2 SERPL-SCNC: 22 MMOL/L (ref 22–29)
COLOR UR: YELLOW
CREAT SERPL-MCNC: 1.17 MG/DL (ref 0.57–1)
DEPRECATED RDW RBC AUTO: 49.3 FL (ref 37–54)
EOSINOPHIL # BLD AUTO: 0.15 10*3/MM3 (ref 0–0.4)
EOSINOPHIL NFR BLD AUTO: 1 % (ref 0.3–6.2)
ERYTHROCYTE [DISTWIDTH] IN BLOOD BY AUTOMATED COUNT: 14.6 % (ref 12.3–15.4)
GFR SERPL CREATININE-BSD FRML MDRD: 45 ML/MIN/1.73
GLOBULIN UR ELPH-MCNC: 3 GM/DL
GLUCOSE SERPL-MCNC: 99 MG/DL (ref 65–99)
GLUCOSE UR STRIP-MCNC: NEGATIVE MG/DL
HCT VFR BLD AUTO: 37.5 % (ref 34–46.6)
HGB BLD-MCNC: 12.3 G/DL (ref 12–15.9)
HGB UR QL STRIP.AUTO: NEGATIVE
HYALINE CASTS UR QL AUTO: ABNORMAL /LPF
IMM GRANULOCYTES # BLD AUTO: 0.08 10*3/MM3 (ref 0–0.05)
IMM GRANULOCYTES NFR BLD AUTO: 0.5 % (ref 0–0.5)
KETONES UR QL STRIP: NEGATIVE
LEUKOCYTE ESTERASE UR QL STRIP.AUTO: ABNORMAL
LYMPHOCYTES # BLD AUTO: 0.6 10*3/MM3 (ref 0.7–3.1)
LYMPHOCYTES NFR BLD AUTO: 3.8 % (ref 19.6–45.3)
MCH RBC QN AUTO: 30.2 PG (ref 26.6–33)
MCHC RBC AUTO-ENTMCNC: 32.8 G/DL (ref 31.5–35.7)
MCV RBC AUTO: 92.1 FL (ref 79–97)
MONOCYTES # BLD AUTO: 1.46 10*3/MM3 (ref 0.1–0.9)
MONOCYTES NFR BLD AUTO: 9.3 % (ref 5–12)
NEUTROPHILS NFR BLD AUTO: 13.42 10*3/MM3 (ref 1.7–7)
NEUTROPHILS NFR BLD AUTO: 85.3 % (ref 42.7–76)
NITRITE UR QL STRIP: NEGATIVE
NRBC BLD AUTO-RTO: 0 /100 WBC (ref 0–0.2)
PH UR STRIP.AUTO: 6.5 [PH] (ref 5–8)
PLATELET # BLD AUTO: 287 10*3/MM3 (ref 140–450)
PMV BLD AUTO: 9.2 FL (ref 6–12)
POTASSIUM SERPL-SCNC: 4.8 MMOL/L (ref 3.5–5.2)
PROT SERPL-MCNC: 7 G/DL (ref 6–8.5)
PROT UR QL STRIP: NEGATIVE
RBC # BLD AUTO: 4.07 10*6/MM3 (ref 3.77–5.28)
RBC # UR STRIP: ABNORMAL /HPF
REF LAB TEST METHOD: ABNORMAL
SARS-COV-2 RNA PNL SPEC NAA+PROBE: NOT DETECTED
SODIUM SERPL-SCNC: 133 MMOL/L (ref 136–145)
SP GR UR STRIP: 1.02 (ref 1–1.03)
SQUAMOUS #/AREA URNS HPF: ABNORMAL /HPF
UROBILINOGEN UR QL STRIP: ABNORMAL
WBC # UR STRIP: ABNORMAL /HPF
WBC NRBC COR # BLD: 15.73 10*3/MM3 (ref 3.4–10.8)
YEAST URNS QL MICRO: ABNORMAL /HPF

## 2021-12-20 PROCEDURE — 96375 TX/PRO/DX INJ NEW DRUG ADDON: CPT

## 2021-12-20 PROCEDURE — 25010000002 ONDANSETRON PER 1 MG: Performed by: INTERNAL MEDICINE

## 2021-12-20 PROCEDURE — 87635 SARS-COV-2 COVID-19 AMP PRB: CPT | Performed by: INTERNAL MEDICINE

## 2021-12-20 PROCEDURE — 80053 COMPREHEN METABOLIC PANEL: CPT | Performed by: INTERNAL MEDICINE

## 2021-12-20 PROCEDURE — 81001 URINALYSIS AUTO W/SCOPE: CPT | Performed by: INTERNAL MEDICINE

## 2021-12-20 PROCEDURE — 99283 EMERGENCY DEPT VISIT LOW MDM: CPT

## 2021-12-20 PROCEDURE — 85025 COMPLETE CBC W/AUTO DIFF WBC: CPT | Performed by: INTERNAL MEDICINE

## 2021-12-20 PROCEDURE — 87086 URINE CULTURE/COLONY COUNT: CPT | Performed by: INTERNAL MEDICINE

## 2021-12-20 PROCEDURE — 71045 X-RAY EXAM CHEST 1 VIEW: CPT

## 2021-12-20 RX ORDER — PANTOPRAZOLE SODIUM 40 MG/1
40 TABLET, DELAYED RELEASE ORAL DAILY
COMMUNITY
End: 2022-12-09

## 2021-12-20 RX ORDER — ONDANSETRON 2 MG/ML
4 INJECTION INTRAMUSCULAR; INTRAVENOUS ONCE
Status: COMPLETED | OUTPATIENT
Start: 2021-12-20 | End: 2021-12-20

## 2021-12-20 RX ADMIN — SODIUM CHLORIDE 1000 ML: 9 INJECTION, SOLUTION INTRAVENOUS at 22:35

## 2021-12-20 RX ADMIN — ONDANSETRON 4 MG: 2 INJECTION INTRAMUSCULAR; INTRAVENOUS at 22:35

## 2021-12-21 VITALS
WEIGHT: 118 LBS | BODY MASS INDEX: 21.71 KG/M2 | TEMPERATURE: 97.9 F | HEIGHT: 62 IN | RESPIRATION RATE: 16 BRPM | SYSTOLIC BLOOD PRESSURE: 126 MMHG | OXYGEN SATURATION: 100 % | DIASTOLIC BLOOD PRESSURE: 81 MMHG | HEART RATE: 68 BPM

## 2021-12-21 PROCEDURE — 96365 THER/PROPH/DIAG IV INF INIT: CPT

## 2021-12-21 PROCEDURE — 25010000002 CEFTRIAXONE PER 250 MG: Performed by: INTERNAL MEDICINE

## 2021-12-21 RX ORDER — CEPHALEXIN 500 MG/1
500 CAPSULE ORAL 3 TIMES DAILY
Qty: 15 CAPSULE | Refills: 0 | Status: SHIPPED | OUTPATIENT
Start: 2021-12-21 | End: 2022-05-23

## 2021-12-21 RX ORDER — ONDANSETRON 4 MG/1
4 TABLET, ORALLY DISINTEGRATING ORAL 4 TIMES DAILY PRN
Qty: 15 TABLET | Refills: 0 | Status: SHIPPED | OUTPATIENT
Start: 2021-12-21 | End: 2022-12-09

## 2021-12-21 RX ADMIN — SODIUM CHLORIDE 1 G: 900 INJECTION INTRAVENOUS at 01:24

## 2021-12-22 LAB — BACTERIA SPEC AEROBE CULT: NORMAL

## 2021-12-27 NOTE — ED PROVIDER NOTES
Subjective   Patient is a 76-year-old female who presents emergency room complaint of generalized weakness and fatigue.  She states is been going on for couple days and gotten progressively worse.  She states that she has had similar symptoms previously when she had urinary tract infections and is worried about having a urinary tract infection.          Review of Systems   Constitutional: Positive for fatigue. Negative for chills and fever.   HENT: Negative for congestion and facial swelling.    Eyes: Negative for photophobia, discharge and visual disturbance.   Respiratory: Negative for cough, shortness of breath and wheezing.    Cardiovascular: Negative for chest pain, palpitations and leg swelling.   Gastrointestinal: Positive for nausea. Negative for abdominal pain, diarrhea and vomiting.   Endocrine: Negative for cold intolerance and heat intolerance.   Genitourinary: Negative for difficulty urinating and urgency.   Musculoskeletal: Negative for arthralgias, joint swelling and myalgias.   Skin: Negative for color change and pallor.   Neurological: Positive for weakness. Negative for dizziness and light-headedness.   Hematological: Negative for adenopathy. Does not bruise/bleed easily.   Psychiatric/Behavioral: Negative for agitation, behavioral problems and confusion.       Past Medical History:   Diagnosis Date   • A-fib ([facility])    • Abnormal nuclear stress test 8/23/2019   • Angina pectoris ([facility])    • Anxiety    • Arthritis    • Atherosclerosis of coronary artery bypass graft    • Carotid artery occlusion without infarction    • Chronic kidney disease    • Chronic lung disease    • Colon polyp    • Diabetes mellitus ([facility])    • DJD (degenerative joint disease)    • Essential hypertension 3/24/2017   • Fibrocystic breast disease    • Gastritis    • GI bleed    • Hemorrhoids    • Hyperlipidemia    • Hypertension    • Lung disease     chronic   • MI (myocardial infarction) ([facility])    • Palpitations    • PUD (peptic  ulcer disease)    • PVD (peripheral vascular disease) ([facility])    • Renal failure, acute (HCC)    • 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       Allergies   Allergen Reactions   • Buffered Aspirin Angioedema     Patient has taken tums in the past with no problem.   • Salicylates Angioedema   • Shellfish-Derived Products Unknown - High Severity   • Demerol [Meperidine] Nausea And Vomiting and Hallucinations       Past Surgical History:   Procedure Laterality Date   • ABLATION OF DYSRHYTHMIC FOCUS      May 2021 in [place]     • APPENDECTOMY     • CARDIAC CATHETERIZATION  05/01/2009    Left-Heart Skin   • CARDIAC CATHETERIZATION N/A 2/4/2021    Procedure: Left Heart Cath;  Surgeon: Tristan Cabello DO;  Location:  [facility]ATH [facility];  Service: Cardiology;  Laterality: N/A;   • CARDIAC CATHETERIZATION Right 2/5/2021    Procedure: Left Heart Cath, rotablator to RCA with Dr. Abrams at 1PM;  Surgeon: Tristan Cabello DO;  Location:  [facility];  Service: Cardiology;  Laterality: Right;   • CAROTID ENDARTERECTOMY     • CATARACT EXTRACTION     • CATARACT EXTRACTION     • COLONOSCOPY     • CORONARY ARTERY BYPASS GRAFT  07/2007    Four   • ENDOSCOPY  10/02/2013   • ENDOSCOPY  10/02/2013   • HYSTERECTOMY     • SKIN CANCER EXCISION     • THROMBOENDARTERECTOMY     • TONSILLECTOMY         Family History   Problem Relation Age of Onset   • Heart disease Mother    • Breast cancer Mother    • Heart disease Father    • Heart disease Brother    • Heart disease Brother    • Breast cancer Maternal Grandmother        Social History     Socioeconomic History   • Marital status:    Tobacco Use   • Smoking status: Never Smoker   • Smokeless tobacco: Never Used   • Tobacco comment: Non smoker; no tobacco use   Vaping Use   • Vaping Use: Never used   Substance and Sexual Activity   • Alcohol use: Yes     Comment: occ   • Drug use: No   • Sexual activity: Defer           Objective   Physical  Exam  Vitals and nursing note reviewed.   Constitutional:       Appearance: Normal appearance. She is well-developed.   HENT:      Head: Normocephalic and atraumatic.      Nose: Nose normal.   Eyes:      Conjunctiva/sclera: Conjunctivae normal.      Pupils: Pupils are equal, round, and reactive to light.   Cardiovascular:      Rate and Rhythm: Normal rate and regular rhythm.      Heart sounds: Normal heart sounds.   Pulmonary:      Effort: Pulmonary effort is normal.      Breath sounds: Normal breath sounds.   Abdominal:      General: Bowel sounds are normal. There is no distension.      Palpations: Abdomen is soft.   Musculoskeletal:         General: Normal range of motion.      Cervical back: Normal range of motion and neck supple.   Skin:     General: Skin is warm and dry.   Neurological:      General: No focal deficit present.      Mental Status: She is alert and oriented to person, place, and time.      Cranial Nerves: No cranial nerve deficit.   Psychiatric:         Mood and Affect: Mood normal.         Behavior: Behavior normal.         Thought Content: Thought content normal.         Procedures           ED Course                                                 MDM    Final diagnoses:   Acute cystitis without hematuria       ED Disposition  ED Disposition     ED Disposition Condition Comment    Discharge Stable           Matthieu Bhakta, DO  [address] [place] [address]  [phone]    In 3 days           Medication List      New Prescriptions    cephalexin 500 MG capsule  Commonly known as: KEFLEX  Take 1 capsule by mouth 3 (Three) Times a Day.     ondansetron ODT 4 MG disintegrating tablet  Commonly known as: ZOFRAN-ODT  Place 1 tablet on the tongue 4 (Four) Times a Day As Needed for Nausea.        Changed    metoprolol succinate XL 50 MG 24 hr tablet  Commonly known as: TOPROL-XL  Take 1 tablet by mouth Daily.  What changed: additional instructions           Where to Get Your Medications      These  medications were sent to [facility]/pharmacy #[id number] - [place], [place] - [address] - [phone]  - [phone] [address] [address]    Phone: [phone]   · cephalexin 500 MG capsule  · ondansetron ODT 4 MG disintegrating tablet          Gurwinder Helton MD  12/26/21 [id number]

## 2022-02-07 ENCOUNTER — TRANSCRIBE ORDERS (OUTPATIENT)
Dept: ADMINISTRATIVE | Facility: HOSPITAL | Age: 77
End: 2022-02-07

## 2022-02-07 DIAGNOSIS — Z12.31 ENCOUNTER FOR SCREENING MAMMOGRAM FOR MALIGNANT NEOPLASM OF BREAST: Primary | ICD-10-CM

## 2022-02-15 ENCOUNTER — HOSPITAL ENCOUNTER (OUTPATIENT)
Dept: MAMMOGRAPHY | Facility: HOSPITAL | Age: 77
Discharge: HOME | End: 2022-02-15
Admitting: INTERNAL MEDICINE

## 2022-02-15 DIAGNOSIS — Z12.31 ENCOUNTER FOR SCREENING MAMMOGRAM FOR MALIGNANT NEOPLASM OF BREAST: ICD-10-CM

## 2022-02-15 PROCEDURE — 77067 SCR MAMMO BI INCL CAD: CPT

## 2022-02-15 PROCEDURE — 77063 BREAST TOMOSYNTHESIS BI: CPT

## 2022-05-16 RX ORDER — SACUBITRIL AND VALSARTAN 24; 26 MG/1; MG/1
TABLET, FILM COATED ORAL
Qty: 180 TABLET | Refills: 3 | Status: SHIPPED | OUTPATIENT
Start: 2022-05-16

## 2022-05-18 RX ORDER — PRAVASTATIN SODIUM 40 MG
TABLET ORAL
Qty: 90 TABLET | Refills: 3 | Status: SHIPPED | OUTPATIENT
Start: 2022-05-18

## 2022-05-23 ENCOUNTER — OFFICE VISIT (OUTPATIENT)
Dept: CARDIOLOGY | Facility: CLINIC | Age: 77
End: 2022-05-23

## 2022-05-23 VITALS
BODY MASS INDEX: 23.37 KG/M2 | OXYGEN SATURATION: 98 % | WEIGHT: 127 LBS | HEART RATE: 62 BPM | DIASTOLIC BLOOD PRESSURE: 82 MMHG | SYSTOLIC BLOOD PRESSURE: 116 MMHG | HEIGHT: 62 IN

## 2022-05-23 DIAGNOSIS — I34.0 MITRAL VALVE INSUFFICIENCY, UNSPECIFIED ETIOLOGY: ICD-10-CM

## 2022-05-23 DIAGNOSIS — Z95.1 S/P CABG X 4: ICD-10-CM

## 2022-05-23 DIAGNOSIS — I35.0 AORTIC VALVE STENOSIS, ETIOLOGY OF CARDIAC VALVE DISEASE UNSPECIFIED: ICD-10-CM

## 2022-05-23 DIAGNOSIS — Z79.01 CURRENT USE OF LONG TERM ANTICOAGULATION: ICD-10-CM

## 2022-05-23 DIAGNOSIS — I25.810 CORONARY ARTERY DISEASE INVOLVING CORONARY BYPASS GRAFT OF NATIVE HEART WITHOUT ANGINA PECTORIS: Chronic | ICD-10-CM

## 2022-05-23 DIAGNOSIS — I48.0 PAROXYSMAL ATRIAL FIBRILLATION: Chronic | ICD-10-CM

## 2022-05-23 DIAGNOSIS — E78.2 MIXED HYPERLIPIDEMIA: Chronic | ICD-10-CM

## 2022-05-23 DIAGNOSIS — Z95.5 STATUS POST INSERTION OF DRUG-ELUTING STENT INTO RIGHT CORONARY ARTERY FOR CORONARY ARTERY DISEASE: ICD-10-CM

## 2022-05-23 DIAGNOSIS — E11.59 TYPE 2 DIABETES MELLITUS WITH OTHER CIRCULATORY COMPLICATION, WITHOUT LONG-TERM CURRENT USE OF INSULIN: Chronic | ICD-10-CM

## 2022-05-23 DIAGNOSIS — I50.42 CHRONIC COMBINED SYSTOLIC AND DIASTOLIC CONGESTIVE HEART FAILURE: Primary | Chronic | ICD-10-CM

## 2022-05-23 DIAGNOSIS — I10 ESSENTIAL HYPERTENSION: Chronic | ICD-10-CM

## 2022-05-23 PROCEDURE — 99214 OFFICE O/P EST MOD 30 MIN: CPT | Performed by: NURSE PRACTITIONER

## 2022-05-23 PROCEDURE — 93000 ELECTROCARDIOGRAM COMPLETE: CPT | Performed by: NURSE PRACTITIONER

## 2022-05-23 NOTE — PATIENT INSTRUCTIONS
Advance Care Planning and Advance Directives     You make decisions on a daily basis - decisions about where you want to live, your career, your home, your life. Perhaps one of the most important decisions you face is your choice for future medical care. Take time to talk with your family and your healthcare team and start planning today.  Advance Care Planning is a process that can help you:  Understand possible future healthcare decisions in light of your own experiences  Reflect on those decision in light of your goals and values  Discuss your decisions with those closest to you and the healthcare professionals that care for you  Make a plan by creating a document that reflects your wishes    Surrogate Decision Maker  In the event of a medical emergency, which has left you unable to communicate or to make your own decisions, you would need someone to make decisions for you.  It is important to discuss your preferences for medical treatment with this person while you are in good health.     Qualities of a surrogate decision maker:  Willing to take on this role and responsibility  Knows what you want for future medical care  Willing to follow your wishes even if they don't agree with them  Able to make difficult medical decisions under stressful circumstances    Advance Directives  These are legal documents you can create that will guide your healthcare team and decision maker(s) when needed. These documents can be stored in the electronic medical record.    Living Will - a legal document to guide your care if you have a terminal condition or a serious illness and are unable to communicate. States vary by statute in document names/types, but most forms may include one or more of the following:        -  Directions regarding life-prolonging treatments        -  Directions regarding artificially provided nutrition/hydration        -  Choosing a healthcare decision maker        -  Direction regarding organ/tissue  donation    Durable Power of  for Healthcare - this document names an -in-fact to make medical decisions for you, but it may also allow this person to make personal and financial decisions for you. Please seek the advice of an  if you need this type of document.    **Advance Directives are not required and no one may discriminate against you if you do not sign one.    Medical Orders  Many states allow specific forms/orders signed by your physician to record your wishes for medical treatment in your current state of health. This form, signed in personal communication with your physician, addresses resuscitation and other medical interventions that you may or may not want.      For more information or to schedule a time with a [facility] Advance Care Planning Facilitator contact: [email or web address] or call [phone] and someone will contact you directly.

## 2022-05-23 NOTE — PROGRESS NOTES
Chief Complaint  Coronary Artery Disease (6mo F/U. )    Subjective          Sondra Connolly presents to [facility] for routine follow-up. She has chronic combined systolic and diastolic congestive heart failure, coronary artery disease status post CABG x4 with subsequent Rotablator rotational atherectomy, PTCA and 2.5 x 28 mm Xience Korin drug-eluting stent placement to the right coronary artery 2/5/2021, moderate to severe aortic valve stenosis, moderate to severe mitral valve regurgitation, paroxysmal atrial fibrillation status post cardioversion 4/6/2021 and ablation 6/2/2021 per Dr. Audie Hawk with electrophysiology at [place] in [facility] now on chronic anticoagulation, diet-controlled type 2 diabetes mellitus, hypertension and hyperlipidemia. She reports days with increased fatigue and decreased stamina intermittently. Patient denies chest pain, shortness of breath, palpitations, dizziness, syncope, orthopnea, PND or edema.  Patient denies any signs of bleeding.    Congestive Heart Failure  Presents for follow-up visit. Associated symptoms include fatigue. Pertinent negatives include no abdominal pain, chest pain, chest pressure, claudication, edema, muscle weakness, near-syncope, nocturia, orthopnea, palpitations, paroxysmal nocturnal dyspnea, shortness of breath or unexpected weight change. The symptoms have been stable. Her past medical history is significant for CAD. Compliance with total regimen is 51-75%. Compliance with diet is 51-75%. Compliance with medications is %.   Coronary Artery Disease  Presents for follow-up visit. Pertinent negatives include no chest pain, chest pressure, chest tightness, dizziness, leg swelling, muscle weakness, palpitations, shortness of breath or weight gain. Risk factors include hyperlipidemia. Risk factors do not include hypertension. Her past medical history is significant for CHF. The symptoms have been stable.  "Compliance with diet is variable. Compliance with exercise is variable. Compliance with medications is good.   Atrial Fibrillation  Presents for follow-up visit. Symptoms are negative for an AICD problem, bradycardia, chest pain, dizziness, hemodynamic instability, hypertension, hypotension, pacemaker problem, palpitations, shortness of breath, syncope, tachycardia and weakness. The symptoms have been stable. Past medical history includes atrial fibrillation, CAD, CHF and hyperlipidemia. There are no medication compliance problems.   Hypertension  This is a chronic problem. The current episode started more than 1 year ago. The problem is controlled. Pertinent negatives include no anxiety, blurred vision, chest pain, headaches, malaise/fatigue, neck pain, orthopnea, palpitations, peripheral edema, PND, shortness of breath or sweats. Risk factors for coronary artery disease include diabetes mellitus, dyslipidemia and post-menopausal state. Current antihypertension treatment includes ACE inhibitors and beta blockers. The current treatment provides significant improvement. Hypertensive end-organ damage includes CAD/MI and heart failure.   Hyperlipidemia  This is a chronic problem. The current episode started more than 1 year ago. Pertinent negatives include no chest pain or shortness of breath. Current antihyperlipidemic treatment includes statins. Risk factors for coronary artery disease include post-menopausal, hypertension, dyslipidemia and diabetes mellitus.     I have reviewed and confirmed the accuracy of the ROS  RANDA Dietrich       Objective   Vital Signs:   /82   Pulse 62   Ht 157.5 cm (62\")   Wt 57.6 kg (127 lb)   SpO2 98%   BMI 23.23 kg/m²     Vitals and nursing note reviewed.   Constitutional:       General: Not in acute distress.     Appearance: Normal and healthy appearance. Well-developed, normal weight and not in distress. Not diaphoretic.   Eyes:      General: Lids are normal.       "   Right eye: No discharge.         Left eye: No discharge.      Conjunctiva/sclera: Conjunctivae normal.      Pupils: Pupils are equal, round, and reactive to light.   HENT:      Head: Normocephalic and atraumatic.      Jaw: There is normal jaw occlusion.      Right Ear: External ear normal.      Left Ear: External ear normal.      Nose: Nose normal.   Neck:      Thyroid: No thyromegaly.      Vascular: No carotid bruit, JVD or JVR. JVD normal.      Trachea: Trachea normal. No tracheal deviation.   Pulmonary:      Effort: Pulmonary effort is normal. No respiratory distress.      Breath sounds: Normal breath sounds. No decreased breath sounds. No wheezing. No rhonchi. No rales.   Chest:      Chest wall: Not tender to palpatation.   Cardiovascular:      PMI at left midclavicular line. Normal rate. Regular rhythm. Normal S1. Normal S2.      Murmurs: There is a grade 2/6 harsh midsystolic murmur at the URSB, radiating to the neck.      No gallop. No click. No rub.   Pulses:     Intact distal pulses. No decreased pulses.   Edema:     Peripheral edema absent.   Abdominal:      General: Bowel sounds are normal. There is no distension.      Palpations: Abdomen is soft.      Tenderness: There is no abdominal tenderness.   Musculoskeletal: Normal range of motion.         General: No tenderness or deformity.      Cervical back: Normal range of motion and neck supple. Skin:     General: Skin is warm and dry.      Coloration: Skin is not pale.      Findings: No erythema or rash.   Neurological:      General: No focal deficit present.      Mental Status: Alert, oriented to person, place, and time and oriented to person, place and time.   Psychiatric:         Attention and Perception: Attention and perception normal.         Mood and Affect: Mood and affect normal.         Speech: Speech normal.         Behavior: Behavior normal.         Thought Content: Thought content normal.         Cognition and Memory: Cognition and memory  normal.         Judgment: Judgment normal.        Result Review :   The following data was reviewed by: RANDA Dietrich on 5/23/2022:  Common labs    Common Labsle 12/20/21 12/20/21    [id number]   Glucose  99   BUN  24 (A)   Creatinine  1.17 (A)   eGFR Non  Am  45 (A)   Sodium  133 (A)   Potassium  4.8   Chloride  100   Calcium  9.1   Albumin  4.00   Total Bilirubin  0.3   Alkaline Phosphatase  65   AST (SGOT)  30   ALT (SGPT)  29   WBC 15.73 (A)    Hemoglobin 12.3    Hematocrit 37.5    Platelets 287    (A) Abnormal value       Comments are available for some flowsheets but are not being displayed.           Data reviewed: Cardiology studies 2D echo 2/1/21 and 8/2/2021 and left heart catheterization 2/11/21     ECG 12 Lead    Date/Time: 5/23/2022 1:37 PM  Performed by: Magda Allen APRN  Authorized by: Magda Allen APRN   Comparison: compared with previous ECG from 8/26/2021  Rhythm: sinus rhythm  Rate: normal  BPM: 62    Clinical impression: normal ECG              Assessment and Plan    Diagnoses and all orders for this visit:    1. Chronic combined systolic and diastolic congestive heart failure (CMS/HCC) (Primary)-NYHA class II.  Compensated.  EF 41 to 45% on 2D echo 2/1/2021 and improved to 56 to 60% on 2D echo 8/2/2021.  Discussed initiation of spironolactone and/or Farxiga, however pt declines at this time.  Reviewed signs and symptoms of CHF and what to report with the patient. Patient instructed to restrict sodium and weigh daily. Report weight gain of greater than 2 lbs overnight or 5 lbs in 1 week. Pt verbalized understanding of instructions and plan of care.     2. Coronary artery disease involving coronary bypass graft of native heart without angina pectoris-no clinical signs of ischemia.  Stable.    3. 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     4. Status post insertion of drug-eluting stent into right coronary artery for coronary artery disease- 2.5 x 28 mm Xience Korin drug-eluting stent placement to the right coronary artery 2/5/2021. Pt continues on Brilinta. Denies bleeding. Decrease Brilinta to 60 mg twice daily. Pt is allergic to aspirin.     5. Paroxysmal atrial fibrillation (CMS/[facility])-status post cardioversion 4/6/21 and ablation 6/2/2021.  In sinus rhythm today.  Anticoagulated.  Stable.  Continue metoprolol succinate.    6. History of cardioversion- 4/6/21.     7.Current use of longterm anticoagulation-patient continues on Eliquis. Denies bleeding.     8. Aortic valve stenosis, etiology of cardiac valve disease unspecified-moderate on 2D echo 8/2/2021.  Repeat 2D echo around 8/2/2022 for surveillance of valvular heart disease.    9. Mitral valve insufficiency, unspecified etiology-moderate to severe on 2D echo 2/1/2021.  Mild on 2D echo 8/2/2021.  Repeat 2D echo around 8/2/2022 for surveillance of valvular heart disease.    10. Type 2 diabetes mellitus with other circulatory complication, without long-term current use of insulin (CMS/[facility])-diet controlled.  Type 2 diabetes mellitus in the setting of obstructive coronary artery disease.  Management per PCP.  Stable.    11. Essential hypertension-blood pressure is well controlled.  Continue to monitor following above medication adjustments.  Monitor and record daily blood pressure. Report readings consistently higher than 130/90 or consistently lower than 100/60.     12. Mixed hyperlipidemia-management per primary care provider.  LDL elevated outside of goal range of less than 70 at 112 on 2/1/2021. Pt is not on statin.     Advance Care Planning   ACP discussion was held with the patient during this  visit. Patient does not have an advance directive, information provided.         Follow Up   Return in about 6 months (around 11/23/2022) for Next scheduled follow up with Dr. gregory.  Patient was given instructions and counseling regarding her condition or for health maintenance advice. Please see specific information pulled into the AVS if appropriate.

## 2022-06-02 ENCOUNTER — APPOINTMENT (OUTPATIENT)
Dept: CARDIOLOGY | Facility: HOSPITAL | Age: 77
End: 2022-06-02

## 2022-08-01 ENCOUNTER — HOSPITAL ENCOUNTER (OUTPATIENT)
Dept: CARDIOLOGY | Facility: HOSPITAL | Age: 77
Discharge: HOME | End: 2022-08-01
Admitting: NURSE PRACTITIONER

## 2022-08-01 VITALS
HEIGHT: 62 IN | DIASTOLIC BLOOD PRESSURE: 82 MMHG | SYSTOLIC BLOOD PRESSURE: 116 MMHG | BODY MASS INDEX: 23.37 KG/M2 | WEIGHT: 127 LBS

## 2022-08-01 DIAGNOSIS — I35.0 AORTIC VALVE STENOSIS, ETIOLOGY OF CARDIAC VALVE DISEASE UNSPECIFIED: ICD-10-CM

## 2022-08-01 PROCEDURE — 93306 TTE W/DOPPLER COMPLETE: CPT

## 2022-08-01 PROCEDURE — 93356 MYOCRD STRAIN IMG SPCKL TRCK: CPT | Performed by: INTERNAL MEDICINE

## 2022-08-01 PROCEDURE — 93306 TTE W/DOPPLER COMPLETE: CPT | Performed by: INTERNAL MEDICINE

## 2022-08-01 PROCEDURE — 93356 MYOCRD STRAIN IMG SPCKL TRCK: CPT

## 2022-08-03 LAB
BH CV ECHO LEFT VENTRICLE GLOBAL LONGITUDINAL STRAIN: -12 %
BH CV ECHO MEAS - AO MAX PG: 45.4 MMHG
BH CV ECHO MEAS - AO MEAN PG: 24.6 MMHG
BH CV ECHO MEAS - AO ROOT DIAM: 3.1 CM
BH CV ECHO MEAS - AO V2 MAX: 337 CM/SEC
BH CV ECHO MEAS - AO V2 VTI: 80.6 CM
BH CV ECHO MEAS - AVA(I,D): 2.8 CM2
BH CV ECHO MEAS - EDV(CUBED): 140.6 ML
BH CV ECHO MEAS - EDV(MOD-SP2): 142 ML
BH CV ECHO MEAS - EDV(MOD-SP4): 121 ML
BH CV ECHO MEAS - EF(MOD-BP): 42.3 %
BH CV ECHO MEAS - EF(MOD-SP2): 42.5 %
BH CV ECHO MEAS - EF(MOD-SP4): 44.5 %
BH CV ECHO MEAS - ESV(CUBED): 73 ML
BH CV ECHO MEAS - ESV(MOD-SP2): 81.7 ML
BH CV ECHO MEAS - ESV(MOD-SP4): 67.1 ML
BH CV ECHO MEAS - FS: 19.6 %
BH CV ECHO MEAS - IVS/LVPW: 0.94 CM
BH CV ECHO MEAS - IVSD: 0.97 CM
BH CV ECHO MEAS - LA DIMENSION: 4.2 CM
BH CV ECHO MEAS - LAT PEAK E' VEL: 10.6 CM/SEC
BH CV ECHO MEAS - LV DIASTOLIC VOL/BSA (35-75): 76.8 CM2
BH CV ECHO MEAS - LV MASS(C)D: 194.3 GRAMS
BH CV ECHO MEAS - LV MAX PG: 19 MMHG
BH CV ECHO MEAS - LV MEAN PG: 10 MMHG
BH CV ECHO MEAS - LV SYSTOLIC VOL/BSA (12-30): 42.6 CM2
BH CV ECHO MEAS - LV V1 MAX: 218 CM/SEC
BH CV ECHO MEAS - LV V1 VTI: 46.5 CM
BH CV ECHO MEAS - LVIDD: 5.2 CM
BH CV ECHO MEAS - LVIDS: 4.2 CM
BH CV ECHO MEAS - LVOT AREA: 4.9 CM2
BH CV ECHO MEAS - LVOT DIAM: 2.5 CM
BH CV ECHO MEAS - LVPWD: 1.03 CM
BH CV ECHO MEAS - MED PEAK E' VEL: 6 CM/SEC
BH CV ECHO MEAS - MR MAX PG: 164.9 MMHG
BH CV ECHO MEAS - MR MAX VEL: 642 CM/SEC
BH CV ECHO MEAS - MR MEAN PG: 93 MMHG
BH CV ECHO MEAS - MR MEAN VEL: 446 CM/SEC
BH CV ECHO MEAS - MR VTI: 246 CM
BH CV ECHO MEAS - MV A MAX VEL: 57.5 CM/SEC
BH CV ECHO MEAS - MV DEC SLOPE: 945 CM/SEC2
BH CV ECHO MEAS - MV DEC TIME: 0.11 MSEC
BH CV ECHO MEAS - MV E MAX VEL: 102.5 CM/SEC
BH CV ECHO MEAS - MV E/A: 1.78
BH CV ECHO MEAS - MV MAX PG: 10.9 MMHG
BH CV ECHO MEAS - MV MEAN PG: 2 MMHG
BH CV ECHO MEAS - MV P1/2T: 50.8 MSEC
BH CV ECHO MEAS - MV V2 VTI: 35.1 CM
BH CV ECHO MEAS - MVA(P1/2T): 4.3 CM2
BH CV ECHO MEAS - MVA(VTI): 6.5 CM2
BH CV ECHO MEAS - PA V2 MAX: 97.2 CM/SEC
BH CV ECHO MEAS - PI END-D VEL: 137 CM/SEC
BH CV ECHO MEAS - RAP SYSTOLE: 5 MMHG
BH CV ECHO MEAS - RF(MV,LVOT)(1DIAM): 0.48 CM
BH CV ECHO MEAS - RVSP: 43.2 MMHG
BH CV ECHO MEAS - SI(MOD-SP2): 38.3 ML/M2
BH CV ECHO MEAS - SI(MOD-SP4): 34.2 ML/M2
BH CV ECHO MEAS - SV(LVOT): 228.3 ML
BH CV ECHO MEAS - SV(MOD-SP2): 60.3 ML
BH CV ECHO MEAS - SV(MOD-SP4): 53.9 ML
BH CV ECHO MEAS - TR MAX PG: 38.2 MMHG
BH CV ECHO MEAS - TR MAX VEL: 309 CM/SEC
BH CV ECHO MEASUREMENTS AVERAGE E/E' RATIO: 12.35
BH CV VAS BP LEFT ARM: NORMAL MMHG
LEFT ATRIUM VOLUME INDEX: 50.7 ML/M2
LEFT ATRIUM VOLUME: 80 ML
MAXIMAL PREDICTED HEART RATE: 141 BPM
STRESS TARGET HR: 120 BPM

## 2022-08-15 RX ORDER — APIXABAN 5 MG/1
TABLET, FILM COATED ORAL
Qty: 180 TABLET | Refills: 3 | Status: SHIPPED | OUTPATIENT
Start: 2022-08-15 | End: 2022-11-28 | Stop reason: SDUPTHER

## 2022-10-10 RX ORDER — TICAGRELOR 90 MG/1
TABLET ORAL
Qty: 180 TABLET | Refills: 4 | OUTPATIENT
Start: 2022-10-10

## 2022-11-09 RX ORDER — TICAGRELOR 90 MG/1
TABLET ORAL
Qty: 180 TABLET | Refills: 4 | OUTPATIENT
Start: 2022-11-09

## 2022-11-28 ENCOUNTER — OFFICE VISIT (OUTPATIENT)
Dept: CARDIOLOGY | Facility: CLINIC | Age: 77
End: 2022-11-28

## 2022-11-28 VITALS
HEART RATE: 69 BPM | BODY MASS INDEX: 23.96 KG/M2 | SYSTOLIC BLOOD PRESSURE: 127 MMHG | DIASTOLIC BLOOD PRESSURE: 71 MMHG | WEIGHT: 131 LBS

## 2022-11-28 DIAGNOSIS — I25.810 CORONARY ARTERY DISEASE INVOLVING CORONARY BYPASS GRAFT OF NATIVE HEART WITHOUT ANGINA PECTORIS: Chronic | ICD-10-CM

## 2022-11-28 DIAGNOSIS — I34.0 MITRAL VALVE INSUFFICIENCY, UNSPECIFIED ETIOLOGY: ICD-10-CM

## 2022-11-28 DIAGNOSIS — E78.2 MIXED HYPERLIPIDEMIA: Chronic | ICD-10-CM

## 2022-11-28 DIAGNOSIS — Z98.890 HISTORY OF CARDIOVERSION: ICD-10-CM

## 2022-11-28 DIAGNOSIS — Z95.1 S/P CABG X 4: ICD-10-CM

## 2022-11-28 DIAGNOSIS — R06.09 DOE (DYSPNEA ON EXERTION): ICD-10-CM

## 2022-11-28 DIAGNOSIS — I35.0 AORTIC VALVE STENOSIS, ETIOLOGY OF CARDIAC VALVE DISEASE UNSPECIFIED: ICD-10-CM

## 2022-11-28 DIAGNOSIS — Z95.5 STATUS POST INSERTION OF DRUG-ELUTING STENT INTO RIGHT CORONARY ARTERY FOR CORONARY ARTERY DISEASE: Primary | ICD-10-CM

## 2022-11-28 DIAGNOSIS — I48.0 PAROXYSMAL ATRIAL FIBRILLATION: Chronic | ICD-10-CM

## 2022-11-28 DIAGNOSIS — I10 ESSENTIAL HYPERTENSION: Chronic | ICD-10-CM

## 2022-11-28 DIAGNOSIS — R09.89 BILATERAL CAROTID BRUITS: ICD-10-CM

## 2022-11-28 DIAGNOSIS — I50.42 CHRONIC COMBINED SYSTOLIC AND DIASTOLIC CONGESTIVE HEART FAILURE: Chronic | ICD-10-CM

## 2022-11-28 PROCEDURE — 99214 OFFICE O/P EST MOD 30 MIN: CPT | Performed by: INTERNAL MEDICINE

## 2022-11-28 NOTE — PROGRESS NOTES
Sondra Connolly  [id number]  1945  77 y.o.  female    Referring Provider: Matthieu Bhakta DO    Reason for Follow-up Visit: Here for routine follow up  chronic atrial fibrillation   Holter as below  Cardiac workup test results as below   coronary artery disease Coronary artery bypass grafting > 10 years ago  Nuclear stress test no significant ischemia, has prior infarction  Prior admission   Recent percutaneous coronary intervention drug eluting stent to right coronary artery Dr Cabello   Cardiac workup test results as below: 14-day outpatient cardiac telemetry   Cardiac workup test results as below: echo     Subjective    No new events or complaints since last visit   No bleeding, excessive bruising, gait instability or fall risks      BP well controlled at home.     Easy bruisability   Overall feels the same   Minor epistaxis periodically in past now resolved  Tolerating current medications well with no untoward side effects   Compliant with prescribed medication regimen. Tries to adhere to cardiac diet.      Mild exertional shortness of breath    No significant cough or wheezing    No palpitations  No associated chest pain  No significant pedal edema    No fever or chills  No significant expectoration    No hemoptysis  No syncope    Tolerating current medications well with no untoward side effects   Compliant with prescribed medication regimen. Tries to adhere to cardiac diet.     No further presyncope or syncope   No bleeding, excessive bruising, gait instability or fall risks       Saw Dr Hawk for atrial fibrillation ablation s/p ablation, due to see Dr Reed  Saw her back and was told to talk to me about on anticoagulation  No bleeding, excessive bruising, gait instability or fall risks         History of present illness:  Sondra Connolly is a 77 y.o. yo female with chronic atrial fibrillation  coronary artery disease coronary artery bypass grafting who presents today for   Chief Complaint   Patient  presents with   • Follow-up     6 month CAD - echo results    .    History  Past Medical History:   Diagnosis Date   • A-fib (HCC)    • Abnormal nuclear stress test 8/23/2019   • Angina pectoris (HCC)    • Anxiety    • Arthritis    • Atherosclerosis of coronary artery bypass graft    • Carotid artery occlusion without infarction    • Chronic kidney disease    • Chronic lung disease    • Colon polyp    • Diabetes mellitus (HCC)    • DJD (degenerative joint disease)    • Essential hypertension 3/24/2017   • Fibrocystic breast disease    • Gastritis    • GI bleed    • Hemorrhoids    • Hyperlipidemia    • Hypertension    • Lung disease     chronic   • MI (myocardial infarction) (HCC)    • Palpitations    • PUD (peptic ulcer disease)    • PVD (peripheral vascular disease) ([facility])    • Renal failure, acute (HCC)    • 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   ,   Past Surgical History:   Procedure Laterality Date   • ABLATION OF DYSRHYTHMIC FOCUS      May 2021 in [place]     • APPENDECTOMY     • CARDIAC CATHETERIZATION  05/01/2009    Left-Heart Skin   • CARDIAC CATHETERIZATION N/A 2/4/2021    Procedure: Left Heart Cath;  Surgeon: Tristan Cabello DO;  Location:  [facility] CATH [facility];  Service: Cardiology;  Laterality: N/A;   • CARDIAC CATHETERIZATION Right 2/5/2021    Procedure: Left Heart Cath, rotablator to RCA with Dr. Abrams at 1PM;  Surgeon: Tristan Cabello DO;  Location:  [facility] CATH [facility];  Service: Cardiology;  Laterality: Right;   • CAROTID ENDARTERECTOMY     • CATARACT EXTRACTION     • CATARACT EXTRACTION     • COLONOSCOPY     • CORONARY ARTERY BYPASS GRAFT  07/2007    Four   • ENDOSCOPY  10/02/2013   • ENDOSCOPY  10/02/2013   • HYSTERECTOMY     • SKIN CANCER EXCISION     • THROMBOENDARTERECTOMY     • TONSILLECTOMY     ,   Family History   Problem Relation Age of Onset   • Heart disease Mother    • Breast cancer Mother    • Heart disease Father    • Heart disease Brother    • Heart  disease Brother    • Breast cancer Maternal Grandmother    ,   Social History     Tobacco Use   • Smoking status: Never   • Smokeless tobacco: Never   • Tobacco comments:     Non smoker; no tobacco use   Vaping Use   • Vaping Use: Never used   Substance Use Topics   • Alcohol use: Yes     Comment: occ   • Drug use: Not Currently   ,     Medications  Current Outpatient Medications   Medication Sig Dispense Refill   • apixaban (Eliquis) 2.5 MG tablet tablet Take 2 tablets by mouth Every 12 (Twelve) Hours. 180 tablet 3   • Entresto 24-26 MG tablet TAKE 1 TABLET BY MOUTH TWICE A  tablet 3   • furosemide (LASIX) 20 MG tablet Take 1 tablet by mouth Daily As Needed (as needed for increased swelling, shortness of breath and/or weight gain). 90 tablet 3   • metoprolol succinate XL (TOPROL-XL) 50 MG 24 hr tablet Take 1 tablet by mouth Daily. (Patient taking differently: Take 50 mg by mouth Daily. Take 25mg) 90 tablet 3   • Multiple Vitamins-Minerals (MULTIVITAMIN ADULT PO) Take 1 tablet by mouth Daily.     • ondansetron ODT (ZOFRAN-ODT) 4 MG disintegrating tablet Place 1 tablet on the tongue 4 (Four) Times a Day As Needed for Nausea. 15 tablet 0   • pantoprazole (PROTONIX) 40 MG EC tablet Take 40 mg by mouth Daily.     • pravastatin (PRAVACHOL) 40 MG tablet TAKE 1 TABLET BY MOUTH DAILY. 90 tablet 3   • ticagrelor (BRILINTA) 60 MG tablet tablet Take 1 tablet by mouth 2 (Two) Times a Day. 60 tablet 11     No current facility-administered medications for this visit.       Allergies:  Aspirin buf(cacarb-mgcarb-mgo), Salicylates, Shellfish-derived products, and Demerol [meperidine]    Review of Systems  Review of Systems   Constitutional: Positive for malaise/fatigue.   HENT: Negative.    Eyes: Negative.    Cardiovascular: Positive for dyspnea on exertion. Negative for chest pain, claudication, cyanosis, irregular heartbeat, leg swelling, near-syncope, orthopnea, palpitations, paroxysmal nocturnal dyspnea and syncope.    Respiratory: Negative.    Endocrine: Negative.    Hematologic/Lymphatic: Negative.    Skin: Negative.    Musculoskeletal: Positive for arthritis and joint pain.   Gastrointestinal: Negative for anorexia.   Genitourinary: Negative.    Neurological: Positive for dizziness and weakness.   Psychiatric/Behavioral: Negative.        Objective     Physical Exam:  /71   Pulse 69   Wt 59.4 kg (131 lb)   BMI 23.96 kg/m²   Physical Exam   Constitutional: She appears well-developed.   HENT:   Head: Normocephalic.   Neck: Normal carotid pulses and no JVD present. No tracheal tenderness present. Carotid bruit is present. No tracheal deviation present.   Cardiovascular: Regular rhythm and normal pulses.   Murmur heard.   Systolic murmur is present with a grade of 2/6.  Pulmonary/Chest: Effort normal. No stridor.   Abdominal: Soft. She exhibits no distension. There is no abdominal tenderness.   Neurological: She is alert. No cranial nerve deficit or sensory deficit.   Skin: Skin is warm.   Psychiatric: Her speech is normal and behavior is normal.       Results Review:    Results for orders placed during the hospital encounter of 08/01/22    Adult Transthoracic Echo Complete w/ Color, Spectral and Contrast if necessary per protocol    Interpretation Summary  · Left ventricular ejection fraction appears to be 46 - 50%. Left ventricular systolic function is mildly decreased.  · Left ventricular diastolic function is consistent with (grade II w/high LAP) pseudonormalization.  · Abnormal global longitudinal LV strain (GLS) = -12.0%  · Left atrial volume is severely increased.  · Moderate aortic valve stenosis is present.  · Moderate mitral valve regurgitation is present.  · Mild pulmonary hypertension is present.     Sondra Connolly  Limited Transthoracic Echocardiogram with Limited Doppler, Color Flow and Myocardial Strain Imaging  Order# [id number]  Reading physician: Gualberto Gallo MD Ordering physician: Magda Allen,  "APRN Study date: 21     Patient Information    Patient Name   Sondra Connolly MRN   [id number] Legal Sex   Female  (Age)   1945 (77 y.o.)     Patient Hx Of Height, Weight, and Vitals    Height Weight BSA (Calculated - sq m) BMI (Calculated) Retired BMI (kg/m2) Pulse BP   157.5 cm (62.01\") 57.2 kg (126 lb) 1.57 sq meters 23 23.09  102/51     [facility] PACS Images     Show images for Adult Transthoracic Echo Limited W/ Cont if Necessary Per Protocol      Clinical Indication    Valvular Function; Native Valvular Stenosis; Native Valvular Regurg; Severe   Dx: Aortic stenosis, severe [I35.0 (ICD-10-CM)]     Interpretation Summary    • Left ventricular ejection fraction appears to be 56 - 60%. Left ventricular systolic function is normal.  • Left atrial volume is severely increased.  • Left ventricular diastolic function is consistent with (grade I) impaired relaxation.  • The following left ventricular wall segments are hypokinetic: basal inferior.  • Moderate aortic valve stenosis is present.  • Estimated right ventricular systolic pressure from tricuspid regurgitation is normal (<35 mmHg).            21      · Average HR: 69. Min HR: 29. Max HR: 162.     ·  Entire report was reviewed.  Monitoring in days: ~14   · The predominant rhythm noted during the testing period was sinus rhythm.     · Rare supraventricular ectopics with an APC burden of: <  1 %    · Occasional premature ventricular contractions with a PVC burden of: 1.4 %     · No correlated arrhythmia  · No significant pauses                  Conclusion:      Baseline rhythm is sinus.  Lowest rate of 29 bpm at 9:19 AM   Occasional premature contractions with a PVC burden of 1.4%.  9 runs of supraventricular tachycardia longest 14 beats at a maximum rate of 162 bpm and an average of 127 bpm  2 pauses longest 3.1 seconds which was a compensatory pause after a PVC  Longest pause at 5:48 AM and second longest pause of 3 seconds at 8:45 AM first pause " due to compensatory pause after PVC and second pause is due to a sinus pause of 3 seconds at 8:45 AM  Intermittent junctional beats with a rate of 29 bpm at 9:20 AM  9 runs of supraventricular tachycardia longest 14 beats at a maximum rate of 162 bpm  Significant sick sinus syndrome with tachybradycardia syndrome         Tristan Cabello DO (Primary) Pamela Nicholas DO Cole, Chelsea L, RANDA   Indications    Coronary artery disease involving native coronary artery of native heart without angina pectoris [I25.10 (ICD-10-CM)]       Conclusion    Date of procedure: February 4, 2021     Procedures performed:     1. Access to the right femoral artery via modified Seldinger technique and ultrasound guidance  2. Left heart catheterization with retrograde crossing of the aortic valve into the left ventricle  3. Selective bilateral coronary angiography  4. SVG angiography x3  5. LIMA angiography  6. Multiple attempts at PTCA on the proximal and mid right coronary artery with different balloons with minimal success, please see below for details  7. Selective right iliofemoral angiogram for possible device closure  8. Patent hemostasis achieved in the right femoral artery via Angio-Seal closure device to the right one sheath was pulled  9. Conscious sedation with continuous hemodynamic monitoring for 1 hour 36 minutes              Impression:  1.  Coronary artery disease as described above status post successful PTCA and PCI after rotational atherectomy with return of JOSE-3 flow in the distal right coronary artery with improvement in the 90% stenosis and 70 to 80% stenosis to less than 5% stenosis both     Plan:  1. Routine post procedure orders  2.  Dual antiplatelet therapy for 1 year  3.  High intensity statin  4.  Referral for cardiac rehab  5.  ACE inhibitor and beta-blocker as vitals tolerate  6.  Close follow-up with cardiology as an outpatient      Holter 6/19      · Average HR: 79. Min HR: 44. Max HR:  146.     · Monitored for ~1 day   · Slowest heart rate at 8:46 AM  · The predominant rhythm noted during the testing period was atrial flutter   · 2256  premature ventricular contractions: PVC burden:  2%   · 75    non sustained ventricular tachycardia episode longest 9 beats at a maximum rate of 152 beats per minute   · No correlated arrhythmia  · 568 pauses longest 3.3 seconds at 1:54 AM           Sondra Connolly   Regadenoson Stress Test With Myocardial Perfusion SPECT (Multi Study)   Order# [id number]   Reading physician: Gualberto Gallo MD Ordering physician: Gualberto Gallo MD Study date: 19   Patient Information     Patient Name  Sondra Connolly MRN  [id number] Sex  Female  (Age)  1945 (75 y.o.)   Interpretation Summary     · Left ventricular ejection fraction is normal (Calculated EF = 55%).  · Breast attenuation artifact is present.  · Myocardial perfusion imaging indicates a medium-sized infarct located in the inferior wall with no significant ischemia noted.             Diagnoses and all orders for this visit:    1. Status post insertion of drug-eluting stent into right coronary artery for coronary artery disease (Primary)    2. S/P CABG x 4  Dr YADIEL Lau    3. Paroxysmal atrial fibrillation (HCC)    4. Mixed hyperlipidemia    5. Mitral valve insufficiency, unspecified etiology    6. History of cardioversion    7. Essential hypertension    8. Coronary artery disease involving coronary bypass graft of native heart without angina pectoris    9. Chronic combined systolic and diastolic congestive heart failure (HCC)  -     Stress Test With Myocardial Perfusion One Day; Future    10. Aortic valve stenosis, etiology of cardiac valve disease unspecified    11. HUMPHREY (dyspnea on exertion)  -     Stress Test With Myocardial Perfusion One Day; Future    12. Bilateral carotid bruits  -     US Carotid Bilateral; Future    Other orders  -     apixaban (Eliquis) 2.5 MG tablet tablet; Take 2 tablets by mouth  Every 12 (Twelve) Hours.  Dispense: 180 tablet; Refill: 3           Plan       Patient expressed understanding  Encouraged and answered all questions   Discussed with the patient and all questioned fully answered. She will call me if any problems arise.   Discussed results of prior testing with patient : echo  Monitor left ventricular ejection fraction by serial echo       Orders Placed This Encounter   Procedures   • US Carotid Bilateral     Standing Status:   Future     Standing Expiration Date:   11/28/2023     Order Specific Question:   Reason for Exam:     Answer:   carotid bruit   • Stress Test With Myocardial Perfusion One Day     Standing Status:   Future     Standing Expiration Date:   11/28/2023     Order Specific Question:   What stress agent will be used?     Answer:   Regadenoson (Lexiscan)     Order Specific Question:   Difficulty walking criteria?     Answer:   Musculoskeletal (hips, knees, feet, back, amputee)     Order Specific Question:   Reason for exam?     Answer:   Known Coronary Artery Disease        Check BP and heart rates twice daily initially till blood pressures and heart rates under good control and then at least 3x / week,   If blood pressures continue to be well-controlled then can check week a month  at home and bring a recording for review next visit  If BP >130/85 or < 100/60 persistently over 3 reading 30 mins apart or if heart rates persistently above 100 bpm or less than 55 bpm call sooner for evaluation and advise     Stopped Amiodarone   Due to see Dr Reed and will be switching from Dr Hawk     S/L NTG PRN for chest pain, call me or go to ER if has to use S/L nitroglycerin     I support the patient's decision to take the Covid -19 vaccine   Had required complete course   No major issues   Now fully immunized    Had booster too      Continue Brilinta and Eliquis   Will decrease Eliquis to 2.5 mg BID   Given high Chads Vasc score will continue on anticoagulation  Monitor for any  signs of bleeding including red or dark stools as well as easy bruisabilty. Fall precautions.     Follow up with Magda Angela in about 3 months (around 2/28/2023).

## 2022-12-09 ENCOUNTER — OFFICE VISIT (OUTPATIENT)
Dept: CARDIOLOGY | Facility: CLINIC | Age: 77
End: 2022-12-09

## 2022-12-09 VITALS
HEIGHT: 62 IN | DIASTOLIC BLOOD PRESSURE: 68 MMHG | RESPIRATION RATE: 18 BRPM | OXYGEN SATURATION: 98 % | HEART RATE: 66 BPM | WEIGHT: 130 LBS | SYSTOLIC BLOOD PRESSURE: 120 MMHG | BODY MASS INDEX: 23.92 KG/M2

## 2022-12-09 DIAGNOSIS — I48.0 PAROXYSMAL ATRIAL FIBRILLATION: Primary | Chronic | ICD-10-CM

## 2022-12-09 DIAGNOSIS — I50.42 CHRONIC COMBINED SYSTOLIC AND DIASTOLIC CONGESTIVE HEART FAILURE: Chronic | ICD-10-CM

## 2022-12-09 DIAGNOSIS — R00.1 BRADYCARDIA, SINUS: ICD-10-CM

## 2022-12-09 PROBLEM — I48.91 ATRIAL FIBRILLATION WITH RVR (HCC): Status: RESOLVED | Noted: 2021-04-05 | Resolved: 2022-12-09

## 2022-12-09 PROCEDURE — 93000 ELECTROCARDIOGRAM COMPLETE: CPT | Performed by: STUDENT IN AN ORGANIZED HEALTH CARE EDUCATION/TRAINING PROGRAM

## 2022-12-09 PROCEDURE — 99204 OFFICE O/P NEW MOD 45 MIN: CPT | Performed by: STUDENT IN AN ORGANIZED HEALTH CARE EDUCATION/TRAINING PROGRAM

## 2022-12-09 RX ORDER — METOPROLOL SUCCINATE 25 MG/1
12.5 TABLET, EXTENDED RELEASE ORAL DAILY
Qty: 45 TABLET | Refills: 3 | Status: SHIPPED | OUTPATIENT
Start: 2022-12-09

## 2022-12-09 NOTE — PROGRESS NOTES
"Chief Complaint  Atrial Fibrillation (NP- C/O WEAKNESS OCC.)    Subjective        History of Present Illness    Cardiology team:  Magda Luke Problems:  1.  Paroxysmal atrial fibrillation  -6/2021: Ablation with Kenn  -Previously on amiodarone  2.  Sinus bradycardia  3.  Paroxysmal SVT  4.  PVCs  -4/2021: Holter with 1.4% burden  5.  Severe left atrial enlargement    Cardiology Problems:  1.  Chronic combined systolic and diastolic heart failure  -EF 46 to 50%  2.  Moderate aortic valve stenosis  3.  Moderate MR  4.  CAD status post CABG (2010)  5.  Hypertension  6.  Hyperlipidemia    Medical Problems:  1.  Type 2 diabetes    Sondra Connolly is a 77 y.o. female with problem list as above who presents to the clinic for evaluation of paroxysmal atrial fibrillation, sinus bradycardia, paroxysmal SVT.  She has a history of atrial fibrillation and has required cardioversions in the past.  She was also treated with amiodarone in the past to try to control her atrial fibrillation.  She underwent pulmonary vein isolation 6/2021 with Dr. Hawk.  Her more recent monitoring has showed sinus bradycardia with sinus pauses of greater than 3 seconds duration.  She states that she has fatigue and some dizziness with changing positions.  She denies any episodes of presyncope or syncope.    Objective   Vital Signs:  /68 (BP Location: Right arm, Patient Position: Sitting)   Pulse 66   Resp 18   Ht 157.5 cm (62\")   Wt 59 kg (130 lb)   SpO2 98%   BMI 23.78 kg/m²   Estimated body mass index is 23.78 kg/m² as calculated from the following:    Height as of this encounter: 157.5 cm (62\").    Weight as of this encounter: 59 kg (130 lb).      Physical Exam  Vitals reviewed.   Constitutional:       Appearance: Normal appearance.   Cardiovascular:      Rate and Rhythm: Normal rate and regular rhythm.      Pulses: Normal pulses.      Heart sounds: Normal heart sounds. No murmur heard.  Pulmonary:      Effort: " Pulmonary effort is normal. No respiratory distress.      Breath sounds: Normal breath sounds.   Skin:     General: Skin is warm and dry.   Neurological:      General: No focal deficit present.      Mental Status: She is alert and oriented to person, place, and time.   Psychiatric:         Mood and Affect: Mood normal.         Judgment: Judgment normal.        Result Review :  The following data was reviewed by: Lorin Reed MD on 12/09/2022:  CMP    CMP 12/20/21   Glucose 99   BUN 24 (A)   Creatinine 1.17 (A)   eGFR Non  Am 45 (A)   Sodium 133 (A)   Potassium 4.8   Chloride 100   Calcium 9.1   Albumin 4.00   Total Bilirubin 0.3   Alkaline Phosphatase 65   AST (SGOT) 30   ALT (SGPT) 29   (A) Abnormal value       Comments are available for some flowsheets but are not being displayed.           CBC    CBC 12/20/21   WBC 15.73 (A)   RBC 4.07   Hemoglobin 12.3   Hematocrit 37.5   MCV 92.1   MCH 30.2   MCHC 32.8   RDW 14.6   Platelets 287   (A) Abnormal value              Holter monitor results reviewed:  Sinus rhythm with episodes of SVT and PVCs.  Occasional pauses up to 3s in duration consistent with tachy-jaylen syndrome.    Prior ECG previously performed by Dr. Gallo on 5/23/22 was directly visualized and independently interpreted with the following findings:  Sinus rhythm, normal conduction    CHADVASC2 SCORE   [id number] Score: 7 (12/9/2022  6:50 PM)          ECG 12 Lead    Date/Time: 12/9/2022 4:17 PM  Performed by: Lorin Reed MD  Authorized by: Lorin Reed MD   Comparison: compared with previous ECG from 5/23/2022  Similar to previous ECG  Rhythm: sinus rhythm  Rate: normal  Conduction: conduction normal  QRS axis: Borderline right axis deviation.  Other findings: non-specific ST-T wave changes    Clinical impression: abnormal EKG                Assessment and Plan   Diagnoses and all orders for this visit:    1. Paroxysmal atrial fibrillation (HCC) (Primary)  -     ECG 12 Lead    2. Chronic combined  systolic and diastolic congestive heart failure (HCC)    3. Bradycardia, sinus    Other orders  -     metoprolol succinate XL (TOPROL-XL) 25 MG 24 hr tablet; Take 0.5 tablets by mouth Daily.  Dispense: 45 tablet; Refill: 3        Sondra Connolly is a 77 y.o. female with problem list as above who presents to the clinic for evaluation of sinus bradycardia, paroxysmal atrial fibrillation, chronic combined systolic and diastolic heart failure.  She has chronic fatigue though it is unclear whether this is related to her sinus bradycardia or other causes.  Overall her atrial fibrillation sounds to be well controlled since the time of the prior ablation.  We will plan to decrease her metoprolol further given the fatigue and see if symptoms improve.  If they don't can plan for a repeat holter monitor to reassess sinus bradycardia and need for possible PPM.    Plan:  - Decrease metoprolol succinate to 12.5mg daily  - Continue apixaban 2.5mg BID for anticoagulation  - Reassess symptoms in 1 month and if still ongoing, consider a repeat holter monitor   - Will discuss the treatment plan with Dr. Gallo             Follow Up   Return in about 1 month (around 1/9/2023).  Patient was given instructions and counseling regarding her condition or for health maintenance advice. Please see specific information pulled into the AVS if appropriate.     Part of this note may be an electronic transcription/translation of spoken language to printed text using the Dragon Dictation System.

## 2022-12-09 NOTE — PATIENT INSTRUCTIONS
Decrease metoprolol to 12.5mg daily  Follow up in clinic in 1 month, at that time we may repeat a monitor if you are still having symptoms  Call me if you have any episodes of near passing out or passing out

## 2022-12-16 ENCOUNTER — HOSPITAL ENCOUNTER (OUTPATIENT)
Dept: CARDIOLOGY | Facility: HOSPITAL | Age: 77
Discharge: HOME | End: 2022-12-16

## 2022-12-16 ENCOUNTER — HOSPITAL ENCOUNTER (OUTPATIENT)
Dept: ULTRASOUND IMAGING | Facility: HOSPITAL | Age: 77
Discharge: HOME | End: 2022-12-16
Admitting: INTERNAL MEDICINE

## 2022-12-16 ENCOUNTER — TELEPHONE (OUTPATIENT)
Dept: CARDIOLOGY | Facility: CLINIC | Age: 77
End: 2022-12-16

## 2022-12-16 VITALS — SYSTOLIC BLOOD PRESSURE: 153 MMHG | DIASTOLIC BLOOD PRESSURE: 93 MMHG | HEART RATE: 74 BPM

## 2022-12-16 DIAGNOSIS — R09.89 BILATERAL CAROTID BRUITS: ICD-10-CM

## 2022-12-16 DIAGNOSIS — I50.42 CHRONIC COMBINED SYSTOLIC AND DIASTOLIC CONGESTIVE HEART FAILURE: Chronic | ICD-10-CM

## 2022-12-16 DIAGNOSIS — R06.09 DOE (DYSPNEA ON EXERTION): ICD-10-CM

## 2022-12-16 LAB
BH CV NUCLEAR PRIOR STUDY: 3
BH CV REST NUCLEAR ISOTOPE DOSE: 10.4 MCI
BH CV STRESS BP STAGE 1: NORMAL
BH CV STRESS COMMENTS STAGE 1: NORMAL
BH CV STRESS DOSE REGADENOSON STAGE 1: 0.4
BH CV STRESS DURATION MIN STAGE 1: 0
BH CV STRESS DURATION SEC STAGE 1: 10
BH CV STRESS HR STAGE 1: 105
BH CV STRESS NUCLEAR ISOTOPE DOSE: 34.5 MCI
BH CV STRESS PROTOCOL 1: NORMAL
BH CV STRESS RECOVERY BP: NORMAL MMHG
BH CV STRESS RECOVERY HR: 90 BPM
BH CV STRESS STAGE 1: 1
LV EF NUC BP: 56 %
MAXIMAL PREDICTED HEART RATE: 141 BPM
PERCENT MAX PREDICTED HR: 74.47 %
STRESS BASELINE BP: NORMAL MMHG
STRESS BASELINE HR: 75 BPM
STRESS PERCENT HR: 88 %
STRESS POST PEAK BP: NORMAL MMHG
STRESS POST PEAK HR: 105 BPM
STRESS TARGET HR: 120 BPM

## 2022-12-16 PROCEDURE — A9502 TC99M TETROFOSMIN: HCPCS | Performed by: INTERNAL MEDICINE

## 2022-12-16 PROCEDURE — 78452 HT MUSCLE IMAGE SPECT MULT: CPT | Performed by: INTERNAL MEDICINE

## 2022-12-16 PROCEDURE — 93017 CV STRESS TEST TRACING ONLY: CPT

## 2022-12-16 PROCEDURE — 93880 EXTRACRANIAL BILAT STUDY: CPT

## 2022-12-16 PROCEDURE — 0 TECHNETIUM TETROFOSMIN KIT: Performed by: INTERNAL MEDICINE

## 2022-12-16 PROCEDURE — 78452 HT MUSCLE IMAGE SPECT MULT: CPT

## 2022-12-16 PROCEDURE — 25010000002 REGADENOSON 0.4 MG/5ML SOLUTION: Performed by: INTERNAL MEDICINE

## 2022-12-16 PROCEDURE — 93018 CV STRESS TEST I&R ONLY: CPT | Performed by: INTERNAL MEDICINE

## 2022-12-16 PROCEDURE — 93880 EXTRACRANIAL BILAT STUDY: CPT | Performed by: SURGERY

## 2022-12-16 RX ADMIN — TETROFOSMIN 1 DOSE: 1.38 INJECTION, POWDER, LYOPHILIZED, FOR SOLUTION INTRAVENOUS at 09:55

## 2022-12-16 RX ADMIN — TETROFOSMIN 1 DOSE: 1.38 INJECTION, POWDER, LYOPHILIZED, FOR SOLUTION INTRAVENOUS at 11:59

## 2022-12-16 RX ADMIN — REGADENOSON 0.4 MG: 0.08 INJECTION, SOLUTION INTRAVENOUS at 10:28

## 2022-12-16 NOTE — TELEPHONE ENCOUNTER
Received call from Lien in vascular lab. Prelim results US carotid: Left ICA minimum flow. Peak systolic 11.3.  Nuclear stress to follow.

## 2022-12-17 DIAGNOSIS — I65.21 CAROTID STENOSIS, ASYMPTOMATIC, RIGHT: Primary | ICD-10-CM

## 2022-12-20 ENCOUNTER — TELEPHONE (OUTPATIENT)
Dept: CARDIOLOGY | Facility: CLINIC | Age: 77
End: 2022-12-20

## 2022-12-22 NOTE — TELEPHONE ENCOUNTER
Patient daughter voiced understand and did want a sooner appt to go into more detailed about the results

## 2022-12-27 ENCOUNTER — OFFICE VISIT (OUTPATIENT)
Dept: CARDIOLOGY | Facility: CLINIC | Age: 77
End: 2022-12-27

## 2022-12-27 VITALS
SYSTOLIC BLOOD PRESSURE: 144 MMHG | HEIGHT: 62 IN | DIASTOLIC BLOOD PRESSURE: 80 MMHG | BODY MASS INDEX: 24.48 KG/M2 | HEART RATE: 80 BPM | WEIGHT: 133 LBS

## 2022-12-27 DIAGNOSIS — R94.39 ABNORMAL NUCLEAR STRESS TEST: Primary | ICD-10-CM

## 2022-12-27 DIAGNOSIS — I35.0 AORTIC VALVE STENOSIS, ETIOLOGY OF CARDIAC VALVE DISEASE UNSPECIFIED: ICD-10-CM

## 2022-12-27 DIAGNOSIS — Z79.01 CHRONIC ANTICOAGULATION: ICD-10-CM

## 2022-12-27 DIAGNOSIS — I10 ESSENTIAL HYPERTENSION: Chronic | ICD-10-CM

## 2022-12-27 DIAGNOSIS — R06.09 DOE (DYSPNEA ON EXERTION): Primary | ICD-10-CM

## 2022-12-27 DIAGNOSIS — I50.42 CHRONIC COMBINED SYSTOLIC AND DIASTOLIC CONGESTIVE HEART FAILURE: Chronic | ICD-10-CM

## 2022-12-27 DIAGNOSIS — R06.02 SHORTNESS OF BREATH: ICD-10-CM

## 2022-12-27 DIAGNOSIS — I25.708 CORONARY ARTERY DISEASE OF BYPASS GRAFT OF NATIVE HEART WITH STABLE ANGINA PECTORIS: ICD-10-CM

## 2022-12-27 DIAGNOSIS — R94.39 ABNORMAL STRESS TEST: ICD-10-CM

## 2022-12-27 DIAGNOSIS — I34.0 MITRAL VALVE INSUFFICIENCY, UNSPECIFIED ETIOLOGY: ICD-10-CM

## 2022-12-27 DIAGNOSIS — I48.0 PAROXYSMAL ATRIAL FIBRILLATION: Chronic | ICD-10-CM

## 2022-12-27 DIAGNOSIS — E78.2 MIXED HYPERLIPIDEMIA: Chronic | ICD-10-CM

## 2022-12-27 PROCEDURE — 99214 OFFICE O/P EST MOD 30 MIN: CPT | Performed by: NURSE PRACTITIONER

## 2022-12-27 RX ORDER — SODIUM CHLORIDE 9 MG/ML
40 INJECTION, SOLUTION INTRAVENOUS AS NEEDED
Status: CANCELLED | OUTPATIENT
Start: 2022-12-27

## 2022-12-27 RX ORDER — SODIUM CHLORIDE 0.9 % (FLUSH) 0.9 %
3 SYRINGE (ML) INJECTION EVERY 12 HOURS SCHEDULED
Status: CANCELLED | OUTPATIENT
Start: 2022-12-27

## 2022-12-27 RX ORDER — SODIUM CHLORIDE 0.9 % (FLUSH) 0.9 %
3-10 SYRINGE (ML) INJECTION AS NEEDED
Status: CANCELLED | OUTPATIENT
Start: 2022-12-27

## 2022-12-27 NOTE — H&P (VIEW-ONLY)
Subjective:     Encounter Date:12/27/2022      Patient ID: Sondra Connolly is a 77 y.o. female     Chief Complaint:  History of Present Illness  Patient presents today for follow up for results of carotid ultrasound and stress test. Patient was seen last month by Dr. Gallo and had complaints of dyspnea on exertion and activity change. Denies chest pain. She notes she is active but cannot do what she used to do. She notes that some days she gest completely wiped out if she does too much. She has lots of fatigue. Patient has paroxysmal atrial fibrillation and is anticoagulated with Eliquis. Patient had an ablation in 2021 with Dr. Hawk but has now established with Dr. Reed. Patient has moderate aortic stenosis. At last OV she had carotid ultrasound ordered and were abnormal- she has been referred to Dr. Castillo. She had a stress test ordered for HUMPHREY, fatigue and change in stamina. Stress test showed large area of infarct with small are of ischemia. She is back today to discuss these. Patient had CABG in 2010 with Dr. Rajput. She had PTCI and PCI with rotational atherectomy of distal RCA in February 2021. Patient has combined congestive heart failure with a now normalized LVEF. Patient has hypertension, valve disease, hyperlipidemia and type II diabetes.     The following portions of the patient's history were reviewed and updated as appropriate: allergies, current medications, past medical history, past social history, past and problem list.    Allergies   Allergen Reactions   • Aspirin Buf(Cacarb-Mgcarb-Mgo) Angioedema     Patient has taken tums in the past with no problem.   • Salicylates Angioedema   • Shellfish-Derived Products Unknown - High Severity   • Demerol [Meperidine] Nausea And Vomiting and Hallucinations       Current Outpatient Medications:   •  apixaban (Eliquis) 2.5 MG tablet tablet, Take 2 tablets by mouth Every 12 (Twelve) Hours., Disp: 180 tablet, Rfl: 3  •  Entresto 24-26 MG tablet, TAKE 1 TABLET  BY MOUTH TWICE A DAY, Disp: 180 tablet, Rfl: 3  •  furosemide (LASIX) 20 MG tablet, Take 1 tablet by mouth Daily As Needed (as needed for increased swelling, shortness of breath and/or weight gain)., Disp: 90 tablet, Rfl: 3  •  metoprolol succinate XL (TOPROL-XL) 25 MG 24 hr tablet, Take 0.5 tablets by mouth Daily., Disp: 45 tablet, Rfl: 3  •  Multiple Vitamins-Minerals (MULTIVITAMIN ADULT PO), Take 1 tablet by mouth Daily., Disp: , Rfl:   •  pravastatin (PRAVACHOL) 40 MG tablet, TAKE 1 TABLET BY MOUTH DAILY., Disp: 90 tablet, Rfl: 3  •  ticagrelor (BRILINTA) 60 MG tablet tablet, Take 1 tablet by mouth 2 (Two) Times a Day., Disp: 60 tablet, Rfl: 11    Social History     Socioeconomic History   • Marital status:    Tobacco Use   • Smoking status: Never   • Smokeless tobacco: Never   • Tobacco comments:     Non smoker; no tobacco use   Vaping Use   • Vaping Use: Never used   Substance and Sexual Activity   • Alcohol use: Yes     Comment: occ   • Drug use: Not Currently   • Sexual activity: Defer       Review of Systems   Constitutional: Positive for malaise/fatigue. Negative for chills, decreased appetite, fever, weight gain and weight loss.   HENT: Negative for nosebleeds.    Eyes: Negative for visual disturbance.   Cardiovascular: Positive for dyspnea on exertion. Negative for chest pain, leg swelling, near-syncope, orthopnea, palpitations, paroxysmal nocturnal dyspnea and syncope.   Respiratory: Positive for shortness of breath. Negative for cough, hemoptysis and snoring.    Endocrine: Negative for cold intolerance and heat intolerance.   Hematologic/Lymphatic: Negative for bleeding problem. Does not bruise/bleed easily.   Skin: Negative for rash.   Musculoskeletal: Negative for back pain and falls.   Gastrointestinal: Negative for abdominal pain, constipation, diarrhea, heartburn, melena, nausea and vomiting.   Genitourinary: Negative for hematuria.   Neurological: Negative for dizziness, headaches and  "light-headedness.   Psychiatric/Behavioral: Negative for altered mental status.   Allergic/Immunologic: Negative for persistent infections.              Objective:     Constitutional:       Appearance: Healthy appearance. Well-developed and not in distress. Frail.   Eyes:      Pupils: Pupils are equal, round, and reactive to light.   HENT:      Head: Normocephalic and atraumatic.   Neck:      Vascular: No carotid bruit or JVD.   Pulmonary:      Effort: Pulmonary effort is normal.      Breath sounds: Normal breath sounds.   Cardiovascular:      Normal rate. Regular rhythm.      Murmurs: There is a systolic murmur.   Pulses:     Intact distal pulses.   Edema:     Peripheral edema absent.   Abdominal:      General: Bowel sounds are normal.      Palpations: Abdomen is soft.   Musculoskeletal: Normal range of motion.      Cervical back: Normal range of motion and neck supple. Skin:     General: Skin is warm and dry.   Neurological:      Mental Status: Alert and oriented to person, place, and time.      Deep Tendon Reflexes: Reflexes are normal and symmetric.   Psychiatric:         Behavior: Behavior normal.         Thought Content: Thought content normal.         Judgment: Judgment normal.           Procedures  /80   Pulse 80   Ht 157.5 cm (62\")   Wt 60.3 kg (133 lb)   BMI 24.33 kg/m²   Lab Review:   I have reviewed       Lab Results   Component Value Date    CHOL 182 02/01/2021    CHLPL 134 11/30/2015    TRIG 70 02/01/2021    HDL 57 02/01/2021     (H) 02/01/2021      Results for orders placed during the hospital encounter of 08/01/22    Adult Transthoracic Echo Complete w/ Color, Spectral and Contrast if necessary per protocol    Interpretation Summary  · Left ventricular ejection fraction appears to be 46 - 50%. Left ventricular systolic function is mildly decreased.  · Left ventricular diastolic function is consistent with (grade II w/high LAP) pseudonormalization.  · Abnormal global longitudinal LV " strain (GLS) = -12.0%  · Left atrial volume is severely increased.  · Moderate aortic valve stenosis is present.  · Moderate mitral valve regurgitation is present.  · Mild pulmonary hypertension is present.     Assessment:          Diagnosis Plan   1. Abnormal nuclear stress test        2. Shortness of breath        3. Coronary artery disease of bypass graft of native heart with stable angina pectoris (HCC)        4. Chronic combined systolic and diastolic congestive heart failure (HCC)        5. Paroxysmal atrial fibrillation (HCC)        6. Essential hypertension        7. Mixed hyperlipidemia        8. Mitral valve insufficiency, unspecified etiology        9. Aortic valve stenosis, etiology of cardiac valve disease unspecified               Plan:       1. Abnormal Stress Test - reviewed options with medical management vs heart cath. Daughter and patient wish to proceed with heart cath   2. Shortness of breath- HUMPHREY- days where she is wiped out. Will order heart cath  3. CAD- with symptoms concerning for angina with abnormal stress. Will order heart cath. On Eliquis and Plavix. Allergy to Asprin. On Pravachol- could consider high intensity statin moving forward  4. Combined Congestive Heart Failure - euvolemic. LVEF had recovered to normal. Low salt diet. On Entresto and toprol.   5. Paroxysmal Atrial Fibrillation- ablation in 2021. Anticoagulated. Follows with Dr. Reed now.   6. Blood Pressure stable- borderline high today but notes she is really nervous   7. Mixed Hyperlipidemia- on Pravastatin  8. Moderate MR  9. Moderate AS

## 2022-12-28 ENCOUNTER — TELEPHONE (OUTPATIENT)
Dept: CARDIOLOGY | Facility: CLINIC | Age: 77
End: 2022-12-28
Payer: MEDICARE

## 2022-12-28 DIAGNOSIS — Z91.041 CONTRAST MEDIA ALLERGY: Primary | ICD-10-CM

## 2022-12-28 PROBLEM — R94.39 ABNORMAL STRESS TEST: Status: ACTIVE | Noted: 2022-12-28

## 2022-12-28 PROBLEM — R06.09 DOE (DYSPNEA ON EXERTION): Status: ACTIVE | Noted: 2022-12-28

## 2022-12-28 RX ORDER — DIPHENHYDRAMINE HCL 25 MG
50 TABLET ORAL TAKE AS DIRECTED
Qty: 2 TABLET | Refills: 0 | Status: CANCELLED | OUTPATIENT
Start: 2023-01-09

## 2022-12-28 RX ORDER — PREDNISONE 50 MG/1
50 TABLET ORAL TAKE AS DIRECTED
Qty: 3 TABLET | Refills: 0 | Status: CANCELLED | OUTPATIENT
Start: 2023-01-08

## 2022-12-28 NOTE — TELEPHONE ENCOUNTER
Contrast allergy premedication orders have been pended for Dr. Gallo's review/signature to be sent to [facility] in [place].  RN will contact patient for education when orders have been signed.

## 2022-12-28 NOTE — Clinical Note
Patient has shellfish allergy with cath scheduled for 1/9/23.  Please review and sign premed orders.  Thank you!

## 2022-12-28 NOTE — TELEPHONE ENCOUNTER
Ms. Connolly has a shellfish allergy and is scheduled for a cath on 1/9/23 at 11:00/1:00 p.m. with Dr. Gallo.  She prefers the [facility] in [place].  Thanks!

## 2023-01-04 ENCOUNTER — TELEPHONE (OUTPATIENT)
Dept: CARDIOLOGY | Facility: CLINIC | Age: 78
End: 2023-01-04
Payer: MEDICARE

## 2023-01-04 RX ORDER — PREDNISONE 20 MG/1
20 TABLET ORAL DAILY
Qty: 9 TABLET | Refills: 0 | Status: SHIPPED | OUTPATIENT
Start: 2023-01-04

## 2023-01-04 NOTE — TELEPHONE ENCOUNTER
Please see msg from Joann Escalante RN, regarding pretreatment for allergy prior to heart cath.  Please sign off on rx.  Thanks!

## 2023-01-05 NOTE — TELEPHONE ENCOUNTER
RN spoke with patient's daughter and advised her to  prednisone from [facility].  Administration instructions explained to daughter.  Understanding verbalized.

## 2023-01-05 NOTE — TELEPHONE ENCOUNTER
Order for prednisone entered by Dr. Gallo.  Orders (prednisone and benadryl) RN sent to MD for approval have been discontinued.

## 2023-01-09 ENCOUNTER — HOSPITAL ENCOUNTER (OUTPATIENT)
Facility: HOSPITAL | Age: 78
Setting detail: HOSPITAL OUTPATIENT SURGERY
Discharge: HOME | End: 2023-01-09
Attending: INTERNAL MEDICINE | Admitting: NURSE PRACTITIONER
Payer: MEDICARE

## 2023-01-09 VITALS
BODY MASS INDEX: 23.85 KG/M2 | DIASTOLIC BLOOD PRESSURE: 80 MMHG | WEIGHT: 129.6 LBS | TEMPERATURE: 98.3 F | HEIGHT: 62 IN | HEART RATE: 82 BPM | OXYGEN SATURATION: 100 % | SYSTOLIC BLOOD PRESSURE: 126 MMHG | RESPIRATION RATE: 18 BRPM

## 2023-01-09 DIAGNOSIS — R06.09 DOE (DYSPNEA ON EXERTION): ICD-10-CM

## 2023-01-09 DIAGNOSIS — R94.39 ABNORMAL STRESS TEST: ICD-10-CM

## 2023-01-09 LAB
ANION GAP SERPL CALCULATED.3IONS-SCNC: 13 MMOL/L (ref 5–15)
BASOPHILS # BLD AUTO: 0.01 10*3/MM3 (ref 0–0.2)
BASOPHILS NFR BLD AUTO: 0.1 % (ref 0–1.5)
BUN SERPL-MCNC: 16 MG/DL (ref 8–23)
BUN/CREAT SERPL: 15.2 (ref 7–25)
CALCIUM SPEC-SCNC: 9.9 MG/DL (ref 8.6–10.5)
CHLORIDE SERPL-SCNC: 102 MMOL/L (ref 98–107)
CO2 SERPL-SCNC: 21 MMOL/L (ref 22–29)
CREAT SERPL-MCNC: 1.05 MG/DL (ref 0.57–1)
DEPRECATED RDW RBC AUTO: 49.2 FL (ref 37–54)
EGFRCR SERPLBLD CKD-EPI 2021: 54.2 ML/MIN/1.73
EOSINOPHIL # BLD AUTO: 0 10*3/MM3 (ref 0–0.4)
EOSINOPHIL NFR BLD AUTO: 0 % (ref 0.3–6.2)
ERYTHROCYTE [DISTWIDTH] IN BLOOD BY AUTOMATED COUNT: 14.5 % (ref 12.3–15.4)
GLUCOSE SERPL-MCNC: 164 MG/DL (ref 65–99)
HCT VFR BLD AUTO: 42.3 % (ref 34–46.6)
HGB BLD-MCNC: 13.7 G/DL (ref 12–15.9)
IMM GRANULOCYTES # BLD AUTO: 0.04 10*3/MM3 (ref 0–0.05)
IMM GRANULOCYTES NFR BLD AUTO: 0.4 % (ref 0–0.5)
INR PPP: 0.99 (ref 0.91–1.09)
LYMPHOCYTES # BLD AUTO: 0.59 10*3/MM3 (ref 0.7–3.1)
LYMPHOCYTES NFR BLD AUTO: 5.4 % (ref 19.6–45.3)
MCH RBC QN AUTO: 30.3 PG (ref 26.6–33)
MCHC RBC AUTO-ENTMCNC: 32.4 G/DL (ref 31.5–35.7)
MCV RBC AUTO: 93.6 FL (ref 79–97)
MONOCYTES # BLD AUTO: 0.05 10*3/MM3 (ref 0.1–0.9)
MONOCYTES NFR BLD AUTO: 0.5 % (ref 5–12)
NEUTROPHILS NFR BLD AUTO: 10.31 10*3/MM3 (ref 1.7–7)
NEUTROPHILS NFR BLD AUTO: 93.6 % (ref 42.7–76)
NRBC BLD AUTO-RTO: 0 /100 WBC (ref 0–0.2)
PLATELET # BLD AUTO: 309 10*3/MM3 (ref 140–450)
PMV BLD AUTO: 9.2 FL (ref 6–12)
POTASSIUM SERPL-SCNC: 4.6 MMOL/L (ref 3.5–5.2)
PROTHROMBIN TIME: 13.2 SECONDS (ref 11.8–14.8)
RBC # BLD AUTO: 4.52 10*6/MM3 (ref 3.77–5.28)
SODIUM SERPL-SCNC: 136 MMOL/L (ref 136–145)
WBC NRBC COR # BLD: 11 10*3/MM3 (ref 3.4–10.8)

## 2023-01-09 PROCEDURE — 85025 COMPLETE CBC W/AUTO DIFF WBC: CPT | Performed by: NURSE PRACTITIONER

## 2023-01-09 PROCEDURE — 85610 PROTHROMBIN TIME: CPT | Performed by: NURSE PRACTITIONER

## 2023-01-09 PROCEDURE — 80048 BASIC METABOLIC PNL TOTAL CA: CPT | Performed by: NURSE PRACTITIONER

## 2023-01-09 RX ORDER — SODIUM CHLORIDE 0.9 % (FLUSH) 0.9 %
3 SYRINGE (ML) INJECTION EVERY 12 HOURS SCHEDULED
Status: DISCONTINUED | OUTPATIENT
Start: 2023-01-09 | End: 2023-01-09 | Stop reason: HOSPADM

## 2023-01-09 RX ORDER — SODIUM CHLORIDE 9 MG/ML
40 INJECTION, SOLUTION INTRAVENOUS AS NEEDED
Status: DISCONTINUED | OUTPATIENT
Start: 2023-01-09 | End: 2023-01-09 | Stop reason: HOSPADM

## 2023-01-09 RX ORDER — SODIUM CHLORIDE 0.9 % (FLUSH) 0.9 %
3-10 SYRINGE (ML) INJECTION AS NEEDED
Status: DISCONTINUED | OUTPATIENT
Start: 2023-01-09 | End: 2023-01-09 | Stop reason: HOSPADM

## 2023-01-09 NOTE — PROGRESS NOTES
Reviewed old records  Patient has inferior infarction without any significant ischemia  Her left ventricular ejection fraction is mildly impaired  Patient has excellent effort tolerance  She is sometimes gets short of breath after bending down and standing up  She can dance easily  She can [place] without any difficulties  After discussion with her and her daughter would like to defer coronary angiography  I explained to them currently with the findings she has she does not necessarily need to have a heart cath  In future monitor mitral regurgitation as well as aortic stenosis by serial echocardiogram  If in the interim she has any issues to call for an earlier appointment  She is due to see RANDA Bonilla in February  To keep that follow-up  Resume Eliquis

## 2023-01-20 ENCOUNTER — OFFICE VISIT (OUTPATIENT)
Dept: CARDIOLOGY | Facility: CLINIC | Age: 78
End: 2023-01-20
Payer: MEDICARE

## 2023-01-20 VITALS
OXYGEN SATURATION: 95 % | SYSTOLIC BLOOD PRESSURE: 120 MMHG | HEIGHT: 62 IN | HEART RATE: 67 BPM | BODY MASS INDEX: 23.74 KG/M2 | WEIGHT: 129 LBS | DIASTOLIC BLOOD PRESSURE: 86 MMHG

## 2023-01-20 DIAGNOSIS — I25.708 CORONARY ARTERY DISEASE OF BYPASS GRAFT OF NATIVE HEART WITH STABLE ANGINA PECTORIS: ICD-10-CM

## 2023-01-20 DIAGNOSIS — I35.0 NONRHEUMATIC AORTIC VALVE STENOSIS: ICD-10-CM

## 2023-01-20 DIAGNOSIS — R94.39 ABNORMAL STRESS TEST: ICD-10-CM

## 2023-01-20 DIAGNOSIS — R00.1 BRADYCARDIA, SINUS: Primary | ICD-10-CM

## 2023-01-20 PROCEDURE — 93000 ELECTROCARDIOGRAM COMPLETE: CPT | Performed by: PHYSICIAN ASSISTANT

## 2023-01-20 PROCEDURE — 99214 OFFICE O/P EST MOD 30 MIN: CPT | Performed by: PHYSICIAN ASSISTANT

## 2023-01-20 RX ORDER — APIXABAN 5 MG/1
5 TABLET, FILM COATED ORAL EVERY 12 HOURS
COMMUNITY
Start: 2023-01-05

## 2023-01-20 NOTE — PROGRESS NOTES
[facility] -  CLINIC FOLLOW UP     Patient Care Team:  Matthieu Bhakta DO as PCP - General (Internal Medicine)  Gualberto Gallo MD as Cardiologist (Cardiology)    Chief Complaint: follow up med changes    Subjective    EP Problems:  1.  Paroxysmal atrial fibrillation  -6/2021: Ablation with Kenn  -Previously on amiodarone  2.  Sinus bradycardia  3.  Paroxysmal SVT  4.  PVCs  -4/2021: Holter with 1.4% burden  5.  Severe left atrial enlargement     Cardiology Problems:  1.  Chronic combined systolic and diastolic heart failure  -EF 46 to 50%  2.  Moderate aortic valve stenosis  3.  Moderate MR  4.  CAD status post CABG (2010)  5.  Hypertension  6.  Hyperlipidemia     Medical Problems:    HPI: Today I had the pleasure of seeing Sondra Connolly in the cardiology clinic for follow up. She is an 78 year old female with a history paroxysmal atrial fibrillation and is anticoagulated with Eliquis with previous ablation in 2021 with Dr. Hawk. With her CAD history, she had CABG in 2010 with Dr. Rajput and then had PTCI and PCI with rotational atherectomy of distal RCA in February 2021. Patient has combined congestive heart failure with a now normalized LVEF. Patient has hypertension, valve disease, hyperlipidemia and type II diabetes as well.     At her last visit with Dr. Reed, she had complained of SOB and fatigue. Her more recent monitoring has showed sinus bradycardia with sinus pauses of greater than 3 seconds duration.  She states that she has fatigue and some dizziness with changing positions. Due to her chronic fatigue, it is unclear whether this was related to her sinus bradycardia or other causes. Metoprolol was decreased to 12.5mg and she feels that this really helped to improve her symptoms. She feels this was worthwhile and would like to stay on this dose. From a functional standpoint, she is able to mop her kitchen, do housework and activities may take her 30 minutes now.     In regards  "to her abnormal stress test, Dr. Gallo felt that she did not need to proceed with coronary angiography at this time.       Objective     Visit Vitals  /86   Pulse 67   Ht 157.5 cm (62.01\")   Wt 58.5 kg (129 lb)   SpO2 95%   BMI 23.59 kg/m²           Vitals reviewed.   Constitutional:       Appearance: Healthy appearance. Not in distress.   Eyes:      Extraocular Movements: Extraocular movements intact.      Conjunctiva/sclera: Conjunctivae normal.      Pupils: Pupils are equal, round, and reactive to light.   HENT:      Head: Normocephalic and atraumatic.      Nose: Nose normal.    Mouth/Throat:      Lips: Pink.      Mouth: Mucous membranes are moist.      Pharynx: Oropharynx is clear.   Neck:      Vascular: No carotid bruit or JVD. JVD normal.   Pulmonary:      Effort: Pulmonary effort is normal.      Breath sounds: Normal breath sounds.   Chest:      Chest wall: Not tender to palpatation.   Cardiovascular:      PMI at left midclavicular line. Normal rate. Regular rhythm. Normal S1. Normal S2.      Murmurs: There is no murmur.      No gallop. No rub.   Pulses:     Radial: 2+ bilaterally.     Posterior tibial: 2+ bilaterally.  Abdominal:      General: Bowel sounds are normal.      Palpations: Abdomen is soft.   Musculoskeletal: Normal range of motion.      Extremities: No clubbing present.     Cervical back: Normal range of motion. Skin:     General: Skin is warm and dry.   Neurological:      General: No focal deficit present.      Mental Status: Alert and oriented to person, place, and time.      Cranial Nerves: Cranial nerves are intact.   Psychiatric:         Attention and Perception: Attention normal.         Mood and Affect: Affect normal.         Speech: Speech normal.         Behavior: Behavior normal.         Cognition and Memory: Cognition normal.             The following portions of the patient's history were reviewed and updated as appropriate: allergies, current medications, past medical history, " past social history, past and problem list.     Review of Systems   Constitutional: Negative.    HENT: Negative.    Eyes: Negative.    Respiratory: Negative.    Cardiovascular: Negative.    Gastrointestinal: Negative.    Endocrine: Negative.    Genitourinary: Negative.    Musculoskeletal: Negative.    Skin: Negative.    Allergic/Immunologic: Negative.    Neurological: Negative.    Hematological: Negative.    Psychiatric/Behavioral: Negative.           Echo EF Estimated  Lab Results   Component Value Date    ECHOEFEST 55 07/25/2019         ECG 12 Lead    Date/Time: 1/20/2023 3:05 PM  Performed by: Leah Webster PA  Authorized by: Leah Webster PA   Comparison: compared with previous ECG   Rhythm: sinus rhythm              Medication Review: yes    Current Outpatient Medications:   •  apixaban (Eliquis) 2.5 MG tablet tablet, Take 2 tablets by mouth Every 12 (Twelve) Hours., Disp: 180 tablet, Rfl: 3  •  Entresto 24-26 MG tablet, TAKE 1 TABLET BY MOUTH TWICE A DAY, Disp: 180 tablet, Rfl: 3  •  furosemide (LASIX) 20 MG tablet, Take 1 tablet by mouth Daily As Needed (as needed for increased swelling, shortness of breath and/or weight gain)., Disp: 90 tablet, Rfl: 3  •  metoprolol succinate XL (TOPROL-XL) 25 MG 24 hr tablet, Take 0.5 tablets by mouth Daily., Disp: 45 tablet, Rfl: 3  •  Multiple Vitamins-Minerals (MULTIVITAMIN ADULT PO), Take 1 tablet by mouth Daily., Disp: , Rfl:   •  pravastatin (PRAVACHOL) 40 MG tablet, TAKE 1 TABLET BY MOUTH DAILY., Disp: 90 tablet, Rfl: 3  •  predniSONE (DELTASONE) 20 MG tablet, Take 1 tablet by mouth Daily. Take 60 mg Prednisone 13 hours prior 7 hours prior and 1 hour prior to administration of Iodinated contrast, Disp: 9 tablet, Rfl: 0  •  ticagrelor (BRILINTA) 60 MG tablet tablet, Take 1 tablet by mouth 2 (Two) Times a Day., Disp: 60 tablet, Rfl: 11  •  Eliquis 5 MG tablet tablet, Take 5 mg by mouth Every 12 (Twelve) Hours., Disp: , Rfl:    Allergies   Allergen Reactions   •  Aspirin Buf(Cacarb-Mgcarb-Mgo) Angioedema     Patient has taken tums in the past with no problem.   • Salicylates Angioedema   • Shellfish-Derived Products Unknown - High Severity   • Demerol [Meperidine] Nausea And Vomiting and Hallucinations       I have reviewed       CBC:  Lab Results - Last 18 Months   Lab Units 01/09/23  1136   WBC 10*3/mm3 11.00*   HEMOGLOBIN g/dL 13.7   HEMATOCRIT % 42.3   PLATELETS 10*3/mm3 309      BMP/CMP:  Lab Results - Last 18 Months   Lab Units 01/09/23  1136 12/20/21  2236   SODIUM mmol/L 136 133*   POTASSIUM mmol/L 4.6 4.8   CHLORIDE mmol/L 102 100   CO2 mmol/L 21.0* 22.0   GLUCOSE mg/dL 164* 99   BUN mg/dL 16 24*   CREATININE mg/dL 1.05* 1.17*   EGFR IF NONAFRICN AM mL/min/1.73  --  45*   CALCIUM mg/dL 9.9 9.1     BNP: No results for input(s): PROBNP in the last 16229 hours.   THYROID:No results for input(s): TSH, FREET4, FTI in the last 09440 hours.    Invalid input(s): FREET3, T3, T4, TEUP,  TOTALT4    Results for orders placed during the hospital encounter of 08/01/22    Adult Transthoracic Echo Complete w/ Color, Spectral and Contrast if necessary per protocol    Interpretation Summary  · Left ventricular ejection fraction appears to be 46 - 50%. Left ventricular systolic function is mildly decreased.  · Left ventricular diastolic function is consistent with (grade II w/high LAP) pseudonormalization.  · Abnormal global longitudinal LV strain (GLS) = -12.0%  · Left atrial volume is severely increased.  · Moderate aortic valve stenosis is present.  · Moderate mitral valve regurgitation is present.  · Mild pulmonary hypertension is present.     Assessment:   Diagnoses and all orders for this visit:    1. Bradycardia, sinus (Primary)    2. Nonrheumatic aortic valve stenosis    3. Abnormal stress test  Overview:  Added automatically from request for surgery [id number]      4. Coronary artery disease of bypass graft of native heart with stable angina pectoris (HCC)    Other orders  -      ECG 12 Lead  Sinus bradycardia: Decreasing her beta blocker to 12.5mg dosing was best for her and she would like to continue this dose. She feels she has adequate activity tolerance and feels that her quality of life is acceptable. If she continues to have recurrent symptoms or worsening SOB/fatigue, she may call our office and we can repeat a Holter monitor to assess for need for pacemaker.     Abnormal Stress test: Dr. Gallo reviewed testing and records and does not recommend LHC at this time given her lack of angina.     Aortic stenosis and MR: Annual echo recommended. Mean aortic gradient was 24mmHg.     Follow up in EP clinic in 6 months.     I spent 30 minutes caring for Sondra on this date of service. This time includes time spent by me in the following activities:preparing for the visit, reviewing tests, obtaining and/or reviewing a separately obtained history, performing a medically appropriate examination and/or evaluation , counseling and educating the patient/family/caregiver, ordering medications, tests, or procedures, referring and communicating with other health care professionals  and documenting information in the medical record        Electronically signed by GASTON Gonzalez

## 2023-01-25 DIAGNOSIS — I65.23 BILATERAL CAROTID ARTERY STENOSIS: Primary | ICD-10-CM

## 2023-01-25 DIAGNOSIS — Z91.013 SHELLFISH ALLERGY: Primary | ICD-10-CM

## 2023-01-25 RX ORDER — PREDNISONE 50 MG/1
50 TABLET ORAL AS NEEDED
Qty: 3 TABLET | Refills: 0 | Status: SHIPPED | OUTPATIENT
Start: 2023-01-25

## 2023-01-25 RX ORDER — DIPHENHYDRAMINE HCL 50 MG
50 CAPSULE ORAL ONCE
Qty: 1 CAPSULE | Refills: 0 | Status: SHIPPED | OUTPATIENT
Start: 2023-01-25 | End: 2023-01-25

## 2023-02-13 ENCOUNTER — TELEPHONE (OUTPATIENT)
Dept: VASCULAR SURGERY | Facility: CLINIC | Age: 78
End: 2023-02-13
Payer: MEDICARE

## 2023-02-13 NOTE — TELEPHONE ENCOUNTER
Called and spoke to the patient's daughter reminding her of her testing and appt tomorrow with Dr. Castillo.  Also made sure that she took her medication for her iodine allergy.  She has started her dosing today.

## 2023-02-14 ENCOUNTER — OFFICE VISIT (OUTPATIENT)
Dept: VASCULAR SURGERY | Facility: CLINIC | Age: 78
End: 2023-02-14
Payer: MEDICARE

## 2023-02-14 ENCOUNTER — HOSPITAL ENCOUNTER (OUTPATIENT)
Dept: CT IMAGING | Facility: HOSPITAL | Age: 78
Discharge: HOME | End: 2023-02-14
Admitting: NURSE PRACTITIONER
Payer: MEDICARE

## 2023-02-14 VITALS
WEIGHT: 125 LBS | HEART RATE: 82 BPM | HEIGHT: 62 IN | BODY MASS INDEX: 23 KG/M2 | SYSTOLIC BLOOD PRESSURE: 114 MMHG | DIASTOLIC BLOOD PRESSURE: 68 MMHG | OXYGEN SATURATION: 98 %

## 2023-02-14 DIAGNOSIS — E78.2 MIXED HYPERLIPIDEMIA: Chronic | ICD-10-CM

## 2023-02-14 DIAGNOSIS — I65.23 BILATERAL CAROTID ARTERY STENOSIS: ICD-10-CM

## 2023-02-14 DIAGNOSIS — I65.23 BILATERAL CAROTID ARTERY STENOSIS: Primary | ICD-10-CM

## 2023-02-14 DIAGNOSIS — I10 ESSENTIAL HYPERTENSION: Chronic | ICD-10-CM

## 2023-02-14 PROCEDURE — 99204 OFFICE O/P NEW MOD 45 MIN: CPT | Performed by: SURGERY

## 2023-02-14 PROCEDURE — 0 IOPAMIDOL PER 1 ML: Performed by: NURSE PRACTITIONER

## 2023-02-14 PROCEDURE — 70498 CT ANGIOGRAPHY NECK: CPT

## 2023-02-14 RX ADMIN — IOPAMIDOL 100 ML: 755 INJECTION, SOLUTION INTRAVENOUS at 13:43

## 2023-02-14 NOTE — PROGRESS NOTES
02/14/2023      Gualberto Gallo MD  [address] [facility]  [address]  [place] [address]    Sondra Connolly  1945    Chief Complaint   Patient presents with   • NEW PATIENT     Referred from [facility] for carotid stenosis. Carotid from 12/16/22 warranted CTA Neck. Performed today. Patient denies any stroke symptoms.        Dear Gualberto Gallo MD    HPI  I had the pleasure of seeing your patient Sondra Connolly in the office today.  Thank you kindly for this consultation.  As you recall, Sondra Connolly is a 78 y.o.  female who you are currently following for coronary artery disease and atrial fibrillation. She has previously undergone a right carotid endarterectomy. She is maintained on Brilinta, Eliquis, and Pravachol.  She denies any strokelike symptoms.  She only has complaints of memory problems.  She did have noninvasive testing performed today, which I did personally review.      Past Medical History:   Diagnosis Date   • A-fib ([facility])    • Abnormal nuclear stress test 8/23/2019   • Angina pectoris ([facility])    • Anxiety    • Arthritis    • Atherosclerosis of coronary artery bypass graft    • Carotid artery occlusion without infarction    • Chronic kidney disease    • Chronic lung disease    • Colon polyp    • Diabetes mellitus ([facility])    • DJD (degenerative joint disease)    • Essential hypertension 3/24/2017   • Fibrocystic breast disease    • Gastritis    • GI bleed    • Hemorrhoids    • Hyperlipidemia    • Hypertension    • Lung disease     chronic   • MI (myocardial infarction) ([facility])    • Palpitations    • PUD (peptic ulcer disease)    • PVD (peripheral vascular disease) ([facility])    • Renal failure, acute ([facility])    • 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       Past Surgical History:   Procedure Laterality Date   • ABLATION OF DYSRHYTHMIC FOCUS      May 2021 in [place]     • APPENDECTOMY     • CARDIAC CATHETERIZATION  05/01/2009    Left-Heart Skin   • CARDIAC CATHETERIZATION N/A 2/4/2021    Procedure: Left Heart Cath;  Surgeon:  Tristan Cabello DO;  Location:  [facility] CATH [facility];  Service: Cardiology;  Laterality: N/A;   • CARDIAC CATHETERIZATION Right 2/5/2021    Procedure: Left Heart Cath, rotablator to RCA with Dr. Abrams at 1PM;  Surgeon: Tristan Cabello DO;  Location:  [facility] CATH [facility];  Service: Cardiology;  Laterality: Right;   • CAROTID ENDARTERECTOMY     • CATARACT EXTRACTION     • CATARACT EXTRACTION     • COLONOSCOPY     • CORONARY ARTERY BYPASS GRAFT  07/2007    [facility]   • ENDOSCOPY  10/02/2013   • ENDOSCOPY  10/02/2013   • HYSTERECTOMY     • SKIN CANCER EXCISION     • THROMBOENDARTERECTOMY     • TONSILLECTOMY         Family History   Problem Relation Age of Onset   • Heart disease Mother    • Breast cancer Mother    • Heart disease Father    • Heart disease Brother    • Heart disease Brother    • Breast cancer Maternal Grandmother        Social History     Socioeconomic History   • Marital status:    Tobacco Use   • Smoking status: Never   • Smokeless tobacco: Never   • Tobacco comments:     Non smoker; no tobacco use   Vaping Use   • Vaping Use: Never used   Substance and Sexual Activity   • Alcohol use: Yes     Comment: occ   • Drug use: Not Currently   • Sexual activity: Defer       Allergies   Allergen Reactions   • Aspirin Buf(Cacarb-Mgcarb-Mgo) Angioedema     Patient has taken tums in the past with no problem.   • Salicylates Angioedema   • Codeine Nausea And Vomiting and Hallucinations   • Iodine Unknown - High Severity   • Shellfish-Derived Products Unknown - High Severity   • Demerol [Meperidine] Nausea And Vomiting and Hallucinations         Current Outpatient Medications:   •  apixaban (Eliquis) 2.5 MG tablet tablet, Take 2 tablets by mouth Every 12 (Twelve) Hours., Disp: 180 tablet, Rfl: 3  •  Entresto 24-26 MG tablet, TAKE 1 TABLET BY MOUTH TWICE A DAY, Disp: 180 tablet, Rfl: 3  •  furosemide (LASIX) 20 MG tablet, Take 1 tablet by mouth Daily As Needed (as needed for  "increased swelling, shortness of breath and/or weight gain)., Disp: 90 tablet, Rfl: 3  •  metoprolol succinate XL (TOPROL-XL) 25 MG 24 hr tablet, Take 0.5 tablets by mouth Daily., Disp: 45 tablet, Rfl: 3  •  Multiple Vitamins-Minerals (MULTIVITAMIN ADULT PO), Take 1 tablet by mouth Daily., Disp: , Rfl:   •  pravastatin (PRAVACHOL) 40 MG tablet, TAKE 1 TABLET BY MOUTH DAILY., Disp: 90 tablet, Rfl: 3  •  predniSONE (DELTASONE) 50 MG tablet, Take 1 tablet by mouth As Needed (see sig). 1 tablet by mouth 24, 12, and 1 hour before procedure, Disp: 3 tablet, Rfl: 0  •  ticagrelor (BRILINTA) 60 MG tablet tablet, Take 1 tablet by mouth 2 (Two) Times a Day., Disp: 60 tablet, Rfl: 11  •  Eliquis 5 MG tablet tablet, Take 5 mg by mouth Every 12 (Twelve) Hours., Disp: , Rfl:   •  predniSONE (DELTASONE) 20 MG tablet, Take 1 tablet by mouth Daily. Take 60 mg Prednisone 13 hours prior 7 hours prior and 1 hour prior to administration of Iodinated contrast, Disp: 9 tablet, Rfl: 0  No current facility-administered medications for this visit.    Review of Systems   Constitutional: Negative.    HENT: Negative.    Eyes: Negative.    Respiratory: Negative.    Cardiovascular: Negative.    Gastrointestinal: Negative.    Endocrine: Negative.    Genitourinary: Negative.    Musculoskeletal: Negative.    Skin: Negative.    Allergic/Immunologic: Negative.    Neurological: Negative.    Hematological: Negative.    Psychiatric/Behavioral: Negative.    All other systems reviewed and are negative.    /68   Pulse 82   Ht 157.5 cm (62\")   Wt 56.7 kg (125 lb)   SpO2 98%   BMI 22.86 kg/m²     Physical Exam  Vitals and nursing note reviewed.   Constitutional:       Appearance: She is well-developed.   HENT:      Head: Normocephalic and atraumatic.   Eyes:      General: No scleral icterus.     Pupils: Pupils are equal, round, and reactive to light.   Neck:      Thyroid: No thyromegaly.      Vascular: No carotid bruit or JVD.   Cardiovascular:     "  Rate and Rhythm: Normal rate and regular rhythm.      Pulses:           Carotid pulses are 2+ on the right side and 2+ on the left side.       Femoral pulses are 2+ on the right side and 2+ on the left side.       Popliteal pulses are 2+ on the right side and 2+ on the left side.        Dorsalis pedis pulses are 2+ on the right side and 2+ on the left side.        Posterior tibial pulses are 2+ on the right side and 2+ on the left side.      Heart sounds: Normal heart sounds.   Pulmonary:      Effort: Pulmonary effort is normal.      Breath sounds: Normal breath sounds.   Abdominal:      General: Bowel sounds are normal. There is no distension or abdominal bruit.      Palpations: Abdomen is soft. There is no mass.      Tenderness: There is no abdominal tenderness.   Musculoskeletal:         General: Normal range of motion.      Cervical back: Neck supple.   Lymphadenopathy:      Cervical: No cervical adenopathy.   Skin:     General: Skin is warm and dry.   Neurological:      Mental Status: She is alert and oriented to person, place, and time.      Cranial Nerves: No cranial nerve deficit.      Sensory: No sensory deficit.         Diagnostic Data:  CT Angiogram Neck    Result Date: 2/14/2023  Narrative: CT ANGIOGRAM NECK- 2/14/2023 1:20 PM CST  HISTORY: Carotid artery stenosis; I65.23-Occlusion and stenosis of bilateral carotid arteries  COMPARISON: None  DOSE LENGTH PRODUCT: 292 mGy cm. Automated exposure control was also utilized to decrease patient radiation dose.  TECHNIQUE: Axial images of the neck are performed following IV contrast. 2-D and maximal intensity projectional reconstructed images are reviewed  FINDINGS:  Median sternotomy wires with evidence of coronary bypass. Mild to moderate atherosclerosis of the normal caliber thoracic aortic arch. Mild atherosclerotic changes extend into the proximal left subclavian artery creating less than 20% stenosis. The right brachiocephalic artery is patent. Mild  calcified plaque within the proximal right subclavian artery results in 25% stenosis.  Mild calcified plaque of the distal right common carotid artery create 15% stenosis. Surgical clips adjacent the right carotid sheath from prior carotid endarterectomy. Mild calcified plaque of the right carotid bulb extends into the right proximal internal carotid artery resulting in no significant luminal stenosis. The right external/internal carotid arteries appear patent.  The left common carotid artery is patent. There is severe high-grade stenosis of the proximal left internal carotid artery with only a small string of contrast suspected within the proximal left ICA. The mid and distal left internal carotid artery are patent but small in caliber compared to the contralateral right side. Left external carotid artery is patent.  The vertebral arteries originate from the proximal subclavian arteries as expected. Hypoplastic right vertebral artery. No focal right vertebral artery stenosis. Basilar artery patent.  No aneurysm or dissection.  Calcified granuloma right lung apex.  Heterogeneous thyroid gland with no dominant nodule. No pathologic lymphadenopathy or soft tissue mass identified within the neck.  Old ischemic changes of the right frontal lobe. Moderate degenerative change of the thoracolumbar spine.      Impression: 1. High-grade stenosis of the proximal left internal carotid artery for a segment of approximately 1.3 cm with only a small string of contrast seen through the region. This finding discussed with Gissel Stephenson at 3:22 PM on 02/14/2023 2. Right carotid endarterectomy changes with only mild calcified plaque of the right carotid bulb and proximal ICA but no significant luminal stenosis. 3. Hypoplastic right vertebral artery. No focal vertebral artery stenosis. 4. Coronary artery bypass surgery. This report was finalized on 02/14/2023 15:23 by Dr. Xochitl Infante MD.       History: Carotid occlusive  disease     IMPRESSION:  Impression:  1. There is less than 50% stenosis of the right internal carotid artery.  2. There is less than 50% stenosis of the left internal carotid artery.  There may be a distal carotid or MCA occlusion secondary to the dampened  low velocity flow.  3. Antegrade flow is demonstrated in the right vertebral. The left  vertebral was not visualized.  4. Further imaging/evaluation may be warranted with a CTA of the neck.     Comments: Bilateral carotid vertebral arterial duplex scan was  performed.     Grayscale imaging shows intimal thickening and calcified elements at the  carotid bifurcation. The right internal carotid artery peak systolic  velocity is 96.3 cm/sec. The end-diastolic velocity is 36.4 cm/sec. The  right ICA/CCA ratio is approximately 1.3 . These findings correlate with  less than 50% stenosis of the right internal carotid artery.     Grayscale imaging shows intimal thickening and calcified elements at the  carotid bifurcation. The left internal carotid artery peak systolic  velocity is 14.6 cm/sec. The end-diastolic velocity is 5.2 cm/sec. The  left ICA/CCA ratio is approximately 0.4 . These findings correlate with  less than 50% stenosis of the left internal carotid artery.     Antegrade flow is demonstrated in bilateral vertebral arteries.     This report was finalized on 12/16/2022 14:42 by Dr. Juan C Castillo MD.    Patient Active Problem List   Diagnosis   • Mixed hyperlipidemia   • Coronary artery disease of bypass graft of native heart with stable angina pectoris (HCC)   • Paroxysmal atrial fibrillation (HCC)   • Essential hypertension   • Anxiety about health   • Coumadin toxicity   • Chronic combined systolic and diastolic congestive heart failure (HCC)   • S/P CABG x 4 2010 Dr YADIEL Lau   • Status post insertion of drug-eluting stent into right coronary artery for coronary artery disease   • History of cardioversion   • Current use of long term anticoagulation    • Aortic valve stenosis   • Mitral valve regurgitation   • Diabetes mellitus (HCC)   • Bradycardia, sinus   • HUMPHREY (dyspnea on exertion)   • Abnormal stress test        Diagnosis Plan   1. Bilateral carotid artery stenosis  US Carotid Bilateral      2. Mixed hyperlipidemia        3. Essential hypertension            Plan: After thoroughly evaluating Sondra Connolly, I believe the best course of action is to remain conservative from a vascular surgery standpoint.  I did personally review her testing and there is severe stenosis/near occlusive stenosis in the left proximal internal carotid with diminutive string contrast and minimal distal flow.  Right carotid is patent.  I would only recommend continued medical management at this time.  She is maintained on Eliquis and Brilinta as well as Pravachol.  I did discuss vascular risk factors as they pertain to the progression of vascular disease including controlling her hypertension and hyperlipidemia.  These risk factors are currently stable.  The patient is to continue taking their medications as previously discussed.   This was all discussed in full with complete understanding.  Thank you for allowing me to participate in the care of your patient.  Please do not hesitate to call with any questions or concerns.  We will keep you aware of any further encounters with Sondra Connolly.        Sincerely yours,         Juan C Castillo, Matthieu Rueda DO

## 2023-02-23 ENCOUNTER — OFFICE VISIT (OUTPATIENT)
Dept: GASTROENTEROLOGY | Facility: CLINIC | Age: 78
End: 2023-02-23
Payer: MEDICARE

## 2023-02-23 VITALS
BODY MASS INDEX: 23.55 KG/M2 | OXYGEN SATURATION: 98 % | HEIGHT: 62 IN | SYSTOLIC BLOOD PRESSURE: 126 MMHG | DIASTOLIC BLOOD PRESSURE: 76 MMHG | TEMPERATURE: 96.9 F | WEIGHT: 128 LBS | HEART RATE: 50 BPM

## 2023-02-23 DIAGNOSIS — R19.5 POSITIVE COLORECTAL CANCER SCREENING USING COLOGUARD TEST: Primary | ICD-10-CM

## 2023-02-23 DIAGNOSIS — D68.9 COAGULOPATHY: ICD-10-CM

## 2023-02-23 PROCEDURE — 99214 OFFICE O/P EST MOD 30 MIN: CPT | Performed by: NURSE PRACTITIONER

## 2023-02-23 NOTE — PROGRESS NOTES
Chief Complaint   Patient presents with   • GI Problem     Positive cologuard test       PCP: Matthieu Bhakta,   REFER: No ref. provider found     Subjective     HPI    She presents to office with positive cologaurd test from PCP office in 1-5-23.  Coarse is constant.  Length of time test has been positive is unknown.  Testing performed as part of routine physical.  She denies change in bowels.  Bowels described as moving without difficulty, no change. No abdominal pain.  No BRBPR.  No melena.  Weight is stable.  She has not undergone previous colonoscopy evaluation.  There is not a family history of colon cancer or colon polyps.      On brilinta and eliquis. On both of these for over 1 year. Last cardiac stent more than a year ago.       Unsure if ever had colonoscopy.   No family history of colon cancer/polyps.         Past Medical History:   Diagnosis Date   • A-fib (HCC)    • Abnormal nuclear stress test 8/23/2019   • Angina pectoris ([facility])    • Anxiety    • Arthritis    • Atherosclerosis of coronary artery bypass graft    • Carotid artery occlusion without infarction    • Chronic kidney disease    • Chronic lung disease    • Colon polyp    • Diabetes mellitus ([facility])    • DJD (degenerative joint disease)    • Essential hypertension 3/24/2017   • Fibrocystic breast disease    • Gastritis    • GI bleed    • Hemorrhoids    • Hyperlipidemia    • Hypertension    • Lung disease     chronic   • MI (myocardial infarction) ([facility])    • Palpitations    • PUD (peptic ulcer disease)    • PVD (peripheral vascular disease) ([facility])    • Renal failure, acute ([facility])    • 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       Moderate aortic stenosis on last echo 2022.       Past Surgical History:   Procedure Laterality Date   • ABLATION OF DYSRHYTHMIC FOCUS      May 2021 in [place]     • APPENDECTOMY     • CARDIAC CATHETERIZATION  05/01/2009    Left-Heart Skin   • CARDIAC CATHETERIZATION N/A 2/4/2021    Procedure: Left Heart Cath;  Surgeon: Irineo  Tristan Davies DO;  Location:  [facility]ATH [facility];  Service: Cardiology;  Laterality: N/A;   • CARDIAC CATHETERIZATION Right 2/5/2021    Procedure: Left Heart Cath, rotablator to RCA with Dr. Abrams at 1PM;  Surgeon: Tristan Cabello DO;  Location:  [facility]ATH [facility];  Service: Cardiology;  Laterality: Right;   • CAROTID ENDARTERECTOMY     • CATARACT EXTRACTION     • CATARACT EXTRACTION     • COLONOSCOPY     • CORONARY ARTERY BYPASS GRAFT  07/2007    [facility]   • ENDOSCOPY  10/02/2013   • ENDOSCOPY  10/02/2013   • HYSTERECTOMY     • SKIN CANCER EXCISION     • THROMBOENDARTERECTOMY     • TONSILLECTOMY         Outpatient Medications Marked as Taking for the 2/23/23 encounter (Office Visit) with Katlin Marcus APRN   Medication Sig Dispense Refill   • apixaban (Eliquis) 2.5 MG tablet tablet Take 2 tablets by mouth Every 12 (Twelve) Hours. 180 tablet 3   • Eliquis 5 MG tablet tablet Take 5 mg by mouth Every 12 (Twelve) Hours.     • Entresto 24-26 MG tablet TAKE 1 TABLET BY MOUTH TWICE A  tablet 3   • furosemide (LASIX) 20 MG tablet Take 1 tablet by mouth Daily As Needed (as needed for increased swelling, shortness of breath and/or weight gain). 90 tablet 3   • metoprolol succinate XL (TOPROL-XL) 25 MG 24 hr tablet Take 0.5 tablets by mouth Daily. (Patient taking differently: Take 20 mg by mouth Daily.) 45 tablet 3   • Multiple Vitamins-Minerals (MULTIVITAMIN ADULT PO) Take 1 tablet by mouth Daily.     • pravastatin (PRAVACHOL) 40 MG tablet TAKE 1 TABLET BY MOUTH DAILY. 90 tablet 3   • ticagrelor (BRILINTA) 60 MG tablet tablet Take 1 tablet by mouth 2 (Two) Times a Day. 60 tablet 11       Allergies   Allergen Reactions   • Aspirin Buf(Cacarb-Mgcarb-Mgo) Angioedema     Patient has taken tums in the past with no problem.   • Salicylates Angioedema   • Codeine Nausea And Vomiting and Hallucinations   • Iodine Unknown - High Severity   • Shellfish-Derived Products Unknown - High Severity  "  • Demerol [Meperidine] Nausea And Vomiting and Hallucinations       Social History     Socioeconomic History   • Marital status:    Tobacco Use   • Smoking status: Never   • Smokeless tobacco: Never   • Tobacco comments:     Non smoker; no tobacco use   Vaping Use   • Vaping Use: Never used   Substance and Sexual Activity   • Alcohol use: Not Currently   • Drug use: Not Currently   • Sexual activity: Defer       Family History   Problem Relation Age of Onset   • Heart disease Mother    • Breast cancer Mother    • Heart disease Father    • Heart disease Brother    • Heart disease Brother    • Breast cancer Maternal Grandmother    • Colon cancer Neg Hx        Review of Systems   Constitutional: Negative for chills, fever and unexpected weight change.   Respiratory: Negative for shortness of breath.    Cardiovascular: Negative for chest pain.   Gastrointestinal: Negative for abdominal distention, abdominal pain, anal bleeding, blood in stool, constipation, diarrhea, nausea and vomiting.       Objective     Vitals:    02/23/23 1231   BP: 126/76   Pulse: 50   Temp: 96.9 °F (36.1 °C)   SpO2: 98%   Weight: 58.1 kg (128 lb)   Height: 157.5 cm (62\")     Body mass index is 23.41 kg/m².    Physical Exam  Vitals reviewed.   Constitutional:       General: She is not in acute distress.  Cardiovascular:      Rate and Rhythm: Normal rate and regular rhythm.      Heart sounds: Murmur (systolic ) heard.   Pulmonary:      Effort: Pulmonary effort is normal.      Breath sounds: Normal breath sounds.   Abdominal:      General: Bowel sounds are normal. There is no distension.      Palpations: Abdomen is soft.      Tenderness: There is no abdominal tenderness.   Skin:     General: Skin is warm and dry.   Neurological:      Mental Status: She is alert.         Imaging Results (Most Recent)     None          Body mass index is 23.41 kg/m².    Assessment & Plan     Diagnoses and all orders for this visit:    1. Positive colorectal " cancer screening using Cologuard test (Primary)    2. Coagulopathy (HCC)  Comments:  brilinta and eliquis.               I have explained a positive cologuard indicates the presence of DNA/hgb in stool that is associated with colon polyps or colon cancer.  There is a chance the test is a false positive with that chance being higher for people over the age of 65. This is due to normal changes in DNA associated with getting older (AGA).  Due to the positive result of the Cologuard I recommend pt undergoing colonoscopy.  Colonoscopy remains the gold standard for detection of colon polyps/colon cancer.      All risks, benefits, alternatives, and indications of colonoscopy procedure have been discussed with the patient. Risks to include perforation of the colon requiring possible surgery or colostomy, risk of bleeding from biopsies or removal of colon tissue, possibility of missing a colon polyp or cancer, or adverse drug reaction.  Benefits to include the diagnosis and management of disease of the colon and rectum.  Pt verbalizes understanding and agrees to proceed with procedure.      However prior to scheduling I will send a letter to Dr. Gallo in regards to if the patient can safely hold brilinta x 5 days and eliquis x 2 days prior to procedure. I will contact patient once he has responded.  I have instructed the patient to contact our office if she has not heard from us within 2 weeks.        The patient was advised on the risks of stopping blood thinners for a procedure.  The risks discussed included the risk of developing myocardial infarction, CVA, embolus, clogging of the arteries or stents, etc.  We discussed the potential consequences of the risks discussed.  The benefits of stopping as well as the alternatives were discussed as well.   Patient is to hold their anticoagulation medication per the direction of their prescribing physician, Dr. Gallo.  A letter will be sent to prescribing This is to prevent any risk  or complication from bleeding intra and post procedure. If they develop bleeding post procedure they are to go the emergency department for further evaluation and treatment immediately.             There are no Patient Instructions on file for this visit.

## 2023-03-14 ENCOUNTER — OFFICE VISIT (OUTPATIENT)
Dept: CARDIOLOGY | Facility: CLINIC | Age: 78
End: 2023-03-14
Payer: MEDICARE

## 2023-03-14 VITALS
DIASTOLIC BLOOD PRESSURE: 80 MMHG | HEIGHT: 62 IN | BODY MASS INDEX: 23.55 KG/M2 | HEART RATE: 77 BPM | SYSTOLIC BLOOD PRESSURE: 143 MMHG | WEIGHT: 128 LBS

## 2023-03-14 DIAGNOSIS — I34.0 MITRAL VALVE INSUFFICIENCY, UNSPECIFIED ETIOLOGY: ICD-10-CM

## 2023-03-14 DIAGNOSIS — R06.09 DOE (DYSPNEA ON EXERTION): ICD-10-CM

## 2023-03-14 DIAGNOSIS — E78.2 MIXED HYPERLIPIDEMIA: Chronic | ICD-10-CM

## 2023-03-14 DIAGNOSIS — Z95.1 S/P CABG X 4: Primary | ICD-10-CM

## 2023-03-14 DIAGNOSIS — Z79.01 CURRENT USE OF LONG TERM ANTICOAGULATION: ICD-10-CM

## 2023-03-14 DIAGNOSIS — I48.0 PAROXYSMAL ATRIAL FIBRILLATION: Chronic | ICD-10-CM

## 2023-03-14 DIAGNOSIS — I25.708 CORONARY ARTERY DISEASE OF BYPASS GRAFT OF NATIVE HEART WITH STABLE ANGINA PECTORIS: ICD-10-CM

## 2023-03-14 DIAGNOSIS — I10 ESSENTIAL HYPERTENSION: Chronic | ICD-10-CM

## 2023-03-14 DIAGNOSIS — Z95.5 STATUS POST INSERTION OF DRUG-ELUTING STENT INTO RIGHT CORONARY ARTERY FOR CORONARY ARTERY DISEASE: ICD-10-CM

## 2023-03-14 DIAGNOSIS — E11.59 TYPE 2 DIABETES MELLITUS WITH OTHER CIRCULATORY COMPLICATION, WITHOUT LONG-TERM CURRENT USE OF INSULIN: Chronic | ICD-10-CM

## 2023-03-14 DIAGNOSIS — Z98.890 HISTORY OF CARDIOVERSION: ICD-10-CM

## 2023-03-14 PROCEDURE — 99214 OFFICE O/P EST MOD 30 MIN: CPT | Performed by: INTERNAL MEDICINE

## 2023-03-14 NOTE — PROGRESS NOTES
Sondra Connolly  [id number]  1945  78 y.o.  female    Referring Provider: Matthieu Bhakta DO    Reason for Follow-up Visit: Here for routine follow up  chronic atrial fibrillation   Holter as below  Cardiac workup test results as below   coronary artery disease Coronary artery bypass grafting > 10 years ago  Nuclear stress test no significant ischemia, has prior infarction  Prior admission   Recent percutaneous coronary intervention drug eluting stent to right coronary artery Dr Cabello   Cardiac workup test results as below: 14-day outpatient cardiac telemetry   Cardiac workup test results as below: echo     Subjective    Overall feels well   No new events or complaints since last visit   No bleeding, excessive bruising, gait instability or fall risks      Not checking blood sugars regularly at home    Easy bruisability now better after reducing Eliquis dose      Tolerating current medications well with no untoward side effects   Compliant with prescribed medication regimen. Tries to adhere to cardiac diet.      Mild exertional shortness of breath    No significant cough or wheezing    No palpitations  No associated chest pain  No significant pedal edema    No fever or chills  No significant expectoration    No hemoptysis  No syncope    Tolerating current medications well with no untoward side effects   Compliant with prescribed medication regimen. Tries to adhere to cardiac diet.     No further presyncope or syncope   No bleeding, excessive bruising, gait instability or fall risks       Saw Dr Hawk for atrial fibrillation ablation s/p ablation, and now seeing Dr Reed       History of present illness:  Sondra Connolly is a 78 y.o. yo female with chronic atrial fibrillation  coronary artery disease coronary artery bypass grafting who presents today for   Chief Complaint   Patient presents with   • Follow-up     3 Month follow up    .    History  Past Medical History:   Diagnosis Date   • A-fib (HCC)    •  Abnormal nuclear stress test 8/23/2019   • Angina pectoris (HCC)    • Anxiety    • Arthritis    • Atherosclerosis of coronary artery bypass graft    • Carotid artery occlusion without infarction    • Chronic kidney disease    • Chronic lung disease    • Colon polyp    • Diabetes mellitus (HCC)    • DJD (degenerative joint disease)    • Essential hypertension 3/24/2017   • Fibrocystic breast disease    • Gastritis    • GI bleed    • Hemorrhoids    • Hyperlipidemia    • Hypertension    • Lung disease     chronic   • MI (myocardial infarction) (HCC)    • Palpitations    • PUD (peptic ulcer disease)    • PVD (peripheral vascular disease) (HCC)    • Renal failure, acute (HCC)    • 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   ,   Past Surgical History:   Procedure Laterality Date   • ABLATION OF DYSRHYTHMIC FOCUS      May 2021 in [place]     • APPENDECTOMY     • CARDIAC CATHETERIZATION  05/01/2009    Left-Heart Skin   • CARDIAC CATHETERIZATION N/A 2/4/2021    Procedure: Left Heart Cath;  Surgeon: Tristan Cabello DO;  Location:  [facility];  Service: Cardiology;  Laterality: N/A;   • CARDIAC CATHETERIZATION Right 2/5/2021    Procedure: Left Heart Cath, rotablator to RCA with Dr. Abrams at 1PM;  Surgeon: Tristan Cabello DO;  Location:  [facility];  Service: Cardiology;  Laterality: Right;   • CAROTID ENDARTERECTOMY     • CATARACT EXTRACTION     • CATARACT EXTRACTION     • COLONOSCOPY     • CORONARY ARTERY BYPASS GRAFT  07/2007    Four   • ENDOSCOPY  10/02/2013   • ENDOSCOPY  10/02/2013   • HYSTERECTOMY     • SKIN CANCER EXCISION     • THROMBOENDARTERECTOMY     • TONSILLECTOMY     ,   Family History   Problem Relation Age of Onset   • Heart disease Mother    • Breast cancer Mother    • Heart disease Father    • Heart disease Brother    • Heart disease Brother    • Breast cancer Maternal Grandmother    • Colon cancer Neg Hx    ,   Social History     Tobacco Use   • Smoking status:  Never   • Smokeless tobacco: Never   • Tobacco comments:     Non smoker; no tobacco use   Vaping Use   • Vaping Use: Never used   Substance Use Topics   • Alcohol use: Not Currently   • Drug use: Not Currently   ,     Medications  Current Outpatient Medications   Medication Sig Dispense Refill   • apixaban (Eliquis) 2.5 MG tablet tablet Take 2 tablets by mouth Every 12 (Twelve) Hours. 180 tablet 3   • Eliquis 5 MG tablet tablet Take 1 tablet by mouth Every 12 (Twelve) Hours.     • Entresto 24-26 MG tablet TAKE 1 TABLET BY MOUTH TWICE A  tablet 3   • furosemide (LASIX) 20 MG tablet Take 1 tablet by mouth Daily As Needed (as needed for increased swelling, shortness of breath and/or weight gain). 90 tablet 3   • metoprolol succinate XL (TOPROL-XL) 25 MG 24 hr tablet Take 0.5 tablets by mouth Daily. (Patient taking differently: Take 20 mg by mouth Daily.) 45 tablet 3   • Multiple Vitamins-Minerals (MULTIVITAMIN ADULT PO) Take 1 tablet by mouth Daily.     • pravastatin (PRAVACHOL) 40 MG tablet TAKE 1 TABLET BY MOUTH DAILY. 90 tablet 3   • predniSONE (DELTASONE) 20 MG tablet Take 1 tablet by mouth Daily. Take 60 mg Prednisone 13 hours prior 7 hours prior and 1 hour prior to administration of Iodinated contrast 9 tablet 0   • predniSONE (DELTASONE) 50 MG tablet Take 1 tablet by mouth As Needed (see sig). 1 tablet by mouth 24, 12, and 1 hour before procedure 3 tablet 0   • ticagrelor (BRILINTA) 60 MG tablet tablet Take 1 tablet by mouth 2 (Two) Times a Day. 60 tablet 11     No current facility-administered medications for this visit.       Allergies:  Aspirin buf(cacarb-mgcarb-mgo), Salicylates, Codeine, Iodine, Shellfish-derived products, and Demerol [meperidine]    Review of Systems  Review of Systems   Constitutional: Positive for malaise/fatigue.   HENT: Negative.    Eyes: Negative.    Cardiovascular: Positive for dyspnea on exertion. Negative for chest pain, claudication, cyanosis, irregular heartbeat, leg  "swelling, near-syncope, orthopnea, palpitations, paroxysmal nocturnal dyspnea and syncope.   Respiratory: Negative.    Endocrine: Negative.    Hematologic/Lymphatic: Negative.    Skin: Negative.    Musculoskeletal: Positive for arthritis and joint pain.   Gastrointestinal: Negative for anorexia.   Genitourinary: Negative.    Neurological: Positive for dizziness and weakness.   Psychiatric/Behavioral: Negative.        Objective     Physical Exam:  /80   Pulse 77   Ht 157.5 cm (62\")   Wt 58.1 kg (128 lb)   BMI 23.41 kg/m²   Physical Exam   Constitutional: She appears well-developed.   HENT:   Head: Normocephalic.   Neck: Normal carotid pulses and no JVD present. No tracheal tenderness present. Carotid bruit is present. No tracheal deviation present.   Cardiovascular: Regular rhythm and normal pulses.   Murmur heard.   Systolic murmur is present with a grade of 2/6.  Pulmonary/Chest: Effort normal. No stridor.   Abdominal: Soft. She exhibits no distension. There is no abdominal tenderness.   Neurological: She is alert. No cranial nerve deficit or sensory deficit.   Skin: Skin is warm.   Psychiatric: Her speech is normal and behavior is normal.       Results Review:    Results for orders placed during the hospital encounter of 08/01/22    Adult Transthoracic Echo Complete w/ Color, Spectral and Contrast if necessary per protocol    Interpretation Summary  · Left ventricular ejection fraction appears to be 46 - 50%. Left ventricular systolic function is mildly decreased.  · Left ventricular diastolic function is consistent with (grade II w/high LAP) pseudonormalization.  · Abnormal global longitudinal LV strain (GLS) = -12.0%  · Left atrial volume is severely increased.  · Moderate aortic valve stenosis is present.  · Moderate mitral valve regurgitation is present.  · Mild pulmonary hypertension is present.     Sondra Connolly  Limited Transthoracic Echocardiogram with Limited Doppler, Color Flow and Myocardial " "Strain Imaging  Order# [id number]  Reading physician: Gualberto Gallo MD Ordering physician: Magda Allen APRN Study date: 21     Patient Information    Patient Name   Sondra Connolly MRN   [id number] Legal Sex   Female  (Age)   1945 (77 y.o.)     Patient Hx Of Height, Weight, and Vitals    Height Weight BSA (Calculated - sq m) BMI (Calculated) Retired BMI (kg/m2) Pulse BP   157.5 cm (62.01\") 57.2 kg (126 lb) 1.57 sq meters 23 23.09  102/51     Stroho PACS Images     Show images for Adult Transthoracic Echo Limited W/ Cont if Necessary Per Protocol      Clinical Indication    Valvular Function; Native Valvular Stenosis; Native Valvular Regurg; Severe   Dx: Aortic stenosis, severe [I35.0 (ICD-10-CM)]     Interpretation Summary    • Left ventricular ejection fraction appears to be 56 - 60%. Left ventricular systolic function is normal.  • Left atrial volume is severely increased.  • Left ventricular diastolic function is consistent with (grade I) impaired relaxation.  • The following left ventricular wall segments are hypokinetic: basal inferior.  • Moderate aortic valve stenosis is present.  • Estimated right ventricular systolic pressure from tricuspid regurgitation is normal (<35 mmHg).            21      · Average HR: 69. Min HR: 29. Max HR: 162.     ·  Entire report was reviewed.  Monitoring in days: ~14   · The predominant rhythm noted during the testing period was sinus rhythm.     · Rare supraventricular ectopics with an APC burden of: <  1 %    · Occasional premature ventricular contractions with a PVC burden of: 1.4 %     · No correlated arrhythmia  · No significant pauses                  Conclusion:      Baseline rhythm is sinus.  Lowest rate of 29 bpm at 9:19 AM   Occasional premature contractions with a PVC burden of 1.4%.  9 runs of supraventricular tachycardia longest 14 beats at a maximum rate of 162 bpm and an average of 127 bpm  2 pauses longest 3.1 seconds which was a " compensatory pause after a PVC  Longest pause at 5:48 AM and second longest pause of 3 seconds at 8:45 AM first pause due to compensatory pause after PVC and second pause is due to a sinus pause of 3 seconds at 8:45 AM  Intermittent junctional beats with a rate of 29 bpm at 9:20 AM  9 runs of supraventricular tachycardia longest 14 beats at a maximum rate of 162 bpm  Significant sick sinus syndrome with tachybradycardia syndrome         Tristan Cabello DO (Primary) Pamela Nicholas DO Cole, Chelsea L, RANDA   Indications    Coronary artery disease involving native coronary artery of native heart without angina pectoris [I25.10 (ICD-10-CM)]       Conclusion    Date of procedure: February 4, 2021     Procedures performed:     1. Access to the right femoral artery via modified Seldinger technique and ultrasound guidance  2. Left heart catheterization with retrograde crossing of the aortic valve into the left ventricle  3. Selective bilateral coronary angiography  4. SVG angiography x3  5. LIMA angiography  6. Multiple attempts at PTCA on the proximal and mid right coronary artery with different balloons with minimal success, please see below for details  7. Selective right iliofemoral angiogram for possible device closure  8. Patent hemostasis achieved in the right femoral artery via Angio-Seal closure device to the right one sheath was pulled  9. Conscious sedation with continuous hemodynamic monitoring for 1 hour 36 minutes              Impression:  1.  Coronary artery disease as described above status post successful PTCA and PCI after rotational atherectomy with return of JOSE-3 flow in the distal right coronary artery with improvement in the 90% stenosis and 70 to 80% stenosis to less than 5% stenosis both     Plan:  1. Routine post procedure orders  2.  Dual antiplatelet therapy for 1 year  3.  High intensity statin  4.  Referral for cardiac rehab  5.  ACE inhibitor and beta-blocker as vitals  tolerate  6.  Close follow-up with cardiology as an outpatient      Holter       · Average HR: 79. Min HR: 44. Max HR: 146.     · Monitored for ~1 day   · Slowest heart rate at 8:46 AM  · The predominant rhythm noted during the testing period was atrial flutter   · 2256  premature ventricular contractions: PVC burden:  2%   · 75    non sustained ventricular tachycardia episode longest 9 beats at a maximum rate of 152 beats per minute   · No correlated arrhythmia  · 568 pauses longest 3.3 seconds at 1:54 AM           Sondra Connolly   Regadenoson Stress Test With Myocardial Perfusion SPECT (Multi Study)   Order# [id number]   Reading physician: Gualberto Gallo MD Ordering physician: Gualberto Gallo MD Study date: 19   Patient Information     Patient Name  Sondra Connolly MRN  [id number] Sex  Female  (Age)  1945 (75 y.o.)   Interpretation Summary     · Left ventricular ejection fraction is normal (Calculated EF = 55%).  · Breast attenuation artifact is present.  · Myocardial perfusion imaging indicates a medium-sized infarct located in the inferior wall with no significant ischemia noted.             Diagnoses and all orders for this visit:    1. S/P CABG x 4  Dr YADIEL Lau (Primary)    2. Status post insertion of drug-eluting stent into right coronary artery for coronary artery disease    3. Mixed hyperlipidemia    4. Paroxysmal atrial fibrillation (HCC)    5. History of cardioversion    6. Mitral valve insufficiency, unspecified etiology    7. Type 2 diabetes mellitus with other circulatory complication, without long-term current use of insulin (HCC)    8. HUMPHREY (dyspnea on exertion)  -     Adult Transthoracic Echo Complete w/ Color, Spectral and Contrast if necessary per protocol; Future    9. Essential hypertension    10. Coronary artery disease of bypass graft of native heart with stable angina pectoris (HCC)    11. Current use of long term anticoagulation           Plan       Patient expressed  understanding  Encouraged and answered all questions   Discussed with the patient and all questioned fully answered. She will call me if any problems arise.   Discussed results of prior testing with patient : echo  Monitor left ventricular ejection fraction by serial echo        Monitor left ventricular ejection fraction by serial echo    will repeat this fall     Orders Placed This Encounter   Procedures   • Adult Transthoracic Echo Complete w/ Color, Spectral and Contrast if necessary per protocol     Standing Status:   Future     Standing Expiration Date:   3/14/2024     Scheduling Instructions:      Myocardial strain to be performed during echocardiogram as long as technically feasible     Order Specific Question:   Reason for exam?     Answer:   Dyspnea     Order Specific Question:   Release to patient     Answer:   Routine Release        Check BP and heart rates twice daily initially till blood pressures and heart rates under good control and then at least 3x / week,   If blood pressures continue to be well-controlled then can check week a month  at home and bring a recording for review next visit  If BP >130/85 or < 100/60 persistently over 3 reading 30 mins apart or if heart rates persistently above 100 bpm or less than 55 bpm call sooner for evaluation and advise     Stopped Amiodarone   Saw Dr Reed and will keep f/u      S/L NTG PRN for chest pain, call me or go to ER if has to use S/L nitroglycerin     I support the patient's decision to take the Covid -19 vaccine   Had required complete course   No major issues   Now fully immunized    Had booster too      Continue Brilinta and Eliquis   Decreased Eliquis to 2.5 mg BID and tolerating well   Given high Chads Vasc score will continue on anticoagulation  Monitor for any signs of bleeding including red or dark stools as well as easy bruisabilty. Fall precautions.              Return in about 6 months (around 9/14/2023).

## 2023-03-21 ENCOUNTER — TELEPHONE (OUTPATIENT)
Dept: GASTROENTEROLOGY | Facility: CLINIC | Age: 78
End: 2023-03-21
Payer: MEDICARE

## 2023-03-21 NOTE — TELEPHONE ENCOUNTER
I did contact her daughter Radha.  I did inform her that Dr. Gallo did not think it would be safe to hold the Brilinta but could decrease the dose and could hold Eliquis.  Radha does tell me that they have discussed this with her mother.  Her mother has decided not to proceed with colonoscopy.  They do understand that the Cologuard test can  DNA/hemoglobin and precancerous polyps and even colon cancer.  They will contact our office if she does change her mind.      Wolf, could you please contact Dr. Cruz's office and let them know that she does not wish to pursue colonoscopy.

## 2023-04-17 ENCOUNTER — TELEPHONE (OUTPATIENT)
Dept: CARDIOLOGY | Facility: CLINIC | Age: 78
End: 2023-04-17
Payer: MEDICARE

## 2023-04-17 NOTE — TELEPHONE ENCOUNTER
PT DAUGHTER CALLED TO REPORT THAT PT DID NOT FEEL WELL YESTERDAY & BP & HR WAS ELEVATED  : 145/88 HR 97    TODAY SHE STATES THAT HER BP IS BACK IN NORMAL RANGE, 119/72, BUT HR IS STILL ELEVATED AT 93. SHE REPORTS NO CHANGES IN MEDICATION (I REVIEWED MED LIST WITH PT DAUGHTER) OR NEW OTC MEDS. ONLY THAT PT HAS BEEN CONGESTED RECENTLY & DEALING WITH ALLERGIES. SHE IS CONCERNED WITH HER HR BECAUSE IT USUALLY RUNS IN THE 60-70 BPM RANGE. SHE WANTS TO KNOW IF SHE SHOULD GIVE HER AN AMIODARONE (WITH WAS DISCONTINUED 2021).    PLEASE ADVISE

## 2023-04-23 ENCOUNTER — HOSPITAL ENCOUNTER (EMERGENCY)
Facility: HOSPITAL | Age: 78
Discharge: HOME | End: 2023-04-23
Admitting: EMERGENCY MEDICINE
Payer: MEDICARE

## 2023-04-23 ENCOUNTER — APPOINTMENT (OUTPATIENT)
Dept: CT IMAGING | Facility: HOSPITAL | Age: 78
End: 2023-04-23
Payer: MEDICARE

## 2023-04-23 ENCOUNTER — APPOINTMENT (OUTPATIENT)
Dept: GENERAL RADIOLOGY | Facility: HOSPITAL | Age: 78
End: 2023-04-23
Payer: MEDICARE

## 2023-04-23 VITALS
RESPIRATION RATE: 16 BRPM | DIASTOLIC BLOOD PRESSURE: 79 MMHG | TEMPERATURE: 98 F | WEIGHT: 128 LBS | HEIGHT: 62 IN | HEART RATE: 65 BPM | BODY MASS INDEX: 23.55 KG/M2 | SYSTOLIC BLOOD PRESSURE: 116 MMHG | OXYGEN SATURATION: 98 %

## 2023-04-23 DIAGNOSIS — R53.1 WEAKNESS: Primary | ICD-10-CM

## 2023-04-23 DIAGNOSIS — N28.1 RENAL CYST, RIGHT: ICD-10-CM

## 2023-04-23 LAB
ALBUMIN SERPL-MCNC: 4 G/DL (ref 3.5–5.2)
ALBUMIN/GLOB SERPL: 1.5 G/DL
ALP SERPL-CCNC: 67 U/L (ref 39–117)
ALT SERPL W P-5'-P-CCNC: 15 U/L (ref 1–33)
ANION GAP SERPL CALCULATED.3IONS-SCNC: 12 MMOL/L (ref 5–15)
APTT PPP: 38.8 SECONDS (ref 24.1–35)
AST SERPL-CCNC: 20 U/L (ref 1–32)
BASOPHILS # BLD AUTO: 0.03 10*3/MM3 (ref 0–0.2)
BASOPHILS NFR BLD AUTO: 0.3 % (ref 0–1.5)
BILIRUB SERPL-MCNC: 0.4 MG/DL (ref 0–1.2)
BILIRUB UR QL STRIP: NEGATIVE
BUN SERPL-MCNC: 17 MG/DL (ref 8–23)
BUN/CREAT SERPL: 19.5 (ref 7–25)
CALCIUM SPEC-SCNC: 9.8 MG/DL (ref 8.6–10.5)
CHLORIDE SERPL-SCNC: 103 MMOL/L (ref 98–107)
CLARITY UR: CLEAR
CO2 SERPL-SCNC: 22 MMOL/L (ref 22–29)
COLOR UR: YELLOW
CREAT SERPL-MCNC: 0.87 MG/DL (ref 0.57–1)
D-LACTATE SERPL-SCNC: 1.1 MMOL/L (ref 0.5–2)
DEPRECATED RDW RBC AUTO: 48.1 FL (ref 37–54)
EGFRCR SERPLBLD CKD-EPI 2021: 67.4 ML/MIN/1.73
EOSINOPHIL # BLD AUTO: 0.43 10*3/MM3 (ref 0–0.4)
EOSINOPHIL NFR BLD AUTO: 4.8 % (ref 0.3–6.2)
ERYTHROCYTE [DISTWIDTH] IN BLOOD BY AUTOMATED COUNT: 14 % (ref 12.3–15.4)
GEN 5 2HR TROPONIN T REFLEX: 17 NG/L
GLOBULIN UR ELPH-MCNC: 2.7 GM/DL
GLUCOSE SERPL-MCNC: 98 MG/DL (ref 65–99)
GLUCOSE UR STRIP-MCNC: NEGATIVE MG/DL
HCT VFR BLD AUTO: 39.8 % (ref 34–46.6)
HGB BLD-MCNC: 12.8 G/DL (ref 12–15.9)
HGB UR QL STRIP.AUTO: NEGATIVE
HOLD SPECIMEN: NORMAL
HOLD SPECIMEN: NORMAL
IMM GRANULOCYTES # BLD AUTO: 0.05 10*3/MM3 (ref 0–0.05)
IMM GRANULOCYTES NFR BLD AUTO: 0.6 % (ref 0–0.5)
INR PPP: 1.2 (ref 0.91–1.09)
KETONES UR QL STRIP: NEGATIVE
LEUKOCYTE ESTERASE UR QL STRIP.AUTO: NEGATIVE
LIPASE SERPL-CCNC: 55 U/L (ref 13–60)
LYMPHOCYTES # BLD AUTO: 1.66 10*3/MM3 (ref 0.7–3.1)
LYMPHOCYTES NFR BLD AUTO: 18.6 % (ref 19.6–45.3)
MAGNESIUM SERPL-MCNC: 2.3 MG/DL (ref 1.6–2.4)
MCH RBC QN AUTO: 30.1 PG (ref 26.6–33)
MCHC RBC AUTO-ENTMCNC: 32.2 G/DL (ref 31.5–35.7)
MCV RBC AUTO: 93.6 FL (ref 79–97)
MONOCYTES # BLD AUTO: 1.05 10*3/MM3 (ref 0.1–0.9)
MONOCYTES NFR BLD AUTO: 11.8 % (ref 5–12)
NEUTROPHILS NFR BLD AUTO: 5.69 10*3/MM3 (ref 1.7–7)
NEUTROPHILS NFR BLD AUTO: 63.9 % (ref 42.7–76)
NITRITE UR QL STRIP: NEGATIVE
NRBC BLD AUTO-RTO: 0 /100 WBC (ref 0–0.2)
PH UR STRIP.AUTO: 8 [PH] (ref 5–8)
PLATELET # BLD AUTO: 319 10*3/MM3 (ref 140–450)
PMV BLD AUTO: 9 FL (ref 6–12)
POTASSIUM SERPL-SCNC: 4.4 MMOL/L (ref 3.5–5.2)
PROCALCITONIN SERPL-MCNC: 0.05 NG/ML (ref 0–0.25)
PROT SERPL-MCNC: 6.7 G/DL (ref 6–8.5)
PROT UR QL STRIP: NEGATIVE
PROTHROMBIN TIME: 15.4 SECONDS (ref 11.8–14.8)
RBC # BLD AUTO: 4.25 10*6/MM3 (ref 3.77–5.28)
SODIUM SERPL-SCNC: 137 MMOL/L (ref 136–145)
SP GR UR STRIP: 1.01 (ref 1–1.03)
T3FREE SERPL-MCNC: 2.53 PG/ML (ref 2–4.4)
T4 FREE SERPL-MCNC: 1.2 NG/DL (ref 0.93–1.7)
TROPONIN T DELTA: -1 NG/L
TROPONIN T SERPL HS-MCNC: 18 NG/L
TSH SERPL DL<=0.05 MIU/L-ACNC: 1.35 UIU/ML (ref 0.27–4.2)
UROBILINOGEN UR QL STRIP: NORMAL
WBC NRBC COR # BLD: 8.91 10*3/MM3 (ref 3.4–10.8)
WHOLE BLOOD HOLD COAG: NORMAL
WHOLE BLOOD HOLD SPECIMEN: NORMAL

## 2023-04-23 PROCEDURE — 83605 ASSAY OF LACTIC ACID: CPT | Performed by: PHYSICIAN ASSISTANT

## 2023-04-23 PROCEDURE — 84481 FREE ASSAY (FT-3): CPT | Performed by: PHYSICIAN ASSISTANT

## 2023-04-23 PROCEDURE — 93005 ELECTROCARDIOGRAM TRACING: CPT | Performed by: PHYSICIAN ASSISTANT

## 2023-04-23 PROCEDURE — 36415 COLL VENOUS BLD VENIPUNCTURE: CPT

## 2023-04-23 PROCEDURE — 85610 PROTHROMBIN TIME: CPT | Performed by: PHYSICIAN ASSISTANT

## 2023-04-23 PROCEDURE — 80053 COMPREHEN METABOLIC PANEL: CPT | Performed by: PHYSICIAN ASSISTANT

## 2023-04-23 PROCEDURE — 84443 ASSAY THYROID STIM HORMONE: CPT | Performed by: PHYSICIAN ASSISTANT

## 2023-04-23 PROCEDURE — 84484 ASSAY OF TROPONIN QUANT: CPT | Performed by: PHYSICIAN ASSISTANT

## 2023-04-23 PROCEDURE — 84439 ASSAY OF FREE THYROXINE: CPT | Performed by: PHYSICIAN ASSISTANT

## 2023-04-23 PROCEDURE — 83735 ASSAY OF MAGNESIUM: CPT | Performed by: PHYSICIAN ASSISTANT

## 2023-04-23 PROCEDURE — 85025 COMPLETE CBC W/AUTO DIFF WBC: CPT | Performed by: PHYSICIAN ASSISTANT

## 2023-04-23 PROCEDURE — 85730 THROMBOPLASTIN TIME PARTIAL: CPT | Performed by: PHYSICIAN ASSISTANT

## 2023-04-23 PROCEDURE — 99284 EMERGENCY DEPT VISIT MOD MDM: CPT

## 2023-04-23 PROCEDURE — 83690 ASSAY OF LIPASE: CPT | Performed by: PHYSICIAN ASSISTANT

## 2023-04-23 PROCEDURE — 81003 URINALYSIS AUTO W/O SCOPE: CPT | Performed by: PHYSICIAN ASSISTANT

## 2023-04-23 PROCEDURE — 74176 CT ABD & PELVIS W/O CONTRAST: CPT

## 2023-04-23 PROCEDURE — 74022 RADEX COMPL AQT ABD SERIES: CPT

## 2023-04-23 PROCEDURE — 84145 PROCALCITONIN (PCT): CPT | Performed by: PHYSICIAN ASSISTANT

## 2023-04-23 RX ORDER — SODIUM CHLORIDE 0.9 % (FLUSH) 0.9 %
10 SYRINGE (ML) INJECTION AS NEEDED
Status: DISCONTINUED | OUTPATIENT
Start: 2023-04-23 | End: 2023-04-23 | Stop reason: HOSPADM

## 2023-04-23 RX ORDER — SODIUM CHLORIDE, SODIUM LACTATE, POTASSIUM CHLORIDE, CALCIUM CHLORIDE 600; 310; 30; 20 MG/100ML; MG/100ML; MG/100ML; MG/100ML
100 INJECTION, SOLUTION INTRAVENOUS CONTINUOUS
Status: DISCONTINUED | OUTPATIENT
Start: 2023-04-23 | End: 2023-04-23 | Stop reason: HOSPADM

## 2023-04-23 RX ORDER — SODIUM CHLORIDE 0.9 % (FLUSH) 0.9 %
10 SYRINGE (ML) INJECTION AS NEEDED
Status: CANCELLED | OUTPATIENT
Start: 2023-04-23

## 2023-04-23 NOTE — ED PROVIDER NOTES
Subjective   History of Present Illness    Patient is a pleasant 78-year-old female who presents to ED with her son.  Chief complaint is sudden onset of generalized weakness with increased belching, flatus, and palpitations.    Patient does have significant medical history of proximal atrial fibrillation on anticoagulation, history of cardioversion, history of MI, status post CABG x4 back in 2010, carotid artery occlusion without infarction, chronic kidney disease, chronic lung disease, diabetes mellitus, hypertension, GI bleed, hyperlipidemia, peptic ulcer disease, and peripheral vascular disease.    5 days ago, patient complained of sudden onset of generalized weakness.  She describes that she just did not have the energy to ambulate and move around as she normally does.  Typically on a good day, she is able to ambulate independently without any assistance.  She does reside with her daughter.  The weakness had persisted and she feels like she just cannot get out of bed.  She denied any pain associated with it.  She denies any fever, nausea, or vomiting.  She denies any shortness of breath, chest pain, pressure, or tightness.  She did feel a fleeting episode of palpitations today lasting just for few seconds.  The patient denies any abdominal pain.  However, she has noticed increased belching and flatus.  She has eaten less than her usual which is already little to begin with.  He is drinking fluids but feels like she is dehydrated.  She is still urinating per usual.  Patient denies any rash.  She does not recall any change in medications recently.  She did follow-up with her cardiologist, Dr. Gallo, last month and she believes everything went well.  She denies any recent illnesses.  Son did, the patient did have a history of hypokalemia in which she had to be admitted.  Patient denies any focal deficits.  She denies any one-sided weakness, speech, or gait abnormalities.    Review of Systems   Constitutional:  Positive for activity change, appetite change and fatigue. Negative for fever.   HENT: Negative.    Respiratory: Negative.  Negative for cough, chest tightness, shortness of breath, wheezing and stridor.    Cardiovascular: Positive for palpitations. Negative for chest pain and leg swelling.   Gastrointestinal: Negative.  Negative for abdominal distention, abdominal pain, anal bleeding, blood in stool, constipation, diarrhea, nausea, rectal pain and vomiting.   Genitourinary: Negative.  Negative for decreased urine volume and difficulty urinating.   Musculoskeletal: Negative.  Negative for arthralgias and myalgias.   Skin: Negative.    Neurological: Positive for weakness. Negative for syncope and numbness.   Psychiatric/Behavioral: Negative.    All other systems reviewed and are negative.      Past Medical History:   Diagnosis Date   • A-fib    • Abnormal nuclear stress test 8/23/2019   • Angina pectoris    • Anxiety    • Arthritis    • Atherosclerosis of coronary artery bypass graft    • Carotid artery occlusion without infarction    • Chronic kidney disease    • Chronic lung disease    • Colon polyp    • Diabetes mellitus    • DJD (degenerative joint disease)    • Essential hypertension 3/24/2017   • Fibrocystic breast disease    • Gastritis    • GI bleed    • Hemorrhoids    • Hyperlipidemia    • Hypertension    • Lung disease     chronic   • MI (myocardial infarction)    • Palpitations    • PUD (peptic ulcer disease)    • PVD (peripheral vascular disease)    • Renal failure, acute    • 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       Allergies   Allergen Reactions   • Aspirin Buf(Cacarb-Mgcarb-Mgo) Angioedema     Patient has taken tums in the past with no problem.   • Salicylates Angioedema   • Codeine Nausea And Vomiting and Hallucinations   • Iodine Unknown - High Severity   • Shellfish-Derived Products Unknown - High Severity   • Demerol [Meperidine] Nausea And Vomiting and Hallucinations       Past Surgical History:   Procedure  Laterality Date   • ABLATION OF DYSRHYTHMIC FOCUS      May 2021 in [place]     • APPENDECTOMY     • CARDIAC CATHETERIZATION  05/01/2009    Left-Heart Skin   • CARDIAC CATHETERIZATION N/A 2/4/2021    Procedure: Left Heart Cath;  Surgeon: Tristan Cabello DO;  Location:  [facility];  Service: Cardiology;  Laterality: N/A;   • CARDIAC CATHETERIZATION Right 2/5/2021    Procedure: Left Heart Cath, rotablator to RCA with Dr. Abrams at 1PM;  Surgeon: Tristan Cabello DO;  Location:  [facility];  Service: Cardiology;  Laterality: Right;   • CAROTID ENDARTERECTOMY     • CATARACT EXTRACTION     • CATARACT EXTRACTION     • COLONOSCOPY     • CORONARY ARTERY BYPASS GRAFT  07/2007    Four   • ENDOSCOPY  10/02/2013   • ENDOSCOPY  10/02/2013   • HYSTERECTOMY     • SKIN CANCER EXCISION     • THROMBOENDARTERECTOMY     • TONSILLECTOMY         Family History   Problem Relation Age of Onset   • Heart disease Mother    • Breast cancer Mother    • Heart disease Father    • Heart disease Brother    • Heart disease Brother    • Breast cancer Maternal Grandmother    • Colon cancer Neg Hx        Social History     Socioeconomic History   • Marital status:    Tobacco Use   • Smoking status: Never   • Smokeless tobacco: Never   • Tobacco comments:     Non smoker; no tobacco use   Vaping Use   • Vaping Use: Never used   Substance and Sexual Activity   • Alcohol use: Not Currently   • Drug use: Not Currently   • Sexual activity: Defer       Prior to Admission medications    Medication Sig Start Date End Date Taking? Authorizing Provider   apixaban (Eliquis) 2.5 MG tablet tablet Take 2 tablets by mouth Every 12 (Twelve) Hours. 11/28/22   Gualberto Gallo MD   Eliquis 5 MG tablet tablet Take 1 tablet by mouth Every 12 (Twelve) Hours. 1/5/23   Provider, MD Ronald   Entresto 24-26 MG tablet TAKE 1 TABLET BY MOUTH TWICE A DAY 5/16/22   Sy Obrien APRN   furosemide (LASIX)  "20 MG tablet Take 1 tablet by mouth Daily As Needed (as needed for increased swelling, shortness of breath and/or weight gain). 4/29/21   Magda Allen APRN   metoprolol succinate XL (TOPROL-XL) 25 MG 24 hr tablet Take 0.5 tablets by mouth Daily.  Patient taking differently: Take 20 mg by mouth Daily. 12/9/22   Lorin Reed MD   Multiple Vitamins-Minerals (MULTIVITAMIN ADULT PO) Take 1 tablet by mouth Daily.    Provider, MD Ronald   pravastatin (PRAVACHOL) 40 MG tablet TAKE 1 TABLET BY MOUTH DAILY. 5/18/22   Sy Obrien APRN   predniSONE (DELTASONE) 20 MG tablet Take 1 tablet by mouth Daily. Take 60 mg Prednisone 13 hours prior 7 hours prior and 1 hour prior to administration of Iodinated contrast 1/4/23   Gualberto Gallo MD   predniSONE (DELTASONE) 50 MG tablet Take 1 tablet by mouth As Needed (see sig). 1 tablet by mouth 24, 12, and 1 hour before procedure 1/25/23   Gissel Stephenson APRN   ticagrelor (BRILINTA) 60 MG tablet tablet Take 1 tablet by mouth 2 (Two) Times a Day. 5/23/22   Magda Allen APRN       Medications   sodium chloride 0.9 % flush 10 mL (has no administration in time range)   sodium chloride 0.9 % flush 10 mL (has no administration in time range)   lactated ringers infusion (has no administration in time range)       /80   Pulse 67   Temp 98.3 °F (36.8 °C) (Oral)   Resp 18   Ht 157.5 cm (62\")   Wt 58.1 kg (128 lb)   SpO2 99%   BMI 23.41 kg/m²       Objective   Physical Exam  Vitals and nursing note reviewed.   Constitutional:       General: She is not in acute distress.     Appearance: She is well-developed. She is not diaphoretic.   HENT:      Head: Normocephalic and atraumatic.   Eyes:      Conjunctiva/sclera: Conjunctivae normal.      Pupils: Pupils are equal, round, and reactive to light.   Neck:      Trachea: No tracheal deviation.   Cardiovascular:      Rate and Rhythm: Normal rate and regular rhythm.      Heart sounds: Normal heart sounds. No murmur " heard.  Pulmonary:      Effort: Pulmonary effort is normal.      Breath sounds: Normal breath sounds.   Abdominal:      General: Bowel sounds are normal. There is no distension.      Palpations: Abdomen is soft. There is no mass.      Tenderness: There is no abdominal tenderness. There is no guarding or rebound.   Musculoskeletal:         General: Normal range of motion.      Cervical back: Normal range of motion and neck supple.   Skin:     General: Skin is warm and dry.   Neurological:      General: No focal deficit present.      Mental Status: She is alert and oriented to person, place, and time.      Sensory: No sensory deficit.      Coordination: Coordination normal.   Psychiatric:         Mood and Affect: Mood normal.         Behavior: Behavior normal.         Thought Content: Thought content normal.         Judgment: Judgment normal.         Procedures         Lab Results (last 24 hours)     Procedure Component Value Units Date/Time    CBC & Differential [[id number]]  (Abnormal) Collected: 04/23/23 1002    Specimen: Blood Updated: 04/23/23 1019    Narrative:      The following orders were created for panel order CBC & Differential.  Procedure                               Abnormality         Status                     ---------                               -----------         ------                     CBC Auto Differential[[id number]]        Abnormal            Final result                 Please view results for these tests on the individual orders.    Comprehensive Metabolic Panel [[id number]] Collected: 04/23/23 1002    Specimen: Blood Updated: 04/23/23 1038     Glucose 98 mg/dL      BUN 17 mg/dL      Creatinine 0.87 mg/dL      Sodium 137 mmol/L      Potassium 4.4 mmol/L      Chloride 103 mmol/L      CO2 22.0 mmol/L      Calcium 9.8 mg/dL      Total Protein 6.7 g/dL      Albumin 4.0 g/dL      ALT (SGPT) 15 U/L      AST (SGOT) 20 U/L      Alkaline Phosphatase 67 U/L      Total Bilirubin 0.4 mg/dL       "Globulin 2.7 gm/dL      A/G Ratio 1.5 g/dL      BUN/Creatinine Ratio 19.5     Anion Gap 12.0 mmol/L      eGFR 67.4 mL/min/1.73     Narrative:      GFR Normal >60  Chronic Kidney Disease <60  Kidney Failure <15    The GFR formula is only valid for adults with stable renal function between ages 18 and 70.    Lipase [[id number]]  (Normal) Collected: 04/23/23 1002    Specimen: Blood Updated: 04/23/23 1033     Lipase 55 U/L     aPTT [[id number]]  (Abnormal) Collected: 04/23/23 1002    Specimen: Blood Updated: 04/23/23 1027     PTT 38.8 seconds     Protime-INR [[id number]]  (Abnormal) Collected: 04/23/23 1002    Specimen: Blood Updated: 04/23/23 1027     Protime 15.4 Seconds      INR 1.20    Lactic Acid, Plasma [[id number]]  (Normal) Collected: 04/23/23 1002    Specimen: Blood Updated: 04/23/23 1036     Lactate 1.1 mmol/L     Procalcitonin [[id number]]  (Normal) Collected: 04/23/23 1002    Specimen: Blood Updated: 04/23/23 1042     Procalcitonin 0.05 ng/mL     Narrative:      As a Marker for Sepsis (Non-Neonates):    1. <0.5 ng/mL represents a low risk of severe sepsis and/or septic shock.  2. >2 ng/mL represents a high risk of severe sepsis and/or septic shock.    As a Marker for Lower Respiratory Tract Infections that require antibiotic therapy:    PCT on Admission    Antibiotic Therapy       6-12 Hrs later    >0.5                Strongly Recommended  >0.25 - <0.5        Recommended   0.1 - 0.25          Discouraged              Remeasure/reassess PCT  <0.1                Strongly Discouraged     Remeasure/reassess PCT    As 28 day mortality risk marker: \"Change in Procalcitonin Result\" (>80% or <=80%) if Day 0 (or Day 1) and Day 4 values are available. Refer to http://www.[employer]-pct-calculator.com    Change in PCT <=80%  A decrease of PCT levels below or equal to 80% defines a positive change in PCT test result representing a higher risk for 28-day all-cause mortality of patients diagnosed with severe sepsis for septic " shock.    Change in PCT >80%  A decrease of PCT levels of more than 80% defines a negative change in PCT result representing a lower risk for 28-day all-cause mortality of patients diagnosed with severe sepsis or septic shock.       TSH [[id number]]  (Normal) Collected: 04/23/23 1002    Specimen: Blood Updated: 04/23/23 1044     TSH 1.350 uIU/mL     T4, Free [[id number]]  (Normal) Collected: 04/23/23 1002    Specimen: Blood Updated: 04/23/23 1044     Free T4 1.20 ng/dL     Narrative:      Results may be falsely increased if patient taking Biotin.      Magnesium [[id number]]  (Normal) Collected: 04/23/23 1002    Specimen: Blood Updated: 04/23/23 1038     Magnesium 2.3 mg/dL     T3, Free [[id number]] Collected: 04/23/23 1002    Specimen: Blood Updated: 04/23/23 1020    High Sensitivity Troponin T [[id number]]  (Abnormal) Collected: 04/23/23 1002    Specimen: Blood Updated: 04/23/23 1034     HS Troponin T 18 ng/L     Narrative:      High Sensitive Troponin T Reference Range:  <10.0 ng/L- Negative Female for AMI  <15.0 ng/L- Negative Male for AMI  >=10 - Abnormal Female indicating possible myocardial injury.  >=15 - Abnormal Male indicating possible myocardial injury.   Clinicians would have to utilize clinical acumen, EKG, Troponin, and serial changes to determine if it is an Acute Myocardial Infarction or myocardial injury due to an underlying chronic condition.         CBC Auto Differential [[id number]]  (Abnormal) Collected: 04/23/23 1002    Specimen: Blood Updated: 04/23/23 1019     WBC 8.91 10*3/mm3      RBC 4.25 10*6/mm3      Hemoglobin 12.8 g/dL      Hematocrit 39.8 %      MCV 93.6 fL      MCH 30.1 pg      MCHC 32.2 g/dL      RDW 14.0 %      RDW-SD 48.1 fl      MPV 9.0 fL      Platelets 319 10*3/mm3      Neutrophil % 63.9 %      Lymphocyte % 18.6 %      Monocyte % 11.8 %      Eosinophil % 4.8 %      Basophil % 0.3 %      Immature Grans % 0.6 %      Neutrophils, Absolute 5.69 10*3/mm3      Lymphocytes, Absolute 1.66  10*3/mm3      Monocytes, Absolute 1.05 10*3/mm3      Eosinophils, Absolute 0.43 10*3/mm3      Basophils, Absolute 0.03 10*3/mm3      Immature Grans, Absolute 0.05 10*3/mm3      nRBC 0.0 /100 WBC     Urinalysis With Culture If Indicated - Urine, Catheter [[id number]]  (Normal) Collected: 04/23/23 1105    Specimen: Urine, Catheter Updated: 04/23/23 1119     Color, UA Yellow     Appearance, UA Clear     pH, UA 8.0     Specific Gravity, UA 1.012     Glucose, UA Negative     Ketones, UA Negative     Bilirubin, UA Negative     Blood, UA Negative     Protein, UA Negative     Leuk Esterase, UA Negative     Nitrite, UA Negative     Urobilinogen, UA 0.2 E.U./dL    Narrative:      In absence of clinical symptoms, the presence of pyuria, bacteria, and/or nitrites on the urinalysis result does not correlate with infection.  Urine microscopic not indicated.    High Sensitivity Troponin T 2Hr [[id number]]  (Abnormal) Collected: 04/23/23 1201    Specimen: Blood Updated: 04/23/23 1224     HS Troponin T 17 ng/L      Troponin T Delta -1 ng/L     Narrative:      High Sensitive Troponin T Reference Range:  <10.0 ng/L- Negative Female for AMI  <15.0 ng/L- Negative Male for AMI  >=10 - Abnormal Female indicating possible myocardial injury.  >=15 - Abnormal Male indicating possible myocardial injury.   Clinicians would have to utilize clinical acumen, EKG, Troponin, and serial changes to determine if it is an Acute Myocardial Infarction or myocardial injury due to an underlying chronic condition.               CT Abdomen Pelvis Without Contrast    Result Date: 4/23/2023  Narrative: EXAMINATION: CT ABDOMEN PELVIS WO CONTRAST- 4/23/2023 2:23 PM CDT  HISTORY: weakness, increased belching, flatulence and abdominal pain. Palpitations.  DOSE: 325 mGycm (Automatic exposure control technique was implemented in an effort to keep the radiation dose as low as possible without compromising image quality)  REPORT: Spiral CT of the abdomen and pelvis was  performed without contrast from the lung bases through the pubic symphysis. Reconstructed coronal and sagittal images are also reviewed.  Comparison: There are no correlative imaging studies for comparison.  Review of lung windows demonstrates normal aeration of the lung bases. Evaluation of the solid abdominal organs is limited without intravenous contrast. There is a small sliding-type hiatal hernia. The liver is homogeneous, normal in size. The gallbladder appears unremarkable. Scattered calcified granulomas are present in the spleen. Spleen size is normal. The stomach is decompressed. The pancreas and right adrenal gland appear normal. There slight thickening of the left adrenal gland, without a mass. This may be related to mild adenomatous change. There is atrophy of the left kidney, no hydronephrosis is identified. There is a low-attenuation exophytic mass at the lateral aspect of the right mid kidney probably a cyst. This has a maximum size of approximately 3.3 cm. This is incompletely evaluated without intravenous contrast. 2 mm nonobstructing nephrolithiasis is noted within the left mid kidney. The ureters are decompressed. There is mild distention of the urinary bladder without wall thickening. There is previous hysterectomy. A left femoral hernia is present, containing fat. This measures 2 cm. There is mild fatty infiltration of the right inguinal canal. Mild sigmoid diverticulosis is present without evidence of acute diverticulitis. The sigmoid colon appears redundant. No evidence of bowel obstruction. No free fluid, free air or intra-abdominal abscess is identified. Heavy calcified plaque is noted within the abdominal aorta, celiac artery and inspiration, SMA and its branches as well as the JOE, internal and external iliac arteries, greater on the left. Extensive femoral calcified atherosclerosis is also present. No intra-abdominal or pelvic lymphadenopathy is identified. Review of bone windows shows  advanced degenerative disc disease L4-5 and L5-S1, with vacuum discs and there is grade 1 spondylolisthesis L4-5.      Impression: 1. No acute intra-abdominal or pelvic abnormality is identified. 2. Heavy calcified atherosclerotic plaque is present within the aorta and its branches. 3. Mild sigmoid diverticulosis without evidence of acute diverticulitis. 4. Evidence of previous hysterectomy. 5. Cystic mass at the lateral aspect of the right mid kidney measuring 3.3 cm, this may represent a simple cyst, this is incompletely evaluated on noncontrast CT. 6. Fat-containing left inguinal hernia with a maximum diameter of 2 cm.   This report was finalized on 04/23/2023 14:32 by Dr. Audie Friend MD.    XR Abdomen 2+ VW with Chest 1 VW    Result Date: 4/23/2023  Narrative: EXAMINATION: XR ABDOMEN 2+ VIEWS W CHEST 1 VW- 4/23/2023 10:31 AM CDT  HISTORY: Abdominal pain, gaseous distention. palpitations. Cardiac hx.  REPORT: Frontal chest x-ray, upright and supine views of the abdomen were obtained.  COMPARISON: There are no correlative imaging studies for comparison.  Moderate to large volume of stool seen throughout the colon, no bowel dilation or evidence of obstruction is identified. There is no mass effect. Extensive atherosclerotic calcifications noted within the splenic artery. There are advanced degenerative changes of the lower lumbar spine. There is diffuse osteopenia. On the upright view, there is no free air. The lungs are mildly hyperinflated, no infiltrate or consolidation is identified. There is previous CABG. Borderline cardiomegaly is noted without evidence of CHF. No pneumothorax or pleural effusion is identified. Degenerative changes are seen throughout the thoracic spine with mild dextro scoliosis.      Impression: 1. No acute cardiopulmonary abnormality, mild hyperinflation of the lungs is noted. There is previous CABG. 2. Moderate constipation, no evidence of bowel obstruction, no free air. 3. Advanced  degenerative changes throughout the spine. Diffuse osteopenia.. This report was finalized on 04/23/2023 10:34 by Dr. Audie Friend MD.      ED Course  ED Course as of 04/23/23 1503   Sun Apr 23, 2023   1502 Patient has been reassessed.  She continues to deny any chest pain, pressure, tightness or shortness of breath.  She denies any further palpitations.  Patient and son have been educated cardiac work-up is essentially unremarkable.  Delta is -1.  The rest of her studies did not show anything remarkable either.  No leukocytosis.  No electrolyte abnormalities.  No evidence of bacterial infection.  Thyroid panel is negative.  Scan of the abdomen did not show any acute intra-abdominal abnormality but she does have cystic mass at the lateral aspect of the right mid kidney measuring 3.3 cm that could represent a simple cyst.  However because she is allergic to contrast.  This was completely evaluated on a noncontrast CT.  She also has a fat-containing left inguinal hernia with a maximum diameter of 2 cm.    This has been discussed at length with the patient and her son.  They feel comfortable be discharged at this time.  Son believes this is probably her working herself up since she is very anxious.  Patient agrees to this.  I recommend follow-up with primary care provider-call tomorrow for an appointment.  Strict return precautions advised.  Patient will be discharged in stable condition. [TK]      ED Course User Index  [TK] Mitchel Miranda PA          [facility]    Final diagnoses:   Weakness   Renal cyst, right          Mitchel Miranda PA  04/23/23 1503

## 2023-04-24 LAB
QT INTERVAL: 384 MS
QT INTERVAL: 392 MS
QTC INTERVAL: 423 MS
QTC INTERVAL: 428 MS

## 2023-05-03 ENCOUNTER — TELEPHONE (OUTPATIENT)
Dept: CARDIOLOGY | Facility: CLINIC | Age: 78
End: 2023-05-03
Payer: MEDICARE

## 2023-05-03 NOTE — TELEPHONE ENCOUNTER
PER BEKA, PT SHOULD TAKE NO FURTHER MEDICATIONS THAT COULD LOWER HER BP TONIGHT. PT IS OKAY TO TAKE ELIQUIS TONIGHT OR WAIT & CONTINUE IN AM.    PT DAUGHTER NOTIFIED. ADVISED TO CONTINUE MONITORING & CONTACT NURSE LINE THIS EVENING IF BP CONTINUES TO DROP &/OR IS ACCOMPANIED BY NEW/ABNOMRAL SYMPTOMS.    PT DAUGHTER, LIZ, VERBALIZED UNDERSTANDING.

## 2023-05-03 NOTE — TELEPHONE ENCOUNTER
PT DAUGHTER CALLED BACK ASKING IF HER MOTHER SHOULD TAKE HER EVENING DOSE OF MEDS (THAT SHE HAS ALREADY DOUBLED UP ON THIS MORNING)

## 2023-05-03 NOTE — TELEPHONE ENCOUNTER
Metoprolol 25   Eliquis  2.5   Entresto 24-26  Brilinta 60 mg     Patient has taken her morning medications twice.     Her daughter called and asked what she should monitor I told. I advised she just watch for bleeding in her urine and bowl movement and just to check her blood pressure and watch her closely because she may not feel good if her pressure gets to low.     Is there anything else she needs to monitor?

## 2023-06-12 RX ORDER — SACUBITRIL AND VALSARTAN 24; 26 MG/1; MG/1
TABLET, FILM COATED ORAL
Qty: 60 TABLET | Refills: 11 | Status: SHIPPED | OUTPATIENT
Start: 2023-06-12

## 2023-06-14 RX ORDER — TICAGRELOR 60 MG/1
TABLET ORAL
Qty: 60 TABLET | Refills: 11 | Status: SHIPPED | OUTPATIENT
Start: 2023-06-14

## 2023-06-27 NOTE — PROGRESS NOTES
[facility]  INPATIENT PROGRESS NOTE    Patient Name: Sondra Connolly  Date of Admission: 1/31/2021  Today's Date: 02/03/21  Length of Stay: 0  Primary Care Physician: Matthieu Bhakta DO    Subjective   Chief Complaint: Chest discomfort on day of admission  HPI   Patient seen and examined at bedside.  She is resting comfortably lying in bed.  She has remained chest pain-free and without shortness of breath.  INR remains elevated at 3.43.  Discussed with Dr. Ross, will give 2 units FFP.  Patient states she has been on Coumadin for over 7 years and has always had wide variability with her INR.  Recommend transition to Eliquis.    Review of Systems   All pertinent negatives and positives are as above. All other systems have been reviewed and are negative unless otherwise stated.     Objective    Temp:  [97.8 °F (36.6 °C)-98.8 °F (37.1 °C)] 98.3 °F (36.8 °C)  Heart Rate:  [64-94] 67  Resp:  [16-18] 16  BP: (105-131)/(52-83) 112/62  Physical Exam  Constitutional:       Appearance: She is well-developed.      Comments: Lying in bed.  No acute distress.  Tolerating room air.  HENT:      Head: Normocephalic and atraumatic.   Eyes:      General: No scleral icterus.     Conjunctiva/sclera: Conjunctivae normal.      Pupils: Pupils are equal, round, and reactive to light.   Neck:      Musculoskeletal: Neck supple.      Vascular: No JVD.   Cardiovascular:      Rate and Rhythm: Normal rate. Rhythm irregular.      Heart sounds: Normal heart sounds.      Comments: Atrial fibrillation   Pulmonary:      Effort: Pulmonary effort is normal.      Breath sounds: Normal breath sounds. No wheezing or rales.   Abdominal:      General: Bowel sounds are normal. There is no distension.      Palpations: Abdomen is soft. There is no mass.      Tenderness: There is no abdominal tenderness. There is no guarding or rebound.   Musculoskeletal: Normal range of motion.   Lymphadenopathy:       Cervical: No cervical adenopathy.   Skin:     General: Skin is warm and dry.   Neurological:      Mental Status: She is alert and oriented to person, place, and time.      Cranial Nerves: No cranial nerve deficit.   Psychiatric:         Behavior: Behavior normal.     Results Review:  I have reviewed the labs, radiology results, and diagnostic studies.    Laboratory Data:   Results from last 7 days   Lab Units 02/01/21  0036 01/31/21  1716   WBC 10*3/mm3 13.25* 11.21*   HEMOGLOBIN g/dL 14.6 15.0   HEMATOCRIT % 43.9 43.9   PLATELETS 10*3/mm3 264 252        Results from last 7 days   Lab Units 02/01/21  0036 01/31/21  1716   SODIUM mmol/L 139 136   POTASSIUM mmol/L 4.1 4.1   CHLORIDE mmol/L 101 100   CO2 mmol/L 27.0 27.0   BUN mg/dL 20 22   CREATININE mg/dL 0.81 0.85   CALCIUM mg/dL 9.6 9.7   BILIRUBIN mg/dL 0.4 0.3   ALK PHOS U/L 70 80   ALT (SGPT) U/L 15 16   AST (SGOT) U/L 17 18   GLUCOSE mg/dL 141* 161*     I have reviewed the patient's current medications.     Assessment/Plan     Active Hospital Problems    Diagnosis   • **Precordial pain   • Coumadin toxicity   • Chronic atrial fibrillation (CMS/HCC)   • Anxiety about health   • Longstanding persistent atrial fibrillation (CMS/HCC)   • Coronary artery disease involving native coronary artery of native heart without angina pectoris   • Mixed hyperlipidemia   • Essential hypertension     Plan:  1.  INR remains elevated at 3.43.  Continue to hold Coumadin.    Discussed with Dr. Ross, will give 2 units FFP.  Recommend to transition to Eliquis.  Cardiology planning for cardiac catheterization when PT/INR is more favorable.  2.  Permanent atrial fibrillation -rates controlled with increased dose of Toprol.  Continue Cardizem CD.  3.  Hyperlipidemia -continue pravastatin.  4.  Sequential compression devices for DVT prophylaxis.  5.  Encourage ambulation.  6.  Daily PT/INR.     Discharge Planning: Cardiac catheterization per cardiology when PT/INR is more  favorable.    Electronically signed by Dulce Maria Vasquez, RANDA, 02/03/21, 09:29 CST.     DISCHARGE

## 2023-08-03 ENCOUNTER — HOSPITAL ENCOUNTER (OUTPATIENT)
Dept: CARDIOLOGY | Facility: HOSPITAL | Age: 78
Discharge: HOME | End: 2023-08-03
Admitting: INTERNAL MEDICINE
Payer: MEDICARE

## 2023-08-03 DIAGNOSIS — R06.09 DOE (DYSPNEA ON EXERTION): ICD-10-CM

## 2023-08-03 PROCEDURE — 93356 MYOCRD STRAIN IMG SPCKL TRCK: CPT | Performed by: INTERNAL MEDICINE

## 2023-08-03 PROCEDURE — 93306 TTE W/DOPPLER COMPLETE: CPT | Performed by: INTERNAL MEDICINE

## 2023-08-03 PROCEDURE — 93306 TTE W/DOPPLER COMPLETE: CPT

## 2023-08-03 PROCEDURE — 93356 MYOCRD STRAIN IMG SPCKL TRCK: CPT

## 2023-08-05 LAB
BH CV ECHO LEFT VENTRICLE GLOBAL LONGITUDINAL STRAIN: -12.4 %
BH CV ECHO MEAS - AO MAX PG: 50.4 MMHG
BH CV ECHO MEAS - AO MEAN PG: 23.5 MMHG
BH CV ECHO MEAS - AO ROOT DIAM: 2.7 CM
BH CV ECHO MEAS - AO V2 MAX: 355 CM/SEC
BH CV ECHO MEAS - AO V2 VTI: 77.5 CM
BH CV ECHO MEAS - AVA(I,D): 0.6 CM2
BH CV ECHO MEAS - EDV(CUBED): 62.6 ML
BH CV ECHO MEAS - EDV(MOD-SP2): 50.2 ML
BH CV ECHO MEAS - EDV(MOD-SP4): 56.6 ML
BH CV ECHO MEAS - EF(MOD-SP2): 51.8 %
BH CV ECHO MEAS - EF(MOD-SP4): 50.4 %
BH CV ECHO MEAS - ESV(CUBED): 30.4 ML
BH CV ECHO MEAS - ESV(MOD-SP2): 24.2 ML
BH CV ECHO MEAS - ESV(MOD-SP4): 28.1 ML
BH CV ECHO MEAS - FS: 21.4 %
BH CV ECHO MEAS - IVS/LVPW: 1.37 CM
BH CV ECHO MEAS - IVSD: 1.37 CM
BH CV ECHO MEAS - LA DIMENSION: 4 CM
BH CV ECHO MEAS - LAT PEAK E' VEL: 9.9 CM/SEC
BH CV ECHO MEAS - LV DIASTOLIC VOL/BSA (35-75): 36.7 CM2
BH CV ECHO MEAS - LV MASS(C)D: 160.6 GRAMS
BH CV ECHO MEAS - LV MAX PG: 2.41 MMHG
BH CV ECHO MEAS - LV MEAN PG: 1 MMHG
BH CV ECHO MEAS - LV SYSTOLIC VOL/BSA (12-30): 18.2 CM2
BH CV ECHO MEAS - LV V1 MAX: 77.6 CM/SEC
BH CV ECHO MEAS - LV V1 VTI: 18.4 CM
BH CV ECHO MEAS - LVIDD: 4 CM
BH CV ECHO MEAS - LVIDS: 3.1 CM
BH CV ECHO MEAS - LVOT AREA: 2.5 CM2
BH CV ECHO MEAS - LVOT DIAM: 1.8 CM
BH CV ECHO MEAS - LVPWD: 1 CM
BH CV ECHO MEAS - MED PEAK E' VEL: 5 CM/SEC
BH CV ECHO MEAS - MR MAX PG: 105.6 MMHG
BH CV ECHO MEAS - MR MAX VEL: 509 CM/SEC
BH CV ECHO MEAS - MR MEAN PG: 64.5 MMHG
BH CV ECHO MEAS - MR MEAN VEL: 374 CM/SEC
BH CV ECHO MEAS - MR VTI: 180.5 CM
BH CV ECHO MEAS - MV A MAX VEL: 90.7 CM/SEC
BH CV ECHO MEAS - MV DEC TIME: 0.23 MSEC
BH CV ECHO MEAS - MV E MAX VEL: 116 CM/SEC
BH CV ECHO MEAS - MV E/A: 1.28
BH CV ECHO MEAS - SI(MOD-SP2): 16.8 ML/M2
BH CV ECHO MEAS - SI(MOD-SP4): 18.5 ML/M2
BH CV ECHO MEAS - SV(LVOT): 46.8 ML
BH CV ECHO MEAS - SV(MOD-SP2): 26 ML
BH CV ECHO MEAS - SV(MOD-SP4): 28.5 ML
BH CV ECHO MEAS - TR MAX PG: 15.1 MMHG
BH CV ECHO MEAS - TR MAX VEL: 194 CM/SEC
BH CV ECHO MEASUREMENTS AVERAGE E/E' RATIO: 15.57
LEFT ATRIUM VOLUME: 103 ML

## 2023-08-07 ENCOUNTER — TELEPHONE (OUTPATIENT)
Dept: CARDIOLOGY | Facility: CLINIC | Age: 78
End: 2023-08-07
Payer: MEDICARE

## 2023-08-07 NOTE — TELEPHONE ENCOUNTER
----- Message from RANDA Vera sent at 8/7/2023  1:16 PM CDT -----  Please call and notify patient that her echo is back. It showed her LVEF 51-55%. Her moderate mitral valve regurgitation is unchanged. Her aortic valve stenosis has progressed from moderate to severe. I can place a referral to the structural heart clinic to discuss possible replacement with her or she can discuss further at her upcoming appointment with Magda on 9/14/2023 if she would rather. Thanks

## 2023-09-29 ENCOUNTER — TRANSCRIBE ORDERS (OUTPATIENT)
Dept: ADMINISTRATIVE | Facility: HOSPITAL | Age: 78
End: 2023-09-29
Payer: MEDICARE

## 2023-09-29 DIAGNOSIS — Z12.31 ENCOUNTER FOR SCREENING MAMMOGRAM FOR MALIGNANT NEOPLASM OF BREAST: Primary | ICD-10-CM

## 2023-10-01 LAB
NCCN CRITERIA FLAG: NORMAL
TYRER CUZICK SCORE: 1

## 2023-10-11 ENCOUNTER — HOSPITAL ENCOUNTER (OUTPATIENT)
Dept: MAMMOGRAPHY | Facility: HOSPITAL | Age: 78
Discharge: HOME | End: 2023-10-11
Admitting: INTERNAL MEDICINE
Payer: MEDICARE

## 2023-10-11 DIAGNOSIS — Z12.31 ENCOUNTER FOR SCREENING MAMMOGRAM FOR MALIGNANT NEOPLASM OF BREAST: ICD-10-CM

## 2023-10-11 PROCEDURE — 77063 BREAST TOMOSYNTHESIS BI: CPT

## 2023-10-11 PROCEDURE — 77067 SCR MAMMO BI INCL CAD: CPT

## 2023-10-16 ENCOUNTER — TRANSCRIBE ORDERS (OUTPATIENT)
Dept: ADMINISTRATIVE | Facility: HOSPITAL | Age: 78
End: 2023-10-16
Payer: MEDICARE

## 2023-10-16 ENCOUNTER — HOSPITAL ENCOUNTER (OUTPATIENT)
Dept: GENERAL RADIOLOGY | Facility: HOSPITAL | Age: 78
Discharge: HOME | End: 2023-10-16
Admitting: NURSE PRACTITIONER
Payer: MEDICARE

## 2023-10-16 DIAGNOSIS — R07.81 RIB PAIN ON LEFT SIDE: ICD-10-CM

## 2023-10-16 DIAGNOSIS — R07.81 RIB PAIN ON LEFT SIDE: Primary | ICD-10-CM

## 2023-10-16 PROCEDURE — 71100 X-RAY EXAM RIBS UNI 2 VIEWS: CPT

## 2023-10-20 ENCOUNTER — OFFICE VISIT (OUTPATIENT)
Dept: CARDIOLOGY | Facility: CLINIC | Age: 78
End: 2023-10-20
Payer: MEDICARE

## 2023-10-20 VITALS
WEIGHT: 116 LBS | HEIGHT: 62 IN | SYSTOLIC BLOOD PRESSURE: 126 MMHG | HEART RATE: 69 BPM | BODY MASS INDEX: 21.35 KG/M2 | OXYGEN SATURATION: 100 % | DIASTOLIC BLOOD PRESSURE: 58 MMHG

## 2023-10-20 DIAGNOSIS — I50.42 CHRONIC COMBINED SYSTOLIC AND DIASTOLIC CONGESTIVE HEART FAILURE: Primary | ICD-10-CM

## 2023-10-20 DIAGNOSIS — Z79.01 CURRENT USE OF LONG TERM ANTICOAGULATION: ICD-10-CM

## 2023-10-20 DIAGNOSIS — I34.0 MITRAL VALVE INSUFFICIENCY, UNSPECIFIED ETIOLOGY: ICD-10-CM

## 2023-10-20 DIAGNOSIS — I25.810 CORONARY ARTERY DISEASE INVOLVING CORONARY BYPASS GRAFT OF NATIVE HEART WITHOUT ANGINA PECTORIS: ICD-10-CM

## 2023-10-20 DIAGNOSIS — I48.0 PAROXYSMAL ATRIAL FIBRILLATION: ICD-10-CM

## 2023-10-20 DIAGNOSIS — I10 ESSENTIAL HYPERTENSION: ICD-10-CM

## 2023-10-20 DIAGNOSIS — E78.2 MIXED HYPERLIPIDEMIA: ICD-10-CM

## 2023-10-20 DIAGNOSIS — I35.0 NONRHEUMATIC AORTIC VALVE STENOSIS: ICD-10-CM

## 2023-10-20 RX ORDER — SIMVASTATIN 80 MG
1 TABLET ORAL DAILY
COMMUNITY
Start: 2023-09-07

## 2023-10-20 NOTE — PROGRESS NOTES
Subjective:     Encounter Date:10/20/2023      Patient ID: Sondra Connolly is a 78 y.o. female with coronary artery disease s/p PCI to RCA 2021 with prior CABG x4 in 2010, combined congestive heart failure, paroxysmal atrial fibrillation, chronic anticoagulation, Type 2 DM, hypertension.     Chief Complaint: routine follow up  History of Present Illness  Patient presents today for management of coronary artery disease. Today patient reports that she has been ok. She has recently been diagnosed with UTI and is being treated with Macrobid. She denies any chest pain. She reports that she has some dyspnea on moderate exertion. She denies any heart racing or palpitations. She reports tome dizziness with quick position changes. She denies any leg swelling, orthopnea or PND. She reports that she monitors her BP routinely. She reports that it has remained well controlled running 120-130/70-80. She has lasix to take as needed but hasn't needed to take it. Patient is on Eliquis and denies any bleeding issues. Patient follows with Dr Bhakta as PCP.    The following portions of the patient's history were reviewed and updated as appropriate: allergies, current medications, past family history, past medical history, past social history, past surgical history and problem list.    Allergies   Allergen Reactions    Aspirin Buf(Cacarb-Mgcarb-Mgo) Angioedema     Patient has taken tums in the past with no problem.    Salicylates Angioedema    Codeine Nausea And Vomiting and Hallucinations    Iodine Unknown - High Severity    Shellfish-Derived Products Unknown - High Severity    Demerol [Meperidine] Nausea And Vomiting and Hallucinations       Current Outpatient Medications:     Brilinta 60 MG tablet tablet, TAKE 1 TABLET BY MOUTH TWICE A DAY, Disp: 60 tablet, Rfl: 11    Eliquis 5 MG tablet tablet, Take 1 tablet by mouth Every 12 (Twelve) Hours., Disp: , Rfl:     Entresto 24-26 MG tablet, TAKE 1 TABLET BY MOUTH TWICE A DAY, Disp: 60  tablet, Rfl: 11    furosemide (LASIX) 20 MG tablet, Take 1 tablet by mouth Daily As Needed (as needed for increased swelling, shortness of breath and/or weight gain)., Disp: 90 tablet, Rfl: 3    metoprolol succinate XL (TOPROL-XL) 25 MG 24 hr tablet, Take 0.5 tablets by mouth Daily. (Patient taking differently: Take 20 mg by mouth Daily.), Disp: 45 tablet, Rfl: 3    Multiple Vitamins-Minerals (MULTIVITAMIN ADULT PO), Take 1 tablet by mouth Daily., Disp: , Rfl:     simvastatin (ZOCOR) 80 MG tablet, Take 1 tablet by mouth Daily., Disp: , Rfl:     Past Medical History:   Diagnosis Date    A-fib     Abnormal nuclear stress test 8/23/2019    Angina pectoris     Anxiety     Arthritis     Atherosclerosis of coronary artery bypass graft     Carotid artery occlusion without infarction     Chronic kidney disease     Chronic lung disease     Colon polyp     Diabetes mellitus     DJD (degenerative joint disease)     Essential hypertension 3/24/2017    Fibrocystic breast disease     Gastritis     GI bleed     Hemorrhoids     Hyperlipidemia     Hypertension     Lung disease     chronic    MI (myocardial infarction)     Palpitations     PUD (peptic ulcer disease)     PVD (peripheral vascular disease)     Renal failure, acute     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     Social History     Socioeconomic History    Marital status:    Tobacco Use    Smoking status: Never    Smokeless tobacco: Never    Tobacco comments:     Non smoker; no tobacco use   Vaping Use    Vaping Use: Never used   Substance and Sexual Activity    Alcohol use: Not Currently    Drug use: Not Currently    Sexual activity: Defer       Review of Systems   Constitutional: Negative for malaise/fatigue.   HENT:  Negative for nosebleeds.    Cardiovascular:  Positive for dyspnea on exertion (moderate exertion). Negative for chest pain, irregular heartbeat, leg swelling, near-syncope, orthopnea, palpitations, paroxysmal nocturnal dyspnea and syncope.   Respiratory:  Negative  "for shortness of breath.    Hematologic/Lymphatic: Bruises/bleeds easily.   Neurological:  Positive for dizziness (intermittent). Negative for weakness.   All other systems reviewed and are negative.         Objective:     Vitals reviewed.   Constitutional:       General: Not in acute distress.     Appearance: Normal appearance. Well-developed.   Eyes:      Pupils: Pupils are equal, round, and reactive to light.   HENT:      Head: Normocephalic and atraumatic.      Nose: Nose normal.   Neck:      Vascular: No carotid bruit.   Pulmonary:      Effort: Pulmonary effort is normal. No respiratory distress.      Breath sounds: Normal breath sounds. No wheezing. No rales.   Cardiovascular:      Normal rate. Regular rhythm.      Murmurs: There is a grade 4/6 systolic murmur.   Edema:     Peripheral edema absent.   Abdominal:      General: There is no distension.      Palpations: Abdomen is soft.   Musculoskeletal: Normal range of motion.      Cervical back: Normal range of motion and neck supple. Skin:     General: Skin is warm.      Findings: No erythema or rash.   Neurological:      General: No focal deficit present.      Mental Status: Alert and oriented to person, place, and time.   Psychiatric:         Attention and Perception: Attention normal.         Mood and Affect: Mood normal.         Speech: Speech normal.         Behavior: Behavior normal.         Thought Content: Thought content normal.         Judgment: Judgment normal.         /58   Pulse 69   Ht 157 cm (61.81\")   Wt 52.6 kg (116 lb)   SpO2 100%   BMI 21.35 kg/m²     Procedures    Lab Review:     Results for orders placed during the hospital encounter of 08/03/23    Adult Transthoracic Echo Complete w/ Color, Spectral and Contrast if necessary per protocol    Interpretation Summary    Left ventricular systolic function is low normal. Left ventricular ejection fraction appears to be 51 - 55%.    Left ventricular wall thickness is consistent with " mild concentric hypertrophy.    The following left ventricular wall segments are hypokinetic: mid inferolateral. The following left ventricular wall segments are akinetic: basal inferolateral.    Left ventricular diastolic function is consistent with (grade II w/high LAP) pseudonormalization.    Left atrial volume is severely increased.    The right atrial cavity is mildly  dilated.    Severe aortic valve stenosis is present.    Moderate mitral valve regurgitation is present.    Estimated right ventricular systolic pressure from tricuspid regurgitation is normal (<35 mmHg).    I have personally reviewed echo and past office notes prior to patients visit  Assessment:          Diagnosis Plan   1. Chronic combined systolic and diastolic congestive heart failure        2. Coronary artery disease involving coronary bypass graft of native heart without angina pectoris        3. Paroxysmal atrial fibrillation        4. Current use of long term anticoagulation        5. Nonrheumatic aortic valve stenosis  Ambulatory Referral to [facility] [place]      6. Mitral valve insufficiency, unspecified etiology        7. Essential hypertension        8. Mixed hyperlipidemia               Plan:       Coronary artery disease: prior CABG x4 in 2010 and subsequent PCI to distal RCA after rotational atherectomy in 2021. Gretanet remains chest pain free. Continue brilinta, simvastatin and metoprolol    2. Chronic combined systolic and diastolic congestive heart failure: previously LVEF 41-45% on echo 2/2021 improved to 51-55% on 8/3/2013. Patient is stabel and euvolemic in office. Continue metoprolol, entresto and lasix prn. Reviewed signs and symptoms of CHF and what to report to office with patient. Patient instructed to restrict sodium and record daily weight. Patient is to report weight gain of greater than 2 lbs overnight or 5 lbs in 1 week. Patient verbalized understanding of instructions and plan of care.     3. Paroxysmal  Atrial Fibrillation: Regular rhythm on examination today. Continue metoprolol and eliquis. Patient follows with Dr Reed.    4. Chronic anticoagulation: Patient is on Eliquis and denies any bleeding issues.     5. Aortic valve stenosis: Severe on echo from 8/2023. Will make referral to structural heart     6. Mitral valve regurgitation: moderate on echo from 8/2023.     7. Hypertension: Controlled. Continue  current medications    8. Hyperlipidemia: managed and followed by PCP. Continue simvastatin    I spent 38 minutes caring for Sondra on this date of service. This time includes time spent by me in the following activities:preparing for the visit, reviewing tests, obtaining and/or reviewing a separately obtained history, performing a medically appropriate examination and/or evaluation , counseling and educating the patient/family/caregiver, ordering medications, tests, or procedures, and documenting information in the medical record     Patient is to follow up in 6 months or sooner if needed

## 2023-11-07 ENCOUNTER — HOSPITAL ENCOUNTER (OUTPATIENT)
Dept: MAMMOGRAPHY | Facility: HOSPITAL | Age: 78
Discharge: HOME | End: 2023-11-07
Admitting: INTERNAL MEDICINE
Payer: MEDICARE

## 2023-11-07 ENCOUNTER — HOSPITAL ENCOUNTER (OUTPATIENT)
Dept: ULTRASOUND IMAGING | Facility: HOSPITAL | Age: 78
Discharge: HOME | End: 2023-11-07
Payer: MEDICARE

## 2023-11-07 DIAGNOSIS — R92.8 ABNORMAL MAMMOGRAM: ICD-10-CM

## 2023-11-07 PROCEDURE — 77065 DX MAMMO INCL CAD UNI: CPT

## 2023-11-07 PROCEDURE — G0279 TOMOSYNTHESIS, MAMMO: HCPCS

## 2023-11-27 ENCOUNTER — OFFICE VISIT (OUTPATIENT)
Dept: CARDIOLOGY | Facility: CLINIC | Age: 78
End: 2023-11-27
Payer: MEDICARE

## 2023-11-27 VITALS
SYSTOLIC BLOOD PRESSURE: 130 MMHG | HEIGHT: 62 IN | HEART RATE: 61 BPM | OXYGEN SATURATION: 99 % | DIASTOLIC BLOOD PRESSURE: 60 MMHG | WEIGHT: 115 LBS | BODY MASS INDEX: 21.16 KG/M2

## 2023-11-27 DIAGNOSIS — I34.0 MITRAL VALVE INSUFFICIENCY, UNSPECIFIED ETIOLOGY: ICD-10-CM

## 2023-11-27 DIAGNOSIS — I25.708 CORONARY ARTERY DISEASE OF BYPASS GRAFT OF NATIVE HEART WITH STABLE ANGINA PECTORIS: ICD-10-CM

## 2023-11-27 DIAGNOSIS — I50.42 CHRONIC COMBINED SYSTOLIC AND DIASTOLIC CONGESTIVE HEART FAILURE: Chronic | ICD-10-CM

## 2023-11-27 DIAGNOSIS — I48.0 PAROXYSMAL ATRIAL FIBRILLATION: Chronic | ICD-10-CM

## 2023-11-27 DIAGNOSIS — I35.0 NONRHEUMATIC AORTIC VALVE STENOSIS: Primary | ICD-10-CM

## 2023-11-27 NOTE — H&P (VIEW-ONLY)
[facility] - CARDIOLOGY/Structural Heart Clinic  New Patient Initial Outpatient Evaluation    Primary Care Physician: Matthieu Bhakta DO    Subjective     Chief Complaint: Valvular heart disease    History of Present Illness  78-year-old female with known CAD, including prior PCI to the RCA in 2021 and four-vessel CABG in 2010, chronic combined systolic and diastolic congestive heart failure, PAF on anticoagulation, who was kindly referred to the Structural [facility] Clinic by RANDA Vera, for aortic stenosis.  This was noted on an echocardiogram done in August.    Overall, she says she has good days and bad days.  Some days she's more fatigued than others.  She does admit she 'wants to sleep all the time.'  She does admit getting tired and out of breath mopping - having to stop to rest.  Also notes that making the bed makes her breathe hard - her daughter corroborates this by having witnessed it.  She denies any orthopnea, PND, or rapid weight fluctuations.  She has not had syncope.    Review of Systems   Constitutional: Positive for malaise/fatigue.   Cardiovascular:  Positive for dyspnea on exertion. Negative for chest pain, claudication, leg swelling, near-syncope, orthopnea, palpitations, paroxysmal nocturnal dyspnea and syncope.   Respiratory:  Negative for shortness of breath.    Hematologic/Lymphatic: Does not bruise/bleed easily.        Otherwise complete ROS reviewed and negative except as mentioned in the HPI.      Past Medical History:   Past Medical History:   Diagnosis Date    A-fib     Abnormal nuclear stress test 8/23/2019    Angina pectoris     Anxiety     Arthritis     Atherosclerosis of coronary artery bypass graft     Carotid artery occlusion without infarction     Chronic kidney disease     Chronic lung disease     Colon polyp     Diabetes mellitus     DJD (degenerative joint disease)     Essential hypertension 3/24/2017    Fibrocystic breast disease     Gastritis     GI bleed     Hemorrhoids      Hyperlipidemia     Hypertension     Lung disease     chronic    MI (myocardial infarction)     Palpitations     PUD (peptic ulcer disease)     PVD (peripheral vascular disease)     Renal failure, acute     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       Past Surgical History:  Past Surgical History:   Procedure Laterality Date    ABLATION OF DYSRHYTHMIC FOCUS      May 2021 in [place]      APPENDECTOMY      CARDIAC CATHETERIZATION  05/01/2009    Left-Heart Skin    CARDIAC CATHETERIZATION N/A 2/4/2021    Procedure: Left Heart Cath;  Surgeon: Tristan Cabello DO;  Location:  [facility]ATH [facility]VAS[facility];  Service: Cardiology;  Laterality: N/A;    CARDIAC CATHETERIZATION Right 2/5/2021    Procedure: Left Heart Cath, rotablator to RCA with Dr. Abrams at 1PM;  Surgeon: Tristan Cabello DO;  Location:  [facility] CATH INV[facility] LOCATION;  Service: Cardiology;  Laterality: Right;    CAROTID ENDARTERECTOMY      CATARACT EXTRACTION      CATARACT EXTRACTION      COLONOSCOPY      CORONARY ARTERY BYPASS GRAFT  07/2007    Four    ENDOSCOPY  10/02/2013    ENDOSCOPY  10/02/2013    HYSTERECTOMY      SKIN CANCER EXCISION      THROMBOENDARTERECTOMY      TONSILLECTOMY         Family History: family history includes Breast cancer in her maternal grandmother and mother; Heart disease in her brother, brother, father, and mother.    Social History:  reports that she has never smoked. She has never used smokeless tobacco. She reports that she does not currently use alcohol. She reports that she does not currently use drugs.    Medications:  Prior to Admission medications    Medication Sig Start Date End Date Taking? Authorizing Provider   Brilinta 60 MG tablet tablet TAKE 1 TABLET BY MOUTH TWICE A DAY 6/14/23   Gualberto Gallo MD   Eliquis 5 MG tablet tablet Take 1 tablet by mouth Every 12 (Twelve) Hours. 1/5/23   Provider, MD Ronald   Entresto 24-26 MG tablet TAKE 1 TABLET BY MOUTH TWICE A DAY 6/12/23   Gualberto Gallo MD  "  furosemide (LASIX) 20 MG tablet Take 1 tablet by mouth Daily As Needed (as needed for increased swelling, shortness of breath and/or weight gain). 4/29/21   Magda Allen APRN   metoprolol succinate XL (TOPROL-XL) 25 MG 24 hr tablet Take 0.5 tablets by mouth Daily.  Patient taking differently: Take 20 mg by mouth Daily. 12/9/22   Lorin Reed MD   Multiple Vitamins-Minerals (MULTIVITAMIN ADULT PO) Take 1 tablet by mouth Daily.    Provider, MD Ronald   simvastatin (ZOCOR) 80 MG tablet Take 1 tablet by mouth Daily. 9/7/23   Provider, MD Ronald     Allergies:  Allergies   Allergen Reactions    Aspirin Buf(Cacarb-Mgcarb-Mgo) Angioedema     Patient has taken tums in the past with no problem.    Salicylates Angioedema    Codeine Nausea And Vomiting and Hallucinations    Iodine Unknown - High Severity    Shellfish-Derived Products Unknown - High Severity    Demerol [Meperidine] Nausea And Vomiting and Hallucinations       Objective     Vital Signs: /60 (BP Location: Left arm, Patient Position: Sitting, Cuff Size: Adult)   Pulse 61   Ht 157 cm (61.81\")   Wt 52.2 kg (115 lb)   SpO2 99%   BMI 21.16 kg/m²     Vitals and nursing note reviewed.   Constitutional:       General: Not in acute distress.     Appearance: Not in distress.   Neck:      Vascular: No JVD or JVR. JVD normal.   Pulmonary:      Effort: Pulmonary effort is normal.      Breath sounds: Normal breath sounds.   Cardiovascular:      Normal rate. Regular rhythm.      Murmurs: There is a grade 3/6 harsh midsystolic murmur at the URSB, radiating to the neck.      No gallop.  No rub.   Pulses:     Intact distal pulses.   Edema:     Peripheral edema absent.   Skin:     General: Skin is warm and dry.   Neurological:      Mental Status: Alert, oriented to person, place, and time and oriented to person, place and time.       Results Reviewed:      ECG 12 Lead    Date/Time: 11/27/2023 2:30 PM  Performed by: Bhavik Cuellar MD    Authorized " by: Bhavik Cuellar MD  Comparison: compared with previous ECG from 7/21/2023  Similar to previous ECG  Rhythm: sinus rhythm  Rate: normal  BPM: 61  QRS axis: normal    Clinical impression: normal ECG        Lab Results   Component Value Date    CHOL 182 02/01/2021    TRIG 70 02/01/2021    HDL 57 02/01/2021    VLDL 13 02/01/2021    LDLHDL 1.95 02/01/2021     Lab Results   Component Value Date    HGBA1C 5.90 (H) 02/01/2021       Results for orders placed during the hospital encounter of 08/03/23    Adult Transthoracic Echo Complete w/ Color, Spectral and Contrast if necessary per protocol    Interpretation Summary    Left ventricular systolic function is low normal. Left ventricular ejection fraction appears to be 51 - 55%.    Left ventricular wall thickness is consistent with mild concentric hypertrophy.    The following left ventricular wall segments are hypokinetic: mid inferolateral. The following left ventricular wall segments are akinetic: basal inferolateral.    Left ventricular diastolic function is consistent with (grade II w/high LAP) pseudonormalization.    Left atrial volume is severely increased.    The right atrial cavity is mildly  dilated.    Severe aortic valve stenosis is present.    Moderate mitral valve regurgitation is present.    Estimated right ventricular systolic pressure from tricuspid regurgitation is normal (<35 mmHg).    Independent interpretation of images reviewed: Since the echocardiogram reported above was interpreted by different cardiologist, I reviewed all images in their entirety.  I agree that there is systolic function of the left ventricle, with wall motion abnormalities of the inferolateral wall.  There is also moderate mitral vegetation, which may be in part due to these wall motion abnormalities.  The aortic valve itself appears trileaflet and severely calcified, with quantitative assessment consistent with severe aortic stenosis.    Assessment / Plan        Problem List  Items Addressed This Visit (all established and stable, except for otherwise noted)         Cardiac and Vasculature    Paroxysmal atrial fibrillation (Chronic)    Chronic combined systolic and diastolic congestive heart failure (Chronic)    Coronary artery disease of bypass graft of native heart with stable angina pectoris    Aortic valve stenosis - Primary: Worsening, likely the cause of recent progressive symptoms    Mitral valve regurgitation     Recommendations and plans: We spent the bulk of today's visit discussing the anatomy of aortic valvular disease, as well as the anatomy involved and the physiologic impact of severe aortic stenosis.  We discussed the natural history of aortic stenosis (including the 50% mortality rate at 2 years for patients with symptomatic severe AS), indications for valvular intervention, as well as work-up that is necessary to help decide preferred modality of valvular intervention (SAVR versus TAVR).  I also emphasized the multidisciplinary nature of the [facility] team assessment and evaluation of patients with valvular heart disease, including our weekly valve conference.      After a long and thorough conversation with the patient and her family, patients states she would like to be considered for treatment options if available.  As such, I think a CTA to fully evaluate not only the valve size but peripheral vascular access options is reasonable.  Will set her up to see one of our cardiothoracic surgeons in consultation that same day, I will discuss her case starting this week at her weekly structural heart conference.  Of note, frailty testing was performed today by, and the patient and her son received education materials from, Valorie Irizarry RN (structural heart coordinator).    In the meantime, continue all current therapies.    58 minutes spent on day of service    Bhavik Cuellar MD   11/29/23   07:27 CST

## 2023-11-27 NOTE — PROGRESS NOTES
[facility] - CARDIOLOGY/Structural Heart Clinic  New Patient Initial Outpatient Evaluation    Primary Care Physician: Matthieu Bhakta DO    Subjective     Chief Complaint: Valvular heart disease    History of Present Illness  78-year-old female with known CAD, including prior PCI to the RCA in 2021 and four-vessel CABG in 2010, chronic combined systolic and diastolic congestive heart failure, PAF on anticoagulation, who was kindly referred to the Structural Heart Clinic by RANDA Vera, for aortic stenosis.  This was noted on an echocardiogram done in August.    Overall, she says she has good days and bad days.  Some days she's more fatigued than others.  She does admit she 'wants to sleep all the time.'  She does admit getting tired and out of breath mopping - having to stop to rest.  Also notes that making the bed makes her breathe hard - her daughter corroborates this by having witnessed it.  She denies any orthopnea, PND, or rapid weight fluctuations.  She has not had syncope.    Review of Systems   Constitutional: Positive for malaise/fatigue.   Cardiovascular:  Positive for dyspnea on exertion. Negative for chest pain, claudication, leg swelling, near-syncope, orthopnea, palpitations, paroxysmal nocturnal dyspnea and syncope.   Respiratory:  Negative for shortness of breath.    Hematologic/Lymphatic: Does not bruise/bleed easily.        Otherwise complete ROS reviewed and negative except as mentioned in the HPI.      Past Medical History:   Past Medical History:   Diagnosis Date    A-fib     Abnormal nuclear stress test 8/23/2019    Angina pectoris     Anxiety     Arthritis     Atherosclerosis of coronary artery bypass graft     Carotid artery occlusion without infarction     Chronic kidney disease     Chronic lung disease     Colon polyp     Diabetes mellitus     DJD (degenerative joint disease)     Essential hypertension 3/24/2017    Fibrocystic breast disease     Gastritis     GI bleed     Hemorrhoids      Hyperlipidemia     Hypertension     Lung disease     chronic    MI (myocardial infarction)     Palpitations     PUD (peptic ulcer disease)     PVD (peripheral vascular disease)     Renal failure, acute     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       Past Surgical History:  Past Surgical History:   Procedure Laterality Date    ABLATION OF DYSRHYTHMIC FOCUS      May 2021 in [place]      APPENDECTOMY      CARDIAC CATHETERIZATION  05/01/2009    Left-Heart Skin    CARDIAC CATHETERIZATION N/A 2/4/2021    Procedure: Left Heart Cath;  Surgeon: Tristan Cabello DO;  Location:  [facility] CATH INVASIVE LOCATION;  Service: Cardiology;  Laterality: N/A;    CARDIAC CATHETERIZATION Right 2/5/2021    Procedure: Left Heart Cath, rotablator to RCA with Dr. Abrams at 1PM;  Surgeon: Tristan Cabello DO;  Location:  [facility] CATH INVASIVE LOCATION;  Service: Cardiology;  Laterality: Right;    CAROTID ENDARTERECTOMY      CATARACT EXTRACTION      CATARACT EXTRACTION      COLONOSCOPY      CORONARY ARTERY BYPASS GRAFT  07/2007    Four    ENDOSCOPY  10/02/2013    ENDOSCOPY  10/02/2013    HYSTERECTOMY      SKIN CANCER EXCISION      THROMBOENDARTERECTOMY      TONSILLECTOMY         Family History: family history includes Breast cancer in her maternal grandmother and mother; Heart disease in her brother, brother, father, and mother.    Social History:  reports that she has never smoked. She has never used smokeless tobacco. She reports that she does not currently use alcohol. She reports that she does not currently use drugs.    Medications:  Prior to Admission medications    Medication Sig Start Date End Date Taking? Authorizing Provider   Brilinta 60 MG tablet tablet TAKE 1 TABLET BY MOUTH TWICE A DAY 6/14/23   Gualberto Gallo MD   Eliquis 5 MG tablet tablet Take 1 tablet by mouth Every 12 (Twelve) Hours. 1/5/23   Provider, MD Ronald   Entresto 24-26 MG tablet TAKE 1 TABLET BY MOUTH TWICE A DAY 6/12/23   Gualberto Gallo MD  "  furosemide (LASIX) 20 MG tablet Take 1 tablet by mouth Daily As Needed (as needed for increased swelling, shortness of breath and/or weight gain). 4/29/21   Magda Allen APRN   metoprolol succinate XL (TOPROL-XL) 25 MG 24 hr tablet Take 0.5 tablets by mouth Daily.  Patient taking differently: Take 20 mg by mouth Daily. 12/9/22   Lorin Reed MD   Multiple Vitamins-Minerals (MULTIVITAMIN ADULT PO) Take 1 tablet by mouth Daily.    Provider, MD Ronald   simvastatin (ZOCOR) 80 MG tablet Take 1 tablet by mouth Daily. 9/7/23   Provider, MD Ronald     Allergies:  Allergies   Allergen Reactions    Aspirin Buf(Cacarb-Mgcarb-Mgo) Angioedema     Patient has taken tums in the past with no problem.    Salicylates Angioedema    Codeine Nausea And Vomiting and Hallucinations    Iodine Unknown - High Severity    Shellfish-Derived Products Unknown - High Severity    Demerol [Meperidine] Nausea And Vomiting and Hallucinations       Objective     Vital Signs: /60 (BP Location: Left arm, Patient Position: Sitting, Cuff Size: Adult)   Pulse 61   Ht 157 cm (61.81\")   Wt 52.2 kg (115 lb)   SpO2 99%   BMI 21.16 kg/m²     Vitals and nursing note reviewed.   Constitutional:       General: Not in acute distress.     Appearance: Not in distress.   Neck:      Vascular: No JVD or JVR. JVD normal.   Pulmonary:      Effort: Pulmonary effort is normal.      Breath sounds: Normal breath sounds.   Cardiovascular:      Normal rate. Regular rhythm.      Murmurs: There is a grade 3/6 harsh midsystolic murmur at the URSB, radiating to the neck.      No gallop.  No rub.   Pulses:     Intact distal pulses.   Edema:     Peripheral edema absent.   Skin:     General: Skin is warm and dry.   Neurological:      Mental Status: Alert, oriented to person, place, and time and oriented to person, place and time.       Results Reviewed:      ECG 12 Lead    Date/Time: 11/27/2023 2:30 PM  Performed by: Bhavik Cuellar MD    Authorized " by: Bhavik Cuellar MD  Comparison: compared with previous ECG from 7/21/2023  Similar to previous ECG  Rhythm: sinus rhythm  Rate: normal  BPM: 61  QRS axis: normal    Clinical impression: normal ECG        Lab Results   Component Value Date    CHOL 182 02/01/2021    TRIG 70 02/01/2021    HDL 57 02/01/2021    VLDL 13 02/01/2021    LDLHDL 1.95 02/01/2021     Lab Results   Component Value Date    HGBA1C 5.90 (H) 02/01/2021       Results for orders placed during the hospital encounter of 08/03/23    Adult Transthoracic Echo Complete w/ Color, Spectral and Contrast if necessary per protocol    Interpretation Summary    Left ventricular systolic function is low normal. Left ventricular ejection fraction appears to be 51 - 55%.    Left ventricular wall thickness is consistent with mild concentric hypertrophy.    The following left ventricular wall segments are hypokinetic: mid inferolateral. The following left ventricular wall segments are akinetic: basal inferolateral.    Left ventricular diastolic function is consistent with (grade II w/high LAP) pseudonormalization.    Left atrial volume is severely increased.    The right atrial cavity is mildly  dilated.    Severe aortic valve stenosis is present.    Moderate mitral valve regurgitation is present.    Estimated right ventricular systolic pressure from tricuspid regurgitation is normal (<35 mmHg).    Independent interpretation of images reviewed: Since the echocardiogram reported above was interpreted by different cardiologist, I reviewed all images in their entirety.  I agree that there is systolic function of the left ventricle, with wall motion abnormalities of the inferolateral wall.  There is also moderate mitral vegetation, which may be in part due to these wall motion abnormalities.  The aortic valve itself appears trileaflet and severely calcified, with quantitative assessment consistent with severe aortic stenosis.    Assessment / Plan        Problem List  Items Addressed This Visit (all established and stable, except for otherwise noted)         Cardiac and Vasculature    Paroxysmal atrial fibrillation (Chronic)    Chronic combined systolic and diastolic congestive heart failure (Chronic)    Coronary artery disease of bypass graft of native heart with stable angina pectoris    Aortic valve stenosis - Primary: Worsening, likely the cause of recent progressive symptoms    Mitral valve regurgitation     Recommendations and plans: We spent the bulk of today's visit discussing the anatomy of aortic valvular disease, as well as the anatomy involved and the physiologic impact of severe aortic stenosis.  We discussed the natural history of aortic stenosis (including the 50% mortality rate at 2 years for patients with symptomatic severe AS), indications for valvular intervention, as well as work-up that is necessary to help decide preferred modality of valvular intervention (SAVR versus TAVR).  I also emphasized the multidisciplinary nature of the [facility] Structural Heart team assessment and evaluation of patients with valvular heart disease, including our weekly valve conference.      After a long and thorough conversation with the patient and her family, patients states she would like to be considered for treatment options if available.  As such, I think a CTA to fully evaluate not only the valve size but peripheral vascular access options is reasonable.  Will set her up to see one of our cardiothoracic surgeons in consultation that same day, I will discuss her case starting this week at her weekly structural heart conference.  Of note, frailty testing was performed today by, and the patient and her son received education materials from, Valorie Irizarry RN (structural heart coordinator).    In the meantime, continue all current therapies.    58 minutes spent on day of service    Bhavik Cuellar MD   11/29/23   07:27 CST

## 2023-11-28 RX ORDER — METOPROLOL SUCCINATE 25 MG/1
12.5 TABLET, EXTENDED RELEASE ORAL DAILY
Qty: 45 TABLET | Refills: 3 | Status: SHIPPED | OUTPATIENT
Start: 2023-11-28

## 2023-11-29 DIAGNOSIS — I35.0 AORTIC STENOSIS, SEVERE: Primary | ICD-10-CM

## 2023-11-29 RX ORDER — PREDNISONE 50 MG/1
50 TABLET ORAL TAKE AS DIRECTED
Qty: 3 TABLET | Refills: 0 | Status: SHIPPED | OUTPATIENT
Start: 2023-11-29

## 2023-12-15 ENCOUNTER — HOSPITAL ENCOUNTER (OUTPATIENT)
Dept: CT IMAGING | Facility: HOSPITAL | Age: 78
Discharge: HOME | End: 2023-12-15
Admitting: INTERNAL MEDICINE
Payer: MEDICARE

## 2023-12-15 ENCOUNTER — OFFICE VISIT (OUTPATIENT)
Dept: CARDIAC SURGERY | Facility: CLINIC | Age: 78
End: 2023-12-15
Payer: MEDICARE

## 2023-12-15 VITALS
HEART RATE: 82 BPM | WEIGHT: 113 LBS | DIASTOLIC BLOOD PRESSURE: 62 MMHG | BODY MASS INDEX: 20.8 KG/M2 | SYSTOLIC BLOOD PRESSURE: 106 MMHG | HEIGHT: 62 IN | OXYGEN SATURATION: 97 %

## 2023-12-15 DIAGNOSIS — I35.0 AORTIC STENOSIS, SEVERE: ICD-10-CM

## 2023-12-15 DIAGNOSIS — I35.0 NONRHEUMATIC AORTIC VALVE STENOSIS: Primary | ICD-10-CM

## 2023-12-15 LAB — CREAT BLDA-MCNC: 1.1 MG/DL (ref 0.6–1.3)

## 2023-12-15 PROCEDURE — 71275 CT ANGIOGRAPHY CHEST: CPT

## 2023-12-15 PROCEDURE — 25510000001 IOPAMIDOL PER 1 ML: Performed by: INTERNAL MEDICINE

## 2023-12-15 PROCEDURE — 74174 CTA ABD&PLVS W/CONTRAST: CPT

## 2023-12-15 PROCEDURE — 82565 ASSAY OF CREATININE: CPT

## 2023-12-15 RX ADMIN — IOPAMIDOL 100 ML: 755 INJECTION, SOLUTION INTRAVENOUS at 11:30

## 2023-12-15 NOTE — LETTER
December 21, 2023       No Recipients    Patient: Sondra Connolly   YOB: 1945   Date of Visit: 12/15/2023       Dear Matthieu Bhakta DO,    Sondra Connolly was in my office today. Below are the relevant portions of my assessment and plan of care.    Ms. Connolly is an 78-year-old female who presents with severe symptomatic aortic stenosis. She has a history of coronary artery bypass grafting times 4 by Dr. Rajput in 2010. She also has a history of paroxysmal atrial fibrillation, underwent ablation, and since then has been in sinus rhythm. The patient has class 2 [place] [employer] heart failure symptoms. This has been worsening over the last few months, and the patient has intermittently experienced chest pain with exertion.     Today, we discussed the natural history of severe aortic stenosis in patients such as her, especially in the setting of previous coronary artery bypass grafting at 78 years of age. We discussed treatment options including redo sternotomy and aortic valve replacement versus transcatheter aortic valve replacement. I would recommend transcatheter aortic valve replacement. We discussed preoperative, operative, and postoperative expectations at length as well as the risks and benefits.     We discussed the risk of surgery including but not limited to bleeding, infection, injury to major organs or vessels, chronic heart failure, arrhythmias, need for pacemaker, prolonged ventilation, renal failure, stroke, risk of anesthesia, risk of conversion to open operation, reoperation, and/or death.  She understands these risks and agrees to proceed.    I would consider her a candidate for conversion to open if needed. Her [employer] calculated isolated aortic valve replacement risk of mortality is 7.8 percent, and I would deem her high risk for redo sternotomy aortic valve replacement, especially in light of patent vein grafts and inferior mesenteric artery. She wishes  to proceed with transcatheter aortic valve replacement.     We will complete her workup with a repeat coronary angiography that Dr. Gallo will perform. After that, we will plan on transcatheter aortic valve replacement. She has plans to follow up with her dentist soon.     We will discuss her case at our multidisciplinary valve conference and make further plans for transcatheter aortic valve replacement. Thank you for trusting me with the care of Ms. Connolly.  Please do not hesitate to call with any questions or concerns.         Sincerely,        Reed Abdi MD        CC:   No Recipients

## 2023-12-15 NOTE — PROGRESS NOTES
"Cardiac Surgery Consultation    Referring Physician: Dr. Bhavik Cuellar.    Primary Care Physician: Dr. Matthieu Bhakta.    Chief Complaint   Patient presents with    CT ANGIO TAVR CHEST ABDOMEN PELVIS     Referred by Bhavik Cuellar MD 11/29/2023         Subjective     History of Present Illness    The patient presents today for an initial evaluation. She is accompanied by her daughter.    The patient reports that she developed fatigue and chest pain with significant exertion. Her fatigue and chest pain slightly worsened with each occurrence. She denies chest pain currently. She experiences fatigue though attributes this to age. Her daughter notes that she ambulated from the imaging center to this office without significant difficulties.     The patient's surgical history includes coronary artery bypass grafting times 4 in 2007 with Dr. Rajput, right carotid endarterectomy, and bilateral tonsillectomy. Her daughter states that the patient's most recent procedure was stenting, Rotablator, and stent placement to the right coronary artery on 02/05/2021 with Dr. Abrams. Her daughter states that the patient had a \"mini heart attack\" shortly prior to bypass. The patient denies a history of cerebrovascular accident, transient ischemic attack, or other vascular issues. She underwent ablation for atrial fibrillation in [place]. Her atrial fibrillation has not recurred, and pacemaker was discussed as an option if her atrial fibrillation recurs. She takes anticoagulant medications. She has a partial plate denture. Her daughter intends to schedule her dental clearance. The patient lives at home with another daughter.    Review of Systems   Constitutional:  Positive for fatigue. Negative for activity change and unexpected weight change.   Respiratory:  Negative for cough, chest tightness and shortness of breath.    Cardiovascular:  Positive for chest pain. Negative for leg swelling.        A complete review of systems was " performed, is negative except stated above.    Past Medical History:   Diagnosis Date    A-fib     Abnormal nuclear stress test 8/23/2019    Angina pectoris     Anxiety     Arthritis     Atherosclerosis of coronary artery bypass graft     Carotid artery occlusion without infarction     Chronic kidney disease     Chronic lung disease     Colon polyp     Diabetes mellitus     DJD (degenerative joint disease)     Essential hypertension 3/24/2017    Fibrocystic breast disease     Gastritis     GI bleed     Hemorrhoids     Hyperlipidemia     Hypertension     Lung disease     chronic    MI (myocardial infarction)     Palpitations     PUD (peptic ulcer disease)     PVD (peripheral vascular disease)     Renal failure, acute     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     Past Surgical History:   Procedure Laterality Date    ABLATION OF DYSRHYTHMIC FOCUS      May 2021 in [place]      APPENDECTOMY      CARDIAC CATHETERIZATION  05/01/2009    Left-Heart Skin    CARDIAC CATHETERIZATION N/A 2/4/2021    Procedure: Left Heart Cath;  Surgeon: Tristan Cabello DO;  Location:  [facility];  Service: Cardiology;  Laterality: N/A;    CARDIAC CATHETERIZATION Right 2/5/2021    Procedure: Left Heart Cath, rotablator to RCA with Dr. Abrams at 1PM;  Surgeon: Tristan Cabello DO;  Location:  [facility];  Service: Cardiology;  Laterality: Right;    CAROTID ENDARTERECTOMY      CATARACT EXTRACTION      CATARACT EXTRACTION      COLONOSCOPY      CORONARY ARTERY BYPASS GRAFT  07/2007    Four    ENDOSCOPY  10/02/2013    ENDOSCOPY  10/02/2013    HYSTERECTOMY      SKIN CANCER EXCISION      THROMBOENDARTERECTOMY      TONSILLECTOMY       Family History   Problem Relation Age of Onset    Heart disease Mother     Breast cancer Mother     Heart disease Father     Heart disease Brother     Heart disease Brother     Breast cancer Maternal Grandmother     Colon cancer Neg Hx      Social History     Tobacco Use     "Smoking status: Never    Smokeless tobacco: Never    Tobacco comments:     Non smoker; no tobacco use   Vaping Use    Vaping Use: Never used   Substance Use Topics    Alcohol use: Not Currently    Drug use: Not Currently     Current Outpatient Medications   Medication Sig Dispense Refill    Brilinta 60 MG tablet tablet TAKE 1 TABLET BY MOUTH TWICE A DAY 60 tablet 11    diphenhydrAMINE (BENADRYL) 50 MG tablet Take 1 tablet by mouth Take As Directed. Take 50 mg 1 hour before procedure 1 tablet 0    Eliquis 5 MG tablet tablet Take 1 tablet by mouth Every 12 (Twelve) Hours.      Entresto 24-26 MG tablet TAKE 1 TABLET BY MOUTH TWICE A DAY 60 tablet 11    furosemide (LASIX) 20 MG tablet Take 1 tablet by mouth Daily As Needed (as needed for increased swelling, shortness of breath and/or weight gain). 90 tablet 3    metoprolol succinate XL (TOPROL-XL) 25 MG 24 hr tablet TAKE 1/2 TABLET BY MOUTH EVERY DAY 45 tablet 3    Multiple Vitamins-Minerals (MULTIVITAMIN ADULT PO) Take 1 tablet by mouth Daily.      predniSONE (DELTASONE) 50 MG tablet Take 1 tablet by mouth Take As Directed. Take 50 mg 13 hours, 7 hours and 1 hour before procedure. 3 tablet 0    simvastatin (ZOCOR) 80 MG tablet Take 1 tablet by mouth Daily.       No current facility-administered medications for this visit.     Allergies:  Aspirin buf(cacarb-mgcarb-mgo), Salicylates, Codeine, Iodine, Shellfish-derived products, and Demerol [meperidine]    Objective      Vital Signs  Visit Vitals  /62   Pulse 82   Ht 157 cm (61.81\")   Wt 51.3 kg (113 lb)   SpO2 97%   BMI 20.79 kg/m²         Physical Exam  Vitals reviewed.   Constitutional:       General: She is not in acute distress.     Appearance: She is well-developed. She is not diaphoretic.   HENT:      Head: Normocephalic and atraumatic.   Eyes:      General: No scleral icterus.     Pupils: Pupils are equal, round, and reactive to light.   Neck:      Vascular: No JVD.      Trachea: No tracheal deviation. "   Cardiovascular:      Rate and Rhythm: Normal rate and regular rhythm.      Heart sounds: Normal heart sounds. No murmur heard.  Pulmonary:      Effort: Pulmonary effort is normal. No respiratory distress.      Breath sounds: Normal breath sounds. No stridor. No wheezing.   Abdominal:      General: There is no distension.      Palpations: Abdomen is soft.      Tenderness: There is no abdominal tenderness.   Musculoskeletal:         General: No deformity. Normal range of motion.      Cervical back: Normal range of motion and neck supple.   Skin:     General: Skin is warm and dry.      Capillary Refill: Capillary refill takes less than 2 seconds.      Coloration: Skin is not pale.      Findings: No erythema or rash.   Neurological:      Mental Status: She is alert and oriented to person, place, and time.      Cranial Nerves: No cranial nerve deficit.   Psychiatric:         Behavior: Behavior normal.         Thought Content: Thought content normal.         Judgment: Judgment normal.         Results Review:     WBC   Date Value Ref Range Status   04/23/2023 8.91 3.40 - 10.80 10*3/mm3 Final     RBC   Date Value Ref Range Status   04/23/2023 4.25 3.77 - 5.28 10*6/mm3 Final     Hemoglobin   Date Value Ref Range Status   04/23/2023 12.8 12.0 - 15.9 g/dL Final     Hematocrit   Date Value Ref Range Status   04/23/2023 39.8 34.0 - 46.6 % Final     MCV   Date Value Ref Range Status   04/23/2023 93.6 79.0 - 97.0 fL Final     MCH   Date Value Ref Range Status   04/23/2023 30.1 26.6 - 33.0 pg Final     MCHC   Date Value Ref Range Status   04/23/2023 32.2 31.5 - 35.7 g/dL Final     RDW   Date Value Ref Range Status   04/23/2023 14.0 12.3 - 15.4 % Final     RDW-SD   Date Value Ref Range Status   04/23/2023 48.1 37.0 - 54.0 fl Final     MPV   Date Value Ref Range Status   04/23/2023 9.0 6.0 - 12.0 fL Final     Platelets   Date Value Ref Range Status   04/23/2023 319 140 - 450 10*3/mm3 Final     Neutrophil %   Date Value Ref Range  Status   04/23/2023 63.9 42.7 - 76.0 % Final     Lymphocyte %   Date Value Ref Range Status   04/23/2023 18.6 (L) 19.6 - 45.3 % Final     Monocyte %   Date Value Ref Range Status   04/23/2023 11.8 5.0 - 12.0 % Final     Eosinophil %   Date Value Ref Range Status   04/23/2023 4.8 0.3 - 6.2 % Final     Basophil %   Date Value Ref Range Status   04/23/2023 0.3 0.0 - 1.5 % Final     Immature Grans %   Date Value Ref Range Status   04/23/2023 0.6 (H) 0.0 - 0.5 % Final     Neutrophils, Absolute   Date Value Ref Range Status   04/23/2023 5.69 1.70 - 7.00 10*3/mm3 Final     Lymphocytes, Absolute   Date Value Ref Range Status   04/23/2023 1.66 0.70 - 3.10 10*3/mm3 Final     Monocytes, Absolute   Date Value Ref Range Status   04/23/2023 1.05 (H) 0.10 - 0.90 10*3/mm3 Final     Eosinophils, Absolute   Date Value Ref Range Status   04/23/2023 0.43 (H) 0.00 - 0.40 10*3/mm3 Final     Basophils, Absolute   Date Value Ref Range Status   04/23/2023 0.03 0.00 - 0.20 10*3/mm3 Final     Immature Grans, Absolute   Date Value Ref Range Status   04/23/2023 0.05 0.00 - 0.05 10*3/mm3 Final     nRBC   Date Value Ref Range Status   04/23/2023 0.0 0.0 - 0.2 /100 WBC Final     Glucose   Date Value Ref Range Status   04/23/2023 98 65 - 99 mg/dL Final     Sodium   Date Value Ref Range Status   04/23/2023 137 136 - 145 mmol/L Final     Potassium   Date Value Ref Range Status   04/23/2023 4.4 3.5 - 5.2 mmol/L Final     CO2   Date Value Ref Range Status   04/23/2023 22.0 22.0 - 29.0 mmol/L Final     Chloride   Date Value Ref Range Status   04/23/2023 103 98 - 107 mmol/L Final     Anion Gap   Date Value Ref Range Status   04/23/2023 12.0 5.0 - 15.0 mmol/L Final     Creatinine   Date Value Ref Range Status   12/15/2023 1.10 0.60 - 1.30 mg/dL Final     Comment:     Serial Number: [id number]:  [id number]     BUN   Date Value Ref Range Status   04/23/2023 17 8 - 23 mg/dL Final     BUN/Creatinine Ratio   Date Value Ref Range Status   04/23/2023 19.5 7.0 -  25.0 Final     Calcium   Date Value Ref Range Status   04/23/2023 9.8 8.6 - 10.5 mg/dL Final     eGFR Non  Amer   Date Value Ref Range Status   12/20/2021 45 (L) >60 mL/min/1.73 Final     Alkaline Phosphatase   Date Value Ref Range Status   04/23/2023 67 39 - 117 U/L Final     Total Protein   Date Value Ref Range Status   04/23/2023 6.7 6.0 - 8.5 g/dL Final     ALT (SGPT)   Date Value Ref Range Status   04/23/2023 15 1 - 33 U/L Final     AST (SGOT)   Date Value Ref Range Status   04/23/2023 20 1 - 32 U/L Final     Total Bilirubin   Date Value Ref Range Status   04/23/2023 0.4 0.0 - 1.2 mg/dL Final     Albumin   Date Value Ref Range Status   04/23/2023 4.0 3.5 - 5.2 g/dL Final     Globulin   Date Value Ref Range Status   04/23/2023 2.7 gm/dL Final        I reviewed the patient's clinical results and discussed with patient.    CT Chest:: CT angiogram transcatheter aortic valve replacement on 12/15/2023:   FINDINGS:     ANGIOGRAM:     Heavy aortic valve calcification. Heavy three-vessel coronary artery  atherosclerotic calcification. Postoperative change of median sternotomy  and CABG. The thoracic aorta is nonaneurysmal. Moderate atherosclerotic  calcification/plaque in the aortic arch and descending thoracic aorta.  Main pulmonary artery is dilated measuring 3.2 cm diameter. No  pericardial effusion.     Nonaneurysmal abdominal aorta with heavy atherosclerotic  plaque/calcification. Celiac artery is widely patent. Moderate  atherosclerotic narrowing of the proximal SMA. JOE is patent. Moderate  atherosclerotic narrowing of the proximal left renal artery. Both common  iliac arteries, internal iliac arteries, external iliac arteries, and  common femoral arteries contain heavy atherosclerotic calcification but  are widely patent.     Other findings:     The central airways are clear. No consolidation or pleural effusion. No  advanced emphysematous or fibrotic change. No suspicious pulmonary  nodules. No enlarged  thoracic lymph nodes.     No arterial enhancing liver lesion. No gallstone or gallbladder wall  thickening. No biliary ductal dilatation. Pancreas, spleen, and adrenal  glands are unremarkable. Right renal cyst. No solid enhancing renal  mass. No urolithiasis or hydronephrosis. No focal urinary bladder wall  thickening.     No colonic wall thickening or pericolonic fat stranding. No small bowel  distention or active inflammation. No ascites or free pelvic fluid. No  pelvic mass or pelvic collection. No enlarged abdominal or pelvic lymph  nodes. Grade 1 anterolisthesis of L4 and L5. Moderate multilevel  thoracolumbar spine degenerative change.        IMPRESSION:  1.  TAVR screening report generated by [facility] is included in  PACS as an additional series and is available for review.  2.  Heavy aortic valve calcification. Postoperative changes of median  sternotomy and CABG. Nonaneurysmal thoracoabdominal aorta with heavy  atherosclerotic calcification/plaque. Moderate atherosclerotic narrowing  of the proximal SMA and moderate atherosclerotic narrowing of the  proximal left renal artery.  3.  Main pulmonary artery is dilated, which can indicate pulmonary  hypertension.     This report was signed and finalized on 12/17/2023 2:38 PM by Dr. Og Mackenzie MD.    ECHO: Performed on 08/05/2023:  Interpretation Summary         Left ventricular systolic function is low normal. Left ventricular ejection fraction appears to be 51 - 55%.    Left ventricular wall thickness is consistent with mild concentric hypertrophy.    The following left ventricular wall segments are hypokinetic: mid inferolateral. The following left ventricular wall segments are akinetic: basal inferolateral.    Left ventricular diastolic function is consistent with (grade II w/high LAP) pseudonormalization.    Left atrial volume is severely increased.    The right atrial cavity is mildly  dilated.    Severe aortic valve stenosis is present.     Moderate mitral valve regurgitation is present.    Estimated right ventricular systolic pressure from tricuspid regurgitation is normal (<35 mmHg).      I personally reviewed images of following exams, the following is my interpretation:    CT Chest: CT angiogram transcatheter aortic valve replacement on 12/15/2023: Adequate access for transfemoral transcatheter aortic valve replacement. There are some scattered calcifications in the abdominal aorta and some intramural thrombus though nothing to prevent transfemoral access. I am able to visualize 2 vein grafts patent of 3, difficult to tell about the third, and inferior mesenteric artery appears patent. Aortic arch has a normal branching pattern. Ascending aorta is 3.4 cm in maximum dimension.   ECHO: Echocardiogram performed on 08/05/2023: Low-normal left ventricular function. In short axis images, left ventricular function appears normal and in long axis, mildly depressed. Severely calcified aortic valve leaflets with very minimal movement, possibly bicuspid although annulus is very round and not ovoid. Aortic valve area via VTI is 0.6 cm2, via V max is 0.56 cm2, mean gradients in the upper 20s mmHg.            Assessment & Plan         Isolated AVR STS PROM: 7.8 percent.  NYHA Heart Failure Class: 2.    Ms. Connolly is an 78-year-old female who presents with severe symptomatic aortic stenosis. She has a history of coronary artery bypass grafting times 4 by Dr. Rajput in 2010. She also has a history of paroxysmal atrial fibrillation, underwent ablation, and since then has been in sinus rhythm. The patient has class 2 [employer] heart failure symptoms. This has been worsening over the last few months, and the patient has intermittently experienced chest pain with exertion.     Today, we discussed the natural history of severe aortic stenosis in patients such as her, especially in the setting of previous coronary artery bypass grafting at 78 years of  age. We discussed treatment options including redo sternotomy and aortic valve replacement versus transcatheter aortic valve replacement. I would recommend transcatheter aortic valve replacement. We discussed preoperative, operative, and postoperative expectations at length as well as the risks and benefits.     We discussed the risk of surgery including but not limited to bleeding, infection, injury to major organs or vessels, chronic heart failure, arrhythmias, need for pacemaker, prolonged ventilation, renal failure, stroke, risk of anesthesia, risk of conversion to open operation, reoperation, and/or death.  She understands these risks and agrees to proceed.    I would consider her a candidate for conversion to open if needed. Her [employer] calculated isolated aortic valve replacement risk of mortality is 7.8 percent, and I would deem her high risk for redo sternotomy aortic valve replacement, especially in light of patent vein grafts and inferior mesenteric artery. She wishes to proceed with transcatheter aortic valve replacement.     We will complete her workup with a repeat coronary angiography that Dr. Gallo will perform. After that, we will plan on transcatheter aortic valve replacement. She has plans to follow up with her dentist soon.     We will discuss her case at our multidisciplinary valve conference and make further plans for transcatheter aortic valve replacement. Thank you for trusting me with the care of Ms. Connolly.  Please do not hesitate to call with any questions or concerns.    Reed Abdi MD  Cardiothoracic Surgeon      Transcribed from ambient dictation for Reed Abdi MD by Kamila Rosenberg.  12/15/23   15:32 CST    Patient or patient representative verbalized consent to the visit recording.  I have personally performed the services described in this document as transcribed by the above individual, and it is both accurate and complete.

## 2023-12-16 ENCOUNTER — HOSPITAL ENCOUNTER (EMERGENCY)
Facility: HOSPITAL | Age: 78
Discharge: HOME | End: 2023-12-16
Payer: MEDICARE

## 2023-12-16 ENCOUNTER — APPOINTMENT (OUTPATIENT)
Dept: CT IMAGING | Facility: HOSPITAL | Age: 78
End: 2023-12-16
Payer: MEDICARE

## 2023-12-16 ENCOUNTER — APPOINTMENT (OUTPATIENT)
Dept: GENERAL RADIOLOGY | Facility: HOSPITAL | Age: 78
End: 2023-12-16
Payer: MEDICARE

## 2023-12-16 VITALS
HEART RATE: 78 BPM | DIASTOLIC BLOOD PRESSURE: 76 MMHG | BODY MASS INDEX: 21.35 KG/M2 | OXYGEN SATURATION: 99 % | HEIGHT: 62 IN | TEMPERATURE: 98 F | SYSTOLIC BLOOD PRESSURE: 144 MMHG | RESPIRATION RATE: 20 BRPM | WEIGHT: 116 LBS

## 2023-12-16 DIAGNOSIS — N30.90 CYSTITIS: ICD-10-CM

## 2023-12-16 DIAGNOSIS — R41.82 ALTERED MENTAL STATUS, UNSPECIFIED ALTERED MENTAL STATUS TYPE: Primary | ICD-10-CM

## 2023-12-16 LAB
ALBUMIN SERPL-MCNC: 4.5 G/DL (ref 3.5–5.2)
ALBUMIN/GLOB SERPL: 2 G/DL
ALP SERPL-CCNC: 61 U/L (ref 39–117)
ALT SERPL W P-5'-P-CCNC: 20 U/L (ref 1–33)
AMPHET+METHAMPHET UR QL: NEGATIVE
AMPHETAMINES UR QL: NEGATIVE
ANION GAP SERPL CALCULATED.3IONS-SCNC: 10 MMOL/L (ref 5–15)
AST SERPL-CCNC: 28 U/L (ref 1–32)
B PARAPERT DNA SPEC QL NAA+PROBE: NOT DETECTED
B PERT DNA SPEC QL NAA+PROBE: NOT DETECTED
BACTERIA UR QL AUTO: ABNORMAL /HPF
BARBITURATES UR QL SCN: NEGATIVE
BASOPHILS # BLD AUTO: 0.01 10*3/MM3 (ref 0–0.2)
BASOPHILS NFR BLD AUTO: 0.1 % (ref 0–1.5)
BENZODIAZ UR QL SCN: NEGATIVE
BILIRUB SERPL-MCNC: 0.3 MG/DL (ref 0–1.2)
BILIRUB UR QL STRIP: NEGATIVE
BUN SERPL-MCNC: 25 MG/DL (ref 8–23)
BUN/CREAT SERPL: 23.8 (ref 7–25)
BUPRENORPHINE SERPL-MCNC: NEGATIVE NG/ML
C PNEUM DNA NPH QL NAA+NON-PROBE: NOT DETECTED
CALCIUM SPEC-SCNC: 9.8 MG/DL (ref 8.6–10.5)
CANNABINOIDS SERPL QL: NEGATIVE
CHLORIDE SERPL-SCNC: 99 MMOL/L (ref 98–107)
CLARITY UR: CLEAR
CO2 SERPL-SCNC: 26 MMOL/L (ref 22–29)
COCAINE UR QL: NEGATIVE
COLOR UR: YELLOW
CREAT SERPL-MCNC: 1.05 MG/DL (ref 0.57–1)
DEPRECATED RDW RBC AUTO: 51.1 FL (ref 37–54)
EGFRCR SERPLBLD CKD-EPI 2021: 53.8 ML/MIN/1.73
EOSINOPHIL # BLD AUTO: 0.03 10*3/MM3 (ref 0–0.4)
EOSINOPHIL NFR BLD AUTO: 0.3 % (ref 0.3–6.2)
ERYTHROCYTE [DISTWIDTH] IN BLOOD BY AUTOMATED COUNT: 14.6 % (ref 12.3–15.4)
FENTANYL UR-MCNC: NEGATIVE NG/ML
FLUAV SUBTYP SPEC NAA+PROBE: NOT DETECTED
FLUBV RNA ISLT QL NAA+PROBE: NOT DETECTED
GLOBULIN UR ELPH-MCNC: 2.2 GM/DL
GLUCOSE SERPL-MCNC: 108 MG/DL (ref 65–99)
GLUCOSE UR STRIP-MCNC: NEGATIVE MG/DL
HADV DNA SPEC NAA+PROBE: NOT DETECTED
HCOV 229E RNA SPEC QL NAA+PROBE: NOT DETECTED
HCOV HKU1 RNA SPEC QL NAA+PROBE: NOT DETECTED
HCOV NL63 RNA SPEC QL NAA+PROBE: NOT DETECTED
HCOV OC43 RNA SPEC QL NAA+PROBE: NOT DETECTED
HCT VFR BLD AUTO: 37 % (ref 34–46.6)
HGB BLD-MCNC: 11.9 G/DL (ref 12–15.9)
HGB UR QL STRIP.AUTO: NEGATIVE
HMPV RNA NPH QL NAA+NON-PROBE: NOT DETECTED
HPIV1 RNA ISLT QL NAA+PROBE: NOT DETECTED
HPIV2 RNA SPEC QL NAA+PROBE: NOT DETECTED
HPIV3 RNA NPH QL NAA+PROBE: NOT DETECTED
HPIV4 P GENE NPH QL NAA+PROBE: NOT DETECTED
HYALINE CASTS UR QL AUTO: ABNORMAL /LPF
IMM GRANULOCYTES # BLD AUTO: 0.03 10*3/MM3 (ref 0–0.05)
IMM GRANULOCYTES NFR BLD AUTO: 0.3 % (ref 0–0.5)
KETONES UR QL STRIP: NEGATIVE
LEUKOCYTE ESTERASE UR QL STRIP.AUTO: ABNORMAL
LYMPHOCYTES # BLD AUTO: 1.15 10*3/MM3 (ref 0.7–3.1)
LYMPHOCYTES NFR BLD AUTO: 9.9 % (ref 19.6–45.3)
M PNEUMO IGG SER IA-ACNC: NOT DETECTED
MAGNESIUM SERPL-MCNC: 2.3 MG/DL (ref 1.6–2.4)
MCH RBC QN AUTO: 30.6 PG (ref 26.6–33)
MCHC RBC AUTO-ENTMCNC: 32.2 G/DL (ref 31.5–35.7)
MCV RBC AUTO: 95.1 FL (ref 79–97)
METHADONE UR QL SCN: NEGATIVE
MONOCYTES # BLD AUTO: 1.23 10*3/MM3 (ref 0.1–0.9)
MONOCYTES NFR BLD AUTO: 10.6 % (ref 5–12)
NEUTROPHILS NFR BLD AUTO: 78.8 % (ref 42.7–76)
NEUTROPHILS NFR BLD AUTO: 9.18 10*3/MM3 (ref 1.7–7)
NITRITE UR QL STRIP: NEGATIVE
NRBC BLD AUTO-RTO: 0 /100 WBC (ref 0–0.2)
OPIATES UR QL: NEGATIVE
OXYCODONE UR QL SCN: NEGATIVE
PCP UR QL SCN: NEGATIVE
PH UR STRIP.AUTO: 6.5 [PH] (ref 5–8)
PLATELET # BLD AUTO: 299 10*3/MM3 (ref 140–450)
PMV BLD AUTO: 9.3 FL (ref 6–12)
POTASSIUM SERPL-SCNC: 4.7 MMOL/L (ref 3.5–5.2)
PROT SERPL-MCNC: 6.7 G/DL (ref 6–8.5)
PROT UR QL STRIP: NEGATIVE
RBC # BLD AUTO: 3.89 10*6/MM3 (ref 3.77–5.28)
RBC # UR STRIP: ABNORMAL /HPF
REF LAB TEST METHOD: ABNORMAL
RHINOVIRUS RNA SPEC NAA+PROBE: NOT DETECTED
RSV RNA NPH QL NAA+NON-PROBE: NOT DETECTED
SARS-COV-2 RNA NPH QL NAA+NON-PROBE: NOT DETECTED
SODIUM SERPL-SCNC: 135 MMOL/L (ref 136–145)
SP GR UR STRIP: 1.01 (ref 1–1.03)
SQUAMOUS #/AREA URNS HPF: ABNORMAL /HPF
TRICYCLICS UR QL SCN: NEGATIVE
TSH SERPL DL<=0.05 MIU/L-ACNC: 1.16 UIU/ML (ref 0.27–4.2)
UROBILINOGEN UR QL STRIP: ABNORMAL
WBC # UR STRIP: ABNORMAL /HPF
WBC NRBC COR # BLD AUTO: 11.63 10*3/MM3 (ref 3.4–10.8)

## 2023-12-16 PROCEDURE — 87086 URINE CULTURE/COLONY COUNT: CPT

## 2023-12-16 PROCEDURE — 84443 ASSAY THYROID STIM HORMONE: CPT

## 2023-12-16 PROCEDURE — 80053 COMPREHEN METABOLIC PANEL: CPT

## 2023-12-16 PROCEDURE — 81001 URINALYSIS AUTO W/SCOPE: CPT

## 2023-12-16 PROCEDURE — 85025 COMPLETE CBC W/AUTO DIFF WBC: CPT

## 2023-12-16 PROCEDURE — 74176 CT ABD & PELVIS W/O CONTRAST: CPT

## 2023-12-16 PROCEDURE — 83735 ASSAY OF MAGNESIUM: CPT

## 2023-12-16 PROCEDURE — 93005 ELECTROCARDIOGRAM TRACING: CPT

## 2023-12-16 PROCEDURE — 80307 DRUG TEST PRSMV CHEM ANLYZR: CPT

## 2023-12-16 PROCEDURE — 0202U NFCT DS 22 TRGT SARS-COV-2: CPT

## 2023-12-16 PROCEDURE — 71045 X-RAY EXAM CHEST 1 VIEW: CPT

## 2023-12-16 PROCEDURE — 99284 EMERGENCY DEPT VISIT MOD MDM: CPT

## 2023-12-16 PROCEDURE — 25010000002 CEFTRIAXONE PER 250 MG

## 2023-12-16 PROCEDURE — 96365 THER/PROPH/DIAG IV INF INIT: CPT

## 2023-12-16 PROCEDURE — 70450 CT HEAD/BRAIN W/O DYE: CPT

## 2023-12-16 RX ORDER — CEFDINIR 300 MG/1
300 CAPSULE ORAL 2 TIMES DAILY
Qty: 14 CAPSULE | Refills: 0 | Status: SHIPPED | OUTPATIENT
Start: 2023-12-16 | End: 2023-12-23

## 2023-12-16 RX ADMIN — CEFTRIAXONE 1000 MG: 1 INJECTION, POWDER, FOR SOLUTION INTRAMUSCULAR; INTRAVENOUS at 19:24

## 2023-12-16 NOTE — ED PROVIDER NOTES
Subjective   History of Present Illness  Patient is an 78-year-old female who presents emergency department with her son.  The son is at the bedside helping provide history.  Son states that yesterday the patient was outside walking and asking the neighbors where they were at.  He states that they took a home UTI test which was positive.  Son states that 2 months ago patient had a UTI and had similar symptoms with altered mental status and delusions.  Patient reports that her urine has been dark, she denies any dysuria or frequency.  She also reports that she has been fatigued for the past couple of months.  Patient also reports left-sided flank pain, denies any abdominal pain.  Denies fevers.  Denies any diarrhea.  Denies nausea or vomiting.        Review of Systems   Constitutional:  Positive for fatigue.   Genitourinary:  Positive for flank pain.   Psychiatric/Behavioral:  Positive for confusion.    All other systems reviewed and are negative.      Past Medical History:   Diagnosis Date    A-fib     Abnormal nuclear stress test 8/23/2019    Angina pectoris     Anxiety     Arthritis     Atherosclerosis of coronary artery bypass graft     Carotid artery occlusion without infarction     Chronic kidney disease     Chronic lung disease     Colon polyp     Diabetes mellitus     DJD (degenerative joint disease)     Essential hypertension 3/24/2017    Fibrocystic breast disease     Gastritis     GI bleed     Hemorrhoids     Hyperlipidemia     Hypertension     Lung disease     chronic    MI (myocardial infarction)     Palpitations     PUD (peptic ulcer disease)     PVD (peripheral vascular disease)     Renal failure, acute     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    Iodine Unknown - High Severity    Salicylates Angioedema    Shellfish-Derived Products Unknown - High Severity    Codeine Nausea And  Vomiting and Hallucinations    Demerol [Meperidine] Nausea And Vomiting and Hallucinations       Past Surgical History:   Procedure Laterality Date    ABLATION OF DYSRHYTHMIC FOCUS      May 2021 in [place]      APPENDECTOMY      CARDIAC CATHETERIZATION  05/01/2009    Left-Heart Skin    CARDIAC CATHETERIZATION N/A 2/4/2021    Procedure: Left Heart Cath;  Surgeon: Tristan Cabello DO;  Location:  [facility] CATH [facility];  Service: Cardiology;  Laterality: N/A;    CARDIAC CATHETERIZATION Right 2/5/2021    Procedure: Left Heart Cath, rotablator to RCA with Dr. Abrams at 1PM;  Surgeon: Tristan Cabello DO;  Location:  [facility]ATH [facility];  Service: Cardiology;  Laterality: Right;    CAROTID ENDARTERECTOMY      CATARACT EXTRACTION      CATARACT EXTRACTION      COLONOSCOPY      CORONARY ARTERY BYPASS GRAFT  07/2007    Four    ENDOSCOPY  10/02/2013    ENDOSCOPY  10/02/2013    HYSTERECTOMY      SKIN CANCER EXCISION      THROMBOENDARTERECTOMY      TONSILLECTOMY         Family History   Problem Relation Age of Onset    Heart disease Mother     Breast cancer Mother     Heart disease Father     Heart disease Brother     Heart disease Brother     Breast cancer Maternal Grandmother     Colon cancer Neg Hx        Social History     Socioeconomic History    Marital status:    Tobacco Use    Smoking status: Never    Smokeless tobacco: Never    Tobacco comments:     Non smoker; no tobacco use   Vaping Use    Vaping Use: Never used   Substance and Sexual Activity    Alcohol use: Not Currently    Drug use: Not Currently    Sexual activity: Defer           Objective   Physical Exam  Vitals and nursing note reviewed.   Constitutional:       General: She is not in acute distress.     Appearance: Normal appearance. She is normal weight. She is not ill-appearing or toxic-appearing.   HENT:      Head: Normocephalic.   Eyes:      Extraocular Movements: Extraocular movements intact.      Right  eye: No nystagmus.      Left eye: No nystagmus.      Conjunctiva/sclera: Conjunctivae normal.      Pupils: Pupils are equal, round, and reactive to light.   Cardiovascular:      Rate and Rhythm: Normal rate and regular rhythm.      Pulses: Normal pulses.      Heart sounds: Normal heart sounds.   Pulmonary:      Effort: Pulmonary effort is normal.      Breath sounds: Normal breath sounds.   Abdominal:      General: Abdomen is flat. Bowel sounds are normal. There is no distension.      Palpations: Abdomen is soft.      Tenderness: There is no abdominal tenderness. There is left CVA tenderness. There is no right CVA tenderness.   Musculoskeletal:         General: Normal range of motion.      Cervical back: Normal range of motion and neck supple.   Skin:     General: Skin is warm and dry.   Neurological:      General: No focal deficit present.      Mental Status: She is alert and oriented to person, place, and time.      GCS: GCS eye subscore is 4. GCS verbal subscore is 5. GCS motor subscore is 6.      Sensory: Sensation is intact.      Motor: Motor function is intact.      Comments:  strength bilaterally.  Dorsiflexion and plantarflexion strong laterally.   Psychiatric:         Mood and Affect: Mood normal.         Behavior: Behavior normal.         Thought Content: Thought content normal.         Judgment: Judgment normal.         Procedures           ED Course                                             Medical Decision Making  Patient is an 78-year-old female who presents emergency department with her son.  The son is at the bedside helping provide history.  Son states that yesterday the patient was outside walking and asking the neighbors where they were at.  He states that they took a home UTI test which was positive.  Son states that 2 months ago patient had a UTI and had similar symptoms with altered mental status and delusions.  Patient reports that her urine has been dark, she denies any dysuria or  frequency.  She also reports that she has been fatigued for the past couple of months.  Patient also reports left-sided flank pain, denies any abdominal pain.  Denies fevers.  Denies any diarrhea.  Denies nausea or vomiting.    Patient was non-toxic and not-ill appearing on arrival. Vital signs stable.     Patient's presentation raises suspicion for differentials including, but not limited to, urinary tract infection, pneumonia, electrolyte imbalance, acute intracranial abnormality.     External (non-ED) record review: None    Given this, Sondra was placed on the monitor. Laboratory studies and imaging studies were ordered including CBC, CMP, magnesium, TSH, urinalysis, urine drug screen, respiratory panel, EKG, chest x-ray, CT head without contrast, CT abdomen pelvis with contrast.     Sondra was given IV Rocephin in the ER.    Imaging was reviewed by radiologist.  Chest x-ray revealed no acute findings.  CT abdomen pelvis revealed No acute abdominopelvic finding. Cholelithiasis without evidence of cholecystitis. Moderate volume stool in the rectum and colon.  CT head revealed No acute intracranial findings. Global cerebral volume loss and presumed chronic microvascular ischemic white matter change. Focal encephalomalacia in the right frontal lobe.    Labs were reviewed.  CBC with mild leukocytosis, hemoglobin 11.9.  CMP with creatinine of 1.05, BUN 25, sodium 135.  TSH normal.  Magnesium normal.  Urine drug screen negative.  Respiratory panel negative.  Urinalysis with no bacteria, 6-10 WBCs.  On re-evaluation, patient remained hemodynamically stable and appeared to be comfortable and in no acute distress.    Given findings described above, patient's presentation is most likely related to urinary tract infection.  Patient was given IV Rocephin and sent home on cefdinir.  She was also advised to stay hydrated at home given mild renal insufficiency.  At this point, after reviewing the workup, I have a low suspicion  for pneumonia or viral illness based on x-ray findings and respiratory panel results.  Lungs were clear bilaterally on exam.  Low suspicion for intracranial abnormality based on CT results.  Patient was neurologically intact on exam.  She was answering all questions appropriately and following all commands.    I discussed all of the lab and imaging results with the patient during this visit in the emergency department. I answered all the questions regarding the emergency department evaluation, diagnosis, and treatment plan. We talked about how crucial it is for the patient to follow up by calling their primary care provider as soon as possible to schedule an appointment for within the next few days or as soon as possible so that the symptoms can be reassessed to see if they have improved or to answer any additional questions. I also provided the patient with advice on returning safely and urged the patient to visit the emergency department right away if any worsening or new symptoms appeared. The patient verbalized understanding of the discharge instructions and agreed with them. Sondra was discharged in stable condition.    Signed by:   RANDA Santana 12/17/2023 08:43 CST     Dragon disclaimer:  Part of this note may be an electronic transcription/translation of spoken language to printed text using the Dragon Dictation System.    Problems Addressed:  Altered mental status, unspecified altered mental status type: acute illness or injury  Cystitis: acute illness or injury    Amount and/or Complexity of Data Reviewed  Labs: ordered.  Radiology: ordered.  ECG/medicine tests: ordered.    Risk  Prescription drug management.        Final diagnoses:   Altered mental status, unspecified altered mental status type   Cystitis       ED Disposition  ED Disposition       ED Disposition   Discharge    Condition   Stable    Comment   --               Matthieu Bhakta, DO  [address] [place] [address]  [phone]    Schedule  an appointment as soon as possible for a visit in 1 day      [facility] EMERGENCY DEPARTMENT  [address] [place] [id number] [phone]  Go to   If symptoms worsen         Medication List        New Prescriptions      cefdinir 300 MG capsule  Commonly known as: OMNICEF  Take 1 capsule by mouth 2 (Two) Times a Day for 7 days.               Where to Get Your Medications        These medications were sent to [facility]/pharmacy #[id number] - [place], [place] - [address] [place] [address] [phone] [facility] [phone] [address] [address]      Phone: [phone]   cefdinir 300 MG capsule            Jazz Cleary, RANDA  12/17/23 0849

## 2023-12-17 NOTE — DISCHARGE INSTRUCTIONS
It was very nice to meet you, Sondra. Thank you for allowing us to take care of you today at [facility].    Please understand that an ER evaluation is just the start of your evaluation. We will do what we can, but we are often unable to fully figure out what is causing your symptoms from one evaluation. Thus, our primary goal is to determine whether you need to be evaluated in the hospital or if it is safe for you to go home and see other doctors such as a primary care physician or a specialist on an outpatient basis.     Like we discussed, it is VERY IMPORTANT that you follow up with your primary care doctor (call them to set up an appointment) within the next few days or as soon as possible so that you can be re-evaluated for improvement in your symptoms or for any other questions.     A copy of your results should be included in your paperwork. If you were prescribed any medications, please take them as directed or call us back with any questions.    Please return to the emergency room within 12-48 hours if you experience any new or worsening symptoms.  Please start the antibiotic tomorrow.  Please also stay well-hydrated.

## 2023-12-18 ENCOUNTER — PREP FOR SURGERY (OUTPATIENT)
Dept: OTHER | Facility: HOSPITAL | Age: 78
End: 2023-12-18
Payer: MEDICARE

## 2023-12-18 ENCOUNTER — TELEPHONE (OUTPATIENT)
Dept: CARDIOLOGY | Facility: CLINIC | Age: 78
End: 2023-12-18
Payer: MEDICARE

## 2023-12-18 DIAGNOSIS — I35.0 AORTIC STENOSIS, SEVERE: Primary | ICD-10-CM

## 2023-12-18 LAB
BACTERIA SPEC AEROBE CULT: NORMAL
QT INTERVAL: 374 MS
QTC INTERVAL: 400 MS

## 2023-12-18 RX ORDER — SODIUM CHLORIDE 0.9 % (FLUSH) 0.9 %
10 SYRINGE (ML) INJECTION AS NEEDED
Status: CANCELLED | OUTPATIENT
Start: 2023-12-18

## 2023-12-18 RX ORDER — PREDNISONE 50 MG/1
50 TABLET ORAL TAKE AS DIRECTED
Qty: 3 TABLET | Refills: 0 | Status: SHIPPED | OUTPATIENT
Start: 2023-12-18

## 2023-12-18 RX ORDER — SODIUM CHLORIDE 0.9 % (FLUSH) 0.9 %
10 SYRINGE (ML) INJECTION EVERY 12 HOURS SCHEDULED
Status: CANCELLED | OUTPATIENT
Start: 2023-12-18

## 2023-12-18 NOTE — TELEPHONE ENCOUNTER
Call to pt's son, Samm as listed in the chart. I wanted to let him know that pt needs a heart cath with Dr. Gallo and I would like to work on getting that scheduled, but I saw that she was in the ER over the weekend. He reports that she seems to be doing better and is agreeable to proceed.

## 2023-12-21 ENCOUNTER — TELEPHONE (OUTPATIENT)
Dept: CARDIOLOGY | Facility: CLINIC | Age: 78
End: 2023-12-21
Payer: MEDICARE

## 2023-12-21 NOTE — TELEPHONE ENCOUNTER
Call to pt's daughter regarding dental visit. She has an appt made for her mom and will let me know after it is completed.

## 2023-12-22 ENCOUNTER — HOSPITAL ENCOUNTER (OUTPATIENT)
Facility: HOSPITAL | Age: 78
Setting detail: HOSPITAL OUTPATIENT SURGERY
Discharge: HOME | End: 2023-12-22
Attending: INTERNAL MEDICINE | Admitting: INTERNAL MEDICINE
Payer: MEDICARE

## 2023-12-22 VITALS
SYSTOLIC BLOOD PRESSURE: 160 MMHG | HEART RATE: 67 BPM | DIASTOLIC BLOOD PRESSURE: 76 MMHG | TEMPERATURE: 97.4 F | RESPIRATION RATE: 16 BRPM | OXYGEN SATURATION: 100 %

## 2023-12-22 DIAGNOSIS — I35.0 AORTIC STENOSIS, SEVERE: ICD-10-CM

## 2023-12-22 LAB
ALBUMIN SERPL-MCNC: 4.4 G/DL (ref 3.5–5.2)
ALBUMIN/GLOB SERPL: 1.9 G/DL
ALP SERPL-CCNC: 51 U/L (ref 39–117)
ALT SERPL W P-5'-P-CCNC: 17 U/L (ref 1–33)
ANION GAP SERPL CALCULATED.3IONS-SCNC: 10 MMOL/L (ref 5–15)
AST SERPL-CCNC: 22 U/L (ref 1–32)
BILIRUB SERPL-MCNC: 0.4 MG/DL (ref 0–1.2)
BUN SERPL-MCNC: 15 MG/DL (ref 8–23)
BUN/CREAT SERPL: 15.3 (ref 7–25)
CALCIUM SPEC-SCNC: 9.4 MG/DL (ref 8.6–10.5)
CHLORIDE SERPL-SCNC: 100 MMOL/L (ref 98–107)
CO2 SERPL-SCNC: 25 MMOL/L (ref 22–29)
CREAT SERPL-MCNC: 0.98 MG/DL (ref 0.57–1)
DEPRECATED RDW RBC AUTO: 51 FL (ref 37–54)
EGFRCR SERPLBLD CKD-EPI 2021: 58.5 ML/MIN/1.73
ERYTHROCYTE [DISTWIDTH] IN BLOOD BY AUTOMATED COUNT: 14.4 % (ref 12.3–15.4)
GLOBULIN UR ELPH-MCNC: 2.3 GM/DL
GLUCOSE SERPL-MCNC: 141 MG/DL (ref 65–99)
HCT VFR BLD AUTO: 38.4 % (ref 34–46.6)
HGB BLD-MCNC: 12.1 G/DL (ref 12–15.9)
MCH RBC QN AUTO: 30.3 PG (ref 26.6–33)
MCHC RBC AUTO-ENTMCNC: 31.5 G/DL (ref 31.5–35.7)
MCV RBC AUTO: 96.2 FL (ref 79–97)
PLATELET # BLD AUTO: 288 10*3/MM3 (ref 140–450)
PMV BLD AUTO: 9.4 FL (ref 6–12)
POTASSIUM SERPL-SCNC: 4.4 MMOL/L (ref 3.5–5.2)
PROT SERPL-MCNC: 6.7 G/DL (ref 6–8.5)
RBC # BLD AUTO: 3.99 10*6/MM3 (ref 3.77–5.28)
SODIUM SERPL-SCNC: 135 MMOL/L (ref 136–145)
WBC NRBC COR # BLD AUTO: 10.17 10*3/MM3 (ref 3.4–10.8)

## 2023-12-22 PROCEDURE — 80053 COMPREHEN METABOLIC PANEL: CPT | Performed by: INTERNAL MEDICINE

## 2023-12-22 PROCEDURE — 85027 COMPLETE CBC AUTOMATED: CPT | Performed by: INTERNAL MEDICINE

## 2023-12-22 PROCEDURE — 25010000002 DIPHENHYDRAMINE PER 50 MG: Performed by: INTERNAL MEDICINE

## 2023-12-22 PROCEDURE — 93799 UNLISTED CV SVC/PROCEDURE: CPT | Performed by: INTERNAL MEDICINE

## 2023-12-22 PROCEDURE — 25810000003 SODIUM CHLORIDE 0.9 % SOLUTION: Performed by: INTERNAL MEDICINE

## 2023-12-22 PROCEDURE — C1894 INTRO/SHEATH, NON-LASER: HCPCS | Performed by: INTERNAL MEDICINE

## 2023-12-22 PROCEDURE — C1769 GUIDE WIRE: HCPCS | Performed by: INTERNAL MEDICINE

## 2023-12-22 PROCEDURE — 93455 CORONARY ART/GRFT ANGIO S&I: CPT | Performed by: INTERNAL MEDICINE

## 2023-12-22 PROCEDURE — 25010000002 FENTANYL CITRATE (PF) 50 MCG/ML SOLUTION: Performed by: INTERNAL MEDICINE

## 2023-12-22 PROCEDURE — 99152 MOD SED SAME PHYS/QHP 5/>YRS: CPT | Performed by: INTERNAL MEDICINE

## 2023-12-22 PROCEDURE — 99153 MOD SED SAME PHYS/QHP EA: CPT

## 2023-12-22 PROCEDURE — 25010000002 MIDAZOLAM PER 1 MG: Performed by: INTERNAL MEDICINE

## 2023-12-22 PROCEDURE — 25510000001 IOPAMIDOL PER 1 ML: Performed by: INTERNAL MEDICINE

## 2023-12-22 PROCEDURE — C1760 CLOSURE DEV, VASC: HCPCS | Performed by: INTERNAL MEDICINE

## 2023-12-22 PROCEDURE — C1887 CATHETER, GUIDING: HCPCS | Performed by: INTERNAL MEDICINE

## 2023-12-22 RX ORDER — SODIUM CHLORIDE 9 MG/ML
40 INJECTION, SOLUTION INTRAVENOUS AS NEEDED
Status: CANCELLED | OUTPATIENT
Start: 2023-12-22

## 2023-12-22 RX ORDER — SODIUM CHLORIDE 0.9 % (FLUSH) 0.9 %
10 SYRINGE (ML) INJECTION AS NEEDED
Status: DISCONTINUED | OUTPATIENT
Start: 2023-12-22 | End: 2023-12-22 | Stop reason: HOSPADM

## 2023-12-22 RX ORDER — ACETAMINOPHEN 325 MG/1
650 TABLET ORAL EVERY 4 HOURS PRN
Status: CANCELLED | OUTPATIENT
Start: 2023-12-22

## 2023-12-22 RX ORDER — MIDAZOLAM HYDROCHLORIDE 1 MG/ML
INJECTION INTRAMUSCULAR; INTRAVENOUS
Status: DISCONTINUED | OUTPATIENT
Start: 2023-12-22 | End: 2023-12-22 | Stop reason: HOSPADM

## 2023-12-22 RX ORDER — SODIUM CHLORIDE 9 MG/ML
100 INJECTION, SOLUTION INTRAVENOUS CONTINUOUS
Status: CANCELLED | OUTPATIENT
Start: 2023-12-22

## 2023-12-22 RX ORDER — SODIUM CHLORIDE 0.9 % (FLUSH) 0.9 %
10 SYRINGE (ML) INJECTION AS NEEDED
Status: CANCELLED | OUTPATIENT
Start: 2023-12-22

## 2023-12-22 RX ORDER — DIPHENHYDRAMINE HYDROCHLORIDE 50 MG/ML
INJECTION INTRAMUSCULAR; INTRAVENOUS
Status: DISCONTINUED | OUTPATIENT
Start: 2023-12-22 | End: 2023-12-22 | Stop reason: HOSPADM

## 2023-12-22 RX ORDER — SODIUM CHLORIDE 0.9 % (FLUSH) 0.9 %
10 SYRINGE (ML) INJECTION EVERY 12 HOURS SCHEDULED
Status: CANCELLED | OUTPATIENT
Start: 2023-12-22

## 2023-12-22 RX ORDER — SODIUM CHLORIDE 0.9 % (FLUSH) 0.9 %
10 SYRINGE (ML) INJECTION EVERY 12 HOURS SCHEDULED
Status: DISCONTINUED | OUTPATIENT
Start: 2023-12-22 | End: 2023-12-22 | Stop reason: HOSPADM

## 2023-12-22 RX ORDER — LIDOCAINE HYDROCHLORIDE 20 MG/ML
INJECTION, SOLUTION INFILTRATION; PERINEURAL
Status: DISCONTINUED | OUTPATIENT
Start: 2023-12-22 | End: 2023-12-22 | Stop reason: HOSPADM

## 2023-12-22 RX ORDER — FENTANYL CITRATE 50 UG/ML
INJECTION, SOLUTION INTRAMUSCULAR; INTRAVENOUS
Status: DISCONTINUED | OUTPATIENT
Start: 2023-12-22 | End: 2023-12-22 | Stop reason: HOSPADM

## 2023-12-22 RX ADMIN — SODIUM CHLORIDE 157.8 ML: 9 INJECTION, SOLUTION INTRAVENOUS at 12:58

## 2023-12-22 NOTE — INTERVAL H&P NOTE
H&P reviewed. The patient was examined and there are no changes to the H&P.    Recommend cardiac catheterization, selective coronary angiography, left ventriculography and percutaneous coronary intervention with application of arteriotomy hemostatic closure device.    I discussed cardiac catheterization, the procedure, risks (including bleeding, infection, vascular damage [including minor oozing, bruising, bleeding, and up to and including but not limited to the need for vascular surgery, emergency cardiothoracic surgery, contrast reaction, renal failure, respiratory failure, heart attack, stroke, arrhythmia and even death), benefits, and alternatives and the patient has voiced understanding and is willing to proceed.    Adequate pre-hydration and post cardiac catheterization hydration.  Premedications as required and indicated for cardiac catheterization.    No contraindication to drug eluting stent placement if required  Further recommendations pending results of cardiac catheterization

## 2023-12-22 NOTE — Clinical Note
In addition to senokot and colace will add milk of mag prn - patient requesting as well Prepped: groin. Prepped with: Hibiclens. The site was clipped. The patient was draped in a sterile fashion.

## 2024-01-11 RX ORDER — APIXABAN 5 MG/1
TABLET, FILM COATED ORAL
Qty: 60 TABLET | Refills: 11 | Status: SHIPPED | OUTPATIENT
Start: 2024-01-11

## 2024-01-18 ENCOUNTER — TELEPHONE (OUTPATIENT)
Dept: CARDIOLOGY | Facility: CLINIC | Age: 79
End: 2024-01-18
Payer: MEDICARE

## 2024-01-18 NOTE — TELEPHONE ENCOUNTER
Call to pt's daughter to let her know that her mom needs to have her dental work done before we do her TAVR. She verbalized understanding.

## 2024-01-26 ENCOUNTER — TELEPHONE (OUTPATIENT)
Dept: CARDIOLOGY | Facility: CLINIC | Age: 79
End: 2024-01-26
Payer: MEDICARE

## 2024-02-01 ENCOUNTER — TELEPHONE (OUTPATIENT)
Dept: CARDIOLOGY | Facility: CLINIC | Age: 79
End: 2024-02-01
Payer: MEDICARE

## 2024-02-01 ENCOUNTER — TELEPHONE (OUTPATIENT)
Dept: VASCULAR SURGERY | Facility: CLINIC | Age: 79
End: 2024-02-01
Payer: MEDICARE

## 2024-02-01 NOTE — TELEPHONE ENCOUNTER
Call to the pt's dentist to get a clearance letter for TAVR. They will fax over to me.    Call to the pt's daughter to let her know. Informed her that we are planning her TAVR for 2/27/24. She verbalized understanding.

## 2024-02-02 ENCOUNTER — HOSPITAL ENCOUNTER (OUTPATIENT)
Dept: ULTRASOUND IMAGING | Facility: HOSPITAL | Age: 79
Discharge: HOME | End: 2024-02-02
Payer: MEDICARE

## 2024-02-02 ENCOUNTER — OFFICE VISIT (OUTPATIENT)
Dept: VASCULAR SURGERY | Facility: CLINIC | Age: 79
End: 2024-02-02
Payer: MEDICARE

## 2024-02-02 VITALS
OXYGEN SATURATION: 98 % | HEART RATE: 64 BPM | WEIGHT: 112 LBS | SYSTOLIC BLOOD PRESSURE: 132 MMHG | HEIGHT: 62 IN | BODY MASS INDEX: 20.61 KG/M2 | DIASTOLIC BLOOD PRESSURE: 74 MMHG

## 2024-02-02 DIAGNOSIS — E78.2 MIXED HYPERLIPIDEMIA: ICD-10-CM

## 2024-02-02 DIAGNOSIS — I65.23 BILATERAL CAROTID ARTERY STENOSIS: ICD-10-CM

## 2024-02-02 DIAGNOSIS — I65.23 BILATERAL CAROTID ARTERY STENOSIS: Primary | ICD-10-CM

## 2024-02-02 DIAGNOSIS — I10 ESSENTIAL HYPERTENSION: ICD-10-CM

## 2024-02-02 PROCEDURE — 93880 EXTRACRANIAL BILAT STUDY: CPT

## 2024-02-02 PROCEDURE — 3075F SYST BP GE 130 - 139MM HG: CPT | Performed by: NURSE PRACTITIONER

## 2024-02-02 PROCEDURE — 99214 OFFICE O/P EST MOD 30 MIN: CPT | Performed by: NURSE PRACTITIONER

## 2024-02-02 PROCEDURE — 3078F DIAST BP <80 MM HG: CPT | Performed by: NURSE PRACTITIONER

## 2024-02-02 PROCEDURE — 1159F MED LIST DOCD IN RCRD: CPT | Performed by: NURSE PRACTITIONER

## 2024-02-02 PROCEDURE — 1160F RVW MEDS BY RX/DR IN RCRD: CPT | Performed by: NURSE PRACTITIONER

## 2024-02-07 ENCOUNTER — TELEPHONE (OUTPATIENT)
Dept: CARDIOLOGY | Facility: CLINIC | Age: 79
End: 2024-02-07
Payer: MEDICARE

## 2024-02-07 NOTE — TELEPHONE ENCOUNTER
I received a call from the pt's dentist this week letting me know that he would not sign off for her to have surgery until she had her dental work completed.    I called and spoke with the pt's daughter. Educated regarding the importance of good dental health prior to a valve replacement. She verbalized understanding. She will contact the dental office and set up the appt.     I will reach out to Dr. Gallo regarding medications that can be held for dental work since she is on Eliquis and Brilinta.

## 2024-02-09 ENCOUNTER — TELEPHONE (OUTPATIENT)
Dept: CARDIOLOGY | Facility: CLINIC | Age: 79
End: 2024-02-09
Payer: MEDICARE

## 2024-02-09 NOTE — TELEPHONE ENCOUNTER
Call to pt's daughter to let her know the medication instructions from Dr. Gallo for dental work. She was instructed to call me if the dentist had any issues doing dental work on Brilinta.    She verbalized understanding and will call me once she has an appt made.

## 2024-02-09 NOTE — TELEPHONE ENCOUNTER
----- Message from Gualberto Gallo MD sent at 2/8/2024  5:00 AM CST -----  If you can let her know she can have 1-2 teeth pulled at the time  Stay on Brilinta 60 mg p.o. twice daily  Hold Eliquis for 2 days prior to dental extractions  If oral surgeon is uncomfortable with this let me know  Thank you  ----- Message -----  From: Valorie Irizarry RN  Sent: 2/7/2024   9:15 AM CST  To: Gualberto Gallo MD    This pt needs to have some dental work done prior to TAVR. She needs to have a couple of teeth pulled and some fillings re-done because they have broken down. She needs to know if she can hold her Eliquis and Brilinta. She had stents in 2021 and is allergic to ASA. ASA causes angioedema.    Please let me know what your recommendations are and I will let the pt and the dentist know.    Thanks!

## 2024-02-19 ENCOUNTER — TELEPHONE (OUTPATIENT)
Dept: CARDIOLOGY | Facility: CLINIC | Age: 79
End: 2024-02-19
Payer: MEDICARE

## 2024-03-07 ENCOUNTER — TELEPHONE (OUTPATIENT)
Dept: CARDIOLOGY | Facility: CLINIC | Age: 79
End: 2024-03-07
Payer: MEDICARE

## 2024-03-07 NOTE — TELEPHONE ENCOUNTER
Call to the pt's daughter to see if she was able to have her dental work done. She was. We talked about doing her TAVR on March 19. She was agreeable to proceed.    I called the dental office ([facility]) and requested a letter of clearance. One should get faxed over today.

## 2024-03-08 ENCOUNTER — TELEPHONE (OUTPATIENT)
Dept: CARDIOLOGY | Facility: CLINIC | Age: 79
End: 2024-03-08
Payer: MEDICARE

## 2024-03-08 ENCOUNTER — PREP FOR SURGERY (OUTPATIENT)
Dept: OTHER | Facility: HOSPITAL | Age: 79
End: 2024-03-08
Payer: MEDICARE

## 2024-03-08 DIAGNOSIS — R06.02 SHORTNESS OF BREATH: ICD-10-CM

## 2024-03-08 DIAGNOSIS — I35.0 AORTIC STENOSIS, SEVERE: Primary | ICD-10-CM

## 2024-03-08 RX ORDER — NITROGLYCERIN 0.4 MG/1
0.4 TABLET SUBLINGUAL
OUTPATIENT
Start: 2024-03-08

## 2024-03-08 RX ORDER — PREDNISONE 50 MG/1
50 TABLET ORAL TAKE AS DIRECTED
Qty: 3 TABLET | Refills: 0 | Status: SHIPPED | OUTPATIENT
Start: 2024-03-08

## 2024-03-08 RX ORDER — CLOPIDOGREL BISULFATE 75 MG/1
75 TABLET ORAL DAILY
Qty: 30 TABLET | Refills: 11 | Status: SHIPPED | OUTPATIENT
Start: 2024-03-08

## 2024-03-08 RX ORDER — SODIUM CHLORIDE 0.9 % (FLUSH) 0.9 %
10 SYRINGE (ML) INJECTION EVERY 12 HOURS SCHEDULED
OUTPATIENT
Start: 2024-03-08

## 2024-03-08 RX ORDER — SODIUM CHLORIDE 0.9 % (FLUSH) 0.9 %
10 SYRINGE (ML) INJECTION AS NEEDED
OUTPATIENT
Start: 2024-03-08

## 2024-03-08 RX ORDER — CLOPIDOGREL BISULFATE 75 MG/1
300 TABLET ORAL ONCE
OUTPATIENT
Start: 2024-03-08 | End: 2024-03-08

## 2024-03-08 RX ORDER — SODIUM CHLORIDE 9 MG/ML
40 INJECTION, SOLUTION INTRAVENOUS AS NEEDED
OUTPATIENT
Start: 2024-03-08

## 2024-03-08 NOTE — TELEPHONE ENCOUNTER
Call to pt's daughter to let her know that I sent in her pre med for TAVR and her IV dye allergy. She verbalized understanding.    I also let the pt know that we will be switching her from Brilinta to Plavix 1 week before TAVR. Explained that we do this to decrease the risk of bleeding in case of emergency during the procedure. Gave detailed instructions about how to take it. She verbalized understanding.

## 2024-03-18 ENCOUNTER — HOSPITAL ENCOUNTER (OUTPATIENT)
Dept: GENERAL RADIOLOGY | Facility: HOSPITAL | Age: 79
Discharge: HOME | End: 2024-03-18
Payer: MEDICARE

## 2024-03-18 ENCOUNTER — PRE-ADMISSION TESTING (OUTPATIENT)
Dept: PREADMISSION TESTING | Facility: HOSPITAL | Age: 79
DRG: 267 | End: 2024-03-18
Payer: MEDICARE

## 2024-03-18 VITALS
SYSTOLIC BLOOD PRESSURE: 138 MMHG | WEIGHT: 114.64 LBS | DIASTOLIC BLOOD PRESSURE: 62 MMHG | HEART RATE: 64 BPM | OXYGEN SATURATION: 100 % | BODY MASS INDEX: 22.51 KG/M2 | HEIGHT: 60 IN | RESPIRATION RATE: 18 BRPM

## 2024-03-18 DIAGNOSIS — R06.02 SHORTNESS OF BREATH: ICD-10-CM

## 2024-03-18 DIAGNOSIS — I35.0 AORTIC STENOSIS, SEVERE: ICD-10-CM

## 2024-03-18 LAB
ABO GROUP BLD: NORMAL
ALBUMIN SERPL-MCNC: 4.3 G/DL (ref 3.5–5.2)
ALBUMIN/GLOB SERPL: 1.8 G/DL
ALP SERPL-CCNC: 65 U/L (ref 39–117)
ALT SERPL W P-5'-P-CCNC: 17 U/L (ref 1–33)
ANION GAP SERPL CALCULATED.3IONS-SCNC: 7 MMOL/L (ref 5–15)
AST SERPL-CCNC: 20 U/L (ref 1–32)
BASOPHILS # BLD AUTO: 0.02 10*3/MM3 (ref 0–0.2)
BASOPHILS NFR BLD AUTO: 0.3 % (ref 0–1.5)
BILIRUB SERPL-MCNC: 0.3 MG/DL (ref 0–1.2)
BLD GP AB SCN SERPL QL: NEGATIVE
BUN SERPL-MCNC: 14 MG/DL (ref 8–23)
BUN/CREAT SERPL: 16.3 (ref 7–25)
CALCIUM SPEC-SCNC: 9.4 MG/DL (ref 8.6–10.5)
CHLORIDE SERPL-SCNC: 98 MMOL/L (ref 98–107)
CO2 SERPL-SCNC: 27 MMOL/L (ref 22–29)
CREAT SERPL-MCNC: 0.86 MG/DL (ref 0.57–1)
DEPRECATED RDW RBC AUTO: 45.9 FL (ref 37–54)
EGFRCR SERPLBLD CKD-EPI 2021: 68 ML/MIN/1.73
EOSINOPHIL # BLD AUTO: 0.11 10*3/MM3 (ref 0–0.4)
EOSINOPHIL NFR BLD AUTO: 1.9 % (ref 0.3–6.2)
ERYTHROCYTE [DISTWIDTH] IN BLOOD BY AUTOMATED COUNT: 13.5 % (ref 12.3–15.4)
GLOBULIN UR ELPH-MCNC: 2.4 GM/DL
GLUCOSE SERPL-MCNC: 83 MG/DL (ref 65–99)
HCT VFR BLD AUTO: 39 % (ref 34–46.6)
HGB BLD-MCNC: 12.8 G/DL (ref 12–15.9)
IMM GRANULOCYTES # BLD AUTO: 0.02 10*3/MM3 (ref 0–0.05)
IMM GRANULOCYTES NFR BLD AUTO: 0.3 % (ref 0–0.5)
INR PPP: 0.96 (ref 0.91–1.09)
LYMPHOCYTES # BLD AUTO: 1.08 10*3/MM3 (ref 0.7–3.1)
LYMPHOCYTES NFR BLD AUTO: 18.7 % (ref 19.6–45.3)
MCH RBC QN AUTO: 30.6 PG (ref 26.6–33)
MCHC RBC AUTO-ENTMCNC: 32.8 G/DL (ref 31.5–35.7)
MCV RBC AUTO: 93.3 FL (ref 79–97)
MONOCYTES # BLD AUTO: 0.68 10*3/MM3 (ref 0.1–0.9)
MONOCYTES NFR BLD AUTO: 11.7 % (ref 5–12)
NEUTROPHILS NFR BLD AUTO: 3.88 10*3/MM3 (ref 1.7–7)
NEUTROPHILS NFR BLD AUTO: 67.1 % (ref 42.7–76)
NRBC BLD AUTO-RTO: 0 /100 WBC (ref 0–0.2)
NT-PROBNP SERPL-MCNC: 1050 PG/ML (ref 0–1800)
PLATELET # BLD AUTO: 286 10*3/MM3 (ref 140–450)
PMV BLD AUTO: 9.2 FL (ref 6–12)
POTASSIUM SERPL-SCNC: 4.7 MMOL/L (ref 3.5–5.2)
PROT SERPL-MCNC: 6.7 G/DL (ref 6–8.5)
PROTHROMBIN TIME: 13.2 SECONDS (ref 11.8–14.8)
RBC # BLD AUTO: 4.18 10*6/MM3 (ref 3.77–5.28)
RH BLD: POSITIVE
SODIUM SERPL-SCNC: 132 MMOL/L (ref 136–145)
T&S EXPIRATION DATE: NORMAL
WBC NRBC COR # BLD AUTO: 5.79 10*3/MM3 (ref 3.4–10.8)

## 2024-03-18 PROCEDURE — 93010 ELECTROCARDIOGRAM REPORT: CPT | Performed by: INTERNAL MEDICINE

## 2024-03-18 PROCEDURE — 85610 PROTHROMBIN TIME: CPT

## 2024-03-18 PROCEDURE — 85025 COMPLETE CBC W/AUTO DIFF WBC: CPT

## 2024-03-18 PROCEDURE — 80053 COMPREHEN METABOLIC PANEL: CPT

## 2024-03-18 PROCEDURE — 86901 BLOOD TYPING SEROLOGIC RH(D): CPT | Performed by: INTERNAL MEDICINE

## 2024-03-18 PROCEDURE — 71046 X-RAY EXAM CHEST 2 VIEWS: CPT

## 2024-03-18 PROCEDURE — 93005 ELECTROCARDIOGRAM TRACING: CPT

## 2024-03-18 PROCEDURE — 83880 ASSAY OF NATRIURETIC PEPTIDE: CPT

## 2024-03-18 PROCEDURE — 86900 BLOOD TYPING SEROLOGIC ABO: CPT | Performed by: INTERNAL MEDICINE

## 2024-03-18 PROCEDURE — 36415 COLL VENOUS BLD VENIPUNCTURE: CPT

## 2024-03-18 PROCEDURE — 86850 RBC ANTIBODY SCREEN: CPT | Performed by: INTERNAL MEDICINE

## 2024-03-18 NOTE — DISCHARGE INSTRUCTIONS
Preparing for Surgery  Follow these instructions before the procedure:  FOLLOW ELIAS'S INSTRUCTIONS RE: BLOOD THINNERS (ELIQUS AND PLAVIX)  Ask your health care provider about:  Changing or stopping your regular medicines. This is especially important if you are taking diabetes medicines or blood thinners.  Taking medicines such as aspirin and non-steroidal anti-inflammatory drugs (NSAIDS) such as ibuprofen that can thin your blood. Do not take these medicines unless your health care provider tells you to take them.  Taking over-the-counter medicines, vitamins, herbs, and supplements.  Contact your surgeon if you:  Develop a fever of more than 100.4°F (38°C) or other feelings of illness during the 48 hours before your surgery.  Have symptoms that get worse.  Have questions or concerns about your surgery.  If you are going home the same day of your surgery you will need to arrange for a responsible adult, age 18 years old or older, to drive you home from the hospital and stay with you for 24 hours. Verification of the  will be made prior to any procedure requiring sedation. You may not go home in a taxi or any form of public transportation by yourself.     Day before your procedure  Medication(s) you need to stop the day before your surgery:PRDNISONE, BENADRYL THE WAY THEY ARE PRESCRIBED.  HOLD YOUR ENTRESTO 24 HRS PRIOR TO SURGERY UNLESS YOU HAVE ALREADY TAKEN FOR THE DAY.    24 hours before your procedure DO NOT drink alcoholic beverages or smoke.  You may be asked to shower with a germ-killing soap.  Day of your procedure   You may take the following medication(s) the morning of surgery with a sip of water: FOLLOW YOUR INSTRUCTIONS RE: PREDNISONE, BENADRYL, METOPROLOL      8 hours before your procedure STOP all food, any dairy products, and full liquids. This includes hard candy, chewing gum or mints. This is extremely important to prevent serious complications.   Up to 2 hours before your scheduled  arrival time, you may have clear liquids no cream, powder, or pulp of any kind. Safe options are water, black coffee, plain tea, soda, Gatorade/Powerade, clear broth, apple juice.  2 hours before your scheduled arrival time, STOP drinking clear liquids.  You may need to take another shower with a germ-killing soap before you leave home in the morning. Do not use perfumes, colognes, or body lotions.  Wear comfortable loose-fitting clothing.  Remove all jewelry including body piercing and rings, dark colored nail polish, and make up prior to arrival at the hospital. Leave all valuables at home.   Bring your hearing aids if you rely on them.  Do not wear contact lenses. If you wear eyeglasses remember to bring a case to store them in while you are in surgery.  Do not use denture adhesives since you will be asked to remove them during your surgery.    You do not need to bring your home medications into the hospital.   Bring your sleep apnea device with you on the day of your surgery (if this applies to you).  If you wear portable oxygen, bring it with you.   If you are staying overnight, you may bring a bag of items you may need such as slippers, robe and a change of clothes for your discharge. You may want to leave these items in the car until you are ready for them since your family will take your belongings when you leave the pre-operative area.  Arrive at the hospital as scheduled by the office. You will be asked to arrive 2 hours prior to your surgery time in order to prepare for your procedure.  When you arrive at the hospital  Go to the registration desk located at the main entrance of the hospital.  After registration is completed, you will be given a beeper and a sticker sheet. Take the stickers to Outpatient Surgery and place in the tray at the end of the desk to notify the staff that you have arrived and registered.   Return to the lobby to wait. You are not always called back according to the time of arrival  but rather the time your doctor will be ready.  When your beeper lights up and vibrates proceed through the double doors, under the stairs, and a member of the Outpatient Surgery staff will escort you to your preoperative room.   How to Use Chlorhexidine Before Surgery  Chlorhexidine gluconate (CHG) is a germ-killing (antiseptic) solution that is used to clean the skin. It can get rid of the bacteria that normally live on the skin and can keep them away for about 24 hours. To clean your skin with CHG, you may be given:  A CHG solution to use in the shower or as part of a sponge bath.  A prepackaged cloth that contains CHG.  Cleaning your skin with CHG may help lower the risk for infection:  While you are staying in the intensive care unit of the hospital.  If you have a vascular access, such as a central line, to provide short-term or long-term access to your veins.  If you have a catheter to drain urine from your bladder.  If you are on a ventilator. A ventilator is a machine that helps you breathe by moving air in and out of your lungs.  After surgery.  What are the risks?  Risks of using CHG include:  A skin reaction.  Hearing loss, if CHG gets in your ears and you have a perforated eardrum.  Eye injury, if CHG gets in your eyes and is not rinsed out.  The CHG product catching fire.  Make sure that you avoid smoking and flames after applying CHG to your skin.  Do not use CHG:  If you have a chlorhexidine allergy or have previously reacted to chlorhexidine.  On babies younger than 2 months of age.  How to use CHG solution  Use CHG only as told by your health care provider, and follow the instructions on the label.  Use the full amount of CHG as directed. Usually, this is one bottle.  During a shower    Follow these steps when using CHG solution during a shower (unless your health care provider gives you different instructions):  Start the shower.  Use your normal soap and shampoo to wash your face and hair.  Turn  off the shower or move out of the shower stream.  Pour the CHG onto a clean washcloth. Do not use any type of brush or rough-edged sponge.  Starting at your neck, lather your body down to your toes. Make sure you follow these instructions:  If you will be having surgery, pay special attention to the part of your body where you will be having surgery. Scrub this area for at least 1 minute.  Do not use CHG on your head or face. If the solution gets into your ears or eyes, rinse them well with water.  Avoid your genital area.  Avoid any areas of skin that have broken skin, cuts, or scrapes.  Scrub your back and under your arms. Make sure to wash skin folds.  Let the lather sit on your skin for 1-2 minutes or as long as told by your health care provider.  Thoroughly rinse your entire body in the shower. Make sure that all body creases and crevices are rinsed well.  Dry off with a clean towel. Do not put any substances on your body afterward--such as powder, lotion, or perfume--unless you are told to do so by your health care provider. Only use lotions that are recommended by the .  Put on clean clothes or pajamas.  If it is the night before your surgery, sleep in clean sheets.     During a sponge bath  Follow these steps when using CHG solution during a sponge bath (unless your health care provider gives you different instructions):  Use your normal soap and shampoo to wash your face and hair.  Pour the CHG onto a clean washcloth.  Starting at your neck, lather your body down to your toes. Make sure you follow these instructions:  If you will be having surgery, pay special attention to the part of your body where you will be having surgery. Scrub this area for at least 1 minute.  Do not use CHG on your head or face. If the solution gets into your ears or eyes, rinse them well with water.  Avoid your genital area.  Avoid any areas of skin that have broken skin, cuts, or scrapes.  Scrub your back and under  your arms. Make sure to wash skin folds.  Let the lather sit on your skin for 1-2 minutes or as long as told by your health care provider.  Using a different clean, wet washcloth, thoroughly rinse your entire body. Make sure that all body creases and crevices are rinsed well.  Dry off with a clean towel. Do not put any substances on your body afterward--such as powder, lotion, or perfume--unless you are told to do so by your health care provider. Only use lotions that are recommended by the .  Put on clean clothes or pajamas.  If it is the night before your surgery, sleep in clean sheets.  How to use CHG prepackaged cloths  Only use CHG cloths as told by your health care provider, and follow the instructions on the label.  Use the CHG cloth on clean, dry skin.  Do not use the CHG cloth on your head or face unless your health care provider tells you to.  When washing with the CHG cloth:  Avoid your genital area.  Avoid any areas of skin that have broken skin, cuts, or scrapes.  Before surgery    Follow these steps when using a CHG cloth to clean before surgery (unless your health care provider gives you different instructions):  Using the CHG cloth, vigorously scrub the part of your body where you will be having surgery. Scrub using a back-and-forth motion for 3 minutes. The area on your body should be completely wet with CHG when you are done scrubbing.  Do not rinse. Discard the cloth and let the area air-dry. Do not put any substances on the area afterward, such as powder, lotion, or perfume.  Put on clean clothes or pajamas.  If it is the night before your surgery, sleep in clean sheets.     For general bathing  Follow these steps when using CHG cloths for general bathing (unless your health care provider gives you different instructions).  Use a separate CHG cloth for each area of your body. Make sure you wash between any folds of skin and between your fingers and toes. Wash your body in the  following order, switching to a new cloth after each step:  The front of your neck, shoulders, and chest.  Both of your arms, under your arms, and your hands.  Your stomach and groin area, avoiding the genitals.  Your right leg and foot.  Your left leg and foot.  The back of your neck, your back, and your buttocks.  Do not rinse. Discard the cloth and let the area air-dry. Do not put any substances on your body afterward--such as powder, lotion, or perfume--unless you are told to do so by your health care provider. Only use lotions that are recommended by the .  Put on clean clothes or pajamas.  Contact a health care provider if:  Your skin gets irritated after scrubbing.  You have questions about using your solution or cloth.  You swallow any chlorhexidine. Call your local poison control center (1-813.167.8510 in the [place]  Get help right away if:  Your eyes itch badly, or they become very red or swollen.  Your skin itches badly and is red or swollen.  Your hearing changes.  You have trouble seeing.  You have swelling or tingling in your mouth or throat.  You have trouble breathing.  These symptoms may represent a serious problem that is an emergency. Do not wait to see if the symptoms will go away. Get medical help right away. Call your local emergency services (317 in the [place] Do not drive yourself to the hospital.  Summary  Chlorhexidine gluconate (CHG) is a germ-killing (antiseptic) solution that is used to clean the skin. Cleaning your skin with CHG may help to lower your risk for infection.  You may be given CHG to use for bathing. It may be in a bottle or in a prepackaged cloth to use on your skin. Carefully follow your health care provider's instructions and the instructions on the product label.  Do not use CHG if you have a chlorhexidine allergy.  Contact your health care provider if your skin gets irritated after scrubbing.  This information is not intended to replace advice given to you  by your health care provider. Make sure you discuss any questions you have with your health care provider.  Document Revised: 04/17/2023 Document Reviewed: 02/28/2022  Elsevier Patient Education © 2023 [employer]

## 2024-03-19 ENCOUNTER — ANESTHESIA EVENT (OUTPATIENT)
Dept: PERIOP | Facility: HOSPITAL | Age: 79
End: 2024-03-19
Payer: MEDICARE

## 2024-03-19 ENCOUNTER — ANESTHESIA (OUTPATIENT)
Dept: PERIOP | Facility: HOSPITAL | Age: 79
End: 2024-03-19
Payer: MEDICARE

## 2024-03-19 ENCOUNTER — APPOINTMENT (OUTPATIENT)
Dept: GENERAL RADIOLOGY | Facility: HOSPITAL | Age: 79
DRG: 267 | End: 2024-03-19
Payer: MEDICARE

## 2024-03-19 ENCOUNTER — HOSPITAL ENCOUNTER (INPATIENT)
Facility: HOSPITAL | Age: 79
LOS: 2 days | Discharge: HOME | DRG: 267 | End: 2024-03-21
Attending: INTERNAL MEDICINE | Admitting: INTERNAL MEDICINE
Payer: MEDICARE

## 2024-03-19 ENCOUNTER — HOSPITAL ENCOUNTER (OUTPATIENT)
Dept: CARDIOLOGY | Facility: HOSPITAL | Age: 79
Discharge: HOME | End: 2024-03-19
Payer: MEDICARE

## 2024-03-19 DIAGNOSIS — I35.0 AORTIC STENOSIS, SEVERE: ICD-10-CM

## 2024-03-19 DIAGNOSIS — Z95.2 S/P TAVR (TRANSCATHETER AORTIC VALVE REPLACEMENT): Primary | ICD-10-CM

## 2024-03-19 DIAGNOSIS — I48.0 PAROXYSMAL ATRIAL FIBRILLATION: Chronic | ICD-10-CM

## 2024-03-19 DIAGNOSIS — I44.7 LBBB (LEFT BUNDLE BRANCH BLOCK): ICD-10-CM

## 2024-03-19 LAB
ABO GROUP BLD: NORMAL
ANION GAP SERPL CALCULATED.3IONS-SCNC: 10 MMOL/L (ref 5–15)
APTT PPP: 38.8 SECONDS (ref 24.5–36)
BH BB BLOOD EXPIRATION DATE: NORMAL
BH BB BLOOD EXPIRATION DATE: NORMAL
BH BB BLOOD TYPE BARCODE: 5100
BH BB BLOOD TYPE BARCODE: 5100
BH BB DISPENSE STATUS: NORMAL
BH BB DISPENSE STATUS: NORMAL
BH BB PRODUCT CODE: NORMAL
BH BB PRODUCT CODE: NORMAL
BH BB UNIT NUMBER: NORMAL
BH BB UNIT NUMBER: NORMAL
BH CV ECHO MEAS - AO MAX PG: 49.6 MMHG
BH CV ECHO MEAS - AO MEAN PG: 19 MMHG
BH CV ECHO MEAS - AO ROOT DIAM: 3.3 CM
BH CV ECHO MEAS - AO V2 MAX: 352 CM/SEC
BH CV ECHO MEAS - AO V2 VTI: 64.5 CM
BH CV ECHO MEAS - AVA(I,D): 0.77 CM2
BH CV ECHO MEAS - EDV(CUBED): 103.8 ML
BH CV ECHO MEAS - ESV(CUBED): 24.4 ML
BH CV ECHO MEAS - FS: 38.3 %
BH CV ECHO MEAS - IVS/LVPW: 1.18 CM
BH CV ECHO MEAS - IVSD: 1.3 CM
BH CV ECHO MEAS - LA DIMENSION: 3.4 CM
BH CV ECHO MEAS - LV MASS(C)D: 212 GRAMS
BH CV ECHO MEAS - LV MAX PG: 1.5 MMHG
BH CV ECHO MEAS - LV MEAN PG: 1 MMHG
BH CV ECHO MEAS - LV V1 MAX: 61.3 CM/SEC
BH CV ECHO MEAS - LV V1 VTI: 15.9 CM
BH CV ECHO MEAS - LVIDD: 4.7 CM
BH CV ECHO MEAS - LVIDS: 2.9 CM
BH CV ECHO MEAS - LVOT AREA: 3.1 CM2
BH CV ECHO MEAS - LVOT DIAM: 2 CM
BH CV ECHO MEAS - LVPWD: 1.1 CM
BH CV ECHO MEAS - SV(LVOT): 50 ML
BLD GP AB SCN SERPL QL: NEGATIVE
BUN SERPL-MCNC: 16 MG/DL (ref 8–23)
BUN/CREAT SERPL: 20 (ref 7–25)
CALCIUM SPEC-SCNC: 8.5 MG/DL (ref 8.6–10.5)
CHLORIDE SERPL-SCNC: 97 MMOL/L (ref 98–107)
CO2 SERPL-SCNC: 22 MMOL/L (ref 22–29)
CREAT SERPL-MCNC: 0.8 MG/DL (ref 0.57–1)
CROSSMATCH INTERPRETATION: NORMAL
CROSSMATCH INTERPRETATION: NORMAL
DEPRECATED RDW RBC AUTO: 44.2 FL (ref 37–54)
EGFRCR SERPLBLD CKD-EPI 2021: 74.1 ML/MIN/1.73
ERYTHROCYTE [DISTWIDTH] IN BLOOD BY AUTOMATED COUNT: 13.3 % (ref 12.3–15.4)
GLUCOSE BLDC GLUCOMTR-MCNC: 137 MG/DL (ref 70–130)
GLUCOSE BLDC GLUCOMTR-MCNC: 155 MG/DL (ref 70–130)
GLUCOSE BLDC GLUCOMTR-MCNC: 189 MG/DL (ref 70–130)
GLUCOSE SERPL-MCNC: 141 MG/DL (ref 65–99)
HCT VFR BLD AUTO: 30.9 % (ref 34–46.6)
HGB BLD-MCNC: 10.5 G/DL (ref 12–15.9)
INR PPP: 1.15 (ref 0.91–1.09)
MAGNESIUM SERPL-MCNC: 2 MG/DL (ref 1.6–2.4)
MCH RBC QN AUTO: 31 PG (ref 26.6–33)
MCHC RBC AUTO-ENTMCNC: 34 G/DL (ref 31.5–35.7)
MCV RBC AUTO: 91.2 FL (ref 79–97)
PLATELET # BLD AUTO: 210 10*3/MM3 (ref 140–450)
PMV BLD AUTO: 8.9 FL (ref 6–12)
POTASSIUM SERPL-SCNC: 4.1 MMOL/L (ref 3.5–5.2)
PROTHROMBIN TIME: 15.1 SECONDS (ref 11.8–14.8)
QT INTERVAL: 410 MS
QTC INTERVAL: 399 MS
RBC # BLD AUTO: 3.39 10*6/MM3 (ref 3.77–5.28)
RH BLD: POSITIVE
SODIUM SERPL-SCNC: 129 MMOL/L (ref 136–145)
T&S EXPIRATION DATE: NORMAL
UNIT  ABO: NORMAL
UNIT  ABO: NORMAL
UNIT  RH: NORMAL
UNIT  RH: NORMAL
WBC NRBC COR # BLD AUTO: 6.65 10*3/MM3 (ref 3.4–10.8)

## 2024-03-19 PROCEDURE — 85610 PROTHROMBIN TIME: CPT | Performed by: INTERNAL MEDICINE

## 2024-03-19 PROCEDURE — 25010000002 FENTANYL CITRATE (PF) 100 MCG/2ML SOLUTION: Performed by: NURSE ANESTHETIST, CERTIFIED REGISTERED

## 2024-03-19 PROCEDURE — 85027 COMPLETE CBC AUTOMATED: CPT | Performed by: INTERNAL MEDICINE

## 2024-03-19 PROCEDURE — 85730 THROMBOPLASTIN TIME PARTIAL: CPT | Performed by: INTERNAL MEDICINE

## 2024-03-19 PROCEDURE — 93325 DOPPLER ECHO COLOR FLOW MAPG: CPT | Performed by: INTERNAL MEDICINE

## 2024-03-19 PROCEDURE — 86900 BLOOD TYPING SEROLOGIC ABO: CPT | Performed by: INTERNAL MEDICINE

## 2024-03-19 PROCEDURE — 93005 ELECTROCARDIOGRAM TRACING: CPT | Performed by: INTERNAL MEDICINE

## 2024-03-19 PROCEDURE — 93308 TTE F-UP OR LMTD: CPT | Performed by: INTERNAL MEDICINE

## 2024-03-19 PROCEDURE — 02RF38Z REPLACEMENT OF AORTIC VALVE WITH ZOOPLASTIC TISSUE, PERCUTANEOUS APPROACH: ICD-10-PCS | Performed by: INTERNAL MEDICINE

## 2024-03-19 PROCEDURE — 33361 REPLACE AORTIC VALVE PERQ: CPT | Performed by: INTERNAL MEDICINE

## 2024-03-19 PROCEDURE — B3101ZZ FLUOROSCOPY OF THORACIC AORTA USING LOW OSMOLAR CONTRAST: ICD-10-PCS | Performed by: INTERNAL MEDICINE

## 2024-03-19 PROCEDURE — 25010000002 PROPOFOL 200 MG/20ML EMULSION: Performed by: NURSE ANESTHETIST, CERTIFIED REGISTERED

## 2024-03-19 PROCEDURE — 25010000002 CEFAZOLIN PER 500 MG: Performed by: INTERNAL MEDICINE

## 2024-03-19 PROCEDURE — 86900 BLOOD TYPING SEROLOGIC ABO: CPT

## 2024-03-19 PROCEDURE — 25510000001 IOPAMIDOL PER 1 ML: Performed by: INTERNAL MEDICINE

## 2024-03-19 PROCEDURE — 80048 BASIC METABOLIC PNL TOTAL CA: CPT | Performed by: INTERNAL MEDICINE

## 2024-03-19 PROCEDURE — 25810000003 SODIUM CHLORIDE 0.9 % SOLUTION: Performed by: INTERNAL MEDICINE

## 2024-03-19 PROCEDURE — C1769 GUIDE WIRE: HCPCS | Performed by: INTERNAL MEDICINE

## 2024-03-19 PROCEDURE — 86901 BLOOD TYPING SEROLOGIC RH(D): CPT

## 2024-03-19 PROCEDURE — 82948 REAGENT STRIP/BLOOD GLUCOSE: CPT

## 2024-03-19 PROCEDURE — B24BZZZ ULTRASONOGRAPHY OF HEART WITH AORTA: ICD-10-PCS | Performed by: INTERNAL MEDICINE

## 2024-03-19 PROCEDURE — 33361 REPLACE AORTIC VALVE PERQ: CPT | Performed by: SURGERY

## 2024-03-19 PROCEDURE — B41D1ZZ FLUOROSCOPY OF AORTA AND BILATERAL LOWER EXTREMITY ARTERIES USING LOW OSMOLAR CONTRAST: ICD-10-PCS | Performed by: INTERNAL MEDICINE

## 2024-03-19 PROCEDURE — 71045 X-RAY EXAM CHEST 1 VIEW: CPT

## 2024-03-19 PROCEDURE — C1894 INTRO/SHEATH, NON-LASER: HCPCS | Performed by: INTERNAL MEDICINE

## 2024-03-19 PROCEDURE — 93308 TTE F-UP OR LMTD: CPT

## 2024-03-19 PROCEDURE — 86850 RBC ANTIBODY SCREEN: CPT | Performed by: INTERNAL MEDICINE

## 2024-03-19 PROCEDURE — 25010000002 NICARDIPINE 2.5 MG/ML SOLUTION: Performed by: INTERNAL MEDICINE

## 2024-03-19 PROCEDURE — 93010 ELECTROCARDIOGRAM REPORT: CPT | Performed by: INTERNAL MEDICINE

## 2024-03-19 PROCEDURE — 4A023N7 MEASUREMENT OF CARDIAC SAMPLING AND PRESSURE, LEFT HEART, PERCUTANEOUS APPROACH: ICD-10-PCS | Performed by: INTERNAL MEDICINE

## 2024-03-19 PROCEDURE — 86923 COMPATIBILITY TEST ELECTRIC: CPT

## 2024-03-19 PROCEDURE — 25010000002 PROTAMINE SULFATE PER 10 MG: Performed by: NURSE ANESTHETIST, CERTIFIED REGISTERED

## 2024-03-19 PROCEDURE — 93321 DOPPLER ECHO F-UP/LMTD STD: CPT

## 2024-03-19 PROCEDURE — 93321 DOPPLER ECHO F-UP/LMTD STD: CPT | Performed by: INTERNAL MEDICINE

## 2024-03-19 PROCEDURE — 83735 ASSAY OF MAGNESIUM: CPT | Performed by: INTERNAL MEDICINE

## 2024-03-19 PROCEDURE — 25010000002 HEPARIN (PORCINE) PER 1000 UNITS: Performed by: NURSE ANESTHETIST, CERTIFIED REGISTERED

## 2024-03-19 PROCEDURE — 25810000003 LACTATED RINGERS PER 1000 ML: Performed by: INTERNAL MEDICINE

## 2024-03-19 PROCEDURE — 25010000002 PROPOFOL 10 MG/ML EMULSION: Performed by: NURSE ANESTHETIST, CERTIFIED REGISTERED

## 2024-03-19 PROCEDURE — 93325 DOPPLER ECHO COLOR FLOW MAPG: CPT

## 2024-03-19 PROCEDURE — C1760 CLOSURE DEV, VASC: HCPCS | Performed by: INTERNAL MEDICINE

## 2024-03-19 PROCEDURE — 86901 BLOOD TYPING SEROLOGIC RH(D): CPT | Performed by: INTERNAL MEDICINE

## 2024-03-19 PROCEDURE — C1889 IMPLANT/INSERT DEVICE, NOC: HCPCS | Performed by: INTERNAL MEDICINE

## 2024-03-19 DEVICE — VLV HEART TRNSCATH SAPIEN/COMMANDER 23MM: Type: IMPLANTABLE DEVICE | Site: HEART | Status: FUNCTIONAL

## 2024-03-19 RX ORDER — HEPARIN SODIUM 1000 [USP'U]/ML
INJECTION, SOLUTION INTRAVENOUS; SUBCUTANEOUS AS NEEDED
Status: DISCONTINUED | OUTPATIENT
Start: 2024-03-19 | End: 2024-03-19 | Stop reason: SURG

## 2024-03-19 RX ORDER — SODIUM CHLORIDE, SODIUM LACTATE, POTASSIUM CHLORIDE, CALCIUM CHLORIDE 600; 310; 30; 20 MG/100ML; MG/100ML; MG/100ML; MG/100ML
100 INJECTION, SOLUTION INTRAVENOUS CONTINUOUS
Status: DISCONTINUED | OUTPATIENT
Start: 2024-03-19 | End: 2024-03-19

## 2024-03-19 RX ORDER — NITROGLYCERIN 0.4 MG/1
0.4 TABLET SUBLINGUAL
Status: DISCONTINUED | OUTPATIENT
Start: 2024-03-19 | End: 2024-03-21 | Stop reason: HOSPADM

## 2024-03-19 RX ORDER — PROTAMINE SULFATE 10 MG/ML
INJECTION, SOLUTION INTRAVENOUS AS NEEDED
Status: DISCONTINUED | OUTPATIENT
Start: 2024-03-19 | End: 2024-03-19 | Stop reason: SURG

## 2024-03-19 RX ORDER — CLOPIDOGREL BISULFATE 75 MG/1
300 TABLET ORAL ONCE
Status: COMPLETED | OUTPATIENT
Start: 2024-03-19 | End: 2024-03-19

## 2024-03-19 RX ORDER — ONDANSETRON 2 MG/ML
4 INJECTION INTRAMUSCULAR; INTRAVENOUS
Status: DISCONTINUED | OUTPATIENT
Start: 2024-03-19 | End: 2024-03-19 | Stop reason: HOSPADM

## 2024-03-19 RX ORDER — SODIUM CHLORIDE 9 MG/ML
40 INJECTION, SOLUTION INTRAVENOUS AS NEEDED
Status: DISCONTINUED | OUTPATIENT
Start: 2024-03-19 | End: 2024-03-19 | Stop reason: HOSPADM

## 2024-03-19 RX ORDER — SODIUM CHLORIDE 0.9 % (FLUSH) 0.9 %
10 SYRINGE (ML) INJECTION AS NEEDED
Status: DISCONTINUED | OUTPATIENT
Start: 2024-03-19 | End: 2024-03-19 | Stop reason: HOSPADM

## 2024-03-19 RX ORDER — SODIUM CHLORIDE, SODIUM LACTATE, POTASSIUM CHLORIDE, CALCIUM CHLORIDE 600; 310; 30; 20 MG/100ML; MG/100ML; MG/100ML; MG/100ML
1000 INJECTION, SOLUTION INTRAVENOUS CONTINUOUS
Status: DISCONTINUED | OUTPATIENT
Start: 2024-03-19 | End: 2024-03-19

## 2024-03-19 RX ORDER — FENTANYL CITRATE 50 UG/ML
INJECTION, SOLUTION INTRAMUSCULAR; INTRAVENOUS AS NEEDED
Status: DISCONTINUED | OUTPATIENT
Start: 2024-03-19 | End: 2024-03-19 | Stop reason: SURG

## 2024-03-19 RX ORDER — ACETAMINOPHEN 325 MG/1
650 TABLET ORAL EVERY 4 HOURS PRN
Status: DISCONTINUED | OUTPATIENT
Start: 2024-03-19 | End: 2024-03-21 | Stop reason: HOSPADM

## 2024-03-19 RX ORDER — CLOPIDOGREL BISULFATE 75 MG/1
300 TABLET ORAL ONCE
Status: DISCONTINUED | OUTPATIENT
Start: 2024-03-19 | End: 2024-03-19

## 2024-03-19 RX ORDER — DROPERIDOL 2.5 MG/ML
0.62 INJECTION, SOLUTION INTRAMUSCULAR; INTRAVENOUS ONCE AS NEEDED
Status: DISCONTINUED | OUTPATIENT
Start: 2024-03-19 | End: 2024-03-19 | Stop reason: HOSPADM

## 2024-03-19 RX ORDER — METOPROLOL SUCCINATE 25 MG/1
12.5 TABLET, EXTENDED RELEASE ORAL DAILY
Status: DISCONTINUED | OUTPATIENT
Start: 2024-03-20 | End: 2024-03-21 | Stop reason: HOSPADM

## 2024-03-19 RX ORDER — LIDOCAINE HYDROCHLORIDE 20 MG/ML
INJECTION, SOLUTION INFILTRATION; PERINEURAL
Status: DISCONTINUED | OUTPATIENT
Start: 2024-03-19 | End: 2024-03-19 | Stop reason: HOSPADM

## 2024-03-19 RX ORDER — SODIUM CHLORIDE 9 MG/ML
1 INJECTION, SOLUTION INTRAVENOUS CONTINUOUS
Status: DISPENSED | OUTPATIENT
Start: 2024-03-19 | End: 2024-03-19

## 2024-03-19 RX ORDER — FENTANYL CITRATE 50 UG/ML
50 INJECTION, SOLUTION INTRAMUSCULAR; INTRAVENOUS
Status: DISCONTINUED | OUTPATIENT
Start: 2024-03-19 | End: 2024-03-19 | Stop reason: HOSPADM

## 2024-03-19 RX ORDER — SODIUM CHLORIDE 0.9 % (FLUSH) 0.9 %
3 SYRINGE (ML) INJECTION EVERY 12 HOURS SCHEDULED
Status: DISCONTINUED | OUTPATIENT
Start: 2024-03-19 | End: 2024-03-19 | Stop reason: HOSPADM

## 2024-03-19 RX ORDER — SODIUM CHLORIDE 0.9 % (FLUSH) 0.9 %
3-10 SYRINGE (ML) INJECTION AS NEEDED
Status: DISCONTINUED | OUTPATIENT
Start: 2024-03-19 | End: 2024-03-19 | Stop reason: HOSPADM

## 2024-03-19 RX ORDER — NITROGLYCERIN 0.4 MG/1
0.4 TABLET SUBLINGUAL
Status: DISCONTINUED | OUTPATIENT
Start: 2024-03-19 | End: 2024-03-19 | Stop reason: HOSPADM

## 2024-03-19 RX ORDER — ONDANSETRON 2 MG/ML
4 INJECTION INTRAMUSCULAR; INTRAVENOUS EVERY 6 HOURS PRN
Status: DISCONTINUED | OUTPATIENT
Start: 2024-03-19 | End: 2024-03-21 | Stop reason: HOSPADM

## 2024-03-19 RX ORDER — PROPOFOL 10 MG/ML
INJECTION, EMULSION INTRAVENOUS AS NEEDED
Status: DISCONTINUED | OUTPATIENT
Start: 2024-03-19 | End: 2024-03-19 | Stop reason: SURG

## 2024-03-19 RX ORDER — LABETALOL HYDROCHLORIDE 5 MG/ML
5 INJECTION, SOLUTION INTRAVENOUS
Status: DISCONTINUED | OUTPATIENT
Start: 2024-03-19 | End: 2024-03-19 | Stop reason: HOSPADM

## 2024-03-19 RX ORDER — FLUMAZENIL 0.1 MG/ML
0.2 INJECTION INTRAVENOUS AS NEEDED
Status: DISCONTINUED | OUTPATIENT
Start: 2024-03-19 | End: 2024-03-19 | Stop reason: HOSPADM

## 2024-03-19 RX ORDER — DEXMEDETOMIDINE HYDROCHLORIDE 4 UG/ML
INJECTION, SOLUTION INTRAVENOUS CONTINUOUS PRN
Status: DISCONTINUED | OUTPATIENT
Start: 2024-03-19 | End: 2024-03-19 | Stop reason: SURG

## 2024-03-19 RX ORDER — SODIUM CHLORIDE 0.9 % (FLUSH) 0.9 %
3 SYRINGE (ML) INJECTION AS NEEDED
Status: DISCONTINUED | OUTPATIENT
Start: 2024-03-19 | End: 2024-03-19 | Stop reason: HOSPADM

## 2024-03-19 RX ORDER — MIDAZOLAM HYDROCHLORIDE 1 MG/ML
0.5 INJECTION INTRAMUSCULAR; INTRAVENOUS
Status: DISCONTINUED | OUTPATIENT
Start: 2024-03-19 | End: 2024-03-19 | Stop reason: HOSPADM

## 2024-03-19 RX ORDER — CLOPIDOGREL BISULFATE 75 MG/1
75 TABLET ORAL DAILY
Status: DISCONTINUED | OUTPATIENT
Start: 2024-03-20 | End: 2024-03-21 | Stop reason: HOSPADM

## 2024-03-19 RX ORDER — OXYCODONE AND ACETAMINOPHEN 10; 325 MG/1; MG/1
1 TABLET ORAL EVERY 4 HOURS PRN
Status: DISCONTINUED | OUTPATIENT
Start: 2024-03-19 | End: 2024-03-19 | Stop reason: HOSPADM

## 2024-03-19 RX ORDER — SODIUM CHLORIDE 0.9 % (FLUSH) 0.9 %
10 SYRINGE (ML) INJECTION EVERY 12 HOURS SCHEDULED
Status: DISCONTINUED | OUTPATIENT
Start: 2024-03-19 | End: 2024-03-19 | Stop reason: HOSPADM

## 2024-03-19 RX ORDER — MIDAZOLAM HYDROCHLORIDE 1 MG/ML
1 INJECTION INTRAMUSCULAR; INTRAVENOUS ONCE
Status: DISCONTINUED | OUTPATIENT
Start: 2024-03-19 | End: 2024-03-19 | Stop reason: HOSPADM

## 2024-03-19 RX ORDER — HYDROMORPHONE HYDROCHLORIDE 1 MG/ML
0.5 INJECTION, SOLUTION INTRAMUSCULAR; INTRAVENOUS; SUBCUTANEOUS
Status: DISCONTINUED | OUTPATIENT
Start: 2024-03-19 | End: 2024-03-19 | Stop reason: HOSPADM

## 2024-03-19 RX ORDER — NALOXONE HCL 0.4 MG/ML
0.04 VIAL (ML) INJECTION AS NEEDED
Status: DISCONTINUED | OUTPATIENT
Start: 2024-03-19 | End: 2024-03-19 | Stop reason: HOSPADM

## 2024-03-19 RX ORDER — LIDOCAINE HYDROCHLORIDE 10 MG/ML
0.5 INJECTION, SOLUTION EPIDURAL; INFILTRATION; INTRACAUDAL; PERINEURAL ONCE AS NEEDED
Status: DISCONTINUED | OUTPATIENT
Start: 2024-03-19 | End: 2024-03-19 | Stop reason: HOSPADM

## 2024-03-19 RX ADMIN — PROPOFOL 40 MG: 10 INJECTION, EMULSION INTRAVENOUS at 10:05

## 2024-03-19 RX ADMIN — CLOPIDOGREL BISULFATE 300 MG: 75 TABLET ORAL at 08:48

## 2024-03-19 RX ADMIN — SODIUM CHLORIDE, POTASSIUM CHLORIDE, SODIUM LACTATE AND CALCIUM CHLORIDE: 600; 310; 30; 20 INJECTION, SOLUTION INTRAVENOUS at 09:57

## 2024-03-19 RX ADMIN — DEXMEDETOMIDINE HYDROCHLORIDE 0.2 MCG/KG/HR: 4 INJECTION, SOLUTION INTRAVENOUS at 10:06

## 2024-03-19 RX ADMIN — FENTANYL CITRATE 25 MCG: 50 INJECTION, SOLUTION INTRAMUSCULAR; INTRAVENOUS at 10:38

## 2024-03-19 RX ADMIN — FENTANYL CITRATE 50 MCG: 50 INJECTION, SOLUTION INTRAMUSCULAR; INTRAVENOUS at 10:43

## 2024-03-19 RX ADMIN — NICARDIPINE HYDROCHLORIDE 5 MG/HR: 2.5 INJECTION, SOLUTION INTRAVENOUS at 12:51

## 2024-03-19 RX ADMIN — CEFAZOLIN 2 G: 2 INJECTION, POWDER, FOR SOLUTION INTRAMUSCULAR; INTRAVENOUS at 21:19

## 2024-03-19 RX ADMIN — CEFAZOLIN 2000 MG: 2 INJECTION, POWDER, FOR SOLUTION INTRAMUSCULAR; INTRAVENOUS at 10:07

## 2024-03-19 RX ADMIN — PROPOFOL 50 MCG/KG/MIN: 10 INJECTION, EMULSION INTRAVENOUS at 10:06

## 2024-03-19 RX ADMIN — SODIUM CHLORIDE 1 ML/KG/HR: 9 INJECTION, SOLUTION INTRAVENOUS at 13:37

## 2024-03-19 RX ADMIN — PROTAMINE SULFATE 25 MG: 10 INJECTION, SOLUTION INTRAVENOUS at 11:28

## 2024-03-19 RX ADMIN — SACUBITRIL AND VALSARTAN 1 TABLET: 24; 26 TABLET, FILM COATED ORAL at 21:20

## 2024-03-19 RX ADMIN — HEPARIN SODIUM 6000 UNITS: 1000 INJECTION, SOLUTION INTRAVENOUS; SUBCUTANEOUS at 10:43

## 2024-03-19 RX ADMIN — PROTAMINE SULFATE 25 MG: 10 INJECTION, SOLUTION INTRAVENOUS at 11:13

## 2024-03-19 RX ADMIN — FENTANYL CITRATE 25 MCG: 50 INJECTION, SOLUTION INTRAMUSCULAR; INTRAVENOUS at 10:05

## 2024-03-19 NOTE — PLAN OF CARE
Goal Outcome Evaluation:  Plan of Care Reviewed With: patient, son, daughter        Progress: no change  Outcome Evaluation: Patient arrived from PACU to CCU 11. Patient Slightly drowsy but easily aroused. Orientation does wax and wane but that is baseline. Arrived on 3 liters NC but currently on RA at 100 %, no SOB. Pulses obtained per doppler. Sites marked. Bilateral groin sites C/D/I. No bruising, soft, non-tender to palpation. No C/O pain. NS infusing. Cardene gtt started but currently off. Safety maintained.

## 2024-03-19 NOTE — Clinical Note
Hemostasis started on the right femoral vein. Manual pressure applied to vessel. Hemostasis achieved successfully.

## 2024-03-19 NOTE — OP NOTE
TAVR Procedure Note    Date: 3/19/2024    Pre-operative diagnosis:  Severe Symptomatic Aortic valve stenosis  Atrial Fibrillation  Chronic Diastolic Heart Failure  On Chronic Anticoagulation  Hypertension  Hyperlipidemia  Type 2 Diabetes Mellitus  Peptic Ulcer Disease    Post-operative diagnosis:  Same     Procedures:  1.  Percutaneous Transfemoral Transcatheter Aortic Valve Replacement - CPT 87128- modifier 62    Physicians:  Cardiothoracic surgeon: Reed Abdi M.D.  Structural heart cardiologist: Bhavik Cuellar M.D.    Indication: Ms. Connolly is an 79-year-old female presented with class III NYHA heart failure symptoms.  She had a history of CABG in  and has patent grafts.  She had severe symptomatic aortic stenosis with a mean gradient greater than 40 and valve area less than 1.  Her STS risk of redo sternotomy isolated aortic valve replacement was 8%, I would deem her prohibitive risk given her patent LIMA, frailty and age.  Given this we discussed the risk and benefits is well as operative expectations of TAVR, she understood and agreed to proceed.    Estimated Blood Loss: minimal    Complications: None immediate    Operative findings: Valve Used: 23 mm Qureshi MARCELA 3 ultra minimal to no perivalvular leak.  Postoperative rhythm: Sinus with small resolving right bundle alissa block.  Estimated valve landin/20.         Procedural details:  Patient was brought to the hybrid operating room and prepped and draped in the usual sterile fashion.  Time out was performed.  Antibiotics were administered.  Ultrasound guidance and micropuncture needle was used to directly access the right common femoral artery using ultrasound guidance. Femoral angiography was performed, confirming the arteriotomy site to be suitable for closure and vessel size to be appropriate for up-sizing to larger bore sheath.  A 6 Japanese sheath was placed without difficulty.  Ultrasound-guided access was obtained in the left common femoral  artery and a 6 Cymro sheath placed using same technique.  The left femoral vein was accessed using ultrasound guidance and 6 Cymro sheath was advanced without difficulty.      A 5 Cymro balloon-tipped temporary pacing wire was advanced into the right ventricular apex through venous sheath.  Balloon was deflated and capture thresholds appropriate.    We then pre-closed the right femoral arteriotomy using two Percloses at the 10 and 2 o'clock positions.  Full dose heparin was given and ACTs maintained above 250.  Sutures were secured and the wire was exchanged for an Amplatz super-stiff wire.  Over this, the 14 Cymro eSheath was advanced into position and sutured in place..  The patient was administered heparin with ACT's monitored for effective anticoagulation for TAVR implant.        Through the 6 Cymro sheath in the left CFA, a 6 Cymro angled pigtail was advanced into the RCC.  A root shot was performed and co-planar root angle perfected.  We then advanced a AL-1 catheter up the working side until in the ascending aorta.  We then used a straight guidewire to cross the valve.  The catheter was advanced into the LV and hemodynamics obtained.  The LVEDP was 10.  The straight wire was then exchanged for a precurved Amplatz extra-stiff wire which was advanced to the apex without problem.  At this point, valve timeout was performed.  All potentially necessary equipment was confirmed in the room, with valve size also confirmed. The valve had been prepped and loaded at the back table.  Orientation of valve was confirmed on back table.  Valve was loaded and advanced into the descending aorta.  Valve was loaded onto balloon then advanced to ascending aorta. Valve was then advanced across the native valve and positioned appropriately.    Rapid pacing was initiated at 180 bpm.  There was good capture and appropriate drop in blood pressure.  Root shot was confirmed and position of valve perfected. 23 mm valve was deployed  under fluoroscopic guidance.  This was a nominal inflation.  Pacing was stopped after balloon deflation and system was pulled back into the descending aorta.  A ~80/20 position was accomplished.  TTE assessment was performed and valve was felt to be well positioned with no significant perivalvular leak.  Rhythm was stable without significant block.  Root shot was performed with minimal AR.  The delivery system was removed and the 6 Greenlandic pigtail was pulled into descending aorta.        Finally, the 14 Greenlandic eSheath was removed with the 2 Percloses tied down.  Abdominal aortography with runoff to the right lower extremity was performed from the left groin.  The left access was removed and arteriotomy closed with a Perclose deployed.  The temp wire was removed and manual pressure held with excellent hemostasis.        Plans:  to PACU hemodynamically stable and extubated.    -complete surgical prophylactic antibiotics x24 hours  -ASA 81 mg daily, Plavix 75 mg daily (x3 months, unless other mitigating factors)    Reed Abdi M.D.  Cardiothoracic Surgeon

## 2024-03-19 NOTE — ANESTHESIA PROCEDURE NOTES
Arterial Line    Pre-sedation assessment completed: 3/19/2024 9:08 AM    Patient reassessed immediately prior to procedure    Patient location during procedure: pre-op  Start time: 3/19/2024 9:08 AM  Stop Time:3/19/2024 9:08 AM       Line placed for hemodynamic monitoring.  Performed By   Anesthesiologist: Donavan Paris MD   Preanesthetic Checklist  Completed: patient identified, IV checked, site marked, risks and benefits discussed, surgical consent, monitors and equipment checked, pre-op evaluation and timeout performed  Arterial Line Prep    Sterile Tech: cap, gloves and mask  Prep: ChloraPrep  Patient monitoring: blood pressure monitoring, continuous pulse oximetry and EKG  Arterial Line Procedure   Laterality:right  Location:  radial artery  Catheter size: 20 G   Guidance: ultrasound guided  PROCEDURE NOTE/ULTRASOUND INTERPRETATION.  Using ultrasound guidance the potential vascular sites for insertion of the catheter were visualized to determine the patency of the vessel to be used for vascular access.  After selecting the appropriate site for insertion, the needle was visualized under ultrasound being inserted into the radial artery, followed by ultrasound confirmation of wire and catheter placement. There were no abnormalities seen on ultrasound; an image was taken; and the patient tolerated the procedure with no complications.   Number of attempts: 1  Successful placement: yes Images: still images not obtained  Post Assessment   Dressing Type: occlusive dressing applied, secured with tape and wrist guard applied.   Complications no  Circ/Move/Sens Assessment: normal and unchanged.   Patient Tolerance: patient tolerated the procedure well with no apparent complications  Additional Notes  Wire intact. Calibrated/Connections checked, line flushed.  Appropriate waveform. Clear occlusive opsite applied over catheter insertion site. Pt remained hemodynamically stable throughout procedure.

## 2024-03-19 NOTE — ANESTHESIA POSTPROCEDURE EVALUATION
Patient: Sondra Connolly    Procedure Summary       Date: 03/19/24 Room / Location:  [facility] OR  /  [facility] OR    Anesthesia Start: 0957 Anesthesia Stop: 1141    Procedures:       Transfemoral Transcatheter Aortic Valve Replacement      TRANSFEMORAL TRANSCATHETER AORTIC VALVE REPLACEMENT (Bilateral: Chest) Diagnosis:       Aortic stenosis, severe      (Aortic stenosis, severe [I35.0])    Surgeons: Bhavik Cuellar MD; Reed Abdi MD Provider: Juan Fournier CRNA    Anesthesia Type: MAC ASA Status: 4            Anesthesia Type: MAC    Vitals  Vitals Value Taken Time   /58 03/19/24 1220   Temp 97.2 °F (36.2 °C) 03/19/24 1200   Pulse 60 03/19/24 1220   Resp 14 03/19/24 1220   SpO2 99 % 03/19/24 1220           Post Anesthesia Care and Evaluation    Patient location during evaluation: PACU  Patient participation: complete - patient participated  Level of consciousness: awake and alert  Pain management: adequate    Airway patency: patent  Anesthetic complications: No anesthetic complications    Cardiovascular status: acceptable  Respiratory status: acceptable  Hydration status: acceptable    Comments: Blood pressure 141/58, pulse 60, temperature 97.2 °F (36.2 °C), temperature source Temporal, resp. rate 14, weight 51.7 kg (113 lb 15.7 oz), SpO2 99%.    Pt discharged from PACU based on barbara score >8

## 2024-03-19 NOTE — H&P
[facility] - CARDIOLOGY  HISTORY AND PHYSICAL    Date of Admission: 3/19/2024  Primary Care Physician: Matthieu Bhakta DO    Subjective     Chief Complaint: Here for TAVR    History of Present Illness  79-year-old female presents today for planned elective transcatheter aortic valve replacement.  She was initially referred to the structural heart clinic on 11/27/2023 for symptomatic severe aortic stenosis.  She also has coronary disease including PCI to the RCA in 2021, following four-vessel CABG in 2020.  She is also managed for chronic combined systolic and diastolic congestive heart failure and, atrial fibrilation (paroxysmal) on anticoagulation.  At the time of the initial evaluation she was complaining of significant exertional dyspnea, including limiting dyspnea simply with mopping the floor and making the bed.  She was not having angina or episodes of volume overload.  She subsequently completed workup including cardiac catheterization and CT scan, showing anatomy suitable for TAVR.  She was evaluated by surgical members of the multidisciplinary heart team as well, and her case was reviewed numerous times in the structural heart weekly meeting.  Valvular intervention was delayed by need for dental care.  Having now completed that, final plans were made and she presents today for TAVR.    Since our first meeting on 11/27/2023, she reports no significant changes.  She has not been hospitalized for heart failure.  She continues to complain of class III CHF symptoms, with limiting dyspnea with very minimal activity.  She also endorses occasional orthopnea and PND, though no peripheral edema.  She describes chronic atypical chest pain that is sharp and short-lived, and has not had syncope.      Review of Systems   Respiratory:  Negative for shortness of breath and wheezing.    Cardiovascular:  Negative for chest pain, palpitations and leg swelling.   Genitourinary:  Negative for dysuria.        Has had UTIs  intermittently, but denies any recent symptoms   Psychiatric/Behavioral:  Positive for confusion.       Otherwise complete ROS reviewed and negative except as mentioned in the HPI.    Past Medical History:   Past Medical History:   Diagnosis Date    A-fib     Abnormal nuclear stress test 08/23/2019    Angina pectoris     Anxiety     Arthritis     Atherosclerosis of coronary artery bypass graft     Carotid artery occlusion without infarction     Chronic kidney disease     Chronic lung disease     Colon polyp     Diabetes mellitus     BLOOD SUGARS ARE UNDER CONTROL OFF OF ALL MEDS FOR A LONG TIME PER PT AND SON    DJD (degenerative joint disease)     Essential hypertension 03/24/2017    Fibrocystic breast disease     Gastritis     GI bleed     Hemorrhoids     Hyperlipidemia     Hypertension     Lung disease     chronic    MI (myocardial infarction)     Palpitations     PUD (peptic ulcer disease)     PVD (peripheral vascular disease)     Renal failure, acute     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       Past Surgical History:  Past Surgical History:   Procedure Laterality Date    ABLATION OF DYSRHYTHMIC FOCUS      May 2021 in [place]      APPENDECTOMY      CARDIAC CATHETERIZATION  05/01/2009    Left-Heart Skin    CARDIAC CATHETERIZATION N/A 2/4/2021    Procedure: Left Heart Cath;  Surgeon: Tristan Cabello DO;  Location:  [facility] CATH INVASIVE LOCATION;  Service: Cardiology;  Laterality: N/A;    CARDIAC CATHETERIZATION Right 2/5/2021    Procedure: Left Heart Cath, rotablator to RCA with Dr. Abrams at 1PM;  Surgeon: Tristan Cabello DO;  Location:  [facility] CATH INVASIVE LOCATION;  Service: Cardiology;  Laterality: Right;    CARDIAC CATHETERIZATION N/A 12/22/2023    Procedure: Left Heart Cath;  Surgeon: Gualberto Gallo MD;  Location:  [facility] CATH INVASIVE LOCATION;  Service: Cardiology;  Laterality: N/A;    CAROTID ENDARTERECTOMY      CATARACT EXTRACTION      CATARACT EXTRACTION      COLONOSCOPY      CORONARY  ARTERY BYPASS GRAFT  07/2007    Four    ENDOSCOPY  10/02/2013    ENDOSCOPY  10/02/2013    HYSTERECTOMY      SKIN CANCER EXCISION      THROMBOENDARTERECTOMY      TONSILLECTOMY         Family History: family history includes Breast cancer in her maternal grandmother and mother; Heart disease in her brother, brother, father, and mother.    Social History:  reports that she has never smoked. She has never used smokeless tobacco. She reports that she does not currently use alcohol. She reports that she does not currently use drugs.    Medications:  Prior to Admission medications    Medication Sig Start Date End Date Taking? Authorizing Provider   clopidogrel (PLAVIX) 75 MG tablet Take 1 tablet by mouth Daily. Start this med on March 12. Take 300mg on Day 1, then 75 mg daily. 3/8/24  Yes Bhavik Cuellar MD   diphenhydrAMINE (BENADRYL) 50 MG tablet Take 1 tablet by mouth Take As Directed. Take 50 mg 1 hour before procedure 3/8/24  Yes Bhavik Cuellar MD   Entresto 24-26 MG tablet TAKE 1 TABLET BY MOUTH TWICE A DAY 6/12/23  Yes Gualberto Gallo MD   metoprolol succinate XL (TOPROL-XL) 25 MG 24 hr tablet TAKE 1/2 TABLET BY MOUTH EVERY DAY 11/28/23  Yes Leah Webster PA   Multiple Vitamins-Minerals (MULTIVITAMIN ADULT PO) Take 1 tablet by mouth Daily.   Yes ProviderRonald MD   predniSONE (DELTASONE) 50 MG tablet Take 1 tablet by mouth Take As Directed. Take 50 mg 13 hours, 7 hours and 1 hour before procedure. 3/8/24  Yes Bhavik Cuellar MD   simvastatin (ZOCOR) 80 MG tablet Take 1 tablet by mouth Daily. 9/7/23  Yes Ronald Aguiar MD   apixaban (Eliquis) 5 MG tablet tablet TAKE 1 TABLETS BY MOUTH EVERY 12 HOURS 1/11/24   Gualberto Gallo MD   furosemide (LASIX) 20 MG tablet Take 1 tablet by mouth Daily As Needed (as needed for increased swelling, shortness of breath and/or weight gain). 4/29/21   Magda Allen APRN     Allergies:  Allergies   Allergen Reactions    Iodine Unknown - High Severity     Salicylates Angioedema    Shellfish-Derived Products Unknown - High Severity    Codeine Nausea And Vomiting and Hallucinations    Aspirin Hives     Hives and swelling    Demerol [Meperidine] Nausea And Vomiting and Hallucinations       Objective     Vital Signs: /59 (BP Location: Left arm, Patient Position: Sitting)   Pulse 63   Temp 97.5 °F (36.4 °C) (Temporal)   Resp 16   Wt 51.7 kg (113 lb 15.7 oz)   SpO2 100%   BMI 22.23 kg/m²     Vitals and nursing note reviewed.   Constitutional:       General: Not in acute distress.     Appearance: Not in distress.   Neck:      Vascular: No JVD or JVR. JVD normal.   Pulmonary:      Effort: Pulmonary effort is normal.      Breath sounds: Normal breath sounds.   Cardiovascular:      Normal rate. Regular rhythm.      Murmurs: There is a harsh midsystolic murmur at the URSB, radiating to the neck.      No gallop.  No rub.   Pulses:     Intact distal pulses.   Edema:     Peripheral edema absent.   Skin:     General: Skin is warm and dry.   Neurological:      Mental Status: Alert, oriented to person, place, and time and oriented to person, place and time.         Results Reviewed:  I have reviewed all laboratory CBC within normal limits, including hemoglobin 12.8.  INR 0.96.  Data, with pertinent findings: Sodium 132, CMP otherwise normal.  proBNP 1050.  I have reviewed the chest x-ray report with findings: No acute cardiac pulmonary disease    I have visualized all the electrocardiograms performed, with my interpretations to follow: Sinus bradycardia with rate 57, narrow QRS, no evidence of heart block    CT scan reviewed: Bilateral iliofemoral system suitable for access.  Valve size appropriate for a 23 mm S3 valve.      Assessment / Plan        Active Hospital Problems    Diagnosis     **Aortic stenosis, severe     Chronic combined systolic and diastolic congestive heart failure     Paroxysmal atrial fibrillation     Coronary artery disease of bypass graft of native  heart with stable angina pectoris     Mitral valve regurgitation     Essential hypertension     Current use of long term anticoagulation      Recommendations and plans:   -Will proceed with TAVR large-bore access of the right femoral artery for advancement and deployment of a 23 mm S3 ultra valve, with 6 Serbian access for pigtail from the left common femoral artery with temporary pacemaker wire placed from the left common femoral vein      I discussed transcatheter aortic valve replacement, the procedure, risks (including bleeding, infection, vascular damage [including minor oozing, bruising, bleeding, and up to and including the need for vascular surgery], contrast reaction, renal failure, respiratory failure, heart attack, stroke, arrhythmia and potential need for permanent pacemaker implantation, 1% risk of conversion to open sternotomy, and even death), benefits, and alternatives and the patient has voiced understanding and is willing to proceed.        Code Status: Full     I discussed the patients findings and my recommendations with: Patient and daughter    Estimated length of stay: Unknown    Bhavik Cuellar MD   03/19/24   08:30 CDT

## 2024-03-19 NOTE — ANESTHESIA PREPROCEDURE EVALUATION
Anesthesia Evaluation     NPO Solid Status: > 8 hours  NPO Liquid Status: > 8 hours           Airway   Mallampati: II  TM distance: >3 FB  Neck ROM: limited  No difficulty expected  Dental      Pulmonary - normal exam   (-) not a smoker  Cardiovascular     PT is on anticoagulation therapy  Rhythm: irregular  Rate: normal    (+) hypertension, valvular problems/murmurs AS, past MI  >12 months, CAD, CABG, cardiac stents (RCA) , dysrhythmias Atrial Fib, angina, CHF , HUMPHREY, PVD, hyperlipidemia,  carotid artery disease      Neuro/Psych  GI/Hepatic/Renal/Endo    (+) PUD, renal disease- CRI, diabetes mellitus    Musculoskeletal     Abdominal    Substance History      OB/GYN          Other   arthritis,                       Anesthesia Plan    ASA 4     MAC     intravenous induction     Anesthetic plan, risks, benefits, and alternatives have been provided, discussed and informed consent has been obtained with: patient.

## 2024-03-19 NOTE — PROGRESS NOTES
Pharmacy Renal Dose Adjustment     Assessment/Action/Plan:  Patient meets two (Age greater than 80 years old & ABW less than 60 kg) of out the three criteria for apixaban dose reduction. Based on prescribing information provided by the drug , apixaban 5 mg PO every 12 hours, has been changed to 2.5 mg PO every 12 hours. Pharmacy will continue to monitor daily and make further adjustment(s) accordingly.     Subjective:  Sondra Connolly is a 79 y.o. female     Additional Factors Considered:  Patient disposition per documentation  Disease state or condition being treated    Objective:  Ht:  ; Wt: 51.7 kg (113 lb 15.7 oz)  Estimated Creatinine Clearance: 45 mL/min (by C-G formula based on SCr of 0.8 mg/dL).   Creatinine   Date Value Ref Range Status   03/19/2024 0.80 0.57 - 1.00 mg/dL Final   03/18/2024 0.86 0.57 - 1.00 mg/dL Final   12/22/2023 0.98 0.57 - 1.00 mg/dL Final   12/15/2023 1.10 0.60 - 1.30 mg/dL Final     Comment:     Serial Number: [id number]:  [id number]       Kai Howard, Sheela  03/19/24 17:30 CDT

## 2024-03-19 NOTE — Clinical Note
PATIENT BROUGHT TO OPERATIVE SUITE AND POSITIONED IN SUPINE POSITION ON OR TABLE. 5 LEAD EKG LEADS, BALLOON PUMP LEADS, AND COMBO PADS APPLIED PER PROTOCOL. ARMS TUCKED AT SIDE AND ALL VALERIA PROMINENCES PADDED. AFTER INDUCTION OF ANESTHESIA, SHOULDER ROLL WAS PLACED TRANSVERSELY, GROUNDING PADS WERE APPLIED TO BILATERAL BUTTOCKS, AND PATIENT WAS SHAVED AND PREPPED FROM CHIN TO KNEES.

## 2024-03-19 NOTE — BRIEF OP NOTE
CV TRANSFEMORAL TRANSCATHETER AORTIC VALVE INSERTION  Progress Note    Sondra Connolly  3/19/2024    Pre-op Diagnosis:   Aortic stenosis, severe [I35.0]       Post-Op Diagnosis Codes:     * Aortic stenosis, severe [I35.0]    Procedure/CPT® Codes:        Procedure(s):  Transfemoral Transcatheter Aortic Valve Replacement  TRANSFEMORAL TRANSCATHETER AORTIC VALVE REPLACEMENT      Surgeon(s):  Bhavik Cuellar MD Ward, Austin N, MD Faulkner, Michael Wade, MD Lopez, Nicholas M, MD Ward, Austin N, MD    Anesthesia: Monitored Anesthesia Care    Staff:   Circulator: Sarah Yan RN  Scrub Person: Adrienne Fournier Amanda, R.T.(R)  Documenter: Yenifer Rivera RN         Estimated Blood Loss: minimal    Urine Voided: * No values recorded between 3/19/2024  9:57 AM and 3/19/2024 11:37 AM *    Specimens:                None          Drains:   Urethral Catheter (Active)   Daily Indications Selected surgeries ( tract, abdomen) 03/19/24 1138   Site Assessment Clean;Skin intact 03/19/24 1138   Collection Container Standard drainage bag 03/19/24 1138   Securement Method Securing device 03/19/24 1138       Findings:   Severe AS (SHAHBAZ 0.6cm2 by echo before procedure)  LVEDP 10mmHg   Invasive MG 45mmHg    23 S3 ULTRA at nominal was deployed, 80/20, with good result.  Trace PVL noted on aortic root angiography thereafter    MG by TTE afterwards: 4mmHg      Complications: none          Bhavik Cuellar MD     Date: 3/19/2024  Time: 11:46 CDT

## 2024-03-20 ENCOUNTER — APPOINTMENT (OUTPATIENT)
Dept: CARDIOLOGY | Facility: HOSPITAL | Age: 79
DRG: 267 | End: 2024-03-20
Payer: MEDICARE

## 2024-03-20 PROBLEM — E87.1 HYPONATREMIA: Status: ACTIVE | Noted: 2024-03-20

## 2024-03-20 LAB
ANION GAP SERPL CALCULATED.3IONS-SCNC: 9 MMOL/L (ref 5–15)
BACTERIA UR QL AUTO: ABNORMAL /HPF
BH CV ECHO MEAS - AO MAX PG: 16.8 MMHG
BH CV ECHO MEAS - AO MEAN PG: 8.8 MMHG
BH CV ECHO MEAS - AO V2 MAX: 205 CM/SEC
BH CV ECHO MEAS - AO V2 VTI: 41.8 CM
BH CV ECHO MEAS - EDV(MOD-SP4): 92.6 ML
BH CV ECHO MEAS - EF(MOD-SP4): 53.7 %
BH CV ECHO MEAS - ESV(MOD-SP4): 42.9 ML
BH CV ECHO MEAS - LV DIASTOLIC VOL/BSA (35-75): 63.2 CM2
BH CV ECHO MEAS - LV MAX PG: 6.3 MMHG
BH CV ECHO MEAS - LV MEAN PG: 3 MMHG
BH CV ECHO MEAS - LV SYSTOLIC VOL/BSA (12-30): 29.3 CM2
BH CV ECHO MEAS - LV V1 MAX: 125 CM/SEC
BH CV ECHO MEAS - LV V1 VTI: 24.6 CM
BH CV ECHO MEAS - MR MAX PG: 140.2 MMHG
BH CV ECHO MEAS - MR MAX VEL: 592 CM/SEC
BH CV ECHO MEAS - MR MEAN PG: 99 MMHG
BH CV ECHO MEAS - MR MEAN VEL: 475 CM/SEC
BH CV ECHO MEAS - MR VTI: 230 CM
BH CV ECHO MEAS - SI(MOD-SP4): 33.9 ML/M2
BH CV ECHO MEAS - SV(MOD-SP4): 49.7 ML
BILIRUB UR QL STRIP: NEGATIVE
BUN SERPL-MCNC: 18 MG/DL (ref 8–23)
BUN/CREAT SERPL: 18.6 (ref 7–25)
CALCIUM SPEC-SCNC: 8.8 MG/DL (ref 8.6–10.5)
CHLORIDE SERPL-SCNC: 98 MMOL/L (ref 98–107)
CLARITY UR: CLEAR
CO2 SERPL-SCNC: 24 MMOL/L (ref 22–29)
COLOR UR: YELLOW
CREAT SERPL-MCNC: 0.97 MG/DL (ref 0.57–1)
DEPRECATED RDW RBC AUTO: 45.1 FL (ref 37–54)
EGFRCR SERPLBLD CKD-EPI 2021: 58.8 ML/MIN/1.73
ERYTHROCYTE [DISTWIDTH] IN BLOOD BY AUTOMATED COUNT: 13.5 % (ref 12.3–15.4)
GLUCOSE BLDC GLUCOMTR-MCNC: 107 MG/DL (ref 70–130)
GLUCOSE BLDC GLUCOMTR-MCNC: 108 MG/DL (ref 70–130)
GLUCOSE BLDC GLUCOMTR-MCNC: 110 MG/DL (ref 70–130)
GLUCOSE BLDC GLUCOMTR-MCNC: 94 MG/DL (ref 70–130)
GLUCOSE SERPL-MCNC: 100 MG/DL (ref 65–99)
GLUCOSE UR STRIP-MCNC: NEGATIVE MG/DL
HCT VFR BLD AUTO: 34.3 % (ref 34–46.6)
HGB BLD-MCNC: 11.4 G/DL (ref 12–15.9)
HGB UR QL STRIP.AUTO: ABNORMAL
HYALINE CASTS UR QL AUTO: ABNORMAL /LPF
KETONES UR QL STRIP: NEGATIVE
LEUKOCYTE ESTERASE UR QL STRIP.AUTO: ABNORMAL
MCH RBC QN AUTO: 30.7 PG (ref 26.6–33)
MCHC RBC AUTO-ENTMCNC: 33.2 G/DL (ref 31.5–35.7)
MCV RBC AUTO: 92.5 FL (ref 79–97)
NITRITE UR QL STRIP: NEGATIVE
PH UR STRIP.AUTO: 6.5 [PH] (ref 5–8)
PLATELET # BLD AUTO: 241 10*3/MM3 (ref 140–450)
PMV BLD AUTO: 8.9 FL (ref 6–12)
POTASSIUM SERPL-SCNC: 4.1 MMOL/L (ref 3.5–5.2)
PROT UR QL STRIP: NEGATIVE
RBC # BLD AUTO: 3.71 10*6/MM3 (ref 3.77–5.28)
RBC # UR STRIP: ABNORMAL /HPF
REF LAB TEST METHOD: ABNORMAL
SODIUM SERPL-SCNC: 131 MMOL/L (ref 136–145)
SP GR UR STRIP: 1.02 (ref 1–1.03)
SQUAMOUS #/AREA URNS HPF: ABNORMAL /HPF
UROBILINOGEN UR QL STRIP: ABNORMAL
WBC # UR STRIP: ABNORMAL /HPF
WBC NRBC COR # BLD AUTO: 18 10*3/MM3 (ref 3.4–10.8)

## 2024-03-20 PROCEDURE — 93308 TTE F-UP OR LMTD: CPT | Performed by: INTERNAL MEDICINE

## 2024-03-20 PROCEDURE — 93321 DOPPLER ECHO F-UP/LMTD STD: CPT

## 2024-03-20 PROCEDURE — 82948 REAGENT STRIP/BLOOD GLUCOSE: CPT

## 2024-03-20 PROCEDURE — 80048 BASIC METABOLIC PNL TOTAL CA: CPT | Performed by: INTERNAL MEDICINE

## 2024-03-20 PROCEDURE — 93308 TTE F-UP OR LMTD: CPT

## 2024-03-20 PROCEDURE — 99232 SBSQ HOSP IP/OBS MODERATE 35: CPT | Performed by: INTERNAL MEDICINE

## 2024-03-20 PROCEDURE — 93005 ELECTROCARDIOGRAM TRACING: CPT | Performed by: INTERNAL MEDICINE

## 2024-03-20 PROCEDURE — 93321 DOPPLER ECHO F-UP/LMTD STD: CPT | Performed by: INTERNAL MEDICINE

## 2024-03-20 PROCEDURE — 93325 DOPPLER ECHO COLOR FLOW MAPG: CPT | Performed by: INTERNAL MEDICINE

## 2024-03-20 PROCEDURE — 93010 ELECTROCARDIOGRAM REPORT: CPT | Performed by: INTERNAL MEDICINE

## 2024-03-20 PROCEDURE — 81001 URINALYSIS AUTO W/SCOPE: CPT | Performed by: INTERNAL MEDICINE

## 2024-03-20 PROCEDURE — 93325 DOPPLER ECHO COLOR FLOW MAPG: CPT

## 2024-03-20 PROCEDURE — 85027 COMPLETE CBC AUTOMATED: CPT | Performed by: INTERNAL MEDICINE

## 2024-03-20 PROCEDURE — 99232 SBSQ HOSP IP/OBS MODERATE 35: CPT | Performed by: SURGERY

## 2024-03-20 RX ADMIN — CLOPIDOGREL BISULFATE 75 MG: 75 TABLET, FILM COATED ORAL at 17:32

## 2024-03-20 RX ADMIN — APIXABAN 2.5 MG: 2.5 TABLET, FILM COATED ORAL at 21:04

## 2024-03-20 RX ADMIN — APIXABAN 2.5 MG: 2.5 TABLET, FILM COATED ORAL at 09:32

## 2024-03-20 RX ADMIN — SACUBITRIL AND VALSARTAN 1 TABLET: 24; 26 TABLET, FILM COATED ORAL at 09:33

## 2024-03-20 RX ADMIN — SACUBITRIL AND VALSARTAN 1 TABLET: 24; 26 TABLET, FILM COATED ORAL at 21:04

## 2024-03-20 RX ADMIN — METOPROLOL SUCCINATE 12.5 MG: 25 TABLET, EXTENDED RELEASE ORAL at 09:33

## 2024-03-20 NOTE — CASE MANAGEMENT/SOCIAL WORK
Discharge Planning Assessment  [facility]     Patient Name: Sondra Connolly  MRN: [id number]  Today's Date: 3/20/2024    Admit Date: 3/19/2024        Discharge Needs Assessment       Row Name 03/20/24 0923       Living Environment    People in Home child(noelle), adult    Name(s) of People in Home Aye    Current Living Arrangements home    Primary Care Provided by child(noelle)    Provides Primary Care For no one    Family Caregiver if Needed child(noelle), adult    Family Caregiver Names Aye    Able to Return to Prior Arrangements yes       Resource/Environmental Concerns    Resource/Environmental Concerns none       Transition Planning    Patient/Family Anticipates Transition to home with family    Transportation Anticipated family or friend will provide       Discharge Needs Assessment    Readmission Within the Last 30 Days no previous admission in last 30 days    Equipment Currently Used at Home oxygen    Concerns to be Addressed no discharge needs identified    Equipment Needed After Discharge none    Outpatient/Agency/Support Group Needs homecare agency    Discharge Facility/Level of Care Needs home with home health    Discharge Coordination/Progress spoke to patient who lives with daughter who is her caregiver; has RX coverage oxygen PRN and PCP; will follow for DC needs                   Discharge Plan    No documentation.                 Continued Care and Services - Admitted Since 3/19/2024    No active coordination exists for this encounter.          Demographic Summary    No documentation.                  Functional Status    No documentation.                  Psychosocial    No documentation.                  Abuse/Neglect    No documentation.                  Legal    No documentation.                  Substance Abuse    No documentation.                  Patient Forms    No documentation.                     Tonia Arellano RN

## 2024-03-20 NOTE — PROGRESS NOTES
"    [facility] -  Progress Note     LOS: 1 day   Patient Care Team:  Matthieu Bhakta DO as PCP - General (Internal Medicine)  Gualberto Gallo MD as Cardiologist (Cardiology)  Yaya Cobian APRN as Nurse Practitioner (Cardiology)  Lorin Reed MD as Cardiologist (Cardiac Electrophysiology)  Magda Allen APRN as Nurse Practitioner (Family Medicine)  Abdi, Reed JUNIOR MD as Consulting Physician (Cardiothoracic Surgery)  Sy Obrien APRN as Nurse Practitioner (Cardiology)    Chief Complaint: Follow-up TAVR    Subjective     Interval History: Patient underwent successful TF-TAVR yesterday, with implant of 23 mm S3 ultra Resilia valve.  Left bundle branch block was noted after valve implant.  Overnight, no further conduction deficits.  She denies any shortness of breath or chest pain this morning.  However, per nursing staff she has been awake all night and is very confused.  Miles was removed.  This morning, she is rather agitated stating that the staff is \"conspiring against me, tried to take my camera but then tells me there is no camera.\"    Review of Systems:  Review of Systems   Unable to perform ROS: Dementia       Vital Sign Min/Max for last 24 hours  Temp  Min: 97.2 °F (36.2 °C)  Max: 98.3 °F (36.8 °C)   BP  Min: 92/59  Max: 152/52   Pulse  Min: 53  Max: 90   Resp  Min: 14  Max: 19   SpO2  Min: 94 %  Max: 100 %   No data recorded   Weight  Min: 51.3 kg (113 lb)  Max: 51.7 kg (113 lb 15.7 oz)         03/20/24  0641   Weight: 51.3 kg (113 lb)       Physical Exam:   Vitals and nursing note reviewed.   Constitutional:       General: Not in acute distress.     Appearance: Not in distress.   Neck:      Vascular: No JVD or JVR. JVD normal.   Pulmonary:      Effort: Pulmonary effort is normal.      Breath sounds: Normal breath sounds.   Cardiovascular:      Normal rate. Regular rhythm.      Murmurs: There is a grade 2/6 systolic murmur.      No gallop.  No rub.   Pulses:     Intact " distal pulses.   Edema:     Peripheral edema absent.   Skin:     General: Skin is warm and dry.   Neurological:      Mental Status: Alert, oriented to person, place, and time and oriented to person, place and time.         Medication Review: yes  Current Facility-Administered Medications   Medication Dose Route Frequency Provider Last Rate Last Admin    acetaminophen (TYLENOL) tablet 650 mg  650 mg Oral Q4H PRN Bhavik Cuellar MD        apixaban (ELIQUIS) tablet 2.5 mg  2.5 mg Oral Q12H Bhavik Cuellar MD        Calcium Replacement - Follow Nurse / BPA Driven Protocol   Does not apply PRN Bhavik Cuellar MD        clopidogrel (PLAVIX) tablet 75 mg  75 mg Oral Daily Bhavik Cuellar MD        Magnesium Cardiology Dose Replacement - Follow Nurse / BPA Driven Protocol   Does not apply PRN Bhavik Cuellar MD        metoprolol succinate XL (TOPROL-XL) 24 hr tablet 12.5 mg  12.5 mg Oral Daily Bhavik Cuellar MD        niCARdipine (CARDENE) 25 mg in 250 mL NS infusion kit  5-15 mg/hr Intravenous Titrated Bhavik Cuellar MD   Held at 03/19/24 1735    nitroglycerin (NITROSTAT) SL tablet 0.4 mg  0.4 mg Sublingual Q5 Min PRN Bhavik Cuellar MD        ondansetron (ZOFRAN) injection 4 mg  4 mg Intravenous Q6H PRN Bhavik Cuellar MD        Phosphorus Replacement - Follow Nurse / BPA Driven Protocol   Does not apply PRN Bhavik Cuellar MD        Potassium Replacement - Follow Nurse / BPA Driven Protocol   Does not apply PRN Bhavik Cuellar MD        sacubitril-valsartan (ENTRESTO) 24-26 MG tablet 1 tablet  1 tablet Oral BID Bhavik Cuellar MD   1 tablet at 03/19/24 2120         Results Review:   I have reviewed all laboratory data, with pertinent findings: Sodium 131, creatinine 0.97, WBC up to 18, hemoglobin stable 11.4, platelets 241.    I have reviewed telemetry, which shows sinus rhythm with left bundle branch block, no high-grade AV block    I have visualized all the electrocardiograms performed, with my  interpretations to follow: Sinus rhythm, LBBB    Assessment & Plan       Aortic stenosis, severe    Coronary artery disease of bypass graft of native heart with stable angina pectoris    Paroxysmal atrial fibrillation    Essential hypertension    Chronic combined systolic and diastolic congestive heart failure    Current use of long term anticoagulation    Mitral valve regurgitation    Hyponatremia    1.  Valvular heart disease, now POD #1 s/p TF-TAVR (23 S3 Ultra RESILIA): Stable  -Limited echocardiogram pending this morning  -Had been on Brilinta prior to TAVR for CAD status post remote PCI (allergic to aspirin), along with Eliquis for CVA prophylaxis with PAF; this was switched to Plavix prior to the procedure   -Continue Eliquis   -Will reach out to Dr. Gallo to see if he would prefer patient remain on clopidogrel upon discharge or switch her back to Brilinta  -SBE prophylaxis prior to any dental procedures for life  -Okay to transfer to telemetry floor today    2.  Left bundle branch block: New after TAVR implant.  -Continue close telemetry monitoring.  If no other high-grade AV block noted, will plan for discharge home wearing a live monitor    3.  Chronic systolic/diastolic congestive heart failure: Stable.  No evidence of volume overload on exam  -Continue Entresto and Toprol-XL at home doses    4.  Paroxysmal atrial fibrillation: Stable.  Remains in sinus rhythm currently.  -Continue Eliquis as per above     5.  CAD, status post CABG and PCI: Stable.  No angina.  -Antiplatelet therapy as per #1 above  -Continue statin    6.  Confusion: Patient with baseline dementia, but seems worse this morning.  Has had previous bouts of worsened confusion with UTI, so we will check urine today.    Discussed with Dr. Jin Cuellar MD  03/20/24  07:48 CDT

## 2024-03-20 NOTE — PLAN OF CARE
Goal Outcome Evaluation:      Transferred from ICU.  Confused and gets out of bed.  Family to remain at bedside and bed alarm activated.  Reeducated on incentive spirometer.  Family educated also and assisting patient with use.  Bowel movement today as documented.  Vital signs stable.

## 2024-03-20 NOTE — PLAN OF CARE
Goal Outcome Evaluation:   Groin sites remain intact with no drainage, soft, and non tender. Pulsed 2+. Patient has been awake all night long. Severely confused, anxious, ripping off equipment. Not able to console. Dementia at baseline. Safety maintained. Vss stable. Call light in reach.

## 2024-03-20 NOTE — PROGRESS NOTES
"Patient name: Sondra Connolly  Patient : 1945  VISIT # [id number]  MR #[id number]    Procedure:Procedure(s):  Transfemoral Transcatheter Aortic Valve Replacement  TRANSFEMORAL TRANSCATHETER AORTIC VALVE REPLACEMENT  Procedure Date:3/19/2024  POD:1 Day Post-Op    Subjective   Chief complaint: Shortness of breath    Patient is resting in bed tolerating room air.  She does have a new left bundle branch block.  No overnight events from a surgical standpoint.  Patient has been awake all night and is very confused and anxious.  She does have dementia at baseline.  Her daughter is on her way to sit with her now.  Miles catheter has been removed.  Groin sites are clean dry and intact.    ROS: Unable to perform due to dementia.         Objective     Visit Vitals  BP 92/59   Pulse 78   Temp 97.9 °F (36.6 °C) (Oral)   Resp 19   Ht 152.4 cm (60\")   Wt 51.3 kg (113 lb)   SpO2 98%   BMI 22.07 kg/m²       Intake/Output Summary (Last 24 hours) at 3/20/2024 0834  Last data filed at 3/20/2024 0802  Gross per 24 hour   Intake 800 ml   Output 1346 ml   Net -546 ml       Lab:     CBC:  Results from last 7 days   Lab Units 24  0216 24  1154 24  1054   WBC 10*3/mm3 18.00* 6.65 5.79   HEMATOCRIT % 34.3 30.9* 39.0   PLATELETS 10*3/mm3 241 210 286          BMP:  Results from last 7 days   Lab Units 24  0216 24  1154 24  1054   SODIUM mmol/L 131* 129* 132*   POTASSIUM mmol/L 4.1 4.1 4.7   CHLORIDE mmol/L 98 97* 98   CO2 mmol/L 24.0 22.0 27.0   GLUCOSE mg/dL 100* 141* 83   BUN mg/dL 18 16 14   CREATININE mg/dL 0.97 0.80 0.86          COAG:  Results from last 7 days   Lab Units 24  1154   INR  1.15*   APTT seconds 38.8*       IMAGES:       Imaging Results (Last 24 Hours)       Procedure Component Value Units Date/Time    XR Chest 1 View [[id number]] Collected: 24 1205     Updated: 24 1209    Narrative:      EXAM: XR CHEST 1 VW- 3/19/2024 10:50 AM     HISTORY: Post-Op Check Line & Tube " Placement; I35.0-Nonrheumatic  aortic (valve) stenosis       COMPARISON: 3/18/2024.     TECHNIQUE: Single frontal radiograph of the chest was obtained.     FINDINGS:     Support Devices: Prior median sternotomy with CABG. Fractured first and  fifth mediastinal wires. Aortic valve replacement.     Cardiac and Mediastinal Silhouettes: Stable mild cardiomegaly.     Lungs/Pleura: No focal consolidation. No sizable pleural effusion. No  visible pneumothorax.     Osseous structures: No acute osseous finding.     Other: None.       Impression:         Status post aortic valve replacement. No acute cardiopulmonary  abnormality.           This report was signed and finalized on 3/19/2024 12:06 PM by Reed Owens.                 Physical Exam:  General: Alert, confused.  No apparent distress.   Cardiovascular: Regular rate and rhythm with murmur, no rubs, or gallops.    Pulmonary: Clear to auscultation bilaterally without wheezing, rubs, or rales.  Abdomen: Soft, nondistended, and nontender.  Extremities: Warm, moves all extremities. No edema.  Groin sites are clean dry and intact with no evidence of hematoma  Neurologic: No focal deficit, confused         Impression:  Severe symptomatic aortic valve stenosis  Atrial fibrillation  On chronic anticoagulation  Dementia        Plan:  Await repeat echo today  Routine post TAVR care  Continue to reorient patient, daughter to come sit with her.  UA pending to rule out UTI.  Anticipate keeping patient overnight for ongoing monitoring.  Discussed with patient and nurse      RANDA Toledo  03/20/24  08:34 CDT

## 2024-03-21 ENCOUNTER — READMISSION MANAGEMENT (OUTPATIENT)
Dept: CALL CENTER | Facility: HOSPITAL | Age: 79
End: 2024-03-21
Payer: MEDICARE

## 2024-03-21 VITALS
HEART RATE: 66 BPM | RESPIRATION RATE: 16 BRPM | HEIGHT: 60 IN | DIASTOLIC BLOOD PRESSURE: 59 MMHG | BODY MASS INDEX: 22.19 KG/M2 | SYSTOLIC BLOOD PRESSURE: 118 MMHG | WEIGHT: 113 LBS | OXYGEN SATURATION: 100 % | TEMPERATURE: 99.3 F

## 2024-03-21 DIAGNOSIS — Z95.2 S/P TAVR (TRANSCATHETER AORTIC VALVE REPLACEMENT): ICD-10-CM

## 2024-03-21 DIAGNOSIS — I35.0 AORTIC VALVE STENOSIS, ETIOLOGY OF CARDIAC VALVE DISEASE UNSPECIFIED: Primary | ICD-10-CM

## 2024-03-21 PROBLEM — I50.32 CHRONIC HEART FAILURE WITH PRESERVED EJECTION FRACTION (HFPEF): Status: ACTIVE | Noted: 2024-03-21

## 2024-03-21 LAB
ANION GAP SERPL CALCULATED.3IONS-SCNC: 8 MMOL/L (ref 5–15)
BUN SERPL-MCNC: 17 MG/DL (ref 8–23)
BUN/CREAT SERPL: 22.4 (ref 7–25)
CALCIUM SPEC-SCNC: 8.4 MG/DL (ref 8.6–10.5)
CHLORIDE SERPL-SCNC: 100 MMOL/L (ref 98–107)
CO2 SERPL-SCNC: 20 MMOL/L (ref 22–29)
CREAT SERPL-MCNC: 0.76 MG/DL (ref 0.57–1)
DEPRECATED RDW RBC AUTO: 45.6 FL (ref 37–54)
EGFRCR SERPLBLD CKD-EPI 2021: 78.8 ML/MIN/1.73
ERYTHROCYTE [DISTWIDTH] IN BLOOD BY AUTOMATED COUNT: 13.8 % (ref 12.3–15.4)
GLUCOSE BLDC GLUCOMTR-MCNC: 92 MG/DL (ref 70–130)
GLUCOSE SERPL-MCNC: 84 MG/DL (ref 65–99)
HCT VFR BLD AUTO: 28.3 % (ref 34–46.6)
HGB BLD-MCNC: 9.7 G/DL (ref 12–15.9)
MCH RBC QN AUTO: 30.9 PG (ref 26.6–33)
MCHC RBC AUTO-ENTMCNC: 34.3 G/DL (ref 31.5–35.7)
MCV RBC AUTO: 90.1 FL (ref 79–97)
PLATELET # BLD AUTO: 159 10*3/MM3 (ref 140–450)
PMV BLD AUTO: 9.3 FL (ref 6–12)
POTASSIUM SERPL-SCNC: 4.2 MMOL/L (ref 3.5–5.2)
RBC # BLD AUTO: 3.14 10*6/MM3 (ref 3.77–5.28)
SODIUM SERPL-SCNC: 128 MMOL/L (ref 136–145)
WBC NRBC COR # BLD AUTO: 9.05 10*3/MM3 (ref 3.4–10.8)

## 2024-03-21 PROCEDURE — 93005 ELECTROCARDIOGRAM TRACING: CPT | Performed by: INTERNAL MEDICINE

## 2024-03-21 PROCEDURE — 80048 BASIC METABOLIC PNL TOTAL CA: CPT | Performed by: INTERNAL MEDICINE

## 2024-03-21 PROCEDURE — 85027 COMPLETE CBC AUTOMATED: CPT | Performed by: INTERNAL MEDICINE

## 2024-03-21 PROCEDURE — 82948 REAGENT STRIP/BLOOD GLUCOSE: CPT

## 2024-03-21 PROCEDURE — 99232 SBSQ HOSP IP/OBS MODERATE 35: CPT | Performed by: SURGERY

## 2024-03-21 PROCEDURE — 93010 ELECTROCARDIOGRAM REPORT: CPT | Performed by: INTERNAL MEDICINE

## 2024-03-21 RX ADMIN — APIXABAN 2.5 MG: 2.5 TABLET, FILM COATED ORAL at 08:49

## 2024-03-21 RX ADMIN — METOPROLOL SUCCINATE 12.5 MG: 25 TABLET, EXTENDED RELEASE ORAL at 08:49

## 2024-03-21 RX ADMIN — SACUBITRIL AND VALSARTAN 1 TABLET: 24; 26 TABLET, FILM COATED ORAL at 08:49

## 2024-03-21 NOTE — PROGRESS NOTES
"Patient name: Sondra Connolly  Patient : 1945  VISIT # [id number]  MR #[id number]    Procedure:Procedure(s):  Transfemoral Transcatheter Aortic Valve Replacement  TRANSFEMORAL TRANSCATHETER AORTIC VALVE REPLACEMENT  Procedure Date:3/19/2024  POD:2 Days Post-Op    Subjective   Chief complaint: Shortness of breath    Patient is tolerating room air.  Sodium this morning is 128.  Family is at bedside.  No issues with groin sites.  Repeat transthoracic echo performed yesterday revealing TAVR valve in good position with normal mean gradient of 8 mmHg and no paravalvular leak.  Daughter is at bedside and states that mental status is significantly improved.  She has been up walking in the hallway    ROS: No fevers or chills.  Shortness of breath improving.  No chest pain         Objective     Visit Vitals  /59 (BP Location: Right arm, Patient Position: Lying)   Pulse 66   Temp 99.3 °F (37.4 °C) (Oral)   Resp 16   Ht 152.4 cm (60\")   Wt 51.3 kg (113 lb)   SpO2 100%   BMI 22.07 kg/m²       Intake/Output Summary (Last 24 hours) at 3/21/2024 0953  Last data filed at 3/21/2024 0710  Gross per 24 hour   Intake 935 ml   Output 600 ml   Net 335 ml       Lab:     CBC:  Results from last 7 days   Lab Units 24  0541 24  0216 24  1154   WBC 10*3/mm3 9.05 18.00* 6.65   HEMATOCRIT % 28.3* 34.3 30.9*   PLATELETS 10*3/mm3 159 241 210          BMP:  Results from last 7 days   Lab Units 24  0541 24  0216 24  1154   SODIUM mmol/L 128* 131* 129*   POTASSIUM mmol/L 4.2 4.1 4.1   CHLORIDE mmol/L 100 98 97*   CO2 mmol/L 20.0* 24.0 22.0   GLUCOSE mg/dL 84 100* 141*   BUN mg/dL 17 18 16   CREATININE mg/dL 0.76 0.97 0.80          COAG:  Results from last 7 days   Lab Units 24  1154   INR  1.15*   APTT seconds 38.8*       IMAGES:       Imaging Results (Last 24 Hours)       ** No results found for the last 24 hours. **              Physical Exam:  General: Alert to person and place.  No apparent " distress.   Cardiovascular: Regular rate and rhythm with murmur, no rubs, or gallops.    Pulmonary: Clear to auscultation bilaterally without wheezing, rubs, or rales.  Abdomen: Soft, nondistended, and nontender.  Extremities: Warm, moves all extremities. No edema.  Groin sites are clean dry and intact with no evidence of hematoma  Neurologic: No focal deficit         Impression:  Severe symptomatic aortic valve stenosis  Atrial fibrillation  On chronic anticoagulation  Dementia        Plan:  Dr. Abdi discussed with patient's daughter, likely okay for discharge home today.  Will further discuss with Dr. Cuellar  Routine post TAVR care  Routine follow-up in 1 month in structural heart clinic with repeat echo  Discussed with patient and her daughter      Tonia Dobbs, RANDA  03/21/24  09:53 CDT

## 2024-03-21 NOTE — PLAN OF CARE
Problem: Adult Inpatient Plan of Care  Goal: Plan of Care Review  Outcome: Met  Goal: Patient-Specific Goal (Individualized)  Outcome: Met  Goal: Absence of Hospital-Acquired Illness or Injury  Outcome: Met  Goal: Optimal Comfort and Wellbeing  Outcome: Met  Goal: Readiness for Transition of Care  Outcome: Met     Problem: Skin Injury Risk Increased  Goal: Skin Health and Integrity  Outcome: Met     Problem: Activity Intolerance (Cardiovascular Surgery)  Goal: Improved Activity Tolerance  Outcome: Met     Problem: Adjustment to Surgery (Cardiovascular Surgery)  Goal: Optimal Coping with Heart Surgery  Outcome: Met     Problem: Bleeding (Cardiovascular Surgery)  Goal: Bleeding (Cardiovascular Surgery)  Outcome: Met     Problem: Bowel Motility Impaired (Cardiovascular Surgery)  Goal: Effective Bowel Elimination (Cardiovascular Surgery)  Outcome: Met     Problem: Cardiac Function Impaired (Cardiovascular Surgery)  Goal: Effective Cardiac Function  Outcome: Met     Problem: Cerebral Tissue Perfusion (Cardiovascular Surgery)  Goal: Optimal Cerebral Tissue Perfusion (Cardiovascular Surgery)  Outcome: Met     Problem: Fluid and Electrolyte Imbalance (Cardiovascular Surgery)  Goal: Fluid and Electrolyte Balance (Cardiovascular Surgery)  Outcome: Met     Problem: Glycemic Control Impaired (Cardiovascular Surgery)  Goal: Blood Glucose Level Within Targeted Range (Cardiovascular Surgery)  Outcome: Met     Problem: Infection (Cardiovascular Surgery)  Goal: Absence of Infection Signs and Symptoms  Outcome: Met     Problem: Ongoing Anesthesia Effects (Cardiovascular Surgery)  Goal: Anesthesia/Sedation Recovery  Outcome: Met     Problem: Pain (Cardiovascular Surgery)  Goal: Acceptable Pain Control  Outcome: Met     Problem: Postoperative Nausea and Vomiting (Cardiovascular Surgery)  Goal: Nausea and Vomiting Relief (Cardiovascular Surgery)  Outcome: Met     Problem: Postoperative Urinary Retention (Cardiovascular Surgery)  Goal:  Effective Urinary Elimination (Cardiovascular Surgery)  Outcome: Met     Problem: Respiratory Compromise (Cardiovascular Surgery)  Goal: Effective Oxygenation and Ventilation (Cardiovascular Surgery)  Outcome: Met     Problem: Fall Injury Risk  Goal: Absence of Fall and Fall-Related Injury  Outcome: Met   Goal Outcome Evaluation:

## 2024-03-21 NOTE — PLAN OF CARE
Goal Outcome Evaluation:                 Problem: Adult Inpatient Plan of Care  Goal: Plan of Care Review  Outcome: Ongoing, Progressing  Flowsheets (Taken 3/21/2024 2242)  Progress: no change  Plan of Care Reviewed With: patient  Outcome Evaluation: Pt has been oriented to self only this shift. Family at bedside. No c/o pain as of this time. Bilateral groin sites C/D/I, soft to palpation. SR 62-83, first degree AV block on tele.

## 2024-03-21 NOTE — DISCHARGE INSTRUCTIONS
No lifting greater than 5 pounds for 7 days (this is the weight of a gallon of milk)  No heavy or strenuous pushing or pulling.  No tub baths, hot tubs, swimming pools, or submerging groin underwater for 1 week.  Wash groin site daily with antibacterial soap and water. Rinse and keep clean and dry.  No lotions, powders, or topical ointments to site. Keep open to air and dry.  Call our office with any concerns or if you notice redness, swelling, drainage, warmth, or pain at the site.

## 2024-03-22 NOTE — OUTREACH NOTE
Prep Survey      Flowsheet Row Responses   [facility] facility patient discharged from? [facility]   Is LACE score < 7 ? No   Eligibility Readm Mgmt   Discharge diagnosis AORTIC VALVE REPAIR/REPLACEMENT   Does the patient have one of the following disease processes/diagnoses(primary or secondary)? Cardiothoracic surgery   Does the patient have Home health ordered? No   Is there a DME ordered? No   Prep survey completed? Yes            GOLDY STOLL - Registered Nurse

## 2024-03-23 LAB
BH BB BLOOD EXPIRATION DATE: NORMAL
BH BB BLOOD EXPIRATION DATE: NORMAL
BH BB BLOOD TYPE BARCODE: 5100
BH BB BLOOD TYPE BARCODE: 5100
BH BB DISPENSE STATUS: NORMAL
BH BB DISPENSE STATUS: NORMAL
BH BB PRODUCT CODE: NORMAL
BH BB PRODUCT CODE: NORMAL
BH BB UNIT NUMBER: NORMAL
BH BB UNIT NUMBER: NORMAL
CROSSMATCH INTERPRETATION: NORMAL
CROSSMATCH INTERPRETATION: NORMAL
UNIT  ABO: NORMAL
UNIT  ABO: NORMAL
UNIT  RH: NORMAL
UNIT  RH: NORMAL

## 2024-03-24 LAB
QT INTERVAL: 436 MS
QT INTERVAL: 444 MS
QT INTERVAL: 512 MS
QT INTERVAL: 516 MS
QTC INTERVAL: 469 MS
QTC INTERVAL: 470 MS
QTC INTERVAL: 494 MS
QTC INTERVAL: 502 MS

## 2024-03-28 ENCOUNTER — READMISSION MANAGEMENT (OUTPATIENT)
Dept: CALL CENTER | Facility: HOSPITAL | Age: 79
End: 2024-03-28
Payer: MEDICARE

## 2024-03-28 NOTE — OUTREACH NOTE
CT Surgery Week 1 Survey      Flowsheet Row Responses   [facility] patient discharged from? [place]   Does the patient have one of the following disease processes/diagnoses(primary or secondary)? Cardiothoracic surgery   Week 1 attempt successful? Yes   Call start time 1045   Call end time 1051   Discharge diagnosis AORTIC VALVE REPAIR/REPLACEMENT   Person spoke with today (if not patient) and relationship DaughterRadha   Meds reviewed with patient/caregiver? Yes   Is the patient having any side effects they believe may be caused by any medication additions or changes? No   Does the patient have all medications related to this admission filled (includes all antibiotics, pain medications, cardiac medications, etc.) N/A   Is the patient taking all medications as directed (includes completed medication regime)? Yes   Comments regarding appointments Cardiology f/u appts on 4/15/24 and 4/22/24.   Does the patient have a primary care provider?  Yes   Does the patient have an appointment scheduled with their C/T surgeon? Yes  [Cardiology f/u appt.]   Comments regarding PCP PCP, Dr. Matthieu Bhakta. F/U appt today, 3/28/24 at 3:15 PM. Verifed with daughter.   Has the patient kept scheduled appointments due by today? N/A   Has home health visited the patient within 72 hours of discharge? N/A   Psychosocial issues? No   Comments Daughters states patient is doing well. Her groin is only tender in one small area. No s/s of infection. Patient is still on lifting restrictions and no bath, daughter verbalized understanding.   Did the patient receive a copy of their discharge instructions? Yes   Nursing interventions Reviewed instructions with patient  [DaughterRadha.]   What is the patient's perception of their health status since discharge? Improving   Is the patient/caregiver able to teach back normal signs of recovery? Pain or discomfort at incisional site   Nursing interventions Reassured on normal signs of  recovery   Is the patient /caregiver able to teach back basic post-op care? Practice cough and deep breath every 4 hours while awake, Shower daily, No tub bath, swimming, or hot tub until instructed by MD, Use a clean wash cloth and antibacterial bar or liquid soap to clean incisions, If the steri-strips are falling off, it is okay to remove them. (If applicable), Lifting as instructed by MD in discharge instructions   Is the patient/caregiver able to teach back signs and symptoms of incisional infection? Increased redness, swelling or pain at the incisonal site, Increased drainage or bleeding, Incisional warmth, Pus or odor from incision, Fever   Is the patient/caregiver able to teach back steps to recovery at home? Set small, achievable goals for return to baseline health, Rest and rebuild strength, gradually increase activity   Is the patient /caregiver able to teach back the importance of cardiac rehab? Yes   Nursing interventions Provided education on importance of cardiac rehab  [Daughter states they have already discussed Cardiac Rehab. Pt plans to attend.]   If the patient is a current smoker, are they able to teach back resources for cessation? Not a smoker   Is the patient/caregiver able to teach back the hierarchy of who to call/visit for symptoms/problems? PCP, Specialist, Home health nurse, Urgent Care, ED, 911 Yes   Week 1 call completed? Yes   Graduated Yes   Is the patient interested in additional calls from an ambulatory ? No   Would this patient benefit from a Referral to Amb Social Work? No   Wrap up additional comments Pt doing well per daughter. Denies any needs or concerns at this time.   Call end time 1051              Valorie NATHAN - Registered Nurse

## 2024-03-29 NOTE — DISCHARGE SUMMARY
[facility] HEART GROUP DISCHARGE    Date of Discharge:  3/28/2024    Discharge Diagnosis:   1, severe symptomatic aortic stenosis, status post TAVR  2.  Chronic combined heart failure   3.  Coronary artery disease, status post CABG and PCI  4.  Paroxysmal atrial fibrillation  5.  Confusion/dementia  6.  New onset left bundle branch block (after TAVR)    Presenting Problem/History of Present Illness  (From H&P):  79-year-old female presents today for planned elective transcatheter aortic valve replacement.  She was initially referred to the structural heart clinic on 11/27/2023 for symptomatic severe aortic stenosis.  She also has coronary disease including PCI to the RCA in 2021, following four-vessel CABG in 2020.  She is also managed for chronic combined systolic and diastolic congestive heart failure and, atrial fibrilation (paroxysmal) on anticoagulation.  At the time of the initial evaluation she was complaining of significant exertional dyspnea, including limiting dyspnea simply with mopping the floor and making the bed.  She was not having angina or episodes of volume overload.  She subsequently completed workup including cardiac catheterization and CT scan, showing anatomy suitable for TAVR.  She was evaluated by surgical members of the multidisciplinary heart team as well, and her case was reviewed numerous times in the structural heart weekly meeting.  Valvular intervention was delayed by need for dental care.  Having now completed that, final plans were made and she presents today for TAVR.     Since our first meeting on 11/27/2023, she reports no significant changes.  She has not been hospitalized for heart failure.  She continues to complain of class III CHF symptoms, with limiting dyspnea with very minimal activity.  She also endorses occasional orthopnea and PND, though no peripheral edema.  She describes chronic atypical chest pain that is sharp and short-lived, and has not had  syncope.    Hospital Course  Patient underwent successful TF-TAVR yesterday, with implant of 23 mm S3 ultra Resilia valve.  Left bundle branch block was noted after valve implant.  Patient was confused before the procedure, but became more confused overnight after TAVR.  Infectious sources were investigated but none were found.  On POD #2, she was much more appropriately oriented, and felt stable for discharge.  She was having no chest pain, shortness of breath, palpitations, or any other new symptoms since TAVR implant.  She did develop left bundle branch block at time of implant, so will be discharged home with a live monitor.  She was cautioned to call with any episodes of near syncope or syncope.  Also, I did reach out to her primary cardiologist, Dr. Gallo, who is okay with keeping her on clopidogrel at discharge along with Eliquis, will which she needs for stroke protection given known paroxysmal atrial fibrillation.    Procedures Performed  Procedure(s):  Transfemoral Transcatheter Aortic Valve Replacement  TRANSFEMORAL TRANSCATHETER AORTIC VALVE REPLACEMENT       Consults:   Consults       No orders found from 2/19/2024 to 3/20/2024.            Pertinent Test Results:   Results for orders placed during the hospital encounter of 03/19/24    Adult Transthoracic Echo Limited with Contrast if Necessary Per Protocol POD #1    Interpretation Summary    Left ventricular systolic function is low normal. Left ventricular ejection fraction appears to be 51 - 55%.    There is a TAVR valve present (#23 S3 Ultra RESILIA) in excellent position, with normal transvalvular mean gradient (8mmHg) and no evidence of paravalvular regurgitation.    The following left ventricular wall segments are hypokinetic: mid inferolateral. The following left ventricular wall segments are akinetic: basal inferolateral.    The left atrial cavity is dilated.    No evidence of pericardial effusion.    Normal size and function of the right  ventricle.    Limited study to assess for the above - comprared to pre-TAVR images, no change in LV function.      Condition on Discharge:  stable    Physical Exam at Discharge    Vital Signs         Physical Exam:  Vitals and nursing note reviewed.   Constitutional:       General: Not in acute distress.     Appearance: Not in distress.   Neck:      Vascular: No JVD or JVR. JVD normal.   Pulmonary:      Effort: Pulmonary effort is normal.      Breath sounds: Normal breath sounds.   Cardiovascular:      Normal rate. Regular rhythm.      Murmurs: There is a systolic murmur.      No gallop.  No rub.   Pulses:     Intact distal pulses.   Edema:     Peripheral edema absent.   Skin:     General: Skin is warm and dry.   Neurological:      Mental Status: Alert, oriented to person, place, and time and oriented to person, place and time.         Discharge Disposition  Home or Self Care    Discharge Medications     Discharge Medications        Continue These Medications        Instructions Start Date   clopidogrel 75 MG tablet  Commonly known as: PLAVIX   75 mg, Oral, Daily, Start this med on March 12. Take 300mg on Day 1, then 75 mg daily.      Eliquis 5 MG tablet tablet  Generic drug: apixaban   TAKE 1 TABLETS BY MOUTH EVERY 12 HOURS      Entresto 24-26 MG tablet  Generic drug: sacubitril-valsartan   TAKE 1 TABLET BY MOUTH TWICE A DAY      metoprolol succinate XL 25 MG 24 hr tablet  Commonly known as: TOPROL-XL   12.5 mg, Oral, Daily      multivitamin with minerals tablet tablet   1 tablet, Oral, Daily      simvastatin 80 MG tablet  Commonly known as: ZOCOR   1 tablet, Oral, Daily               Discharge Diet: heart healthy    Activity at Discharge:   No lifting greater than 5 pounds for 7 days (this is the weight of a gallon of milk)  No heavy or strenuous pushing or pulling.  No tub baths, hot tubs, swimming pools, or submerging groin underwater for 1 week.  Wash groin site daily with antibacterial soap and water. Rinse  and keep clean and dry.  No lotions, powders, or topical ointments to site. Keep open to air and dry.  Call our office with any concerns or if you notice redness, swelling, drainage, warmth, or pain at the site.      Follow-up Appointments  Future Appointments   Date Time Provider Department Center   4/15/2024  7:00 AM PAD ECHO ROOM 1 [facility] CARDI PAD   4/15/2024  2:00 PM [facility] HRT GRP PAD, WATCHMAN/STRUCTURAL HEART [facility] CD PAD PAD   4/22/2024  1:00 PM Sy Obrien APRN [facility] CD PAD PAD   7/26/2024  1:00 PM Leah Webster PA [facility] CD PAD PAD   1/22/2025  2:15 PM Gissel Stephenson APRN [facility] VS PAD PAD     Additional Instructions for the Follow-ups that You Need to Schedule       Ambulatory Referral to Cardiac Rehab   As directed              Test Results Pending at Discharge: none     35 minutes spent on day of discharge    Bhavik Cuellar MD  03/28/24  23:50 CDT

## 2024-04-02 LAB
BH CV ECHO MEAS - AO MAX PG: 16.8 MMHG
BH CV ECHO MEAS - AO MEAN PG: 8.8 MMHG
BH CV ECHO MEAS - AO V2 MAX: 205 CM/SEC
BH CV ECHO MEAS - AO V2 VTI: 41.8 CM
BH CV ECHO MEAS - EDV(MOD-SP4): 92.6 ML
BH CV ECHO MEAS - EF(MOD-SP4): 53.7 %
BH CV ECHO MEAS - ESV(MOD-SP4): 42.9 ML
BH CV ECHO MEAS - LV DIASTOLIC VOL/BSA (35-75): 63.2 CM2
BH CV ECHO MEAS - LV MAX PG: 6.3 MMHG
BH CV ECHO MEAS - LV MEAN PG: 3 MMHG
BH CV ECHO MEAS - LV SYSTOLIC VOL/BSA (12-30): 29.3 CM2
BH CV ECHO MEAS - LV V1 MAX: 125 CM/SEC
BH CV ECHO MEAS - LV V1 VTI: 24.6 CM
BH CV ECHO MEAS - MR MAX PG: 140.2 MMHG
BH CV ECHO MEAS - MR MAX VEL: 592 CM/SEC
BH CV ECHO MEAS - MR MEAN PG: 99 MMHG
BH CV ECHO MEAS - MR MEAN VEL: 475 CM/SEC
BH CV ECHO MEAS - MR VTI: 230 CM
BH CV ECHO MEAS - SI(MOD-SP4): 33.9 ML/M2
BH CV ECHO MEAS - SV(MOD-SP4): 49.7 ML

## 2024-04-15 ENCOUNTER — HOSPITAL ENCOUNTER (OUTPATIENT)
Dept: CARDIOLOGY | Facility: HOSPITAL | Age: 79
Discharge: HOME | End: 2024-04-15
Admitting: INTERNAL MEDICINE
Payer: MEDICARE

## 2024-04-15 ENCOUNTER — OFFICE VISIT (OUTPATIENT)
Dept: CARDIOLOGY | Facility: CLINIC | Age: 79
End: 2024-04-15
Payer: MEDICARE

## 2024-04-15 VITALS
WEIGHT: 113 LBS | OXYGEN SATURATION: 99 % | DIASTOLIC BLOOD PRESSURE: 50 MMHG | BODY MASS INDEX: 22.19 KG/M2 | SYSTOLIC BLOOD PRESSURE: 120 MMHG | HEART RATE: 64 BPM | HEIGHT: 60 IN

## 2024-04-15 VITALS
WEIGHT: 113 LBS | BODY MASS INDEX: 22.19 KG/M2 | DIASTOLIC BLOOD PRESSURE: 64 MMHG | HEIGHT: 60 IN | SYSTOLIC BLOOD PRESSURE: 120 MMHG

## 2024-04-15 DIAGNOSIS — Z95.1 S/P CABG X 4: ICD-10-CM

## 2024-04-15 DIAGNOSIS — I48.0 PAROXYSMAL ATRIAL FIBRILLATION: Chronic | ICD-10-CM

## 2024-04-15 DIAGNOSIS — Z95.2 S/P TAVR (TRANSCATHETER AORTIC VALVE REPLACEMENT): Primary | ICD-10-CM

## 2024-04-15 DIAGNOSIS — Z95.2 S/P TAVR (TRANSCATHETER AORTIC VALVE REPLACEMENT): ICD-10-CM

## 2024-04-15 DIAGNOSIS — I34.0 MITRAL VALVE INSUFFICIENCY, UNSPECIFIED ETIOLOGY: ICD-10-CM

## 2024-04-15 DIAGNOSIS — Z79.01 CURRENT USE OF LONG TERM ANTICOAGULATION: ICD-10-CM

## 2024-04-15 DIAGNOSIS — I50.42 CHRONIC COMBINED SYSTOLIC AND DIASTOLIC CONGESTIVE HEART FAILURE: Chronic | ICD-10-CM

## 2024-04-15 LAB
BH CV ECHO MEAS - AO MAX PG: 20.1 MMHG
BH CV ECHO MEAS - AO MEAN PG: 10.7 MMHG
BH CV ECHO MEAS - AO V2 MAX: 224 CM/SEC
BH CV ECHO MEAS - AO V2 VTI: 54.9 CM
BH CV ECHO MEAS - AVA(I,D): 1.59 CM2
BH CV ECHO MEAS - EDV(CUBED): 177.5 ML
BH CV ECHO MEAS - EDV(MOD-SP2): 101 ML
BH CV ECHO MEAS - EDV(MOD-SP4): 95.8 ML
BH CV ECHO MEAS - EF(MOD-BP): 47.7 %
BH CV ECHO MEAS - EF(MOD-SP2): 42.4 %
BH CV ECHO MEAS - EF(MOD-SP4): 51.8 %
BH CV ECHO MEAS - ESV(CUBED): 65.5 ML
BH CV ECHO MEAS - ESV(MOD-SP2): 58.2 ML
BH CV ECHO MEAS - ESV(MOD-SP4): 46.2 ML
BH CV ECHO MEAS - FS: 28.3 %
BH CV ECHO MEAS - IVS/LVPW: 1.53 CM
BH CV ECHO MEAS - IVSD: 0.92 CM
BH CV ECHO MEAS - LA DIMENSION: 5.6 CM
BH CV ECHO MEAS - LAT PEAK E' VEL: 9.8 CM/SEC
BH CV ECHO MEAS - LV DIASTOLIC VOL/BSA (35-75): 65.4 CM2
BH CV ECHO MEAS - LV MASS(C)D: 156.3 GRAMS
BH CV ECHO MEAS - LV MAX PG: 7.1 MMHG
BH CV ECHO MEAS - LV MEAN PG: 4 MMHG
BH CV ECHO MEAS - LV SYSTOLIC VOL/BSA (12-30): 31.5 CM2
BH CV ECHO MEAS - LV V1 MAX: 133 CM/SEC
BH CV ECHO MEAS - LV V1 VTI: 30.8 CM
BH CV ECHO MEAS - LVIDD: 5.6 CM
BH CV ECHO MEAS - LVIDS: 4 CM
BH CV ECHO MEAS - LVOT AREA: 2.8 CM2
BH CV ECHO MEAS - LVOT DIAM: 1.9 CM
BH CV ECHO MEAS - LVPWD: 0.6 CM
BH CV ECHO MEAS - MED PEAK E' VEL: 7.8 CM/SEC
BH CV ECHO MEAS - MR MAX PG: 129.5 MMHG
BH CV ECHO MEAS - MR MAX VEL: 568.5 CM/SEC
BH CV ECHO MEAS - MV A MAX VEL: 80.4 CM/SEC
BH CV ECHO MEAS - MV DEC SLOPE: 787 CM/SEC2
BH CV ECHO MEAS - MV E MAX VEL: 149 CM/SEC
BH CV ECHO MEAS - MV E/A: 1.85
BH CV ECHO MEAS - MV P1/2T: 65.1 MSEC
BH CV ECHO MEAS - MVA(P1/2T): 3.4 CM2
BH CV ECHO MEAS - PA V2 MAX: 77.6 CM/SEC
BH CV ECHO MEAS - RAP SYSTOLE: 5 MMHG
BH CV ECHO MEAS - RV MAX PG: 2.03 MMHG
BH CV ECHO MEAS - RV V1 MAX: 71.3 CM/SEC
BH CV ECHO MEAS - RVDD: 3.3 CM
BH CV ECHO MEAS - RVSP: 27.7 MMHG
BH CV ECHO MEAS - SI(MOD-SP2): 29.2 ML/M2
BH CV ECHO MEAS - SI(MOD-SP4): 33.9 ML/M2
BH CV ECHO MEAS - SV(LVOT): 87.3 ML
BH CV ECHO MEAS - SV(MOD-SP2): 42.8 ML
BH CV ECHO MEAS - SV(MOD-SP4): 49.6 ML
BH CV ECHO MEAS - TAPSE (>1.6): 1.51 CM
BH CV ECHO MEAS - TR MAX PG: 22.7 MMHG
BH CV ECHO MEAS - TR MAX VEL: 238 CM/SEC
BH CV ECHO MEASUREMENTS AVERAGE E/E' RATIO: 16.93
BH CV XLRA - RV BASE: 3.8 CM
LEFT ATRIUM VOLUME INDEX: 84.9 ML/M2
LEFT ATRIUM VOLUME: 124 ML

## 2024-04-15 PROCEDURE — 93306 TTE W/DOPPLER COMPLETE: CPT | Performed by: INTERNAL MEDICINE

## 2024-04-15 PROCEDURE — 93306 TTE W/DOPPLER COMPLETE: CPT

## 2024-04-15 NOTE — PROGRESS NOTES
"     Subjective:     Encounter Date:04/15/2024      Patient ID: Sondra Connolly is a 79 y.o. female.    Chief Complaint: Follow-up TAVR  History of Present Illness  79-year-old female returns today to structural heart clinic for 1 month follow-up after undergoing successful TAVR, using a #23 S3 ultra valve, on 3/19/2024.  This was performed via transfemoral access, and the valve was landed in a position of 80/20.  Her cardiac history also includes coronary disease including four-vessel CABG in 2020 and PCI to the RCA in 2021, chronic systolic and diastolic congestive heart failure, and paroxysmal atrial fibrillation on anticoagulation.  Prior to the TAVR, when first referred to the structural clinic, she was reporting dyspnea with light household activities like mopping the floor making the bed.  She was not having any angina.  Arriving for the planned TAVR, she was reporting NYHA III exertional dyspnea, and also had developed some orthopnea and PND.  Left bundle branch block was noted after valve implant, so she was monitored for 48 hours on telemetry.  There have been no development of high-grade AV block, so she was discharged home then with a live MCOT monitor.    Today, she reports overall she is much better since the TAVR.  Reports energy better, and daughter notices she does not get as out of breath.  Daughter also notes \"she has more color.\"  Patient denies syncope/near-syncope, orthopnea/PND, swelling.  No rapid weight changes.    Due to start cardiac rehab on Wednesday at [facility]      The following portions of the patient's history were reviewed and updated as appropriate: allergies, current medications, past family history, past medical history, past social history, past surgical history, and problem list.    Review of Systems   Constitutional: Positive for malaise/fatigue.   Cardiovascular:  Positive for dyspnea on exertion. Negative for chest pain, claudication, leg swelling, near-syncope, orthopnea, " palpitations, paroxysmal nocturnal dyspnea and syncope.   Respiratory:  Negative for shortness of breath.    Hematologic/Lymphatic: Does not bruise/bleed easily.       Current Outpatient Medications:     apixaban (Eliquis) 5 MG tablet tablet, TAKE 1 TABLETS BY MOUTH EVERY 12 HOURS, Disp: 60 tablet, Rfl: 11    clopidogrel (PLAVIX) 75 MG tablet, Take 1 tablet by mouth Daily. Start this med on March 12. Take 300mg on Day 1, then 75 mg daily., Disp: 30 tablet, Rfl: 11    Entresto 24-26 MG tablet, TAKE 1 TABLET BY MOUTH TWICE A DAY, Disp: 60 tablet, Rfl: 11    metoprolol succinate XL (TOPROL-XL) 25 MG 24 hr tablet, TAKE 1/2 TABLET BY MOUTH EVERY DAY, Disp: 45 tablet, Rfl: 3    Multiple Vitamins-Minerals (MULTIVITAMIN ADULT PO), Take 1 tablet by mouth Daily., Disp: , Rfl:     simvastatin (ZOCOR) 80 MG tablet, Take 1 tablet by mouth Daily., Disp: , Rfl:        Objective:      Vitals:    04/15/24 1404   BP: 120/50   Pulse: 64   SpO2: 99%     Vitals and nursing note reviewed.   Constitutional:       General: Not in acute distress.     Appearance: Not in distress.   Neck:      Vascular: No JVD or JVR. JVD normal.   Pulmonary:      Effort: Pulmonary effort is normal.      Breath sounds: Normal breath sounds.   Cardiovascular:      Normal rate. Regular rhythm.      Murmurs: There is a systolic murmur.      No gallop.  No rub.   Pulses:     Intact distal pulses.   Edema:     Peripheral edema absent.   Skin:     General: Skin is warm and dry.   Neurological:      Mental Status: Alert, oriented to person, place, and time and oriented to person, place and time.         Lab Review:         ECG 12 Lead    Date/Time: 4/15/2024 2:10 PM  Performed by: Bhavik Cuellar MD    Authorized by: Bhavik Cuellar MD  Comparison: compared with previous ECG from 3/21/2024  Similar to previous ECG  Rhythm: sinus rhythm  Rate: normal  BPM: 64  Conduction: left bundle branch block  Other findings: non-specific ST-T wave changes    Clinical  impression: abnormal EKG            Results for orders placed during the hospital encounter of 04/15/24    Adult Transthoracic Echo Complete w/ Color, Spectral and Contrast if necessary per protocol    Interpretation Summary    Left ventricular systolic function is mildly decreased. Left ventricular ejection fraction appears to be 46 - 50%.    There is a TAVR valve present (#23 S3 Ultra) in excellent position, with normal transvalvular mean gradient (8mmHg) and no evidence of paravalvular regurgitation.    Severe mitral valve regurgitation is present.    The left ventricular cavity is mildly dilated.    The following left ventricular wall segments are hypokinetic: mid anterior, apical anterior, basal anterolateral, mid anterolateral, apical lateral, mid inferolateral, apical inferior, mid inferior, apical septal, basal inferoseptal, mid inferoseptal, apex hypokinetic, mid anteroseptal, basal anterior and basal inferoseptal. The following left ventricular wall segments are akinetic: basal inferolateral and basal inferior.    Estimated right ventricular systolic pressure from tricuspid regurgitation is normal (<35 mmHg).    Normal size, with mildly reduced systolic function of the right ventricle.    Compared to most recent exam, which was a limited study on 3/20/2024, global LV systolic function is now slightly reduced and the severity of mitral regurg and appears slightly worse.      Independent review of imaging: I did again today reviewed the diagnostic angiogram performed in December 2023.  There is a severely calcified ostial RCA stenosis  Assessment/Plan:     Problem List Items Addressed This Visit (all established)         Cardiac and Vasculature    Paroxysmal atrial fibrillation (Chronic): Stable, in sinus rhythm today.  On anticoagulation with Eliquis.    Chronic combined systolic and diastolic congestive heart failure (Chronic): As noted below, LV function appears mildly worse but she appears compensated  and reports no symptoms    S/P CABG x 4 2010 Dr YADIEL Lau: Stable; no angina reported    Overview     LIMA to LAD: Widely patent, ties into the mid segment of the LAD  SVG to RCA system: Widely patent however the proximal segment is diffusely diseased, the mid segment has a 70 to 80% stenotic lesion SVG  SVG to diagonal: Patent, 20 to 30% stenosis in the proximal segment, held aneurysm in the mid segment,  SVG to assumed OM: Totally occluded at the origin         Mitral valve regurgitation: Appears severe (which is worsened) on today's echocardiogram, though again voices no symptoms    S/P TAVR (transcatheter aortic valve replacement) - Primary: Stable.  Valve appears excellent on today's echocardiogram.       Coag and Thromboembolic    Current use of long term anticoagulation       Recommendations/plans:    Overall, from a valvular heart disease status post TAVR standpoint, patient is doing very well.  She reports symptomatic improvement, and her valve looks excellent on today's echocardiogram.    Though she does not voice any concerns that would be attributable to this, nor exhibit any signs of volume overload on exam, I am concerned that her overall ventricular function appears slightly worse on today's echocardiogram.  She also has mitral regurgitation that appears worse.  Again, she reports no symptoms attributable to either of these and is euvolemic on exam.    I will touch base with her primary cardiologist, Dr. Gallo, to see if he would like to consider further additions to her medical regiment for chronic combined systolic and diastolic congestive heart failure, potentially including increasing Entresto dose or adding SGLT2 inhibitor, or pursue further workup.  If she were to develop CHF symptoms despite optimal tolerated guideline directed medical therapy, and her much regurgitation is remains moderate to severe is felt to be functional, we could consider her for MitraClip therapy in the future.    Also,  though there NOAC worsened mitral regurgitation is likely functional from worsened LV function, I also noted inferior ischemic changes on today's ECG, so considered ischemic mitral regurgitation as well.  Reviewing her pre-TAVR cardiac catheterization, she had severely calcified ostial RCA stenosis.  Again, she is not noting any angina, but I will also review this with Dr. Gallo.  She may benefit from transitioning to a high intensity statin therapy and/or consider revascularization, pending symptomatic development.    From a post-TAVR standpoint, she should remain on 2-drug strategy (for her, including an anticoagulant given her paroxysmal atrial fibrillation, in addition to an antiplatelet drug) for 3 months.  Given no recent coronary intervention, for antiplatelet therapy can be stopped at that point and remain on Eliquis indefinitely.    Lastly, she should remain on SBE prophylaxis for life, and we will plan on seeing her back in the [facility] in March 2025 for 1 year follow-up after TAVR, unless we can be of any further assistance with her in the meantime.    Again, thank you for allowing us to participate in the care of your patient.      Bhavik Cuellar MD  04/15/2024  14:14 CDT

## 2024-04-24 ENCOUNTER — OFFICE VISIT (OUTPATIENT)
Dept: CARDIOLOGY | Facility: CLINIC | Age: 79
End: 2024-04-24
Payer: MEDICARE

## 2024-04-24 VITALS
BODY MASS INDEX: 22.19 KG/M2 | WEIGHT: 113 LBS | HEIGHT: 60 IN | DIASTOLIC BLOOD PRESSURE: 86 MMHG | OXYGEN SATURATION: 98 % | SYSTOLIC BLOOD PRESSURE: 138 MMHG | RESPIRATION RATE: 18 BRPM | HEART RATE: 65 BPM

## 2024-04-24 DIAGNOSIS — I35.0 AORTIC VALVE STENOSIS, ETIOLOGY OF CARDIAC VALVE DISEASE UNSPECIFIED: ICD-10-CM

## 2024-04-24 DIAGNOSIS — Z95.2 S/P TAVR (TRANSCATHETER AORTIC VALVE REPLACEMENT): ICD-10-CM

## 2024-04-24 DIAGNOSIS — E78.2 MIXED HYPERLIPIDEMIA: Chronic | ICD-10-CM

## 2024-04-24 DIAGNOSIS — I34.0 MITRAL VALVE INSUFFICIENCY, UNSPECIFIED ETIOLOGY: ICD-10-CM

## 2024-04-24 DIAGNOSIS — I10 ESSENTIAL HYPERTENSION: Chronic | ICD-10-CM

## 2024-04-24 DIAGNOSIS — I25.708 CORONARY ARTERY DISEASE OF BYPASS GRAFT OF NATIVE HEART WITH STABLE ANGINA PECTORIS: Primary | ICD-10-CM

## 2024-04-24 DIAGNOSIS — Z79.01 CURRENT USE OF LONG TERM ANTICOAGULATION: ICD-10-CM

## 2024-04-24 DIAGNOSIS — I48.0 PAROXYSMAL ATRIAL FIBRILLATION: ICD-10-CM

## 2024-04-24 DIAGNOSIS — I50.42 CHRONIC COMBINED SYSTOLIC AND DIASTOLIC CONGESTIVE HEART FAILURE: ICD-10-CM

## 2024-04-24 RX ORDER — NITROGLYCERIN 0.4 MG/1
0.4 TABLET SUBLINGUAL
Qty: 25 TABLET | Refills: 2 | Status: SHIPPED | OUTPATIENT
Start: 2024-04-24

## 2024-04-24 RX ORDER — NITROGLYCERIN 0.4 MG/1
TABLET SUBLINGUAL
COMMUNITY
End: 2024-04-24 | Stop reason: SDUPTHER

## 2024-04-24 NOTE — PROGRESS NOTES
Subjective:     Encounter Date: 04/24/2024      Patient ID: Sondra Connolly is a 79 y.o. female with coronary artery disease s/p PCI to RCA 2021 with prior CABG x4 in 2010, severe aortic stenosis s/p TAVR (#23 S3 ultra valve) on 3/19/2024, combined congestive heart failure, paroxysmal atrial fibrillation, chronic anticoagulation, Type 2 DM, hypertension.     Chief Complaint: routine follow up  History of Present Illness  Patient presents today for management of coronary artery disease. She is  s/p TAVR (#23 S3 ultra valve) on 3/19/2024. She saw Dr Cuellar 4/15/2024.  She went to cardiac rehab once and she was very sore for the other couple of days so she has declined going back. She has been walking at home and doing exercising at home. Today she reports that she has been doing ok. She reports that her dyspnea on exertion is much improved since TAVR and her exercise tolerance is increasing. She denies any chest pain. She denies any leg swelling, orthopnea or PND. She denies any heart racing or palpitations. She reports some fatigue and dizziness if she over does activity. Family and patient report that her BP has remained well controlled. Patient is on Eliquis and denies any bleeding issues. Patient follows with Dr Bhakta as PCP.    The following portions of the patient's history were reviewed and updated as appropriate: allergies, current medications, past family history, past medical history, past social history, past surgical history and problem list.    Allergies   Allergen Reactions    Iodine Unknown - High Severity    Salicylates Angioedema    Shellfish-Derived Products Unknown - High Severity    Codeine Nausea And Vomiting and Hallucinations    Aspirin Hives     Hives and swelling    Demerol [Meperidine] Nausea And Vomiting and Hallucinations       Current Outpatient Medications:     apixaban (Eliquis) 5 MG tablet tablet, TAKE 1 TABLETS BY MOUTH EVERY 12 HOURS, Disp: 60 tablet, Rfl: 11    clopidogrel (PLAVIX)  75 MG tablet, Take 1 tablet by mouth Daily. Start this med on March 12. Take 300mg on Day 1, then 75 mg daily., Disp: 30 tablet, Rfl: 11    Entresto 24-26 MG tablet, TAKE 1 TABLET BY MOUTH TWICE A DAY, Disp: 60 tablet, Rfl: 11    metoprolol succinate XL (TOPROL-XL) 25 MG 24 hr tablet, TAKE 1/2 TABLET BY MOUTH EVERY DAY, Disp: 45 tablet, Rfl: 3    Multiple Vitamins-Minerals (MULTIVITAMIN ADULT PO), Take 1 tablet by mouth Daily., Disp: , Rfl:     nitroglycerin (NITROSTAT) 0.4 MG SL tablet, Place 1 tablet under the tongue Every 5 (Five) Minutes As Needed for Chest Pain. Take no more than 3 doses in 15 minutes., Disp: 25 tablet, Rfl: 2    simvastatin (ZOCOR) 80 MG tablet, Take 1 tablet by mouth Daily., Disp: , Rfl:     empagliflozin (JARDIANCE) 10 MG tablet tablet, Take 1 tablet by mouth Daily., Disp: 30 tablet, Rfl: 11    Past Medical History:   Diagnosis Date    A-fib     Abnormal nuclear stress test 08/23/2019    Angina pectoris     Anxiety     Arthritis     Atherosclerosis of coronary artery bypass graft     Carotid artery occlusion without infarction     Chronic kidney disease     Chronic lung disease     Colon polyp     Diabetes mellitus     BLOOD SUGARS ARE UNDER CONTROL OFF OF ALL MEDS FOR A LONG TIME PER PT AND SON    DJD (degenerative joint disease)     Essential hypertension 03/24/2017    Fibrocystic breast disease     Gastritis     GI bleed     Hemorrhoids     Hyperlipidemia     Hypertension     Lung disease     chronic    MI (myocardial infarction)     Palpitations     PUD (peptic ulcer disease)     PVD (peripheral vascular disease)     Renal failure, acute     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     Social History     Socioeconomic History    Marital status:    Tobacco Use    Smoking status: Never    Smokeless tobacco: Never    Tobacco comments:     Non smoker; no tobacco use   Vaping Use    Vaping status: Never Used   Substance and Sexual Activity    Alcohol use: Not Currently    Drug use: Not Currently     "Sexual activity: Defer       Review of Systems   Constitutional: Negative for malaise/fatigue.   HENT:  Negative for nosebleeds.    Cardiovascular:  Positive for dyspnea on exertion (moderate exertion). Negative for chest pain, irregular heartbeat, leg swelling, near-syncope, orthopnea, palpitations, paroxysmal nocturnal dyspnea and syncope.   Respiratory:  Negative for shortness of breath.    Hematologic/Lymphatic: Bruises/bleeds easily.   Neurological:  Positive for dizziness (intermittent). Negative for weakness.   All other systems reviewed and are negative.         Objective:     Vitals reviewed.   Constitutional:       General: Not in acute distress.     Appearance: Normal appearance. Well-developed.   Eyes:      Pupils: Pupils are equal, round, and reactive to light.   HENT:      Head: Normocephalic and atraumatic.      Nose: Nose normal.   Neck:      Vascular: No carotid bruit.   Pulmonary:      Effort: Pulmonary effort is normal. No respiratory distress.      Breath sounds: Normal breath sounds. No wheezing. No rales.   Cardiovascular:      Normal rate. Regular rhythm.      Murmurs: There is a grade 4/6 systolic murmur.   Edema:     Peripheral edema absent.   Abdominal:      General: There is no distension.      Palpations: Abdomen is soft.   Musculoskeletal: Normal range of motion.      Cervical back: Normal range of motion and neck supple. Skin:     General: Skin is warm.      Findings: No erythema or rash.   Neurological:      General: No focal deficit present.      Mental Status: Alert and oriented to person, place, and time.   Psychiatric:         Attention and Perception: Attention normal.         Mood and Affect: Mood normal.         Speech: Speech normal.         Behavior: Behavior normal.         Thought Content: Thought content normal.         Judgment: Judgment normal.         /86   Pulse 65   Resp 18   Ht 152.4 cm (60\")   Wt 51.3 kg (113 lb)   SpO2 98%   BMI 22.07 kg/m² "     Procedures    Lab Review:     Results for orders placed during the hospital encounter of 04/15/24    Adult Transthoracic Echo Complete w/ Color, Spectral and Contrast if necessary per protocol    Interpretation Summary    Left ventricular systolic function is mildly decreased. Left ventricular ejection fraction appears to be 46 - 50%.    There is a TAVR valve present (#23 S3 Ultra) in excellent position, with normal transvalvular mean gradient (8mmHg) and no evidence of paravalvular regurgitation.    Severe mitral valve regurgitation is present.    The left ventricular cavity is mildly dilated.    The following left ventricular wall segments are hypokinetic: mid anterior, apical anterior, basal anterolateral, mid anterolateral, apical lateral, mid inferolateral, apical inferior, mid inferior, apical septal, basal inferoseptal, mid inferoseptal, apex hypokinetic, mid anteroseptal, basal anterior and basal inferoseptal. The following left ventricular wall segments are akinetic: basal inferolateral and basal inferior.    Estimated right ventricular systolic pressure from tricuspid regurgitation is normal (<35 mmHg).    Normal size, with mildly reduced systolic function of the right ventricle.    Compared to most recent exam, which was a limited study on 3/20/2024, global LV systolic function is now slightly reduced and the severity of mitral regurg and appears slightly worse.    I have personally reviewed echo and past office notes prior to patients visit  Assessment:          Diagnosis Plan   1. Coronary artery disease of bypass graft of native heart with stable angina pectoris        2. Chronic combined systolic and diastolic congestive heart failure        3. Paroxysmal atrial fibrillation        4. Current use of long term anticoagulation        5. Aortic valve stenosis, etiology of cardiac valve disease unspecified        6. S/P TAVR (transcatheter aortic valve replacement)        7. Mitral valve  insufficiency, unspecified etiology        8. Essential hypertension        9. Mixed hyperlipidemia                 Plan:       Coronary artery disease: prior CABG x4 in 2010 and subsequent PCI to distal RCA after rotational atherectomy in 2021. [facility] pre-TAVR showed severely calcified ostial RCA stenosis. Patient remains chest pain free. Continue plavix, simvastatin and metoprolol    2. Chronic combined systolic and diastolic congestive heart failure: previously LVEF 41-45% on echo 2/2021 improved to 51-55% on 8/3/2013. Most recent echo 4/15/2024 showed LVEF 46-50%. Patient is stable and euvolemic in office. Continue metoprolol, entresto and lasix prn. Will start Jardiance today. Reviewed signs and symptoms of CHF and what to report to office with patient. Patient instructed to restrict sodium and record daily weight. Patient is to report weight gain of greater than 2 lbs overnight or 5 lbs in 1 week. Patient verbalized understanding of instructions and plan of care.     3. Paroxysmal Atrial Fibrillation: Regular rhythm on examination today. Continue metoprolol and eliquis. Patient follows with Dr Reed.    4. Chronic anticoagulation: Patient is on Eliquis and denies any bleeding issues.     5-6. Aortic valve stenosis:  s/p TAVR (#23 S3 ultra valve) on 3/19/2024. Echo 4/15/2024 showed Valve in excellent position with normal transvalvular mean gradient 8 mmHg and no paravalvular regurgitation    7. Mitral valve regurgitation: severe on echo from 4/15/2024.     8. Hypertension: Controlled. Continue current medications    9. Hyperlipidemia: managed and followed by PCP. Continue simvastatin    I attest that all portions of this note reviewed and all information has been updated by myself to reflect the patient's current status.      I spent 36 minutes caring for Sondra on this date of service. This time includes time spent by me in the following activities:preparing for the visit, reviewing tests, obtaining and/or  reviewing a separately obtained history, performing a medically appropriate examination and/or evaluation , counseling and educating the patient/family/caregiver, ordering medications, tests, or procedures, and documenting information in the medical record    Patient is to follow up in 4 months or sooner if needed

## 2024-07-09 RX ORDER — SACUBITRIL AND VALSARTAN 24; 26 MG/1; MG/1
TABLET, FILM COATED ORAL
Qty: 180 TABLET | Refills: 3 | Status: SHIPPED | OUTPATIENT
Start: 2024-07-09

## 2024-07-15 ENCOUNTER — TELEPHONE (OUTPATIENT)
Dept: CARDIOLOGY | Facility: CLINIC | Age: 79
End: 2024-07-15
Payer: MEDICARE

## 2024-07-15 NOTE — TELEPHONE ENCOUNTER
PATIENT IS TAKING ELIQUIS AND PLAVIX AND IS NEEDING EXTRACTIONS DONE.     THEY WOULD LIKE TO KNOW HOW MANY DAYS THAT THE PATIENT CAN BE OFF OF THE MEDICATION

## 2024-07-25 RX ORDER — ENOXAPARIN SODIUM 100 MG/ML
1 INJECTION SUBCUTANEOUS EVERY 12 HOURS SCHEDULED
Qty: 6 ML | Refills: 0 | Status: SHIPPED | OUTPATIENT
Start: 2024-07-25

## 2024-10-31 ENCOUNTER — APPOINTMENT (OUTPATIENT)
Dept: GENERAL RADIOLOGY | Facility: HOSPITAL | Age: 79
End: 2024-10-31
Payer: MEDICARE

## 2024-10-31 ENCOUNTER — APPOINTMENT (OUTPATIENT)
Dept: CT IMAGING | Facility: HOSPITAL | Age: 79
End: 2024-10-31
Payer: MEDICARE

## 2024-10-31 ENCOUNTER — HOSPITAL ENCOUNTER (EMERGENCY)
Facility: HOSPITAL | Age: 79
Discharge: HOME | End: 2024-10-31
Payer: MEDICARE

## 2024-10-31 VITALS
HEART RATE: 81 BPM | TEMPERATURE: 98.3 F | RESPIRATION RATE: 16 BRPM | DIASTOLIC BLOOD PRESSURE: 84 MMHG | SYSTOLIC BLOOD PRESSURE: 158 MMHG | HEIGHT: 60 IN | BODY MASS INDEX: 22.2 KG/M2 | WEIGHT: 113.1 LBS | OXYGEN SATURATION: 100 %

## 2024-10-31 DIAGNOSIS — R53.1 GENERALIZED WEAKNESS: Primary | ICD-10-CM

## 2024-10-31 DIAGNOSIS — E86.0 DEHYDRATION: ICD-10-CM

## 2024-10-31 LAB
ALBUMIN SERPL-MCNC: 3.9 G/DL (ref 3.5–5.2)
ALBUMIN/GLOB SERPL: 1.6 G/DL
ALP SERPL-CCNC: 86 U/L (ref 39–117)
ALT SERPL W P-5'-P-CCNC: 14 U/L (ref 1–33)
ANION GAP SERPL CALCULATED.3IONS-SCNC: 5 MMOL/L (ref 5–15)
APTT PPP: 36.4 SECONDS (ref 24.5–36)
AST SERPL-CCNC: 18 U/L (ref 1–32)
B PARAPERT DNA SPEC QL NAA+PROBE: NOT DETECTED
B PERT DNA SPEC QL NAA+PROBE: NOT DETECTED
BACTERIA UR QL AUTO: NORMAL /HPF
BASOPHILS # BLD AUTO: 0.03 10*3/MM3 (ref 0–0.2)
BASOPHILS NFR BLD AUTO: 0.6 % (ref 0–1.5)
BILIRUB SERPL-MCNC: 0.2 MG/DL (ref 0–1.2)
BILIRUB UR QL STRIP: NEGATIVE
BUN SERPL-MCNC: 24 MG/DL (ref 8–23)
BUN/CREAT SERPL: 18.9 (ref 7–25)
C PNEUM DNA NPH QL NAA+NON-PROBE: NOT DETECTED
CALCIUM SPEC-SCNC: 8.7 MG/DL (ref 8.6–10.5)
CHLORIDE SERPL-SCNC: 105 MMOL/L (ref 98–107)
CLARITY UR: CLEAR
CO2 SERPL-SCNC: 27 MMOL/L (ref 22–29)
COLOR UR: YELLOW
CREAT SERPL-MCNC: 1.27 MG/DL (ref 0.57–1)
DEPRECATED RDW RBC AUTO: 49 FL (ref 37–54)
EGFRCR SERPLBLD CKD-EPI 2021: 42.6 ML/MIN/1.73
EOSINOPHIL # BLD AUTO: 0.21 10*3/MM3 (ref 0–0.4)
EOSINOPHIL NFR BLD AUTO: 4.3 % (ref 0.3–6.2)
ERYTHROCYTE [DISTWIDTH] IN BLOOD BY AUTOMATED COUNT: 14.6 % (ref 12.3–15.4)
FLUAV SUBTYP SPEC NAA+PROBE: NOT DETECTED
FLUBV RNA ISLT QL NAA+PROBE: NOT DETECTED
GLOBULIN UR ELPH-MCNC: 2.4 GM/DL
GLUCOSE SERPL-MCNC: 106 MG/DL (ref 65–99)
GLUCOSE UR STRIP-MCNC: ABNORMAL MG/DL
HADV DNA SPEC NAA+PROBE: NOT DETECTED
HCOV 229E RNA SPEC QL NAA+PROBE: NOT DETECTED
HCOV HKU1 RNA SPEC QL NAA+PROBE: NOT DETECTED
HCOV NL63 RNA SPEC QL NAA+PROBE: NOT DETECTED
HCOV OC43 RNA SPEC QL NAA+PROBE: NOT DETECTED
HCT VFR BLD AUTO: 39.4 % (ref 34–46.6)
HGB BLD-MCNC: 12.6 G/DL (ref 12–15.9)
HGB UR QL STRIP.AUTO: ABNORMAL
HMPV RNA NPH QL NAA+NON-PROBE: NOT DETECTED
HPIV1 RNA ISLT QL NAA+PROBE: NOT DETECTED
HPIV2 RNA SPEC QL NAA+PROBE: NOT DETECTED
HPIV3 RNA NPH QL NAA+PROBE: NOT DETECTED
HPIV4 P GENE NPH QL NAA+PROBE: NOT DETECTED
HYALINE CASTS UR QL AUTO: NORMAL /LPF
IMM GRANULOCYTES # BLD AUTO: 0.01 10*3/MM3 (ref 0–0.05)
IMM GRANULOCYTES NFR BLD AUTO: 0.2 % (ref 0–0.5)
INR PPP: 1.08 (ref 0.91–1.09)
KETONES UR QL STRIP: NEGATIVE
LEUKOCYTE ESTERASE UR QL STRIP.AUTO: ABNORMAL
LYMPHOCYTES # BLD AUTO: 1.04 10*3/MM3 (ref 0.7–3.1)
LYMPHOCYTES NFR BLD AUTO: 21.3 % (ref 19.6–45.3)
M PNEUMO IGG SER IA-ACNC: NOT DETECTED
MAGNESIUM SERPL-MCNC: 2.2 MG/DL (ref 1.6–2.4)
MCH RBC QN AUTO: 28.9 PG (ref 26.6–33)
MCHC RBC AUTO-ENTMCNC: 32 G/DL (ref 31.5–35.7)
MCV RBC AUTO: 90.4 FL (ref 79–97)
MONOCYTES # BLD AUTO: 0.6 10*3/MM3 (ref 0.1–0.9)
MONOCYTES NFR BLD AUTO: 12.3 % (ref 5–12)
NEUTROPHILS NFR BLD AUTO: 3 10*3/MM3 (ref 1.7–7)
NEUTROPHILS NFR BLD AUTO: 61.3 % (ref 42.7–76)
NITRITE UR QL STRIP: NEGATIVE
NRBC BLD AUTO-RTO: 0 /100 WBC (ref 0–0.2)
NT-PROBNP SERPL-MCNC: 509.3 PG/ML (ref 0–1800)
PH UR STRIP.AUTO: 6.5 [PH] (ref 5–8)
PLATELET # BLD AUTO: 278 10*3/MM3 (ref 140–450)
PMV BLD AUTO: 9 FL (ref 6–12)
POTASSIUM SERPL-SCNC: 4.7 MMOL/L (ref 3.5–5.2)
PROCALCITONIN SERPL-MCNC: 0.04 NG/ML (ref 0–0.25)
PROT SERPL-MCNC: 6.3 G/DL (ref 6–8.5)
PROT UR QL STRIP: ABNORMAL
PROTHROMBIN TIME: 14.5 SECONDS (ref 11.8–14.8)
RBC # BLD AUTO: 4.36 10*6/MM3 (ref 3.77–5.28)
RBC # UR STRIP: NORMAL /HPF
REF LAB TEST METHOD: NORMAL
RHINOVIRUS RNA SPEC NAA+PROBE: NOT DETECTED
RSV RNA NPH QL NAA+NON-PROBE: NOT DETECTED
SARS-COV-2 RNA NPH QL NAA+NON-PROBE: NOT DETECTED
SODIUM SERPL-SCNC: 137 MMOL/L (ref 136–145)
SP GR UR STRIP: 1.01 (ref 1–1.03)
SQUAMOUS #/AREA URNS HPF: NORMAL /HPF
TROPONIN T SERPL HS-MCNC: 27 NG/L
UROBILINOGEN UR QL STRIP: ABNORMAL
WBC # UR STRIP: NORMAL /HPF
WBC NRBC COR # BLD AUTO: 4.89 10*3/MM3 (ref 3.4–10.8)

## 2024-10-31 PROCEDURE — 0202U NFCT DS 22 TRGT SARS-COV-2: CPT

## 2024-10-31 PROCEDURE — 85025 COMPLETE CBC W/AUTO DIFF WBC: CPT

## 2024-10-31 PROCEDURE — 36415 COLL VENOUS BLD VENIPUNCTURE: CPT

## 2024-10-31 PROCEDURE — 84484 ASSAY OF TROPONIN QUANT: CPT

## 2024-10-31 PROCEDURE — 81001 URINALYSIS AUTO W/SCOPE: CPT

## 2024-10-31 PROCEDURE — 83735 ASSAY OF MAGNESIUM: CPT

## 2024-10-31 PROCEDURE — 93005 ELECTROCARDIOGRAM TRACING: CPT

## 2024-10-31 PROCEDURE — 25810000003 SODIUM CHLORIDE 0.9 % SOLUTION

## 2024-10-31 PROCEDURE — 83880 ASSAY OF NATRIURETIC PEPTIDE: CPT

## 2024-10-31 PROCEDURE — 70450 CT HEAD/BRAIN W/O DYE: CPT

## 2024-10-31 PROCEDURE — 71045 X-RAY EXAM CHEST 1 VIEW: CPT

## 2024-10-31 PROCEDURE — 84145 PROCALCITONIN (PCT): CPT

## 2024-10-31 PROCEDURE — 85610 PROTHROMBIN TIME: CPT

## 2024-10-31 PROCEDURE — 85730 THROMBOPLASTIN TIME PARTIAL: CPT

## 2024-10-31 PROCEDURE — 80053 COMPREHEN METABOLIC PANEL: CPT

## 2024-10-31 PROCEDURE — 99284 EMERGENCY DEPT VISIT MOD MDM: CPT

## 2024-10-31 RX ORDER — SODIUM CHLORIDE 0.9 % (FLUSH) 0.9 %
10 SYRINGE (ML) INJECTION AS NEEDED
Status: DISCONTINUED | OUTPATIENT
Start: 2024-10-31 | End: 2024-10-31 | Stop reason: HOSPADM

## 2024-10-31 RX ADMIN — SODIUM CHLORIDE 500 ML: 0.9 INJECTION, SOLUTION INTRAVENOUS at 19:19

## 2024-10-31 NOTE — ED PROVIDER NOTES
Subjective   History of Present Illness  Patient is an 79-year-old female that presents to the emergency department by EMS for complaints of generalized weakness and lightheadedness.  Patient reports she went to bed yesterday feeling fine with no complaints.  Patient reports she woke up this morning feeling lightheaded and very weak on her feet.  Patient reports she felt as though her legs were going to give out from under her.  Patient denies any episodes of near syncope, fall, trauma, or injury.  Patient states she has been congested over the last few days but denies any fevers, body aches, chills or cough.  Patient denies any episodes of chest pain, palpitations, or shortness of breath.  Denies any dizziness or room spinning sensations.  Denies any headache, blurred vision, or syncope.  Denies any abdominal pain, nausea, vomiting, constipation or diarrhea.  When asking patient about urinary symptoms she denies any dysuria but does report urinary frequency.  PMH significant for A-fib, anxiety, CKD, chronic lung disease, diabetes, hypertension, gastritis, MI and CABG x 4.      Review of Systems   HENT:  Positive for congestion.    Neurological:  Positive for weakness and light-headedness.   All other systems reviewed and are negative.      Past Medical History:   Diagnosis Date    A-fib     Abnormal nuclear stress test 08/23/2019    Angina pectoris     Anxiety     Arthritis     Atherosclerosis of coronary artery bypass graft     Carotid artery occlusion without infarction     Chronic kidney disease     Chronic lung disease     Colon polyp     Diabetes mellitus     BLOOD SUGARS ARE UNDER CONTROL OFF OF ALL MEDS FOR A LONG TIME PER PT AND SON    DJD (degenerative joint disease)     Essential hypertension 03/24/2017    Fibrocystic breast disease     Gastritis     GI bleed     Hemorrhoids     Hyperlipidemia     Hypertension     Lung disease     chronic    MI (myocardial infarction)     Palpitations     PUD (peptic  ulcer disease)     PVD (peripheral vascular disease)     Renal failure, acute     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       Allergies   Allergen Reactions    Iodine Unknown - High Severity    Salicylates Angioedema    Shellfish-Derived Products Unknown - High Severity    Codeine Nausea And Vomiting and Hallucinations    Aspirin Hives     Hives and swelling    Demerol [Meperidine] Nausea And Vomiting and Hallucinations       Past Surgical History:   Procedure Laterality Date    ABLATION OF DYSRHYTHMIC FOCUS      May 2021 in [place]      AORTIC VALVE REPAIR/REPLACEMENT N/A 3/19/2024    Procedure: Transfemoral Transcatheter Aortic Valve Replacement;  Surgeon: Bhavik Cuellar MD;  Location:  [facility];  Service: Cardiovascular;  Laterality: N/A;    AORTIC VALVE REPAIR/REPLACEMENT Bilateral 3/19/2024    Procedure: TRANSFEMORAL TRANSCATHETER AORTIC VALVE REPLACEMENT;  Surgeon: Reed Abdi MD;  Location:  [facility];  Service: Cardiothoracic;  Laterality: Bilateral;    APPENDECTOMY      CARDIAC CATHETERIZATION  05/01/2009    Left-Heart Skin    CARDIAC CATHETERIZATION N/A 2/4/2021    Procedure: Left Heart Cath;  Surgeon: Tristan Cabello DO;  Location:  [facility];  Service: Cardiology;  Laterality: N/A;    CARDIAC CATHETERIZATION Right 2/5/2021    Procedure: Left Heart Cath, rotablator to RCA with Dr. Abrams at 1PM;  Surgeon: Tristan Cabello DO;  Location:  [facility];  Service: Cardiology;  Laterality: Right;    CARDIAC CATHETERIZATION N/A 12/22/2023    Procedure: Left Heart Cath;  Surgeon: Gualberto Gallo MD;  Location:  [facility];  Service: Cardiology;  Laterality: N/A;    CAROTID ENDARTERECTOMY      CATARACT EXTRACTION      CATARACT EXTRACTION      COLONOSCOPY      CORONARY ARTERY BYPASS GRAFT  07/2007    Four    ENDOSCOPY  10/02/2013    ENDOSCOPY  10/02/2013    HYSTERECTOMY      SKIN CANCER EXCISION      THROMBOENDARTERECTOMY       TONSILLECTOMY         Family History   Problem Relation Age of Onset    Heart disease Mother     Breast cancer Mother     Heart disease Father     Heart disease Brother     Heart disease Brother     Breast cancer Maternal Grandmother     Colon cancer Neg Hx        Social History     Socioeconomic History    Marital status:    Tobacco Use    Smoking status: Never    Smokeless tobacco: Never    Tobacco comments:     Non smoker; no tobacco use   Vaping Use    Vaping status: Never Used   Substance and Sexual Activity    Alcohol use: Not Currently    Drug use: Not Currently    Sexual activity: Defer           Objective   Physical Exam  Vitals and nursing note reviewed.   Constitutional:       Appearance: Normal appearance.      Comments: Nontoxic appearing. In no acute distress.    HENT:      Head: Normocephalic and atraumatic.      Right Ear: External ear normal.      Left Ear: External ear normal.      Nose: Nose normal. Congestion present.      Mouth/Throat:      Mouth: Mucous membranes are moist.      Pharynx: Oropharynx is clear.   Eyes:      Extraocular Movements: Extraocular movements intact.      Conjunctiva/sclera: Conjunctivae normal.      Pupils: Pupils are equal, round, and reactive to light.   Cardiovascular:      Rate and Rhythm: Normal rate and regular rhythm.      Pulses: Normal pulses.      Heart sounds: Normal heart sounds.   Pulmonary:      Effort: Pulmonary effort is normal. No respiratory distress.      Breath sounds: Normal breath sounds. No wheezing.   Chest:      Chest wall: No tenderness.   Abdominal:      General: Abdomen is flat. Bowel sounds are normal. There is no distension.      Palpations: Abdomen is soft.      Tenderness: There is no abdominal tenderness. There is no right CVA tenderness, left CVA tenderness, guarding or rebound.   Musculoskeletal:         General: Normal range of motion.      Cervical back: Normal range of motion and neck supple.      Right lower leg: No edema.       Left lower leg: No edema.   Skin:     General: Skin is warm and dry.      Capillary Refill: Capillary refill takes less than 2 seconds.   Neurological:      General: No focal deficit present.      Mental Status: She is alert and oriented to person, place, and time. Mental status is at baseline.   Psychiatric:         Mood and Affect: Mood normal.         Behavior: Behavior normal.         Thought Content: Thought content normal.         Judgment: Judgment normal.       Labs Reviewed   COMPREHENSIVE METABOLIC PANEL - Abnormal; Notable for the following components:       Result Value    Glucose 106 (*)     BUN 24 (*)     Creatinine 1.27 (*)     eGFR 42.6 (*)     All other components within normal limits    Narrative:     GFR Normal >60  Chronic Kidney Disease <60  Kidney Failure <15    The GFR formula is only valid for adults with stable renal function between ages 18 and 70.   APTT - Abnormal; Notable for the following components:    PTT 36.4 (*)     All other components within normal limits   URINALYSIS W/ CULTURE IF INDICATED - Abnormal; Notable for the following components:    Glucose, UA >=1000 mg/dL (3+) (*)     Blood, UA Trace (*)     Protein, UA Trace (*)     Leuk Esterase, UA Small (1+) (*)     All other components within normal limits    Narrative:     In absence of clinical symptoms, the presence of pyuria, bacteria, and/or nitrites on the urinalysis result does not correlate with infection.   SINGLE HS TROPONIN T - Abnormal; Notable for the following components:    HS Troponin T 27 (*)     All other components within normal limits    Narrative:     High Sensitive Troponin T Reference Range:  <14.0 ng/L- Negative Female for AMI  <22.0 ng/L- Negative Male for AMI  >=14 - Abnormal Female indicating possible myocardial injury.  >=22 - Abnormal Male indicating possible myocardial injury.   Clinicians would have to utilize clinical acumen, EKG, Troponin, and serial changes to determine if it is an Acute  "Myocardial Infarction or myocardial injury due to an underlying chronic condition.        CBC WITH AUTO DIFFERENTIAL - Abnormal; Notable for the following components:    Monocyte % 12.3 (*)     All other components within normal limits   RESPIRATORY PANEL PCR W/ COVID-19 (SARS-COV-2), NP SWAB IN UTM/VTP, 2 HR TAT - Normal    Narrative:     In the setting of a positive respiratory panel with a viral infection PLUS a negative procalcitonin without other underlying concern for bacterial infection, consider observing off antibiotics or discontinuation of antibiotics and continue supportive care. If the respiratory panel is positive for atypical bacterial infection (Bordetella pertussis, Chlamydophila pneumoniae, or Mycoplasma pneumoniae), consider antibiotic de-escalation to target atypical bacterial infection.   PROTIME-INR - Normal   PROCALCITONIN - Normal    Narrative:     As a Marker for Sepsis (Non-Neonates):    1. <0.5 ng/mL represents a low risk of severe sepsis and/or septic shock.  2. >2 ng/mL represents a high risk of severe sepsis and/or septic shock.    As a Marker for Lower Respiratory Tract Infections that require antibiotic therapy:    PCT on Admission    Antibiotic Therapy       6-12 Hrs later    >0.5                Strongly Recommended  >0.25 - <0.5        Recommended   0.1 - 0.25          Discouraged              Remeasure/reassess PCT  <0.1                Strongly Discouraged     Remeasure/reassess PCT    As 28 day mortality risk marker: \"Change in Procalcitonin Result\" (>80% or <=80%) if Day 0 (or Day 1) and Day 4 values are available. Refer to http://www.[facility]-pct-calculator.com    Change in PCT <=80%  A decrease of PCT levels below or equal to 80% defines a positive change in PCT test result representing a higher risk for 28-day all-cause mortality of patients diagnosed with severe sepsis for septic shock.    Change in PCT >80%  A decrease of PCT levels of more than 80% defines a negative change " in PCT result representing a lower risk for 28-day all-cause mortality of patients diagnosed with severe sepsis or septic shock.      MAGNESIUM - Normal   BNP (IN-HOUSE) - Normal    Narrative:     This assay is used as an aid in the diagnosis of individuals suspected of having heart failure. It can be used as an aid in the diagnosis of acute decompensated heart failure (ADHF) in patients presenting with signs and symptoms of ADHF to the emergency department (ED). In addition, NT-proBNP of <300 pg/mL indicates ADHF is not likely.    Age Range Result Interpretation  NT-proBNP Concentration (pg/mL:      <50             Positive            >450                   Gray                 300-450                    Negative             <300    50-75           Positive            >900                  Gray                300-900                  Negative            <300      >75             Positive            >1800                  Gray                300-1800                  Negative            <300   URINALYSIS, MICROSCOPIC ONLY   CBC AND DIFFERENTIAL    Narrative:     The following orders were created for panel order CBC & Differential.  Procedure                               Abnormality         Status                     ---------                               -----------         ------                     CBC Auto Differential[[id number]]        Abnormal            Final result                 Please view results for these tests on the individual orders.      CT Head Without Contrast   Final Result       1. Moderate cerebral and cerebellar atrophy with chronic microvascular   disease. Remote right frontal cortical and subcortical infarct.   2. No evidence of acute intracranial process.               This report was signed and finalized on 10/31/2024 5:22 PM by Dr. Lux Norris MD.          XR Chest 1 View   Final Result   1.. Cardiac enlargement. No redistribution or failure.   2. No acute infiltrate or effusion.        This report was signed and finalized on 10/31/2024 5:34 PM by Dr. Lux Norris MD.               Procedures           ED Course                                               Medical Decision Making  Sondra Connolly is a 79 y.o. female who presents to the ED by EMS for complaints of generalized weakness and lightheadedness.  Patient reports she went to bed yesterday feeling fine with no complaints.  Patient reports she woke up this morning feeling lightheaded and very weak on her feet.  Patient reports she felt as though her legs were going to give out from under her.  Patient denies any episodes of near syncope, fall, trauma, or injury.  Patient states she has been congested over the last few days but denies any fevers, body aches, chills or cough.  Patient denies any episodes of chest pain, palpitations, or shortness of breath.  Denies any dizziness or room spinning sensations.  Denies any headache, blurred vision, or syncope.  Denies any abdominal pain, nausea, vomiting, constipation or diarrhea.  When asking patient about urinary symptoms she denies any dysuria but does report urinary frequency.  PMH significant for A-fib, anxiety, CKD, chronic lung disease, diabetes, hypertension, gastritis, MI and CABG x 4.    Patient was non-toxic appearing on arrival. No acute distress was noted.  Vital signs stable.     Patient's presentation raises suspicion for differentials including, but not limited to, viral illness, pneumonia, urinary tract infection, electrolyte balance, dehydration, ICH, arrhythmia.    Past medical history, surgical history, and medication regimen reviewed.     Previous notes, labs, imaging and more reviewed.    Medications administered,   sodium chloride 0.9 % bolus 500 mL     Please refer to above section of note for lab and imaging results that were reviewed and interpreted by radiology as well as attending physician.     Given findings described above, patient's presentation is likely  consistent with generalized weakness and dehydration.     I had an in-depth discussion with the patient as well as family present at bedside regarding all lab and imaging results completed during today's ED encounter. Discussed that patient kidney function has slightly declined compared to labs completed 7 months ago. Discussed the importance of staying hydrated by increasing oral intake and good nutrition to help with not only hydration but also generalized weakness symptoms. Patient and family feel comfortable with being discharged home. They were educated on concerning signs and symptoms that would warrant a quick return to the ED and verbalized understanding of this. I answered all the questions regarding the emergency department evaluation, diagnosis, and treatment plan in plain and simple language that was understandable. We discussed that due to always having some diagnostic uncertainty while in the ER, there is always a chance that symptoms may change or new symptoms may reveal themselves after being discharged. Because of this, I stressed the importance of Sondra following up with their PCP. Patient informed that appointment will need to be done by calling their office to set up an appointment within the next few days or as soon as reasonably possible so that the symptoms can be re-evaluated for improvement or for any other questions. I also gave Sondra common sense return precautions and prompted patient to return to the emergency department within 24 - 48hrs if there are any new, worsening, or concerning symptoms. The patient verbalized understanding of the discharge instructions and agreed with them. Sondra was discharged in stable condition.     Dragon disclaimer:  Parts of this note may be an electronic transcription/translation of spoken language to printed text using the Dragon dictation system.    Problems Addressed:  Dehydration: complicated acute illness or injury  Generalized weakness: complicated  acute illness or injury    Amount and/or Complexity of Data Reviewed  Labs: ordered.  Radiology: ordered.    Risk  Prescription drug management.        Final diagnoses:   Generalized weakness   Dehydration       ED Disposition  ED Disposition       ED Disposition   Discharge    Condition   Stable    Comment   --               Matthieu Bhakta, DO  [address] [facility]   [facility] [address]  [phone]    Schedule an appointment as soon as possible for a visit       [facility] EMERGENCY DEPARTMENT  [address] [place] [id number] [phone]    If symptoms worsen         Medication List      No changes were made to your prescriptions during this visit.            Sheryl Birch, APRN  11/01/24 1431

## 2024-11-01 NOTE — DISCHARGE INSTRUCTIONS
It was very nice to meet you, Sondra. Thank you for allowing us to take care of you today at [facility].    Today you were seen in the emergency department for your symptoms. Please understand that an ER evaluation is just the start of your evaluation. We do the best we can, but we are often unable to fully find what is causing your symptoms from one evaluation.  Because of this, the goal is to determine whether you need to be evaluated in the hospital or if it is safe for you to go home and see other doctors provided such as primary care physicians or specialist on an outpatient basis.     Like we discussed, I strongly urge that you follow up with your primary care doctor. Please call their office to set up an appointment as soon as possible so that you can be re-evaluated for improvement in your symptoms or for any other questions.  I have provided the information needed, including phone number, to call to set up an appointment below in these discharge papers.     Educational material has also been provided in the following pages regarding what we have discussed today.     Please return to the emergency room within 12-48 hours if you experience symptoms such as the following:   Fever, chills, chest pain or shortness of breath, pain with inspiration/expiration, pain that travels to your arms, neck or back, nausea, vomiting, severe headache, tearing pain in your chest, dizziness, feel as though you are about to pass out, OR if you have any worsening symptoms, or any other concerns.

## 2024-11-03 LAB
QT INTERVAL: 428 MS
QTC INTERVAL: 452 MS

## 2025-01-21 DIAGNOSIS — I65.23 BILATERAL CAROTID ARTERY STENOSIS: Primary | ICD-10-CM

## 2025-01-29 ENCOUNTER — OFFICE VISIT (OUTPATIENT)
Dept: CARDIOLOGY | Facility: CLINIC | Age: 80
End: 2025-01-29
Payer: MEDICARE

## 2025-01-29 VITALS
HEART RATE: 65 BPM | DIASTOLIC BLOOD PRESSURE: 72 MMHG | SYSTOLIC BLOOD PRESSURE: 128 MMHG | OXYGEN SATURATION: 98 % | WEIGHT: 112 LBS | BODY MASS INDEX: 21.87 KG/M2

## 2025-01-29 DIAGNOSIS — I50.42 CHRONIC COMBINED SYSTOLIC AND DIASTOLIC CONGESTIVE HEART FAILURE: ICD-10-CM

## 2025-01-29 DIAGNOSIS — I48.0 PAROXYSMAL ATRIAL FIBRILLATION: ICD-10-CM

## 2025-01-29 DIAGNOSIS — Z95.2 S/P TAVR (TRANSCATHETER AORTIC VALVE REPLACEMENT): ICD-10-CM

## 2025-01-29 DIAGNOSIS — I35.0 AORTIC VALVE STENOSIS, ETIOLOGY OF CARDIAC VALVE DISEASE UNSPECIFIED: ICD-10-CM

## 2025-01-29 DIAGNOSIS — E78.2 MIXED HYPERLIPIDEMIA: ICD-10-CM

## 2025-01-29 DIAGNOSIS — I34.0 MITRAL VALVE INSUFFICIENCY, UNSPECIFIED ETIOLOGY: ICD-10-CM

## 2025-01-29 DIAGNOSIS — I10 ESSENTIAL HYPERTENSION: ICD-10-CM

## 2025-01-29 DIAGNOSIS — I25.708 CORONARY ARTERY DISEASE OF BYPASS GRAFT OF NATIVE HEART WITH STABLE ANGINA PECTORIS: Primary | ICD-10-CM

## 2025-01-29 DIAGNOSIS — Z79.01 CURRENT USE OF LONG TERM ANTICOAGULATION: ICD-10-CM

## 2025-01-29 RX ORDER — ROSUVASTATIN CALCIUM 40 MG/1
40 TABLET, COATED ORAL DAILY
COMMUNITY

## 2025-01-29 NOTE — PROGRESS NOTES
Reason For Visit:  Coronary Artery Disease (9 mo fu)     Subjective        Sondra Connolly is a 80 y.o. female with the below pertinent PMH who presents for follow-up of CAD and CHF.    Since her previous appointment she has been doing well.  She is tolerating her current regimen well.  Weights been stable with no significant volume fluctuations.  Stable dyspnea on exertion.  Denies any exertional chest pain.  Does have nosebleeds with her current regimen but these are overall stable.  Overall she feels like she is in good health.      ROS: Pertinent findings as noted above    Pertinent PMH  -Coronary artery disease status post four-vessel CABG in 2010 with subsequent PCI to distal RCA in 2021  - Chronic, systolic and diastolic congestive heart failure  - Paroxysmal atrial fibrillation status post ablation and 2021  - Aortic valve stenosis status post TAVR in March 2024  - Severe mitral valve regurgitation  - Hypertension  - Hyperlipidemia    Pertinent past medical, surgical, family, and social history were reviewed.      Current Outpatient Medications:     apixaban (Eliquis) 5 MG tablet tablet, TAKE 1 TABLETS BY MOUTH EVERY 12 HOURS, Disp: 60 tablet, Rfl: 11    clopidogrel (PLAVIX) 75 MG tablet, Take 1 tablet by mouth Daily. Start this med on March 12. Take 300mg on Day 1, then 75 mg daily., Disp: 30 tablet, Rfl: 11    empagliflozin (JARDIANCE) 10 MG tablet tablet, Take 1 tablet by mouth Daily., Disp: 30 tablet, Rfl: 11    Enoxaparin Sodium (LOVENOX) 80 MG/0.8ML solution prefilled syringe syringe, Inject 0.5 mL under the skin into the appropriate area as directed Every 12 (Twelve) Hours. Start 3 days prior to procedure 12 hours after stopping Eliquis Last dose 12 hours prior to procedure, Disp: 6 mL, Rfl: 0    metoprolol succinate XL (TOPROL-XL) 25 MG 24 hr tablet, TAKE 1/2 TABLET BY MOUTH EVERY DAY, Disp: 45 tablet, Rfl: 3    Multiple Vitamins-Minerals (MULTIVITAMIN ADULT PO), Take 1 tablet by mouth Daily., Disp: ,  "Rfl:     nitroglycerin (NITROSTAT) 0.4 MG SL tablet, Place 1 tablet under the tongue Every 5 (Five) Minutes As Needed for Chest Pain. Take no more than 3 doses in 15 minutes., Disp: 25 tablet, Rfl: 2    rosuvastatin (CRESTOR) 40 MG tablet, Take 1 tablet by mouth Daily., Disp: , Rfl:     sacubitril-valsartan (Entresto) 24-26 MG tablet, TAKE 1 TABLET BY MOUTH TWICE A DAY, Disp: 180 tablet, Rfl: 3     Objective   Vital Signs:  /72 (BP Location: Left arm, Patient Position: Sitting, Cuff Size: Adult)   Pulse 65   Wt 50.8 kg (112 lb)   SpO2 98%   BMI 21.87 kg/m²   Estimated body mass index is 21.87 kg/m² as calculated from the following:    Height as of 10/31/24: 152.4 cm (60\").    Weight as of this encounter: 50.8 kg (112 lb).      Constitutional:       Appearance: Healthy appearance. Not in distress.   Pulmonary:      Effort: Pulmonary effort is normal.      Breath sounds: Normal breath sounds.   Cardiovascular:      PMI at left midclavicular line. Normal rate. Regular rhythm. Normal S1. Normal S2.       Murmurs: There is no murmur.      No gallop.  No click. No rub.   Pulses:     Intact distal pulses.   Edema:     Peripheral edema absent.   Neurological:      Mental Status: Alert and oriented to person, place and time.        Result Review :  The following data was reviewed by: RANDA Adams on 01/29/2025:  CMP   CMP          3/20/2024    02:16 3/21/2024    05:41 10/31/2024    17:35   CMP   Glucose 100  84  106    BUN 18  17  24    Creatinine 0.97  0.76  1.27    EGFR 58.8  78.8  42.6    Sodium 131  128  137    Potassium 4.1  4.2  4.7    Chloride 98  100  105    Calcium 8.8  8.4  8.7    Total Protein   6.3    Albumin   3.9    Globulin   2.4    Total Bilirubin   0.2    Alkaline Phosphatase   86    AST (SGOT)   18    ALT (SGPT)   14    Albumin/Globulin Ratio   1.6    BUN/Creatinine Ratio 18.6  22.4  18.9    Anion Gap 9.0  8.0  5.0      CBC   CBC          3/20/2024    02:16 3/21/2024    05:41 10/31/2024 "    17:35   CBC   WBC 18.00  9.05  4.89    RBC 3.71  3.14  4.36    Hemoglobin 11.4  9.7  12.6    Hematocrit 34.3  28.3  39.4    MCV 92.5  90.1  90.4    MCH 30.7  30.9  28.9    MCHC 33.2  34.3  32.0    RDW 13.5  13.8  14.6    Platelets 241  159  278      Lipid Panel   BMP   BMP          3/20/2024    02:16 3/21/2024    05:41 10/31/2024    17:35   BMP   BUN 18  17  24    Creatinine 0.97  0.76  1.27    Sodium 131  128  137    Potassium 4.1  4.2  4.7    Chloride 98  100  105    CO2 24.0  20.0  27.0    Calcium 8.8  8.4  8.7      Data reviewed : Cardiology studies echo      ECG 12 Lead    Date/Time: 1/29/2025 2:47 PM  Performed by: Wong Jimenes APRN    Authorized by: Wong Jimenes APRN  Comparison: compared with previous ECG from 10/31/2024  Similar to previous ECG  Comparison to previous ECG: Normal sinus rhythm with nonspecific intraventricular block  Rhythm: sinus rhythm  Rate: normal  Conduction: non-specific intraventricular conduction delay  QRS axis: normal    Clinical impression: abnormal EKG       Results for orders placed during the hospital encounter of 04/15/24    Adult Transthoracic Echo Complete w/ Color, Spectral and Contrast if necessary per protocol    Interpretation Summary    Left ventricular systolic function is mildly decreased. Left ventricular ejection fraction appears to be 46 - 50%.    There is a TAVR valve present (#23 S3 Ultra) in excellent position, with normal transvalvular mean gradient (8mmHg) and no evidence of paravalvular regurgitation.    Severe mitral valve regurgitation is present.    The left ventricular cavity is mildly dilated.    The following left ventricular wall segments are hypokinetic: mid anterior, apical anterior, basal anterolateral, mid anterolateral, apical lateral, mid inferolateral, apical inferior, mid inferior, apical septal, basal inferoseptal, mid inferoseptal, apex hypokinetic, mid anteroseptal, basal anterior and basal inferoseptal. The following left  ventricular wall segments are akinetic: basal inferolateral and basal inferior.    Estimated right ventricular systolic pressure from tricuspid regurgitation is normal (<35 mmHg).    Normal size, with mildly reduced systolic function of the right ventricle.    Compared to most recent exam, which was a limited study on 3/20/2024, global LV systolic function is now slightly reduced and the severity of mitral regurg and appears slightly worse.         Assessment and Plan   Diagnoses and all orders for this visit:    1. Coronary artery disease of bypass graft of native heart with stable angina pectoris (Primary)  -Stable with no anginal symptoms as of late  - Continue Plavix 75 mg daily  - Continue Toprol-XL 12.5 mg daily  - Continue Crestor 40 mg daily      2. Chronic combined systolic and diastolic congestive heart failure  -Appears euvolemic on exam today with no significant volume issues as of late  - Beta-blocker as noted above  - Continue Jardiance 10 mg daily  - Continue Entresto 24/26 mg twice daily    3. Paroxysmal atrial fibrillation  4. Current use of long term anticoagulation  -In sinus rhythm on EKG today  - Given that patient is over 78 and less than 60 kg she qualifies for reduced dose Eliquis.  Reduced to 2.5 mg twice daily  - Continue beta-blocker as noted above    5. Aortic valve stenosis, etiology of cardiac valve disease unspecified  6. S/P TAVR (transcatheter aortic valve replacement)  7. Mitral valve insufficiency, unspecified etiology  -Overall stable with no symptoms worsening over the last few months  -Order echocardiogram to be done at 1 year ramya and follow-up with general cardiology thereafter    8. Essential hypertension  9. Mixed hyperlipidemia  -Normotensive in clinic today, continue above regimen  - Continue Crestor as noted above               I spent 2 minutes on the separately reported service of EKG interpretation. This time is not included in the time used to support the E/M service  also reported today.      Follow Up   Return in about 3 months (around 4/29/2025).  Patient was given instructions and counseling regarding her condition or for health maintenance advice. Please see specific information pulled into the AVS if appropriate.       Part of this note may be an electronic transcription/translation of spoken language to printed text using the Dragon Dictation System.

## 2025-02-26 ENCOUNTER — HOSPITAL ENCOUNTER (OUTPATIENT)
Dept: ULTRASOUND IMAGING | Facility: HOSPITAL | Age: 80
Discharge: HOME | End: 2025-02-26
Admitting: NURSE PRACTITIONER
Payer: MEDICARE

## 2025-02-26 DIAGNOSIS — I65.23 BILATERAL CAROTID ARTERY STENOSIS: ICD-10-CM

## 2025-02-26 PROCEDURE — 93880 EXTRACRANIAL BILAT STUDY: CPT

## 2025-03-03 ENCOUNTER — OFFICE VISIT (OUTPATIENT)
Dept: VASCULAR SURGERY | Facility: CLINIC | Age: 80
End: 2025-03-03
Payer: MEDICARE

## 2025-03-03 VITALS
BODY MASS INDEX: 22.78 KG/M2 | DIASTOLIC BLOOD PRESSURE: 62 MMHG | SYSTOLIC BLOOD PRESSURE: 120 MMHG | HEIGHT: 60 IN | HEART RATE: 78 BPM | OXYGEN SATURATION: 94 % | WEIGHT: 116 LBS

## 2025-03-03 DIAGNOSIS — I10 ESSENTIAL HYPERTENSION: ICD-10-CM

## 2025-03-03 DIAGNOSIS — I65.23 BILATERAL CAROTID ARTERY STENOSIS: Primary | ICD-10-CM

## 2025-03-03 DIAGNOSIS — E78.2 MIXED HYPERLIPIDEMIA: ICD-10-CM

## 2025-03-03 PROCEDURE — 1160F RVW MEDS BY RX/DR IN RCRD: CPT | Performed by: NURSE PRACTITIONER

## 2025-03-03 PROCEDURE — 1159F MED LIST DOCD IN RCRD: CPT | Performed by: NURSE PRACTITIONER

## 2025-03-03 PROCEDURE — 3074F SYST BP LT 130 MM HG: CPT | Performed by: NURSE PRACTITIONER

## 2025-03-03 PROCEDURE — 99214 OFFICE O/P EST MOD 30 MIN: CPT | Performed by: NURSE PRACTITIONER

## 2025-03-03 PROCEDURE — 3078F DIAST BP <80 MM HG: CPT | Performed by: NURSE PRACTITIONER

## 2025-03-03 NOTE — PROGRESS NOTES
"03/03/2025     Matthieu Bhakta DO  [address] [place] [address]      Sondra Connolly  1945    Chief Complaint   Patient presents with    Bilateral carotid artery stenosis     No stroke symptoms, no changes or complaints other than a 2-3 minute episode of difficulty       Dear Matthieu Bhakta,      HPI  I had the pleasure of seeing your patient Sondra Connolly in the office today.   As you recall, Sondra Connolly is a 80 y.o.  female who you are currently following for routine health maintenance.  We are following for asymptomatic carotid occlusive disease.  She does have a history of previous right carotid endarterectomy.  She is maintained on Eliquis, Plavix, and Crestor.  Currently she is doing well and denies any strokelike symptoms.  She did have noninvasive testing performed which I did review in office.        Review of Systems   Constitutional: Negative.    HENT: Negative.     Eyes: Negative.    Respiratory: Negative.     Cardiovascular: Negative.    Gastrointestinal: Negative.    Endocrine: Negative.    Genitourinary: Negative.    Musculoskeletal: Negative.    Skin: Negative.    Allergic/Immunologic: Negative.    Neurological: Negative.    Hematological: Negative.    Psychiatric/Behavioral: Negative.          /62   Pulse 78   Ht 152.4 cm (60\")   Wt 52.6 kg (116 lb)   SpO2 94%   BMI 22.65 kg/m²   Physical Exam  Vitals and nursing note reviewed.   Constitutional:       General: She is not in acute distress.     Appearance: Normal appearance. She is well-developed. She is not diaphoretic.   HENT:      Head: Normocephalic and atraumatic.   Eyes:      General: No scleral icterus.     Pupils: Pupils are equal, round, and reactive to light.   Neck:      Thyroid: No thyromegaly.      Vascular: No carotid bruit or JVD.   Cardiovascular:      Rate and Rhythm: Normal rate and regular rhythm.      Pulses: Normal pulses.      Heart sounds: Normal heart sounds and S2 normal. No murmur heard.     No " friction rub. No gallop.   Pulmonary:      Effort: Pulmonary effort is normal.      Breath sounds: Normal breath sounds.   Abdominal:      General: Bowel sounds are normal.      Palpations: Abdomen is soft.   Musculoskeletal:         General: Normal range of motion.      Cervical back: Normal range of motion and neck supple.   Skin:     General: Skin is warm and dry.   Neurological:      Mental Status: She is alert and oriented to person, place, and time.      Cranial Nerves: No cranial nerve deficit.   Psychiatric:         Mood and Affect: Mood normal.         Behavior: Behavior normal.         Thought Content: Thought content normal.         Judgment: Judgment normal.            Diagnostic Data:  US Carotid Bilateral  Result Date: 2/26/2025  Narrative: History: Carotid occlusive disease      Impression: Impression: 1. There is less than 50% stenosis of the right internal carotid artery. 2. There is 70 to 99% stenosis of the left internal carotid artery. 3. Antegrade flow is demonstrated in bilateral vertebral arteries. 4. The left internal carotid artery has evidence of a string sign. This is consistent with previous CTA of the neck testing and is unchanged.  Comments: Bilateral carotid vertebral arterial duplex scan was performed.  Grayscale imaging shows intimal thickening and calcified elements at the carotid bifurcation. The right internal carotid artery peak systolic velocity is 84.2 cm/sec. The end-diastolic velocity is 14.7 cm/sec. The right ICA/CCA ratio is approximately 1.4. These findings correlate with less than 50% stenosis of the right internal carotid artery.  Grayscale imaging shows intimal thickening and calcified elements at the carotid bifurcation. The left internal carotid artery peak systolic velocity is 16.8 cm/sec. The end-diastolic velocity is 0.65 cm/sec. The left ICA/CCA ratio is approximately 0.4. These findings correlate with 70 to 99% stenosis of the left internal carotid artery.   Antegrade flow is demonstrated in bilateral vertebral arteries.   This report was signed and finalized on 2/26/2025 3:33 PM by Dr. Juan C Castillo MD.             Patient Active Problem List   Diagnosis    Mixed hyperlipidemia    Coronary artery disease of bypass graft of native heart with stable angina pectoris    Paroxysmal atrial fibrillation    Essential hypertension    Anxiety about health    Coumadin toxicity    Chronic combined systolic and diastolic congestive heart failure    S/P CABG x 4 2010 Dr YADIEL Lau    Status post insertion of drug-eluting stent into right coronary artery for coronary artery disease    History of cardioversion    Current use of long term anticoagulation    Aortic valve stenosis    Mitral valve regurgitation    Diabetes mellitus    Bradycardia, sinus    HUMPHREY (dyspnea on exertion)    Abnormal stress test    Hyponatremia    S/P TAVR (transcatheter aortic valve replacement)    Chronic heart failure with preserved ejection fraction (HFpEF)        Diagnosis Plan   1. Bilateral carotid artery stenosis  US Carotid Bilateral      2. Mixed hyperlipidemia        3. Essential hypertension                Plan: After thoroughly evaluating Sondra Connolly, I believe the best course of action is to remain conservative from vascular surgery standpoint.  Currently she is doing well and denies any strokelike symptoms.  I did review her testing which shows less than 50% right carotid stenosis and 70 to 99% left carotid stenosis.  This is unchanged and consistent with previous CTA of the neck showing the string sign.  We will see her back in 1 year with repeat noninvasive testing for continued surveillance, including a carotid duplex.  I did discuss vascular risk factors as they pertain to the progression of vascular disease including controlling her hypertension and hyperlipidemia.  Her blood pressure stable on her current medications.  She should continue her Plavix 75 mg daily and Crestor 40 mg daily  in addition to her other medications.   This was all discussed in full with complete understanding.  Thank you for allowing me to participate in the care of your patient.  Please do not hesitate to call with any questions or concerns.  We will keep you aware of any further encounters with Sondra Connolly.        Sincerely yours,         RANDA Lopez Jason R, DO

## 2025-03-03 NOTE — LETTER
"March 19, 2025     Matthieu Bhakta DO  [address] [place] [address]    Patient: Sondra Connolly   YOB: 1945   Date of Visit: 3/3/2025     Dear Matthieu Bhakta DO:       Thank you for referring Sondra Connolly to me for evaluation. Below are the relevant portions of my assessment and plan of care.    If you have questions, please do not hesitate to call me. I look forward to following Sondra along with you.         Sincerely,        RANDA Lopez        CC: No Recipients    Gissel Stephenson APRN  03/19/25 1515  Sign when Signing Visit  03/03/2025     Matthieu Bhakta DO  [address] [place] [address]      Sondra Connolly  1945    Chief Complaint   Patient presents with   • Bilateral carotid artery stenosis     No stroke symptoms, no changes or complaints other than a 2-3 minute episode of difficulty       Dear Matthieu Bhakta DO     HPI  I had the pleasure of seeing your patient Sondra Connolly in the office today.   As you recall, Sondra Connolly is a 80 y.o.  female who you are currently following for routine health maintenance.  We are following for asymptomatic carotid occlusive disease.  She does have a history of previous right carotid endarterectomy.  She is maintained on Eliquis, Plavix, and Crestor.  Currently she is doing well and denies any strokelike symptoms.  She did have noninvasive testing performed which I did review in office.        Review of Systems   Constitutional: Negative.    HENT: Negative.     Eyes: Negative.    Respiratory: Negative.     Cardiovascular: Negative.    Gastrointestinal: Negative.    Endocrine: Negative.    Genitourinary: Negative.    Musculoskeletal: Negative.    Skin: Negative.    Allergic/Immunologic: Negative.    Neurological: Negative.    Hematological: Negative.    Psychiatric/Behavioral: Negative.          /62   Pulse 78   Ht 152.4 cm (60\")   Wt 52.6 kg (116 lb)   SpO2 94%   BMI 22.65 kg/m²   Physical Exam  Vitals and nursing note " reviewed.   Constitutional:       General: She is not in acute distress.     Appearance: Normal appearance. She is well-developed. She is not diaphoretic.   HENT:      Head: Normocephalic and atraumatic.   Eyes:      General: No scleral icterus.     Pupils: Pupils are equal, round, and reactive to light.   Neck:      Thyroid: No thyromegaly.      Vascular: No carotid bruit or JVD.   Cardiovascular:      Rate and Rhythm: Normal rate and regular rhythm.      Pulses: Normal pulses.      Heart sounds: Normal heart sounds and S2 normal. No murmur heard.     No friction rub. No gallop.   Pulmonary:      Effort: Pulmonary effort is normal.      Breath sounds: Normal breath sounds.   Abdominal:      General: Bowel sounds are normal.      Palpations: Abdomen is soft.   Musculoskeletal:         General: Normal range of motion.      Cervical back: Normal range of motion and neck supple.   Skin:     General: Skin is warm and dry.   Neurological:      Mental Status: She is alert and oriented to person, place, and time.      Cranial Nerves: No cranial nerve deficit.   Psychiatric:         Mood and Affect: Mood normal.         Behavior: Behavior normal.         Thought Content: Thought content normal.         Judgment: Judgment normal.            Diagnostic Data:  US Carotid Bilateral  Result Date: 2/26/2025  Narrative: History: Carotid occlusive disease      Impression: Impression: 1. There is less than 50% stenosis of the right internal carotid artery. 2. There is 70 to 99% stenosis of the left internal carotid artery. 3. Antegrade flow is demonstrated in bilateral vertebral arteries. 4. The left internal carotid artery has evidence of a string sign. This is consistent with previous CTA of the neck testing and is unchanged.  Comments: Bilateral carotid vertebral arterial duplex scan was performed.  Grayscale imaging shows intimal thickening and calcified elements at the carotid bifurcation. The right internal carotid artery peak  systolic velocity is 84.2 cm/sec. The end-diastolic velocity is 14.7 cm/sec. The right ICA/CCA ratio is approximately 1.4. These findings correlate with less than 50% stenosis of the right internal carotid artery.  Grayscale imaging shows intimal thickening and calcified elements at the carotid bifurcation. The left internal carotid artery peak systolic velocity is 16.8 cm/sec. The end-diastolic velocity is 0.65 cm/sec. The left ICA/CCA ratio is approximately 0.4. These findings correlate with 70 to 99% stenosis of the left internal carotid artery.  Antegrade flow is demonstrated in bilateral vertebral arteries.   This report was signed and finalized on 2/26/2025 3:33 PM by Dr. Juan C Castillo MD.             Patient Active Problem List   Diagnosis   • Mixed hyperlipidemia   • Coronary artery disease of bypass graft of native heart with stable angina pectoris   • Paroxysmal atrial fibrillation   • Essential hypertension   • Anxiety about health   • Coumadin toxicity   • Chronic combined systolic and diastolic congestive heart failure   • S/P CABG x 4 2010 Dr YADIEL Lau   • Status post insertion of drug-eluting stent into right coronary artery for coronary artery disease   • History of cardioversion   • Current use of long term anticoagulation   • Aortic valve stenosis   • Mitral valve regurgitation   • Diabetes mellitus   • Bradycardia, sinus   • HUMPHREY (dyspnea on exertion)   • Abnormal stress test   • Hyponatremia   • S/P TAVR (transcatheter aortic valve replacement)   • Chronic heart failure with preserved ejection fraction (HFpEF)        Diagnosis Plan   1. Bilateral carotid artery stenosis  US Carotid Bilateral      2. Mixed hyperlipidemia        3. Essential hypertension                Plan: After thoroughly evaluating Sondra Connolly, I believe the best course of action is to remain conservative from vascular surgery standpoint.  Currently she is doing well and denies any strokelike symptoms.  I did review her  testing which shows less than 50% right carotid stenosis and 70 to 99% left carotid stenosis.  This is unchanged and consistent with previous CTA of the neck showing the string sign.  We will see her back in 1 year with repeat noninvasive testing for continued surveillance, including a carotid duplex.  I did discuss vascular risk factors as they pertain to the progression of vascular disease including controlling her hypertension and hyperlipidemia.  Her blood pressure stable on her current medications.  She should continue her Plavix 75 mg daily and Crestor 40 mg daily in addition to her other medications.   This was all discussed in full with complete understanding.  Thank you for allowing me to participate in the care of your patient.  Please do not hesitate to call with any questions or concerns.  We will keep you aware of any further encounters with Sondra Connolly.        Sincerely yours,         RANDA Lopez Jason R, DO

## 2025-03-10 RX ORDER — CLOPIDOGREL BISULFATE 75 MG/1
75 TABLET ORAL DAILY
Qty: 30 TABLET | Refills: 11 | Status: SHIPPED | OUTPATIENT
Start: 2025-03-10

## 2025-03-31 ENCOUNTER — HOSPITAL ENCOUNTER (OUTPATIENT)
Dept: CARDIOLOGY | Facility: HOSPITAL | Age: 80
Discharge: HOME | End: 2025-03-31
Payer: MEDICARE

## 2025-03-31 VITALS
DIASTOLIC BLOOD PRESSURE: 62 MMHG | HEIGHT: 60 IN | BODY MASS INDEX: 22.78 KG/M2 | SYSTOLIC BLOOD PRESSURE: 120 MMHG | WEIGHT: 116 LBS

## 2025-03-31 DIAGNOSIS — I34.0 MITRAL VALVE INSUFFICIENCY, UNSPECIFIED ETIOLOGY: ICD-10-CM

## 2025-03-31 DIAGNOSIS — Z95.2 S/P TAVR (TRANSCATHETER AORTIC VALVE REPLACEMENT): ICD-10-CM

## 2025-03-31 PROCEDURE — 93306 TTE W/DOPPLER COMPLETE: CPT

## 2025-03-31 PROCEDURE — 93356 MYOCRD STRAIN IMG SPCKL TRCK: CPT

## 2025-04-03 LAB
AORTIC DIMENSIONLESS INDEX: 0.51 (DI)
AV MEAN PRESS GRAD SYS DOP V1V2: 5 MMHG
AV VMAX SYS DOP: 157 CM/SEC
BH CV ECHO LEFT VENTRICLE GLOBAL LONGITUDINAL STRAIN: -12 %
BH CV ECHO MEAS - AO MAX PG: 9.9 MMHG
BH CV ECHO MEAS - AO V2 VTI: 37.3 CM
BH CV ECHO MEAS - AVA(I,D): 1.15 CM2
BH CV ECHO MEAS - EDV(CUBED): 143.1 ML
BH CV ECHO MEAS - EDV(MOD-SP2): 90.8 ML
BH CV ECHO MEAS - EDV(MOD-SP4): 68.9 ML
BH CV ECHO MEAS - EF(MOD-SP2): 46.7 %
BH CV ECHO MEAS - EF(MOD-SP4): 58.6 %
BH CV ECHO MEAS - ESV(CUBED): 126.5 ML
BH CV ECHO MEAS - ESV(MOD-SP2): 48.4 ML
BH CV ECHO MEAS - ESV(MOD-SP4): 28.5 ML
BH CV ECHO MEAS - FS: 4 %
BH CV ECHO MEAS - IVS/LVPW: 1.2 CM
BH CV ECHO MEAS - IVSD: 1.1 CM
BH CV ECHO MEAS - LA DIMENSION: 4.6 CM
BH CV ECHO MEAS - LAT PEAK E' VEL: 20.2 CM/SEC
BH CV ECHO MEAS - LV DIASTOLIC VOL/BSA (35-75): 46.5 CM2
BH CV ECHO MEAS - LV MASS(C)D: 198.2 GRAMS
BH CV ECHO MEAS - LV MAX PG: 2.48 MMHG
BH CV ECHO MEAS - LV MEAN PG: 1 MMHG
BH CV ECHO MEAS - LV SYSTOLIC VOL/BSA (12-30): 19.2 CM2
BH CV ECHO MEAS - LV V1 MAX: 78.6 CM/SEC
BH CV ECHO MEAS - LV V1 VTI: 18.9 CM
BH CV ECHO MEAS - LVIDD: 5.2 CM
BH CV ECHO MEAS - LVIDS: 5 CM
BH CV ECHO MEAS - LVOT AREA: 2.27 CM2
BH CV ECHO MEAS - LVOT DIAM: 1.7 CM
BH CV ECHO MEAS - LVPWD: 0.92 CM
BH CV ECHO MEAS - MED PEAK E' VEL: 9.1 CM/SEC
BH CV ECHO MEAS - MR MAX PG: 139.2 MMHG
BH CV ECHO MEAS - MR MAX VEL: 590 CM/SEC
BH CV ECHO MEAS - MR MEAN PG: 87 MMHG
BH CV ECHO MEAS - MR MEAN VEL: 441 CM/SEC
BH CV ECHO MEAS - MR VTI: 240 CM
BH CV ECHO MEAS - MV A MAX VEL: 75.4 CM/SEC
BH CV ECHO MEAS - MV DEC SLOPE: 617 CM/SEC2
BH CV ECHO MEAS - MV DEC TIME: 0.14 SEC
BH CV ECHO MEAS - MV E MAX VEL: 153 CM/SEC
BH CV ECHO MEAS - MV E/A: 2.03
BH CV ECHO MEAS - MV MAX PG: 6.8 MMHG
BH CV ECHO MEAS - MV MEAN PG: 3 MMHG
BH CV ECHO MEAS - MV P1/2T: 63.6 MSEC
BH CV ECHO MEAS - MV V2 VTI: 41.4 CM
BH CV ECHO MEAS - MVA(P1/2T): 3.5 CM2
BH CV ECHO MEAS - MVA(VTI): 1.04 CM2
BH CV ECHO MEAS - PA V2 MAX: 68.8 CM/SEC
BH CV ECHO MEAS - RAP SYSTOLE: 8 MMHG
BH CV ECHO MEAS - RVSP: 27.9 MMHG
BH CV ECHO MEAS - SV(LVOT): 42.9 ML
BH CV ECHO MEAS - SV(MOD-SP2): 42.4 ML
BH CV ECHO MEAS - SV(MOD-SP4): 40.4 ML
BH CV ECHO MEAS - SVI(LVOT): 29 ML/M2
BH CV ECHO MEAS - SVI(MOD-SP2): 28.6 ML/M2
BH CV ECHO MEAS - SVI(MOD-SP4): 27.3 ML/M2
BH CV ECHO MEAS - TAPSE (>1.6): 1.68 CM
BH CV ECHO MEAS - TR MAX PG: 19.9 MMHG
BH CV ECHO MEAS - TR MAX VEL: 223 CM/SEC
BH CV ECHO MEASUREMENTS AVERAGE E/E' RATIO: 10.44
BH CV XLRA - TDI S': 6.9 CM/SEC
LEFT ATRIUM VOLUME INDEX: 75 ML/M2
LV EF BIPLANE MOD: 54 %
MV VENA CONTRACTA: 0.5 CM

## 2025-04-15 ENCOUNTER — OFFICE VISIT (OUTPATIENT)
Dept: CARDIOLOGY | Facility: CLINIC | Age: 80
End: 2025-04-15
Payer: MEDICARE

## 2025-04-15 VITALS
BODY MASS INDEX: 22.78 KG/M2 | OXYGEN SATURATION: 99 % | WEIGHT: 116 LBS | HEIGHT: 60 IN | SYSTOLIC BLOOD PRESSURE: 140 MMHG | HEART RATE: 63 BPM | DIASTOLIC BLOOD PRESSURE: 60 MMHG

## 2025-04-15 DIAGNOSIS — Z95.2 S/P TAVR (TRANSCATHETER AORTIC VALVE REPLACEMENT): Primary | ICD-10-CM

## 2025-04-15 DIAGNOSIS — Z79.01 CURRENT USE OF LONG TERM ANTICOAGULATION: ICD-10-CM

## 2025-04-15 DIAGNOSIS — I48.0 PAROXYSMAL ATRIAL FIBRILLATION: Chronic | ICD-10-CM

## 2025-04-15 DIAGNOSIS — I50.42 CHRONIC COMBINED SYSTOLIC AND DIASTOLIC CONGESTIVE HEART FAILURE: Chronic | ICD-10-CM

## 2025-04-15 DIAGNOSIS — I34.0 MITRAL VALVE INSUFFICIENCY, UNSPECIFIED ETIOLOGY: ICD-10-CM

## 2025-04-15 DIAGNOSIS — I25.810 CORONARY ARTERY DISEASE INVOLVING CORONARY BYPASS GRAFT OF NATIVE HEART WITHOUT ANGINA PECTORIS: ICD-10-CM

## 2025-04-15 DIAGNOSIS — I10 ESSENTIAL HYPERTENSION: Chronic | ICD-10-CM

## 2025-04-15 PROBLEM — I35.0 AORTIC VALVE STENOSIS: Status: RESOLVED | Noted: 2017-03-24 | Resolved: 2025-04-15

## 2025-04-15 PROBLEM — I50.32 CHRONIC HEART FAILURE WITH PRESERVED EJECTION FRACTION (HFPEF): Status: RESOLVED | Noted: 2024-03-21 | Resolved: 2025-04-15

## 2025-04-15 PROBLEM — R94.39 ABNORMAL STRESS TEST: Status: RESOLVED | Noted: 2022-12-28 | Resolved: 2025-04-15

## 2025-04-15 NOTE — PROGRESS NOTES
Subjective:     Encounter Date:04/15/2025      Patient ID: Sondra Connolly is a 80 y.o. female.    Chief Complaint: f/u 1-yr post-TAVR  History of Present Illness  The patient is an 80-year-old female who presents for a follow-up visit. The chief complaint is follow-up post TAVR. She returns today for a 1-year follow-up after initially undergoing successful transcatheter aortic valve replacement using a number 23 S3 Ultra valve on 03/19/2024. Cardiac history also includes coronary disease, including 4-vessel CABG in 2020 and PCI of the right coronary artery in 2021.     She was last seen here in routine follow-up on 04/15/2024, one month post-TAVR, and reported she was doing much better, particularly with improved energy. Her daughter noticed she was not getting out of breath as much and observed an improvement in her color. The echocardiogram performed during that visit showed a mild worsening of her left ventricular ejection fraction and an increase in mitral regurgitation. However, no symptoms attributable to either condition were reported, and she appeared euvolemic on exam. Communication with her primary cardiologist, Dr. Gallo, was made to discuss potential additional medical therapies for CHF, including adding an SGLT inhibitor or increasing her Entresto. It was noted that if CHF symptoms developed despite optimally tolerated guideline-directed medical therapy and functional mitral regurgitation remained moderate to severe or worse, MitraClip therapy could be considered. More obvious ischemic changes in the inferior leads on the EKG were noted, and severely calcified ostial RCA stenosis was observed on pre-TAVR cath. No angina was reported.    No consultation with Dr. Gallo has occurred since her last visit here, but she did have an appointment with Sy, a nurse practitioner, 9 days post her previous visit, during which Jardiance was prescribed. Respiratory function has remained stable compared to the  previous year, with no observed decline. Ambulation has improved significantly from her pre-procedure status when frequent pauses to catch her breath were necessary. Occasional fatigue is experienced during exertion or inadequate rest, particularly during activities such as bed-making, mopping, or sweeping. These symptoms have improved since her procedure. No episodes of chest discomfort, tightness, pressure, heaviness, or squeezing are reported, but a transient, dull sensation occurs if overexertion happens. No serious bleeding complications such as hematochezia have been experienced. Weight loss since her last visit is noted.    PAST SURGICAL HISTORY: 4-vessel CABG in 2020, PCI of the right coronary artery in 2021.        The following portions of the patient's history were reviewed and updated as appropriate: allergies, current medications, past family history, past medical history, past social history, past surgical history, and problem list.    Review of Systems   Constitutional: Negative for malaise/fatigue.   Cardiovascular:  Positive for dyspnea on exertion. Negative for chest pain, claudication, leg swelling, near-syncope, orthopnea, palpitations, paroxysmal nocturnal dyspnea and syncope.   Respiratory:  Negative for shortness of breath.    Hematologic/Lymphatic: Does not bruise/bleed easily.           Current Outpatient Medications:     apixaban (ELIQUIS) 2.5 MG tablet tablet, Take 1 tablet by mouth 2 (Two) Times a Day., Disp: 60 tablet, Rfl: 11    empagliflozin (JARDIANCE) 10 MG tablet tablet, Take 1 tablet by mouth Daily., Disp: 30 tablet, Rfl: 11    metoprolol succinate XL (TOPROL-XL) 25 MG 24 hr tablet, TAKE 1/2 TABLET BY MOUTH EVERY DAY, Disp: 45 tablet, Rfl: 3    Multiple Vitamins-Minerals (MULTIVITAMIN ADULT PO), Take 1 tablet by mouth Daily., Disp: , Rfl:     nitroglycerin (NITROSTAT) 0.4 MG SL tablet, Place 1 tablet under the tongue Every 5 (Five) Minutes As Needed for Chest Pain. Take no more  than 3 doses in 15 minutes., Disp: 25 tablet, Rfl: 2    rosuvastatin (CRESTOR) 40 MG tablet, Take 1 tablet by mouth Daily., Disp: , Rfl:     sacubitril-valsartan (Entresto) 24-26 MG tablet, TAKE 1 TABLET BY MOUTH TWICE A DAY, Disp: 180 tablet, Rfl: 3       Objective:      Vitals:    04/15/25 1458   BP: 140/60   Pulse: 63   SpO2: 99%     Vitals and nursing note reviewed.   Constitutional:       General: Not in acute distress.     Appearance: Not in distress.   Neck:      Vascular: No JVD or JVR. JVD normal.   Pulmonary:      Effort: Pulmonary effort is normal.      Breath sounds: Normal breath sounds.   Cardiovascular:      Normal rate. Regular rhythm.      Murmurs: There is a grade 2/6 high frequency blowing holosystolic murmur at the apex.      No gallop.  No rub.   Pulses:     Intact distal pulses.   Edema:     Peripheral edema absent.   Skin:     General: Skin is warm and dry.   Neurological:      Mental Status: Alert, oriented to person, place, and time and oriented to person, place and time.       Physical Exam      Lab Review:         ECG 12 Lead    Date/Time: 4/15/2025 3:07 PM  Performed by: Bhavik Cuellar MD    Authorized by: Bhavik Cuellar MD  Comparison: compared with previous ECG from 1/29/2025  Similar to previous ECG  Rhythm: sinus rhythm  Rate: normal  Conduction: 1st degree AV block and non-specific intraventricular conduction delay  Q waves: V2, V3, V1 and V4    T inversion: III  QRS axis: right    Clinical impression: abnormal EKG        Results        Results for orders placed during the hospital encounter of 03/31/25    Adult Transthoracic Echo Complete w/ Color, Spectral and Contrast if necessary per protocol    Interpretation Summary    Left ventricular systolic function is low normal. Left ventricular ejection fraction appears to be 51 - 55%.    There is a TAVR valve present (#23 S3 Ultra) in excellent position, with normal transvalvular mean gradient (5mmHg) and no evidence of paravalvular  regurgitation.    The following left ventricular wall segments are hypokinetic: basal inferolateral and mid inferolateral. The following left ventricular wall segments are akinetic: basal inferior and basal inferoseptal.    Moderate to severe mitral valve regurgitation is present.    The left atrial cavity is severely dilated.    Estimated right ventricular systolic pressure from tricuspid regurgitation is normal (<35 mmHg).    Normal size and function of the right ventricle.    Compared to most recent exam from 4/15/2024, overall left ventricular systolic function appears improved, mitral regurgitation slightly less severe, and right ventricular systolic function improved.        Assessment/Plan:     Problem List Items Addressed This Visit (all established and stable)         Cardiac and Vasculature    Paroxysmal atrial fibrillation (Chronic)    Essential hypertension (Chronic)    Chronic combined systolic and diastolic congestive heart failure (Chronic)    Coronary artery disease involving coronary bypass graft of native heart without angina pectoris    Mitral valve regurgitation    S/P TAVR (transcatheter aortic valve replacement) - Primary       Coag and Thromboembolic    Current use of long term anticoagulation         Recommendations/plans:    Assessment & Plan  1.  Valvular heart disease, status post TAVR (#23 S3 ultra on 3/19/2024): Stable.  Continues to maintain symptomatic improvement after the procedure, with no worsening since.  - Recent echocardiogram reviewed, showing mild improvement in LV function and good valve position with normal transvalvular gradient and no evidence of paravalvular regurgitation  - Discontinue clopidogrel; continue Eliquis for anticoagulation  - Annual echocardiographic surveillance of TAVR  - Follow up with Dr. Gallo's nurse practitioner in 2 weeks  - Continue routine follow-up with primary cardiology providers    2.  Coronary artery disease, status post CABG: Stable.  No  angina.  Continue current medical therapy.    3.  Chronic systolic and diastolic congestive heart failure: Stable.  EF improved to 51-55% most recent echo.  Euvolemic on exam.  - Continue current medical regimen    4.  Paroxysmal atrial fibrillation: Stable.  In sinus rhythm today.  - Continue anticoagulation for CVA prophylaxis    5.  Essential hypertension: Borderline today but generally otherwise well-controlled.  Continue current medical regimen.    6.  Mitral regurgitation: Moderate to severe on last echocardiogram.  However, asymptomatic and no other indication for intervention at this point.  - Continue routine surveillance echocardiography    Follow-up:  - Appointment with Dr. Gallo's nurse practitioner in 2 weeks,  - No need for scheduled structural heart clinic follow-up at this point.  However, if she begins to develop more symptoms suggesting potential need for mitral mention, would be happy to see her back for consideration      Bhavik Cuellar MD  04/15/2025  23:13 CDT    Transcribed from ambient dictation for Bhavik Cuellar MD by Bhavik Cuellar MD.  04/15/25   15:40 CDT    Patient or patient representative verbalized consent to the visit recording.

## 2025-04-29 ENCOUNTER — OFFICE VISIT (OUTPATIENT)
Dept: CARDIOLOGY | Facility: CLINIC | Age: 80
End: 2025-04-29
Payer: MEDICARE

## 2025-04-29 VITALS
BODY MASS INDEX: 21.99 KG/M2 | HEIGHT: 60 IN | WEIGHT: 112 LBS | SYSTOLIC BLOOD PRESSURE: 137 MMHG | HEART RATE: 66 BPM | DIASTOLIC BLOOD PRESSURE: 81 MMHG | OXYGEN SATURATION: 100 %

## 2025-04-29 DIAGNOSIS — E11.59 TYPE 2 DIABETES MELLITUS WITH OTHER CIRCULATORY COMPLICATION, WITHOUT LONG-TERM CURRENT USE OF INSULIN: Chronic | ICD-10-CM

## 2025-04-29 DIAGNOSIS — E78.2 MIXED HYPERLIPIDEMIA: Chronic | ICD-10-CM

## 2025-04-29 DIAGNOSIS — I34.0 NONRHEUMATIC MITRAL VALVE REGURGITATION: ICD-10-CM

## 2025-04-29 DIAGNOSIS — Z95.1 S/P CABG X 4: ICD-10-CM

## 2025-04-29 DIAGNOSIS — I48.0 PAROXYSMAL ATRIAL FIBRILLATION: Chronic | ICD-10-CM

## 2025-04-29 DIAGNOSIS — Z92.89 HISTORY OF CARDIOVERSION: ICD-10-CM

## 2025-04-29 DIAGNOSIS — I10 ESSENTIAL HYPERTENSION: Chronic | ICD-10-CM

## 2025-04-29 DIAGNOSIS — I25.810 CORONARY ARTERY DISEASE INVOLVING CORONARY BYPASS GRAFT OF NATIVE HEART WITHOUT ANGINA PECTORIS: ICD-10-CM

## 2025-04-29 DIAGNOSIS — Z79.01 CURRENT USE OF LONG TERM ANTICOAGULATION: ICD-10-CM

## 2025-04-29 DIAGNOSIS — I50.42 CHRONIC COMBINED SYSTOLIC AND DIASTOLIC CONGESTIVE HEART FAILURE: Primary | Chronic | ICD-10-CM

## 2025-04-29 DIAGNOSIS — Z95.2 S/P TAVR (TRANSCATHETER AORTIC VALVE REPLACEMENT): ICD-10-CM

## 2025-04-29 DIAGNOSIS — Z95.5 STATUS POST INSERTION OF DRUG-ELUTING STENT INTO RIGHT CORONARY ARTERY FOR CORONARY ARTERY DISEASE: ICD-10-CM

## 2025-04-29 RX ORDER — SPIRONOLACTONE 25 MG/1
25 TABLET ORAL DAILY
Qty: 30 TABLET | Refills: 11 | Status: SHIPPED | OUTPATIENT
Start: 2025-04-29

## 2025-04-29 NOTE — PROGRESS NOTES
Chief Complaint  Congestive Heart Failure (3mo F/U), Coronary Artery Disease, and PAFIB    Subjective          Sondra Connolly presents to [facility] for routine follow-up.  She was last seen in our office on 1/29/2025 at which time Eliquis dose was decreased to 2.5 mg twice daily due to age greater than 80 years and weight less than 60 kg.  She has chronic combined systolic and diastolic congestive heart failure, coronary artery disease status post CABG x4 with subsequent Rotablator rotational atherectomy, PTCA and 2.5 x 28 mm Xience Korin drug-eluting stent placement to the right coronary artery 2/5/2021, severe aortic valve stenosis status post TAVR with 23 mm S3 ultra valve 3/19/2024, moderate to severe mitral valve regurgitation, paroxysmal atrial fibrillation status post cardioversion 4/6/2021 and ablation 6/2/2021 per Dr. Audie Hawk with electrophysiology at [place] in [facility] now on chronic anticoagulation, diet-controlled type 2 diabetes mellitus, hypertension and hyperlipidemia. She reports improvement in increased fatigue and decreased stamina. She reports occasional palpitations. Patient denies chest pain, shortness of breath, dizziness, syncope, orthopnea, PND or edema.  Patient denies any signs of bleeding.    Congestive Heart Failure  Presents for follow-up visit. Associated symptoms include palpitations. Pertinent negatives include no abdominal pain, chest pain, chest pressure, claudication, edema, fatigue, muscle weakness, near-syncope, nocturia, orthopnea, paroxysmal nocturnal dyspnea, shortness of breath or unexpected weight change. The symptoms have been stable. Her past medical history is significant for CAD. Compliance with total regimen is 51-75%. Compliance with diet is 51-75%. Compliance with medications is %.   Coronary Artery Disease  Presents for follow-up visit. Symptoms include palpitations. Pertinent negatives include no chest pain,  "chest pressure, chest tightness, dizziness, leg swelling, muscle weakness, shortness of breath or weight gain. Risk factors include hyperlipidemia. Risk factors do not include hypertension. Her past medical history is significant for CHF. The symptoms have been stable. Compliance with diet is variable. Compliance with exercise is variable. Compliance with medications is good.   Atrial Fibrillation  Presents for follow-up visit. Symptoms include palpitations. Symptoms are negative for an AICD problem, bradycardia, chest pain, dizziness, hemodynamic instability, hypertension, hypotension, pacemaker problem, shortness of breath, syncope, tachycardia and weakness. The symptoms have been stable. Past medical history includes atrial fibrillation, CAD, CHF and hyperlipidemia. There are no medication compliance problems.   Hypertension  Chronicity:  Chronic  Onset:  More than 1 year ago  Condition status:  Controlled  Associated symptoms: palpitations    Associated symptoms: no anxiety, no blurred vision, no chest pain, no headaches, no malaise/fatigue, no neck pain, no orthopnea, no peripheral edema, no PND, no shortness of breath, no sweats and no dizziness    CAD risks:  Diabetes mellitus, dyslipidemia and post-menopausal state  Current therapy:  ACE inhibitors and beta blockers  Improvement on treatment:  Significant  End-organ damage: CAD/MI and heart failure    Hyperlipidemia  This is a chronic problem. The current episode started more than 1 year ago. Pertinent negatives include no chest pain or shortness of breath. Current antihyperlipidemic treatment includes statins. Risk factors for coronary artery disease include post-menopausal, hypertension, dyslipidemia and diabetes mellitus.     I have reviewed and confirmed the accuracy of the ROS RANDA Dietrich      Objective   Vital Signs:   /81   Pulse 66   Ht 152.4 cm (60\")   Wt 50.8 kg (112 lb)   SpO2 100%   BMI 21.87 kg/m²     Vitals and nursing " note reviewed.   Constitutional:       General: Not in acute distress.     Appearance: Normal and healthy appearance. Well-developed, normal weight and not in distress. Not diaphoretic.   Eyes:      General: Lids are normal.         Right eye: No discharge.         Left eye: No discharge.      Conjunctiva/sclera: Conjunctivae normal.      Pupils: Pupils are equal, round, and reactive to light.   HENT:      Head: Normocephalic and atraumatic.      Jaw: There is normal jaw occlusion.      Right Ear: External ear normal.      Left Ear: External ear normal.      Nose: Nose normal.   Neck:      Thyroid: No thyromegaly.      Vascular: No carotid bruit, JVD or JVR. JVD normal.      Trachea: Trachea normal. No tracheal deviation.   Pulmonary:      Effort: Pulmonary effort is normal. No respiratory distress.      Breath sounds: Normal breath sounds. No decreased breath sounds. No wheezing. No rhonchi. No rales.   Chest:      Chest wall: Not tender to palpatation.   Cardiovascular:      PMI at left midclavicular line. Normal rate. Regular rhythm. Normal S1. Normal S2.       Murmurs: There is a grade 2/6 high frequency blowing holosystolic murmur at the apex. There is a grade 2/4 high frequency blowing decrescendo, early diastolic murmur at the URSB, radiating to the apex.      No gallop.  No click. No rub.   Pulses:     Intact distal pulses. No decreased pulses.   Edema:     Peripheral edema absent.   Abdominal:      General: Bowel sounds are normal. There is no distension.      Palpations: Abdomen is soft.      Tenderness: There is no abdominal tenderness.   Musculoskeletal: Normal range of motion.         General: No tenderness or deformity.      Cervical back: Normal range of motion and neck supple. Skin:     General: Skin is warm and dry.      Coloration: Skin is not pale.      Findings: No erythema or rash.   Neurological:      General: No focal deficit present.      Mental Status: Alert, oriented to person, place, and  time and oriented to person, place and time.   Psychiatric:         Attention and Perception: Attention and perception normal.         Mood and Affect: Mood and affect normal.         Speech: Speech normal.         Behavior: Behavior normal.         Thought Content: Thought content normal.         Cognition and Memory: Cognition and memory normal.         Judgment: Judgment normal.        Result Review :   The following data was reviewed by: RANDA Dietrich on 04/29/2025:  Common labs          10/31/2024    17:35   Common Labs   Glucose 106    BUN 24    Creatinine 1.27    Sodium 137    Potassium 4.7    Chloride 105    Calcium 8.7    Albumin 3.9    Total Bilirubin 0.2    Alkaline Phosphatase 86    AST (SGOT) 18    ALT (SGPT) 14    WBC 4.89    Hemoglobin 12.6    Hematocrit 39.4    Platelets 278      Data reviewed: Cardiology studies 2D echo 2/1/21, 8/2/2021, 8/3/2022, 8/5/2023, 3/19/2024, 3/20/2024, 4/15/2024 and 3/31/2025 and left heart catheterization 2/11/21 and 12/22/2023         Assessment and Plan    Diagnoses and all orders for this visit:    1. Chronic combined systolic and diastolic congestive heart failure (CMS/HCC) (Primary)-NYHA class II.  Compensated.  EF 41 to 45% on 2D echo 2/1/2021 and improved to 51-55 % on 2D echo 3/31/2025.  Start spironolactone 25 mg daily. BMP in one week. Reviewed signs and symptoms of CHF and what to report with the patient. Patient instructed to restrict sodium and weigh daily. Report weight gain of greater than 2 lbs overnight or 5 lbs in 1 week. Pt verbalized understanding of instructions and plan of care.  Continue Entresto, Jardiance and metoprolol succinate.  Consider up titration of Entresto, spironolactone and metoprolol succinate in the future, if tolerated.    2. Coronary artery disease involving coronary bypass graft of native heart without angina pectoris-no clinical signs of ischemia.  Stable.    3. S/P CABG x 4- LIMA to LAD: Widely patent, ties into the mid  segment of the LAD; SVG to RCA system: Widely patent however the proximal segment is diffusely diseased, the mid segment has a 70 to 80% stenotic lesion; SVG to diagonal: Patent, 20 to 30% stenosis in the proximal segment, held aneurysm in the mid segment; SVG to assumed OM: Totally occluded at the origin.     4. Status post insertion of drug-eluting stent into right coronary artery for coronary artery disease- 2.5 x 28 mm Xience Korin drug-eluting stent placement to the right coronary artery 2/5/2021. Pt continues on Eliquis. Denies bleeding.     5. Paroxysmal atrial fibrillation (CMS/[facility])-status post cardioversion 4/6/21 and ablation 6/2/2021.  In sinus rhythm today.  Anticoagulated.  Stable.  Patient is following with electrophysiology and is due for follow up. Continue metoprolol succinate.    6. History of cardioversion- 4/6/21.     7.Current use of longterm anticoagulation-patient continues on Eliquis. Denies bleeding.     8. S/P TAVR (transcatheter aortic valve replacement) -23 mm S3 ultra valve 3/19/2024.  Gradients of within defined limits on most recent 2D echo 3/31/2025.  Continue yearly echo for surveillance.    9.  Nonrheumatic mitral valve regurgitation-moderate to severe on 2D echo 3/31/2025.  Repeat 2D echo around 3/31/2026, or sooner with worsening symptoms.    10. Type 2 diabetes mellitus with other circulatory complication, without long-term current use of insulin (CMS/[facility])-diet controlled.  Type 2 diabetes mellitus in the setting of obstructive coronary artery disease.  Management per PCP.  Stable.  Continue Jardiance.    11. Essential hypertension-blood pressure is elevated in office today.  Continue to monitor following above medication adjustments.Continue Entresto and metoprolol succinate.  Monitor and record daily blood pressure. Report readings consistently higher than 130/80 or consistently lower than 100/60.     12. Mixed hyperlipidemia-management per primary care provider.  Continue  Crestor.    Follow Up   Return in about 3 months (around 7/29/2025) for Next scheduled follow up.  Patient was given instructions and counseling regarding her condition or for health maintenance advice. Please see specific information pulled into the AVS if appropriate.

## 2025-05-20 RX ORDER — EMPAGLIFLOZIN 10 MG/1
10 TABLET, FILM COATED ORAL DAILY
Qty: 30 TABLET | Refills: 11 | Status: SHIPPED | OUTPATIENT
Start: 2025-05-20

## 2025-07-24 ENCOUNTER — APPOINTMENT (OUTPATIENT)
Dept: CT IMAGING | Facility: HOSPITAL | Age: 80
DRG: 309 | End: 2025-07-24
Payer: MEDICARE

## 2025-07-24 ENCOUNTER — APPOINTMENT (OUTPATIENT)
Dept: GENERAL RADIOLOGY | Facility: HOSPITAL | Age: 80
DRG: 309 | End: 2025-07-24
Payer: MEDICARE

## 2025-07-24 ENCOUNTER — HOSPITAL ENCOUNTER (EMERGENCY)
Facility: HOSPITAL | Age: 80
Discharge: NURSING, REHAB OR LONG-TERM CARE | DRG: 309 | End: 2025-07-24
Attending: STUDENT IN AN ORGANIZED HEALTH CARE EDUCATION/TRAINING PROGRAM | Admitting: EMERGENCY MEDICINE
Payer: MEDICARE

## 2025-07-24 VITALS
DIASTOLIC BLOOD PRESSURE: 84 MMHG | HEIGHT: 60 IN | OXYGEN SATURATION: 97 % | WEIGHT: 116.5 LBS | TEMPERATURE: 98 F | RESPIRATION RATE: 18 BRPM | HEART RATE: 117 BPM | BODY MASS INDEX: 22.87 KG/M2 | SYSTOLIC BLOOD PRESSURE: 111 MMHG

## 2025-07-24 DIAGNOSIS — I48.0 PAROXYSMAL A-FIB: ICD-10-CM
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
## 2025-07-24 DIAGNOSIS — E86.0 DEHYDRATION: ICD-10-CM

## 2025-07-24 DIAGNOSIS — S50.00XA CONTUSION OF ELBOW, UNSPECIFIED LATERALITY, INITIAL ENCOUNTER: ICD-10-CM

## 2025-07-24 DIAGNOSIS — R79.89 TROPONIN I ABOVE REFERENCE RANGE: ICD-10-CM

## 2025-07-24 LAB
ALBUMIN SERPL-MCNC: 3.8 G/DL (ref 3.5–5.2)
ALBUMIN/GLOB SERPL: 1.7 G/DL
ALP SERPL-CCNC: 59 U/L (ref 39–117)
ALT SERPL W P-5'-P-CCNC: 10 U/L (ref 1–33)
ANION GAP SERPL CALCULATED.3IONS-SCNC: 13 MMOL/L (ref 5–15)
APTT PPP: 35.4 SECONDS (ref 24.5–36)
AST SERPL-CCNC: 19 U/L (ref 1–32)
BASOPHILS # BLD AUTO: 0.04 10*3/MM3 (ref 0–0.2)
BASOPHILS NFR BLD AUTO: 0.7 % (ref 0–1.5)
BILIRUB SERPL-MCNC: 0.4 MG/DL (ref 0–1.2)
BUN SERPL-MCNC: 22.9 MG/DL (ref 8–23)
BUN/CREAT SERPL: 14.9 (ref 7–25)
CALCIUM SPEC-SCNC: 9.2 MG/DL (ref 8.6–10.5)
CHLORIDE SERPL-SCNC: 96 MMOL/L (ref 98–107)
CO2 SERPL-SCNC: 21 MMOL/L (ref 22–29)
CREAT SERPL-MCNC: 1.54 MG/DL (ref 0.57–1)
DEPRECATED RDW RBC AUTO: 51.8 FL (ref 37–54)
EGFRCR SERPLBLD CKD-EPI 2021: 33.6 ML/MIN/1.73
EOSINOPHIL # BLD AUTO: 0.12 10*3/MM3 (ref 0–0.4)
EOSINOPHIL NFR BLD AUTO: 2 % (ref 0.3–6.2)
ERYTHROCYTE [DISTWIDTH] IN BLOOD BY AUTOMATED COUNT: 15.6 % (ref 12.3–15.4)
GEN 5 1HR TROPONIN T REFLEX: 70 NG/L
GLOBULIN UR ELPH-MCNC: 2.2 GM/DL
GLUCOSE SERPL-MCNC: 84 MG/DL (ref 65–99)
HCT VFR BLD AUTO: 32.7 % (ref 34–46.6)
HGB BLD-MCNC: 10.6 G/DL (ref 12–15.9)
IMM GRANULOCYTES # BLD AUTO: 0.02 10*3/MM3 (ref 0–0.05)
IMM GRANULOCYTES NFR BLD AUTO: 0.3 % (ref 0–0.5)
INR PPP: 1.45 (ref 0.91–1.09)
LIPASE SERPL-CCNC: 30 U/L (ref 13–60)
LYMPHOCYTES # BLD AUTO: 1.3 10*3/MM3 (ref 0.7–3.1)
LYMPHOCYTES NFR BLD AUTO: 21.8 % (ref 19.6–45.3)
MAGNESIUM SERPL-MCNC: 2 MG/DL (ref 1.6–2.4)
MCH RBC QN AUTO: 29.5 PG (ref 26.6–33)
MCHC RBC AUTO-ENTMCNC: 32.4 G/DL (ref 31.5–35.7)
MCV RBC AUTO: 91.1 FL (ref 79–97)
MONOCYTES # BLD AUTO: 0.64 10*3/MM3 (ref 0.1–0.9)
MONOCYTES NFR BLD AUTO: 10.7 % (ref 5–12)
NEUTROPHILS NFR BLD AUTO: 3.84 10*3/MM3 (ref 1.7–7)
NEUTROPHILS NFR BLD AUTO: 64.5 % (ref 42.7–76)
NRBC BLD AUTO-RTO: 0 /100 WBC (ref 0–0.2)
PHOSPHATE SERPL-MCNC: 3.3 MG/DL (ref 2.5–4.5)
PLATELET # BLD AUTO: 253 10*3/MM3 (ref 140–450)
PMV BLD AUTO: 9.3 FL (ref 6–12)
POTASSIUM SERPL-SCNC: 4.7 MMOL/L (ref 3.5–5.2)
PROT SERPL-MCNC: 6 G/DL (ref 6–8.5)
PROTHROMBIN TIME: 18.5 SECONDS (ref 11.8–14.8)
RBC # BLD AUTO: 3.59 10*6/MM3 (ref 3.77–5.28)
SODIUM SERPL-SCNC: 130 MMOL/L (ref 136–145)
TROPONIN T % DELTA: -11
TROPONIN T NUMERIC DELTA: -9 NG/L
TROPONIN T SERPL HS-MCNC: 79 NG/L
WBC NRBC COR # BLD AUTO: 5.96 10*3/MM3 (ref 3.4–10.8)

## 2025-07-24 PROCEDURE — 83735 ASSAY OF MAGNESIUM: CPT | Performed by: STUDENT IN AN ORGANIZED HEALTH CARE EDUCATION/TRAINING PROGRAM

## 2025-07-24 PROCEDURE — 99284 EMERGENCY DEPT VISIT MOD MDM: CPT | Performed by: STUDENT IN AN ORGANIZED HEALTH CARE EDUCATION/TRAINING PROGRAM

## 2025-07-24 PROCEDURE — 93010 ELECTROCARDIOGRAM REPORT: CPT | Performed by: INTERNAL MEDICINE

## 2025-07-24 PROCEDURE — 84100 ASSAY OF PHOSPHORUS: CPT | Performed by: STUDENT IN AN ORGANIZED HEALTH CARE EDUCATION/TRAINING PROGRAM

## 2025-07-24 PROCEDURE — 85025 COMPLETE CBC W/AUTO DIFF WBC: CPT | Performed by: STUDENT IN AN ORGANIZED HEALTH CARE EDUCATION/TRAINING PROGRAM

## 2025-07-24 PROCEDURE — 25810000003 SODIUM CHLORIDE 0.9 % SOLUTION: Performed by: STUDENT IN AN ORGANIZED HEALTH CARE EDUCATION/TRAINING PROGRAM

## 2025-07-24 PROCEDURE — 80053 COMPREHEN METABOLIC PANEL: CPT | Performed by: STUDENT IN AN ORGANIZED HEALTH CARE EDUCATION/TRAINING PROGRAM

## 2025-07-24 PROCEDURE — 36415 COLL VENOUS BLD VENIPUNCTURE: CPT

## 2025-07-24 PROCEDURE — 74176 CT ABD & PELVIS W/O CONTRAST: CPT

## 2025-07-24 PROCEDURE — 72125 CT NECK SPINE W/O DYE: CPT

## 2025-07-24 PROCEDURE — 83690 ASSAY OF LIPASE: CPT | Performed by: STUDENT IN AN ORGANIZED HEALTH CARE EDUCATION/TRAINING PROGRAM

## 2025-07-24 PROCEDURE — 96374 THER/PROPH/DIAG INJ IV PUSH: CPT

## 2025-07-24 PROCEDURE — 85730 THROMBOPLASTIN TIME PARTIAL: CPT | Performed by: STUDENT IN AN ORGANIZED HEALTH CARE EDUCATION/TRAINING PROGRAM

## 2025-07-24 PROCEDURE — 73080 X-RAY EXAM OF ELBOW: CPT

## 2025-07-24 PROCEDURE — 71250 CT THORAX DX C-: CPT

## 2025-07-24 PROCEDURE — 85610 PROTHROMBIN TIME: CPT | Performed by: STUDENT IN AN ORGANIZED HEALTH CARE EDUCATION/TRAINING PROGRAM

## 2025-07-24 PROCEDURE — 84484 ASSAY OF TROPONIN QUANT: CPT | Performed by: STUDENT IN AN ORGANIZED HEALTH CARE EDUCATION/TRAINING PROGRAM

## 2025-07-24 PROCEDURE — 25010000002 METOPROLOL TARTRATE 5 MG/5ML SOLUTION: Performed by: EMERGENCY MEDICINE

## 2025-07-24 PROCEDURE — 70450 CT HEAD/BRAIN W/O DYE: CPT

## 2025-07-24 PROCEDURE — 93005 ELECTROCARDIOGRAM TRACING: CPT | Performed by: STUDENT IN AN ORGANIZED HEALTH CARE EDUCATION/TRAINING PROGRAM

## 2025-07-24 PROCEDURE — 72131 CT LUMBAR SPINE W/O DYE: CPT

## 2025-07-24 PROCEDURE — 72128 CT CHEST SPINE W/O DYE: CPT

## 2025-07-24 RX ORDER — SENNOSIDES 8.6 MG
650 CAPSULE ORAL EVERY 8 HOURS PRN
COMMUNITY

## 2025-07-24 RX ORDER — DAPAGLIFLOZIN 5 MG/1
5 TABLET, FILM COATED ORAL DAILY
COMMUNITY

## 2025-07-24 RX ORDER — TRAZODONE HYDROCHLORIDE 50 MG/1
50 TABLET ORAL NIGHTLY
COMMUNITY

## 2025-07-24 RX ORDER — EZETIMIBE 10 MG/1
10 TABLET ORAL DAILY
COMMUNITY

## 2025-07-24 RX ORDER — NITROGLYCERIN 0.4 MG/1
0.4 TABLET SUBLINGUAL
COMMUNITY
End: 2025-08-01 | Stop reason: HOSPADM

## 2025-07-24 RX ORDER — METOPROLOL TARTRATE 1 MG/ML
5 INJECTION, SOLUTION INTRAVENOUS ONCE
Status: COMPLETED | OUTPATIENT
Start: 2025-07-24 | End: 2025-07-24

## 2025-07-24 RX ORDER — DILTIAZEM HYDROCHLORIDE 5 MG/ML
10 INJECTION INTRAVENOUS ONCE
Status: DISCONTINUED | OUTPATIENT
Start: 2025-07-24 | End: 2025-07-24

## 2025-07-24 RX ORDER — METOPROLOL SUCCINATE 25 MG/1
25 TABLET, EXTENDED RELEASE ORAL DAILY
COMMUNITY
End: 2025-08-01 | Stop reason: HOSPADM

## 2025-07-24 RX ORDER — SPIRONOLACTONE 25 MG/1
25 TABLET ORAL DAILY
COMMUNITY

## 2025-07-24 RX ORDER — MAGNESIUM HYDROXIDE/ALUMINUM HYDROXICE/SIMETHICONE 120; 1200; 1200 MG/30ML; MG/30ML; MG/30ML
30 SUSPENSION ORAL EVERY 4 HOURS PRN
COMMUNITY

## 2025-07-24 RX ORDER — SACUBITRIL AND VALSARTAN 24; 26 MG/1; MG/1
1 TABLET, FILM COATED ORAL 2 TIMES DAILY
COMMUNITY

## 2025-07-24 RX ORDER — METOPROLOL TARTRATE 50 MG
50 TABLET ORAL ONCE
Status: COMPLETED | OUTPATIENT
Start: 2025-07-24 | End: 2025-07-24

## 2025-07-24 RX ORDER — CLOPIDOGREL BISULFATE 75 MG/1
75 TABLET ORAL DAILY
COMMUNITY

## 2025-07-24 RX ORDER — OMEPRAZOLE 20 MG/1
20 CAPSULE, DELAYED RELEASE ORAL DAILY
COMMUNITY

## 2025-07-24 RX ADMIN — METOPROLOL TARTRATE 50 MG: 50 TABLET, FILM COATED ORAL at 10:26

## 2025-07-24 RX ADMIN — SODIUM CHLORIDE 1000 ML: 9 INJECTION, SOLUTION INTRAVENOUS at 09:14

## 2025-07-24 RX ADMIN — METOPROLOL TARTRATE 5 MG: 5 INJECTION INTRAVENOUS at 06:51

## 2025-07-24 RX ADMIN — SODIUM CHLORIDE 1000 ML: 9 INJECTION, SOLUTION INTRAVENOUS at 06:02

## 2025-07-24 NOTE — ED PROVIDER NOTES
"Subjective   History of Present Illness  The patient is an 80-year-old female with a history of atrial fibrillation, diabetes, and dementia who presents from [facility] via [facility] for evaluation after an unwitnessed fall. Staff at the nursing home reported hearing a commotion and found the patient on the floor with a hematoma to the head and a skin tear. The patient is on Eliquis for her atrial fibrillation. The patient does not recall the fall event, stating \"I didn't realize it\" and \"I fell flat on the floor.\" She reports having something in her hand that \"felt like it was moving.\" The patient is noted to have some confusion during the interview, consistent with her history of dementia.        Review of Systems   All other systems reviewed and are negative.      Past Medical History:   Diagnosis Date    A-fib     Abnormal nuclear stress test 08/23/2019    Abnormal stress test 12/28/2022    Added automatically from request for surgery [id number]      Angina pectoris     Anxiety     Aortic valve stenosis 03/24/2017    Arthritis     Atherosclerosis of coronary artery bypass graft     Carotid artery occlusion without infarction     Chronic heart failure with preserved ejection fraction (HFpEF) 03/21/2024    Chronic kidney disease     Chronic lung disease     Colon polyp     Diabetes mellitus     BLOOD SUGARS ARE UNDER CONTROL OFF OF ALL MEDS FOR A LONG TIME PER PT AND SON    DJD (degenerative joint disease)     Essential hypertension 03/24/2017    Fibrocystic breast disease     Gastritis     GI bleed     Hemorrhoids     Hyperlipidemia     Hypertension     Lung disease     chronic    MI (myocardial infarction)     Palpitations     PUD (peptic ulcer disease)     PVD (peripheral vascular disease)     Renal failure, acute     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       Allergies   Allergen Reactions    Iodine Unknown - High Severity    Salicylates Angioedema    Shellfish-Derived Products Unknown - High Severity    " Codeine Nausea And Vomiting and Hallucinations    Aspirin Hives     Hives and swelling    Demerol [Meperidine] Nausea And Vomiting and Hallucinations       Past Surgical History:   Procedure Laterality Date    ABLATION OF DYSRHYTHMIC FOCUS      May 2021 in [place]      AORTIC VALVE REPAIR/REPLACEMENT N/A 3/19/2024    Procedure: Transfemoral Transcatheter Aortic Valve Replacement;  Surgeon: Bhavik Cuellar MD;  Location: [facility] HYBRID OR;  Service: Cardiovascular;  Laterality: N/A;    AORTIC VALVE REPAIR/REPLACEMENT Bilateral 3/19/2024    Procedure: TRANSFEMORAL TRANSCATHETER AORTIC VALVE REPLACEMENT;  Surgeon: Reed Abdi MD;  Location:  [facility] HYBRID OR;  Service: Cardiothoracic;  Laterality: Bilateral;    APPENDECTOMY      CARDIAC CATHETERIZATION  05/01/2009    Left-Heart Skin    CARDIAC CATHETERIZATION N/A 2/4/2021    Procedure: Left Heart Cath;  Surgeon: Tristan Cabello DO;  Location:  [facility] CATH INVASIVE LOCATION;  Service: Cardiology;  Laterality: N/A;    CARDIAC CATHETERIZATION Right 2/5/2021    Procedure: Left Heart Cath, rotablator to RCA with Dr. Abrams at 1PM;  Surgeon: Tristan Cabello DO;  Location:  [facility] CATH INVASIVE LOCATION;  Service: Cardiology;  Laterality: Right;    CARDIAC CATHETERIZATION N/A 12/22/2023    Procedure: Left Heart Cath;  Surgeon: Gualberto Gallo MD;  Location:  [facility] CATH INVASIVE LOCATION;  Service: Cardiology;  Laterality: N/A;    CAROTID ENDARTERECTOMY      CATARACT EXTRACTION      CATARACT EXTRACTION      COLONOSCOPY      CORONARY ARTERY BYPASS GRAFT  07/2007    Four    ENDOSCOPY  10/02/2013    ENDOSCOPY  10/02/2013    HYSTERECTOMY      SKIN CANCER EXCISION      THROMBOENDARTERECTOMY      TONSILLECTOMY         Family History   Problem Relation Age of Onset    Heart disease Mother     Breast cancer Mother     Heart disease Father     Heart disease Brother     Heart disease Brother     Breast cancer Maternal Grandmother     Colon cancer Neg Hx         Social History     Socioeconomic History    Marital status:    Tobacco Use    Smoking status: Never     Passive exposure: Never    Smokeless tobacco: Never    Tobacco comments:     Non smoker; no tobacco use   Vaping Use    Vaping status: Never Used   Substance and Sexual Activity    Alcohol use: Not Currently    Drug use: Not Currently    Sexual activity: Defer           Objective   Physical Exam    Constitutional:  General: Patient is not in acute distress.  Appearance: Patient is not diaphoretic.    HENT:  Head: Normocephalic with **left frontal hematoma. No significant swelling or deformity present**.  Right Ear: External ear normal.  Left Ear: External ear normal.  Nose: Nose normal.    Eyes:  General: No scleral icterus.  Conjunctiva/sclera: Conjunctivae normal.    Cardiovascular:  Rate and Rhythm: **Tachycardic  Heart sounds: No friction rub.    Pulmonary:  Effort: Pulmonary effort is normal. No respiratory distress.  Breath sounds: No stridor. No wheezing.    Abdominal:  Palpations: Abdomen is soft.  Tenderness: **There is no abdominal tenderness. There is no guarding or rebound**.    Musculoskeletal:  General: No deformity. **Patient reports pain in ribs and back. Tenderness noted in back region**.    Skin:  General: Skin is warm and dry. **Skin tear noted**. Normal color. Normal turgor. No active bleeding on L elbow. Positive for skin tear    Neurological:  General: No focal deficit present.  Mental Status: **Patient is alert but confused. Oriented to place (hospital) but not to year or president. Consistent with known dementia**.    Procedures           ED Course  ED Course as of 07/24/25 1124   Thu Jul 24, 2025   0602 Rule out an x-ray of the left elbow for concerns of left elbow abrasion [SG]   0810 Left ventricular systolic function is low normal. Left ventricular ejection fraction appears to be 51 - 55%.  ·  There is a TAVR valve present (#23 S3 Ultra) in excellent position, with normal  transvalvular mean gradient (5mmHg) and no evidence of paravalvular regurgitation.  ·  The following left ventricular wall segments are hypokinetic: basal inferolateral and mid inferolateral. The following left ventricular wall segments are akinetic: basal inferior and basal inferoseptal.  ·  Moderate to severe mitral valve regurgitation is present.  ·  The left atrial cavity is severely dilated.  ·  Estimated right ventricular systolic pressure from tricuspid regurgitation is normal (<35 mmHg).  ·  Normal size and function of the right ventricle.  ·  Compared to most recent exam from 4/15/2024, overall left ventricular systolic function appears improved, mitral regurgitation slightly less severe, and right ventricular systolic function improved.     Since the last echo the patient [TS]   0820 Please refer to Dr. Rollins's report for the details the patient was and un witnessed fall. [TS]   0822 Currently the patient is awake.  Has got a small hematoma to the left orbit.  Extraocular moods are intact no hyphema no conjunctival hemorrhages.  Lab work essentially unremarkable except for mild renal insufficiency and hyponatremia.  Anion gap is negative.  The patient heart rate was elevated 233 she has a history of parentally of paroxysmal A-fib.  Give her 2 doses of Lopressor rate is down to 98.  Blood pressure is slightly on the softer side for which she has received IV fluids.  I am going to give another liter of IV fluids.  I have discussed this case with the hospitalist service.  Her cardiac marker was slightly bumped but I think this is rate related there is no chest pain or shortness of breath associate with this. [TS]   0823 Patient has no chest pain and does not appear to be any distress. [TS]   1119 Patient came into the ED was seen by Dr Rollins please refer to his records for further details patient had a fall her scans are all unremarkable.  She is moving all 4 extremities.  Sodium is slightly on the lower  side with mild renal insufficiency therefore given IV fluids after the echo was reviewed.  Patient got history of paroxysmal A-fib is on anticoagulation and is on metoprolol I have given her extra dose of metoprolol heart rate is in the range of 120.  Cardiac markers elevated which could be related to the A-fib.  But they are flat she does have a history of chronic heart disease she denies any chest pain or shortness of breath to me.  I have discussed this case Dr. Cabello and also with the medicine team.  Eventually the decision was the patient can be discharged home with a follow-up. [TS]      ED Course User Index  [SG] Milo Rollins MD  [TS] Anant Kwon MD                                                       Medical Decision Making  80-year-old female with history of atrial fibrillation, diabetes, and dementia presenting after an unwitnessed fall with head injury and tachycardia.    Rhythm Strip/Cardiac Monitor:  Cardiac Monitor was ordered for evaluation of possible dysrhythmia and demonstrates atrial fibrillation with rapid ventricular response at 133 BPM.    Imaging studies: CT head without contrast was ordered due to head trauma and possible syncope. CT spine without contrast was ordered due to patient's back pain. CT chest without contrast and CT abdomen/pelvis without contrast were ordered as part of a pan-scan to evaluate for trauma. Imaging was performed without contrast due to patient's documented allergies to shellfish and iodine.    EKG, on my independent interpretation, demonstrates atrial fibrillation with rapid ventricular response at a rate of 133 BPM. There are no discernible P waves, though the rhythm appears somewhat regular, which could be consistent with sinus tachycardia. After review, I believe this represents atrial fibrillation.    The patient was given 1 liter of IV fluids as she has no history of CHF and is not fluid overloaded. Cardizem 10mg IV was administered for rate control  of atrial fibrillation with RVR.    Given the unwitnessed fall in an elderly patient with tachycardia, a syncope workup was initiated. Troponin was ordered to evaluate for cardiac etiology. Pulmonary embolism is less likely as the patient is on anticoagulation with Eliquis. Magnesium and phosphorus levels were also ordered.    The patient has multiple areas of pain including head, neck, back, and ribs, necessitating comprehensive imaging to rule out traumatic injuries. There are no concerns for upper or lower extremity trauma, swelling, or deformities except for L elbow      Problems Addressed:  Contusion of elbow, unspecified laterality, initial encounter: acute illness or injury  Dehydration: acute illness or injury  Fall, initial encounter: acute illness or injury  Paroxysmal A-fib: chronic illness or injury with exacerbation, progression, or side effects of treatment  Troponin I above reference range: acute illness or injury    Amount and/or Complexity of Data Reviewed  Labs: ordered.  Radiology: ordered.     Details: Labs x-rays and were reviewed  ECG/medicine tests: ordered.     Details: EKG reviewed predominantly appears to be sinus tachycardia  Discussion of management or test interpretation with external provider(s): Discussed with Dr. Cabello after reviewing the patient's chart and prior cardiac evaluation recommendation was the patient can be discharged home since she denies any chest pain or shortness of breath.  Cussed with the hospitalist the only reason to admit her would be for her A-fib.  The rate is under better control currently at 118 on the Lopressor will discharge home to follow-up.    Risk  Prescription drug management.  Risk Details: Patient with mechanical fall unwitnessed.  No obvious intrathoracic intra-abdominal injuries.  Have discussed this case with the patient and the patient will be discharged home she wants to go home as soon as possible.        Final diagnoses:   Fall, initial  encounter   Contusion of elbow, unspecified laterality, initial encounter   Paroxysmal A-fib   Troponin I above reference range   Dehydration       ED Disposition  ED Disposition       ED Disposition   Discharge    Condition   Stable    Comment   --               Matthieu Bhakta R, DO  [address] [place] [id number] [phone]    Schedule an appointment as soon as possible for a visit in 2 days           Medication List      No changes were made to your prescriptions during this visit.            Anant Kwon MD  07/24/25 1126

## 2025-07-24 NOTE — ED NOTES
The following fall interventions were initiated:    [x] Patient and/or family given education   [x] Call light within reach and educated on how to use   [x] Bed rails up per protocol    [x] Bed locked and in the lowest position   [x] Bed alarm set and on loudest setting   [x] Fall wrist band applied   [x] Non skid footwear applied   [x] Room free of clutter   [x] Patient items within reach   [x] Adequate lighting provided  [x] Falls sign present    [x] Patient moved closer to nursing station   [] Restraints applied

## 2025-07-26 ENCOUNTER — APPOINTMENT (OUTPATIENT)
Dept: GENERAL RADIOLOGY | Facility: HOSPITAL | Age: 80
End: 2025-07-26
Payer: MEDICARE

## 2025-07-26 ENCOUNTER — HOSPITAL ENCOUNTER (INPATIENT)
Facility: HOSPITAL | Age: 80
LOS: 5 days | Discharge: NURSING, REHAB OR LONG-TERM CARE | End: 2025-08-01
Attending: EMERGENCY MEDICINE | Admitting: FAMILY MEDICINE
Payer: MEDICARE

## 2025-07-26 DIAGNOSIS — I48.4 ATYPICAL ATRIAL FLUTTER: ICD-10-CM

## 2025-07-26 DIAGNOSIS — Z74.09 IMPAIRED MOBILITY: ICD-10-CM

## 2025-07-26 DIAGNOSIS — E87.1 HYPONATREMIA: ICD-10-CM

## 2025-07-26 DIAGNOSIS — R13.19 ESOPHAGEAL DYSPHAGIA: ICD-10-CM

## 2025-07-26 DIAGNOSIS — N18.9 ACUTE RENAL FAILURE SUPERIMPOSED ON CHRONIC KIDNEY DISEASE, UNSPECIFIED ACUTE RENAL FAILURE TYPE, UNSPECIFIED CKD STAGE: ICD-10-CM

## 2025-07-26 DIAGNOSIS — I48.91 ATRIAL FIBRILLATION WITH RAPID VENTRICULAR RESPONSE: ICD-10-CM

## 2025-07-26 DIAGNOSIS — I48.0 PAROXYSMAL A-FIB: Primary | ICD-10-CM

## 2025-07-26 DIAGNOSIS — N17.9 ACUTE RENAL FAILURE SUPERIMPOSED ON CHRONIC KIDNEY DISEASE, UNSPECIFIED ACUTE RENAL FAILURE TYPE, UNSPECIFIED CKD STAGE: ICD-10-CM

## 2025-07-26 PROBLEM — R45.89 ANXIETY ABOUT HEALTH: Chronic | Status: ACTIVE | Noted: 2021-02-01

## 2025-07-26 PROBLEM — M62.81 MUSCLE WEAKNESS (GENERALIZED): Status: ACTIVE | Noted: 2025-07-26

## 2025-07-26 PROBLEM — I48.92 ATRIAL FLUTTER WITH RAPID VENTRICULAR RESPONSE: Chronic | Status: ACTIVE | Noted: 2025-07-26

## 2025-07-26 PROBLEM — F01.518 VASCULAR DEMENTIA WITH BEHAVIOR DISTURBANCE: Chronic | Status: ACTIVE | Noted: 2025-07-26

## 2025-07-26 PROBLEM — Z79.01 CURRENT USE OF LONG TERM ANTICOAGULATION: Chronic | Status: ACTIVE | Noted: 2017-03-24

## 2025-07-26 PROBLEM — I48.92 ATRIAL FLUTTER WITH RAPID VENTRICULAR RESPONSE: Status: ACTIVE | Noted: 2025-07-26

## 2025-07-26 LAB
ALBUMIN SERPL-MCNC: 3.4 G/DL (ref 3.5–5.2)
ALBUMIN/GLOB SERPL: 1.8 G/DL
ALP SERPL-CCNC: 70 U/L (ref 39–117)
ALT SERPL W P-5'-P-CCNC: 21 U/L (ref 1–33)
ANION GAP SERPL CALCULATED.3IONS-SCNC: 11 MMOL/L (ref 5–15)
AST SERPL-CCNC: 25 U/L (ref 1–32)
BASOPHILS # BLD AUTO: 0.03 10*3/MM3 (ref 0–0.2)
BASOPHILS NFR BLD AUTO: 0.4 % (ref 0–1.5)
BILIRUB SERPL-MCNC: 0.4 MG/DL (ref 0–1.2)
BILIRUB UR QL STRIP: NEGATIVE
BUN SERPL-MCNC: 27 MG/DL (ref 8–23)
BUN/CREAT SERPL: 18.1 (ref 7–25)
CALCIUM SPEC-SCNC: 8.3 MG/DL (ref 8.6–10.5)
CHLORIDE SERPL-SCNC: 102 MMOL/L (ref 98–107)
CLARITY UR: CLEAR
CO2 SERPL-SCNC: 18 MMOL/L (ref 22–29)
COLOR UR: YELLOW
CREAT SERPL-MCNC: 1.49 MG/DL (ref 0.57–1)
DEPRECATED RDW RBC AUTO: 54.5 FL (ref 37–54)
EGFRCR SERPLBLD CKD-EPI 2021: 34.9 ML/MIN/1.73
EOSINOPHIL # BLD AUTO: 0.01 10*3/MM3 (ref 0–0.4)
EOSINOPHIL NFR BLD AUTO: 0.1 % (ref 0.3–6.2)
ERYTHROCYTE [DISTWIDTH] IN BLOOD BY AUTOMATED COUNT: 16.4 % (ref 12.3–15.4)
GLOBULIN UR ELPH-MCNC: 1.9 GM/DL
GLUCOSE SERPL-MCNC: 120 MG/DL (ref 65–99)
GLUCOSE UR STRIP-MCNC: ABNORMAL MG/DL
HCT VFR BLD AUTO: 30.6 % (ref 34–46.6)
HGB BLD-MCNC: 9.9 G/DL (ref 12–15.9)
HGB UR QL STRIP.AUTO: NEGATIVE
IMM GRANULOCYTES # BLD AUTO: 0.01 10*3/MM3 (ref 0–0.05)
IMM GRANULOCYTES NFR BLD AUTO: 0.1 % (ref 0–0.5)
KETONES UR QL STRIP: ABNORMAL
LEUKOCYTE ESTERASE UR QL STRIP.AUTO: NEGATIVE
LYMPHOCYTES # BLD AUTO: 1.03 10*3/MM3 (ref 0.7–3.1)
LYMPHOCYTES NFR BLD AUTO: 13.7 % (ref 19.6–45.3)
MAGNESIUM SERPL-MCNC: 2 MG/DL (ref 1.6–2.4)
MCH RBC QN AUTO: 29.4 PG (ref 26.6–33)
MCHC RBC AUTO-ENTMCNC: 32.4 G/DL (ref 31.5–35.7)
MCV RBC AUTO: 90.8 FL (ref 79–97)
MONOCYTES # BLD AUTO: 0.75 10*3/MM3 (ref 0.1–0.9)
MONOCYTES NFR BLD AUTO: 10 % (ref 5–12)
NEUTROPHILS NFR BLD AUTO: 5.67 10*3/MM3 (ref 1.7–7)
NEUTROPHILS NFR BLD AUTO: 75.7 % (ref 42.7–76)
NITRITE UR QL STRIP: NEGATIVE
NRBC BLD AUTO-RTO: 0 /100 WBC (ref 0–0.2)
PH UR STRIP.AUTO: 5.5 [PH] (ref 5–8)
PLATELET # BLD AUTO: 243 10*3/MM3 (ref 140–450)
PMV BLD AUTO: 9.6 FL (ref 6–12)
POTASSIUM SERPL-SCNC: 4.4 MMOL/L (ref 3.5–5.2)
PROT SERPL-MCNC: 5.3 G/DL (ref 6–8.5)
PROT UR QL STRIP: ABNORMAL
RBC # BLD AUTO: 3.37 10*6/MM3 (ref 3.77–5.28)
SODIUM SERPL-SCNC: 131 MMOL/L (ref 136–145)
SP GR UR STRIP: 1.02 (ref 1–1.03)
UROBILINOGEN UR QL STRIP: ABNORMAL
WBC NRBC COR # BLD AUTO: 7.5 10*3/MM3 (ref 3.4–10.8)

## 2025-07-26 PROCEDURE — P9612 CATHETERIZE FOR URINE SPEC: HCPCS

## 2025-07-26 PROCEDURE — 25010000002 AMIODARONE IN DEXTROSE 5% 150-4.21 MG/100ML-% SOLUTION: Performed by: EMERGENCY MEDICINE

## 2025-07-26 PROCEDURE — G0378 HOSPITAL OBSERVATION PER HR: HCPCS

## 2025-07-26 PROCEDURE — 99285 EMERGENCY DEPT VISIT HI MDM: CPT | Performed by: EMERGENCY MEDICINE

## 2025-07-26 PROCEDURE — 25010000002 AMIODARONE IN DEXTROSE 5% 360-4.14 MG/200ML-% SOLUTION: Performed by: NURSE PRACTITIONER

## 2025-07-26 PROCEDURE — 83735 ASSAY OF MAGNESIUM: CPT | Performed by: EMERGENCY MEDICINE

## 2025-07-26 PROCEDURE — 25010000002 MAGNESIUM SULFATE 2 GM/50ML SOLUTION: Performed by: EMERGENCY MEDICINE

## 2025-07-26 PROCEDURE — 25810000003 SODIUM CHLORIDE 0.9 % SOLUTION: Performed by: EMERGENCY MEDICINE

## 2025-07-26 PROCEDURE — 81003 URINALYSIS AUTO W/O SCOPE: CPT | Performed by: EMERGENCY MEDICINE

## 2025-07-26 PROCEDURE — 80053 COMPREHEN METABOLIC PANEL: CPT | Performed by: EMERGENCY MEDICINE

## 2025-07-26 PROCEDURE — 93005 ELECTROCARDIOGRAM TRACING: CPT | Performed by: EMERGENCY MEDICINE

## 2025-07-26 PROCEDURE — 85025 COMPLETE CBC W/AUTO DIFF WBC: CPT | Performed by: EMERGENCY MEDICINE

## 2025-07-26 PROCEDURE — 71045 X-RAY EXAM CHEST 1 VIEW: CPT

## 2025-07-26 PROCEDURE — 80053 COMPREHEN METABOLIC PANEL: CPT | Performed by: NURSE PRACTITIONER

## 2025-07-26 PROCEDURE — 25010000002 AMIODARONE IN DEXTROSE 5% 360-4.14 MG/200ML-% SOLUTION: Performed by: EMERGENCY MEDICINE

## 2025-07-26 PROCEDURE — 25010000002 CALCIUM GLUCONATE-NACL 1-0.675 GM/50ML-% SOLUTION: Performed by: NURSE PRACTITIONER

## 2025-07-26 PROCEDURE — 93010 ELECTROCARDIOGRAM REPORT: CPT | Performed by: INTERNAL MEDICINE

## 2025-07-26 RX ORDER — ACETAMINOPHEN 650 MG/1
650 SUPPOSITORY RECTAL EVERY 4 HOURS PRN
Status: DISCONTINUED | OUTPATIENT
Start: 2025-07-26 | End: 2025-08-01 | Stop reason: HOSPADM

## 2025-07-26 RX ORDER — CLOPIDOGREL BISULFATE 75 MG/1
75 TABLET ORAL DAILY
Status: DISCONTINUED | OUTPATIENT
Start: 2025-07-27 | End: 2025-08-01 | Stop reason: HOSPADM

## 2025-07-26 RX ORDER — BISACODYL 10 MG
10 SUPPOSITORY, RECTAL RECTAL DAILY PRN
Status: DISCONTINUED | OUTPATIENT
Start: 2025-07-26 | End: 2025-08-01 | Stop reason: HOSPADM

## 2025-07-26 RX ORDER — METOPROLOL SUCCINATE 25 MG/1
25 TABLET, EXTENDED RELEASE ORAL DAILY
Status: DISCONTINUED | OUTPATIENT
Start: 2025-07-27 | End: 2025-07-26

## 2025-07-26 RX ORDER — ACETAMINOPHEN 325 MG/1
650 TABLET ORAL EVERY 4 HOURS PRN
Status: DISCONTINUED | OUTPATIENT
Start: 2025-07-26 | End: 2025-08-01 | Stop reason: HOSPADM

## 2025-07-26 RX ORDER — SODIUM CHLORIDE 9 MG/ML
40 INJECTION, SOLUTION INTRAVENOUS AS NEEDED
Status: DISCONTINUED | OUTPATIENT
Start: 2025-07-26 | End: 2025-08-01 | Stop reason: HOSPADM

## 2025-07-26 RX ORDER — ACETAMINOPHEN 160 MG/5ML
650 SOLUTION ORAL EVERY 4 HOURS PRN
Status: DISCONTINUED | OUTPATIENT
Start: 2025-07-26 | End: 2025-08-01 | Stop reason: HOSPADM

## 2025-07-26 RX ORDER — POLYETHYLENE GLYCOL 3350 17 G/17G
17 POWDER, FOR SOLUTION ORAL DAILY PRN
Status: DISCONTINUED | OUTPATIENT
Start: 2025-07-26 | End: 2025-08-01 | Stop reason: HOSPADM

## 2025-07-26 RX ORDER — CALCIUM CARBONATE 500 MG/1
2 TABLET, CHEWABLE ORAL 2 TIMES DAILY PRN
Status: DISCONTINUED | OUTPATIENT
Start: 2025-07-26 | End: 2025-08-01 | Stop reason: HOSPADM

## 2025-07-26 RX ORDER — METOPROLOL SUCCINATE 25 MG/1
12.5 TABLET, EXTENDED RELEASE ORAL DAILY
Status: DISCONTINUED | OUTPATIENT
Start: 2025-07-27 | End: 2025-07-30

## 2025-07-26 RX ORDER — TRAZODONE HYDROCHLORIDE 50 MG/1
25 TABLET ORAL NIGHTLY
Status: DISCONTINUED | OUTPATIENT
Start: 2025-07-26 | End: 2025-07-30

## 2025-07-26 RX ORDER — CALCIUM GLUCONATE 20 MG/ML
1000 INJECTION, SOLUTION INTRAVENOUS ONCE
Status: COMPLETED | OUTPATIENT
Start: 2025-07-26 | End: 2025-07-26

## 2025-07-26 RX ORDER — ALUMINA, MAGNESIA, AND SIMETHICONE 2400; 2400; 240 MG/30ML; MG/30ML; MG/30ML
15 SUSPENSION ORAL EVERY 6 HOURS PRN
Status: DISCONTINUED | OUTPATIENT
Start: 2025-07-26 | End: 2025-08-01 | Stop reason: HOSPADM

## 2025-07-26 RX ORDER — MAGNESIUM SULFATE HEPTAHYDRATE 40 MG/ML
2 INJECTION, SOLUTION INTRAVENOUS ONCE
Status: COMPLETED | OUTPATIENT
Start: 2025-07-26 | End: 2025-07-26

## 2025-07-26 RX ORDER — ONDANSETRON 4 MG/1
4 TABLET, ORALLY DISINTEGRATING ORAL EVERY 6 HOURS PRN
Status: DISCONTINUED | OUTPATIENT
Start: 2025-07-26 | End: 2025-08-01 | Stop reason: HOSPADM

## 2025-07-26 RX ORDER — SODIUM CHLORIDE 0.9 % (FLUSH) 0.9 %
10 SYRINGE (ML) INJECTION AS NEEDED
Status: DISCONTINUED | OUTPATIENT
Start: 2025-07-26 | End: 2025-08-01 | Stop reason: HOSPADM

## 2025-07-26 RX ORDER — AMOXICILLIN 250 MG
2 CAPSULE ORAL 2 TIMES DAILY PRN
Status: DISCONTINUED | OUTPATIENT
Start: 2025-07-26 | End: 2025-08-01 | Stop reason: HOSPADM

## 2025-07-26 RX ORDER — SODIUM CHLORIDE 0.9 % (FLUSH) 0.9 %
10 SYRINGE (ML) INJECTION AS NEEDED
Status: DISCONTINUED | OUTPATIENT
Start: 2025-07-26 | End: 2025-07-26

## 2025-07-26 RX ORDER — NITROGLYCERIN 0.4 MG/1
0.4 TABLET SUBLINGUAL
Status: DISCONTINUED | OUTPATIENT
Start: 2025-07-26 | End: 2025-08-01 | Stop reason: HOSPADM

## 2025-07-26 RX ORDER — SPIRONOLACTONE 25 MG/1
25 TABLET ORAL DAILY
Status: DISCONTINUED | OUTPATIENT
Start: 2025-07-26 | End: 2025-08-01 | Stop reason: HOSPADM

## 2025-07-26 RX ORDER — SODIUM CHLORIDE 0.9 % (FLUSH) 0.9 %
10 SYRINGE (ML) INJECTION EVERY 12 HOURS SCHEDULED
Status: DISCONTINUED | OUTPATIENT
Start: 2025-07-26 | End: 2025-08-01 | Stop reason: HOSPADM

## 2025-07-26 RX ORDER — CLOPIDOGREL BISULFATE 75 MG/1
75 TABLET ORAL DAILY
Status: DISCONTINUED | OUTPATIENT
Start: 2025-07-26 | End: 2025-07-26

## 2025-07-26 RX ORDER — ONDANSETRON 2 MG/ML
4 INJECTION INTRAMUSCULAR; INTRAVENOUS EVERY 6 HOURS PRN
Status: DISCONTINUED | OUTPATIENT
Start: 2025-07-26 | End: 2025-08-01 | Stop reason: HOSPADM

## 2025-07-26 RX ORDER — ROSUVASTATIN CALCIUM 20 MG/1
40 TABLET, COATED ORAL NIGHTLY
Status: DISCONTINUED | OUTPATIENT
Start: 2025-07-26 | End: 2025-07-27

## 2025-07-26 RX ORDER — BISACODYL 5 MG/1
5 TABLET, DELAYED RELEASE ORAL DAILY PRN
Status: DISCONTINUED | OUTPATIENT
Start: 2025-07-26 | End: 2025-08-01 | Stop reason: HOSPADM

## 2025-07-26 RX ORDER — SACUBITRIL AND VALSARTAN 24; 26 MG/1; MG/1
1 TABLET, FILM COATED ORAL 2 TIMES DAILY
Status: DISCONTINUED | OUTPATIENT
Start: 2025-07-26 | End: 2025-08-01 | Stop reason: HOSPADM

## 2025-07-26 RX ADMIN — AMIODARONE HYDROCHLORIDE 0.5 MG/MIN: 1.8 INJECTION, SOLUTION INTRAVENOUS at 21:37

## 2025-07-26 RX ADMIN — ROSUVASTATIN CALCIUM 40 MG: 20 TABLET, FILM COATED ORAL at 21:38

## 2025-07-26 RX ADMIN — SPIRONOLACTONE 25 MG: 25 TABLET ORAL at 18:56

## 2025-07-26 RX ADMIN — AMIODARONE HYDROCHLORIDE 150 MG: 1.5 INJECTION, SOLUTION INTRAVENOUS at 14:44

## 2025-07-26 RX ADMIN — CALCIUM GLUCONATE 1000 MG: 20 INJECTION, SOLUTION INTRAVENOUS at 21:38

## 2025-07-26 RX ADMIN — MAGNESIUM SULFATE HEPTAHYDRATE 2 G: 40 INJECTION, SOLUTION INTRAVENOUS at 14:45

## 2025-07-26 RX ADMIN — AMIODARONE HYDROCHLORIDE 1 MG/MIN: 1.8 INJECTION, SOLUTION INTRAVENOUS at 15:23

## 2025-07-26 RX ADMIN — APIXABAN 2.5 MG: 2.5 TABLET, FILM COATED ORAL at 21:38

## 2025-07-26 RX ADMIN — TRAZODONE HYDROCHLORIDE 25 MG: 50 TABLET ORAL at 21:38

## 2025-07-26 RX ADMIN — ACETAMINOPHEN 650 MG: 325 TABLET ORAL at 21:38

## 2025-07-26 RX ADMIN — SODIUM CHLORIDE 500 ML: 9 INJECTION, SOLUTION INTRAVENOUS at 14:45

## 2025-07-26 NOTE — ED PROVIDER NOTES
Subjective   History of Present Illness  Patient is a 80-year-old who was recently seen in the ED and discharged home she got history of paroxysmal A-fib the symptoms on her back because her heart rate is elevated.    Illness  Location:  Elevated heart rate  Severity:  Moderate  Onset quality:  Gradual  Timing:  Constant  Chronicity:  New  Associated symptoms: no abdominal pain, no chest pain, no cough, no headaches, no loss of consciousness, no rhinorrhea, no shortness of breath and no vomiting        Review of Systems   Constitutional: Negative.    HENT: Negative.  Negative for rhinorrhea.    Eyes: Negative.    Respiratory: Negative.  Negative for cough and shortness of breath.    Cardiovascular: Negative.  Negative for chest pain.   Gastrointestinal: Negative.  Negative for abdominal pain and vomiting.   Musculoskeletal: Negative.  Negative for back pain and neck pain.   Skin: Negative.    Neurological: Negative.  Negative for loss of consciousness and headaches.   All other systems reviewed and are negative.      Past Medical History:   Diagnosis Date    A-fib     Abnormal nuclear stress test 08/23/2019    Abnormal stress test 12/28/2022    Added automatically from request for surgery [id number]      Angina pectoris     Anxiety     Aortic valve stenosis 03/24/2017    Arthritis     Atherosclerosis of coronary artery bypass graft     Carotid artery occlusion without infarction     Chronic heart failure with preserved ejection fraction (HFpEF) 03/21/2024    Chronic kidney disease     Chronic lung disease     Colon polyp     Diabetes mellitus     BLOOD SUGARS ARE UNDER CONTROL OFF OF ALL MEDS FOR A LONG TIME PER PT AND SON    DJD (degenerative joint disease)     Essential hypertension 03/24/2017    Fibrocystic breast disease     Gastritis     GI bleed     Hemorrhoids     Hyperlipidemia     Hypertension     Lung disease     chronic    MI (myocardial infarction)     Palpitations     PUD (peptic ulcer disease)     PVD  (peripheral vascular disease)     Renal failure, acute     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       Allergies   Allergen Reactions    Iodine Unknown - High Severity    Salicylates Angioedema    Shellfish-Derived Products Unknown - High Severity    Codeine Nausea And Vomiting and Hallucinations    Aspirin Hives     Hives and swelling    Demerol [Meperidine] Nausea And Vomiting and Hallucinations       Past Surgical History:   Procedure Laterality Date    ABLATION OF DYSRHYTHMIC FOCUS      May 2021 in [place]      AORTIC VALVE REPAIR/REPLACEMENT N/A 3/19/2024    Procedure: Transfemoral Transcatheter Aortic Valve Replacement;  Surgeon: Bhavik Cuellar MD;  Location:  [facility] HYBRID OR;  Service: Cardiovascular;  Laterality: N/A;    AORTIC VALVE REPAIR/REPLACEMENT Bilateral 3/19/2024    Procedure: TRANSFEMORAL TRANSCATHETER AORTIC VALVE REPLACEMENT;  Surgeon: Reed Abdi MD;  Location:  [facility] HYBRID OR;  Service: Cardiothoracic;  Laterality: Bilateral;    APPENDECTOMY      CARDIAC CATHETERIZATION  05/01/2009    Left-Heart Skin    CARDIAC CATHETERIZATION N/A 2/4/2021    Procedure: Left Heart Cath;  Surgeon: Tristan Cabello DO;  Location:  [facility] CATH INVASIVE LOCATION;  Service: Cardiology;  Laterality: N/A;    CARDIAC CATHETERIZATION Right 2/5/2021    Procedure: Left Heart Cath, rotablator to RCA with Dr. Abrams at 1PM;  Surgeon: Tristan Cabello DO;  Location:  [facility] CATH INVASIVE LOCATION;  Service: Cardiology;  Laterality: Right;    CARDIAC CATHETERIZATION N/A 12/22/2023    Procedure: Left Heart Cath;  Surgeon: Gualberto Gallo MD;  Location:  [facility] CATH INVASIVE LOCATION;  Service: Cardiology;  Laterality: N/A;    CAROTID ENDARTERECTOMY      CATARACT EXTRACTION      CATARACT EXTRACTION      COLONOSCOPY      CORONARY ARTERY BYPASS GRAFT  07/2007    Four    ENDOSCOPY  10/02/2013    ENDOSCOPY  10/02/2013    HYSTERECTOMY      SKIN CANCER EXCISION      THROMBOENDARTERECTOMY      TONSILLECTOMY          Family History   Problem Relation Age of Onset    Heart disease Mother     Breast cancer Mother     Heart disease Father     Heart disease Brother     Heart disease Brother     Breast cancer Maternal Grandmother     Colon cancer Neg Hx        Social History     Socioeconomic History    Marital status:    Tobacco Use    Smoking status: Never     Passive exposure: Never    Smokeless tobacco: Never    Tobacco comments:     Non smoker; no tobacco use   Vaping Use    Vaping status: Never Used   Substance and Sexual Activity    Alcohol use: Not Currently    Drug use: Not Currently    Sexual activity: Defer           Objective   Physical Exam  Vitals and nursing note reviewed. Exam conducted with a chaperone present.   Constitutional:       General: She is awake.      Appearance: Normal appearance. She is well-developed. She is not ill-appearing.   HENT:      Head: Normocephalic and atraumatic.   Eyes:      General: Lids are normal.      Conjunctiva/sclera: Conjunctivae normal.      Pupils: Pupils are equal, round, and reactive to light.   Cardiovascular:      Rate and Rhythm: Tachycardia present. Rhythm irregular.      Chest Wall: PMI is not displaced.      Pulses: Normal pulses.      Heart sounds: Normal heart sounds.   Pulmonary:      Effort: Pulmonary effort is normal.      Breath sounds: Normal breath sounds. No decreased breath sounds.   Abdominal:      General: Abdomen is flat. Bowel sounds are normal.      Palpations: Abdomen is soft.      Tenderness: There is no abdominal tenderness.   Musculoskeletal:         General: Normal range of motion.      Cervical back: Full passive range of motion without pain, normal range of motion and neck supple.      Right lower leg: No edema.      Left lower leg: No edema.   Skin:     General: Skin is warm and dry.      Capillary Refill: Capillary refill takes less than 2 seconds.   Neurological:      General: No focal deficit present.      Mental Status: She is alert  and oriented to person, place, and time.      Cranial Nerves: No cranial nerve deficit.      Motor: Motor function is intact. No weakness.      Coordination: Coordination normal.      Deep Tendon Reflexes: Reflexes are normal and symmetric.   Psychiatric:         Behavior: Behavior is cooperative.         Procedures           ED Course  ED Course as of 07/26/25 1648   Sat Jul 26, 2025   1425 Patient is anticoagulated well-controlled with amiodarone and magnesium because blood pressure is low to give Cardizem [TS]   1645 Patient has got a history of paroxysmal A-fib.  And was sent over here for elevated heart rate her blood pressure on the softer side if amiodarone and magnesium is used.  The patient will be admitted to the medicine service at this time.  Rate is under better control. [TS]      ED Course User Index  [TS] Anant Kwon MD                                                       Medical Decision Making  Fast heart rate has a history of A-fib.  Denies any chest pain or shortness of breath.    Problems Addressed:  Acute renal failure superimposed on chronic kidney disease, unspecified acute renal failure type, unspecified CKD stage: chronic illness or injury with exacerbation, progression, or side effects of treatment  Atrial fibrillation with rapid ventricular response: chronic illness or injury with exacerbation, progression, or side effects of treatment  Hyponatremia: acute illness or injury  Paroxysmal A-fib: chronic illness or injury with exacerbation, progression, or side effects of treatment    Amount and/or Complexity of Data Reviewed  Labs: ordered.     Details: Labs are  Radiology: ordered.  ECG/medicine tests: ordered.     Details: With A-fib with RVR  Discussion of management or test interpretation with external provider(s): Discussed with the hospitalist    Risk  Prescription drug management.  Decision regarding hospitalization.  Risk Details: Will be admitted        Final diagnoses:    Paroxysmal A-fib   Atrial fibrillation with rapid ventricular response   Hyponatremia   Acute renal failure superimposed on chronic kidney disease, unspecified acute renal failure type, unspecified CKD stage       ED Disposition  ED Disposition       ED Disposition   Decision to Admit    Condition   --    Comment   Level of Care: Telemetry [5]   Diagnosis: Atrial fibrillation with rapid ventricular response [332924]   Admitting Physician: MILY ALAN [[id number]   Attending Physician: MILY ALAN [[id number]]                 No follow-up provider specified.       Medication List      No changes were made to your prescriptions during this visit.            Anant Kwon MD  07/26/25 0438

## 2025-07-26 NOTE — H&P
[facility] Medicine Services  HISTORY AND PHYSICAL    Date of Admission: 7/26/2025  Primary Care Physician: Matthieu Bhakta DO    Subjective   Primary Historian: Daughter Radha Carl by telephone    Chief Complaint: rapid HR    History of Present Illness  Patient is a 80-year-old female who was recently seen in the ED 2 days ago for rapid HR and discharged back to nursing home from ED. She has a PMH that includes but not limited to COPD, vascular dementia, generalized muscle weakness, dysphagia, HTN, paroxysmal A-fib with history of cardiac ablation 6/2/2021 done in [facility], on chronic oral anticoagulation Eliquis, hyperosmolality and hyponatremia, HLD, atherosclerotic heart disease, arthritis, depression, GERD, chronic diastolic heart failure and insomnia.  She presented from [facility] [place] today after the nurse took her vital signs and found her heart rate to be in the upper 130s.  Patient stated she was a little short of breath at that time to the nurse per the son who was at bedside in the ED on admission assessment.  Blood pressure has been hypotensive upon arrival BP 96/76, , RR 22, SPO2 99% on room air, afebrile 97.5 °F.  EKG in the ED appears to be a flutter, sodium 131, creatinine 1.49, Hgb 9.9 down from 10.6 on 7/24/2025, HCT 30.6 down from 32.7 on 7/24/2025, urine obtained with trace ketones and trace protein and glucose noted however patient is on Jardiance at home which creates glucose in the urine.    Admission assessment completed in the emergency department, patient denies any symptoms that are new at this time, helped her off the bedside commode upon arrival to the ED room after she was done urinating and patient is very weak in her lower extremities, very afraid to fall.  Blood pressure 107/89,  A-flutter, SpO2 93% on room air.  Patient does have bruise noted to left eye where she fell at the nursing home  prompting her visit to [facility] ED 2 days ago.  She does have dementia and is a poor historian.  Called her daughter Radha Carl who is listed as her healthcare agent to verify CODE STATUS as nursing home paperwork states full code.  After long discussion with Radha, the patient's daughter, she stated she signed paperwork at the nursing home 2 months ago that patient is to be DNR/DNI, no cardioversion, no dialysis, no tube feeding.  CODE STATUS orders changed to reflect this.  Should be admitted to Dr. Hunter with hospital medicine service, Dr. Reed cardiac electrophysiology has already been contacted and stated he will see patient in the morning.  Patient will continue on amiodarone drip upon admission, will continue current daily medications that are a necessity.  Currently stable, afebrile and vital signs are stable at this time.    Last Cardiology note dated 4/29/2025 seen by Magda TOLENTINO:  Diagnoses and all orders for this visit:  1. Chronic combined systolic and diastolic congestive heart failure (CMS/HCC) (Primary)-NYHA class II.  Compensated.  EF 41 to 45% on 2D echo 2/1/2021 and improved to 51-55 % on 2D echo 3/31/2025.  Start spironolactone 25 mg daily. BMP in one week. Reviewed signs and symptoms of CHF and what to report with the patient. Patient instructed to restrict sodium and weigh daily. Report weight gain of greater than 2 lbs overnight or 5 lbs in 1 week. Pt verbalized understanding of instructions and plan of care.  Continue Entresto, Jardiance and metoprolol succinate.  Consider up titration of Entresto, spironolactone and metoprolol succinate in the future, if tolerated.     2. Coronary artery disease involving coronary bypass graft of native heart without angina pectoris-no clinical signs of ischemia.  Stable.     3. S/P CABG x 4- LIMA to LAD: Widely patent, ties into the mid segment of the LAD; SVG to RCA system: Widely patent however the proximal segment is diffusely diseased,  the mid segment has a 70 to 80% stenotic lesion; SVG to diagonal: Patent, 20 to 30% stenosis in the proximal segment, held aneurysm in the mid segment; SVG to assumed OM: Totally occluded at the origin.      4. Status post insertion of drug-eluting stent into right coronary artery for coronary artery disease- 2.5 x 28 mm Xience Korin drug-eluting stent placement to the right coronary artery 2/5/2021. Pt continues on Eliquis and Plavix. Denies bleeding.      5. Paroxysmal atrial fibrillation (CMS/HCC)-status post cardioversion 4/6/21 and ablation 6/2/2021.  In sinus rhythm today.  Anticoagulated.  Stable.  Patient is following with electrophysiology and is due for follow up. Continue metoprolol succinate.     6. History of cardioversion- 4/6/21.      7.Current use of longterm anticoagulation-patient continues on Eliquis. Denies bleeding.      8. S/P TAVR (transcatheter aortic valve replacement) -23 mm S3 ultra valve 3/19/2024.  Gradients of within defined limits on most recent 2D echo 3/31/2025.  Continue yearly echo for surveillance.     9.  Nonrheumatic mitral valve regurgitation-moderate to severe on 2D echo 3/31/2025.  Repeat 2D echo around 3/31/2026, or sooner with worsening symptoms.     10. Type 2 diabetes mellitus with other circulatory complication, without long-term current use of insulin (CMS/[facility])-diet controlled.  Type 2 diabetes mellitus in the setting of obstructive coronary artery disease.  Management per PCP.  Stable.  Continue Jardiance.     11. Essential hypertension-blood pressure is elevated in office today.  Continue to monitor following above medication adjustments.Continue Entresto and metoprolol succinate.  Monitor and record daily blood pressure. Report readings consistently higher than 130/80 or consistently lower than 100/60.      12. Mixed hyperlipidemia-management per primary care provider.  Continue Crestor.    Review of Systems   Otherwise complete ROS reviewed and negative except as  mentioned in the HPI.    Past Medical History:   Past Medical History:   Diagnosis Date    A-fib     Abnormal nuclear stress test 08/23/2019    Abnormal stress test 12/28/2022    Added automatically from request for surgery [id number]      Angina pectoris     Anxiety     Aortic valve stenosis 03/24/2017    Arthritis     Atherosclerosis of coronary artery bypass graft     Carotid artery occlusion without infarction     Chronic heart failure with preserved ejection fraction (HFpEF) 03/21/2024    Chronic kidney disease     Chronic lung disease     Colon polyp     Diabetes mellitus     BLOOD SUGARS ARE UNDER CONTROL OFF OF ALL MEDS FOR A LONG TIME PER PT AND SON    DJD (degenerative joint disease)     Essential hypertension 03/24/2017    Fibrocystic breast disease     Gastritis     GI bleed     Hemorrhoids     Hyperlipidemia     Hypertension     Lung disease     chronic    MI (myocardial infarction)     Palpitations     PUD (peptic ulcer disease)     PVD (peripheral vascular disease)     Renal failure, acute     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     Past Surgical History:  Past Surgical History:   Procedure Laterality Date    ABLATION OF DYSRHYTHMIC FOCUS      May 2021 in [place]      AORTIC VALVE REPAIR/REPLACEMENT N/A 3/19/2024    Procedure: Transfemoral Transcatheter Aortic Valve Replacement;  Surgeon: Bhavik Cuellar MD;  Location: [facility] OR;  Service: Cardiovascular;  Laterality: N/A;    AORTIC VALVE REPAIR/REPLACEMENT Bilateral 3/19/2024    Procedure: TRANSFEMORAL TRANSCATHETER AORTIC VALVE REPLACEMENT;  Surgeon: Reed Abdi MD;  Location: [facility] HYBRID OR;  Service: Cardiothoracic;  Laterality: Bilateral;    APPENDECTOMY      CARDIAC CATHETERIZATION  05/01/2009    Left-Heart Skin    CARDIAC CATHETERIZATION N/A 2/4/2021    Procedure: Left Heart Cath;  Surgeon: Tristan Cabello DO;  Location: [facility] CATH INVASIVE LOCATION;  Service: Cardiology;  Laterality: N/A;    CARDIAC CATHETERIZATION Right  2/5/2021    Procedure: Left Heart Cath, rotablator to RCA with Dr. Abrams at 1PM;  Surgeon: Tristan Cabello DO;  Location:  [facility] CATH INVASIVE LOCATION;  Service: Cardiology;  Laterality: Right;    CARDIAC CATHETERIZATION N/A 12/22/2023    Procedure: Left Heart Cath;  Surgeon: Gualberto Gallo MD;  Location:  [facility] CATH INVASIVE LOCATION;  Service: Cardiology;  Laterality: N/A;    CAROTID ENDARTERECTOMY      CATARACT EXTRACTION      CATARACT EXTRACTION      COLONOSCOPY      CORONARY ARTERY BYPASS GRAFT  07/2007    Four    ENDOSCOPY  10/02/2013    ENDOSCOPY  10/02/2013    HYSTERECTOMY      SKIN CANCER EXCISION      THROMBOENDARTERECTOMY      TONSILLECTOMY       Social History:  reports that she has never smoked. She has never been exposed to tobacco smoke. She has never used smokeless tobacco. She reports that she does not currently use alcohol. She reports that she does not currently use drugs.    Family History: family history includes Breast cancer in her maternal grandmother and mother; Heart disease in her brother, brother, father, and mother.       Allergies:  Allergies   Allergen Reactions    Iodine Unknown - High Severity    Salicylates Angioedema    Shellfish-Derived Products Unknown - High Severity    Codeine Nausea And Vomiting and Hallucinations    Aspirin Hives     Hives and swelling    Demerol [Meperidine] Nausea And Vomiting and Hallucinations       Medications:  Prior to Admission medications    Medication Sig Start Date End Date Taking? Authorizing Provider   acetaminophen (TYLENOL) 650 MG 8 hr tablet Take 1 tablet by mouth Every 8 (Eight) Hours As Needed for Mild Pain, Moderate Pain or Headache.    ProviderRonald MD   aluminum-magnesium hydroxide-simethicone (MAALOX/MYLANTA) 200-200-20 MG/5ML suspension Take 30 mL by mouth Every 4 (Four) Hours As Needed for Indigestion or Heartburn.    ProviderRonald MD   apixaban (ELIQUIS) 2.5 MG tablet tablet Take 1 tablet by mouth 2 (Two)  Times a Day.    Ronald gAuiar MD   clopidogrel (PLAVIX) 75 MG tablet Take 1 tablet by mouth Daily.    Ronald Aguiar MD   dapagliflozin (FARXIGA) 5 MG tablet tablet Take 1 tablet by mouth Daily.    Ronald Aguiar MD   empagliflozin (JARDIANCE) 10 MG tablet tablet Take 1 tablet by mouth Daily.  Patient not taking: Reported on 7/24/2025    Ronald Aguiar MD   ezetimibe (ZETIA) 10 MG tablet Take 1 tablet by mouth Daily.    Ronald Aguiar MD   metoprolol succinate XL (TOPROL-XL) 25 MG 24 hr tablet Take 1 tablet by mouth Daily.    Ronald Aguiar MD   Multiple Vitamins-Minerals (MULTIVITAMIN ADULT PO) Take 1 tablet by mouth Daily.  Patient not taking: Reported on 7/24/2025    Ronald Aguiar MD   nitroglycerin (NITROSTAT) 0.4 MG SL tablet Place 1 tablet under the tongue Every 5 (Five) Minutes As Needed for Chest Pain. Take no more than 3 doses in 15 minutes.  Patient not taking: Reported on 7/24/2025    Ronald Aguiar MD   omeprazole (priLOSEC) 20 MG capsule Take 1 capsule by mouth Daily.    Ronald Aguiar MD   rosuvastatin (CRESTOR) 40 MG tablet Take 1 tablet by mouth Daily.    Ronald Aguiar MD   sacubitril-valsartan (ENTRESTO) 24-26 MG tablet Take 1 tablet by mouth 2 (Two) Times a Day.    Ronald Aguiar MD   spironolactone (ALDACTONE) 25 MG tablet Take 1 tablet by mouth Daily.    Ronald Aguiar MD   traZODone (DESYREL) 50 MG tablet Take 1 tablet by mouth Every Night.    Ronald Aguiar MD     I have utilized all available immediate resources to obtain, update, or review the patient's current medications (including all prescriptions, over-the-counter products, herbals, cannabis/cannabidiol products, and vitamin/mineral/dietary (nutritional) supplements).    Results for orders placed during the hospital encounter of 03/31/25    Adult Transthoracic Echo Complete w/ Color, Spectral and Contrast if necessary per protocol 04/03/2025  "12:32 PM    Interpretation Summary    Left ventricular systolic function is low normal. Left ventricular ejection fraction appears to be 51 - 55%.    There is a TAVR valve present (#23 S3 Ultra) in excellent position, with normal transvalvular mean gradient (5mmHg) and no evidence of paravalvular regurgitation.    The following left ventricular wall segments are hypokinetic: basal inferolateral and mid inferolateral. The following left ventricular wall segments are akinetic: basal inferior and basal inferoseptal.    Moderate to severe mitral valve regurgitation is present.    The left atrial cavity is severely dilated.    Estimated right ventricular systolic pressure from tricuspid regurgitation is normal (<35 mmHg).    Normal size and function of the right ventricle.    Compared to most recent exam from 4/15/2024, overall left ventricular systolic function appears improved, mitral regurgitation slightly less severe, and right ventricular systolic function improved.       Objective     Vital Signs: /86   Pulse (!) 121   Temp 97.8 °F (36.6 °C) (Oral)   Resp 18   Ht 152.4 cm (60\")   Wt 55.7 kg (122 lb 11.2 oz)   SpO2 97%   BMI 23.96 kg/m²   Physical Exam  Vitals and nursing note reviewed.   Constitutional:       Comments: Appears underweight, frail   HENT:      Head: Normocephalic. Contusion present.      Jaw: There is normal jaw occlusion.        Comments: Bruise noted to the left outer eyelid and eyebrow area, reportedly due to fall 2 days ago at the nursing home.     Nose: Nose normal.      Mouth/Throat:      Mouth: Mucous membranes are dry.   Eyes:      Extraocular Movements: Extraocular movements intact.      Conjunctiva/sclera: Conjunctivae normal.      Pupils: Pupils are equal, round, and reactive to light.      Comments: Bruise to left outer eyelid and eyebrow ridge from fall 2 days ago   Cardiovascular:      Rate and Rhythm: Regular rhythm. Tachycardia present.      Pulses: Normal pulses.         "   Dorsalis pedis pulses are 2+ on the right side and 2+ on the left side.        Posterior tibial pulses are 2+ on the right side and 2+ on the left side.      Heart sounds: Normal heart sounds. No murmur heard.     No friction rub. No gallop.      Comments: Atrial flutter HR 122bpm  Pulmonary:      Effort: Pulmonary effort is normal. No respiratory distress.      Breath sounds: Normal breath sounds. No wheezing, rhonchi or rales.   Abdominal:      General: Abdomen is flat. Bowel sounds are normal.      Palpations: Abdomen is soft.   Genitourinary:     Comments: Deferred  Musculoskeletal:         General: No swelling or tenderness.      Cervical back: Normal range of motion.   Feet:      Right foot:      Skin integrity: Skin integrity normal.      Left foot:      Skin integrity: Skin integrity normal.   Skin:     General: Skin is warm and dry.      Capillary Refill: Capillary refill takes 2 to 3 seconds.   Neurological:      General: No focal deficit present.      Mental Status: She is alert. Mental status is at baseline. She is disoriented.      Motor: Weakness present.      Gait: Gait abnormal.          Results Reviewed:  Lab Results (last 24 hours)       Procedure Component Value Units Date/Time    Urinalysis With Microscopic If Indicated (No Culture) - Urine, Catheter [[id number]]  (Abnormal) Collected: 07/26/25 1522    Specimen: Urine, Catheter Updated: 07/26/25 1543     Color, UA Yellow     Appearance, UA Clear     pH, UA 5.5     Specific Gravity, UA 1.023     Glucose, UA >=1000 mg/dL (3+)     Ketones, UA Trace     Bilirubin, UA Negative     Blood, UA Negative     Protein, UA Trace     Leuk Esterase, UA Negative     Nitrite, UA Negative     Urobilinogen, UA 1.0 E.U./dL    Narrative:      Urine microscopic not indicated.    Magnesium [[id number]]  (Normal) Collected: 07/26/25 1413    Specimen: Blood Updated: 07/26/25 1452     Magnesium 2.0 mg/dL     Comprehensive Metabolic Panel [[id number]]  (Abnormal) Collected:  07/26/25 1413    Specimen: Blood Updated: 07/26/25 1452     Glucose 120 mg/dL      BUN 27.0 mg/dL      Creatinine 1.49 mg/dL      Sodium 131 mmol/L      Potassium 4.4 mmol/L      Chloride 102 mmol/L      CO2 18.0 mmol/L      Calcium 8.3 mg/dL      Total Protein 5.3 g/dL      Albumin 3.4 g/dL      ALT (SGPT) 21 U/L      AST (SGOT) 25 U/L      Alkaline Phosphatase 70 U/L      Total Bilirubin 0.4 mg/dL      Globulin 1.9 gm/dL      A/G Ratio 1.8 g/dL      BUN/Creatinine Ratio 18.1     Anion Gap 11.0 mmol/L      eGFR 34.9 mL/min/1.73     Narrative:      GFR Categories in Chronic Kidney Disease (CKD)              GFR Category          GFR (mL/min/1.73)    Interpretation  G1                    90 or greater        Normal or high (1)  G2                    60-89                Mild decrease (1)  G3a                   45-59                Mild to moderate decrease  G3b                   30-44                Moderate to severe decrease  G4                    15-29                Severe decrease  G5                    14 or less           Kidney failure    (1)In the absence of evidence of kidney disease, neither GFR category G1 or G2 fulfill the criteria for CKD.    eGFR calculation 2021 CKD-EPI creatinine equation, which does not include race as a factor    CBC & Differential [[id number]]  (Abnormal) Collected: 07/26/25 1413    Specimen: Blood Updated: 07/26/25 1424    Narrative:      The following orders were created for panel order CBC & Differential.  Procedure                               Abnormality         Status                     ---------                               -----------         ------                     CBC Auto Differential[[id number]]        Abnormal            Final result                 Please view results for these tests on the individual orders.    CBC Auto Differential [[id number]]  (Abnormal) Collected: 07/26/25 1413    Specimen: Blood Updated: 07/26/25 1424     WBC 7.50 10*3/mm3      RBC 3.37  10*6/mm3      Hemoglobin 9.9 g/dL      Hematocrit 30.6 %      MCV 90.8 fL      MCH 29.4 pg      MCHC 32.4 g/dL      RDW 16.4 %      RDW-SD 54.5 fl      MPV 9.6 fL      Platelets 243 10*3/mm3      Neutrophil % 75.7 %      Lymphocyte % 13.7 %      Monocyte % 10.0 %      Eosinophil % 0.1 %      Basophil % 0.4 %      Immature Grans % 0.1 %      Neutrophils, Absolute 5.67 10*3/mm3      Lymphocytes, Absolute 1.03 10*3/mm3      Monocytes, Absolute 0.75 10*3/mm3      Eosinophils, Absolute 0.01 10*3/mm3      Basophils, Absolute 0.03 10*3/mm3      Immature Grans, Absolute 0.01 10*3/mm3      nRBC 0.0 /100 WBC           Imaging Results (Last 24 Hours)       Procedure Component Value Units Date/Time    XR Chest 1 View [[id number]] Collected: 07/26/25 1437     Updated: 07/26/25 1441    Narrative:      XR CHEST 1 VW-      HISTORY: Rapid heart rate       COMPARISON: 10/31/2024     FINDINGS:  Upright frontal radiograph of the chest was obtained     Postop TAVR. Cardiomegaly which is stable. Pulmonary vasculature are  nondilated. Lungs are well expanded. No consolidation or pleural  effusion. No pneumothorax. Spinal scoliotic curvature.       Impression:      1.  Stable chest exam without acute process. Lungs are well expanded. No  pulmonary edema.     This report was signed and finalized on 7/26/2025 2:38 PM by Dr Williams [place].             I have personally reviewed and interpreted the radiology studies and ECG obtained at time of admission.     Assessment / Plan   Assessment:   Active Hospital Problems    Diagnosis     **Atrial flutter with rapid ventricular response     Muscle weakness (generalized)     Chronic combined systolic and diastolic congestive heart failure     Vascular dementia with behavior disturbance     Anxiety about health     Current use of long term anticoagulation     Essential hypertension        Treatment Plan  The patient will be admitted to Dr. Hunter with hospital medicine service here at [facility]  [place].     Atrial flutter with rapid ventricular response  Started on amiodarone drip in the emergency department, continue Amio drip as ordered  Cardiac electrophysiologist  consulted and notified, will see patient in the morning  Continue metoprolol succinate at decreased dose 12.5 mg starting in the morning if blood pressure can tolerate  Continuous telemetry  Continue Eliquis 2.5 mg twice daily, Plavix 75 mg daily  Vitals every 4 hours and as needed  EKG as needed for rate conversion to normal sinus rhythm or change in rhythm  Current use of long-term anticoagulation Eliquis  Secondary to chronic paroxysmal atrial fibrillation  Continue Eliquis 2.5 mg daily  Monitor for S/S of bleeding  Essential hypertension  Hold Entresto tonight, reevaluate in the morning to see if vital signs have improved and blood pressure can tolerate  Hold spironolactone until blood pressure can tolerate  Vitals every 4 hours and as needed  Notify hospitalist provider if becomes hypotensive and remains sustained  Anxiety about health  Patient with high level of anxiety about her surroundings, being in the hospital and healthcare in general  May be contributing to continued elevated heart rate  Continue home trazodone at decreased dose from 50 mg to 25 mg at night  Muscle weakness  Up with assistance  Fall precautions  PT/OT to eval and treat  Vascular dementia with behavioral disturbance  Patient baseline is confusion, per daughter on telephone patient is at her baseline  Easily stimulated, try to reduce stimulation to patient by turning down lights, grouping cares and allowing rest  Chronic combined systolic and diastolic congestive heart failure  Currently holding home Entresto due to hypotension, will restart when blood pressure improves  EF 41 to 45% on 2D echo 2/1/2021 and improved to 51-55 % on 2D echo 3/31/2025.  Continue metoprolol succinate 12.5 mg daily which is decreased from home dose of 25 mg daily if blood pressure  tolerates, follow hold parameters    Medical Decision Making  Number and Complexity of problems: 7  1 acute problem, high complexity, unchanged on Amio drip, asymptomatic and stable  6 chronic problems, moderate complexity, stable and will continue to monitor and adjust med doses as appropriate  Differential Diagnosis: None    Conditions and Status        Condition is unchanged.     [facility] Data  External documents reviewed: Yes  Cardiac tracing (EKG, telemetry) interpretation: Reviewed reports  Radiology interpretation: Reviewed diagnostic imaging reports  Labs reviewed: Reviewed previous and current lab values  Any tests that were considered but not ordered: None     Decision rules/scores evaluated (example [id number], Wells, etc):      GUANAKITO?DS?-VASc Score for Atrial Fibrillation Stroke Risk - MDCalc  Calculated on Jul 26 2025 7:35 PM  7 points -> Stroke risk was 11.2% per year in >90,000 patients (the [place] Atrial Fibrillation Cohort Study) and 15.7% risk of stroke/TIA/systemic embolism.    Discussed with: Discussed plan of care with patient, son at bedside, daughter Radha over the telephone, Dr. Reed over secure chat and Dr. Hunter.     Care Planning  Shared decision making: Discussed with: Discussed plan of care with patient, son at bedside, daughter Radha over the telephone, Dr. Reed over secure chat and Dr. Hunter.  Code status and discussions: Called Radha Carl patient's daughter and surrogate decision maker, discussed CODE STATUS as nursing home paperwork states full code, daughter Radha verified patient is a DNR/DNI and states she filled out the paperwork and signed it approximately 2 months ago at the nursing home.  I did inform Radha that it still states full code on the nursing home paperwork so she needs to address that with the nursing home.  CODE STATUS has been changed in hospital chart to reflect DNR/DNI.    Disposition  Social Determinants of Health that impact treatment or disposition: Patient from  nursing home  Estimated length of stay is 1-2 days.     I confirmed that the patient's advanced care plan is present, code status is documented, and a surrogate decision maker is listed in the patient's medical record.     The patient's surrogate decision maker is Radha Carl, her daughter.     The patient was seen and examined by me on 7/26/2025 at 17:40.    Electronically signed by CHRISTINE Huertas, 07/26/25, 18:26 CDT.

## 2025-07-27 PROBLEM — I48.91 ATRIAL FIBRILLATION WITH RAPID VENTRICULAR RESPONSE: Status: ACTIVE | Noted: 2025-07-27

## 2025-07-27 PROBLEM — N17.9 AKI (ACUTE KIDNEY INJURY): Status: ACTIVE | Noted: 2025-07-27

## 2025-07-27 LAB
ALBUMIN SERPL-MCNC: 3.4 G/DL (ref 3.5–5.2)
ALBUMIN SERPL-MCNC: 3.6 G/DL (ref 3.5–5.2)
ALBUMIN/GLOB SERPL: 1.6 G/DL
ALBUMIN/GLOB SERPL: 1.9 G/DL
ALP SERPL-CCNC: 102 U/L (ref 39–117)
ALP SERPL-CCNC: 109 U/L (ref 39–117)
ALT SERPL W P-5'-P-CCNC: 42 U/L (ref 1–33)
ALT SERPL W P-5'-P-CCNC: 44 U/L (ref 1–33)
ANION GAP SERPL CALCULATED.3IONS-SCNC: 14 MMOL/L (ref 5–15)
ANION GAP SERPL CALCULATED.3IONS-SCNC: 15 MMOL/L (ref 5–15)
AST SERPL-CCNC: 39 U/L (ref 1–32)
AST SERPL-CCNC: 43 U/L (ref 1–32)
BASOPHILS # BLD AUTO: 0.01 10*3/MM3 (ref 0–0.2)
BASOPHILS NFR BLD AUTO: 0.1 % (ref 0–1.5)
BILIRUB SERPL-MCNC: 0.3 MG/DL (ref 0–1.2)
BILIRUB SERPL-MCNC: 0.4 MG/DL (ref 0–1.2)
BUN SERPL-MCNC: 30.4 MG/DL (ref 8–23)
BUN SERPL-MCNC: 31.3 MG/DL (ref 8–23)
BUN/CREAT SERPL: 19 (ref 7–25)
BUN/CREAT SERPL: 19.4 (ref 7–25)
CALCIUM SPEC-SCNC: 8.5 MG/DL (ref 8.6–10.5)
CALCIUM SPEC-SCNC: 8.8 MG/DL (ref 8.6–10.5)
CHLORIDE SERPL-SCNC: 95 MMOL/L (ref 98–107)
CHLORIDE SERPL-SCNC: 98 MMOL/L (ref 98–107)
CO2 SERPL-SCNC: 15 MMOL/L (ref 22–29)
CO2 SERPL-SCNC: 16 MMOL/L (ref 22–29)
CREAT SERPL-MCNC: 1.6 MG/DL (ref 0.57–1)
CREAT SERPL-MCNC: 1.61 MG/DL (ref 0.57–1)
CREAT UR-MCNC: 154.9 MG/DL
DEPRECATED RDW RBC AUTO: 55.1 FL (ref 37–54)
EGFRCR SERPLBLD CKD-EPI 2021: 31.8 ML/MIN/1.73
EGFRCR SERPLBLD CKD-EPI 2021: 32.1 ML/MIN/1.73
EOSINOPHIL # BLD AUTO: 0 10*3/MM3 (ref 0–0.4)
EOSINOPHIL NFR BLD AUTO: 0 % (ref 0.3–6.2)
ERYTHROCYTE [DISTWIDTH] IN BLOOD BY AUTOMATED COUNT: 16.3 % (ref 12.3–15.4)
GLOBULIN UR ELPH-MCNC: 1.9 GM/DL
GLOBULIN UR ELPH-MCNC: 2.1 GM/DL
GLUCOSE SERPL-MCNC: 128 MG/DL (ref 65–99)
GLUCOSE SERPL-MCNC: 155 MG/DL (ref 65–99)
HCT VFR BLD AUTO: 31.9 % (ref 34–46.6)
HGB BLD-MCNC: 10 G/DL (ref 12–15.9)
IMM GRANULOCYTES # BLD AUTO: 0.04 10*3/MM3 (ref 0–0.05)
IMM GRANULOCYTES NFR BLD AUTO: 0.6 % (ref 0–0.5)
LYMPHOCYTES # BLD AUTO: 0.87 10*3/MM3 (ref 0.7–3.1)
LYMPHOCYTES NFR BLD AUTO: 12.3 % (ref 19.6–45.3)
MAGNESIUM SERPL-MCNC: 2.8 MG/DL (ref 1.6–2.4)
MCH RBC QN AUTO: 29 PG (ref 26.6–33)
MCHC RBC AUTO-ENTMCNC: 31.3 G/DL (ref 31.5–35.7)
MCV RBC AUTO: 92.5 FL (ref 79–97)
MONOCYTES # BLD AUTO: 0.58 10*3/MM3 (ref 0.1–0.9)
MONOCYTES NFR BLD AUTO: 8.2 % (ref 5–12)
NEUTROPHILS NFR BLD AUTO: 5.57 10*3/MM3 (ref 1.7–7)
NEUTROPHILS NFR BLD AUTO: 78.8 % (ref 42.7–76)
NRBC BLD AUTO-RTO: 0 /100 WBC (ref 0–0.2)
OSMOLALITY SERPL: 276 MOSM/KG (ref 280–301)
OSMOLALITY UR: 608 MOSM/KG (ref 50–1400)
PLATELET # BLD AUTO: 278 10*3/MM3 (ref 140–450)
PMV BLD AUTO: 10 FL (ref 6–12)
POTASSIUM SERPL-SCNC: 4.4 MMOL/L (ref 3.5–5.2)
POTASSIUM SERPL-SCNC: 4.6 MMOL/L (ref 3.5–5.2)
PROT SERPL-MCNC: 5.5 G/DL (ref 6–8.5)
PROT SERPL-MCNC: 5.5 G/DL (ref 6–8.5)
QT INTERVAL: 338 MS
QT INTERVAL: 340 MS
QT INTERVAL: 420 MS
QTC INTERVAL: 490 MS
QTC INTERVAL: 500 MS
QTC INTERVAL: 511 MS
RBC # BLD AUTO: 3.45 10*6/MM3 (ref 3.77–5.28)
SODIUM SERPL-SCNC: 125 MMOL/L (ref 136–145)
SODIUM SERPL-SCNC: 127 MMOL/L (ref 136–145)
SODIUM SERPL-SCNC: 128 MMOL/L (ref 136–145)
SODIUM UR-SCNC: <20 MMOL/L
WBC NRBC COR # BLD AUTO: 7.07 10*3/MM3 (ref 3.4–10.8)

## 2025-07-27 PROCEDURE — 83930 ASSAY OF BLOOD OSMOLALITY: CPT | Performed by: NURSE PRACTITIONER

## 2025-07-27 PROCEDURE — 99222 1ST HOSP IP/OBS MODERATE 55: CPT | Performed by: STUDENT IN AN ORGANIZED HEALTH CARE EDUCATION/TRAINING PROGRAM

## 2025-07-27 PROCEDURE — 25810000003 SODIUM CHLORIDE 0.9 % SOLUTION: Performed by: NURSE PRACTITIONER

## 2025-07-27 PROCEDURE — 85025 COMPLETE CBC W/AUTO DIFF WBC: CPT | Performed by: NURSE PRACTITIONER

## 2025-07-27 PROCEDURE — 84300 ASSAY OF URINE SODIUM: CPT | Performed by: NURSE PRACTITIONER

## 2025-07-27 PROCEDURE — 82570 ASSAY OF URINE CREATININE: CPT | Performed by: NURSE PRACTITIONER

## 2025-07-27 PROCEDURE — 83935 ASSAY OF URINE OSMOLALITY: CPT | Performed by: NURSE PRACTITIONER

## 2025-07-27 PROCEDURE — 80053 COMPREHEN METABOLIC PANEL: CPT | Performed by: NURSE PRACTITIONER

## 2025-07-27 PROCEDURE — 84295 ASSAY OF SERUM SODIUM: CPT | Performed by: NURSE PRACTITIONER

## 2025-07-27 PROCEDURE — 97535 SELF CARE MNGMENT TRAINING: CPT | Performed by: OCCUPATIONAL THERAPIST

## 2025-07-27 PROCEDURE — 25010000002 AMIODARONE IN DEXTROSE 5% 360-4.14 MG/200ML-% SOLUTION: Performed by: NURSE PRACTITIONER

## 2025-07-27 PROCEDURE — 83735 ASSAY OF MAGNESIUM: CPT | Performed by: NURSE PRACTITIONER

## 2025-07-27 PROCEDURE — 92610 EVALUATE SWALLOWING FUNCTION: CPT | Performed by: SPEECH-LANGUAGE PATHOLOGIST

## 2025-07-27 PROCEDURE — 97162 PT EVAL MOD COMPLEX 30 MIN: CPT

## 2025-07-27 PROCEDURE — 97165 OT EVAL LOW COMPLEX 30 MIN: CPT | Performed by: OCCUPATIONAL THERAPIST

## 2025-07-27 RX ORDER — AMIODARONE HYDROCHLORIDE 200 MG/1
400 TABLET ORAL EVERY 12 HOURS SCHEDULED
Status: DISCONTINUED | OUTPATIENT
Start: 2025-07-27 | End: 2025-07-30

## 2025-07-27 RX ORDER — ROSUVASTATIN CALCIUM 10 MG/1
10 TABLET, COATED ORAL NIGHTLY
Status: DISCONTINUED | OUTPATIENT
Start: 2025-07-27 | End: 2025-08-01 | Stop reason: HOSPADM

## 2025-07-27 RX ORDER — SODIUM CHLORIDE 9 MG/ML
100 INJECTION, SOLUTION INTRAVENOUS CONTINUOUS
Status: DISCONTINUED | OUTPATIENT
Start: 2025-07-27 | End: 2025-07-28

## 2025-07-27 RX ORDER — AMIODARONE HYDROCHLORIDE 200 MG/1
TABLET ORAL
Qty: 120 TABLET | Refills: 3 | Status: SHIPPED | OUTPATIENT
Start: 2025-07-27 | End: 2025-08-01 | Stop reason: HOSPADM

## 2025-07-27 RX ADMIN — AMIODARONE HYDROCHLORIDE 0.5 MG/MIN: 1.8 INJECTION, SOLUTION INTRAVENOUS at 09:12

## 2025-07-27 RX ADMIN — SODIUM CHLORIDE 100 ML/HR: 9 INJECTION, SOLUTION INTRAVENOUS at 22:19

## 2025-07-27 RX ADMIN — ROSUVASTATIN 10 MG: 10 TABLET, FILM COATED ORAL at 21:21

## 2025-07-27 RX ADMIN — TRAZODONE HYDROCHLORIDE 25 MG: 50 TABLET ORAL at 21:20

## 2025-07-27 RX ADMIN — APIXABAN 2.5 MG: 2.5 TABLET, FILM COATED ORAL at 21:21

## 2025-07-27 RX ADMIN — AMIODARONE HYDROCHLORIDE 400 MG: 200 TABLET ORAL at 21:23

## 2025-07-27 RX ADMIN — CLOPIDOGREL BISULFATE 75 MG: 75 TABLET, FILM COATED ORAL at 08:57

## 2025-07-27 RX ADMIN — Medication 10 ML: at 08:58

## 2025-07-27 RX ADMIN — SPIRONOLACTONE 25 MG: 25 TABLET ORAL at 08:57

## 2025-07-27 RX ADMIN — APIXABAN 2.5 MG: 2.5 TABLET, FILM COATED ORAL at 08:57

## 2025-07-27 RX ADMIN — AMIODARONE HYDROCHLORIDE 400 MG: 200 TABLET ORAL at 12:34

## 2025-07-27 RX ADMIN — METOPROLOL SUCCINATE 12.5 MG: 25 TABLET, EXTENDED RELEASE ORAL at 08:57

## 2025-07-27 RX ADMIN — SODIUM CHLORIDE 100 ML/HR: 9 INJECTION, SOLUTION INTRAVENOUS at 12:34

## 2025-07-27 NOTE — PLAN OF CARE
Goal Outcome Evaluation:  Plan of Care Reviewed With: patient, daughter        Progress: no change  Outcome Evaluation: OT evaluation completed. Pt is alert and oriented to self. Pt's daughter is present and involved. Pt is emotionally labile throughout OT evaluation. Pt reports pain in BUE and back, cuenca baker 2. Pt was min A for supine to sit with HHA x1, but supervision for sit to supine. pt completes bed mobility 3x. Pt at times is agreeable to further functional mobility but then becomes frustrated and returns to supine each time. Encouragement provided to pt for increased functional mobility. Pt reports not remembering falling but does admit to being fearful of falling. Pt adamantly reports she would like to return home. Pt was dependent to tie gown. Pt was supervision with set upand verbal cues for self feeding task. Pt would take a few bites, then pt would report burning in throat while eating and drinking lunch tray. Pt with belching after minimal PO intake. Pt reports it feels like something is stuck in her throat. RN notified. Pt with BUE tremors noted intermittently, but appear to be when pt is expressing increased frustration, RN notified. Pt provided with encouragement throughout OT evaluation. Skilled OT recommended to address adls, functional mobility and safety awareness. Recommended d/c SNF.    Anticipated Discharge Disposition (OT): skilled nursing facility

## 2025-07-27 NOTE — PLAN OF CARE
"Goal Outcome Evaluation:  Plan of Care Reviewed With: patient, caregiver, child (MEDARDO Gtz)        Progress: no change (Initial Evaluation)       Anticipated Discharge Disposition (SLP): No further SLP services warranted          SLP Swallowing Diagnosis: swallow WFL/no suspected pharyngeal impairment (07/27/25 1249)             SPEECH-LANGUAGE PATHOLOGY EVALUATION - SWALLOW  Subjective: The patient was seen on this date for a Clinical Swallow evaluation.  Patient was alert and cooperative.  Significant history: Presented due to rapid HR. Fall 3 days ago with hematoma and skin tear. From [facility]. CXR stable without acute process. History of dementia.   Family requested evaluation from speech due to patient complaint of burning when eating, sensation of something stuck when eating, and increased belching when eating. She has had chronic issues with her esophagus as well as reduced appetite for some time now. She is now living in SNF and verbalized discontent due to this. She appears depressed due to loss of independence and medical status. I believe that this is contributing to her poor oral intake on top of likely chronic esophageal dysphagia that I am sure is uncomfortable when attempting PO. I encouraged that patient that she had a purpose and life left to live, she became tearful with this conversation.  I have seen the patient in the past with similar complaints of not being able to eat acidic foods and \"knowing I will have trouble with something before even putting it in my mouth\" back in 2021. Daughter reports speech at [facility] was looking into GI consult to assess esophageal function but not yet completed. She used to eat scrambled eggs, grilled cheese, and mac and cheese soft foods items but recently has stopped and is on puree diet at [facility]. She does not like the puree diet but also will not eat solid foods. She has tried protein supplements including fairlife options and does not like them and will not drink " them. Of late she has been eating fortified mashed potatoes at the SNF and this AM only willing to do about 8 bites prior to stopping.   Objective: Oral motor examination results: WFL.  Textures given during assessment of swallow function included thin liquid and regular consistency.  Assessment: Difficulties were noted with regular consistency.  Observations: No overt s/s of aspiration observed. Belching noted throughout. Hesitant to complete solid trial but did with encouragement. Functional rotary chew with need for liquid wash following. No complaint of material stuck but only minimal solid taken due to refusal for more.   SLP Findings:  Patient presents with suspected chronic esophageal dysphagia.   Recommendations: Diet Textures: pureed and thin liquids  Medications should be taken as tolerated.   Recommended Strategies: upright for PO and small bites and sips. Reflux precautions and esophageal compensations.  Oral care 2x a day.  Other Recommended Evaluations: Consider dedicated esophageal assessment and medical management of suspected esophageal dysphagia. Unsure if MD will want to pursue actuely given that this is a chronic condition but patient may benefit from this if she is willing and accepting to plan.     She would also benefit from RD visit to discuss nutritional intake given limited options for PO.   Dysphagia therapy is recommended for education on esophageal compensations PRN and then can likely sign off as this is chronic and at typical baseline.     Jenyn Perdomo, MS CCC-SLP 7/27/2025 13:49 CDT

## 2025-07-27 NOTE — PROGRESS NOTES
[facility] Clinical Pharmacy Services: Renal Dose Adjustment    Relevant clinical data and objective history reviewed:  Estimated Creatinine Clearance: 21.7 mL/min (A) (by C-G formula based on SCr of 1.6 mg/dL (H)).     Results from last 7 days   Lab Units 07/26/25  2316 07/26/25  1413 07/24/25  0601   CREATININE mg/dL 1.60* 1.49* 1.54*   BUN / CREAT RATIO  19.0 18.1 14.9   EGFR mL/min/1.73 32.1* 34.9* 33.6*   BUN mg/dL 30.4* 27.0* 22.9       Assessment/Plan:  Rosuvastatin 40 mg PO has been appropriately renally dose adjusted to 10mg nightly based on our [facility] P&T approved Adult Renal Dosing of Medication policy  Pharmacy will continue to monitor renal function and clinical status and adjust the dose and/or frequency as needed.    Narendra Cervantes, PharmD  7/27/2025  09:21 CDT

## 2025-07-27 NOTE — CASE MANAGEMENT/SOCIAL WORK
Discharge Planning Assessment   [facility]     Patient Name: Sondra Connolly  MRN: [id number]  Today's Date: 7/27/2025    Admit Date: 7/26/2025        Discharge Needs Assessment       Row Name 07/27/25 1050       Living Environment    People in Home facility resident    Name(s) of People in Home [place]    Current Living Arrangements extended care facility    Potentially Unsafe Housing Conditions none    Provides Primary Care For no one, unable/limited ability to care for self    Quality of Family Relationships supportive       Resource/Environmental Concerns    Resource/Environmental Concerns none    Transportation Concerns none       Transportation Needs    In the past 12 months, has lack of transportation kept you from medical appointments or from getting medications? no    In the past 12 months, has lack of transportation kept you from meetings, work, or from getting things needed for daily living? No       Food Insecurity    Within the past 12 months, you worried that your food would run out before you got the money to buy more. Never true    Within the past 12 months, the food you bought just didn't last and you didn't have money to get more. Never true       Transition Planning    Patient/Family Anticipates Transition to long-term care facility    Patient/Family Anticipated Services at Transition skilled nursing    Transportation Anticipated family or friend will provide       Discharge Needs Assessment    Readmission Within the Last 30 Days no previous admission in last 30 days    Current Outpatient/Agency/Support Group skilled nursing facility    Concerns to be Addressed care coordination/care conferences;discharge planning    Do you want help finding or keeping work or a job? I do not need or want help    Do you want help with school or training? For example, starting or completing job training or getting a high school diploma, GED or equivalent No    Outpatient/Agency/Support Group Needs skilled nursing  facility    Discharge Facility/Level of Care Needs nursing facility, skilled    Current Discharge Risk chronically ill    Discharge Coordination/Progress Patient was admitted from [facility] [phone].  Will need to contact SNF during regular business hours to determine bed hold status.                   Discharge Plan    No documentation.                 Continued Care and Services - Admitted Since 7/26/2025    No active coordination exists.       Expected Discharge Date and Time       Expected Discharge Date Expected Discharge Time    Jul 28, 2025            Demographic Summary    No documentation.                  Functional Status    No documentation.                  Psychosocial    No documentation.                  Abuse/Neglect    No documentation.                  Legal    No documentation.                  Substance Abuse    No documentation.                  Patient Forms    No documentation.                     YIMI BettencourtW

## 2025-07-27 NOTE — THERAPY EVALUATION
"Acute Care - Speech Language Pathology   Swallow Initial Evaluation [facility]     Patient Name: Sondra Connolly  : 1945  MRN: [id number]  Today's Date: 2025               Admit Date: 2025  SPEECH-LANGUAGE PATHOLOGY EVALUATION - SWALLOW  Subjective: The patient was seen on this date for a Clinical Swallow evaluation.  Patient was alert and cooperative.  Significant history: Presented due to rapid HR. Fall 3 days ago with hematoma and skin tear. From [facility]. CXR stable without acute process. History of dementia.   Family requested evaluation from speech due to patient complaint of burning when eating, sensation of something stuck when eating, and increased belching when eating. She has had chronic issues with her esophagus as well as reduced appetite for some time now. She is now living in SNF and verbalized discontent due to this. She appears depressed due to loss of independence and medical status. I believe that this is contributing to her poor oral intake on top of likely chronic esophageal dysphagia that I am sure is uncomfortable when attempting PO. I encouraged that patient that she had a purpose and life left to live, she became tearful with this conversation.  I have seen the patient in the past with similar complaints of not being able to eat acidic foods and \"knowing I will have trouble with something before even putting it in my mouth\" back in . Daughter reports speech at [facility] was looking into GI consult to assess esophageal function but not yet completed. She used to eat scrambled eggs, grilled cheese, and mac and cheese soft foods items but recently has stopped and is on puree diet at [facility]. She does not like the puree diet but also will not eat solid foods. She has tried protein supplements including fairlife options and does not like them and will not drink them. Of late she has been eating fortified mashed potatoes at the [facility] and this AM only willing to do about 8 bites prior " to stopping.   Objective: Oral motor examination results: WFL.  Textures given during assessment of swallow function included thin liquid and regular consistency.  Assessment: Difficulties were noted with regular consistency.  Observations: No overt s/s of aspiration observed. Belching noted throughout. Hesitant to complete solid trial but did with encouragement. Functional rotary chew with need for liquid wash following. No complaint of material stuck but only minimal solid taken due to refusal for more.   SLP Findings:  Patient presents with suspected chronic esophageal dysphagia.   Recommendations: Diet Textures: pureed and thin liquids  Medications should be taken as tolerated.   Recommended Strategies: upright for PO and small bites and sips. Reflux precautions and esophageal compensations.  Oral care 2x a day.  Other Recommended Evaluations: Consider dedicated esophageal assessment and medical management of suspected esophageal dysphagia. Unsure if MD will want to pursue actuely given that this is a chronic condition but patient may benefit from this if she is willing and accepting to plan.     She would also benefit from RD visit to discuss nutritional intake given limited options for PO.   Dysphagia therapy is recommended for education on esophageal compensations PRN and then can likely sign off as this is chronic and at typical baseline.   Jenny Perdomo, MS CCC-SLP 7/29/2025 07:09 CDT    Visit Dx:     ICD-10-CM ICD-9-CM   1. Paroxysmal A-fib  I48.0 427.31   2. Atrial fibrillation with rapid ventricular response  I48.91 427.31   3. Hyponatremia  E87.1 276.1   4. Acute renal failure superimposed on chronic kidney disease, unspecified acute renal failure type, unspecified CKD stage  N17.9 584.9    N18.9 585.9   5. Atypical atrial flutter  I48.4 427.32   6. Esophageal dysphagia  R13.19 787.29   7. Impaired mobility [Z74.09]  Z74.09 799.89     Patient Active Problem List   Diagnosis    Mixed hyperlipidemia     Coronary artery disease involving coronary bypass graft of native heart without angina pectoris    Paroxysmal atrial fibrillation    Essential hypertension    Anxiety about health    Coumadin toxicity    Chronic combined systolic and diastolic congestive heart failure    S/P CABG x 4 2010 Dr YADIEL Lau    Status post insertion of drug-eluting stent into right coronary artery for coronary artery disease    History of cardioversion    Current use of long term anticoagulation    Mitral valve regurgitation    Diabetes mellitus    Bradycardia, sinus    HUMPHREY (dyspnea on exertion)    Hyponatremia    S/P TAVR (transcatheter aortic valve replacement)    Atrial flutter with rapid ventricular response    Muscle weakness (generalized)    Vascular dementia with behavior disturbance    Atrial fibrillation with rapid ventricular response    CRISTIANO (acute kidney injury)    Moderate protein-calorie malnutrition     Past Medical History:   Diagnosis Date    A-fib     Abnormal nuclear stress test 08/23/2019    Abnormal stress test 12/28/2022    Added automatically from request for surgery [id number]      Angina pectoris     Anxiety     Aortic valve stenosis 03/24/2017    Arthritis     Atherosclerosis of coronary artery bypass graft     Carotid artery occlusion without infarction     Chronic heart failure with preserved ejection fraction (HFpEF) 03/21/2024    Chronic kidney disease     Chronic lung disease     Colon polyp     Diabetes mellitus     BLOOD SUGARS ARE UNDER CONTROL OFF OF ALL MEDS FOR A LONG TIME PER PT AND SON    DJD (degenerative joint disease)     Essential hypertension 03/24/2017    Fibrocystic breast disease     Gastritis     GI bleed     Hemorrhoids     Hyperlipidemia     Hypertension     Lung disease     chronic    MI (myocardial infarction)     Palpitations     PUD (peptic ulcer disease)     PVD (peripheral vascular disease)     Renal failure, acute     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     Past Surgical History:   Procedure  Laterality Date    ABLATION OF DYSRHYTHMIC FOCUS      May 2021 in [place]      AORTIC VALVE REPAIR/REPLACEMENT N/A 3/19/2024    Procedure: Transfemoral Transcatheter Aortic Valve Replacement;  Surgeon: Bhavik Cuellar MD;  Location:  [facility];  Service: Cardiovascular;  Laterality: N/A;    AORTIC VALVE REPAIR/REPLACEMENT Bilateral 3/19/2024    Procedure: TRANSFEMORAL TRANSCATHETER AORTIC VALVE REPLACEMENT;  Surgeon: Reed Abdi MD;  Location:  [facility];  Service: Cardiothoracic;  Laterality: Bilateral;    APPENDECTOMY      CARDIAC CATHETERIZATION  05/01/2009    Left-Heart Skin    CARDIAC CATHETERIZATION N/A 2/4/2021    Procedure: Left Heart Cath;  Surgeon: Tristan Cabello DO;  Location:  [facility];  Service: Cardiology;  Laterality: N/A;    CARDIAC CATHETERIZATION Right 2/5/2021    Procedure: Left Heart Cath, rotablator to RCA with Dr. Abrams at 1PM;  Surgeon: Tristan Cabello DO;  Location:  [facility];  Service: Cardiology;  Laterality: Right;    CARDIAC CATHETERIZATION N/A 12/22/2023    Procedure: Left Heart Cath;  Surgeon: Gualberto Gallo MD;  Location:  [facility];  Service: Cardiology;  Laterality: N/A;    CAROTID ENDARTERECTOMY      CATARACT EXTRACTION      CATARACT EXTRACTION      COLONOSCOPY      CORONARY ARTERY BYPASS GRAFT  07/2007    Four    ENDOSCOPY  10/02/2013    ENDOSCOPY  10/02/2013    HYSTERECTOMY      SKIN CANCER EXCISION      THROMBOENDARTERECTOMY      TONSILLECTOMY         SLP Recommendation and Plan                                                                               Progress: no change (Initial Evaluation)      SWALLOW EVALUATION (Last 72 Hours)       SLP Adult Swallow Evaluation       Row Name 07/28/25 0815 07/27/25 1249                Rehab Evaluation    Document Type therapy note (daily note)  -MD evaluation  -MG       Subjective Information complains of;fatigue  -MD no complaints   -MG       Patient Observations alert;cooperative;agree to therapy  -MD alert;cooperative;agree to therapy  -MG       Patient/Family/Caregiver Comments/Observations No family present.  -MD Daughter present.  -MG       Evaluation Not Performed, Comment -- ad  -MG       Patient Effort adequate  -MD good  -MG       Symptoms Noted During/After Treatment none  -MD none  -MG          General Information    Patient Profile Reviewed -- yes  -MG       Pertinent History Of Current Problem -- Presented due to rapid HR. Fall 3 days ago with hematoma and skin tear. From [facility]. CXR stable without acute process. History of dementia.  -MG       Current Method of Nutrition -- pureed;thin liquids  -MG       Precautions/Limitations, Vision -- WFL;for purposes of eval  -MG       Precautions/Limitations, Hearing -- WFL;for purposes of eval  -MG       Prior Level of Function-Communication -- cognitive-linguistic impairment  dementia  -MG       Prior Level of Function-Swallowing -- esophageal concerns  -MG       Plans/Goals Discussed with -- patient and family;agreed upon  -MG       Barriers to Rehab -- previous functional deficit;cognitive status;ineffective coping  -MG       Patient's Goals for Discharge -- --  to feel better  -MG       Family Goals for Discharge -- --  to increase intake for increased strength  -MG          Pain    Additional Documentation -- Pain Scale: FACES Pre/Post-Treatment (Group)  -MG          Pain Scale: FACES Pre/Post-Treatment    Pain: FACES Scale, Pretreatment 0-->no hurt  -MD 0-->no hurt  -MG       Posttreatment Pain Rating 0-->no hurt  -MD 0-->no hurt  -MG          Oral Motor Structure and Function    Dentition Assessment -- natural, present and adequate  -MG       Secretion Management -- WNL/WFL  -MG       Mucosal Quality -- moist, healthy  -MG       Volitional Swallow -- WFL  -MG       Volitional Cough -- WFL  -MG          Oral Musculature and Cranial Nerve Assessment    Oral Motor General  Assessment -- WFL  -MG          General Eating/Swallowing Observations    Respiratory Support Currently in Use -- --  -MG          Clinical Swallow Eval    Oral Prep Phase -- WFL  -MG       Oral Transit -- WFL  -MG       Oral Residue -- WFL  -MG       Pharyngeal Phase -- no overt signs/symptoms of pharyngeal impairment  -MG       Esophageal Phase -- suspected esophageal impairment  -MG       Clinical Swallow Evaluation Summary -- See note  -MG          Esophageal Phase Concerns    Esophageal Phase Concerns -- sensation of material sticking  -MG          SLP Evaluation Clinical Impression    SLP Swallowing Diagnosis -- functional oral phase;suspect chronic;esophageal dysphagia  -MG       Functional Impact -- risk of malnutrition;risk of dehydration;risk of aspiration/pneumonia  -MG       Rehab Potential/Prognosis, Swallowing -- adequate, monitor progress closely  -MG       Swallow Criteria for Skilled Therapeutic Interventions Met -- demonstrates skilled criteria  -MG          SLP Treatment Clinical Impressions    Treatment Assessment (SLP) continued  -MD --       Treatment Assessment Comments (SLP) see note  -MD --       Daily Summary of Progress (SLP) progress towards functional goals is fair  -MD --       Barriers to Overall Progress (SLP) Baseline deficits  -MD --       Plan for Continued Treatment (SLP) continue treatment per plan of care  -MD --       Care Plan Review risks/benefits reviewed;current/potential barriers reviewed;patient/other agree to care plan  -MD --          Recommendations    Therapy Frequency (Swallow) -- PRN  -MG       Predicted Duration Therapy Intervention (Days) -- until discharge  -MG       SLP Diet Recommendation -- puree;thin liquids  -MG       Recommended Precautions and Strategies -- upright posture during/after eating;small bites of food and sips of liquid;alternate between small bites of food and sips of liquid;general aspiration precautions;reflux precautions  -MG       Oral Care  Recommendations -- Oral Care BID/PRN;Toothbrush  -MG       SLP Rec. for Method of Medication Administration -- as tolerated  -MG       Monitor for Signs of Aspiration -- yes;notify SLP if any concerns  -MG       Anticipated Discharge Disposition (SLP) -- unknown  -MG          Swallow Goals (SLP)    Swallow LTGs -- Swallow Long Term Goal (free text)  -MG       Swallow STGs -- diet tolerance goal selection (SLP);swallow compensatory strategies goal selection (SLP)  -MG       Diet Tolerance Goal Selection (SLP) -- Patient will tolerate trials of  -MG       Swallow Compensatory Strategies Goal Selection (SLP) -- swallow compensatory strategies, SLP goal 1  -MG          (LTG) Swallow    (LTG) Swallow -- Patient will tolerate least restrictive diet without overt s/s of aspiration.  -MG       [place] (Swallow Long Term Goal) -- independently (over 90% accuracy)  -MG       Time Frame (Swallow Long Term Goal) -- by discharge  -MG       Barriers (Swallow Long Term Goal) -- none  -MG       Progress/Outcomes (Swallow Long Term Goal) -- new goal  -MG          (STG) Patient will tolerate trials of    Consistencies Trialed (Tolerate trials) -- pureed textures;thin liquids  -MG       Desired Outcome (Tolerate trials) -- without signs/symptoms of aspiration;without signs of distress;with adequate oral prep/transit/clearance  -MG       [place] (Tolerate trials) -- independently (over 90% accuracy)  -MG       Time Frame (Tolerate trials) -- by discharge  -MG       Progress/Outcomes (Tolerate trials) -- new goal  -MG          (STG) Swallow Compensatory Strategies Goal 1 (SLP)    Activity (Swallow Compensatory Strategies/Techniques Goal 1, SLP) -- reflux precautions;compensatory strategies;during meal intake  esophageal compensations  -MG       [place]/Accuracy (Swallow Compensatory Strategies/Techniques Goal 1, SLP) -- independently (over 90% accuracy)  -MG       Time Frame (Swallow Compensatory Strategies/Techniques  Goal 1, SLP) -- by discharge  -MG       Barriers (Swallow Compensatory Strategies/Techniques Goal 1, SLP) -- none  -MG       Progress/Outcomes (Swallow Compensatory Strategies/Techniques Goal 1, SLP) -- new goal  -MG                 User Key  (r) = Recorded By, (t) = Taken By, (c) = Cosigned By      Initials Name Effective Dates    MG Conor Jenny STEPH, MS [facility]-SLP 07/11/23 -     Maricarmen Perry, SLP 06/21/22 -                     EDUCATION  The patient has been educated in the following areas:   Dysphagia (Swallowing Impairment) Oral Care/Hydration Modified Diet Instruction.        SLP GOALS       Row Name 07/27/25 1249             (LTG) Swallow    (LTG) Swallow Patient will tolerate least restrictive diet without overt s/s of aspiration.  -MG      [place] (Swallow Long Term Goal) independently (over 90% accuracy)  -MG      Time Frame (Swallow Long Term Goal) by discharge  -MG      Barriers (Swallow Long Term Goal) none  -MG      Progress/Outcomes (Swallow Long Term Goal) new goal  -MG         (STG) Patient will tolerate trials of    Consistencies Trialed (Tolerate trials) pureed textures;thin liquids  -MG      Desired Outcome (Tolerate trials) without signs/symptoms of aspiration;without signs of distress;with adequate oral prep/transit/clearance  -MG      [place] (Tolerate trials) independently (over 90% accuracy)  -MG      Time Frame (Tolerate trials) by discharge  -MG      Progress/Outcomes (Tolerate trials) new goal  -MG         (STG) Swallow Compensatory Strategies Goal 1 (SLP)    Activity (Swallow Compensatory Strategies/Techniques Goal 1, SLP) reflux precautions;compensatory strategies;during meal intake  esophageal compensations  -MG      [place]/Accuracy (Swallow Compensatory Strategies/Techniques Goal 1, SLP) independently (over 90% accuracy)  -MG      Time Frame (Swallow Compensatory Strategies/Techniques Goal 1, SLP) by discharge  -MG      Barriers (Swallow Compensatory  Strategies/Techniques Goal 1, SLP) none  -MG      Progress/Outcomes (Swallow Compensatory Strategies/Techniques Goal 1, SLP) new goal  -MG                User Key  (r) = Recorded By, (t) = Taken By, (c) = Cosigned By      Initials Name Provider Type    Jenny Anderson, MS CCC-SLP Speech and Language Pathologist                         Time Calculation:                    Jenny Perdomo MS CCC-SLP  7/29/2025

## 2025-07-27 NOTE — PLAN OF CARE
Problem: Adult Inpatient Plan of Care  Goal: Plan of Care Review  Recent Flowsheet Documentation  Taken 7/27/2025 1415 by Francesco Rothman, PT  Outcome Evaluation: PT IE complete.  Oriented to person only.  Dtr assisted with PLOF information.  Pt is fearful of falling and easily talks herself into staying in bed.  With encouragement and assurance of not letting her fall, she agreed to ambulate.  Patients confidence increased with a RW as well.  Today she was min A for bed mobility.  CGA sit<>stand.  Ambulated 20ft CGA x1 w/ RW.  PT to see for gait safety and  strengthening.  Recommend return to SNF at [place].  Thank you for referral.  Plan of Care Reviewed With:   patient   child   Goal Outcome Evaluation:  Plan of Care Reviewed With: patient, child           Outcome Evaluation: PT IE complete.  Oriented to person only.  Dtr assisted with PLOF information.  Pt is fearful of falling and easily talks herself into staying in bed.  With encouragement and assurance of not letting her fall, she agreed to ambulate.  Patients confidence increased with a RW as well.  Today she was min A for bed mobility.  CGA sit<>stand.  Ambulated 20ft CGA x1 w/ RW.  PT to see for gait safety and  strengthening.  Recommend return to SNF at [place].  Thank you for referral.    Anticipated Discharge Disposition (PT): skilled nursing facility

## 2025-07-27 NOTE — PLAN OF CARE
Goal Outcome Evaluation:  Plan of Care Reviewed With: patient        Progress: no change  Outcome Evaluation: pt transfer from ED. bedside swallow study complete/pt passed. pt settled in room. running ST per tele. call light in reach/education provided. bed alarm on. safety maintained.

## 2025-07-27 NOTE — THERAPY EVALUATION
Patient Name: Sondra Connolly  : 1945    MRN: [id number]                              Today's Date: 2025       Admit Date: 2025    Visit Dx:     ICD-10-CM ICD-9-CM   1. Paroxysmal A-fib  I48.0 427.31   2. Atrial fibrillation with rapid ventricular response  I48.91 427.31   3. Hyponatremia  E87.1 276.1   4. Acute renal failure superimposed on chronic kidney disease, unspecified acute renal failure type, unspecified CKD stage  N17.9 584.9    N18.9 585.9   5. Atypical atrial flutter  I48.4 427.32   6. Esophageal dysphagia  R13.19 787.29   7. Impaired mobility [Z74.09]  Z74.09 799.89     Patient Active Problem List   Diagnosis    Mixed hyperlipidemia    Coronary artery disease involving coronary bypass graft of native heart without angina pectoris    Paroxysmal atrial fibrillation    Essential hypertension    Anxiety about health    Coumadin toxicity    Chronic combined systolic and diastolic congestive heart failure    S/P CABG x 4  Dr YADIEL Lau    Status post insertion of drug-eluting stent into right coronary artery for coronary artery disease    History of cardioversion    Current use of long term anticoagulation    Mitral valve regurgitation    Diabetes mellitus    Bradycardia, sinus    HUMPHREY (dyspnea on exertion)    Hyponatremia    S/P TAVR (transcatheter aortic valve replacement)    Atrial flutter with rapid ventricular response    Muscle weakness (generalized)    Vascular dementia with behavior disturbance    Atrial fibrillation with rapid ventricular response     Past Medical History:   Diagnosis Date    A-fib     Abnormal nuclear stress test 2019    Abnormal stress test 2022    Added automatically from request for surgery [id number]      Angina pectoris     Anxiety     Aortic valve stenosis 2017    Arthritis     Atherosclerosis of coronary artery bypass graft     Carotid artery occlusion without infarction     Chronic heart failure with preserved ejection fraction (HFpEF)  03/21/2024    Chronic kidney disease     Chronic lung disease     Colon polyp     Diabetes mellitus     BLOOD SUGARS ARE UNDER CONTROL OFF OF ALL MEDS FOR A LONG TIME PER PT AND SON    DJD (degenerative joint disease)     Essential hypertension 03/24/2017    Fibrocystic breast disease     Gastritis     GI bleed     Hemorrhoids     Hyperlipidemia     Hypertension     Lung disease     chronic    MI (myocardial infarction)     Palpitations     PUD (peptic ulcer disease)     PVD (peripheral vascular disease)     Renal failure, acute     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     Past Surgical History:   Procedure Laterality Date    ABLATION OF DYSRHYTHMIC FOCUS      May 2021 in [place]      AORTIC VALVE REPAIR/REPLACEMENT N/A 3/19/2024    Procedure: Transfemoral Transcatheter Aortic Valve Replacement;  Surgeon: Bhavik Cuellar MD;  Location:  [facility];  Service: Cardiovascular;  Laterality: N/A;    AORTIC VALVE REPAIR/REPLACEMENT Bilateral 3/19/2024    Procedure: TRANSFEMORAL TRANSCATHETER AORTIC VALVE REPLACEMENT;  Surgeon: Reed Abdi MD;  Location:  [facility];  Service: Cardiothoracic;  Laterality: Bilateral;    APPENDECTOMY      CARDIAC CATHETERIZATION  05/01/2009    Left-Heart Skin    CARDIAC CATHETERIZATION N/A 2/4/2021    Procedure: Left Heart Cath;  Surgeon: Tristan Cabello DO;  Location:  [facility];  Service: Cardiology;  Laterality: N/A;    CARDIAC CATHETERIZATION Right 2/5/2021    Procedure: Left Heart Cath, rotablator to RCA with Dr. Abrams at 1PM;  Surgeon: Tristan Cabello DO;  Location:  [facility];  Service: Cardiology;  Laterality: Right;    CARDIAC CATHETERIZATION N/A 12/22/2023    Procedure: Left Heart Cath;  Surgeon: Gualberto Gallo MD;  Location:  [facility];  Service: Cardiology;  Laterality: N/A;    CAROTID ENDARTERECTOMY      CATARACT EXTRACTION      CATARACT EXTRACTION      COLONOSCOPY      CORONARY ARTERY  BYPASS GRAFT  07/2007    Four    ENDOSCOPY  10/02/2013    ENDOSCOPY  10/02/2013    HYSTERECTOMY      SKIN CANCER EXCISION      THROMBOENDARTERECTOMY      TONSILLECTOMY        General Information       [facility] Name 07/27/25 1415          Physical Therapy Time and Intention    Document Type evaluation  Atrial flutter with rapid ventricular response  -     Mode of Treatment physical therapy  -       Row Name 07/27/25 1415          General Information    Patient Profile Reviewed yes  -     Prior Level of Function independent:;all household mobility;ADL's  prior to admission to [facility]  -     Existing Precautions/Restrictions fall  -     Barriers to Rehab cognitive status;previous functional deficit  fearful of falling  -       Row Name 07/27/25 1415          Living Environment    Current Living Arrangements extended care facility  -     People in Home facility resident  -       Row Name 07/27/25 1415          Cognition    Orientation Status (Cognition) oriented to;person  -[facility] Name 07/27/25 1415          Safety Issues/Impairments Affecting Functional Mobility    Safety Issues Affecting Function (Mobility) ability to follow commands;insight into deficits/self-awareness;judgment;problem-solving  -     Impairments Affecting Function (Mobility) balance;cognition;endurance/activity tolerance;strength;pain  -               User Key  (r) = Recorded By, (t) = Taken By, (c) = Cosigned By      Initials Name Provider Type     Francesco Rothman, PT Physical Therapist                   Mobility       Row Name 07/27/25 1415          Bed Mobility    Bed Mobility supine-sit;sit-supine  -     Supine-Sit [place] (Bed Mobility) minimum assist (75% patient effort)  [facility]     Sit-Supine [place] (Bed Mobility) minimum assist (75% patient effort)  -       Row Name 07/27/25 1415          Sit-Stand Transfer    Sit-Stand [place] (Transfers) contact guard  -       Row Name 07/27/25 1415           Gait/Stairs (Locomotion)    [place] Level (Gait) contact guard  -     Assistive Device (Gait) walker, front-wheeled  -     Distance in Feet (Gait) 20  -               User Key  (r) = Recorded By, (t) = Taken By, (c) = Cosigned By      Initials Name Provider Type     Francesco Rothman, PT Physical Therapist                   Obj/Interventions       [facility] Name 07/27/25 1415          Range of Motion Comprehensive    General Range of Motion bilateral upper extremity ROM WFL;bilateral lower extremity ROM WFL  [facility] 07/27/25 1415          Strength Comprehensive (MMT)    General Manual Muscle Testing (MMT) Assessment upper extremity strength deficits identified;lower extremity strength deficits identified  -               User Key  (r) = Recorded By, (t) = Taken By, (c) = Cosigned By      Initials Name Provider Type     Francesco Rothman, PT Physical Therapist                   Goals/Plan       Row Name 07/27/25 1415          Bed Mobility Goal 1 (PT)    Activity/Assistive Device (Bed Mobility Goal 1, PT) bed mobility activities, all  -     [place] Level/Cues Needed (Bed Mobility Goal 1, PT) standby assist  -     Time Frame (Bed Mobility Goal 1, PT) 10 days  -     Progress/Outcomes (Bed Mobility Goal 1, PT) new goal  -[facility] Name 07/27/25 1415          Transfer Goal 1 (PT)    Activity/Assistive Device (Transfer Goal 1, PT) sit-to-stand/stand-to-sit  -     [place] Level/Cues Needed (Transfer Goal 1, PT) standby assist  -     Time Frame (Transfer Goal 1, PT) 10 days  -     Progress/Outcome (Transfer Goal 1, PT) new goal  -[facility] Name 07/27/25 1415          Gait Training Goal 1 (PT)    Activity/Assistive Device (Gait Training Goal 1, PT) gait (walking locomotion);assistive device use;decrease fall risk  -     [place] Level (Gait Training Goal 1, PT) contact guard required  -     Distance (Gait Training Goal 1, PT) 40ft  -     Time Frame (Gait Training Goal 1, PT)  10 days  -     Progress/Outcome (Gait Training Goal 1, PT) new goal  -       Row Name 07/27/25 1415          Therapy Assessment/Plan (PT)    Planned Therapy Interventions (PT) bed mobility training;gait training;home exercise program;strengthening;ROM (range of motion);patient/family education;transfer training  -               User Key  (r) = Recorded By, (t) = Taken By, (c) = Cosigned By      Initials Name Provider Type     Francesco Rothman, PT Physical Therapist                   Clinical Impression       Row Name 07/27/25 1415          Pain    Pain Location abdomen  -     Pain Management Interventions exercise or physical activity utilized  -     Response to Pain Interventions activity participation with tolerable pain  -[facility] Name 07/27/25 1415          Pain Scale: FACES Pre/Post-Treatment    Pain: FACES Scale, Pretreatment 0-->no hurt  -     Posttreatment Pain Rating 2-->hurts little bit  -       Row Name 07/27/25 1415          Plan of Care Review    Plan of Care Reviewed With patient;child  -     Outcome Evaluation PT IE complete.  Oriented to person only.  Dtr assisted with PLOF information.  Pt is fearful of falling and easily talks herself into staying in bed.  With encouragement and assurance of not letting her fall, she agreed to ambulate.  Patients confidence increased with a RW as well.  Today she was min A for bed mobility.  CGA sit<>stand.  Ambulated 20ft CGA x1 w/ RW.  PT to see for gait safety and  strengthening.  Recommend return to SNF at [place].  Thank you for referral.  -       Row Name 07/27/25 1415          Therapy Assessment/Plan (PT)    Patient/Family Therapy Goals Statement (PT) return home per pt  -     Rehab Potential (PT) good  -     Criteria for Skilled Interventions Met (PT) yes  -     Therapy Frequency (PT) 2 times/day  -     Predicted Duration of Therapy Intervention (PT) until dc  -       Row Name 07/27/25 1415          Positioning and Restraints     Pre-Treatment Position in bed  -LH     Post Treatment Position bed  -LH     In Bed fowlers;call light within reach;side rails up x2;encouraged to call for assist;exit alarm on;with family/caregiver  -               User Key  (r) = Recorded By, (t) = Taken By, (c) = Cosigned By      Initials Name Provider Type     Francesco Rothman, PT Physical Therapist                   Outcome Measures       Row Name 07/27/25 1415          How much help from another person do you currently need...    Turning from your back to your side while in flat bed without using bedrails? 3  -LH     Moving from lying on back to sitting on the side of a flat bed without bedrails? 3  -LH     Moving to and from a bed to a chair (including a wheelchair)? 3  -LH     Standing up from a chair using your arms (e.g., wheelchair, bedside chair)? 3  -LH     Climbing 3-5 steps with a railing? 2  -LH     To walk in hospital room? 3  -     AM-PAC 6 Clicks Score (PT) 17  -     Highest Level of Mobility Goal Stand (1 or More Minutes)-  -       Row Name 07/27/25 1415 07/27/25 1119       Functional Assessment    Outcome Measure Options AM-PAC 6 Clicks Basic Mobility (PT)  - AM-PAC 6 Clicks Daily Activity (OT)  -MM              User Key  (r) = Recorded By, (t) = Taken By, (c) = Cosigned By      Initials Name Provider Type     Francesco Rothman, PT Physical Therapist    MM Juvenal Aguilar, OTR/L Occupational Therapist                                 Physical Therapy Education       Title: PT OT SLP Therapies (In Progress)       Topic: Physical Therapy (Done)       Point: Mobility training (Done)       Learning Progress Summary            Patient Acceptance, E,D, CHANTEL,LAZARA,NR by  at 7/27/2025 1516    Comment: benefits of PT and POC, call for A OOB                      Point: Precautions (Done)       Learning Progress Summary            Patient Acceptance, E,D, CHANTEL,LAZARA,NR by  at 7/27/2025 1516    Comment: benefits of PT and POC, call for A OOB                                       User Key       Initials Effective Dates Name Provider Type Discipline     02/03/23 -  Francesco Rothman, PT Physical Therapist PT                  PT Recommendation and Plan  Planned Therapy Interventions (PT): bed mobility training, gait training, home exercise program, strengthening, ROM (range of motion), patient/family education, transfer training  Outcome Evaluation: PT IE complete.  Oriented to person only.  Dtr assisted with PLOF information.  Pt is fearful of falling and easily talks herself into staying in bed.  With encouragement and assurance of not letting her fall, she agreed to ambulate.  Patients confidence increased with a RW as well.  Today she was min A for bed mobility.  CGA sit<>stand.  Ambulated 20ft CGA x1 w/ RW.  PT to see for gait safety and  strengthening.  Recommend return to SNF at [place].  Thank you for referral.     Time Calculation:         PT Charges       Row Name 07/27/25 1517             Time Calculation    Start Time 1415  -      Stop Time 1500  -      Time Calculation (min) 45 min  -      PT Received On 07/27/25  -      PT Goal Re-Cert Due Date 08/06/25  -         Untimed Charges    PT Eval/Re-eval Minutes 45  -         Total Minutes    Untimed Charges Total Minutes 45  -       Total Minutes 45  -                User Key  (r) = Recorded By, (t) = Taken By, (c) = Cosigned By      Initials Name Provider Type     Francesco Rothman, PT Physical Therapist                  Therapy Charges for Today       Code Description Service Date Service Provider Modifiers Qty    72105703976 HC PT EVAL MOD COMPLEXITY 3 7/27/2025 Francesco Rothman, PT GP 1            PT G-Codes  Outcome Measure Options: AM-PAC 6 Clicks Basic Mobility (PT)  AM-PAC 6 Clicks Score (PT): 17  AM-PAC 6 Clicks Score (OT): 13  PT Discharge Summary  Anticipated Discharge Disposition (PT): skilled nursing facility    Francesco Rothman PT  7/27/2025

## 2025-07-27 NOTE — THERAPY EVALUATION
Patient Name: Sondra Connolly  : 1945    MRN: [id number]                              Today's Date: 2025       Admit Date: 2025    Visit Dx:     ICD-10-CM ICD-9-CM   1. Paroxysmal A-fib  I48.0 427.31   2. Atrial fibrillation with rapid ventricular response  I48.91 427.31   3. Hyponatremia  E87.1 276.1   4. Acute renal failure superimposed on chronic kidney disease, unspecified acute renal failure type, unspecified CKD stage  N17.9 584.9    N18.9 585.9   5. Atypical atrial flutter  I48.4 427.32   6. Esophageal dysphagia  R13.19 787.29     Patient Active Problem List   Diagnosis    Mixed hyperlipidemia    Coronary artery disease involving coronary bypass graft of native heart without angina pectoris    Paroxysmal atrial fibrillation    Essential hypertension    Anxiety about health    Coumadin toxicity    Chronic combined systolic and diastolic congestive heart failure    S/P CABG x 4  Dr YADIEL Lau    Status post insertion of drug-eluting stent into right coronary artery for coronary artery disease    History of cardioversion    Current use of long term anticoagulation    Mitral valve regurgitation    Diabetes mellitus    Bradycardia, sinus    HUMPHREY (dyspnea on exertion)    Hyponatremia    S/P TAVR (transcatheter aortic valve replacement)    Atrial flutter with rapid ventricular response    Muscle weakness (generalized)    Vascular dementia with behavior disturbance    Atrial fibrillation with rapid ventricular response     Past Medical History:   Diagnosis Date    A-fib     Abnormal nuclear stress test 2019    Abnormal stress test 2022    Added automatically from request for surgery [id number]      Angina pectoris     Anxiety     Aortic valve stenosis 2017    Arthritis     Atherosclerosis of coronary artery bypass graft     Carotid artery occlusion without infarction     Chronic heart failure with preserved ejection fraction (HFpEF) 2024    Chronic kidney disease     Chronic  lung disease     Colon polyp     Diabetes mellitus     BLOOD SUGARS ARE UNDER CONTROL OFF OF ALL MEDS FOR A LONG TIME PER PT AND SON    DJD (degenerative joint disease)     Essential hypertension 03/24/2017    Fibrocystic breast disease     Gastritis     GI bleed     Hemorrhoids     Hyperlipidemia     Hypertension     Lung disease     chronic    MI (myocardial infarction)     Palpitations     PUD (peptic ulcer disease)     PVD (peripheral vascular disease)     Renal failure, acute     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     Past Surgical History:   Procedure Laterality Date    ABLATION OF DYSRHYTHMIC FOCUS      May 2021 in [place]      AORTIC VALVE REPAIR/REPLACEMENT N/A 3/19/2024    Procedure: Transfemoral Transcatheter Aortic Valve Replacement;  Surgeon: Bhavik Cuellar MD;  Location:  [facility];  Service: Cardiovascular;  Laterality: N/A;    AORTIC VALVE REPAIR/REPLACEMENT Bilateral 3/19/2024    Procedure: TRANSFEMORAL TRANSCATHETER AORTIC VALVE REPLACEMENT;  Surgeon: Reed Abdi MD;  Location:  [facility];  Service: Cardiothoracic;  Laterality: Bilateral;    APPENDECTOMY      CARDIAC CATHETERIZATION  05/01/2009    Left-Heart Skin    CARDIAC CATHETERIZATION N/A 2/4/2021    Procedure: Left Heart Cath;  Surgeon: Tristan Cabello DO;  Location:  [facility];  Service: Cardiology;  Laterality: N/A;    CARDIAC CATHETERIZATION Right 2/5/2021    Procedure: Left Heart Cath, rotablator to RCA with Dr. Abrams at 1PM;  Surgeon: Tristan Cabello DO;  Location:  [facility];  Service: Cardiology;  Laterality: Right;    CARDIAC CATHETERIZATION N/A 12/22/2023    Procedure: Left Heart Cath;  Surgeon: Gualberto Gallo MD;  Location:  [facility];  Service: Cardiology;  Laterality: N/A;    CAROTID ENDARTERECTOMY      CATARACT EXTRACTION      CATARACT EXTRACTION      COLONOSCOPY      CORONARY ARTERY BYPASS GRAFT  07/2007    Four    ENDOSCOPY  10/02/2013     ENDOSCOPY  10/02/2013    HYSTERECTOMY      SKIN CANCER EXCISION      THROMBOENDARTERECTOMY      TONSILLECTOMY        General Information       Row Name 07/27/25 1119          OT Time and Intention    Subjective Information complains of;weakness;fatigue  -MM     Document Type evaluation  Pt admitted with increased HR and recent fall. Dx: A-fib, hyponatremia, acute renal failure with CKD, CHF, vascular dementia, and HTN.  -MM     Mode of Treatment occupational therapy  -MM     Patient Effort good  -MM     Symptoms Noted During/After Treatment other (see comments)   pt reports burning in throat while eating and drinking lunch tray. Pt with belching after minimal PO intake. Pt reports it feels like something is stuck in her throat. RN notified.  -MM       Row Name 07/27/25 1119          General Information    Patient Profile Reviewed yes  -MM     Prior Level of Function --  Difficult to obtain d/t pt's cognition. Pt's daughter does report the pt normal walks without a walker to the dining room.  -MM     Existing Precautions/Restrictions fall  -MM     Barriers to Rehab medically complex;previous functional deficit;cognitive status;ineffective coping  -MM       Row Name 07/27/25 1119          Living Environment    Current Living Arrangements extended care facility  -MM     People in Home facility resident  -MM       Row Name 07/27/25 1119          Cognition    Orientation Status (Cognition) oriented to;person  -MM       Row Name 07/27/25 1119          Safety Issues/Impairments Affecting Functional Mobility    Safety Issues Affecting Function (Mobility) ability to follow commands;at risk behavior observed;awareness of need for assistance;friction/shear risk;impulsivity;insight into deficits/self-awareness;judgment;problem-solving;safety precaution awareness;safety precautions follow-through/compliance;sequencing abilities  -MM     Impairments Affecting Function (Mobility) balance;cognition;endurance/activity  tolerance;motor planning;pain;strength  -MM     Cognitive Impairments, Mobility Safety/Performance attention;awareness, need for assistance;impulsivity;insight into deficits/self-awareness;judgment;problem-solving/reasoning;safety precaution awareness;safety precaution follow-through;sequencing abilities  -MM               User Key  (r) = Recorded By, (t) = Taken By, (c) = Cosigned By      Initials Name Provider Type    MM Juvenal Aguilar, OTR/L Occupational Therapist                     Mobility/ADL's       Row Name 07/27/25 1119          Bed Mobility    Bed Mobility supine-sit;sit-supine  -MM     Supine-Sit [place] (Bed Mobility) minimum assist (75% patient effort);verbal cues  -MM     Sit-Supine [place] (Bed Mobility) supervision;verbal cues  -MM     Assistive Device (Bed Mobility) head of bed elevated;other (see comments)  HHA x1 for supine to sit.  -MM     Comment, (Bed Mobility) pt completes bed mobility 3x. Pt at times is agreeable to further functional mobility but then becomes frustrated and returns to supine each time. Encouragement provided to pt for increased functional mobility. Pt reports not remembering falling but does admit to being fearful of falling. Pt adamantly reports she would like to return home.   -MM       Row Name 07/27/25 1119          Activities of Daily Living    BADL Assessment/Intervention upper body dressing;feeding  -MM       Row Name 07/27/25 1119          Upper Body Dressing Assessment/Training    [place] Level (Upper Body Dressing) dependent (less than 25% patient effort);verbal cues  tie hospital gown.  -MM     Position (Upper Body Dressing) edge of bed sitting  -MM       Row Name 07/27/25 1119          Self-Feeding Assessment/Training    [place] Level (Feeding) feeding skills;liquids to mouth;modified independence;set up;verbal cues  -MM     Position (Feeding) sitting up in bed  -MM               User Key  (r) = Recorded By, (t) = Taken By, (c) = Cosigned  By      Initials Name Provider Type    Juvenal Hussein, OTR/L Occupational Therapist                   Obj/Interventions       Row Name 07/27/25 1119          Range of Motion Comprehensive    General Range of Motion bilateral upper extremity ROM WFL  -MM     Comment, General Range of Motion BUE functionally AROM WFL within 90 degrees shoulder ROM, formally not assessed 2' pt's cognition.  -MM       Row Name 07/27/25 1119          Strength Comprehensive (MMT)    Comment, General Manual Muscle Testing (MMT) Assessment BUE strength functionally 4-/5 within available ROM.  -MM       Row Name 07/27/25 1119          Balance    Balance Assessment sitting static balance;sitting dynamic balance  -MM     Static Sitting Balance contact guard;verbal cues  -MM     Dynamic Sitting Balance contact guard;minimal assist;verbal cues  -MM     Position, Sitting Balance supported;sitting edge of bed  -MM               User Key  (r) = Recorded By, (t) = Taken By, (c) = Cosigned By      Initials Name Provider Type    Juvenal Hussein, OTR/L Occupational Therapist                   Goals/Plan       Row Name 07/27/25 1119          Transfer Goal 1 (OT)    Activity/Assistive Device (Transfer Goal 1, OT) toilet;bed-to-chair/chair-to-bed;shower chair  -MM     [place] Level/Cues Needed (Transfer Goal 1, OT) minimum assist (75% or more patient effort);verbal cues required;set-up required  -MM     Time Frame (Transfer Goal 1, OT) long term goal (LTG);by discharge  -MM     Progress/Outcome (Transfer Goal 1, OT) new goal  -MM       Row Name 07/27/25 1119          Dressing Goal 1 (OT)    Activity/Device (Dressing Goal 1, OT) upper body dressing  -MM     [place]/Cues Needed (Dressing Goal 1, OT) minimum assist (75% or more patient effort);verbal cues required;set-up required  -MM     Time Frame (Dressing Goal 1, OT) long term goal (LTG);by discharge  -MM     Progress/Outcome (Dressing Goal 1, OT) new goal  -MM       Row Name  07/27/25 1119          Toileting Goal 1 (OT)    Activity/Device (Toileting Goal 1, OT) toileting skills, all  -MM     [place] Level/Cues Needed (Toileting Goal 1, OT) minimum assist (75% or more patient effort);verbal cues required;set-up required  -MM     Time Frame (Toileting Goal 1, OT) long term goal (LTG);by discharge  -MM     Progress/Outcome (Toileting Goal 1, OT) new goal  -MM       Row Name 07/27/25 1119          Therapy Assessment/Plan (OT)    Planned Therapy Interventions (OT) activity tolerance training;BADL retraining;functional balance retraining;occupation/activity based interventions;patient/caregiver education/training;ROM/therapeutic exercise;strengthening exercise;transfer/mobility retraining;adaptive equipment training;cognitive/visual perception retraining;neuromuscular control/coordination retraining;passive ROM/stretching  -MM               User Key  (r) = Recorded By, (t) = Taken By, (c) = Cosigned By      Initials Name Provider Type    MM Juvenal Aguilar, OTR/L Occupational Therapist                   Clinical Impression       Row Name 07/27/25 1119          Pain Assessment    Pain Location extremity;back  -MM     Pain Side/Orientation bilateral;upper  -MM     Pain Management Interventions activity modification encouraged;exercise or physical activity utilized;movement retraining implemented;positioning techniques utilized  -MM       Row Name 07/27/25 1119          Pain Scale: FACES Pre/Post-Treatment    Pain: FACES Scale, Pretreatment 2-->hurts little bit  -MM     Posttreatment Pain Rating 2-->hurts little bit  -MM       Row Name 07/27/25 1119          Plan of Care Review    Plan of Care Reviewed With patient;daughter  -MM     Progress no change  -MM     Outcome Evaluation OT evaluation completed. Pt is alert and oriented to self. Pt's daughter is present and involved. Pt is emotionally labile throughout OT evaluation. Pt reports pain in BUE and back, cuenca baker 2. Pt was min A for  supine to sit with HHA x1, but supervision for sit to supine. pt completes bed mobility 3x. Pt at times is agreeable to further functional mobility but then becomes frustrated and returns to supine each time. Encouragement provided to pt for increased functional mobility. Pt reports not remembering falling but does admit to being fearful of falling. Pt adamantly reports she would like to return home. Pt was dependent to tie gown. Pt was supervision with set upand verbal cues for self feeding task. Pt would take a few bites, then pt would report burning in throat while eating and drinking lunch tray. Pt with belching after minimal PO intake. Pt reports it feels like something is stuck in her throat. RN notified. Pt with BUE tremors noted intermittently, but appear to be when pt is expressing increased frustration, RN notified. Pt provided with encouragement throughout OT evaluation. Skilled OT recommended to address adls, functional mobility and safety awareness. Recommended d/c SNF.  -MM       Row Name 07/27/25 1119          Therapy Assessment/Plan (OT)    Patient/Family Therapy Goal Statement (OT) to go home  -MM     Rehab Potential (OT) good  -MM     Criteria for Skilled Therapeutic Interventions Met (OT) yes;meets criteria;skilled treatment is necessary  -MM     Therapy Frequency (OT) 5 times/wk  -MM     Predicted Duration of Therapy Intervention (OT) until hospital discharge  -MM       Row Name 07/27/25 1119          Therapy Plan Review/Discharge Plan (OT)    Anticipated Discharge Disposition (OT) skilled nursing facility  -MM       Row Name 07/27/25 1119          Positioning and Restraints    Pre-Treatment Position in bed  -MM     Post Treatment Position bed  -MM     In Bed notified nsg;fowlers;call light within reach;encouraged to call for assist  -MM               User Key  (r) = Recorded By, (t) = Taken By, (c) = Cosigned By      Initials Name Provider Type    MM Juvenal Aguilar, OTR/L Occupational  Therapist                   Outcome Measures       Row Name 07/27/25 1119          How much help from another is currently needed...    Putting on and taking off regular lower body clothing? 2  -MM     Bathing (including washing, rinsing, and drying) 2  -MM     Toileting (which includes using toilet bed pan or urinal) 2  -MM     Putting on and taking off regular upper body clothing 1  -MM     Taking care of personal grooming (such as brushing teeth) 3  -MM     Eating meals 3  -MM     AM-PAC 6 Clicks Score (OT) 13  -MM       Row Name 07/27/25 1119          Functional Assessment    Outcome Measure Options AM-PAC 6 Clicks Daily Activity (OT)  -MM               User Key  (r) = Recorded By, (t) = Taken By, (c) = Cosigned By      Initials Name Provider Type    Juvenal Hussein OTR/L Occupational Therapist                    Occupational Therapy Education       Title: PT OT SLP Therapies (In Progress)       Topic: Occupational Therapy (In Progress)       Point: ADL training (In Progress)       Learning Progress Summary            Patient Acceptance, E, NR by  at 7/27/2025 1504   Family Acceptance, E, NR by MM at 7/27/2025 1504                      Point: Precautions (In Progress)       Learning Progress Summary            Patient Acceptance, E, NR by MM at 7/27/2025 1504   Family Acceptance, E, NR by MM at 7/27/2025 1504                      Point: Body mechanics (In Progress)       Learning Progress Summary            Patient Acceptance, E, NR by  at 7/27/2025 1504   Family Acceptance, E, NR by  at 7/27/2025 1504                                      User Key       Initials Effective Dates Name Provider Type Discipline     07/11/23 -  Juvenal Aguilar OTR/L Occupational Therapist OT                  OT Recommendation and Plan  Planned Therapy Interventions (OT): activity tolerance training, BADL retraining, functional balance retraining, occupation/activity based interventions, patient/caregiver  education/training, ROM/therapeutic exercise, strengthening exercise, transfer/mobility retraining, adaptive equipment training, cognitive/visual perception retraining, neuromuscular control/coordination retraining, passive ROM/stretching  Therapy Frequency (OT): 5 times/wk  Plan of Care Review  Plan of Care Reviewed With: patient, daughter  Progress: no change  Outcome Evaluation: OT evaluation completed. Pt is alert and oriented to self. Pt's daughter is present and involved. Pt is emotionally labile throughout OT evaluation. Pt reports pain in BUE and back, cuenca baker 2. Pt was min A for supine to sit with HHA x1, but supervision for sit to supine. pt completes bed mobility 3x. Pt at times is agreeable to further functional mobility but then becomes frustrated and returns to supine each time. Encouragement provided to pt for increased functional mobility. Pt reports not remembering falling but does admit to being fearful of falling. Pt adamantly reports she would like to return home. Pt was dependent to tie gown. Pt was supervision with set upand verbal cues for self feeding task. Pt would take a few bites, then pt would report burning in throat while eating and drinking lunch tray. Pt with belching after minimal PO intake. Pt reports it feels like something is stuck in her throat. RN notified. Pt with BUE tremors noted intermittently, but appear to be when pt is expressing increased frustration, RN notified. Pt provided with encouragement throughout OT evaluation. Skilled OT recommended to address adls, functional mobility and safety awareness. Recommended d/c SNF.     Time Calculation:         Time Calculation- OT       Row Name 07/27/25 1433             Time Calculation- OT    OT Start Time 1119  +7 minutes chart review.  -MM      OT Stop Time 1226  -MM      OT Time Calculation (min) 67 min  -MM      Total Timed Code Minutes- OT 14 minute(s)  -MM      OT Received On 07/27/25  -MM      OT Goal Re-Cert Due Date  08/06/25  -MM         Timed Charges    46475 - OT Self Care/Mgmt Minutes 14  -MM         Total Minutes    Timed Charges Total Minutes 14  -MM       Total Minutes 14  -MM                User Key  (r) = Recorded By, (t) = Taken By, (c) = Cosigned By      Initials Name Provider Type    MM Juvenal Aguilar, OTR/L Occupational Therapist                  Therapy Charges for Today       Code Description Service Date Service Provider Modifiers Qty    [id number]  OT SELF CARE/MGMT/TRAIN EA 15 MIN 7/27/2025 Juvenal Aguilar, OTR/L GO 1    [id number]  OT EVAL LOW COMPLEXITY 4 7/27/2025 Juvenal Aguilar, OTR/L GO 1                 STUART Juan/STEPH  7/27/2025

## 2025-07-27 NOTE — PROGRESS NOTES
[facility]  INPATIENT PROGRESS NOTE    Patient Name: Sondra Connolly  Date of Admission: 7/26/2025  Today's Date: 07/27/25  Length of Stay: 0  Primary Care Physician: Matthieu Bhakta DO    Subjective   Chief Complaint: Rapid heart rate    Today: Repeat labs this morning indicate hyponatremia worsening now 125.  Hemoglobin stable at 10.0, creatinine 1.49 on admit now 1.61.  Start IV fluids normal saline at 100 and hour.  Dr. Reed with electrophysiology saw patient today and discontinued amiodarone drip, started on amiodarone loading dose oral 400 mg for the next 10 days, continue Eliquis 2.5 mg every 12 hours and Plavix 75 mg daily.  Will recheck sodium around 5 PM this evening, and again in the morning.  If little or no improvement, consider starting sodium chloride tablets.  Vital signs are stable, patient remains afebrile and on room air.        Review of Systems   All pertinent negatives and positives are as above. All other systems have been reviewed and are negative unless otherwise stated.     Objective    Temp:  [97.6 °F (36.4 °C)-98.5 °F (36.9 °C)] 97.7 °F (36.5 °C)  Heart Rate:  [] 86  Resp:  [16-19] 18  BP: ()/(67-87) 101/67  Physical Exam  Vitals and nursing note reviewed.   Constitutional:       Comments: Appears underweight, frail   HENT:      Head: Normocephalic. Contusion present.      Jaw: There is normal jaw occlusion.        Comments: Bruise noted to the left outer eyelid and eyebrow area, reportedly due to fall 2 days ago at the nursing home.     Nose: Nose normal.      Mouth/Throat:      Mouth: Mucous membranes are dry.   Eyes:      Extraocular Movements: Extraocular movements intact.      Conjunctiva/sclera: Conjunctivae normal.      Pupils: Pupils are equal, round, and reactive to light.      Comments: Bruise to left outer eyelid and eyebrow ridge from fall 2 days ago   Cardiovascular:      Rate and Rhythm: Regular rhythm.   Normal sinus rhythm on telemetry     Pulses: Normal pulses.           Dorsalis pedis pulses are 2+ on the right side and 2+ on the left side.        Posterior tibial pulses are 2+ on the right side and 2+ on the left side.      Heart sounds: Normal heart sounds. No murmur heard.     No friction rub. No gallop.      Comments: Normal sinus rhythm rate 86 bpm on telemetry  Pulmonary:      Effort: Pulmonary effort is normal. No respiratory distress.      Breath sounds: Normal breath sounds. No wheezing, rhonchi or rales.   Abdominal:      General: Abdomen is flat. Bowel sounds are normal.      Palpations: Abdomen is soft.   Genitourinary:     Comments: Deferred  Musculoskeletal:         General: No swelling or tenderness.      Cervical back: Normal range of motion.   Feet:      Right foot:      Skin integrity: Skin integrity normal.      Left foot:      Skin integrity: Skin integrity normal.   Skin:     General: Skin is warm and dry.      Capillary Refill: Capillary refill takes 2 to 3 seconds.   Neurological:      General: No focal deficit present.      Mental Status: She is alert. Mental status is at baseline. She is disoriented.      Motor: Weakness present.      Gait: Gait abnormal.         Results Review:  I have reviewed the labs, radiology results, and diagnostic studies.    Laboratory Data:   Results from last 7 days   Lab Units 07/27/25  0521 07/26/25  1413 07/24/25  0601   WBC 10*3/mm3 7.07 7.50 5.96   HEMOGLOBIN g/dL 10.0* 9.9* 10.6*   HEMATOCRIT % 31.9* 30.6* 32.7*   PLATELETS 10*3/mm3 278 243 253        Results from last 7 days   Lab Units 07/27/25  0521 07/26/25  2316 07/26/25  1413   SODIUM mmol/L 125* 128* 131*   POTASSIUM mmol/L 4.6 4.4 4.4   CHLORIDE mmol/L 95* 98 102   CO2 mmol/L 15.0* 16.0* 18.0*   BUN mg/dL 31.3* 30.4* 27.0*   CREATININE mg/dL 1.61* 1.60* 1.49*   CALCIUM mg/dL 8.5* 8.8 8.3*   BILIRUBIN mg/dL 0.3 0.4 0.4   ALK PHOS U/L 102 109 70   ALT (SGPT) U/L 44* 42* 21   AST (SGOT) U/L 43* 39*  "25   GLUCOSE mg/dL 128* 155* 120*       Culture Data:   No results found for: \"BLOODCX\", \"URINECX\", \"WOUNDCX\", \"MRSACX\", \"RESPCX\", \"STOOLCX\"    Radiology Data:   Imaging Results (Last 24 Hours)       ** No results found for the last 24 hours. **            I have reviewed the patient's current medications.     Assessment/Plan   Assessment  Active Hospital Problems    Diagnosis     **Atrial flutter with rapid ventricular response     CRISTIANO (acute kidney injury)     Muscle weakness (generalized)     Hyponatremia     Vascular dementia with behavior disturbance     Chronic combined systolic and diastolic congestive heart failure     Anxiety about health     Current use of long term anticoagulation     Essential hypertension     Atrial fibrillation with rapid ventricular response        Treatment Plan  Atrial flutter/Atrial Fibrillation with rapid ventricular response: Resolved  Started on amiodarone drip in the emergency department  Transitioned off of amiodarone drip by electrophysiologist to oral amiodarone loading dose x 10 days, then dose decreased to 200 mg  cardiac electrophysiologist  consulted and continues to follow and manage   continue metoprolol succinate at decreased dose 12.5 mg   Continue Eliquis 2.5 mg twice daily, Plavix 75 mg daily  Vitals every 4 hours and as needed  EKG as needed for rate conversion to normal sinus rhythm or change in rhythm  Current use of long-term anticoagulation Eliquis  Secondary to chronic paroxysmal atrial fibrillation  Continue Eliquis 2.5 mg every 12 hours  Monitor for S/S of bleeding  Essential hypertension  Continue to hold Entresto until blood pressure improves.  Hold spironolactone until blood pressure can tolerate  Vitals every 4 hours and as needed  Notify hospitalist provider if becomes hypotensive and remains sustained  Anxiety about health  Patient with high level of anxiety about her surroundings, being in the hospital and healthcare in general  May be " contributing to continued elevated heart rate  Continue home trazodone at decreased dose from 50 mg to 25 mg at night  Muscle weakness  Up with assistance  Fall precautions  PT/OT to eval and treat  Vascular dementia with behavioral disturbance  Patient baseline is confusion, per daughter on telephone patient is at her baseline  Easily stimulated, try to reduce stimulation to patient by turning down lights, grouping cares and allowing rest  Chronic combined systolic and diastolic congestive heart failure  Currently holding home Entresto due to hypotension, will restart when blood pressure improves  EF 41 to 45% on 2D echo 2/1/2021 and improved to 51-55 % on 2D echo 3/31/2025.  Continue metoprolol succinate 12.5 mg daily which is decreased from home dose of 25 mg daily if blood pressure tolerates, follow hold parameters    CRISTIANO with Hyponatremia  Cr 1.49 on admit, today 1.61  Na 125 this morning  Start NS @ 100 ml/hr  Repeat CMP in am to trend renal function and serum sodium    Medical Decision Making  Number and Complexity of problems: 8  2 acute problems, high complexity, stable, continue to monitor and treat as indicated  6 chronic problems, moderate complexity, stable and will continue to monitor and adjust med doses as appropriate  Differential Diagnosis: None     Conditions and Status        Condition is improved.     MDM Data  External documents reviewed: Yes  Cardiac tracing (EKG, telemetry) interpretation: Reviewed reports  Radiology interpretation: Reviewed diagnostic imaging reports  Labs reviewed: Reviewed previous and current lab values  Any tests that were considered but not ordered: None     Decision rules/scores evaluated (example [id number], Wells, etc):      GUANAKITO?DS?-VASc Score for Atrial Fibrillation Stroke Risk - MDCalc  Calculated on Jul 26 2025 7:35 PM  7 points -> Stroke risk was 11.2% per year in >90,000 patients (the [place] Atrial Fibrillation Cohort Study) and 15.7% risk of  stroke/TIA/systemic embolism.     Discussed with: Discussed plan of care with patient, daughter Radha and Dr. Hunter.     Care Planning  Shared decision making: Discussed with: Discussed plan of care with patient, daughter Radha  and Dr. Hunter.    Code status and discussions: Called Radha Carl patient's daughter on admission who is his main surrogate decision maker, discussed CODE STATUS as nursing home paperwork states full code, daughter Radha verified patient is a DNR/DNI and states she filled out the paperwork and signed it approximately 2 months ago at the nursing home.  I did inform Radha that it still states full code on the nursing home paperwork so she needs to address that with the nursing home.  CODE STATUS has been changed in hospital chart to reflect DNR/DNI.     Disposition  Social Determinants of Health that impact treatment or disposition: Patient from nursing home  Estimated length of stay is 1-2 days.         Electronically signed by RANDA Huertas, 07/27/25, 16:07 CDT.

## 2025-07-27 NOTE — CONSULTS
"EP CONSULT NOTE    Subjective        History of Present Illness    EP Problems:  1.  Paroxysmal atrial fibrillation  1a.  Atypical atrial flutter  -6/2021: PVI with Kenn   2.  Sinus bradycardia  3.  Paroxysmal SVT  4.  PVCs  -4/2021: Holter with 1.4% burden  5.  Severe left atrial enlargement    Cardiology Problems:  1.  Chronic combined systolic and diastolic heart failure  -EF 46 to 50%  2.  Severe aortic valve stenosis status post TAVR  3.  Moderate MR  4.  CAD status post CABG (2010)  5.  Hypertension  6.  Hyperlipidemia    Medical Problems:  1.  Type 2 diabetes  2.  Vascular dementia    Sondra Connolly is a 80 y.o. female with problem list as above for whom EP is consulted regarding sinus bradycardia, paroxysmal atrial fibrillation, atypical atrial flutter.  She presents to the hospital via EMS after a fall at home with a hematoma and skin tear 3 days ago.  She was discharged home and then returned after she was found to be in atrial flutter upon presentation to the ER.  She was started on IV amiodarone and admitted to the hospital for further evaluation.  She had subsequent return of sinus rhythm pharmacologically while on the floor.    Objective   Vital Signs:  /87 (BP Location: Left arm, Patient Position: Lying)   Pulse 115   Temp 97.8 °F (36.6 °C) (Oral)   Resp 19   Ht 152.4 cm (60\")   Wt 58.5 kg (128 lb 15.4 oz) Comment: 2 blankets, 1 sheet.  SpO2 98%   BMI 25.19 kg/m²   Estimated body mass index is 25.19 kg/m² as calculated from the following:    Height as of this encounter: 152.4 cm (60\").    Weight as of this encounter: 58.5 kg (128 lb 15.4 oz).      Physical Exam  Vitals reviewed.   Constitutional:       Appearance: Normal appearance.   HENT:      Head:      Comments: Small contusion present at the left temple  Cardiovascular:      Rate and Rhythm: Normal rate and regular rhythm.      Pulses: Normal pulses.      Heart sounds: Normal heart sounds. No murmur heard.  Pulmonary:      Effort: " Pulmonary effort is normal. No respiratory distress.      Breath sounds: Normal breath sounds.   Skin:     General: Skin is warm and dry.   Neurological:      Mental Status: She is alert. Mental status is at baseline. She is disoriented.          Result Review :  The following data was reviewed by: Lorin Reed MD on 07/26/2025:  CMP          7/24/2025    06:01 7/26/2025    14:13 7/26/2025    23:16 7/27/2025    05:21   CMP   Glucose 84  120  155  128    BUN 22.9  27.0  30.4  31.3    Creatinine 1.54  1.49  1.60  1.61    EGFR 33.6  34.9  32.1  31.8    Sodium 130  131  128  125    Potassium 4.7  4.4  4.4  4.6    Chloride 96  102  98  95    Calcium 9.2  8.3  8.8  8.5    Total Protein 6.0  5.3  5.5  5.5    Albumin 3.8  3.4  3.6  3.4    Globulin 2.2  1.9  1.9  2.1    Total Bilirubin 0.4  0.4  0.4  0.3    Alkaline Phosphatase 59  70  109  102    AST (SGOT) 19  25  39  43    ALT (SGPT) 10  21  42  44    Albumin/Globulin Ratio 1.7  1.8  1.9  1.6    BUN/Creatinine Ratio 14.9  18.1  19.0  19.4    Anion Gap 13.0  11.0  14.0  15.0      CBC          7/24/2025    06:01 7/26/2025    14:13 7/27/2025    05:21   CBC   WBC 5.96  7.50  7.07    RBC 3.59  3.37  3.45    Hemoglobin 10.6  9.9  10.0    Hematocrit 32.7  30.6  31.9    MCV 91.1  90.8  92.5    MCH 29.5  29.4  29.0    MCHC 32.4  32.4  31.3    RDW 15.6  16.4  16.3    Platelets 253  243  278          [id number] SCORE   [id number] Score: 7 (7/26/2025  6:35 PM)           Assessment and Plan   Problems:  Paroxysmal atrial fibrillation  Atypical atrial flutter  Sinus bradycardia  Carlton Connolly is a 80 y.o. female with problem list as above for whom EP is consulted regarding paroxysmal atrial fibrillation, atypical atrial flutter, sinus bradycardia, fall.  Back in sinus rhythm now.  Very limited treatment options given her vascular dementia and other issues.  I do think that transitioning to oral amiodarone is appropriate at this time.  We will plan to use this for long-term  rhythm control.  If she continues to have recurrent episodes, we will need to consider pacemaker and AV node ablation at that time.  Given her dementia, [facility]ra would be most appropriate.    Plan:  - Start amiodarone 400 mg twice daily for 10 days (until 8/6/2025), then 200 mg daily thereafter  - Continue anticoagulation with apixaban 2.5 mg twice daily (age, creatinine, weight)  - 2-week Holter monitor at discharge  - EP to sign off at this time; please call if additional questions arise                 Part of this note may be an electronic transcription/translation of spoken language to printed text using the Dragon Dictation System.

## 2025-07-28 PROBLEM — E44.0 MODERATE PROTEIN-CALORIE MALNUTRITION: Status: ACTIVE | Noted: 2025-07-28

## 2025-07-28 LAB
ALBUMIN SERPL-MCNC: 3.2 G/DL (ref 3.5–5.2)
ALBUMIN/GLOB SERPL: 1.6 G/DL
ALP SERPL-CCNC: 99 U/L (ref 39–117)
ALT SERPL W P-5'-P-CCNC: 60 U/L (ref 1–33)
ANION GAP SERPL CALCULATED.3IONS-SCNC: 13 MMOL/L (ref 5–15)
AST SERPL-CCNC: 50 U/L (ref 1–32)
BASOPHILS # BLD AUTO: 0.02 10*3/MM3 (ref 0–0.2)
BASOPHILS NFR BLD AUTO: 0.2 % (ref 0–1.5)
BILIRUB SERPL-MCNC: 0.4 MG/DL (ref 0–1.2)
BUN SERPL-MCNC: 29.8 MG/DL (ref 8–23)
BUN/CREAT SERPL: 20.4 (ref 7–25)
CALCIUM SPEC-SCNC: 7.9 MG/DL (ref 8.6–10.5)
CHLORIDE SERPL-SCNC: 98 MMOL/L (ref 98–107)
CO2 SERPL-SCNC: 14 MMOL/L (ref 22–29)
CREAT SERPL-MCNC: 1.46 MG/DL (ref 0.57–1)
DEPRECATED RDW RBC AUTO: 54.9 FL (ref 37–54)
EGFRCR SERPLBLD CKD-EPI 2021: 35.8 ML/MIN/1.73
EOSINOPHIL # BLD AUTO: 0.01 10*3/MM3 (ref 0–0.4)
EOSINOPHIL NFR BLD AUTO: 0.1 % (ref 0.3–6.2)
ERYTHROCYTE [DISTWIDTH] IN BLOOD BY AUTOMATED COUNT: 16.4 % (ref 12.3–15.4)
GLOBULIN UR ELPH-MCNC: 2 GM/DL
GLUCOSE SERPL-MCNC: 112 MG/DL (ref 65–99)
HCT VFR BLD AUTO: 31.4 % (ref 34–46.6)
HGB BLD-MCNC: 10 G/DL (ref 12–15.9)
IMM GRANULOCYTES # BLD AUTO: 0.05 10*3/MM3 (ref 0–0.05)
IMM GRANULOCYTES NFR BLD AUTO: 0.6 % (ref 0–0.5)
LYMPHOCYTES # BLD AUTO: 0.77 10*3/MM3 (ref 0.7–3.1)
LYMPHOCYTES NFR BLD AUTO: 9.1 % (ref 19.6–45.3)
MAGNESIUM SERPL-MCNC: 2.4 MG/DL (ref 1.6–2.4)
MCH RBC QN AUTO: 29.3 PG (ref 26.6–33)
MCHC RBC AUTO-ENTMCNC: 31.8 G/DL (ref 31.5–35.7)
MCV RBC AUTO: 92.1 FL (ref 79–97)
MONOCYTES # BLD AUTO: 0.54 10*3/MM3 (ref 0.1–0.9)
MONOCYTES NFR BLD AUTO: 6.4 % (ref 5–12)
NEUTROPHILS NFR BLD AUTO: 7.06 10*3/MM3 (ref 1.7–7)
NEUTROPHILS NFR BLD AUTO: 83.6 % (ref 42.7–76)
NRBC BLD AUTO-RTO: 0 /100 WBC (ref 0–0.2)
PLATELET # BLD AUTO: 260 10*3/MM3 (ref 140–450)
PMV BLD AUTO: 9.8 FL (ref 6–12)
POTASSIUM SERPL-SCNC: 4.6 MMOL/L (ref 3.5–5.2)
PROT SERPL-MCNC: 5.2 G/DL (ref 6–8.5)
RBC # BLD AUTO: 3.41 10*6/MM3 (ref 3.77–5.28)
SODIUM SERPL-SCNC: 125 MMOL/L (ref 136–145)
SODIUM SERPL-SCNC: 129 MMOL/L (ref 136–145)
WBC NRBC COR # BLD AUTO: 8.45 10*3/MM3 (ref 3.4–10.8)

## 2025-07-28 PROCEDURE — 25010000002 OLANZAPINE 10 MG RECONSTITUTED SOLUTION: Performed by: NURSE PRACTITIONER

## 2025-07-28 PROCEDURE — 92526 ORAL FUNCTION THERAPY: CPT

## 2025-07-28 PROCEDURE — 25010000002 FUROSEMIDE PER 20 MG: Performed by: NURSE PRACTITIONER

## 2025-07-28 PROCEDURE — 84295 ASSAY OF SERUM SODIUM: CPT | Performed by: NURSE PRACTITIONER

## 2025-07-28 PROCEDURE — 97116 GAIT TRAINING THERAPY: CPT

## 2025-07-28 PROCEDURE — 80053 COMPREHEN METABOLIC PANEL: CPT | Performed by: NURSE PRACTITIONER

## 2025-07-28 PROCEDURE — 97535 SELF CARE MNGMENT TRAINING: CPT | Performed by: OCCUPATIONAL THERAPIST

## 2025-07-28 PROCEDURE — 83735 ASSAY OF MAGNESIUM: CPT | Performed by: NURSE PRACTITIONER

## 2025-07-28 PROCEDURE — 85025 COMPLETE CBC W/AUTO DIFF WBC: CPT | Performed by: NURSE PRACTITIONER

## 2025-07-28 RX ORDER — TOLVAPTAN 15 MG/1
7.5 TABLET ORAL ONCE
Status: COMPLETED | OUTPATIENT
Start: 2025-07-28 | End: 2025-07-28

## 2025-07-28 RX ORDER — FUROSEMIDE 10 MG/ML
20 INJECTION INTRAMUSCULAR; INTRAVENOUS ONCE
Status: COMPLETED | OUTPATIENT
Start: 2025-07-28 | End: 2025-07-28

## 2025-07-28 RX ORDER — SODIUM CHLORIDE 1 G/1
1 TABLET ORAL EVERY 4 HOURS
Status: DISCONTINUED | OUTPATIENT
Start: 2025-07-28 | End: 2025-07-28

## 2025-07-28 RX ORDER — SODIUM CHLORIDE 1 G/1
1 TABLET ORAL 2 TIMES DAILY WITH MEALS
Status: DISCONTINUED | OUTPATIENT
Start: 2025-07-28 | End: 2025-07-28

## 2025-07-28 RX ORDER — HALOPERIDOL 5 MG/ML
2 INJECTION INTRAMUSCULAR EVERY 4 HOURS PRN
Status: DISCONTINUED | OUTPATIENT
Start: 2025-07-28 | End: 2025-07-30

## 2025-07-28 RX ORDER — OLANZAPINE 10 MG/2ML
5 INJECTION, POWDER, FOR SOLUTION INTRAMUSCULAR EVERY 8 HOURS PRN
Status: DISCONTINUED | OUTPATIENT
Start: 2025-07-28 | End: 2025-07-29

## 2025-07-28 RX ORDER — HALOPERIDOL 5 MG/ML
2 INJECTION INTRAMUSCULAR EVERY 4 HOURS PRN
Status: DISCONTINUED | OUTPATIENT
Start: 2025-07-28 | End: 2025-07-28

## 2025-07-28 RX ORDER — TOLVAPTAN 15 MG/1
15 TABLET ORAL ONCE
Status: DISCONTINUED | OUTPATIENT
Start: 2025-07-28 | End: 2025-07-28

## 2025-07-28 RX ADMIN — FUROSEMIDE 20 MG: 10 INJECTION, SOLUTION INTRAVENOUS at 09:01

## 2025-07-28 RX ADMIN — Medication 10 ML: at 20:04

## 2025-07-28 RX ADMIN — TOLVAPTAN 7.5 MG: 15 TABLET ORAL at 20:03

## 2025-07-28 RX ADMIN — TRAZODONE HYDROCHLORIDE 25 MG: 50 TABLET ORAL at 20:04

## 2025-07-28 RX ADMIN — Medication 10 ML: at 09:02

## 2025-07-28 RX ADMIN — Medication 1 G: at 09:01

## 2025-07-28 RX ADMIN — Medication 1 G: at 13:37

## 2025-07-28 RX ADMIN — OLANZAPINE 5 MG: 10 INJECTION, POWDER, LYOPHILIZED, FOR SOLUTION INTRAMUSCULAR at 18:51

## 2025-07-28 RX ADMIN — APIXABAN 2.5 MG: 2.5 TABLET, FILM COATED ORAL at 20:03

## 2025-07-28 RX ADMIN — AMIODARONE HYDROCHLORIDE 400 MG: 200 TABLET ORAL at 20:03

## 2025-07-28 RX ADMIN — CLOPIDOGREL BISULFATE 75 MG: 75 TABLET, FILM COATED ORAL at 09:01

## 2025-07-28 RX ADMIN — ROSUVASTATIN 10 MG: 10 TABLET, FILM COATED ORAL at 20:03

## 2025-07-28 RX ADMIN — AMIODARONE HYDROCHLORIDE 400 MG: 200 TABLET ORAL at 09:01

## 2025-07-28 RX ADMIN — APIXABAN 2.5 MG: 2.5 TABLET, FILM COATED ORAL at 09:01

## 2025-07-28 RX ADMIN — SACUBITRIL AND VALSARTAN 1 TABLET: 24; 26 TABLET, FILM COATED ORAL at 09:01

## 2025-07-28 RX ADMIN — SACUBITRIL AND VALSARTAN 1 TABLET: 24; 26 TABLET, FILM COATED ORAL at 20:03

## 2025-07-28 NOTE — CASE MANAGEMENT/SOCIAL WORK
Continued Stay Note   [facility]     Patient Name: Sondra Connolly  MRN: [id number]  Today's Date: 7/28/2025    Admit Date: 7/26/2025        Discharge Plan       Row Name 07/28/25 1642       Plan    Plan Comments Message sent to admissions at [facility] to check bed hold status.    Final Discharge Disposition Code 03 - skilled nursing facility (SNF)                   Discharge Codes    No documentation.                 Expected Discharge Date and Time       Expected Discharge Date Expected Discharge Time    Jul 28, 2025               SUSIE Buchanan

## 2025-07-28 NOTE — THERAPY TREATMENT NOTE
Patient Name: Sondra Connolly  : 1945    MRN: [id number]                              Today's Date: 2025       Admit Date: 2025    Visit Dx:     ICD-10-CM ICD-9-CM   1. Paroxysmal A-fib  I48.0 427.31   2. Atrial fibrillation with rapid ventricular response  I48.91 427.31   3. Hyponatremia  E87.1 276.1   4. Acute renal failure superimposed on chronic kidney disease, unspecified acute renal failure type, unspecified CKD stage  N17.9 584.9    N18.9 585.9   5. Atypical atrial flutter  I48.4 427.32   6. Esophageal dysphagia  R13.19 787.29   7. Impaired mobility [Z74.09]  Z74.09 799.89     Patient Active Problem List   Diagnosis    Mixed hyperlipidemia    Coronary artery disease involving coronary bypass graft of native heart without angina pectoris    Paroxysmal atrial fibrillation    Essential hypertension    Anxiety about health    Coumadin toxicity    Chronic combined systolic and diastolic congestive heart failure    S/P CABG x 4  Dr YADIEL Lau    Status post insertion of drug-eluting stent into right coronary artery for coronary artery disease    History of cardioversion    Current use of long term anticoagulation    Mitral valve regurgitation    Diabetes mellitus    Bradycardia, sinus    HUMPHREY (dyspnea on exertion)    Hyponatremia    S/P TAVR (transcatheter aortic valve replacement)    Atrial flutter with rapid ventricular response    Muscle weakness (generalized)    Vascular dementia with behavior disturbance    Atrial fibrillation with rapid ventricular response    CRISTIANO (acute kidney injury)    Moderate protein-calorie malnutrition     Past Medical History:   Diagnosis Date    A-fib     Abnormal nuclear stress test 2019    Abnormal stress test 2022    Added automatically from request for surgery [id number]      Angina pectoris     Anxiety     Aortic valve stenosis 2017    Arthritis     Atherosclerosis of coronary artery bypass graft     Carotid artery occlusion without infarction      Chronic heart failure with preserved ejection fraction (HFpEF) 03/21/2024    Chronic kidney disease     Chronic lung disease     Colon polyp     Diabetes mellitus     BLOOD SUGARS ARE UNDER CONTROL OFF OF ALL MEDS FOR A LONG TIME PER PT AND SON    DJD (degenerative joint disease)     Essential hypertension 03/24/2017    Fibrocystic breast disease     Gastritis     GI bleed     Hemorrhoids     Hyperlipidemia     Hypertension     Lung disease     chronic    MI (myocardial infarction)     Palpitations     PUD (peptic ulcer disease)     PVD (peripheral vascular disease)     Renal failure, acute     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     Past Surgical History:   Procedure Laterality Date    ABLATION OF DYSRHYTHMIC FOCUS      May 2021 in [place]      AORTIC VALVE REPAIR/REPLACEMENT N/A 3/19/2024    Procedure: Transfemoral Transcatheter Aortic Valve Replacement;  Surgeon: Bhavik Cuellar MD;  Location:  [facility];  Service: Cardiovascular;  Laterality: N/A;    AORTIC VALVE REPAIR/REPLACEMENT Bilateral 3/19/2024    Procedure: TRANSFEMORAL TRANSCATHETER AORTIC VALVE REPLACEMENT;  Surgeon: Reed Abdi MD;  Location:  [facility];  Service: Cardiothoracic;  Laterality: Bilateral;    APPENDECTOMY      CARDIAC CATHETERIZATION  05/01/2009    Left-Heart Skin    CARDIAC CATHETERIZATION N/A 2/4/2021    Procedure: Left Heart Cath;  Surgeon: Tristan Cabello DO;  Location:  [facility];  Service: Cardiology;  Laterality: N/A;    CARDIAC CATHETERIZATION Right 2/5/2021    Procedure: Left Heart Cath, rotablator to RCA with Dr. Abrams at 1PM;  Surgeon: Tristan Cabello DO;  Location:  [facility];  Service: Cardiology;  Laterality: Right;    CARDIAC CATHETERIZATION N/A 12/22/2023    Procedure: Left Heart Cath;  Surgeon: Gualberto Gallo MD;  Location:  [facility];  Service: Cardiology;  Laterality: N/A;    CAROTID ENDARTERECTOMY      CATARACT EXTRACTION       CATARACT EXTRACTION      COLONOSCOPY      CORONARY ARTERY BYPASS GRAFT  07/2007    Four    ENDOSCOPY  10/02/2013    ENDOSCOPY  10/02/2013    HYSTERECTOMY      SKIN CANCER EXCISION      THROMBOENDARTERECTOMY      TONSILLECTOMY        General Information       Row Name 07/28/25 1035          OT Time and Intention    Subjective Information complains of;fatigue  -MM     Document Type therapy note (daily note)  -MM     Mode of Treatment occupational therapy  -MM     Patient Effort good  -MM       Row Name 07/28/25 1035          General Information    Patient Profile Reviewed yes  -MM     Existing Precautions/Restrictions fall  -MM     Barriers to Rehab medically complex;previous functional deficit;cognitive status  -MM       Row Name 07/28/25 1035          Cognition    Orientation Status (Cognition) --  -MM       Row Name 07/28/25 1035          Safety Issues/Impairments Affecting Functional Mobility    Safety Issues Affecting Function (Mobility) at risk behavior observed;insight into deficits/self-awareness;safety precaution awareness;safety precautions follow-through/compliance  -MM     Impairments Affecting Function (Mobility) balance;cognition;endurance/activity tolerance;strength;pain  -MM     Cognitive Impairments, Mobility Safety/Performance insight into deficits/self-awareness;safety precaution awareness;safety precaution follow-through  -MM               User Key  (r) = Recorded By, (t) = Taken By, (c) = Cosigned By      Initials Name Provider Type    MM Juvenal Aguilar, OTR/L Occupational Therapist                     Mobility/ADL's       Row Name 07/28/25 1035          Bed Mobility    Bed Mobility rolling left;scooting/bridging;supine-sit;sit-supine  -MM     Rolling Left [place] (Bed Mobility) minimum assist (75% patient effort);verbal cues  -MM     Scooting/Bridging [place] (Bed Mobility) maximum assist (25% patient effort);dependent (less than 25% patient effort);2 person assist;verbal cues   -MM     Supine-Sit [place] (Bed Mobility) minimum assist (75% patient effort);verbal cues  -MM     Sit-Supine [place] (Bed Mobility) minimum assist (75% patient effort);verbal cues  -MM     Assistive Device (Bed Mobility) bed rails;head of bed elevated;repositioning sheet  -MM       Row Name 07/28/25 1035          Transfers    Transfers sit-stand transfer;stand-sit transfer;toilet transfer  -MM       Row Name 07/28/25 1035          Sit-Stand Transfer    Sit-Stand [place] (Transfers) contact guard;verbal cues  -MM       Row Name 07/28/25 1035          Stand-Sit Transfer    Stand-Sit [place] (Transfers) contact guard;verbal cues  -MM       Row Name 07/28/25 1035          Toilet Transfer    Type (Toilet Transfer) sit-stand;stand-sit  -MM     [place] Level (Toilet Transfer) contact guard;verbal cues  -MM       Row Name 07/28/25 1035          Functional Mobility    Functional Mobility- Ind. Level contact guard assist;verbal cues required  -MM     Functional Mobility- Device walker, front-wheeled  -MM     Functional Mobility- Comment BSC to bed RW to BSC to bed with HHA x1.  -MM       Row Name 07/28/25 1035          Activities of Daily Living    BADL Assessment/Intervention bathing;upper body dressing;lower body dressing;grooming;toileting  -MM       Row Name 07/28/25 1035          Upper Body Dressing Assessment/Training    [place] Level (Upper Body Dressing) doff;don;minimum assist (75% patient effort);moderate assist (50% patient effort);verbal cues;set up  hospital gown.  -MM     Position (Upper Body Dressing) edge of bed sitting  -MM       Row Name 07/28/25 1035          Bathing Assessment/Intervention    [place] Level (Bathing) dependent (less than 25% patient effort);verbal cues;set up  wash hair with shampoo shower cap.  -MM     Position (Bathing) sitting up in bed  -MM       Row Name 07/28/25 1035          Lower Body Dressing Assessment/Training    [place] Level (Lower Body  Dressing) doff;don;socks;dependent (less than 25% patient effort);verbal cues;set up  -MM     Position (Lower Body Dressing) edge of bed sitting  -MM       Row Name 07/28/25 1035          Grooming Assessment/Training    [place] Level (Grooming) hair care, combing/brushing;maximum assist (25% patient effort);dependent (less than 25% patient effort);oral care regimen;wash face, hands  don lotion supervision with set up and verbal cues.  -MM     Position (Grooming) sitting up in bed  -MM       [facility] Name 07/28/25 1035          Toileting Assessment/Training    [place] Level (Toileting) toileting skills;maximum assist (25% patient effort);dependent (less than 25% patient effort);verbal cues;set up  -MM     Position (Toileting) supported sitting;supported standing  -MM               User Key  (r) = Recorded By, (t) = Taken By, (c) = Cosigned By      Initials Name Provider Type    Juvenal Hussein, OTR/L Occupational Therapist                   Obj/Interventions       Row Name 07/28/25 1035          Balance    Balance Assessment sitting static balance;sitting dynamic balance;standing static balance;standing dynamic balance  -MM     Static Sitting Balance standby assist;supervision;verbal cues  -MM     Dynamic Sitting Balance supervision;contact guard;verbal cues  -MM     Position, Sitting Balance supported;unsupported;sitting edge of bed  -MM     Static Standing Balance contact guard;verbal cues  -MM     Dynamic Standing Balance contact guard;verbal cues  -MM     Position/Device Used, Standing Balance supported;walker, front-wheeled;other (see comments)  or HHA x1.  -MM               User Key  (r) = Recorded By, (t) = Taken By, (c) = Cosigned By      Initials Name Provider Type    Juvenal Hussein, OTR/L Occupational Therapist                   Goals/Plan    No documentation.                  Clinical Impression       Row Name 07/28/25 1035          Pain Assessment    Pretreatment Pain Rating 0/10 - no pain   -MM     Posttreatment Pain Rating 0/10 - no pain  -MM       Row Name 07/28/25 1035          Plan of Care Review    Plan of Care Reviewed With patient;daughter  -MM     Progress no change  -MM     Outcome Evaluation OT tx completed. Pt was up on BSC when OT entered the room with PCA and pt's daughter present. Pt was CGA for sit to stand t/f. Pt was CGA for functional mobility BSC to bed with RW and bed to BSC to bed with HHA x1. Pt was min A for bed mobility, except max-dependent x2 for scooting with glide pad. Pt was dependent to wash hair with shampoo shower cap, comb hair, and doff/don socks. Pt was min-mod A to doff/don hospital gown. Pt was supervision with set up to brush teeth and wash face. Pt was dependent for toilet hygiene. Continue OT POC.  -MM       Row Name 07/28/25 1035          Positioning and Restraints    Pre-Treatment Position in bed  -MM     Post Treatment Position bed  -MM     In Bed side lying left;fowlers;call light within reach;encouraged to call for assist;with family/caregiver;with nsg;side rails up x3  -MM               User Key  (r) = Recorded By, (t) = Taken By, (c) = Cosigned By      Initials Name Provider Type    MM Juvenal Aguilar, OTR/L Occupational Therapist                   Outcome Measures       Row Name 07/28/25 1035          How much help from another is currently needed...    Putting on and taking off regular lower body clothing? 2  -MM     Bathing (including washing, rinsing, and drying) 2  -MM     Toileting (which includes using toilet bed pan or urinal) 2  -MM     Putting on and taking off regular upper body clothing 3  -MM     Taking care of personal grooming (such as brushing teeth) 2  -MM     Eating meals 3  -MM     AM-PAC 6 Clicks Score (OT) 14  -MM       Row Name 07/28/25 0800          How much help from another person do you currently need...    Turning from your back to your side while in flat bed without using bedrails? 4  -CH     Moving from lying on back to  sitting on the side of a flat bed without bedrails? 3  -CH     Moving to and from a bed to a chair (including a wheelchair)? 3  -CH     Standing up from a chair using your arms (e.g., wheelchair, bedside chair)? 3  -CH     Climbing 3-5 steps with a railing? 3  -CH     To walk in hospital room? 3  -CH     AM-PAC 6 Clicks Score (PT) 19  -CH     Highest Level of Mobility Goal Walk 10 Steps or More-6  -CH       Row Name 07/28/25 1035          Functional Assessment    Outcome Measure Options AM-PAC 6 Clicks Daily Activity (OT)  -               User Key  (r) = Recorded By, (t) = Taken By, (c) = Cosigned By      Initials Name Provider Type     Demetria Mauro LPN Licensed Nurse    Juvenal Hussein, OTR/L Occupational Therapist                    Occupational Therapy Education       Title: PT OT SLP Therapies (In Progress)       Topic: Occupational Therapy (In Progress)       Point: ADL training (Done)       Learning Progress Summary            Patient Acceptance, E, VU,NR by MM at 7/28/2025 1440    Acceptance, E, NR by MM at 7/27/2025 1504   Family Acceptance, E, VU,NR by MM at 7/28/2025 1440    Acceptance, E, NR by MM at 7/27/2025 1504                      Point: Precautions (Done)       Learning Progress Summary            Patient Acceptance, E, VU,NR by MM at 7/28/2025 1440    Acceptance, E, NR by MM at 7/27/2025 1504   Family Acceptance, E, VU,NR by MM at 7/28/2025 1440    Acceptance, E, NR by MM at 7/27/2025 1504                      Point: Body mechanics (Done)       Learning Progress Summary            Patient Acceptance, E, VU,NR by MM at 7/28/2025 1440    Acceptance, E, NR by MM at 7/27/2025 1504   Family Acceptance, E, VU,NR by MM at 7/28/2025 1440    Acceptance, E, NR by MM at 7/27/2025 1504                                      User Key       Initials Effective Dates Name Provider Type [facility] 07/11/23 -  Juvenal Aguilar, OTR/L Occupational Therapist OT                  OT Recommendation and  Plan  Planned Therapy Interventions (OT): activity tolerance training, BADL retraining, functional balance retraining, occupation/activity based interventions, patient/caregiver education/training, ROM/therapeutic exercise, strengthening exercise, transfer/mobility retraining, adaptive equipment training, cognitive/visual perception retraining, neuromuscular control/coordination retraining, passive ROM/stretching  Therapy Frequency (OT): 5 times/wk  Plan of Care Review  Plan of Care Reviewed With: patient, daughter  Progress: no change  Outcome Evaluation: OT tx completed. Pt was up on BSC when OT entered the room with PCA and pt's daughter present. Pt was CGA for sit to stand t/f. Pt was CGA for functional mobility BSC to bed with RW and bed to BSC to bed with HHA x1. Pt was min A for bed mobility, except max-dependent x2 for scooting with glide pad. Pt was dependent to wash hair with shampoo shower cap, comb hair, and doff/don socks. Pt was min-mod A to doff/don hospital gown. Pt was supervision with set up to brush teeth and wash face. Pt was dependent for toilet hygiene. Continue OT POC.     Time Calculation:         Time Calculation- OT       Row Name 07/28/25 1035             Time Calculation- OT    OT Start Time 1035  -MM      OT Stop Time 1132  -MM      OT Time Calculation (min) 57 min  -MM      Total Timed Code Minutes- OT 57 minute(s)  -MM      OT Received On 07/28/25  -MM         Timed Charges    62918 - OT Self Care/Mgmt Minutes 57  -MM         Total Minutes    Timed Charges Total Minutes 57  -MM       Total Minutes 57  -MM                User Key  (r) = Recorded By, (t) = Taken By, (c) = Cosigned By      Initials Name Provider Type    Juvenal Hussein OTR/L Occupational Therapist                  Therapy Charges for Today       Code Description Service Date Service Provider Modifiers Qty    00145939282 HC OT SELF CARE/MGMT/TRAIN EA 15 MIN 7/27/2025 Juvenal Aguilar OTR/L GO 1    03895490960 HC OT  EVAL LOW COMPLEXITY 4 7/27/2025 Juvenal Aguilar, OTR/L GO 1    [id number] HC OT SELF CARE/MGMT/TRAIN EA 15 MIN 7/28/2025 Juvenal Aguilar, OTR/L GO 4                 Juvenal Aguilar, OTR/L  7/28/2025

## 2025-07-28 NOTE — THERAPY TREATMENT NOTE
Acute Care - Physical Therapy Treatment Note  [facility]     Patient Name: Sondra Connolly  : 1945  MRN: [id number]  Today's Date: 2025      Visit Dx:     ICD-10-CM ICD-9-CM   1. Paroxysmal A-fib  I48.0 427.31   2. Atrial fibrillation with rapid ventricular response  I48.91 427.31   3. Hyponatremia  E87.1 276.1   4. Acute renal failure superimposed on chronic kidney disease, unspecified acute renal failure type, unspecified CKD stage  N17.9 584.9    N18.9 585.9   5. Atypical atrial flutter  I48.4 427.32   6. Esophageal dysphagia  R13.19 787.29   7. Impaired mobility [Z74.09]  Z74.09 799.89     Patient Active Problem List   Diagnosis    Mixed hyperlipidemia    Coronary artery disease involving coronary bypass graft of native heart without angina pectoris    Paroxysmal atrial fibrillation    Essential hypertension    Anxiety about health    Coumadin toxicity    Chronic combined systolic and diastolic congestive heart failure    S/P CABG x 4  Dr YADIEL Lau    Status post insertion of drug-eluting stent into right coronary artery for coronary artery disease    History of cardioversion    Current use of long term anticoagulation    Mitral valve regurgitation    Diabetes mellitus    Bradycardia, sinus    HUMPHREY (dyspnea on exertion)    Hyponatremia    S/P TAVR (transcatheter aortic valve replacement)    Atrial flutter with rapid ventricular response    Muscle weakness (generalized)    Vascular dementia with behavior disturbance    Atrial fibrillation with rapid ventricular response    CRISTIANO (acute kidney injury)    Moderate protein-calorie malnutrition     Past Medical History:   Diagnosis Date    A-fib     Abnormal nuclear stress test 2019    Abnormal stress test 2022    Added automatically from request for surgery [id number]      Angina pectoris     Anxiety     Aortic valve stenosis 2017    Arthritis     Atherosclerosis of coronary artery bypass graft     Carotid artery occlusion without  infarction     Chronic heart failure with preserved ejection fraction (HFpEF) 03/21/2024    Chronic kidney disease     Chronic lung disease     Colon polyp     Diabetes mellitus     BLOOD SUGARS ARE UNDER CONTROL OFF OF ALL MEDS FOR A LONG TIME PER PT AND SON    DJD (degenerative joint disease)     Essential hypertension 03/24/2017    Fibrocystic breast disease     Gastritis     GI bleed     Hemorrhoids     Hyperlipidemia     Hypertension     Lung disease     chronic    MI (myocardial infarction)     Palpitations     PUD (peptic ulcer disease)     PVD (peripheral vascular disease)     Renal failure, acute     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     Past Surgical History:   Procedure Laterality Date    ABLATION OF DYSRHYTHMIC FOCUS      May 2021 in [place]      AORTIC VALVE REPAIR/REPLACEMENT N/A 3/19/2024    Procedure: Transfemoral Transcatheter Aortic Valve Replacement;  Surgeon: Bhavik Cuellar MD;  Location:  [facility];  Service: Cardiovascular;  Laterality: N/A;    AORTIC VALVE REPAIR/REPLACEMENT Bilateral 3/19/2024    Procedure: TRANSFEMORAL TRANSCATHETER AORTIC VALVE REPLACEMENT;  Surgeon: Reed Abdi MD;  Location:  [facility];  Service: Cardiothoracic;  Laterality: Bilateral;    APPENDECTOMY      CARDIAC CATHETERIZATION  05/01/2009    Left-Heart Skin    CARDIAC CATHETERIZATION N/A 2/4/2021    Procedure: Left Heart Cath;  Surgeon: Tristan Cabello DO;  Location:  [facility];  Service: Cardiology;  Laterality: N/A;    CARDIAC CATHETERIZATION Right 2/5/2021    Procedure: Left Heart Cath, rotablator to RCA with Dr. Abrams at 1PM;  Surgeon: Tristan Cabello DO;  Location:  [facility];  Service: Cardiology;  Laterality: Right;    CARDIAC CATHETERIZATION N/A 12/22/2023    Procedure: Left Heart Cath;  Surgeon: Gualberto Gallo MD;  Location:  [facility];  Service: Cardiology;  Laterality: N/A;    CAROTID ENDARTERECTOMY      CATARACT  "EXTRACTION      CATARACT EXTRACTION      COLONOSCOPY      CORONARY ARTERY BYPASS GRAFT  07/2007    Four    ENDOSCOPY  10/02/2013    ENDOSCOPY  10/02/2013    HYSTERECTOMY      SKIN CANCER EXCISION      THROMBOENDARTERECTOMY      TONSILLECTOMY       PT Assessment (Last 12 Hours)       PT Evaluation and Treatment       Row Name 07/28/25 1333          Physical Therapy Time and Intention    Subjective Information complains of;weakness;fatigue  -SHAMIR     Document Type therapy note (daily note)  -SHAMIR     Mode of Treatment physical therapy  -SHAMIR     Comment pt requires encouragement for mobility.  states \"i can't\", but then is able to perform  -SHAMIR       Row Name 07/28/25 1333          General Information    Existing Precautions/Restrictions fall  -SHAMIR       Row Name 07/28/25 1333          Pain    Pretreatment Pain Rating 0/10 - no pain  -SHAMIR     Posttreatment Pain Rating 0/10 - no pain  -SHAMIR       Row Name 07/28/25 1333          Bed Mobility    Supine-Sit Stephens (Bed Mobility) verbal cues;minimum assist (75% patient effort)  -SHAMIR       Row Name 07/28/25 1333          Transfers    Transfers sit-stand transfer;stand-sit transfer  -SHAMIR       Row Name 07/28/25 1333          Sit-Stand Transfer    Sit-Stand Stephens (Transfers) verbal cues;contact guard  -SHAMIR     Assistive Device (Sit-Stand Transfers) walker, front-wheeled  -SHAMIR       Row Name 07/28/25 1333          Stand-Sit Transfer    Stand-Sit Stephens (Transfers) verbal cues;contact guard  -SHAMIR     Assistive Device (Stand-Sit Transfers) walker, front-wheeled  -SHAMIR       Row Name 07/28/25 1333          Gait/Stairs (Locomotion)    Stephens Level (Gait) verbal cues;contact guard  -SHAMIR     Assistive Device (Gait) walker, front-wheeled  -SHMAIR     Distance in Feet (Gait) 50  repeated encouragement to continue with amb  -SHAMIR       Row Name             Wound Left posterior elbow Other (Comments)    Wound - Properties Group Present on Original Admission: Y  -ML Side: Left  -ML " Orientation: posterior  -ML Location: elbow  -ML Primary Wound Type: Other  -ML, tear     Retired Wound - Properties Group Present on Original Admission: Y  -ML Side: Left  -ML Orientation: posterior  -ML Location: elbow  -ML    Retired Wound - Properties Group Present on Original Admission: Y  -ML Side: Left  -ML Orientation: posterior  -ML Location: elbow  -ML    Retired Wound - Properties Group Present on Original Admission: Y  -ML Side: Left  -ML Location: elbow  -ML      Row Name 07/28/25 1333          Positioning and Restraints    Pre-Treatment Position in bed  -SHAMIR     Post Treatment Position chair  -SHAMIR     In Chair reclined;call light within reach;encouraged to call for assist;with family/caregiver  -SHAMIR               User Key  (r) = Recorded By, (t) = Taken By, (c) = Cosigned By      Initials Name Provider Type    SHAMIR Rohan Hammond, PTA Physical Therapist Assistant    Leila Go RN Registered Nurse                    Physical Therapy Education       Title: PT OT SLP Therapies (In Progress)       Topic: Physical Therapy (Done)       Point: Mobility training (Done)       Learning Progress Summary            Patient Acceptance, E,D, DU,VU,NR by  at 7/27/2025 1516    Comment: benefits of PT and POC, call for A OOB                      Point: Precautions (Done)       Learning Progress Summary            Patient Acceptance, E,D, DU,VU,NR by  at 7/27/2025 1516    Comment: benefits of PT and POC, call for A OOB                                      User Key       Initials Effective Dates Name Provider Type Discipline     02/03/23 -  Francesco Rothman, PT Physical Therapist PT                  PT Recommendation and Plan         Outcome Measures       Row Name 07/28/25 1333             How much help from another person do you currently need...    Turning from your back to your side while in flat bed without using bedrails? 3  -SHAMIR      Moving from lying on back to sitting on the side of a flat bed without  bedrails? 3  -SHAMIR      Moving to and from a bed to a chair (including a wheelchair)? 3  -SHAMIR      Standing up from a chair using your arms (e.g., wheelchair, bedside chair)? 3  -SHAMIR      Climbing 3-5 steps with a railing? 2  -SHAMIR      To walk in hospital room? 3  -SHAMIR      AM-PAC 6 Clicks Score (PT) 17  -SHAMIR         Functional Assessment    Outcome Measure Options AM-PAC 6 Clicks Basic Mobility (PT)  -SHAMIR                User Key  (r) = Recorded By, (t) = Taken By, (c) = Cosigned By      Initials Name Provider Type    Rohan Gaspar PTA Physical Therapist Assistant                     Time Calculation:    PT Charges       Row Name 07/28/25 1333             Time Calculation    Start Time 1333  -SHAMIR      Stop Time 1356  -SHAMIR      Time Calculation (min) 23 min  -SHAMIR      PT Received On 07/28/25  -SHAMIR         Time Calculation- PT    Total Timed Code Minutes- PT 23 minute(s)  -SHAMIR         Timed Charges    36649 - Gait Training Minutes  23  -SHAMIR         Total Minutes    Timed Charges Total Minutes 23  -SHAMIR       Total Minutes 23  -SHAMIR                User Key  (r) = Recorded By, (t) = Taken By, (c) = Cosigned By      Initials Name Provider Type    Rohan Gaspar PTA Physical Therapist Assistant                  Therapy Charges for Today       Code Description Service Date Service Provider Modifiers Qty    12923892504 HC GAIT TRAINING EA 15 MIN 7/28/2025 Rohan Hammond PTA GP 2            PT G-Codes  Outcome Measure Options: AM-PAC 6 Clicks Basic Mobility (PT)  AM-PAC 6 Clicks Score (PT): 17  AM-PAC 6 Clicks Score (OT): 14    Rohan Hammond PTA  7/28/2025

## 2025-07-28 NOTE — PROGRESS NOTES
"    [facility] Medicine Services  INPATIENT PROGRESS NOTE    Patient Name: Sondra Connolly  Date of Admission: 7/26/2025  Today's Date: 07/28/25  Length of Stay: 1  Primary Care Physician: Matthieu Bhakta DO    Subjective   Chief Complaint: Rapid heart rate    Today: Started on NS infusion yesterday for Na 125, improved to 127 yesterday evening, this morning back down to 125. Starting Sodium Chloride tabs 1gm q4hr, recheck Na at 1600, Na 129, added tolvaptan 15mg x 1 dose.  Patient has vascular dementia with episodes of severe agitation and psychosis which she currently is having.  Refusing to take any medications, throwing her dinner tray on the floor, hitting the nurse hitting this provider when trying to talk her down.  Added olanzapine 5 mg IM to be given for agitation, if not improving after at least 1 hour, instructed nurse she may give Haldol dose ordered as a second line treatment for continued agitation.  Patient currently is hazard to herself and others.  She thinks that she is in her house and that the staff are \"invading her house and trying to steal her stuff and trying to kill her\".  In her current state of mind, there is no success with reorientation or redirection.  Will conservatively use olanzapine and/or Haldol for her severe agitation.  Once patient has calmed down and agitation is improved, nursing staff will attempt to administer tolvaptan dose for her hyponatremia.  Will recheck labs in the morning as not to wake her during the night if she does go to sleep.      Hemoglobin stable at 10.0, creatinine 1.49->1.6->1.46 today. Yesterday was started on amiodarone loading dose oral 400 mg for the next 10 days, tolerating well. HR controlled in 70's, continue Eliquis 2.5 mg every 12 hours and Plavix 75 mg daily.  Vital signs are stable, patient remains afebrile and on room air.    Review of Systems   All pertinent negatives and positives are as above. All other systems " have been reviewed and are negative unless otherwise stated.     Objective    Temp:  [96.9 °F (36.1 °C)-98.9 °F (37.2 °C)] 98.9 °F (37.2 °C)  Heart Rate:  [] 100  Resp:  [16-20] 20  BP: (110-127)/(72-79) 118/75  Physical Exam  Vitals and nursing note reviewed.   Constitutional:       Comments: Appears underweight, frail   HENT:      Head: Normocephalic. Contusion present.      Jaw: There is normal jaw occlusion.        Comments: Bruise noted to the left outer eyelid and eyebrow area, reportedly due to fall 2 days ago at the nursing home.     Nose: Nose normal.      Mouth/Throat:      Mouth: Mucous membranes are dry.   Eyes:      Extraocular Movements: Extraocular movements intact.      Conjunctiva/sclera: Conjunctivae normal.      Pupils: Pupils are equal, round, and reactive to light.      Comments: Bruise to left outer eyelid and eyebrow ridge from fall 2 days ago   Cardiovascular:      Rate and Rhythm: Regular rhythm.  Normal sinus rhythm on telemetry     Pulses: Normal pulses.           Dorsalis pedis pulses are 2+ on the right side and 2+ on the left side.        Posterior tibial pulses are 2+ on the right side and 2+ on the left side.      Heart sounds: Normal heart sounds. No murmur heard.     No friction rub. No gallop.      Comments: Normal sinus rhythm rate 86 bpm on telemetry  Pulmonary:      Effort: Pulmonary effort is normal. No respiratory distress.      Breath sounds: Normal breath sounds. No wheezing, rhonchi or rales.   Abdominal:      General: Abdomen is flat. Bowel sounds are normal.      Palpations: Abdomen is soft.   Genitourinary:     Comments: Deferred  Musculoskeletal:         General: No swelling or tenderness.      Cervical back: Normal range of motion.   Feet:      Right foot:      Skin integrity: Skin integrity normal.      Left foot:      Skin integrity: Skin integrity normal.   Skin:     General: Skin is warm and dry.      Capillary Refill: Capillary refill takes 2 to 3 seconds.  "  Neurological:      General: No focal deficit present.      Mental Status: She is alert.  She is currently having aggressive behavior secondary to her vascular dementia.  She is disoriented.      Motor: Weakness present.      Gait: Gait abnormal.   Psychological:      Currently in a state of psychosis with severe agitation, secondary to her vascular dementia, very agitated and combative, throwing her dinner tray on the floor, throwing items from her table at the nursing staff, slapping at this provider when trying to talk with her, she is paranoid and delusional currently thinking that staff is in her house stealing her stuff and trying to kill her.  She does have a history of vascular dementia with agitation behaviors but this is the first episode that we have seen since she has been in the hospital.        Results Review:  I have reviewed the labs, radiology results, and diagnostic studies.    Laboratory Data:   Results from last 7 days   Lab Units 07/28/25  0515 07/27/25  0521 07/26/25  1413   WBC 10*3/mm3 8.45 7.07 7.50   HEMOGLOBIN g/dL 10.0* 10.0* 9.9*   HEMATOCRIT % 31.4* 31.9* 30.6*   PLATELETS 10*3/mm3 260 278 243        Results from last 7 days   Lab Units 07/28/25  1549 07/28/25  0515 07/27/25  1653 07/27/25  0521 07/26/25  2316   SODIUM mmol/L 129* 125* 127* 125* 128*   POTASSIUM mmol/L  --  4.6  --  4.6 4.4   CHLORIDE mmol/L  --  98  --  95* 98   CO2 mmol/L  --  14.0*  --  15.0* 16.0*   BUN mg/dL  --  29.8*  --  31.3* 30.4*   CREATININE mg/dL  --  1.46*  --  1.61* 1.60*   CALCIUM mg/dL  --  7.9*  --  8.5* 8.8   BILIRUBIN mg/dL  --  0.4  --  0.3 0.4   ALK PHOS U/L  --  99  --  102 109   ALT (SGPT) U/L  --  60*  --  44* 42*   AST (SGOT) U/L  --  50*  --  43* 39*   GLUCOSE mg/dL  --  112*  --  128* 155*       Culture Data:   No results found for: \"BLOODCX\", \"URINECX\", \"WOUNDCX\", \"MRSACX\", \"RESPCX\", \"STOOLCX\"    Radiology Data:   Imaging Results (Last 24 Hours)       ** No results found for the last 24 " hours. **            I have reviewed the patient's current medications.     Assessment/Plan   Assessment  Active Hospital Problems    Diagnosis     **Atrial flutter with rapid ventricular response     CRISTIANO (acute kidney injury)     Muscle weakness (generalized)     Hyponatremia     Vascular dementia with behavior disturbance     Chronic combined systolic and diastolic congestive heart failure     Anxiety about health     Current use of long term anticoagulation     Essential hypertension     Moderate protein-calorie malnutrition     Atrial fibrillation with rapid ventricular response        Treatment Plan  Atrial flutter/Atrial Fibrillation with rapid ventricular response: Controlled  Started on amiodarone drip in the emergency department  Transitioned off of amiodarone drip by electrophysiologist to oral amiodarone loading dose 400mg q12hr x 10 days, then dose will decrease to 200 mg q12hr.   cardiac electrophysiologist  consulted and continues to follow and manage   continue metoprolol succinate at decreased dose 12.5 mg   Continue Eliquis 2.5 mg twice daily, Plavix 75 mg daily  Vitals every 4 hours and as needed  EKG as needed for rate conversion to normal sinus rhythm or change in rhythm  Current use of long-term anticoagulation Eliquis  Secondary to chronic paroxysmal atrial fibrillation  Continue Eliquis 2.5 mg every 12 hours  Monitor for S/S of bleeding  Essential hypertension  Continue to hold Entresto until blood pressure improves.  Hold spironolactone until blood pressure can tolerate  Vitals every 4 hours and as needed  Notify hospitalist provider if becomes hypotensive and remains sustained  Anxiety about health  Patient with high level of anxiety about her surroundings, being in the hospital and healthcare in general  May be contributing to continued elevated heart rate  Continue home trazodone at decreased dose from 50 mg to 25 mg at night  Muscle weakness  Up with assistance  Fall  precautions  PT/OT to eval and treat  Vascular dementia with behavioral disturbance/severe agitation episodes  Patient baseline is confusion, per daughter on telephone patient is at her baseline at time of admission  Easily stimulated, try to reduce stimulation to patient by turning down lights, grouping cares and allowing rest  Became severely agitated this evening 7/28/2025, throwing food and objects at staff, yelling, pulling wires and trying to pull IV out, stating that staff is invading her house, stealing her things and trying to kill her.  Olanzapine 5 mg IM every 8 hours as needed as first-line treatment for severe agitation and psychosis  If after at least 1-2 hours post olanzapine administration patient's agitation has not improved, may give Haldol 2 mg IV.  Chronic combined systolic and diastolic congestive heart failure  Currently holding home Entresto due to hypotension, will restart when blood pressure improves  EF 41 to 45% on 2D echo 2/1/2021 and improved to 51-55 % on 2D echo 3/31/2025.  Continue metoprolol succinate 12.5 mg daily which is decreased from home dose of 25 mg daily if blood pressure tolerates, follow hold parameters    CRISTIANO with Hyponatremia  Cr 1.49 on admit,-->1.61-->1.46  Serum Na 125-->129 after starting sodium tablets however patient refusing tablets due to her agitation and psychosis state.  Start NS @ 100 ml/hr discontinued  Start Sodium tabs 1gm q4hr, received 2 doses, due to severe agitation secondary to vascular dementia psychosis she is refusing any further medications at this time.  Repeat .  Sodium tablets have been discontinued.  Once agitation is controlled and patient more amiable, tolvaptan 7.5 mg ordered p.o. x 1 dose for hyponatremia.  Will recheck CMP in the morning.  Repeat CMP daily to trend renal function and serum sodium    Medical Decision Making  Number and Complexity of problems: 8  2 acute problems, high complexity, stable, continue to monitor and treat  as indicated  1 acute on chronic problem, high complexity afib/aflutter w/RVR, controlled on Amiodarone po, stable  5 chronic problems, moderate complexity, stable and will continue to monitor and adjust med doses as appropriate  1 chronic problem, high complexity, unstable vascular dementia with psychosis and severe agitation episode today requiring olanzapine IM and/or Haldol IV due to patient's severe aggressive behavior and risk for self-harm.  Differential Diagnosis: None     Conditions and Status        Condition is improved.     MDM Data  External documents reviewed: Yes  Cardiac tracing (EKG, telemetry) interpretation: Reviewed reports  Radiology interpretation: Reviewed diagnostic imaging reports  Labs reviewed: Reviewed previous and current lab values  Any tests that were considered but not ordered: None     Decision rules/scores evaluated (example [id number], Wells, etc):      GUANAKITO?DS?-VASc Score for Atrial Fibrillation Stroke Risk - MDCalc  Calculated on Jul 26 2025 7:35 PM  7 points -> Stroke risk was 11.2% per year in >90,000 patients (the [place] Atrial Fibrillation Cohort Study) and 15.7% risk of stroke/TIA/systemic embolism.     Discussed with: Discussed plan of care with patient, daughter Radha and Dr. Hunter.  Discussed patient's episodic severe agitation and aggressive behavior with psychosis secondary to her vascular dementia which does happen according to her nursing home paperwork periodically.  Discussed medication treatment options to reduce patient's risk of harm to herself and to staff.  Orders placed     Care Planning  Shared decision making: Discussed with: Discussed plan of care with patient, rajni Garcia  and Dr. Hunter.    Code status and discussions: Called Radha Carl patient's daughter on admission who is his main surrogate decision maker, discussed CODE STATUS as nursing home paperwork states full code, daughter Radha verified patient is a DNR/DNI and states she filled out the  paperwork and signed it approximately 2 months ago at the nursing home.  I did inform Radha that it still states full code on the nursing home paperwork so she needs to address that with the nursing home.  CODE STATUS has been changed in hospital chart to reflect DNR/DNI.     Disposition  Social Determinants of Health that impact treatment or disposition: Patient from nursing home  Estimated length of stay is 1-2 day.         Electronically signed by RANDA Huertas, 07/28/25, 18:22 CDT.

## 2025-07-28 NOTE — PROGRESS NOTES
Nutrition Services    Patient Name:  Sondra Connolly  YOB: 1945  MRN: [id number]  Admit Date:  7/26/2025    Patient Name: Sondra Connolly  YOB: 1945  MRN: [id number]  Admission date: 7/26/2025  Reason for Encounter: MST 2-3 or Nursing Admission Screen    [facility] Clinical Nutrition Assessment     Subjective    Subjective Information     7/28: Met with patient who was resting in bed this day.  She was communicative, some confusion at times, however, expected r/t dementia dx.  Daughter at bedside.  SLP following and pt is on a pureed diet.  She reports some difficulty swallowing; daughter reports GI has been consulted.  She reports that pts mother had to have her esophagus stretched around this same age.  Pt is consuming ~17% at meals; ~420 cc/day.  Pt received 475 cc IVF.   cc x 1 day.  She states she is hungry but nothing sounds good.  Food ambassador coming by to take meals preferences.  Encouraged pt to pick what sounds good.  Doesn't like ONS because of the chalky taste.  Pt willing to try Boost Breeze which is clear.  Pt also willing to try fortified vanilla pudding. Boost Breeze TID (Provides 750 kcals, 27 g protein if consumed) and Fortified vanilla pudding daily provides 186 kcals and 6 g pro per day, if consumed.  Last BM unknown.  Pt has area to left posterior elbow; not a PI.  Glu range 112-128. No A1c noted.  Will continue to monitor and f/u as needed. Iban Browning, MS, RDN, LD     Objective   H&P and Current Problems      H&P  Past Medical History:   Diagnosis Date    A-fib     Abnormal nuclear stress test 08/23/2019    Abnormal stress test 12/28/2022    Added automatically from request for surgery [id number]      Angina pectoris     Anxiety     Aortic valve stenosis 03/24/2017    Arthritis     Atherosclerosis of coronary artery bypass graft     Carotid artery occlusion without infarction     Chronic heart failure with preserved ejection fraction (HFpEF)  03/21/2024    Chronic kidney disease     Chronic lung disease     Colon polyp     Diabetes mellitus     BLOOD SUGARS ARE UNDER CONTROL OFF OF ALL MEDS FOR A LONG TIME PER PT AND SON    DJD (degenerative joint disease)     Essential hypertension 03/24/2017    Fibrocystic breast disease     Gastritis     GI bleed     Hemorrhoids     Hyperlipidemia     Hypertension     Lung disease     chronic    MI (myocardial infarction)     Palpitations     PUD (peptic ulcer disease)     PVD (peripheral vascular disease)     Renal failure, acute     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      Past Surgical History:   Procedure Laterality Date    ABLATION OF DYSRHYTHMIC FOCUS      May 2021 in [place]      AORTIC VALVE REPAIR/REPLACEMENT N/A 3/19/2024    Procedure: Transfemoral Transcatheter Aortic Valve Replacement;  Surgeon: Bhavik Cuellar MD;  Location:  [facility];  Service: Cardiovascular;  Laterality: N/A;    AORTIC VALVE REPAIR/REPLACEMENT Bilateral 3/19/2024    Procedure: TRANSFEMORAL TRANSCATHETER AORTIC VALVE REPLACEMENT;  Surgeon: Reed Abdi MD;  Location:  [facility];  Service: Cardiothoracic;  Laterality: Bilateral;    APPENDECTOMY      CARDIAC CATHETERIZATION  05/01/2009    Left-Heart Skin    CARDIAC CATHETERIZATION N/A 2/4/2021    Procedure: Left Heart Cath;  Surgeon: Tristan Cabello DO;  Location:  [facility];  Service: Cardiology;  Laterality: N/A;    CARDIAC CATHETERIZATION Right 2/5/2021    Procedure: Left Heart Cath, rotablator to RCA with Dr. Abrams at 1PM;  Surgeon: Tristan Cabello DO;  Location:  [facility];  Service: Cardiology;  Laterality: Right;    CARDIAC CATHETERIZATION N/A 12/22/2023    Procedure: Left Heart Cath;  Surgeon: Gualberto Gallo MD;  Location:  [facility];  Service: Cardiology;  Laterality: N/A;    CAROTID ENDARTERECTOMY      CATARACT EXTRACTION      CATARACT EXTRACTION      COLONOSCOPY      CORONARY ARTERY  "BYPASS GRAFT  07/2007    Four    ENDOSCOPY  10/02/2013    ENDOSCOPY  10/02/2013    HYSTERECTOMY      SKIN CANCER EXCISION      THROMBOENDARTERECTOMY      TONSILLECTOMY        Current Problems   Admission Diagnosis:  Atrial fibrillation with rapid ventricular response [I48.91]    Problem List:    Atrial flutter with rapid ventricular response    Essential hypertension    Anxiety about health    Chronic combined systolic and diastolic congestive heart failure    Current use of long term anticoagulation    Hyponatremia    Muscle weakness (generalized)    Vascular dementia with behavior disturbance    Atrial fibrillation with rapid ventricular response    CRISTIANO (acute kidney injury)         Anthropometrics       Height: 152.4 cm (60\")  Weight: 59.4 kg (131 lb) (1 sheet, 1 blanket) (07/28/25 0321)  Weight Method: Bed scale  BMI (Calculated): 25.6       Trending Weight Changes 07/28/25: weight gain of 19 lbs (16.9%) over 6 month(s)    Significant?  Yes       Weight History  Wt Readings from Last 20 Encounters:   07/28/25 0321 59.4 kg (131 lb)   07/27/25 0412 58.5 kg (128 lb 15.4 oz)   07/26/25 1404 55.7 kg (122 lb 11.2 oz)   07/24/25 0533 52.8 kg (116 lb 8 oz)   04/29/25 1352 50.8 kg (112 lb)   04/15/25 1458 52.6 kg (116 lb)   03/31/25 1237 52.6 kg (116 lb)   03/03/25 1506 52.6 kg (116 lb)   01/29/25 1423 50.8 kg (112 lb)   10/31/24 2052 51.3 kg (113 lb 1.5 oz)   04/24/24 1053 51.3 kg (113 lb)   04/15/24 0934 51.3 kg (113 lb)   04/15/24 1404 51.3 kg (113 lb)   03/20/24 0641 51.3 kg (113 lb)   03/19/24 0757 51.7 kg (113 lb 15.7 oz)   03/18/24 1131 52 kg (114 lb 10.2 oz)   02/02/24 1336 50.8 kg (112 lb)   12/16/23 1444 52.6 kg (116 lb)   12/15/23 1234 51.3 kg (113 lb)   11/27/23 1427 52.2 kg (115 lb)   10/20/23 1048 52.6 kg (116 lb)   07/21/23 1425 54.9 kg (121 lb)   04/23/23 0958 58.1 kg (128 lb)            Labs        Results from last 7 days   Lab Units 07/28/25  0515 07/27/25  1653 07/27/25  0521 07/26/25  2316 " 07/26/25  1413 07/24/25  0601   SODIUM mmol/L 125* 127* 125* 128* 131* 130*   POTASSIUM mmol/L 4.6  --  4.6 4.4 4.4 4.7   GLUCOSE mg/dL 112*  --  128* 155* 120* 84   BUN mg/dL 29.8*  --  31.3* 30.4* 27.0* 22.9   CREATININE mg/dL 1.46*  --  1.61* 1.60* 1.49* 1.54*   CALCIUM mg/dL 7.9*  --  8.5* 8.8 8.3* 9.2   PHOSPHORUS mg/dL  --   --   --   --   --  3.3   MAGNESIUM mg/dL 2.4  --  2.8*  --  2.0 2.0   ALBUMIN g/dL 3.2*  --  3.4* 3.6 3.4* 3.8   BILIRUBIN mg/dL 0.4  --  0.3 0.4 0.4 0.4   ALK PHOS U/L 99  --  102 109 70 59   AST (SGOT) U/L 50*  --  43* 39* 25 19   ALT (SGPT) U/L 60*  --  44* 42* 21 10     Results from last 7 days   Lab Units 07/28/25  0515 07/27/25  0521 07/26/25  1413   PLATELETS 10*3/mm3 260 278 243   HEMOGLOBIN g/dL 10.0* 10.0* 9.9*   HEMATOCRIT % 31.4* 31.9* 30.6*     Lab Results   Component Value Date    HGBA1C 5.90 (H) 02/01/2021          Medications       Scheduled Medications amiodarone, 400 mg, Oral, Q12H  apixaban, 2.5 mg, Oral, BID  clopidogrel, 75 mg, Oral, Daily  [Held by provider] metoprolol succinate XL, 12.5 mg, Oral, Daily  rosuvastatin, 10 mg, Oral, Nightly  sacubitril-valsartan, 1 tablet, Oral, BID  sodium chloride, 10 mL, Intravenous, Q12H  sodium chloride, 1 g, Oral, Q4H  [Held by provider] spironolactone, 25 mg, Oral, Daily  traZODone, 25 mg, Oral, Nightly        Infusions      PRN Medications   acetaminophen **OR** acetaminophen **OR** acetaminophen    aluminum-magnesium hydroxide-simethicone    senna-docusate sodium **AND** polyethylene glycol **AND** bisacodyl **AND** bisacodyl    calcium carbonate    Calcium Replacement - Follow Nurse / BPA Driven Protocol    Magnesium Standard Dose Replacement - Follow Nurse / BPA Driven Protocol    nitroglycerin    ondansetron ODT **OR** ondansetron    Phosphorus Replacement - Follow Nurse / BPA Driven Protocol    Potassium Replacement - Follow Nurse / BPA Driven Protocol    sodium chloride    sodium chloride     Physical Findings       Chewing/Swallowing  Teeth Status: Mouth/Teeth WDL: .WDL except, teeth Teeth Symptoms: tooth/teeth missing  Chewing/Swallowing Issues: SLP following   Edema                            Bowel Function  Stool Output  Perineal Care: diaper changed, absorbent brief/pad changed, catheter care provided (07/28/25 1000)  Last Bowel Movement:  (unknown)   I/Os  Intake & Output (last 3 days)         07/25 0701 07/26 0700 07/26 0701 07/27 0700 07/27 0701 07/28 0700 07/28 0701 07/29 0700    P.O.  120 720 120    I.V. (mL/kg)  150 (2.6) 800 (13.5)     IV Piggyback  500      Total Intake(mL/kg)  770 (13.2) 1520 (25.6) 120 (2)    Urine (mL/kg/hr)    100 (0.4)    Total Output    100    Net  +770 +1520 +20                   Lines, Drains, Airways, & Wounds       Active LDAs       Name Placement date Placement time Site Days Last dressing change    Peripheral IV 07/26/25 1523 20 G Anterior;Proximal;Right Forearm 07/26/25  1523  Forearm  1     Peripheral IV 07/27/25 22 G Anterior;Distal;Right Forearm 07/27/25  --  Forearm  1     External Urinary Catheter 07/26/25  2100  --  1     Wound Left posterior elbow Other (Comments) --  --  -- --                       Nutrition Focused Physical Exam     Trending [facility] 07/28/25:Completed this day.  See below.     Malnutrition Severity Assessment      Patient meets criteria for : Moderate (non-severe) Malnutrition (in the context of chronic disease.)  Malnutrition Type (Last 8 Hours)       Malnutrition Severity Assessment       Row Name 07/28/25 1121       Malnutrition Severity Assessment    Malnutrition Type Chronic Disease - Related Malnutrition      Row Name 07/28/25 1121       Muscle Loss    Loss of Muscle Mass Findings Moderate    Hinduism Region Moderate - slight depression    Clavicle Bone Region Moderate - some protrusion in females, visible in males    Acromion Bone Region Moderate - acromion may slightly protrude    Scapular Bone Region Moderate - mild depression, bones may show  slightly    Dorsal Hand Region Moderate - slight depression    Patellar Region Moderate - patella more prominent, less muscle definition around patella    Anterior Thigh Region Moderate - mild depression on inner thigh    Posterior Calf Region Moderate - some roundness, slight firmness      Row Name 07/28/25 1121       Fat Loss    Subcutaneous Fat Loss Findings Moderate    Orbital Region  Moderate -  somewhat hollowness, slightly dark circles    Upper Arm Region Moderate - some fat tissue, not ample      Row Name 07/28/25 1121       Criteria Met (Must meet criteria for severity in at least 2 of these categories: M Wasting, Fat Loss, Fluid, Secondary Signs, Wt. Status, Intake)    Patient meets criteria for  Moderate (non-severe) Malnutrition  in the context of chronic disease.                     Estimated Needs      Energy Requirements    Weight for Calculation 59.4kg   Method for Estimation  25-30 kcals/kg   Daily Needs (kcal/day) 9278-1140 cc/day   Protein Requirements    Weight for Calculation 59.4kg   Method for Estimation 1.0 gm/kg   Daily Needs (g/day) 59.4 gms pro/day   Fluid Requirements     Method for Estimation Per Clinical Status    Daily Needs (mL/day)      Current Nutrition Orders & Evaluation of Intake      Oral Nutrition     Food Allergies  and Intolerances Shellfish derived products   Current PO Diet Diet: Regular/House; Texture: Pureed (NDD 1); Fluid Consistency: Thin (IDDSI 0)   Oral Nutrition Supplement None     Trending % PO Intake 07/28/25:17% at meals     Enteral Nutrition     Current EN Order Patient isn't on Tube Feeding  Patient doesn't have any tube feeding modular orders     EN Route      EN Tolerance     EN Observation/Intake         Parenteral Nutrition     Current TPN Order    TPN Route    Lipids (mL/%/frequency)     Total # Days on TPN    TPN Observation/Intake       Assessment & Plan   Nutrition Diagnosis and Goals       Nutrition Diagnosis 1 Moderate chronic disease or condition  related malnutrition r/t clinical condition AEB MST score of 2, MSA results.            Goal(s) Increase PO Intake , Meets Estimated Needs , Accepts Oral Nutrition Supplement, Accepts Fortified Food, Tolerates PO Diet , No Significant Weight Loss, Goals of Care are Established, and Skin Integrity to Improve     Nutrition Intervention and Prescription       Intervention  Start oral nutrition supplement, Obtain food preferences, Advised alternate menu selections, Advised available snacks, Start fortified food, Continue to monitor for plan of care, and Completed NFPE      Diet Prescription     Supplement Prescription Boost Breeze TID (Provides 750 kcals, 27 g protein if consumed)   Fortified vanilla pudding daily provides 186 kcals and 6 g pro per day, if consumed.    Education Provided       Enteral Prescription        TPN Prescription      Monitoring/Evaluation       Monitor/Evaluation Per Protocol     RD Follow-Up Encounter 3-5 days     Electronically signed by:  Iban Browning RD  07/28/25 11:23 CDT      Electronically signed by:  Iban Browning RD  07/28/25 11:23 CDT

## 2025-07-28 NOTE — PLAN OF CARE
Goal Outcome Evaluation:  Plan of Care Reviewed With: patient           Outcome Evaluation: Pt. resting in bed. Son went home for the night. Pt. anxious and jumpy. Oriented to self only. Pt. hadn't voided this shift. Bladder scanned for 136ml. Straight cath returned 150ml. Pt. tolerated well. No c/o pain or discomfort. VSS. Bed alarm on. Safety maintained.

## 2025-07-28 NOTE — PLAN OF CARE
Problem: Adult Inpatient Plan of Care  Goal: Plan of Care Review  Recent Flowsheet Documentation  Taken 7/28/2025 1035 by Juvenal Aguilar, OTR/L  Progress: no change  Outcome Evaluation: OT tx completed. Pt was up on BSC when OT entered the room with PCA and pt's daughter present. Pt was CGA for sit to stand t/f. Pt was CGA for functional mobility BSC to bed with RW and bed to BSC to bed with HHA x1. Pt was min A for bed mobility, except max-dependent x2 for scooting with glide pad. Pt was dependent to wash hair with shampoo shower cap, comb hair, and doff/don socks. Pt was min-mod A to ff/don hospital gown. Pt was supervision with set up to brush teeth and wash face. Pt was dependent for toilet hygiene. Continue OT POC.

## 2025-07-29 LAB
ALBUMIN SERPL-MCNC: 3.3 G/DL (ref 3.5–5.2)
ALBUMIN/GLOB SERPL: 1.4 G/DL
ALP SERPL-CCNC: 112 U/L (ref 39–117)
ALT SERPL W P-5'-P-CCNC: 60 U/L (ref 1–33)
AMMONIA BLD-SCNC: 19 UMOL/L (ref 11–51)
ANION GAP SERPL CALCULATED.3IONS-SCNC: 14 MMOL/L (ref 5–15)
AST SERPL-CCNC: 44 U/L (ref 1–32)
BACTERIA UR QL AUTO: ABNORMAL /HPF
BASOPHILS # BLD AUTO: 0.03 10*3/MM3 (ref 0–0.2)
BASOPHILS NFR BLD AUTO: 0.4 % (ref 0–1.5)
BILIRUB SERPL-MCNC: 0.4 MG/DL (ref 0–1.2)
BILIRUB UR QL STRIP: ABNORMAL
BUN SERPL-MCNC: 26.4 MG/DL (ref 8–23)
BUN/CREAT SERPL: 18.3 (ref 7–25)
CALCIUM SPEC-SCNC: 8.6 MG/DL (ref 8.6–10.5)
CHLORIDE SERPL-SCNC: 104 MMOL/L (ref 98–107)
CK SERPL-CCNC: 159 U/L (ref 20–180)
CLARITY UR: ABNORMAL
CO2 SERPL-SCNC: 13 MMOL/L (ref 22–29)
COLOR UR: ABNORMAL
CREAT SERPL-MCNC: 1.44 MG/DL (ref 0.57–1)
DEPRECATED RDW RBC AUTO: 57.8 FL (ref 37–54)
EGFRCR SERPLBLD CKD-EPI 2021: 36.4 ML/MIN/1.73
EOSINOPHIL # BLD AUTO: 0.08 10*3/MM3 (ref 0–0.4)
EOSINOPHIL NFR BLD AUTO: 1 % (ref 0.3–6.2)
ERYTHROCYTE [DISTWIDTH] IN BLOOD BY AUTOMATED COUNT: 16.9 % (ref 12.3–15.4)
GLOBULIN UR ELPH-MCNC: 2.4 GM/DL
GLUCOSE SERPL-MCNC: 102 MG/DL (ref 65–99)
GLUCOSE UR STRIP-MCNC: ABNORMAL MG/DL
HCT VFR BLD AUTO: 33.2 % (ref 34–46.6)
HGB BLD-MCNC: 10.2 G/DL (ref 12–15.9)
HGB UR QL STRIP.AUTO: ABNORMAL
HYALINE CASTS UR QL AUTO: ABNORMAL /LPF
IMM GRANULOCYTES # BLD AUTO: 0.08 10*3/MM3 (ref 0–0.05)
IMM GRANULOCYTES NFR BLD AUTO: 1 % (ref 0–0.5)
KETONES UR QL STRIP: NEGATIVE
LEUKOCYTE ESTERASE UR QL STRIP.AUTO: ABNORMAL
LYMPHOCYTES # BLD AUTO: 1.01 10*3/MM3 (ref 0.7–3.1)
LYMPHOCYTES NFR BLD AUTO: 12.8 % (ref 19.6–45.3)
MAGNESIUM SERPL-MCNC: 2.4 MG/DL (ref 1.6–2.4)
MCH RBC QN AUTO: 29.4 PG (ref 26.6–33)
MCHC RBC AUTO-ENTMCNC: 30.7 G/DL (ref 31.5–35.7)
MCV RBC AUTO: 95.7 FL (ref 79–97)
MONOCYTES # BLD AUTO: 0.77 10*3/MM3 (ref 0.1–0.9)
MONOCYTES NFR BLD AUTO: 9.8 % (ref 5–12)
MYOGLOBIN SERPL-MCNC: 267 NG/ML (ref 25–58)
NEUTROPHILS NFR BLD AUTO: 5.9 10*3/MM3 (ref 1.7–7)
NEUTROPHILS NFR BLD AUTO: 75 % (ref 42.7–76)
NITRITE UR QL STRIP: POSITIVE
NRBC BLD AUTO-RTO: 0.3 /100 WBC (ref 0–0.2)
PH UR STRIP.AUTO: <=5 [PH] (ref 5–8)
PLATELET # BLD AUTO: 255 10*3/MM3 (ref 140–450)
PMV BLD AUTO: 9.8 FL (ref 6–12)
POTASSIUM SERPL-SCNC: 4 MMOL/L (ref 3.5–5.2)
PROT SERPL-MCNC: 5.7 G/DL (ref 6–8.5)
PROT UR QL STRIP: ABNORMAL
RBC # BLD AUTO: 3.47 10*6/MM3 (ref 3.77–5.28)
RBC # UR STRIP: ABNORMAL /HPF
REF LAB TEST METHOD: ABNORMAL
SODIUM SERPL-SCNC: 131 MMOL/L (ref 136–145)
SODIUM SERPL-SCNC: 137 MMOL/L (ref 136–145)
SP GR UR STRIP: 1.01 (ref 1–1.03)
SQUAMOUS #/AREA URNS HPF: ABNORMAL /HPF
TSH SERPL DL<=0.05 MIU/L-ACNC: 2.34 UIU/ML (ref 0.27–4.2)
UROBILINOGEN UR QL STRIP: ABNORMAL
WBC # UR STRIP: ABNORMAL /HPF
WBC NRBC COR # BLD AUTO: 7.87 10*3/MM3 (ref 3.4–10.8)

## 2025-07-29 PROCEDURE — 87077 CULTURE AEROBIC IDENTIFY: CPT | Performed by: NURSE PRACTITIONER

## 2025-07-29 PROCEDURE — 25010000002 HALOPERIDOL LACTATE PER 5 MG: Performed by: NURSE PRACTITIONER

## 2025-07-29 PROCEDURE — 83735 ASSAY OF MAGNESIUM: CPT | Performed by: NURSE PRACTITIONER

## 2025-07-29 PROCEDURE — 84443 ASSAY THYROID STIM HORMONE: CPT | Performed by: NURSE PRACTITIONER

## 2025-07-29 PROCEDURE — 83874 ASSAY OF MYOGLOBIN: CPT | Performed by: NURSE PRACTITIONER

## 2025-07-29 PROCEDURE — 81001 URINALYSIS AUTO W/SCOPE: CPT | Performed by: NURSE PRACTITIONER

## 2025-07-29 PROCEDURE — 97530 THERAPEUTIC ACTIVITIES: CPT

## 2025-07-29 PROCEDURE — 84295 ASSAY OF SERUM SODIUM: CPT | Performed by: NURSE PRACTITIONER

## 2025-07-29 PROCEDURE — 82550 ASSAY OF CK (CPK): CPT | Performed by: NURSE PRACTITIONER

## 2025-07-29 PROCEDURE — 85025 COMPLETE CBC W/AUTO DIFF WBC: CPT | Performed by: NURSE PRACTITIONER

## 2025-07-29 PROCEDURE — 93005 ELECTROCARDIOGRAM TRACING: CPT | Performed by: FAMILY MEDICINE

## 2025-07-29 PROCEDURE — 87186 SC STD MICRODIL/AGAR DIL: CPT | Performed by: NURSE PRACTITIONER

## 2025-07-29 PROCEDURE — 87086 URINE CULTURE/COLONY COUNT: CPT | Performed by: NURSE PRACTITIONER

## 2025-07-29 PROCEDURE — 80053 COMPREHEN METABOLIC PANEL: CPT | Performed by: NURSE PRACTITIONER

## 2025-07-29 PROCEDURE — 82140 ASSAY OF AMMONIA: CPT | Performed by: NURSE PRACTITIONER

## 2025-07-29 PROCEDURE — 93010 ELECTROCARDIOGRAM REPORT: CPT | Performed by: INTERNAL MEDICINE

## 2025-07-29 RX ORDER — DIGOXIN 0.25 MG/ML
500 INJECTION INTRAMUSCULAR; INTRAVENOUS ONCE
Status: COMPLETED | OUTPATIENT
Start: 2025-07-30 | End: 2025-07-30

## 2025-07-29 RX ORDER — OLANZAPINE 10 MG/2ML
5 INJECTION, POWDER, FOR SOLUTION INTRAMUSCULAR EVERY 4 HOURS PRN
Status: DISCONTINUED | OUTPATIENT
Start: 2025-07-29 | End: 2025-07-29

## 2025-07-29 RX ORDER — OLANZAPINE 10 MG/2ML
5 INJECTION, POWDER, FOR SOLUTION INTRAMUSCULAR EVERY 6 HOURS PRN
Status: DISCONTINUED | OUTPATIENT
Start: 2025-07-29 | End: 2025-08-01 | Stop reason: HOSPADM

## 2025-07-29 RX ORDER — DILTIAZEM HCL/D5W 125 MG/125
5-15 PLASTIC BAG, INJECTION (ML) INTRAVENOUS
Status: DISCONTINUED | OUTPATIENT
Start: 2025-07-29 | End: 2025-07-30

## 2025-07-29 RX ADMIN — ROSUVASTATIN 10 MG: 10 TABLET, FILM COATED ORAL at 20:15

## 2025-07-29 RX ADMIN — Medication 10 ML: at 20:15

## 2025-07-29 RX ADMIN — Medication 5 MG/HR: at 21:13

## 2025-07-29 RX ADMIN — Medication 12.5 MG/HR: at 22:28

## 2025-07-29 RX ADMIN — HALOPERIDOL LACTATE 2 MG: 5 INJECTION, SOLUTION INTRAMUSCULAR at 03:28

## 2025-07-29 RX ADMIN — SACUBITRIL AND VALSARTAN 1 TABLET: 24; 26 TABLET, FILM COATED ORAL at 20:15

## 2025-07-29 NOTE — PLAN OF CARE
Goal Outcome Evaluation:  Plan of Care Reviewed With: patient      Progress: no change     Anticipated Discharge Disposition (SLP): unknown    Patient seen to address diet tolerance. Patient seen over a portion of breakfast. Patient refused to consume puree breakfast options. Patient had a snack provided by family that was a soft solid. Patient consumed along with thin liquids. No immediate s/s aspiration noted. After apr 2 minutes, vocal changes and belching were noted. Continued suspected esophageal dysphagia. Unsure if patient or family would desire further evaluation of the esophagus and function, however patient nutritional intake will likely continue to be decreased due to nature of symptoms. Patient is safe to continue with puree solids and thin liquids. Consider further evaluation of the esophagus. ST will continue with education and diet modifications as indicated.     Maricarmen Salazar, SLP 7/29/2025 07:20 CDT

## 2025-07-29 NOTE — PLAN OF CARE
Problem: Adult Inpatient Plan of Care  Goal: Plan of Care Review  7/29/2025 0626 by Nurys Hyde RN  Outcome: Not Progressing  Flowsheets (Taken 7/29/2025 0626)  Progress: no change  Outcome Evaluation:   0330 on 7/28/25.  Patient became agitated   combative, trying to climb out of bed and pulling lines.  Haldol was given for agitation.  Patient was too agitated when lab came to draw and they were unable to get blood.  They will attempt later this morning.  VSS   safety maintained.  Plan of Care Reviewed With: patient  7/29/2025 0625 by Nurys Hyde RN  Outcome: Progressing  Flowsheets (Taken 7/29/2025 0624)  Outcome Evaluation: Patient responded well to zyprexa and was able to sleep until approx  Goal: Patient-Specific Goal (Individualized)  7/29/2025 0626 by Nurys Hyde RN  Outcome: Not Progressing  7/29/2025 0625 by Nurys Hyde RN  Outcome: Progressing  Goal: Absence of Hospital-Acquired Illness or Injury  7/29/2025 0626 by Nurys Hyde RN  Outcome: Not Progressing  7/29/2025 0625 by Nurys Hyde RN  Outcome: Progressing  Intervention: Identify and Manage Fall Risk  Recent Flowsheet Documentation  Taken 7/29/2025 0400 by Nurys Hyde RN  Safety Promotion/Fall Prevention: safety round/check completed  Taken 7/29/2025 0325 by Nurys Hyde RN  Safety Promotion/Fall Prevention: safety round/check completed  Taken 7/29/2025 0200 by Nurys Hyde RN  Safety Promotion/Fall Prevention:   safety round/check completed   activity supervised  Taken 7/29/2025 0000 by Nurys Hyde RN  Safety Promotion/Fall Prevention: safety round/check completed  Taken 7/28/2025 2200 by Nurys Hyde RN  Safety Promotion/Fall Prevention:   safety round/check completed   lighting adjusted  Taken 7/28/2025 2100 by Nurys Hyde RN  Safety Promotion/Fall Prevention: safety round/check completed  Taken 7/28/2025 2030 by Nurys Hyde RN  Safety Promotion/Fall Prevention: safety round/check completed  Taken 7/28/2025 2000 by Nurys Hyde  RN  Safety Promotion/Fall Prevention: safety round/check completed  Taken 7/28/2025 1900 by Nurys Hyde RN  Safety Promotion/Fall Prevention: safety round/check completed  Intervention: Prevent Skin Injury  Recent Flowsheet Documentation  Taken 7/29/2025 0325 by Nurys Hyde RN  Body Position: position changed independently  Taken 7/29/2025 0200 by Nurys Hyde RN  Body Position: position changed independently  Taken 7/28/2025 2030 by Nurys Hyde RN  Body Position: (patient has been agitated and is just now calm; will not attempt a turn at this time) position maintained  Skin Protection:   incontinence pads utilized   silicone foam dressing in place  Intervention: Prevent Infection  Recent Flowsheet Documentation  Taken 7/29/2025 0325 by Nurys Hyde RN  Infection Prevention: rest/sleep promoted  Goal: Optimal Comfort and Wellbeing  7/29/2025 0626 by Nurys Hyde RN  Outcome: Not Progressing  7/29/2025 0625 by Nurys Hyde RN  Outcome: Progressing  Intervention: Provide Person-Centered Care  Recent Flowsheet Documentation  Taken 7/29/2025 0325 by Nurys Hyde RN  Trust Relationship/Rapport:   emotional support provided   empathic listening provided   thoughts/feelings acknowledged  Goal: Readiness for Transition of Care  7/29/2025 0626 by Nurys Hyde RN  Outcome: Not Progressing  7/29/2025 0625 by Nurys Hyde RN  Outcome: Progressing     Problem: Fall Injury Risk  Goal: Absence of Fall and Fall-Related Injury  7/29/2025 0626 by Nurys Hyde RN  Outcome: Not Progressing  7/29/2025 0625 by Nurys Hyde RN  Outcome: Progressing  Intervention: Identify and Manage Contributors  Recent Flowsheet Documentation  Taken 7/28/2025 2030 by Nurys Hyde RN  Self-Care Promotion: independence encouraged  Intervention: Promote Injury-Free Environment  Recent Flowsheet Documentation  Taken 7/29/2025 0400 by Nurys Hyde RN  Safety Promotion/Fall Prevention: safety round/check completed  Taken 7/29/2025 0325 by Nurys Hyde  RN  Safety Promotion/Fall Prevention: safety round/check completed  Taken 7/29/2025 0200 by Nurys Hyde RN  Safety Promotion/Fall Prevention:   safety round/check completed   activity supervised  Taken 7/29/2025 0000 by Nurys Hyde RN  Safety Promotion/Fall Prevention: safety round/check completed  Taken 7/28/2025 2200 by Nurys Hyde RN  Safety Promotion/Fall Prevention:   safety round/check completed   lighting adjusted  Taken 7/28/2025 2100 by Nurys Hyde RN  Safety Promotion/Fall Prevention: safety round/check completed  Taken 7/28/2025 2030 by Nurys Hyde RN  Safety Promotion/Fall Prevention: safety round/check completed  Taken 7/28/2025 2000 by Nurys Hyde RN  Safety Promotion/Fall Prevention: safety round/check completed  Taken 7/28/2025 1900 by Nurys Hyde RN  Safety Promotion/Fall Prevention: safety round/check completed     Problem: Comorbidity Management  Goal: Blood Pressure in Desired Range  7/29/2025 0626 by Nurys Hyde RN  Outcome: Not Progressing  7/29/2025 0625 by Nurys Hyde RN  Outcome: Progressing     Problem: Skin Injury Risk Increased  Goal: Skin Health and Integrity  7/29/2025 0626 by Nurys Hyde RN  Outcome: Not Progressing  7/29/2025 0625 by Nurys Hyde RN  Outcome: Progressing  Intervention: Optimize Skin Protection  Recent Flowsheet Documentation  Taken 7/29/2025 0200 by Nurys Hyde RN  Activity Management: up to bedside commode  Taken 7/28/2025 2030 by Nurys Hyde RN  Pressure Reduction Techniques:   frequent weight shift encouraged   weight shift assistance provided  Pressure Reduction Devices: pressure-redistributing mattress utilized  Skin Protection:   incontinence pads utilized   silicone foam dressing in place     Problem: Malnutrition  Goal: Improved Nutritional Intake  7/29/2025 0626 by Nurys Hyde RN  Outcome: Not Progressing  7/29/2025 0625 by Nurys Hyde RN  Outcome: Progressing   Goal Outcome Evaluation:  Plan of Care Reviewed With: patient        Progress: no  change  Outcome Evaluation: 0330 on 7/28/25.  Patient became agitated; combative, trying to climb out of bed and pulling lines.  Haldol was given for agitation.  Patient was too agitated when lab came to draw and they were unable to get blood.  They will attempt later this morning.  VSS; safety maintained.

## 2025-07-29 NOTE — PLAN OF CARE
Goal Outcome Evaluation:  Plan of Care Reviewed With: patient, daughter        Progress: declining  Outcome Evaluation: PT tx completed. Pt checked on 3 times this date. Each time pt sleeping soundly, woke to PTAs voice on third attempt. Pt requires increased time and encouragement to participate. Min/mod x1 for supine to sit. CGA/min x1 for static sitting balance, sat EOB a total of 5 min then returned to bed herself, mod x1 to complete safely. Pt did require hygiene care and gown change after incontinent episode. Rolled L and R with min x1, nsg assisted. Reports pain during urination and blood noted in suction container. Nsg notifed. Will cont to follow.    Anticipated Discharge Disposition (PT): skilled nursing facility

## 2025-07-29 NOTE — THERAPY TREATMENT NOTE
Acute Care - Speech Language Pathology   Swallow Treatment Note [facility]     Patient Name: Sondra Connolly  : 1945  MRN: [id number]  Today's Date: 2025               Admit Date: 2025    Patient seen to address diet tolerance. Patient seen over a portion of breakfast. Patient refused to consume puree breakfast options. Patient had a snack provided by family that was a soft solid. Patient consumed along with thin liquids. No immediate s/s aspiration noted. After apr 2 minutes, vocal changes and belching were noted. Continued suspected esophageal dysphagia. Unsure if patient or family would desire further evaluation of the esophagus and function, however patient nutritional intake will likely continue to be decreased due to nature of symptoms. Patient is safe to continue with puree solids and thin liquids. Consider further evaluation of the esophagus. ST will continue with education and diet modifications as indicated.     Maricarmen Salazar, SLP 2025 07:23 CDT      Visit Dx:     ICD-10-CM ICD-9-CM   1. Paroxysmal A-fib  I48.0 427.31   2. Atrial fibrillation with rapid ventricular response  I48.91 427.31   3. Hyponatremia  E87.1 276.1   4. Acute renal failure superimposed on chronic kidney disease, unspecified acute renal failure type, unspecified CKD stage  N17.9 584.9    N18.9 585.9   5. Atypical atrial flutter  I48.4 427.32   6. Esophageal dysphagia  R13.19 787.29   7. Impaired mobility [Z74.09]  Z74.09 799.89     Patient Active Problem List   Diagnosis    Mixed hyperlipidemia    Coronary artery disease involving coronary bypass graft of native heart without angina pectoris    Paroxysmal atrial fibrillation    Essential hypertension    Anxiety about health    Coumadin toxicity    Chronic combined systolic and diastolic congestive heart failure    S/P CABG x 4  Dr YADIEL Lau    Status post insertion of drug-eluting stent into right coronary artery for coronary artery disease    History of  cardioversion    Current use of long term anticoagulation    Mitral valve regurgitation    Diabetes mellitus    Bradycardia, sinus    HUMPHREY (dyspnea on exertion)    Hyponatremia    S/P TAVR (transcatheter aortic valve replacement)    Atrial flutter with rapid ventricular response    Muscle weakness (generalized)    Vascular dementia with behavior disturbance    Atrial fibrillation with rapid ventricular response    CRISTAINO (acute kidney injury)    Moderate protein-calorie malnutrition     Past Medical History:   Diagnosis Date    A-fib     Abnormal nuclear stress test 08/23/2019    Abnormal stress test 12/28/2022    Added automatically from request for surgery [id number]      Angina pectoris     Anxiety     Aortic valve stenosis 03/24/2017    Arthritis     Atherosclerosis of coronary artery bypass graft     Carotid artery occlusion without infarction     Chronic heart failure with preserved ejection fraction (HFpEF) 03/21/2024    Chronic kidney disease     Chronic lung disease     Colon polyp     Diabetes mellitus     BLOOD SUGARS ARE UNDER CONTROL OFF OF ALL MEDS FOR A LONG TIME PER PT AND SON    DJD (degenerative joint disease)     Essential hypertension 03/24/2017    Fibrocystic breast disease     Gastritis     GI bleed     Hemorrhoids     Hyperlipidemia     Hypertension     Lung disease     chronic    MI (myocardial infarction)     Palpitations     PUD (peptic ulcer disease)     PVD (peripheral vascular disease)     Renal failure, acute     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     Past Surgical History:   Procedure Laterality Date    ABLATION OF DYSRHYTHMIC FOCUS      May 2021 in [place]      AORTIC VALVE REPAIR/REPLACEMENT N/A 3/19/2024    Procedure: Transfemoral Transcatheter Aortic Valve Replacement;  Surgeon: Bhavik Cuellar MD;  Location: [facility];  Service: Cardiovascular;  Laterality: N/A;    AORTIC VALVE REPAIR/REPLACEMENT Bilateral 3/19/2024    Procedure: TRANSFEMORAL TRANSCATHETER AORTIC VALVE  REPLACEMENT;  Surgeon: Reed Abdi MD;  Location:  [facility];  Service: Cardiothoracic;  Laterality: Bilateral;    APPENDECTOMY      CARDIAC CATHETERIZATION  05/01/2009    Left-Heart Skin    CARDIAC CATHETERIZATION N/A 2/4/2021    Procedure: Left Heart Cath;  Surgeon: Tristan Cabello DO;  Location:  [facility];  Service: Cardiology;  Laterality: N/A;    CARDIAC CATHETERIZATION Right 2/5/2021    Procedure: Left Heart Cath, rotablator to RCA with Dr. Abrams at 1PM;  Surgeon: Tristan Cabello DO;  Location:  [facility];  Service: Cardiology;  Laterality: Right;    CARDIAC CATHETERIZATION N/A 12/22/2023    Procedure: Left Heart Cath;  Surgeon: Gualberto Gallo MD;  Location:  [facility];  Service: Cardiology;  Laterality: N/A;    CAROTID ENDARTERECTOMY      CATARACT EXTRACTION      CATARACT EXTRACTION      COLONOSCOPY      CORONARY ARTERY BYPASS GRAFT  07/2007    Four    ENDOSCOPY  10/02/2013    ENDOSCOPY  10/02/2013    HYSTERECTOMY      SKIN CANCER EXCISION      THROMBOENDARTERECTOMY      TONSILLECTOMY         SLP Recommendation and Plan     SLP Diet Recommendation: puree, thin liquids (07/28/25 0815)  Recommended Precautions and Strategies: upright posture during/after eating, small bites of food and sips of liquid, alternate between small bites of food and sips of liquid, general aspiration precautions, reflux precautions (07/28/25 0815)  SLP Rec. for Method of Medication Administration: as tolerated (07/28/25 0815)     Monitor for Signs of Aspiration: yes, notify SLP if any concerns (07/28/25 0815)        Anticipated Discharge Disposition (SLP): unknown (07/28/25 0815)     Therapy Frequency (Swallow): PRN (07/28/25 0815)  Predicted Duration Therapy Intervention (Days): until discharge (07/28/25 0815)  Oral Care Recommendations: Oral Care BID/PRN, Toothbrush (07/28/25 0815)        Daily Summary of Progress (SLP): progress towards  functional goals is fair (07/28/25 0815)               Treatment Assessment (SLP): continued (07/28/25 0815)  Treatment Assessment Comments (SLP): see note (07/28/25 0815)  Plan for Continued Treatment (SLP): continue treatment per plan of care (07/28/25 0815)         Progress: no change      SWALLOW EVALUATION (Last 72 Hours)       SLP Adult Swallow Evaluation       Row Name 07/28/25 0815 07/27/25 8235                Rehab Evaluation    Document Type therapy note (daily note)  -MD evaluation  -MG       Subjective Information complains of;fatigue  -MD no complaints  -MG       Patient Observations alert;cooperative;agree to therapy  -MD alert;cooperative;agree to therapy  -MG       Patient/Family/Caregiver Comments/Observations No family present.  -MD Daughter present.  -MG       Evaluation Not Performed, Comment -- ad  -MG       Patient Effort adequate  -MD good  -MG       Symptoms Noted During/After Treatment none  -MD none  -MG          General Information    Patient Profile Reviewed -- yes  -MG       Pertinent History Of Current Problem -- Presented due to rapid HR. Fall 3 days ago with hematoma and skin tear. From [facility] CXR stable without acute process. History of dementia.  -MG       Current Method of Nutrition -- pureed;thin liquids  -MG       Precautions/Limitations, Vision -- WFL;for purposes of eval  -MG       Precautions/Limitations, Hearing -- WFL;for purposes of eval  -MG       Prior Level of Function-Communication -- cognitive-linguistic impairment  dementia  -MG       Prior Level of Function-Swallowing -- esophageal concerns  -MG       Plans/Goals Discussed with -- patient and family;agreed upon  -MG       Barriers to Rehab -- previous functional deficit;cognitive status;ineffective coping  -MG       Patient's Goals for Discharge -- --  to feel better  -MG       Family Goals for Discharge -- --  to increase intake for increased strength  -MG          Pain    Additional Documentation -- Pain  Scale: FACES Pre/Post-Treatment (Group)  -MG          Pain Scale: FACES Pre/Post-Treatment    Pain: FACES Scale, Pretreatment 0-->no hurt  -MD 0-->no hurt  -MG       Posttreatment Pain Rating 0-->no hurt  -MD 0-->no hurt  -MG          Oral Motor Structure and Function    Dentition Assessment -- natural, present and adequate  -MG       Secretion Management -- WNL/WFL  -MG       Mucosal Quality -- moist, healthy  -MG       Volitional Swallow -- WFL  -MG       Volitional Cough -- WFL  -MG          Oral Musculature and Cranial Nerve Assessment    Oral Motor General Assessment -- WFL  -MG          General Eating/Swallowing Observations    Respiratory Support Currently in Use -- --  -MG          Clinical Swallow Eval    Oral Prep Phase -- WFL  -MG       Oral Transit -- WFL  -MG       Oral Residue -- WFL  -MG       Pharyngeal Phase -- no overt signs/symptoms of pharyngeal impairment  -MG       Esophageal Phase -- suspected esophageal impairment  -MG       Clinical Swallow Evaluation Summary -- See note  -MG          Esophageal Phase Concerns    Esophageal Phase Concerns -- sensation of material sticking  -MG          SLP Evaluation Clinical Impression    SLP Swallowing Diagnosis -- functional oral phase;suspect chronic;esophageal dysphagia  -MG       Functional Impact -- risk of malnutrition;risk of dehydration;risk of aspiration/pneumonia  -MG       Rehab Potential/Prognosis, Swallowing -- adequate, monitor progress closely  -MG       Swallow Criteria for Skilled Therapeutic Interventions Met -- demonstrates skilled criteria  -MG          SLP Treatment Clinical Impressions    Treatment Assessment (SLP) continued  -MD --       Treatment Assessment Comments (SLP) see note  -MD --       Daily Summary of Progress (SLP) progress towards functional goals is fair  -MD --       Barriers to Overall Progress (SLP) Baseline deficits  -MD --       Plan for Continued Treatment (SLP) continue treatment per plan of care  -MD --        Care Plan Review risks/benefits reviewed;current/potential barriers reviewed;patient/other agree to care plan  -MD --          Recommendations    Therapy Frequency (Swallow) PRN  -MD PRN  -MG       Predicted Duration Therapy Intervention (Days) until discharge  -MD until discharge  -MG       SLP Diet Recommendation puree;thin liquids  -MD puree;thin liquids  -MG       Recommended Precautions and Strategies upright posture during/after eating;small bites of food and sips of liquid;alternate between small bites of food and sips of liquid;general aspiration precautions;reflux precautions  -MD upright posture during/after eating;small bites of food and sips of liquid;alternate between small bites of food and sips of liquid;general aspiration precautions;reflux precautions  -MG       Oral Care Recommendations Oral Care BID/PRN;Toothbrush  -MD Oral Care BID/PRN;Toothbrush  -MG       SLP Rec. for Method of Medication Administration as tolerated  -MD as tolerated  -MG       Monitor for Signs of Aspiration yes;notify SLP if any concerns  -MD yes;notify SLP if any concerns  -MG       Anticipated Discharge Disposition (SLP) unknown  -MD unknown  -MG          Swallow Goals (SLP)    Swallow LTGs Swallow Long Term Goal (free text)  -MD Swallow Long Term Goal (free text)  -MG       Swallow STGs diet tolerance goal selection (SLP);swallow compensatory strategies goal selection (SLP)  -MD diet tolerance goal selection (SLP);swallow compensatory strategies goal selection (SLP)  -MG       Diet Tolerance Goal Selection (SLP) Patient will tolerate trials of  -MD Patient will tolerate trials of  -MG       Swallow Compensatory Strategies Goal Selection (SLP) swallow compensatory strategies, SLP goal 1  -MD swallow compensatory strategies, SLP goal 1  -MG          (LTG) Swallow    (LTG) Swallow Patient will tolerate least restrictive diet without overt s/s of aspiration.  -MD Patient will tolerate least restrictive diet without overt s/s  of aspiration.  -MG       [place] (Swallow Long Term Goal) independently (over 90% accuracy)  -MD independently (over 90% accuracy)  -MG       Time Frame (Swallow Long Term Goal) by discharge  -MD by discharge  -MG       Barriers (Swallow Long Term Goal) none  -MD none  -MG       Progress/Outcomes (Swallow Long Term Goal) progress slower than expected  -MD new goal  -MG          (STG) Patient will tolerate trials of    Consistencies Trialed (Tolerate trials) pureed textures;thin liquids  -MD pureed textures;thin liquids  -MG       Desired Outcome (Tolerate trials) without signs/symptoms of aspiration;without signs of distress;with adequate oral prep/transit/clearance  -MD without signs/symptoms of aspiration;without signs of distress;with adequate oral prep/transit/clearance  -MG       [place] (Tolerate trials) independently (over 90% accuracy)  -MD independently (over 90% accuracy)  -MG       Time Frame (Tolerate trials) by discharge  -MD by discharge  -MG       Progress/Outcomes (Tolerate trials) progress slower than expected  -MD new goal  -MG          (STG) Swallow Compensatory Strategies Goal 1 (SLP)    Activity (Swallow Compensatory Strategies/Techniques Goal 1, SLP) reflux precautions;compensatory strategies;during meal intake  -MD reflux precautions;compensatory strategies;during meal intake  esophageal compensations  -MG       [place]/Accuracy (Swallow Compensatory Strategies/Techniques Goal 1, SLP) independently (over 90% accuracy)  -MD independently (over 90% accuracy)  -MG       Time Frame (Swallow Compensatory Strategies/Techniques Goal 1, SLP) by discharge  -MD by discharge  -MG       Barriers (Swallow Compensatory Strategies/Techniques Goal 1, SLP) none  -MD none  -MG       Progress/Outcomes (Swallow Compensatory Strategies/Techniques Goal 1, SLP) progress slower than expected  -MD new goal  -MG                 User Key  (r) = Recorded By, (t) = Taken By, (c) = Cosigned By      Initials  Name Effective Dates    MG Jenny Perdomo, MS [facility]-SLP 07/11/23 -     Maricarmen Perry, SLP 06/21/22 -                     EDUCATION  The patient has been educated in the following areas:   Dysphagia (Swallowing Impairment).        SLP GOALS       Row Name 07/28/25 0815 07/27/25 1249          (LTG) Swallow    (LTG) Swallow Patient will tolerate least restrictive diet without overt s/s of aspiration.  -MD Patient will tolerate least restrictive diet without overt s/s of aspiration.  -MG     [place] (Swallow Long Term Goal) independently (over 90% accuracy)  -MD independently (over 90% accuracy)  -MG     Time Frame (Swallow Long Term Goal) by discharge  -MD by discharge  -MG     Barriers (Swallow Long Term Goal) none  -MD none  -MG     Progress/Outcomes (Swallow Long Term Goal) progress slower than expected  -MD new goal  -MG        (STG) Patient will tolerate trials of    Consistencies Trialed (Tolerate trials) pureed textures;thin liquids  -MD pureed textures;thin liquids  -MG     Desired Outcome (Tolerate trials) without signs/symptoms of aspiration;without signs of distress;with adequate oral prep/transit/clearance  -MD without signs/symptoms of aspiration;without signs of distress;with adequate oral prep/transit/clearance  -MG     [place] (Tolerate trials) independently (over 90% accuracy)  -MD independently (over 90% accuracy)  -MG     Time Frame (Tolerate trials) by discharge  -MD by discharge  -MG     Progress/Outcomes (Tolerate trials) progress slower than expected  -MD new goal  -MG        (STG) Swallow Compensatory Strategies Goal 1 (SLP)    Activity (Swallow Compensatory Strategies/Techniques Goal 1, SLP) reflux precautions;compensatory strategies;during meal intake  -MD reflux precautions;compensatory strategies;during meal intake  esophageal compensations  -MG     [place]/Accuracy (Swallow Compensatory Strategies/Techniques Goal 1, SLP) independently (over 90% accuracy)  -MD  independently (over 90% accuracy)  -MG     Time Frame (Swallow Compensatory Strategies/Techniques Goal 1, SLP) by discharge  -MD by discharge  -MG     Barriers (Swallow Compensatory Strategies/Techniques Goal 1, SLP) none  -MD none  -MG     Progress/Outcomes (Swallow Compensatory Strategies/Techniques Goal 1, SLP) progress slower than expected  -MD new goal  -MG               User Key  (r) = Recorded By, (t) = Taken By, (c) = Cosigned By      Initials Name Provider Type    Jenny Anderson, MS CCC-SLP Speech and Language Pathologist    Maricarmen Perry, SLP Speech and Language Pathologist                         Time Calculation:       Therapy Charges for Today       Code Description Service Date Service Provider Modifiers Qty    01361026266 HC ST TREATMENT SWALLOW 2 7/28/2025 Maricarmen Salazar, SLP GN 1                 TOSHIA Osborne  7/29/2025

## 2025-07-29 NOTE — CASE MANAGEMENT/SOCIAL WORK
Continued Stay Note  [facility]     Patient Name: Sondra Connolly  MRN: [id number]  Today's Date: 7/29/2025    Admit Date: 7/26/2025        Discharge Plan       Row Name 07/29/25 1136       Plan    Plan Comments Per admissions at [facility], pt does have a bed hold and can return when medically stable.                   Discharge Codes    No documentation.                 Expected Discharge Date and Time       Expected Discharge Date Expected Discharge Time    Jul 28, 2025               SUSIE Buchanan

## 2025-07-29 NOTE — THERAPY TREATMENT NOTE
Acute Care - Physical Therapy Treatment Note  [facility]     Patient Name: Sondra Connolly  : 1945  MRN: [id number]  Today's Date: 2025      Visit Dx:     ICD-10-CM ICD-9-CM   1. Paroxysmal A-fib  I48.0 427.31   2. Atrial fibrillation with rapid ventricular response  I48.91 427.31   3. Hyponatremia  E87.1 276.1   4. Acute renal failure superimposed on chronic kidney disease, unspecified acute renal failure type, unspecified CKD stage  N17.9 584.9    N18.9 585.9   5. Atypical atrial flutter  I48.4 427.32   6. Esophageal dysphagia  R13.19 787.29   7. Impaired mobility [Z74.09]  Z74.09 799.89     Patient Active Problem List   Diagnosis    Mixed hyperlipidemia    Coronary artery disease involving coronary bypass graft of native heart without angina pectoris    Paroxysmal atrial fibrillation    Essential hypertension    Anxiety about health    Coumadin toxicity    Chronic combined systolic and diastolic congestive heart failure    S/P CABG x 4  Dr YADIEL Lau    Status post insertion of drug-eluting stent into right coronary artery for coronary artery disease    History of cardioversion    Current use of long term anticoagulation    Mitral valve regurgitation    Diabetes mellitus    Bradycardia, sinus    HUMPHREY (dyspnea on exertion)    Hyponatremia    S/P TAVR (transcatheter aortic valve replacement)    Atrial flutter with rapid ventricular response    Muscle weakness (generalized)    Vascular dementia with behavior disturbance    Atrial fibrillation with rapid ventricular response    CRISTIANO (acute kidney injury)    Moderate protein-calorie malnutrition     Past Medical History:   Diagnosis Date    A-fib     Abnormal nuclear stress test 2019    Abnormal stress test 2022    Added automatically from request for surgery [id number]      Angina pectoris     Anxiety     Aortic valve stenosis 2017    Arthritis     Atherosclerosis of coronary artery bypass graft     Carotid artery occlusion without  infarction     Chronic heart failure with preserved ejection fraction (HFpEF) 03/21/2024    Chronic kidney disease     Chronic lung disease     Colon polyp     Diabetes mellitus     BLOOD SUGARS ARE UNDER CONTROL OFF OF ALL MEDS FOR A LONG TIME PER PT AND SON    DJD (degenerative joint disease)     Essential hypertension 03/24/2017    Fibrocystic breast disease     Gastritis     GI bleed     Hemorrhoids     Hyperlipidemia     Hypertension     Lung disease     chronic    MI (myocardial infarction)     Palpitations     PUD (peptic ulcer disease)     PVD (peripheral vascular disease)     Renal failure, acute     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     Past Surgical History:   Procedure Laterality Date    ABLATION OF DYSRHYTHMIC FOCUS      May 2021 in [place]      AORTIC VALVE REPAIR/REPLACEMENT N/A 3/19/2024    Procedure: Transfemoral Transcatheter Aortic Valve Replacement;  Surgeon: Bhavik Cuellar MD;  Location:  [facility];  Service: Cardiovascular;  Laterality: N/A;    AORTIC VALVE REPAIR/REPLACEMENT Bilateral 3/19/2024    Procedure: TRANSFEMORAL TRANSCATHETER AORTIC VALVE REPLACEMENT;  Surgeon: Reed Abdi MD;  Location:  [facility];  Service: Cardiothoracic;  Laterality: Bilateral;    APPENDECTOMY      CARDIAC CATHETERIZATION  05/01/2009    Left-Heart Skin    CARDIAC CATHETERIZATION N/A 2/4/2021    Procedure: Left Heart Cath;  Surgeon: Tristan Cabello DO;  Location:  [facility];  Service: Cardiology;  Laterality: N/A;    CARDIAC CATHETERIZATION Right 2/5/2021    Procedure: Left Heart Cath, rotablator to RCA with Dr. Abrams at 1PM;  Surgeon: Tristan Cabello DO;  Location:  [facility];  Service: Cardiology;  Laterality: Right;    CARDIAC CATHETERIZATION N/A 12/22/2023    Procedure: Left Heart Cath;  Surgeon: Gualberto Gallo MD;  Location:  [facility];  Service: Cardiology;  Laterality: N/A;    CAROTID ENDARTERECTOMY      CATARACT  EXTRACTION      CATARACT EXTRACTION      COLONOSCOPY      CORONARY ARTERY BYPASS GRAFT  07/2007    Four    ENDOSCOPY  10/02/2013    ENDOSCOPY  10/02/2013    HYSTERECTOMY      SKIN CANCER EXCISION      THROMBOENDARTERECTOMY      TONSILLECTOMY       PT Assessment (Last 12 Hours)       PT Evaluation and Treatment       Row Name 07/29/25 1400          Physical Therapy Time and Intention    Subjective Information complains of;weakness;pain;fatigue  -LY     Document Type therapy note (daily note)  -LY     Mode of Treatment physical therapy  -LY       Row Name 07/29/25 1400          General Information    Existing Precautions/Restrictions fall  -LY       Row Name 07/29/25 1400          Pain    Pain Management Interventions nursing notified  -LY     Response to Pain Interventions no change per patient report  -LY     Pre/Posttreatment Pain Comment reports pain with urination  -LY       Row Name 07/29/25 1400          Pain Scale: FACES Pre/Post-Treatment    Pain: FACES Scale, Pretreatment 0-->no hurt  -LY     Posttreatment Pain Rating 4-->hurts little more  -LY       Row Name 07/29/25 1400          Bed Mobility    Bed Mobility rolling left;rolling right  -LY     Rolling Left [place] (Bed Mobility) verbal cues;minimum assist (75% patient effort)  -LY     Rolling Right [place] (Bed Mobility) verbal cues;minimum assist (75% patient effort)  -LY     Scooting/Bridging [place] (Bed Mobility) maximum assist (25% patient effort);dependent (less than 25% patient effort);2 person assist;verbal cues  -LY     Supine-Sit [place] (Bed Mobility) verbal cues;minimum assist (75% patient effort);moderate assist (50% patient effort)  -LY     Sit-Supine [place] (Bed Mobility) verbal cues;moderate assist (50% patient effort)  -LY     Assistive Device (Bed Mobility) bed rails;head of bed elevated;repositioning sheet  -LY       Row Name 07/29/25 1400          Transfers    Comment, (Transfers) did not attempt d/t decreased  safety  -LY       Row Name 07/29/25 1400          Balance    Comment, Balance CGA/min x1 for supine to sit, pt required encouragement to try and sit EOB longer  -LY       Row Name             Wound Left posterior elbow Other (Comments)    Wound - Properties Group Present on Original Admission: Y  -ML Side: Left  -ML Orientation: posterior  -ML Location: elbow  -ML Primary Wound Type: Other  -ML, tear     Retired Wound - Properties Group Present on Original Admission: Y  -ML Side: Left  -ML Orientation: posterior  -ML Location: elbow  -ML    Retired Wound - Properties Group Present on Original Admission: Y  -ML Side: Left  -ML Orientation: posterior  -ML Location: elbow  -ML    Retired Wound - Properties Group Present on Original Admission: Y  -ML Side: Left  -ML Location: elbow  -ML      Row Name 07/29/25 1400          Plan of Care Review    Plan of Care Reviewed With patient;daughter  -LY     Progress declining  -LY     Outcome Evaluation PT tx completed. Pt checked on 3 times this date. Each time pt sleeping soundly, woke to PTAs voice on third attempt. Pt requires increased time and encouragement to participate. Min/mod x1 for supine to sit. CGA/min x1 for static sitting balance, sat EOB a total of 5 min then returned to bed herself, mod x1 to complete safely. Pt did require hygiene care and gown change after incontinent episode. Rolled L and R with min x1, nsg assisted. Reports pain during urination and blood noted in suction container. Nsg notifed. Will cont to follow.  -LY       Row Name 07/29/25 1400          Positioning and Restraints    Pre-Treatment Position in bed  -LY     Post Treatment Position bed  -LY     In Bed side lying left;fowlers;call light within reach;encouraged to call for assist;exit alarm on;with family/caregiver  -LY               User Key  (r) = Recorded By, (t) = Taken By, (c) = Cosigned By      Initials Name Provider Type    Trinity Carter, PTA Physical Therapist Assistant      Leila Smith, RN Registered Nurse                    Physical Therapy Education       Title: PT OT SLP Therapies (In Progress)       Topic: Physical Therapy (Done)       Point: Mobility training (Done)       Learning Progress Summary            Patient Acceptance, E,D, DU,VU,NR by  at 7/27/2025 1516    Comment: benefits of PT and POC, call for A OOB                      Point: Precautions (Done)       Learning Progress Summary            Patient Acceptance, E,D, DU,VU,NR by  at 7/27/2025 1516    Comment: benefits of PT and POC, call for A OOB                                      User Key       Initials Effective Dates Name Provider Type Discipline     02/03/23 -  Francesco Rothman, PT Physical Therapist PT                  PT Recommendation and Plan  Anticipated Discharge Disposition (PT): skilled nursing facility  Plan of Care Reviewed With: patient, daughter  Progress: declining  Outcome Evaluation: PT tx completed. Pt checked on 3 times this date. Each time pt sleeping soundly, woke to PTAs voice on third attempt. Pt requires increased time and encouragement to participate. Min/mod x1 for supine to sit. CGA/min x1 for static sitting balance, sat EOB a total of 5 min then returned to bed herself, mod x1 to complete safely. Pt did require hygiene care and gown change after incontinent episode. Rolled L and R with min x1, nsg assisted. Reports pain during urination and blood noted in suction container. Nsg notifed. Will cont to follow.   Outcome Measures       Row Name 07/28/25 3059             How much help from another person do you currently need...    Turning from your back to your side while in flat bed without using bedrails? 3  -SHAMIR      Moving from lying on back to sitting on the side of a flat bed without bedrails? 3  -SHAMIR      Moving to and from a bed to a chair (including a wheelchair)? 3  -SHAMIR      Standing up from a chair using your arms (e.g., wheelchair, bedside chair)? 3  -SHAMIR      Climbing 3-5 steps  with a railing? 2  -SHAMIR      To walk in hospital room? 3  -SHAMIR      AM-PAC 6 Clicks Score (PT) 17  -SHAMIR         Functional Assessment    Outcome Measure Options AM-PAC 6 Clicks Basic Mobility (PT)  -SHAMIR                User Key  (r) = Recorded By, (t) = Taken By, (c) = Cosigned By      Initials Name Provider Type    SHAMIR Rohan Hammond, CURTIS Physical Therapist Assistant                     Time Calculation:    PT Charges       Row Name 07/29/25 1518             Time Calculation    Start Time 1400  -LY      Stop Time 1428  -LY      Time Calculation (min) 28 min  -LY      PT Received On 07/29/25  -LY         Time Calculation- PT    Total Timed Code Minutes- PT 28 minute(s)  -LY         Timed Charges    70997 - PT Therapeutic Activity Minutes 28  -LY         Total Minutes    Timed Charges Total Minutes 28  -LY       Total Minutes 28  -LY                User Key  (r) = Recorded By, (t) = Taken By, (c) = Cosigned By      Initials Name Provider Type    LY Trinity Capellan PTA Physical Therapist Assistant                  Therapy Charges for Today       Code Description Service Date Service Provider Modifiers Qty    [id number] HC PT THERAPEUTIC ACT EA 15 MIN 7/29/2025 Trinity Capellan PTA GP 2            PT G-Codes  Outcome Measure Options: AM-PAC 6 Clicks Basic Mobility (PT)  AM-PAC 6 Clicks Score (PT): 18  AM-PAC 6 Clicks Score (OT): 14    Trinity Capellan PTA  7/29/2025

## 2025-07-29 NOTE — PROGRESS NOTES
[facility] Medicine Services  INPATIENT PROGRESS NOTE    Patient Name: Sondra Connolly  Date of Admission: 7/26/2025  Today's Date: 07/29/25  Length of Stay: 3  Primary Care Physician: Matthieu Bhakta DO    Subjective   Chief Complaint: Rapid heart rate    Today: Nurse reports patient has notable hematuria today, UA with culture ordered.  Patient does not have Miles catheter and so unsure why she has new hematuria.  She is on Eliquis and Plavix.  Hemoglobin stable this morning 10.2, sodium improved 131, creatinine improved to 1.44.  Was hoping to be able to discharge her from the hospital today with follow-up labs outpatient however, due to new hematuria, urine ordered and will keep overnight.  I feel the longer the patient is in the hospital the worse her psychosis and agitation will get.      Discussed patient's new symptoms with Dr. Reed who initially put patient on amiodarone drip transition to oral amiodarone.  Although she has vascular dementia with some behavior issues, her son witnessed it last night and stated she normally does not get that aggressive.  Could be secondary to being in a different environment that she is not familiar with however liver enzymes were elevated again today which is not her baseline, significant hematuria which was new starting this afternoon, hallucinations psychosis all of these being possible side effects or adverse effects to amiodarone.  After speaking with Dr. Reed per secure chat, agreeable to discontinue amiodarone, discontinue Eliquis, keep Plavix dose on currently.  She continues on telemetry so will monitor her heart rate to see if it starts elevating.    Sent UA to evaluate hematuria, evaluated for rhabdomyolysis.  Ammonia level 19, TSH 2.340 CK result 159, serum myoglobin 267, urine creatinine 154.9, urine osmolality 608, urine sodium<20.  UA significantly different than her admit UA, she has been without a Miles catheter and  urinating either in a commode or using pure wick at night.  UA sent in today red in color, turbid appearance, glucosuria, moderate blood, positive nitrites, 3+ large leukocytes, greater than 300 protein, small amount of bilirubin, RBCs too numerous to count, WBCs 21-50, bacteria 4+, urine culture reflexed and is pending.   Hemoglobin stable at 10.2, creatinine 1.49->1.6->1.46-->1.33 today.  Na 125-->129-->131-->137,  vital signs are stable, patient remains afebrile and on room air.    Review of Systems   All pertinent negatives and positives are as above. All other systems have been reviewed and are negative unless otherwise stated.     Objective    Temp:  [97.4 °F (36.3 °C)-98.7 °F (37.1 °C)] 98.7 °F (37.1 °C)  Heart Rate:  [] 131  Resp:  [16-18] 18  BP: (102-133)/(60-92) 118/67  Physical Exam  Vitals and nursing note reviewed.   Constitutional:       Comments: Appears underweight, frail   HENT:      Head: Normocephalic. Contusion present.      Jaw: There is normal jaw occlusion.        Comments: Bruise noted to the left outer eyelid and eyebrow area, reportedly due to fall 2 days ago at the nursing home.     Nose: Nose normal.      Mouth/Throat:      Mouth: Mucous membranes are dry.   Eyes:      Extraocular Movements: Extraocular movements intact.      Conjunctiva/sclera: Conjunctivae normal.      Pupils: Pupils are equal, round, and reactive to light.      Comments: Bruise to left outer eyelid and eyebrow ridge from fall 2 days ago   Cardiovascular:      Rate and Rhythm: Regular rhythm.  Normal sinus rhythm on telemetry     Pulses: Normal pulses.           Dorsalis pedis pulses are 2+ on the right side and 2+ on the left side.        Posterior tibial pulses are 2+ on the right side and 2+ on the left side.      Heart sounds: Normal heart sounds. No murmur heard.     No friction rub. No gallop.      Comments: Normal sinus rhythm rate 86 bpm on telemetry  Pulmonary:      Effort: Pulmonary effort is normal. No  respiratory distress.      Breath sounds: Normal breath sounds. No wheezing, rhonchi or rales.   Abdominal:      General: Abdomen is flat. Bowel sounds are normal.      Palpations: Abdomen is soft.   Genitourinary:     Comments: Deferred  Musculoskeletal:         General: No swelling or tenderness.      Cervical back: Normal range of motion.   Feet:      Right foot:      Skin integrity: Skin integrity normal.      Left foot:      Skin integrity: Skin integrity normal.   Skin:     General: Skin is warm and dry.      Capillary Refill: Capillary refill takes 2 to 3 seconds.   Neurological:      General: No focal deficit present.      Mental Status: She is alert.  She is currently having aggressive behavior secondary to her vascular dementia.  She is disoriented.      Motor: Weakness present.      Gait: Gait abnormal.   Psychological:      Today she remains slightly agitated and does not want anybody messing with her however she is not being aggressive and combative and has been sleeping a bit today likely secondary to receiving Haldol around 230 this morning.  Results Review:  I have reviewed the labs, radiology results, and diagnostic studies.    Laboratory Data:   Results from last 7 days   Lab Units 07/29/25  0615 07/28/25  0515 07/27/25  0521   WBC 10*3/mm3 7.87 8.45 7.07   HEMOGLOBIN g/dL 10.2* 10.0* 10.0*   HEMATOCRIT % 33.2* 31.4* 31.9*   PLATELETS 10*3/mm3 255 260 278        Results from last 7 days   Lab Units 07/29/25  1621 07/29/25  0615 07/28/25  1549 07/28/25  0515 07/27/25  1653 07/27/25  0521   SODIUM mmol/L 137 131* 129* 125*   < > 125*   POTASSIUM mmol/L  --  4.0  --  4.6  --  4.6   CHLORIDE mmol/L  --  104  --  98  --  95*   CO2 mmol/L  --  13.0*  --  14.0*  --  15.0*   BUN mg/dL  --  26.4*  --  29.8*  --  31.3*   CREATININE mg/dL  --  1.44*  --  1.46*  --  1.61*   CALCIUM mg/dL  --  8.6  --  7.9*  --  8.5*   BILIRUBIN mg/dL  --  0.4  --  0.4  --  0.3   ALK PHOS U/L  --  112  --  99  --  102   ALT  "(SGPT) U/L  --  60*  --  60*  --  44*   AST (SGOT) U/L  --  44*  --  50*  --  43*   GLUCOSE mg/dL  --  102*  --  112*  --  128*    < > = values in this interval not displayed.       Culture Data:   No results found for: \"BLOODCX\", \"URINECX\", \"WOUNDCX\", \"MRSACX\", \"RESPCX\", \"STOOLCX\"  UA          10/31/2024    19:18 7/26/2025    15:22 7/29/2025    17:50   Urinalysis   Squamous Epithelial Cells, UA 0-2   0-2    Specific Gravity, UA 1.012  1.023  1.012    Ketones, UA Negative  Trace  Negative    Blood, UA Trace  Negative  Moderate (2+)    Leukocytes, UA Small (1+)  Negative  Large (3+)    Nitrite, UA Negative  Negative  Positive    RBC, UA None Seen   Too Numerous to Count    WBC, UA 0-2   21-50    Bacteria, UA None Seen   4+       Radiology Data:   Imaging Results (Last 24 Hours)       ** No results found for the last 24 hours. **            I have reviewed the patient's current medications.     Assessment/Plan   Assessment  Active Hospital Problems    Diagnosis     **Atrial flutter with rapid ventricular response     Acute UTI (urinary tract infection)     Moderate protein-calorie malnutrition     Atrial fibrillation with rapid ventricular response     CRISTIANO (acute kidney injury)     Muscle weakness (generalized)     Hyponatremia     Vascular dementia with behavior disturbance     Chronic combined systolic and diastolic congestive heart failure     Anxiety about health     Current use of long term anticoagulation     Essential hypertension        Treatment Plan  Atrial flutter/Atrial Fibrillation with rapid ventricular response: Controlled  Started on amiodarone drip in the emergency department  Transitioned off of amiodarone drip by electrophysiologist to oral amiodarone loading dose 400mg q12hr x 10 days, then dose will decrease to 200 mg q12hr.   cardiac electrophysiologist  consulted and continues to follow and manage   continue metoprolol succinate at decreased dose 12.5 mg   Continue Eliquis 2.5 mg twice " daily, Plavix 75 mg daily  Vitals every 4 hours and as needed  EKG as needed for rate conversion to normal sinus rhythm or change in rhythm  Current use of long-term anticoagulation Eliquis  Secondary to chronic paroxysmal atrial fibrillation  Continue Eliquis 2.5 mg every 12 hours  Monitor for S/S of bleeding  Essential hypertension  Continue to hold Entresto until blood pressure improves.  Hold spironolactone until blood pressure can tolerate  Vitals every 4 hours and as needed  Notify hospitalist provider if becomes hypotensive and remains sustained  Anxiety about health  Patient with high level of anxiety about her surroundings, being in the hospital and healthcare in general  May be contributing to continued elevated heart rate  Continue home trazodone at decreased dose from 50 mg to 25 mg at night  Muscle weakness  Up with assistance  Fall precautions  PT/OT to eval and treat  Vascular dementia with behavioral disturbance/severe agitation episodes  Patient baseline is confusion, per daughter on telephone patient is at her baseline at time of admission  Easily stimulated, try to reduce stimulation to patient by turning down lights, grouping cares and allowing rest  Became severely agitated this evening 7/28/2025, throwing food and objects at staff, yelling, pulling wires and trying to pull IV out, stating that staff is invading her house, stealing her things and trying to kill her.  Olanzapine 5 mg IM every 8 hours as needed treatment for severe agitation and psychosis  may give Haldol 2 mg IV for agitation and combativeness as well as hallucinations and psychosis..  Chronic combined systolic and diastolic congestive heart failure  Currently holding home Entresto due to hypotension, will restart when blood pressure improves  EF 41 to 45% on 2D echo 2/1/2021 and improved to 51-55 % on 2D echo 3/31/2025.  Continue metoprolol succinate 12.5 mg daily which is decreased from home dose of 25 mg daily if blood  pressure tolerates, follow hold parameters    CRISTIANO with Hyponatremia  Cr 1.49 on admit,-->1.61-->1.46  Serum Na 125-->129 after starting sodium tablets however patient refusing tablets due to her agitation and psychosis state.  Start NS @ 100 ml/hr discontinued  Start Sodium tabs 1gm q4hr, received 2 doses, due to severe agitation psychosis and and combativeness, she refused any further medications last night until she calmed down.  Repeat . Sodium tablets have been discontinued.   tolvaptan 7.5 mg ordered p.o. x 1 dose for hyponatremia and was able to be given after patient calmed down last night.  Na Improved to 131 on labs this morning  Repeat CMP daily to trend renal function and serum sodium    Medical Decision Making  Number and Complexity of problems: 8  2 acute problems, high complexity, stable, continue to monitor and treat as indicated  1 acute on chronic problem, high complexity afib/aflutter w/RVR, controlled on Amiodarone po, stable  5 chronic problems, moderate complexity, stable and will continue to monitor and adjust med doses as appropriate  1 chronic problem, high complexity, unstable vascular dementia with psychosis and severe agitation episode today requiring olanzapine IM and/or Haldol IV due to patient's severe aggressive behavior and risk for self-harm.  Differential Diagnosis: None     Conditions and Status        Condition is improved.     [facility] Data  External documents reviewed: Yes  Cardiac tracing (EKG, telemetry) interpretation: Reviewed reports  Radiology interpretation: Reviewed diagnostic imaging reports  Labs reviewed: Reviewed previous and current lab values  Any tests that were considered but not ordered: None     Decision rules/scores evaluated (example [id number], Wells, etc):      GUANAKITO?DS?-VASc Score for Atrial Fibrillation Stroke Risk - MDCalc  Calculated on Jul 26 2025 7:35 PM  7 points -> Stroke risk was 11.2% per year in >90,000 patients (the [place] Atrial Fibrillation  Cohort Study) and 15.7% risk of stroke/TIA/systemic embolism.     Discussed with: Discussed plan of care with patient, daughter Radha and Dr. Hunter.  Discussed patient's episodic severe agitation and aggressive behavior with psychosis secondary to her vascular dementia which does happen according to her nursing home paperwork periodically.  Discussed medication treatment options to reduce patient's risk of harm to herself and to staff.  Orders placed     Care Planning  Shared decision making: Discussed with: Discussed plan of care with patient, daughter Radha  and Dr. Hunter.    Code status and discussions: Called Radha Carl patient's daughter on admission who is his main surrogate decision maker, discussed CODE STATUS as nursing home paperwork states full code, daughter Radha verified patient is a DNR/DNI and states she filled out the paperwork and signed it approximately 2 months ago at the nursing home.  I did inform Radha that it still states full code on the nursing home paperwork so she needs to address that with the nursing home.  CODE STATUS has been changed in hospital chart to reflect DNR/DNI.     Disposition  Social Determinants of Health that impact treatment or disposition: Patient from nursing home  Estimated length of stay is 1-2 day.       Electronically signed by RANDA Huertas, 07/30/25, 01:38 CDT.

## 2025-07-30 PROBLEM — N39.0 ACUTE UTI (URINARY TRACT INFECTION): Status: ACTIVE | Noted: 2025-07-30

## 2025-07-30 LAB
ALBUMIN SERPL-MCNC: 3.3 G/DL (ref 3.5–5.2)
ALBUMIN/GLOB SERPL: 1.5 G/DL
ALP SERPL-CCNC: 102 U/L (ref 39–117)
ALT SERPL W P-5'-P-CCNC: 56 U/L (ref 1–33)
ANION GAP SERPL CALCULATED.3IONS-SCNC: 13 MMOL/L (ref 5–15)
AST SERPL-CCNC: 36 U/L (ref 1–32)
BASOPHILS # BLD AUTO: 0.03 10*3/MM3 (ref 0–0.2)
BASOPHILS NFR BLD AUTO: 0.2 % (ref 0–1.5)
BILIRUB SERPL-MCNC: 0.5 MG/DL (ref 0–1.2)
BUN SERPL-MCNC: 17.5 MG/DL (ref 8–23)
BUN/CREAT SERPL: 14.1 (ref 7–25)
CALCIUM SPEC-SCNC: 8.5 MG/DL (ref 8.6–10.5)
CHLORIDE SERPL-SCNC: 102 MMOL/L (ref 98–107)
CO2 SERPL-SCNC: 17 MMOL/L (ref 22–29)
CREAT SERPL-MCNC: 1.24 MG/DL (ref 0.57–1)
DEPRECATED RDW RBC AUTO: 56.5 FL (ref 37–54)
EGFRCR SERPLBLD CKD-EPI 2021: 43.5 ML/MIN/1.73
EOSINOPHIL # BLD AUTO: 0.01 10*3/MM3 (ref 0–0.4)
EOSINOPHIL NFR BLD AUTO: 0.1 % (ref 0.3–6.2)
ERYTHROCYTE [DISTWIDTH] IN BLOOD BY AUTOMATED COUNT: 17.1 % (ref 12.3–15.4)
GLOBULIN UR ELPH-MCNC: 2.2 GM/DL
GLUCOSE SERPL-MCNC: 108 MG/DL (ref 65–99)
HCT VFR BLD AUTO: 33 % (ref 34–46.6)
HGB BLD-MCNC: 10.4 G/DL (ref 12–15.9)
IMM GRANULOCYTES # BLD AUTO: 0.1 10*3/MM3 (ref 0–0.05)
IMM GRANULOCYTES NFR BLD AUTO: 0.6 % (ref 0–0.5)
LYMPHOCYTES # BLD AUTO: 0.44 10*3/MM3 (ref 0.7–3.1)
LYMPHOCYTES NFR BLD AUTO: 2.7 % (ref 19.6–45.3)
MCH RBC QN AUTO: 28.9 PG (ref 26.6–33)
MCHC RBC AUTO-ENTMCNC: 31.5 G/DL (ref 31.5–35.7)
MCV RBC AUTO: 91.7 FL (ref 79–97)
MONOCYTES # BLD AUTO: 0.68 10*3/MM3 (ref 0.1–0.9)
MONOCYTES NFR BLD AUTO: 4.2 % (ref 5–12)
NEUTROPHILS NFR BLD AUTO: 14.92 10*3/MM3 (ref 1.7–7)
NEUTROPHILS NFR BLD AUTO: 92.2 % (ref 42.7–76)
NRBC BLD AUTO-RTO: 0 /100 WBC (ref 0–0.2)
PLATELET # BLD AUTO: 295 10*3/MM3 (ref 140–450)
PMV BLD AUTO: 9.7 FL (ref 6–12)
POTASSIUM SERPL-SCNC: 4.2 MMOL/L (ref 3.5–5.2)
PROT SERPL-MCNC: 5.5 G/DL (ref 6–8.5)
RBC # BLD AUTO: 3.6 10*6/MM3 (ref 3.77–5.28)
SODIUM SERPL-SCNC: 132 MMOL/L (ref 136–145)
WBC NRBC COR # BLD AUTO: 16.18 10*3/MM3 (ref 3.4–10.8)

## 2025-07-30 PROCEDURE — 25010000002 DIGOXIN PER 500 MCG: Performed by: FAMILY MEDICINE

## 2025-07-30 PROCEDURE — 25010000002 PIPERACILLIN SOD-TAZOBACTAM PER 1 G: Performed by: NURSE PRACTITIONER

## 2025-07-30 PROCEDURE — 25010000002 HALOPERIDOL LACTATE PER 5 MG: Performed by: NURSE PRACTITIONER

## 2025-07-30 PROCEDURE — 25010000002 HYALURONIDASE (HUMAN) 150 UNIT/ML SOLUTION 1 ML VIAL: Performed by: NURSE PRACTITIONER

## 2025-07-30 PROCEDURE — 85025 COMPLETE CBC W/AUTO DIFF WBC: CPT | Performed by: NURSE PRACTITIONER

## 2025-07-30 PROCEDURE — 80053 COMPREHEN METABOLIC PANEL: CPT | Performed by: NURSE PRACTITIONER

## 2025-07-30 PROCEDURE — 99232 SBSQ HOSP IP/OBS MODERATE 35: CPT | Performed by: STUDENT IN AN ORGANIZED HEALTH CARE EDUCATION/TRAINING PROGRAM

## 2025-07-30 RX ORDER — SODIUM BICARBONATE 650 MG/1
650 TABLET ORAL 2 TIMES DAILY
Status: DISCONTINUED | OUTPATIENT
Start: 2025-07-30 | End: 2025-08-01 | Stop reason: HOSPADM

## 2025-07-30 RX ORDER — LABETALOL HYDROCHLORIDE 5 MG/ML
10 INJECTION, SOLUTION INTRAVENOUS
Status: DISCONTINUED | OUTPATIENT
Start: 2025-07-30 | End: 2025-07-30

## 2025-07-30 RX ORDER — HALOPERIDOL 5 MG/ML
5 INJECTION INTRAMUSCULAR EVERY 4 HOURS PRN
Status: DISCONTINUED | OUTPATIENT
Start: 2025-07-30 | End: 2025-08-01 | Stop reason: HOSPADM

## 2025-07-30 RX ORDER — HALOPERIDOL 5 MG/ML
2 INJECTION INTRAMUSCULAR EVERY 4 HOURS PRN
Status: DISCONTINUED | OUTPATIENT
Start: 2025-07-30 | End: 2025-08-01 | Stop reason: HOSPADM

## 2025-07-30 RX ORDER — METOPROLOL TARTRATE 1 MG/ML
5 INJECTION, SOLUTION INTRAVENOUS
Status: DISCONTINUED | OUTPATIENT
Start: 2025-07-30 | End: 2025-08-01 | Stop reason: HOSPADM

## 2025-07-30 RX ORDER — METOPROLOL TARTRATE 25 MG/1
25 TABLET, FILM COATED ORAL EVERY 12 HOURS SCHEDULED
Status: DISCONTINUED | OUTPATIENT
Start: 2025-07-30 | End: 2025-07-30

## 2025-07-30 RX ORDER — METOPROLOL TARTRATE 50 MG
50 TABLET ORAL EVERY 12 HOURS SCHEDULED
Status: DISCONTINUED | OUTPATIENT
Start: 2025-07-30 | End: 2025-08-01 | Stop reason: HOSPADM

## 2025-07-30 RX ORDER — HALOPERIDOL 5 MG/ML
2 INJECTION INTRAMUSCULAR EVERY 4 HOURS PRN
Status: DISCONTINUED | OUTPATIENT
Start: 2025-07-30 | End: 2025-07-30

## 2025-07-30 RX ADMIN — PIPERACILLIN AND TAZOBACTAM 3.38 G: 3; .375 INJECTION, POWDER, FOR SOLUTION INTRAVENOUS at 20:57

## 2025-07-30 RX ADMIN — METOPROLOL TARTRATE 50 MG: 50 TABLET, FILM COATED ORAL at 20:57

## 2025-07-30 RX ADMIN — ROSUVASTATIN 10 MG: 10 TABLET, FILM COATED ORAL at 20:57

## 2025-07-30 RX ADMIN — CLOPIDOGREL BISULFATE 75 MG: 75 TABLET, FILM COATED ORAL at 09:06

## 2025-07-30 RX ADMIN — HALOPERIDOL LACTATE 5 MG: 5 INJECTION, SOLUTION INTRAMUSCULAR at 02:47

## 2025-07-30 RX ADMIN — DIGOXIN 500 MCG: 250 INJECTION, SOLUTION INTRAMUSCULAR; INTRAVENOUS; PARENTERAL at 00:13

## 2025-07-30 RX ADMIN — METOPROLOL TARTRATE 25 MG: 25 TABLET, FILM COATED ORAL at 02:39

## 2025-07-30 RX ADMIN — Medication 10 ML: at 09:06

## 2025-07-30 RX ADMIN — HYALURONIDASE (HUMAN RECOMBINANT) 150 UNITS: 150 INJECTION, SOLUTION SUBCUTANEOUS at 02:59

## 2025-07-30 RX ADMIN — SODIUM BICARBONATE 650 MG: 650 TABLET ORAL at 20:57

## 2025-07-30 RX ADMIN — Medication 10 ML: at 20:57

## 2025-07-30 NOTE — PROGRESS NOTES
"EP Problems:  1.  Paroxysmal atrial fibrillation  1a.  Atypical atrial flutter  -6/2021: PVI with Kenn   2.  Sinus bradycardia  3.  Paroxysmal SVT  4.  PVCs  -4/2021: Holter with 1.4% burden  5.  Severe left atrial enlargement  6.  IVCD     Cardiology Problems:  1.  Chronic combined systolic and diastolic heart failure  -EF 46 to 50%  2.  Severe aortic valve stenosis status post TAVR  3.  Moderate MR  4.  CAD status post CABG (2010)  5.  Hypertension  6.  Hyperlipidemia     Medical Problems:  1.  Type 2 diabetes  2.  Vascular dementia    Patient ID:  Sondra Connolly is a 80 y.o. female with problem list as above as above who EP is following for paroxysmal atrial fibrillation, atypical atrial flutter.    Subjective:  She has had more behavioral issues since being in the hospital.  Amiodarone had been discontinued due to slight elevation in hepatic enzyme.  She did have further episodes of atrial fibrillation RVR overnight.    Objective:  /65 (BP Location: Left arm, Patient Position: Lying)   Pulse 78   Temp 97.3 °F (36.3 °C) (Oral)   Resp 18   Ht 152.4 cm (60\")   Wt 59.2 kg (130 lb 8 oz)   SpO2 100%   BMI 25.49 kg/m²     Chronically ill-appearing, somnolent  Regular rate and rhythm  Warm, well-perfused      Labs today:  Lab Results   Component Value Date    GLUCOSE 108 (H) 07/30/2025    CALCIUM 8.5 (L) 07/30/2025     (L) 07/30/2025    K 4.2 07/30/2025    CO2 17.0 (L) 07/30/2025    BUN 17.5 07/30/2025    CREATININE 1.24 (H) 07/30/2025    EGFR 43.5 (L) 07/30/2025     Lab Results   Component Value Date    WBC 16.18 (H) 07/30/2025    HGB 10.4 (L) 07/30/2025    HCT 33.0 (L) 07/30/2025     07/30/2025     Lab Results   Component Value Date    MG 2.4 07/29/2025       Assessment:  Paroxysmal atrial fibrillation  Atypical atrial flutter  Hospital-acquired delirium  IVCD    I suspect that her acute issues are likely secondary to delirium as well as other medical issues rather than an amiodarone " induced.  However, given the concerns about potential medication effects and given that this is not her biggest issue right now, it is okay to discontinue her amiodarone for now and pursue a rate control strategy.  It may be reasonable to retrial this in the outpatient setting versus a long-term rate control strategy if she continues to have symptomatic episodes.  If she continues to have breakthrough episodes refractory to rate control, can consider pacemaker and AV node ablation though would avoid this at this time.    Plan:  - Agree with amiodarone discontinuation as above  - Increase with metoprolol tartrate 50 mg twice daily  - Not currently a candidate for any interventions for atrial fibrillation  - Consider pacemaker and AV node ablation in the future if episodes worsen    Part of this note may be an electronic transcription/translation of spoken language to printed text using the Dragon Dictation System.

## 2025-07-30 NOTE — PROGRESS NOTES
[facility] Clinical Pharmacy Services: Piperacillin-Tazobactam Consult  Sondra Connolly is a 80 y.o. female who has been consulted to dose Piperacillin-tazobactam (Zosyn) for Urinary Tract Infection (complicated)/pyelonephritis.    Current Antimicrobial Therapy:  piperacillin-tazobactam (ZOSYN) 3.375 g IVPB in 100 mL NS MBP (CD)    Relevant clinical data and objective history reviewed:  Wt: 59.2 kg (130 lb 8 oz); BMI: Body mass index is 25.49 kg/m².    Temp Readings from Last 1 Encounters:   07/30/25 97.6 °F (36.4 °C) (Oral)        Estimated Creatinine Clearance: 28.2 mL/min (A) (by C-G formula based on SCr of 1.24 mg/dL (H)).    Results from last 7 days   Lab Units 07/30/25  0340 07/29/25  0615 07/28/25  0515   CREATININE mg/dL 1.24* 1.44* 1.46*   WBC 10*3/mm3 16.18* 7.87 8.45       Microbiology Culture Results:  Culture results from last 30 days   Lab 07/29/25  1750   URINECX >100,000 CFU/mL Gram Negative Bacilli*       Assessment/Plan:  Initiate Piperacillin-tazobactam 3.375 g IV extended infusion every 8 hours  Pharmacy will continue to monitor renal function, clinical status and culture results and adjust the dose and/or frequency as needed.    Carolann Gomes, PharmD  7/30/2025  17:50 CDT

## 2025-07-30 NOTE — PLAN OF CARE
Goal Outcome Evaluation:  Plan of Care Reviewed With: patient           Outcome Evaluation: VSS no Afib this shift. Remains confused but cooperative. Assist with evening meal tolerated without diff. swallowing. Pt still c/o burning with urination but no blood noted in urine. Sitter at bedside for pt safety. Sinus 72-85 BBB per tele.

## 2025-07-30 NOTE — PROGRESS NOTES
[facility]  INPATIENT PROGRESS NOTE    Patient Name: Sondra Connolly  Date of Admission: 7/26/2025  Today's Date: 07/29/25  Length of Stay: 3  Primary Care Physician: Matthieu Bhakta DO    Subjective   Chief Complaint: Rapid heart rate    Today: Sent UA yesterday to evaluate acute onset hematuria, evaluated for rhabdomyolysis.  Ammonia level 19, TSH 2.340 CK result 159, serum myoglobin 267, urine creatinine 154.9, urine osmolality 608, urine sodium<20.  UA color has improved, clearing up, she has been without a Miles catheter and urinating either in a commode or using pure wick at night.  UA was red in color, turbid appearance, glucosuria, moderate blood, positive nitrites, 3+ large leukocytes, greater than 300 protein, small amount of bilirubin, RBCs too numerous to count, WBCs 21-50, bacteria 4+, urine culture reflexed preliminary result is greater than 100,000 gram-negative bacilli: Started [facility] pharmacy to dose, will add  this antibiotic as it has a fairly decent coverage of gram-negative organisms in the urine. awaiting final sensitivity.  Hemoglobin stable at 10.2, creatinine 1.49->1.6->1.46-->1.3 sodium bicarb tablet 650 mg p.o. twice daily started today 3 today.  Na 125-->129-->131-->137-->132,  vital signs are stable, patient remains afebrile and on room air.    Review of Systems   All pertinent negatives and positives are as above. All other systems have been reviewed and are negative unless otherwise stated.     Objective    Temp:  [97.3 °F (36.3 °C)-98.7 °F (37.1 °C)] 97.6 °F (36.4 °C)  Heart Rate:  [] 76  Resp:  [16-18] 18  BP: ()/(57-92) 109/57  Physical Exam  Vitals and nursing note reviewed.   Constitutional:       Comments: Appears underweight, frail   HENT:      Head: Normocephalic. Contusion present.      Jaw: There is normal jaw occlusion.        Comments: Bruise noted to the left outer eyelid and eyebrow area, reportedly  due to fall 2 days ago at the nursing home.     Nose: Nose normal.      Mouth/Throat:      Mouth: Mucous membranes are dry.   Eyes:      Extraocular Movements: Extraocular movements intact.      Conjunctiva/sclera: Conjunctivae normal.      Pupils: Pupils are equal, round, and reactive to light.      Comments: Bruise to left outer eyelid and eyebrow ridge from fall 2 days ago   Cardiovascular:      Rate and Rhythm: Regular rhythm.  Normal sinus rhythm on telemetry     Pulses: Normal pulses.           Dorsalis pedis pulses are 2+ on the right side and 2+ on the left side.        Posterior tibial pulses are 2+ on the right side and 2+ on the left side.      Heart sounds: Normal heart sounds. No murmur heard.     No friction rub. No gallop.      Comments: Normal sinus rhythm rate 86 bpm on telemetry  Pulmonary:      Effort: Pulmonary effort is normal. No respiratory distress.      Breath sounds: Normal breath sounds. No wheezing, rhonchi or rales.   Abdominal:      General: Abdomen is flat. Bowel sounds are normal.      Palpations: Abdomen is soft.   Genitourinary:     Comments: Deferred  Musculoskeletal:         General: No swelling or tenderness.      Cervical back: Normal range of motion.   Feet:      Right foot:      Skin integrity: Skin integrity normal.      Left foot:      Skin integrity: Skin integrity normal.   Skin:     General: Skin is warm and dry.      Capillary Refill: Capillary refill takes 2 to 3 seconds.   Neurological:      General: No focal deficit present.      Mental Status: She is alert.  She is currently having aggressive behavior secondary to her vascular dementia.  She is disoriented.      Motor: Weakness present.      Gait: Gait abnormal.   Psychological:     She got agitated again last night, around 330 this morning had to receive a dose of Haldol.  Results Review:  I have reviewed the labs, radiology results, and diagnostic studies.    Laboratory Data:   Results from last 7 days   Lab Units  "07/30/25  0340 07/29/25  0615 07/28/25  0515   WBC 10*3/mm3 16.18* 7.87 8.45   HEMOGLOBIN g/dL 10.4* 10.2* 10.0*   HEMATOCRIT % 33.0* 33.2* 31.4*   PLATELETS 10*3/mm3 295 255 260        Results from last 7 days   Lab Units 07/30/25  0340 07/29/25  1621 07/29/25  0615 07/28/25  1549 07/28/25  0515   SODIUM mmol/L 132* 137 131*   < > 125*   POTASSIUM mmol/L 4.2  --  4.0  --  4.6   CHLORIDE mmol/L 102  --  104  --  98   CO2 mmol/L 17.0*  --  13.0*  --  14.0*   BUN mg/dL 17.5  --  26.4*  --  29.8*   CREATININE mg/dL 1.24*  --  1.44*  --  1.46*   CALCIUM mg/dL 8.5*  --  8.6  --  7.9*   BILIRUBIN mg/dL 0.5  --  0.4  --  0.4   ALK PHOS U/L 102  --  112  --  99   ALT (SGPT) U/L 56*  --  60*  --  60*   AST (SGOT) U/L 36*  --  44*  --  50*   GLUCOSE mg/dL 108*  --  102*  --  112*    < > = values in this interval not displayed.       Culture Data:   No results found for: \"BLOODCX\", \"URINECX\", \"WOUNDCX\", \"MRSACX\", \"RESPCX\", \"STOOLCX\"  UA          10/31/2024    19:18 7/26/2025    15:22 7/29/2025    17:50   Urinalysis   Squamous Epithelial Cells, UA 0-2   0-2    Specific Gravity, UA 1.012  1.023  1.012    Ketones, UA Negative  Trace  Negative    Blood, UA Trace  Negative  Moderate (2+)    Leukocytes, UA Small (1+)  Negative  Large (3+)    Nitrite, UA Negative  Negative  Positive    RBC, UA None Seen   Too Numerous to Count    WBC, UA 0-2   21-50    Bacteria, UA None Seen   4+       Radiology Data:   Imaging Results (Last 24 Hours)       ** No results found for the last 24 hours. **            I have reviewed the patient's current medications.     Assessment/Plan   Assessment  Active Hospital Problems    Diagnosis     **Atrial flutter with rapid ventricular response     Acute UTI (urinary tract infection)     Moderate protein-calorie malnutrition     Atrial fibrillation with rapid ventricular response     CRISTIANO (acute kidney injury)     Muscle weakness (generalized)     Hyponatremia     Vascular dementia with behavior disturbance  "    Chronic combined systolic and diastolic congestive heart failure     Anxiety about health     Current use of long term anticoagulation     Essential hypertension        Treatment Plan  Atrial flutter/Atrial Fibrillation with rapid ventricular response: Controlled  Started on amiodarone drip in the emergency department  Amiodarone discontinued cardiac electrophysiologist  consulted and continues to follow and manage   Changed metoprolol to metoprolol tartrate initially at 25 mg, I   Continue Eliquis 2.5 mg twice daily, Plavix 75 mg daily  Vitals every 4 hours and as needed  EKG as needed for rate conversion to normal sinus rhythm or change in rhythm  Current use of long-term anticoagulation Eliquis  Secondary to chronic paroxysmal atrial fibrillation  Continue Eliquis 2.5 mg every 12 hours  Monitor for S/S of bleeding  Essential hypertension  Continue to hold Entresto until blood pressure improves.  Hold spironolactone until blood pressure can tolerate  Vitals every 4 hours and as needed  Notify hospitalist provider if becomes hypotensive and remains sustained  Anxiety about health  Patient with high level of anxiety about her surroundings, being in the hospital and healthcare in general, being aggressive towards staff, easily agitated, urine sent due to sudden onset of hematuria however it was suspected for urinary tract infection, culture is pending for final sensitivity but positive for gram-negative organism  May be contributing to continued elevated heart rate  Continue home trazodone at decreased dose from 50 mg to 25 mg at night  Muscle weakness  Up with assistance  Fall precautions  PT/OT to eval and treat  Vascular dementia with behavioral disturbance/severe agitation episodes  Patient baseline is confusion, per daughter on telephone patient is at her baseline at time of admission  Easily stimulated, try to reduce stimulation to patient by turning down lights, grouping cares and allowing  rest  Became severely agitated this evening 7/28/2025, throwing food and objects at staff, yelling, pulling wires and trying to pull IV out, stating that staff is invading her house, stealing her things and trying to kill her.  Olanzapine discontinued  may give Haldol 2 mg IV for agitation and combativeness as well as hallucinations and psychosis..  Chronic combined systolic and diastolic congestive heart failure  Currently holding home Entresto due to hypotension, will restart when blood pressure improves  EF 41 to 45% on 2D echo 2/1/2021 and improved to 51-55 % on 2D echo 3/31/2025.  Continue metoprolol tartrate mg twice daily now increased by the electrophysiologist to 50 mg every 12 hours with hold parameters  CRISTIANO with Hyponatremia  Cr 1.49 on admit,-->1.61-->1.46-->1.44--1.24  Serum Na 125-->129 after starting sodium tablets however patient refusing tablets due to her agitation and psychosis state.  Start NS @ 100 ml/hr discontinued  Hospitalist physician Dr. Hunter restarted sodium bicarb tablet 650 mg twice daily   repeat CMP daily to trend renal function and serum sodium    Medical Decision Making  Number and Complexity of problems: 8  2 acute problems, high complexity, stable, continue to monitor and treat as indicated  1 acute on chronic problem, high complexity afib/aflutter w/RVR, controlled on Amiodarone po, stable  5 chronic problems, moderate complexity, stable and will continue to monitor and adjust med doses as appropriate  1 chronic problem, high complexity, unstable vascular dementia with psychosis and severe agitation episode today requiring olanzapine IM and/or Haldol IV due to patient's severe aggressive behavior and risk for self-harm.  Differential Diagnosis: None     Conditions and Status        Condition is improved.     [facility] Data  External documents reviewed: Yes  Cardiac tracing (EKG, telemetry) interpretation: Reviewed reports  Radiology interpretation: Reviewed diagnostic imaging  reports  Labs reviewed: Reviewed previous and current lab values  Any tests that were considered but not ordered: None     Decision rules/scores evaluated (example [id number], Wells, etc):      GUANAKITO?DS?-VASc Score for Atrial Fibrillation Stroke Risk - MDCalc  Calculated on Jul 26 2025 7:35 PM  7 points -> Stroke risk was 11.2% per year in >90,000 patients (the [place] Atrial Fibrillation Cohort Study) and 15.7% risk of stroke/TIA/systemic embolism.     Discussed with: Discussed plan of care with patient, daughter Radha and Dr. Hunter.  Discussed patient's episodic severe agitation and aggressive behavior with psychosis secondary to her vascular dementia which does happen according to her nursing home paperwork periodically.  Discussed medication treatment options to reduce patient's risk of harm to herself and to staff.  Orders placed     Care Planning  Shared decision making: Discussed with: Discussed plan of care with patient, daughter Radha  and Dr. Hunter.    Code status and discussions: Called Radha Carl patient's daughter on admission who is his main surrogate decision maker, discussed CODE STATUS as nursing home paperwork states full code, daughter Radha verified patient is a DNR/DNI and states she filled out the paperwork and signed it approximately 2 months ago at the nursing home.  I did inform Radha that it still states full code on the nursing home paperwork so she needs to address that with the nursing home.  CODE STATUS has been changed in hospital chart to reflect DNR/DNI.     Disposition  Social Determinants of Health that impact treatment or disposition: Patient from nursing home  Estimated length of stay is 1-2 day.       Electronically signed by RANDA Huertas, 07/30/25, 15:17 CDT.

## 2025-07-30 NOTE — PLAN OF CARE
Goal Outcome Evaluation:  Plan of Care Reviewed With: patient, child        Progress: no change  Outcome Evaluation: patient continues to be confused and exhibit impulsive behaviour.  Patient went into afib w/ rvr with HR sustaining 130s-150s.  Patient started on cardizem gtt and given one time dose of digoxin.  HR stayed elevated.  Patient's IV infiltrated.  Cardizem gtt was stopped and an antidote was given.  Patient was given PO metoprolol and heart rate eventually dropped to normal range.  EP consult today.  Patient was given a dose of prn haldol for agitation and confusion as she was pulling off all lines and having hallucinations.  Safety maintained.

## 2025-07-31 LAB
ALBUMIN SERPL-MCNC: 3 G/DL (ref 3.5–5.2)
ALBUMIN/GLOB SERPL: 1.5 G/DL
ALP SERPL-CCNC: 76 U/L (ref 39–117)
ALT SERPL W P-5'-P-CCNC: 39 U/L (ref 1–33)
ANION GAP SERPL CALCULATED.3IONS-SCNC: 10 MMOL/L (ref 5–15)
AST SERPL-CCNC: 21 U/L (ref 1–32)
BACTERIA SPEC AEROBE CULT: ABNORMAL
BASOPHILS # BLD AUTO: 0.03 10*3/MM3 (ref 0–0.2)
BASOPHILS NFR BLD AUTO: 0.2 % (ref 0–1.5)
BILIRUB SERPL-MCNC: 0.5 MG/DL (ref 0–1.2)
BUN SERPL-MCNC: 20.1 MG/DL (ref 8–23)
BUN/CREAT SERPL: 17.3 (ref 7–25)
CALCIUM SPEC-SCNC: 8.1 MG/DL (ref 8.6–10.5)
CHLORIDE SERPL-SCNC: 105 MMOL/L (ref 98–107)
CO2 SERPL-SCNC: 19 MMOL/L (ref 22–29)
CREAT SERPL-MCNC: 1.16 MG/DL (ref 0.57–1)
DEPRECATED RDW RBC AUTO: 58.9 FL (ref 37–54)
EGFRCR SERPLBLD CKD-EPI 2021: 47.2 ML/MIN/1.73
EOSINOPHIL # BLD AUTO: 0.33 10*3/MM3 (ref 0–0.4)
EOSINOPHIL NFR BLD AUTO: 2.7 % (ref 0.3–6.2)
ERYTHROCYTE [DISTWIDTH] IN BLOOD BY AUTOMATED COUNT: 17.2 % (ref 12.3–15.4)
GLOBULIN UR ELPH-MCNC: 2 GM/DL
GLUCOSE BLDC GLUCOMTR-MCNC: 130 MG/DL (ref 70–130)
GLUCOSE SERPL-MCNC: 86 MG/DL (ref 65–99)
HCT VFR BLD AUTO: 31.4 % (ref 34–46.6)
HGB BLD-MCNC: 9.9 G/DL (ref 12–15.9)
IMM GRANULOCYTES # BLD AUTO: 0.09 10*3/MM3 (ref 0–0.05)
IMM GRANULOCYTES NFR BLD AUTO: 0.7 % (ref 0–0.5)
LYMPHOCYTES # BLD AUTO: 0.99 10*3/MM3 (ref 0.7–3.1)
LYMPHOCYTES NFR BLD AUTO: 8.1 % (ref 19.6–45.3)
MCH RBC QN AUTO: 29.6 PG (ref 26.6–33)
MCHC RBC AUTO-ENTMCNC: 31.5 G/DL (ref 31.5–35.7)
MCV RBC AUTO: 93.7 FL (ref 79–97)
MONOCYTES # BLD AUTO: 0.83 10*3/MM3 (ref 0.1–0.9)
MONOCYTES NFR BLD AUTO: 6.8 % (ref 5–12)
NEUTROPHILS NFR BLD AUTO: 81.5 % (ref 42.7–76)
NEUTROPHILS NFR BLD AUTO: 9.91 10*3/MM3 (ref 1.7–7)
NRBC BLD AUTO-RTO: 0 /100 WBC (ref 0–0.2)
PLATELET # BLD AUTO: 265 10*3/MM3 (ref 140–450)
PMV BLD AUTO: 9.8 FL (ref 6–12)
POTASSIUM SERPL-SCNC: 3.9 MMOL/L (ref 3.5–5.2)
PROT SERPL-MCNC: 5 G/DL (ref 6–8.5)
RBC # BLD AUTO: 3.35 10*6/MM3 (ref 3.77–5.28)
SODIUM SERPL-SCNC: 134 MMOL/L (ref 136–145)
WBC NRBC COR # BLD AUTO: 12.18 10*3/MM3 (ref 3.4–10.8)

## 2025-07-31 PROCEDURE — 97530 THERAPEUTIC ACTIVITIES: CPT

## 2025-07-31 PROCEDURE — 99231 SBSQ HOSP IP/OBS SF/LOW 25: CPT | Performed by: STUDENT IN AN ORGANIZED HEALTH CARE EDUCATION/TRAINING PROGRAM

## 2025-07-31 PROCEDURE — 97535 SELF CARE MNGMENT TRAINING: CPT | Performed by: OCCUPATIONAL THERAPIST

## 2025-07-31 PROCEDURE — 97116 GAIT TRAINING THERAPY: CPT

## 2025-07-31 PROCEDURE — 82948 REAGENT STRIP/BLOOD GLUCOSE: CPT

## 2025-07-31 PROCEDURE — 25010000002 PIPERACILLIN SOD-TAZOBACTAM PER 1 G: Performed by: NURSE PRACTITIONER

## 2025-07-31 PROCEDURE — 85025 COMPLETE CBC W/AUTO DIFF WBC: CPT | Performed by: NURSE PRACTITIONER

## 2025-07-31 PROCEDURE — 80053 COMPREHEN METABOLIC PANEL: CPT | Performed by: NURSE PRACTITIONER

## 2025-07-31 RX ADMIN — SODIUM BICARBONATE 650 MG: 650 TABLET ORAL at 09:08

## 2025-07-31 RX ADMIN — SODIUM BICARBONATE 650 MG: 650 TABLET ORAL at 20:07

## 2025-07-31 RX ADMIN — Medication 10 ML: at 20:07

## 2025-07-31 RX ADMIN — ACETAMINOPHEN 650 MG: 325 TABLET ORAL at 14:40

## 2025-07-31 RX ADMIN — PIPERACILLIN AND TAZOBACTAM 3.38 G: 3; .375 INJECTION, POWDER, FOR SOLUTION INTRAVENOUS at 01:00

## 2025-07-31 RX ADMIN — Medication 10 ML: at 09:08

## 2025-07-31 RX ADMIN — ROSUVASTATIN 10 MG: 10 TABLET, FILM COATED ORAL at 20:07

## 2025-07-31 RX ADMIN — ACETAMINOPHEN 650 MG: 325 TABLET ORAL at 22:31

## 2025-07-31 RX ADMIN — METOPROLOL TARTRATE 50 MG: 50 TABLET, FILM COATED ORAL at 20:07

## 2025-07-31 RX ADMIN — METOPROLOL TARTRATE 50 MG: 50 TABLET, FILM COATED ORAL at 09:08

## 2025-07-31 RX ADMIN — PIPERACILLIN AND TAZOBACTAM 3.38 G: 3; .375 INJECTION, POWDER, FOR SOLUTION INTRAVENOUS at 08:28

## 2025-07-31 RX ADMIN — CLOPIDOGREL BISULFATE 75 MG: 75 TABLET, FILM COATED ORAL at 09:08

## 2025-07-31 RX ADMIN — PIPERACILLIN AND TAZOBACTAM 3.38 G: 3; .375 INJECTION, POWDER, FOR SOLUTION INTRAVENOUS at 17:08

## 2025-07-31 NOTE — PLAN OF CARE
Goal Outcome Evaluation:  Plan of Care Reviewed With: patient        Problem: Adult Inpatient Plan of Care  Goal: Plan of Care Review  Outcome: Progressing  Flowsheets (Taken 7/31/2025 0446)  Outcome Evaluation: Awake, oriented to self. NSR w/BBB at 60-80 per min. per telemetry. No A-fib noted this shift. Sitter at bedside for pt safety.  Plan of Care Reviewed With: patient

## 2025-07-31 NOTE — PROGRESS NOTES
"EP Problems:  1.  Paroxysmal atrial fibrillation  1a.  Atypical atrial flutter  -6/2021: PVI with Kenn   2.  Sinus bradycardia  3.  Paroxysmal SVT  4.  PVCs  -4/2021: Holter with 1.4% burden  5.  Severe left atrial enlargement  6.  IVCD     Cardiology Problems:  1.  Chronic combined systolic and diastolic heart failure  -EF 46 to 50%  2.  Severe aortic valve stenosis status post TAVR  3.  Moderate MR  4.  CAD status post CABG (2010)  5.  Hypertension  6.  Hyperlipidemia     Medical Problems:  1.  Type 2 diabetes  2.  Vascular dementia    Patient ID:  Sondra Connolly is a 80 y.o. female with problem list as above as above who EP is following for paroxysmal atrial fibrillation, atypical atrial flutter.    Subjective:  Continues to be somnolent.    Objective:  /70 (BP Location: Right arm, Patient Position: Lying)   Pulse 70   Temp 97.4 °F (36.3 °C) (Oral)   Resp 16   Ht 152.4 cm (60\")   Wt 59.2 kg (130 lb 8 oz)   SpO2 98%   BMI 25.49 kg/m²     Chronically ill-appearing, somnolent  Regular rate and rhythm  Warm, well-perfused      Labs today:  Lab Results   Component Value Date    GLUCOSE 86 07/31/2025    CALCIUM 8.1 (L) 07/31/2025     (L) 07/31/2025    K 3.9 07/31/2025    CO2 19.0 (L) 07/31/2025    BUN 20.1 07/31/2025    CREATININE 1.16 (H) 07/31/2025    EGFR 47.2 (L) 07/31/2025     Lab Results   Component Value Date    WBC 12.18 (H) 07/31/2025    HGB 9.9 (L) 07/31/2025    HCT 31.4 (L) 07/31/2025     07/31/2025         Assessment:  Paroxysmal atrial fibrillation  Atypical atrial flutter  Hospital-acquired delirium  IVCD    No significant change in her condition.  Tolerating the higher dose of metoprolol right now.    Plan:  -Continue metoprolol tartrate 50 mg twice daily for now, uptitrate as tolerated  - Not currently a candidate for any interventions for atrial fibrillation  - Consider pacemaker and AV node ablation in the future if episodes worsen  -EP to sign off at this time; please call " if additional questions arise    Part of this note may be an electronic transcription/translation of spoken language to printed text using the Dragon Dictation System.

## 2025-07-31 NOTE — THERAPY TREATMENT NOTE
Acute Care - Physical Therapy Treatment Note  [facility]     Patient Name: Sondra Connolly  : 1945  MRN: [id number]  Today's Date: 2025      Visit Dx:     ICD-10-CM ICD-9-CM   1. Paroxysmal A-fib  I48.0 427.31   2. Atrial fibrillation with rapid ventricular response  I48.91 427.31   3. Hyponatremia  E87.1 276.1   4. Acute renal failure superimposed on chronic kidney disease, unspecified acute renal failure type, unspecified CKD stage  N17.9 584.9    N18.9 585.9   5. Atypical atrial flutter  I48.4 427.32   6. Esophageal dysphagia  R13.19 787.29   7. Impaired mobility [Z74.09]  Z74.09 799.89     Patient Active Problem List   Diagnosis    Mixed hyperlipidemia    Coronary artery disease involving coronary bypass graft of native heart without angina pectoris    Paroxysmal atrial fibrillation    Essential hypertension    Anxiety about health    Coumadin toxicity    Chronic combined systolic and diastolic congestive heart failure    S/P CABG x 4  Dr YADIEL Lau    Status post insertion of drug-eluting stent into right coronary artery for coronary artery disease    History of cardioversion    Current use of long term anticoagulation    Mitral valve regurgitation    Diabetes mellitus    Bradycardia, sinus    HUMPHREY (dyspnea on exertion)    Hyponatremia    S/P TAVR (transcatheter aortic valve replacement)    Atrial flutter with rapid ventricular response    Muscle weakness (generalized)    Vascular dementia with behavior disturbance    Atrial fibrillation with rapid ventricular response    CRISTIANO (acute kidney injury)    Moderate protein-calorie malnutrition    Acute UTI (urinary tract infection)     Past Medical History:   Diagnosis Date    A-fib     Abnormal nuclear stress test 2019    Abnormal stress test 2022    Added automatically from request for surgery [id number]      Angina pectoris     Anxiety     Aortic valve stenosis 2017    Arthritis     Atherosclerosis of coronary artery bypass graft      Carotid artery occlusion without infarction     Chronic heart failure with preserved ejection fraction (HFpEF) 03/21/2024    Chronic kidney disease     Chronic lung disease     Colon polyp     Diabetes mellitus     BLOOD SUGARS ARE UNDER CONTROL OFF OF ALL MEDS FOR A LONG TIME PER PT AND SON    DJD (degenerative joint disease)     Essential hypertension 03/24/2017    Fibrocystic breast disease     Gastritis     GI bleed     Hemorrhoids     Hyperlipidemia     Hypertension     Lung disease     chronic    MI (myocardial infarction)     Palpitations     PUD (peptic ulcer disease)     PVD (peripheral vascular disease)     Renal failure, acute     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     Past Surgical History:   Procedure Laterality Date    ABLATION OF DYSRHYTHMIC FOCUS      May 2021 in [place]      AORTIC VALVE REPAIR/REPLACEMENT N/A 3/19/2024    Procedure: Transfemoral Transcatheter Aortic Valve Replacement;  Surgeon: Bhavik Cuellar MD;  Location:  [facility];  Service: Cardiovascular;  Laterality: N/A;    AORTIC VALVE REPAIR/REPLACEMENT Bilateral 3/19/2024    Procedure: TRANSFEMORAL TRANSCATHETER AORTIC VALVE REPLACEMENT;  Surgeon: Reed Abdi MD;  Location:  [facility];  Service: Cardiothoracic;  Laterality: Bilateral;    APPENDECTOMY      CARDIAC CATHETERIZATION  05/01/2009    Left-Heart Skin    CARDIAC CATHETERIZATION N/A 2/4/2021    Procedure: Left Heart Cath;  Surgeon: Tristan Cabello DO;  Location:  [facility];  Service: Cardiology;  Laterality: N/A;    CARDIAC CATHETERIZATION Right 2/5/2021    Procedure: Left Heart Cath, rotablator to RCA with Dr. Abrams at 1PM;  Surgeon: Tristan Cabello DO;  Location:  [facility];  Service: Cardiology;  Laterality: Right;    CARDIAC CATHETERIZATION N/A 12/22/2023    Procedure: Left Heart Cath;  Surgeon: Gualberto Gallo MD;  Location:  [facility];  Service: Cardiology;  Laterality: N/A;     CAROTID ENDARTERECTOMY      CATARACT EXTRACTION      CATARACT EXTRACTION      COLONOSCOPY      CORONARY ARTERY BYPASS GRAFT  07/2007    Four    ENDOSCOPY  10/02/2013    ENDOSCOPY  10/02/2013    HYSTERECTOMY      SKIN CANCER EXCISION      THROMBOENDARTERECTOMY      TONSILLECTOMY       PT Assessment (Last 12 Hours)       PT Evaluation and Treatment       Row Name 07/31/25 1105          Physical Therapy Time and Intention    Subjective Information no complaints  -LY     Document Type therapy note (daily note)  -LY     Mode of Treatment physical therapy  -LY       Row Name 07/31/25 1105          General Information    Patient/Family/Caregiver Comments/Observations daughter present  -LY     Existing Precautions/Restrictions fall  -LY       Row Name 07/31/25 1105          Pain    Pretreatment Pain Rating 0/10 - no pain  -LY     Posttreatment Pain Rating 0/10 - no pain  -LY       Row Name 07/31/25 1105          Bed Mobility    Supine-Sit [place] (Bed Mobility) verbal cues;standby assist  -LY     Assistive Device (Bed Mobility) bed rails;head of bed elevated  -LY     Comment, (Bed Mobility) left sitting EOB  -LY       Row Name 07/31/25 1105          Transfers    Transfers toilet transfer  -LY       Row Name 07/31/25 1105          Sit-Stand Transfer    Sit-Stand [place] (Transfers) verbal cues;standby assist  -LY     Assistive Device (Sit-Stand Transfers) walker, front-wheeled  -LY       Row Name 07/31/25 1105          Stand-Sit Transfer    Stand-Sit [place] (Transfers) verbal cues;standby assist  -LY     Assistive Device (Stand-Sit Transfers) walker, front-wheeled  -LY       Row Name 07/31/25 1105          Toilet Transfer    Type (Toilet Transfer) sit-stand;stand-sit  -LY     [place] Level (Toilet Transfer) contact guard;verbal cues  -LY     Assistive Device (Toilet Transfer) commode;grab bars/safety frame;walker, front-wheeled  -LY       Row Name 07/31/25 1105          Gait/Stairs (Locomotion)     [place] Level (Gait) verbal cues;contact guard  -LY     Assistive Device (Gait) walker, front-wheeled  -LY     Distance in Feet (Gait) 35  -LY     Pattern (Gait) step-through  -LY     Deviations/Abnormal Patterns (Gait) gait speed decreased;stride length decreased  -LY     Bilateral Gait Deviations forward flexed posture  -LY     Comment, (Gait/Stairs) impulsive this date  -LY       Row Name             Wound Left posterior elbow Other (Comments)    Wound - Properties Group Present on Original Admission: Y  -ML Side: Left  -ML Orientation: posterior  -ML Location: elbow  -ML Primary Wound Type: Other  -ML, tear     Retired Wound - Properties Group Present on Original Admission: Y  -ML Side: Left  -ML Orientation: posterior  -ML Location: elbow  -ML    Retired Wound - Properties Group Present on Original Admission: Y  -ML Side: Left  -ML Orientation: posterior  -ML Location: elbow  -ML    Retired Wound - Properties Group Present on Original Admission: Y  -ML Side: Left  -ML Location: elbow  -ML      Row Name             Wound 07/30/25 0245 Right upper arm Infiltration/Extravasation    Wound - Properties Group Placement Date: 07/30/25  -AS Placement Time: 0245 -AS Side: Right  -AS Orientation: upper  -AS Location: arm  -AS Primary Wound Type: Infilt/Extra  -AS Secondary Wound Type - Infiltration / Extravasation: --  -AS, cardizem/digoxin; received antidote; see MAR  Additional Comments: swollen soft area above right elbow (inner arm); inflitration  -AS    Retired Wound - Properties Group Placement Date: 07/30/25  -AS Placement Time: 0245 -AS Side: Right  -AS Orientation: upper  -AS Location: arm  -AS Additional Comments: swollen soft area above right elbow (inner arm); inflitration  -AS    Retired Wound - Properties Group Placement Date: 07/30/25  -AS Placement Time: 0245 -AS Side: Right  -AS Orientation: upper  -AS Location: arm  -AS Additional Comments: swollen soft area above right elbow (inner arm);  inflitration  -AS    Retired Wound - Properties Group Date first assessed: 07/30/25  -AS Time first assessed: 0245  -AS Side: Right  -AS Location: arm  -AS Additional Comments: swollen soft area above right elbow (inner arm); inflitration  -AS      Row Name 07/31/25 1105          Plan of Care Review    Plan of Care Reviewed With patient  -LY     Progress improving  -LY       Row Name 07/31/25 1105          Positioning and Restraints    Pre-Treatment Position in bed  -LY     Post Treatment Position bed  -LY     In Bed sitting EOB;call light within reach;encouraged to call for assist;with family/caregiver;with nsg  -LY               User Key  (r) = Recorded By, (t) = Taken By, (c) = Cosigned By      Initials Name Provider Type    LY Trinity Capellan PTA Physical Therapist Assistant    Leila Go, RN Registered Nurse    AS Nurys Hyde RN Registered Nurse                    Physical Therapy Education       Title: PT OT SLP Therapies (In Progress)       Topic: Physical Therapy (Done)       Point: Mobility training (Done)       Learning Progress Summary            Patient Acceptance, E,D, DU,VU,NR by  at 7/27/2025 1516    Comment: benefits of PT and POC, call for A OOB                      Point: Precautions (Done)       Learning Progress Summary            Patient Acceptance, E,D, DU,VU,NR by  at 7/27/2025 1516    Comment: benefits of PT and POC, call for A OOB                                      User Key       Initials Effective Dates Name Provider Type [facility] 02/03/23 -  Francesco Rothman, PT Physical Therapist PT                  PT Recommendation and Plan  Anticipated Discharge Disposition (PT): skilled nursing facility  Plan of Care Reviewed With: patient  Progress: improving  Outcome Evaluation: PT tx completed. Pt checked on 3 times this date. Each time pt sleeping soundly, woke to PTAs voice on third attempt. Pt requires increased time and encouragement to participate. Min/mod x1 for supine to  sit. CGA/min x1 for static sitting balance, sat EOB a total of 5 min then returned to bed herself, mod x1 to complete safely. Pt did require hygiene care and gown change after incontinent episode. Rolled L and R with min x1, nsg assisted. Reports pain during urination and blood noted in suction container. Nsg notifed. Will cont to follow.   Outcome Measures       Row Name 07/28/25 1333             How much help from another person do you currently need...    Turning from your back to your side while in flat bed without using bedrails? 3  -SHAMIR      Moving from lying on back to sitting on the side of a flat bed without bedrails? 3  -SHAMIR      Moving to and from a bed to a chair (including a wheelchair)? 3  -SHAMIR      Standing up from a chair using your arms (e.g., wheelchair, bedside chair)? 3  -SHAMIR      Climbing 3-5 steps with a railing? 2  -SHAMIR      To walk in hospital room? 3  -SHAMIR      AM-PAC 6 Clicks Score (PT) 17  -SHAMIR         Functional Assessment    Outcome Measure Options AM-PAC 6 Clicks Basic Mobility (PT)  -SHAMIR                User Key  (r) = Recorded By, (t) = Taken By, (c) = Cosigned By      Initials Name Provider Type    Rohan Gaspar, PTA Physical Therapist Assistant                     Time Calculation:    PT Charges       Row Name 07/31/25 1327             Time Calculation    Start Time 1105  -LY      Stop Time 1129  -LY      Time Calculation (min) 24 min  -LY      PT Received On 07/31/25  -LY         Time Calculation- PT    Total Timed Code Minutes- PT 24 minute(s)  -LY         Timed Charges    25655 - Gait Training Minutes  12  -LY      92072 - PT Therapeutic Activity Minutes 12  -LY         Total Minutes    Timed Charges Total Minutes 24  -LY       Total Minutes 24  -LY                User Key  (r) = Recorded By, (t) = Taken By, (c) = Cosigned By      Initials Name Provider Type    Trinity Carter, PTA Physical Therapist Assistant                  Therapy Charges for Today       Code Description  Service Date Service Provider Modifiers Qty    35693265848 HC GAIT TRAINING EA 15 MIN 7/31/2025 Trinity Capellan, PTA GP 1    42987982728 HC PT THERAPEUTIC ACT EA 15 MIN 7/31/2025 Trinity Capellan, CURTIS GP 1            PT G-Codes  Outcome Measure Options: AM-PAC 6 Clicks Daily Activity (OT)  AM-PAC 6 Clicks Score (PT): 18  AM-PAC 6 Clicks Score (OT): 15    Trinity Capellan PTA  7/31/2025

## 2025-07-31 NOTE — PROGRESS NOTES
Nutrition Services    Patient Name:  Sondar Connolly  YOB: 1945  MRN: [id number]  Admit Date:  7/26/2025    Patient Name: Sondra Connolly  YOB: 1945  MRN: [id number]  Admission date: 7/26/2025  Reason for Encounter: Follow-up/Progress Note    [facility] Clinical Nutrition Assessment     Subjective    Subjective Information     7/31:  Nutrition F/U: Pt remains on a pureed diet at this time.  She requires assistance with feeding.  Pt consuming 31.3% of meals and ~1017 cc/day fluid. Pt receiving fortified vanilla pudding daily and Boost Breeze tid to supplement po intake. Current wt: 130.5#. Melo 17.  Last BM unknown.  Secure chat sent to nursing regarding this and awaiting replay. Pt is noted to have prn medications to aid in BM.  ~2150 cc/day UOP plus four unmeasured occurrences.  Will continue to monitor and f/u as needed.  Iban Browning, MS, RDN, LD    7/28: Met with patient who was resting in bed this day.  She was communicative, some confusion at times, however, expected r/t dementia dx.  Daughter at bedside.  SLP following and pt is on a pureed diet.  She reports some difficulty swallowing; daughter reports GI has been consulted.  She reports that pts mother had to have her esophagus stretched around this same age.  Pt is consuming ~17% at meals; ~420 cc/day.  Pt received 475 cc IVF.   cc x 1 day.  She states she is hungry but nothing sounds good.  Food ambassador coming by to take meals preferences.  Encouraged pt to pick what sounds good.  Doesn't like ONS because of the chalky taste.  Pt willing to try Boost Breeze which is clear.  Pt also willing to try fortified vanilla pudding. Boost Breeze TID (Provides 750 kcals, 27 g protein if consumed) and Fortified vanilla pudding daily provides 186 kcals and 6 g pro per day, if consumed.  Last BM unknown.  Pt has area to left posterior elbow; not a PI.  Glu range 112-128. No A1c noted.  Will continue to monitor and f/u as  needed. Iban Browning, MS, RDN, LD     Objective   H&P and Current Problems      H&P  Past Medical History:   Diagnosis Date    A-fib     Abnormal nuclear stress test 08/23/2019    Abnormal stress test 12/28/2022    Added automatically from request for surgery [id number]      Angina pectoris     Anxiety     Aortic valve stenosis 03/24/2017    Arthritis     Atherosclerosis of coronary artery bypass graft     Carotid artery occlusion without infarction     Chronic heart failure with preserved ejection fraction (HFpEF) 03/21/2024    Chronic kidney disease     Chronic lung disease     Colon polyp     Diabetes mellitus     BLOOD SUGARS ARE UNDER CONTROL OFF OF ALL MEDS FOR A LONG TIME PER PT AND SON    DJD (degenerative joint disease)     Essential hypertension 03/24/2017    Fibrocystic breast disease     Gastritis     GI bleed     Hemorrhoids     Hyperlipidemia     Hypertension     Lung disease     chronic    MI (myocardial infarction)     Palpitations     PUD (peptic ulcer disease)     PVD (peripheral vascular disease)     Renal failure, acute     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      Past Surgical History:   Procedure Laterality Date    ABLATION OF DYSRHYTHMIC FOCUS      May 2021 in [place]      AORTIC VALVE REPAIR/REPLACEMENT N/A 3/19/2024    Procedure: Transfemoral Transcatheter Aortic Valve Replacement;  Surgeon: Bhavik Cuellar MD;  Location: [facility] HYBRID OR;  Service: Cardiovascular;  Laterality: N/A;    AORTIC VALVE REPAIR/REPLACEMENT Bilateral 3/19/2024    Procedure: TRANSFEMORAL TRANSCATHETER AORTIC VALVE REPLACEMENT;  Surgeon: Reed Abdi MD;  Location: [facility] HYBRID OR;  Service: Cardiothoracic;  Laterality: Bilateral;    APPENDECTOMY      CARDIAC CATHETERIZATION  05/01/2009    Left-Heart Skin    CARDIAC CATHETERIZATION N/A 2/4/2021    Procedure: Left Heart Cath;  Surgeon: Tristan Cabello DO;  Location: [facility] CATH INVASIVE LOCATION;  Service: Cardiology;  Laterality: N/A;    CARDIAC  "CATHETERIZATION Right 2/5/2021    Procedure: Left Heart Cath, rotablator to RCA with Dr. Abrams at 1PM;  Surgeon: Tristan Cabello DO;  Location:  [facility] CATH INVASIVE LOCATION;  Service: Cardiology;  Laterality: Right;    CARDIAC CATHETERIZATION N/A 12/22/2023    Procedure: Left Heart Cath;  Surgeon: Gualberto Gallo MD;  Location:  [facility] CATH INVASIVE LOCATION;  Service: Cardiology;  Laterality: N/A;    CAROTID ENDARTERECTOMY      CATARACT EXTRACTION      CATARACT EXTRACTION      COLONOSCOPY      CORONARY ARTERY BYPASS GRAFT  07/2007    Four    ENDOSCOPY  10/02/2013    ENDOSCOPY  10/02/2013    HYSTERECTOMY      SKIN CANCER EXCISION      THROMBOENDARTERECTOMY      TONSILLECTOMY        Current Problems   Admission Diagnosis:  Atrial fibrillation with rapid ventricular response [I48.91]    Problem List:    Atrial flutter with rapid ventricular response    Essential hypertension    Anxiety about health    Chronic combined systolic and diastolic congestive heart failure    Current use of long term anticoagulation    Hyponatremia    Muscle weakness (generalized)    Vascular dementia with behavior disturbance    Atrial fibrillation with rapid ventricular response    CRISTIANO (acute kidney injury)    Moderate protein-calorie malnutrition    Acute UTI (urinary tract infection)         Anthropometrics       Height: 152.4 cm (60\")  Weight: 59.2 kg (130 lb 8 oz) (07/30/25 0555)  Weight Method: Bed scale  BMI (Calculated): 25.5       Trending Weight Changes 07/28/25: weight gain of 19 lbs (16.9%) over 6 month(s)    Significant?  Yes       Weight History  Wt Readings from Last 20 Encounters:   07/30/25 0555 59.2 kg (130 lb 8 oz)   07/29/25 0531 60.9 kg (134 lb 3.2 oz)   07/28/25 0321 59.4 kg (131 lb)   07/27/25 0412 58.5 kg (128 lb 15.4 oz)   07/26/25 1404 55.7 kg (122 lb 11.2 oz)   07/24/25 0533 52.8 kg (116 lb 8 oz)   04/29/25 1352 50.8 kg (112 lb)   04/15/25 1458 52.6 kg (116 lb)   03/31/25 1237 52.6 kg (116 lb)   03/03/25 " 1506 52.6 kg (116 lb)   01/29/25 1423 50.8 kg (112 lb)   10/31/24 2052 51.3 kg (113 lb 1.5 oz)   04/24/24 1053 51.3 kg (113 lb)   04/15/24 0934 51.3 kg (113 lb)   04/15/24 1404 51.3 kg (113 lb)   03/20/24 0641 51.3 kg (113 lb)   03/19/24 0757 51.7 kg (113 lb 15.7 oz)   03/18/24 1131 52 kg (114 lb 10.2 oz)   02/02/24 1336 50.8 kg (112 lb)   12/16/23 1444 52.6 kg (116 lb)   12/15/23 1234 51.3 kg (113 lb)   11/27/23 1427 52.2 kg (115 lb)   10/20/23 1048 52.6 kg (116 lb)   07/21/23 1425 54.9 kg (121 lb)   04/23/23 0958 58.1 kg (128 lb)            Labs        Results from last 7 days   Lab Units 07/31/25  0609 07/30/25  0340 07/29/25  1621 07/29/25  0615 07/28/25  1549 07/28/25  0515 07/27/25  1653 07/27/25  0521   SODIUM mmol/L 134* 132* 137 131*   < > 125*   < > 125*   POTASSIUM mmol/L 3.9 4.2  --  4.0  --  4.6  --  4.6   GLUCOSE mg/dL 86 108*  --  102*  --  112*  --  128*   BUN mg/dL 20.1 17.5  --  26.4*  --  29.8*  --  31.3*   CREATININE mg/dL 1.16* 1.24*  --  1.44*  --  1.46*  --  1.61*   CALCIUM mg/dL 8.1* 8.5*  --  8.6  --  7.9*  --  8.5*   MAGNESIUM mg/dL  --   --   --  2.4  --  2.4  --  2.8*   ALBUMIN g/dL 3.0* 3.3*  --  3.3*  --  3.2*  --  3.4*   BILIRUBIN mg/dL 0.5 0.5  --  0.4  --  0.4  --  0.3   ALK PHOS U/L 76 102  --  112  --  99  --  102   AST (SGOT) U/L 21 36*  --  44*  --  50*  --  43*   ALT (SGPT) U/L 39* 56*  --  60*  --  60*  --  44*    < > = values in this interval not displayed.     Results from last 7 days   Lab Units 07/31/25  0609 07/30/25  0340 07/29/25  0615   PLATELETS 10*3/mm3 265 295 255   HEMOGLOBIN g/dL 9.9* 10.4* 10.2*   HEMATOCRIT % 31.4* 33.0* 33.2*     Lab Results   Component Value Date    HGBA1C 5.90 (H) 02/01/2021          Medications       Scheduled Medications clopidogrel, 75 mg, Oral, Daily  metoprolol tartrate, 50 mg, Oral, Q12H  piperacillin-tazobactam, 3.375 g, Intravenous, Q8H  rosuvastatin, 10 mg, Oral, Nightly  [Held by provider] sacubitril-valsartan, 1 tablet, Oral,  BID  sodium bicarbonate, 650 mg, Oral, BID  sodium chloride, 10 mL, Intravenous, Q12H  [Held by provider] spironolactone, 25 mg, Oral, Daily        Infusions      PRN Medications   acetaminophen **OR** acetaminophen **OR** acetaminophen    aluminum-magnesium hydroxide-simethicone    senna-docusate sodium **AND** polyethylene glycol **AND** bisacodyl **AND** bisacodyl    calcium carbonate    Calcium Replacement - Follow Nurse / BPA Driven Protocol    haloperidol lactate **OR** haloperidol lactate    Magnesium Standard Dose Replacement - Follow Nurse / BPA Driven Protocol    metoprolol tartrate    nitroglycerin    OLANZapine    ondansetron ODT **OR** ondansetron    Phosphorus Replacement - Follow Nurse / BPA Driven Protocol    Potassium Replacement - Follow Nurse / BPA Driven Protocol    sodium chloride    sodium chloride     Physical Findings      Chewing/Swallowing  Teeth Status: Mouth/Teeth WDL: .WDL except, teeth Teeth Symptoms: decay/caries, tooth/teeth missing  Chewing/Swallowing Issues: SLP following   Edema                            Bowel Function  Stool Output  Perineal Care: absorbent brief/pad changed, perineum cleansed (07/31/25 0927)  Last Bowel Movement:  (unknown)   I/Os  Intake & Output (last 3 days)         07/28 0701 07/29 0700 07/29 0701 07/30 0700 07/30 0701 07/31 0700 07/31 0701  08/01 0700    P.O. 760 1430 860 300    I.V. (mL/kg)        IV Piggyback    100    Total Intake(mL/kg) 760 (12.5) 1430 (24.2) 860 (14.5) 400 (6.8)    Urine (mL/kg/hr) 1950 (1.3) 1000 (0.7) 3500 (2.5) 850 (5.6)    Total Output 1950 1000 3500 850    Net -1190 +430 -2640 -450            Urine Unmeasured Occurrence 2 x 1 x 1 x            Lines, Drains, Airways, & Wounds       Active LDAs       Name Placement date Placement time Site Days Last dressing change    Peripheral IV 07/26/25 1523 20 G Anterior;Proximal;Right Forearm 07/26/25  1523  Forearm  1     Peripheral IV 07/27/25 22 G Anterior;Distal;Right Forearm 07/27/25   --  Forearm  1     External Urinary Catheter 07/26/25  2100  --  1     Wound Left posterior elbow Other (Comments) --  --  -- --                       Nutrition Focused Physical Exam     Trending NFPE 07/28/25:Completed this day.  See below.     Malnutrition Severity Assessment      Patient meets criteria for : Moderate (non-severe) Malnutrition (in the context of chronic disease.)         Estimated Needs      Energy Requirements    Weight for Calculation 59.4kg   Method for Estimation  25-30 kcals/kg   Daily Needs (kcal/day) 8123-4650 cc/day   Protein Requirements    Weight for Calculation 59.4kg   Method for Estimation 1.0 gm/kg   Daily Needs (g/day) 59.4 gms pro/day   Fluid Requirements     Method for Estimation Per Clinical Status    Daily Needs (mL/day)      Current Nutrition Orders & Evaluation of Intake      Oral Nutrition     Food Allergies  and Intolerances Shellfish derived products   Current PO Diet Diet: Regular/House; Texture: Pureed (NDD 1); Fluid Consistency: Thin (IDDSI 0)   Oral Nutrition Supplement None     Trending % PO Intake 07/31/25: Consuming 31.35 of meals at this time.  07/28/25:17% at meals  Boost Breeze TID (Provides 750 kcals, 27 g protein if consumed)   Fortified vanilla pudding daily provides 186 kcals and 6 g pro per day, if consumed.     Enteral Nutrition     Current EN Order Patient isn't on Tube Feeding  Patient doesn't have any tube feeding modular orders     EN Route      EN Tolerance     EN Observation/Intake         Parenteral Nutrition     Current TPN Order    TPN Route    Lipids (mL/%/frequency)     Total # Days on TPN    TPN Observation/Intake       Assessment & Plan   Nutrition Diagnosis and Goals       Nutrition Diagnosis 1 Moderate chronic disease or condition related malnutrition r/t clinical condition AEB MST score of 2, MSA results.            Goal(s) Increase PO Intake , Meets Estimated Needs , Accepts Oral Nutrition Supplement, Accepts Fortified Food, Tolerates PO  Diet , No Significant Weight Loss, Goals of Care are Established, and Skin Integrity to Improve     Nutrition Intervention and Prescription       Intervention  Obtain food preferences, Advised alternate menu selections, Advised available snacks, Start fortified food, and Continue with current interventions      Diet Prescription     Supplement Prescription     Education Provided       Enteral Prescription        TPN Prescription      Monitoring/Evaluation       Monitor/Evaluation Per Protocol     RD Follow-Up Encounter 3-5 days     Electronically signed by:  Iban Browning RD  07/31/25 09:36 CDT      Electronically signed by:  Iban Browning RD  07/31/25 09:36 CDT

## 2025-07-31 NOTE — THERAPY TREATMENT NOTE
Patient Name: Sondra Connolly  : 1945    MRN: [id number]                              Today's Date: 2025       Admit Date: 2025    Visit Dx:     ICD-10-CM ICD-9-CM   1. Paroxysmal A-fib  I48.0 427.31   2. Atrial fibrillation with rapid ventricular response  I48.91 427.31   3. Hyponatremia  E87.1 276.1   4. Acute renal failure superimposed on chronic kidney disease, unspecified acute renal failure type, unspecified CKD stage  N17.9 584.9    N18.9 585.9   5. Atypical atrial flutter  I48.4 427.32   6. Esophageal dysphagia  R13.19 787.29   7. Impaired mobility [Z74.09]  Z74.09 799.89     Patient Active Problem List   Diagnosis    Mixed hyperlipidemia    Coronary artery disease involving coronary bypass graft of native heart without angina pectoris    Paroxysmal atrial fibrillation    Essential hypertension    Anxiety about health    Coumadin toxicity    Chronic combined systolic and diastolic congestive heart failure    S/P CABG x 4  Dr YADIEL Lau    Status post insertion of drug-eluting stent into right coronary artery for coronary artery disease    History of cardioversion    Current use of long term anticoagulation    Mitral valve regurgitation    Diabetes mellitus    Bradycardia, sinus    HUMPHREY (dyspnea on exertion)    Hyponatremia    S/P TAVR (transcatheter aortic valve replacement)    Atrial flutter with rapid ventricular response    Muscle weakness (generalized)    Vascular dementia with behavior disturbance    Atrial fibrillation with rapid ventricular response    CRISTIANO (acute kidney injury)    Moderate protein-calorie malnutrition    Acute UTI (urinary tract infection)     Past Medical History:   Diagnosis Date    A-fib     Abnormal nuclear stress test 2019    Abnormal stress test 2022    Added automatically from request for surgery [id number]      Angina pectoris     Anxiety     Aortic valve stenosis 2017    Arthritis     Atherosclerosis of coronary artery bypass graft      Carotid artery occlusion without infarction     Chronic heart failure with preserved ejection fraction (HFpEF) 03/21/2024    Chronic kidney disease     Chronic lung disease     Colon polyp     Diabetes mellitus     BLOOD SUGARS ARE UNDER CONTROL OFF OF ALL MEDS FOR A LONG TIME PER PT AND SON    DJD (degenerative joint disease)     Essential hypertension 03/24/2017    Fibrocystic breast disease     Gastritis     GI bleed     Hemorrhoids     Hyperlipidemia     Hypertension     Lung disease     chronic    MI (myocardial infarction)     Palpitations     PUD (peptic ulcer disease)     PVD (peripheral vascular disease)     Renal failure, acute     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     Past Surgical History:   Procedure Laterality Date    ABLATION OF DYSRHYTHMIC FOCUS      May 2021 in [place]      AORTIC VALVE REPAIR/REPLACEMENT N/A 3/19/2024    Procedure: Transfemoral Transcatheter Aortic Valve Replacement;  Surgeon: Bhavik Cuellar MD;  Location:  [facility];  Service: Cardiovascular;  Laterality: N/A;    AORTIC VALVE REPAIR/REPLACEMENT Bilateral 3/19/2024    Procedure: TRANSFEMORAL TRANSCATHETER AORTIC VALVE REPLACEMENT;  Surgeon: Reed Abdi MD;  Location:  [facility];  Service: Cardiothoracic;  Laterality: Bilateral;    APPENDECTOMY      CARDIAC CATHETERIZATION  05/01/2009    Left-Heart Skin    CARDIAC CATHETERIZATION N/A 2/4/2021    Procedure: Left Heart Cath;  Surgeon: Tristan Cabello DO;  Location:  [facility];  Service: Cardiology;  Laterality: N/A;    CARDIAC CATHETERIZATION Right 2/5/2021    Procedure: Left Heart Cath, rotablator to RCA with Dr. Abrams at 1PM;  Surgeon: Tristan Cabello DO;  Location:  [facility];  Service: Cardiology;  Laterality: Right;    CARDIAC CATHETERIZATION N/A 12/22/2023    Procedure: Left Heart Cath;  Surgeon: Gualberto Gallo MD;  Location:  [facility];  Service: Cardiology;  Laterality: N/A;     CAROTID ENDARTERECTOMY      CATARACT EXTRACTION      CATARACT EXTRACTION      COLONOSCOPY      CORONARY ARTERY BYPASS GRAFT  07/2007    Four    ENDOSCOPY  10/02/2013    ENDOSCOPY  10/02/2013    HYSTERECTOMY      SKIN CANCER EXCISION      THROMBOENDARTERECTOMY      TONSILLECTOMY        General Information       Row Name 07/31/25 0900          OT Time and Intention    Document Type therapy note (daily note)  -     Mode of Treatment occupational therapy  -       Row Name 07/31/25 0900          General Information    Existing Precautions/Restrictions fall  -     Barriers to Rehab medically complex;previous functional deficit;cognitive status  -       Row Name 07/31/25 0900          Safety Issues/Impairments Affecting Functional Mobility    Safety Issues Affecting Function (Mobility) ability to follow commands;at risk behavior observed;awareness of need for assistance;friction/shear risk;insight into deficits/self-awareness;judgment;safety precaution awareness;problem-solving;safety precautions follow-through/compliance  -     Impairments Affecting Function (Mobility) balance;cognition;endurance/activity tolerance;strength;pain  -     Cognitive Impairments, Mobility Safety/Performance awareness, need for assistance;insight into deficits/self-awareness;judgment;problem-solving/reasoning;safety precaution awareness;safety precaution follow-through  -               User Key  (r) = Recorded By, (t) = Taken By, (c) = Cosigned By      Initials Name Provider Type     Cinda Castillo, OTR/L Occupational Therapist                     Mobility/ADL's       Row Name 07/31/25 0900          Bed Mobility    Bed Mobility supine-sit;sit-supine  -     Supine-Sit [place] (Bed Mobility) supervision;verbal cues  -     Sit-Supine [place] (Bed Mobility) contact guard;verbal cues  -       Row Name 07/31/25 0900          Transfers    Transfers sit-stand transfer;stand-sit transfer  -       Row Name 07/31/25 0900           Sit-Stand Transfer    Sit-Stand [place] (Transfers) verbal cues;contact guard  -     Assistive Device (Sit-Stand Transfers) walker, front-wheeled  -       Row Name 07/31/25 0900          Stand-Sit Transfer    Stand-Sit [place] (Transfers) verbal cues;standby assist  -     Assistive Device (Stand-Sit Transfers) walker, front-wheeled  -       Row Name 07/31/25 0900          Functional Mobility    Functional Mobility- Ind. Level contact guard assist;verbal cues required  -     Functional Mobility- Device walker, front-wheeled  -     Functional Mobility- Comment side steps up to HOB  -       Row Name 07/31/25 0900          Activities of Daily Living    BADL Assessment/Intervention feeding;grooming;lower body dressing  -[facility] Name 07/31/25 0900          Self-Feeding Assessment/Training    [place] Level (Feeding) set up;supervision;scoop food and bring to mouth;liquids to mouth  -     Position (Feeding) edge of bed sitting  -       Row Name 07/31/25 0900          Lower Body Dressing Assessment/Training    [place] Level (Lower Body Dressing) maximum assist (25% patient effort);don;doff  -     Position (Lower Body Dressing) edge of bed sitting  -     Comment, (Lower Body Dressing) brief  -       Row Name 07/31/25 0900          Grooming Assessment/Training    [place] Level (Grooming) set up;wash face, hands;minimum assist (75% patient effort);hair care, combing/brushing  -     Position (Grooming) edge of bed sitting  -               User Key  (r) = Recorded By, (t) = Taken By, (c) = Cosigned By      Initials Name Provider Type     Cinda Castillo, OTR/L Occupational Therapist                   Obj/Interventions       Row Name 07/31/25 0900          Balance    Balance Interventions sitting;standing;sit to stand;supported;static;dynamic;occupation based/functional task  -               User Key  (r) = Recorded By, (t) = Taken By, (c) = Cosigned By       Initials Name Provider Type    Cinda Aguilar OTR/L Occupational Therapist                   Goals/Plan    No documentation.                  Clinical Impression       Row Name 07/31/25 0900          Pain Scale: FACES Pre/Post-Treatment    Pain: FACES Scale, Pretreatment 0-->no hurt  -CH     Posttreatment Pain Rating 0-->no hurt  -CH       Row Name 07/31/25 0900          Plan of Care Review    Plan of Care Reviewed With patient  -     Progress no change  -     Outcome Evaluation OT tx complete.  OTR encouraged EOB sitting for AM meds & meal.  Pt complied.  Ms. Connolly required S to come to EOB to feed self with set up assist.  OTR provided low distraction, infrequent conversation, & decreased stimulus to allow to attend to task.  Ms. Connolly self fed 75% of meal & 300 cc liquid without spillage.  Set up for facial grooming & min A for hair combing.  During all seated EOB tasks pt had no LOB or appear fatigued.  She made frequent complaints but cont'd with tasks & required no cues for sequencing or safety.  No impulsivity or agitation noted.  CGA for sit to stand and Max A to change soiled brief.  OTR edu'd sitter in pt level of performance .  Cont OT tx  -CH       Row Name 07/31/25 0900          Therapy Plan Review/Discharge Plan (OT)    Anticipated Discharge Disposition (OT) skilled nursing facility  [facility]       Row Name 07/31/25 0900          Positioning and Restraints    Pre-Treatment Position in bed  -     Post Treatment Position bed  -     In Bed fowlers;call light within reach;encouraged to call for assist;side rails up x3;exit alarm on;patient within staff view;with [facility]  -               User Key  (r) = Recorded By, (t) = Taken By, (c) = Cosigned By      Initials Name Provider Type    Cinda Aguilar, OTR/L Occupational Therapist                   Outcome Measures       Row Name 07/31/25 0900          How much help from another is currently needed...    Putting on and taking off regular lower body  clothing? 2  -CH     Bathing (including washing, rinsing, and drying) 2  -CH     Toileting (which includes using toilet bed pan or urinal) 2  -CH     Putting on and taking off regular upper body clothing 3  -CH     Taking care of personal grooming (such as brushing teeth) 3  -CH     Eating meals 3  -CH     AM-PAC 6 Clicks Score (OT) 15  -CH       Row Name 07/31/25 0830          How much help from another person do you currently need...    Turning from your back to your side while in flat bed without using bedrails? 3  -SL     Moving from lying on back to sitting on the side of a flat bed without bedrails? 3  -SL     Moving to and from a bed to a chair (including a wheelchair)? 3  -SL     Standing up from a chair using your arms (e.g., wheelchair, bedside chair)? 3  -SL     Climbing 3-5 steps with a railing? 3  -SL     To walk in hospital room? 3  -SL     AM-PAC 6 Clicks Score (PT) 18  -SL     Highest Level of Mobility Goal Walk 10 Steps or More-6  -SL       Row Name 07/31/25 0900          Functional Assessment    Outcome Measure Options AM-PAC 6 Clicks Daily Activity (OT)  -               User Key  (r) = Recorded By, (t) = Taken By, (c) = Cosigned By      Initials Name Provider Type     Cinda Castillo, OTR/L Occupational Therapist    Kristie Jon, RN Registered Nurse                    Occupational Therapy Education       Title: PT OT SLP Therapies (In Progress)       Topic: Occupational Therapy (In Progress)       Point: ADL training (Done)       Learning Progress Summary            Patient Acceptance, E, VU by  at 7/31/2025 0941    Acceptance, E, VU,NR by MM at 7/28/2025 1440   Family Acceptance, E, VU,NR by MM at 7/28/2025 1440                      Point: Precautions (Done)       Learning Progress Summary            Patient Acceptance, E, VU,NR by MM at 7/28/2025 1440   Family Acceptance, E, VU,NR by MM at 7/28/2025 1440                      Point: Body mechanics (Done)       Learning Progress Summary             Patient Acceptance, E, VU,NR by MM at 7/28/2025 1440   Family Acceptance, E, VU,NR by MM at 7/28/2025 1440                                      User Key       Initials Effective Dates Name Provider Type Discipline     07/11/23 -  Cinda Castillo, OTR/L Occupational Therapist OT    MM 07/11/23 -  Juvenal Aguilar, OTR/L Occupational Therapist OT                  OT Recommendation and Plan     Plan of Care Review  Plan of Care Reviewed With: patient  Progress: no change  Outcome Evaluation: OT tx complete.  OTR encouraged EOB sitting for AM meds & meal.  Pt complied.  Ms. Connolly required S to come to EOB to feed self with set up assist.  OTR provided low distraction, infrequent conversation, & decreased stimulus to allow to attend to task.  Ms. Connolly self fed 75% of meal & 300 cc liquid without spillage.  Set up for facial grooming & min A for hair combing.  During all seated EOB tasks pt had no LOB or appear fatigued.  She made frequent complaints but cont'd with tasks & required no cues for sequencing or safety.  No impulsivity or agitation noted.  CGA for sit to stand and Max A to change soiled brief.  OTR edu'd sitter in pt level of performance .  Cont OT tx     Time Calculation:         Time Calculation- OT       Row Name 07/31/25 0900             Time Calculation- OT    OT Start Time 0900  -CH      OT Stop Time 0930  -CH      OT Time Calculation (min) 30 min  -CH      Total Timed Code Minutes- OT 30 minute(s)  -CH      OT Received On 07/31/25  -CH         Timed Charges    37952 - OT Self Care/Mgmt Minutes 30  -CH         Total Minutes    Timed Charges Total Minutes 30  -CH       Total Minutes 30  -CH                User Key  (r) = Recorded By, (t) = Taken By, (c) = Cosigned By      Initials Name Provider Type     Cinda Castillo, OTR/L Occupational Therapist                  Therapy Charges for Today       Code Description Service Date Service Provider Modifiers Qty    [id number] HC OT SELF  CARE/MGMT/TRAIN EA 15 MIN 7/31/2025 Cinda Castillo, OTR/L GO 2                 Cinda Castillo, OTR/L  7/31/2025

## 2025-07-31 NOTE — PROGRESS NOTES
[facility]  INPATIENT PROGRESS NOTE    Patient Name: Sondra Connolly  Date of Admission: 7/26/2025  Today's Date: 07/31/25  Length of Stay: 4  Primary Care Physician: Matthieu Bhakta DO    Subjective   Chief Complaint:  Rapid heart rate     HPI     Today:  Patient sleeping in bed, easily awakened.  A nurse was in the room sitting with her due to patient had been trying to get out of bed by herself.  She has bruising under her left eye.  She has swelling in bilateral feet.  Patient is confused.  She can tell me her name, but she doesn't know where she is at.  Sodium is 134, creatinine 1.16, potassium 3.9, hemoglobin 9.9, hematocrit 31.4, WBC 12.18.  Urine culture positive for E. coli.  Continue Zosyn IV as ordered.  Dr. Reed has seen patient and increased metoprolol tartrate to 50 mg twice daily.  He noted to consider pacemaker and AV node ablation in the future if episodes worsen.  Telemetry:  Sinus bradycardia at 53 with a bundle. Patient will return to [facility] at discharge.      Review of Systems   All pertinent negatives and positives are as above. All other systems have been reviewed and are negative unless otherwise stated.     Objective    Temp:  [97.1 °F (36.2 °C)-98.1 °F (36.7 °C)] 97.4 °F (36.3 °C)  Heart Rate:  [63-92] 70  Resp:  [14-18] 16  BP: (106-123)/(51-70) 123/70  Physical Exam  Constitutional:       Appearance: Normal appearance.   HENT:      Head: Normocephalic and atraumatic.      Nose: Nose normal.      Mouth/Throat:      Mouth: Mucous membranes are dry.      Pharynx: Oropharynx is clear.   Eyes:      Extraocular Movements: Extraocular movements intact.      Conjunctiva/sclera: Conjunctivae normal.   Cardiovascular:      Rate and Rhythm: Regular rhythm. Bradycardia present.      Pulses: Normal pulses.      Heart sounds: Normal heart sounds.   Pulmonary:      Effort: Pulmonary effort is normal.      Breath sounds: Normal breath sounds.    Abdominal:      General: Bowel sounds are normal.      Palpations: Abdomen is soft.   Musculoskeletal:      Cervical back: Normal range of motion and neck supple.      Right lower leg: Edema present.      Left lower leg: Edema present.   Skin:     General: Skin is warm and dry.      Capillary Refill: Capillary refill takes 2 to 3 seconds.      Comments: Bruising under left eye   Neurological:      Mental Status: She is alert. She is disoriented.   Psychiatric:         Mood and Affect: Mood normal.         Behavior: Behavior is cooperative.       Results Review:  I have reviewed the labs, radiology results, and diagnostic studies.    Laboratory Data:   Results from last 7 days   Lab Units 07/31/25  0609 07/30/25  0340 07/29/25  0615   WBC 10*3/mm3 12.18* 16.18* 7.87   HEMOGLOBIN g/dL 9.9* 10.4* 10.2*   HEMATOCRIT % 31.4* 33.0* 33.2*   PLATELETS 10*3/mm3 265 295 255        Results from last 7 days   Lab Units 07/31/25  0609 07/30/25  0340 07/29/25  1621 07/29/25  0615   SODIUM mmol/L 134* 132* 137 131*   POTASSIUM mmol/L 3.9 4.2  --  4.0   CHLORIDE mmol/L 105 102  --  104   CO2 mmol/L 19.0* 17.0*  --  13.0*   BUN mg/dL 20.1 17.5  --  26.4*   CREATININE mg/dL 1.16* 1.24*  --  1.44*   CALCIUM mg/dL 8.1* 8.5*  --  8.6   BILIRUBIN mg/dL 0.5 0.5  --  0.4   ALK PHOS U/L 76 102  --  112   ALT (SGPT) U/L 39* 56*  --  60*   AST (SGOT) U/L 21 36*  --  44*   GLUCOSE mg/dL 86 108*  --  102*       Culture Data:   Urine Culture   Date Value Ref Range Status   07/29/2025 >100,000 CFU/mL Escherichia coli (A)  Final       Radiology Data:   Imaging Results (Last 24 Hours)       ** No results found for the last 24 hours. **            I have reviewed the patient's current medications.     Assessment/Plan   Assessment  Active Hospital Problems    Diagnosis     **Atrial flutter with rapid ventricular response     Acute UTI (urinary tract infection)     Moderate protein-calorie malnutrition     Atrial fibrillation with rapid ventricular  response     CRISTIANO (acute kidney injury)     Muscle weakness (generalized)     Vascular dementia with behavior disturbance     Hyponatremia     Chronic combined systolic and diastolic congestive heart failure     Anxiety about health     Current use of long term anticoagulation     Essential hypertension        Treatment Plan    1.  Atrial flutter/atrial fibrillation with RVR-continue Toprol tartrate 50 mg twice daily.  EP consulted and signed off.  Dr. Reed noted to consider pacemaker and AV node ablation in the future if episodes worsen.    2.  Current use of long-term anticoagulation-Continue Plavix 75 mg daily.  Eliquis has been stopped.  Continue to monitor for signs of bleeding.    3.  Essential hypertension-Continue to monitor blood pressure per hospital policy.  Adjust medications as needed.  Currently Entresto and spironolactone remain on hold.    4.  Vascular dementia with behavioral disturbances-Continue IV Haldol 2 mg for severe agitation and combativeness every 4 hours as needed.    5.  Chronic combined systolic and diastolic congestive heart failure-Continue telemetry monitoring.  Entresto and spironolactone are currently on hold.    6.  Acute UTI-Continue Zosyn IV.      Medical Decision Making  Atrial flutter/atrial fibrillation with RVR-acute, moderate complexity, improving  Current use of long-term anticoagulation-chronic, moderate complexity, stable  Essential hypertension-chronic, moderate complexity, stable  Vascular dementia with behavioral disturbances-chronic, moderate complexity, stable  Chronic combined systolic and diastolic congestive heart failure-chronic, moderate complexity, stable  Acute UTI-acute, moderate complexity, unchanged        Number and Complexity of problems: 7  Differential Diagnosis: none    Conditions and Status        Condition is improving.     [facility] Data  External documents reviewed: Epic records  Cardiac tracing (EKG, telemetry) interpretation: Telemetry per cardiology  7/31/2020.  Radiology interpretation: Chest x-ray 7/26/2025 per radiology  Labs reviewed: BMP, CBC 7/31/2025  Any tests that were considered but not ordered: none     Decision rules/scores evaluated (example [id number], [place], etc):       Discussed with:  Patient and Dr. Hunter     Care Planning  Shared decision making: Patient and Dr. Hunter  Code status and discussions:  DNR-DNI  Disposition  Social Determinants of Health that impact treatment or disposition: none  I expect the patient to be discharged to [facility] in 1-2 days.         Electronically signed by RANDA Montenegro, 07/31/25, 10:13 CDT.

## 2025-07-31 NOTE — PLAN OF CARE
Goal Outcome Evaluation:  Plan of Care Reviewed With: patient        Progress: no change  Outcome Evaluation: OT tx complete.  OTR encouraged EOB sitting for AM meds & meal.  Pt complied.  Ms. Connolly required S to come to EOB to feed self with set up assist.  OTR provided low distraction, infrequent conversation, & decreased stimulus to allow to attend to task.  Ms. Connolly self fed 75% of meal & 300 cc liquid without spillage.  Set up for facial grooming & min A for hair combing.  During all seated EOB tasks pt had no LOB or appear fatigued.  She made frequent complaints but cont'd with tasks & required no cues for sequencing or safety.  No impulsivity or agitation noted.  CGA for sit to stand and Max A to change soiled brief.  OTR edu'd sitter in pt level of performance .  Cont OT tx    Anticipated Discharge Disposition (OT): skilled nursing facility

## 2025-07-31 NOTE — CASE MANAGEMENT/SOCIAL WORK
Continued Stay Note   [place]     Patient Name: Sondra Connolly  MRN: [id number]  Today's Date: 7/31/2025    Admit Date: 7/26/2025    Plan: [facility]   Discharge Plan       Row Name 07/31/25 1029       Plan    Plan [facility]    Patient/Family in Agreement with Plan yes    Plan Comments Pt has a bed hold at [facility] and can return; no precert. required.  Phone:  [phone] Fax: [phone].  Pharmacy has been changed in EPIC.                   Discharge Codes    No documentation.                 Expected Discharge Date and Time       Expected Discharge Date Expected Discharge Time    Jul 30, 2025               JAIMIE Bryant

## 2025-08-01 ENCOUNTER — HOSPITAL ENCOUNTER (INPATIENT)
Dept: CARDIOLOGY | Facility: HOSPITAL | Age: 80
Discharge: HOME | End: 2025-08-01
Payer: MEDICARE

## 2025-08-01 VITALS
SYSTOLIC BLOOD PRESSURE: 120 MMHG | HEIGHT: 60 IN | DIASTOLIC BLOOD PRESSURE: 58 MMHG | WEIGHT: 142.6 LBS | OXYGEN SATURATION: 98 % | RESPIRATION RATE: 18 BRPM | HEART RATE: 61 BPM | TEMPERATURE: 97.8 F | BODY MASS INDEX: 28 KG/M2

## 2025-08-01 DIAGNOSIS — I48.4 ATYPICAL ATRIAL FLUTTER: ICD-10-CM

## 2025-08-01 LAB
ALBUMIN SERPL-MCNC: 2.7 G/DL (ref 3.5–5.2)
ALBUMIN/GLOB SERPL: 1.4 G/DL
ALP SERPL-CCNC: 73 U/L (ref 39–117)
ALT SERPL W P-5'-P-CCNC: 33 U/L (ref 1–33)
ANION GAP SERPL CALCULATED.3IONS-SCNC: 8 MMOL/L (ref 5–15)
AST SERPL-CCNC: 21 U/L (ref 1–32)
BASOPHILS # BLD AUTO: 0.03 10*3/MM3 (ref 0–0.2)
BASOPHILS NFR BLD AUTO: 0.4 % (ref 0–1.5)
BILIRUB SERPL-MCNC: 0.3 MG/DL (ref 0–1.2)
BUN SERPL-MCNC: 21.7 MG/DL (ref 8–23)
BUN/CREAT SERPL: 17.4 (ref 7–25)
CALCIUM SPEC-SCNC: 7.8 MG/DL (ref 8.6–10.5)
CHLORIDE SERPL-SCNC: 108 MMOL/L (ref 98–107)
CO2 SERPL-SCNC: 20 MMOL/L (ref 22–29)
CREAT SERPL-MCNC: 1.25 MG/DL (ref 0.57–1)
DEPRECATED RDW RBC AUTO: 58.6 FL (ref 37–54)
EGFRCR SERPLBLD CKD-EPI 2021: 43.1 ML/MIN/1.73
EOSINOPHIL # BLD AUTO: 0.71 10*3/MM3 (ref 0–0.4)
EOSINOPHIL NFR BLD AUTO: 9 % (ref 0.3–6.2)
ERYTHROCYTE [DISTWIDTH] IN BLOOD BY AUTOMATED COUNT: 17.1 % (ref 12.3–15.4)
GLOBULIN UR ELPH-MCNC: 1.9 GM/DL
GLUCOSE SERPL-MCNC: 86 MG/DL (ref 65–99)
HCT VFR BLD AUTO: 30.7 % (ref 34–46.6)
HGB BLD-MCNC: 9.4 G/DL (ref 12–15.9)
IMM GRANULOCYTES # BLD AUTO: 0.03 10*3/MM3 (ref 0–0.05)
IMM GRANULOCYTES NFR BLD AUTO: 0.4 % (ref 0–0.5)
LYMPHOCYTES # BLD AUTO: 1.12 10*3/MM3 (ref 0.7–3.1)
LYMPHOCYTES NFR BLD AUTO: 14.2 % (ref 19.6–45.3)
MCH RBC QN AUTO: 28.9 PG (ref 26.6–33)
MCHC RBC AUTO-ENTMCNC: 30.6 G/DL (ref 31.5–35.7)
MCV RBC AUTO: 94.5 FL (ref 79–97)
MONOCYTES # BLD AUTO: 0.9 10*3/MM3 (ref 0.1–0.9)
MONOCYTES NFR BLD AUTO: 11.5 % (ref 5–12)
MYOGLOBIN UR-MCNC: 142 NG/ML (ref 0–13)
NEUTROPHILS NFR BLD AUTO: 5.07 10*3/MM3 (ref 1.7–7)
NEUTROPHILS NFR BLD AUTO: 64.5 % (ref 42.7–76)
NRBC BLD AUTO-RTO: 0 /100 WBC (ref 0–0.2)
PLATELET # BLD AUTO: 248 10*3/MM3 (ref 140–450)
PMV BLD AUTO: 9.8 FL (ref 6–12)
POTASSIUM SERPL-SCNC: 3.8 MMOL/L (ref 3.5–5.2)
PROT SERPL-MCNC: 4.6 G/DL (ref 6–8.5)
RBC # BLD AUTO: 3.25 10*6/MM3 (ref 3.77–5.28)
SODIUM SERPL-SCNC: 136 MMOL/L (ref 136–145)
WBC NRBC COR # BLD AUTO: 7.86 10*3/MM3 (ref 3.4–10.8)

## 2025-08-01 PROCEDURE — 80053 COMPREHEN METABOLIC PANEL: CPT | Performed by: NURSE PRACTITIONER

## 2025-08-01 PROCEDURE — 25010000002 HALOPERIDOL LACTATE PER 5 MG: Performed by: NURSE PRACTITIONER

## 2025-08-01 PROCEDURE — 93246 EXT ECG>7D<15D RECORDING: CPT

## 2025-08-01 PROCEDURE — 85025 COMPLETE CBC W/AUTO DIFF WBC: CPT | Performed by: NURSE PRACTITIONER

## 2025-08-01 PROCEDURE — 25010000002 PIPERACILLIN SOD-TAZOBACTAM PER 1 G: Performed by: NURSE PRACTITIONER

## 2025-08-01 PROCEDURE — 25010000002 OLANZAPINE 10 MG RECONSTITUTED SOLUTION: Performed by: NURSE PRACTITIONER

## 2025-08-01 RX ORDER — AMOXICILLIN AND CLAVULANATE POTASSIUM 500; 125 MG/1; MG/1
1 TABLET, FILM COATED ORAL 2 TIMES DAILY
Qty: 10 TABLET | Refills: 0 | Status: SHIPPED | OUTPATIENT
Start: 2025-08-01 | End: 2025-08-06

## 2025-08-01 RX ORDER — METOPROLOL TARTRATE 50 MG
50 TABLET ORAL EVERY 12 HOURS SCHEDULED
Qty: 180 TABLET | Refills: 0 | Status: SHIPPED | OUTPATIENT
Start: 2025-08-01

## 2025-08-01 RX ADMIN — PIPERACILLIN AND TAZOBACTAM 3.38 G: 3; .375 INJECTION, POWDER, FOR SOLUTION INTRAVENOUS at 09:10

## 2025-08-01 RX ADMIN — METOPROLOL TARTRATE 50 MG: 50 TABLET, FILM COATED ORAL at 09:10

## 2025-08-01 RX ADMIN — PIPERACILLIN AND TAZOBACTAM 3.38 G: 3; .375 INJECTION, POWDER, FOR SOLUTION INTRAVENOUS at 01:55

## 2025-08-01 RX ADMIN — CLOPIDOGREL BISULFATE 75 MG: 75 TABLET, FILM COATED ORAL at 09:10

## 2025-08-01 RX ADMIN — Medication 10 ML: at 09:10

## 2025-08-01 RX ADMIN — HALOPERIDOL LACTATE 2 MG: 5 INJECTION, SOLUTION INTRAMUSCULAR at 03:13

## 2025-08-01 RX ADMIN — SODIUM BICARBONATE 650 MG: 650 TABLET ORAL at 09:10

## 2025-08-01 RX ADMIN — OLANZAPINE 5 MG: 10 INJECTION, POWDER, LYOPHILIZED, FOR SOLUTION INTRAMUSCULAR at 06:22

## 2025-08-01 NOTE — PLAN OF CARE
Goal Outcome Evaluation:  Plan of Care Reviewed With: patient           Outcome Evaluation: 8/1/25 0400 AA, oriented to self but has been hallucinating that people have been in her room talking with her.  Rec'd Haldol x 1 dose but it didn't improve anything. HR Sinus Nicholas to NSR 47-65 w/BBB per telemetry

## 2025-08-01 NOTE — DISCHARGE INSTRUCTIONS
Return to ER for new or worsening symptoms.    2.  Follow-up with PCP in 1 week.    3.  Follow-up with Cardiac EP-Dr. Reed as needed.    4.  Augmentin 500 mg twice daily x 5 days-for UTI.

## 2025-08-01 NOTE — DISCHARGE SUMMARY
[facility] Hospital Medicine Services  DISCHARGE SUMMARY       Date of Admission: 7/26/2025  Date of Discharge:  8/1/2025  Primary Care Physician: Matthieu Bhakta DO    Presenting Problem/History of Present Illness:  Rapid heart rate     Final Discharge Diagnoses:  Active Hospital Problems    Diagnosis     **Atrial flutter with rapid ventricular response     Acute UTI (urinary tract infection)     Moderate protein-calorie malnutrition     Atrial fibrillation with rapid ventricular response     CRISTIANO (acute kidney injury)     Muscle weakness (generalized)     Vascular dementia with behavior disturbance     Hyponatremia     Chronic combined systolic and diastolic congestive heart failure     Anxiety about health     Current use of long term anticoagulation     Essential hypertension        Consults:  Dr. Reed, Cardiac EP    Procedures Performed:  none    Pertinent Test Results:   Results for orders placed during the hospital encounter of 03/31/25    Adult Transthoracic Echo Complete w/ Color, Spectral and Contrast if necessary per protocol    Interpretation Summary    Left ventricular systolic function is low normal. Left ventricular ejection fraction appears to be 51 - 55%.    There is a TAVR valve present (#23 S3 Ultra) in excellent position, with normal transvalvular mean gradient (5mmHg) and no evidence of paravalvular regurgitation.    The following left ventricular wall segments are hypokinetic: basal inferolateral and mid inferolateral. The following left ventricular wall segments are akinetic: basal inferior and basal inferoseptal.    Moderate to severe mitral valve regurgitation is present.    The left atrial cavity is severely dilated.    Estimated right ventricular systolic pressure from tricuspid regurgitation is normal (<35 mmHg).    Normal size and function of the right ventricle.    Compared to most recent exam from 4/15/2024, overall left ventricular systolic function  appears improved, mitral regurgitation slightly less severe, and right ventricular systolic function improved.      Imaging Results (All)       Procedure Component Value Units Date/Time    XR Chest 1 View [[id number]] Collected: 07/26/25 1437     Updated: 07/26/25 1441    Narrative:      XR CHEST 1 VW-      HISTORY: Rapid heart rate       COMPARISON: 10/31/2024     FINDINGS:  Upright frontal radiograph of the chest was obtained     Postop TAVR. Cardiomegaly which is stable. Pulmonary vasculature are  nondilated. Lungs are well expanded. No consolidation or pleural  effusion. No pneumothorax. Spinal scoliotic curvature.       Impression:      1.  Stable chest exam without acute process. Lungs are well expanded. No  pulmonary edema.     This report was signed and finalized on 7/26/2025 2:38 PM by Dr Williams G[place].             LAB RESULTS:      Lab 08/01/25  0413 07/31/25  0609 07/30/25  0340 07/29/25  0615 07/28/25  0515   WBC 7.86 12.18* 16.18* 7.87 8.45   HEMOGLOBIN 9.4* 9.9* 10.4* 10.2* 10.0*   HEMATOCRIT 30.7* 31.4* 33.0* 33.2* 31.4*   PLATELETS 248 265 295 255 260   NEUTROS ABS 5.07 9.91* 14.92* 5.90 7.06*   IMMATURE GRANS (ABS) 0.03 0.09* 0.10* 0.08* 0.05   LYMPHS ABS 1.12 0.99 0.44* 1.01 0.77   MONOS ABS 0.90 0.83 0.68 0.77 0.54   EOS ABS 0.71* 0.33 0.01 0.08 0.01   MCV 94.5 93.7 91.7 95.7 92.1         Lab 08/01/25  0413 07/31/25  0609 07/30/25  0340 07/29/25  1621 07/29/25  0615 07/28/25  1549 07/28/25  0515 07/27/25  1653 07/27/25  0521 07/26/25  2316 07/26/25  1413   SODIUM 136 134* 132* 137 131*   < > 125*   < > 125*   < > 131*   POTASSIUM 3.8 3.9 4.2  --  4.0  --  4.6  --  4.6   < > 4.4   CHLORIDE 108* 105 102  --  104  --  98  --  95*   < > 102   CO2 20.0* 19.0* 17.0*  --  13.0*  --  14.0*  --  15.0*   < > 18.0*   ANION GAP 8.0 10.0 13.0  --  14.0  --  13.0  --  15.0   < > 11.0   BUN 21.7 20.1 17.5  --  26.4*  --  29.8*  --  31.3*   < > 27.0*   CREATININE 1.25* 1.16* 1.24*  --  1.44*  --  1.46*  --  1.61*    < > 1.49*   EGFR 43.1* 47.2* 43.5*  --  36.4*  --  35.8*  --  31.8*   < > 34.9*   GLUCOSE 86 86 108*  --  102*  --  112*  --  128*   < > 120*   CALCIUM 7.8* 8.1* 8.5*  --  8.6  --  7.9*  --  8.5*   < > 8.3*   MAGNESIUM  --   --   --   --  2.4  --  2.4  --  2.8*  --  2.0   TSH  --   --   --  2.340  --   --   --   --   --   --   --     < > = values in this interval not displayed.         Lab 08/01/25  0413 07/31/25  0609 07/30/25  0340 07/29/25  0615 07/28/25  0515   TOTAL PROTEIN 4.6* 5.0* 5.5* 5.7* 5.2*   ALBUMIN 2.7* 3.0* 3.3* 3.3* 3.2*   GLOBULIN 1.9 2.0 2.2 2.4 2.0   ALT (SGPT) 33 39* 56* 60* 60*   AST (SGOT) 21 21 36* 44* 50*   BILIRUBIN 0.3 0.5 0.5 0.4 0.4   ALK PHOS 73 76 102 112 99                     Brief Urine Lab Results  (Last result in the past 365 days)        Color   Clarity   Blood   Leuk Est   Nitrite   Protein   CREAT   Urine HCG        07/29/25 1750 Red   Turbid   Moderate (2+)   Large (3+)   Positive   >=300 mg/dL (3+)                 Microbiology Results (last 10 days)       Procedure Component Value - Date/Time    Urine Culture - Urine, Urine, Clean Catch [[id number]]  (Abnormal)  (Susceptibility) Collected: 07/29/25 1750    Lab Status: Final result Specimen: Urine, Clean Catch Updated: 07/31/25 0305     Urine Culture >100,000 CFU/mL Escherichia coli    Narrative:      Colonization of the urinary tract without infection is common. Treatment is discouraged unless the patient is symptomatic, pregnant, or undergoing an invasive urologic procedure.    Susceptibility        Escherichia coli      HARMEET      Amoxicillin + Clavulanate Susceptible      Ampicillin Susceptible      Ampicillin + Sulbactam Susceptible      Cefazolin (Urine) Susceptible      Cefepime Susceptible      Ceftazidime Susceptible      Ceftriaxone Susceptible      Cefuroxime axetil Susceptible      Gentamicin Susceptible      Levofloxacin Susceptible      Nitrofurantoin Susceptible      Piperacillin + Tazobactam Susceptible       Trimethoprim + Sulfamethoxazole Susceptible                                 Microbiology Results (last 10 days)       Procedure Component Value - Date/Time    Urine Culture - Urine, Urine, Clean Catch [[id number]]  (Abnormal)  (Susceptibility) Collected: 07/29/25 1750    Lab Status: Final result Specimen: Urine, Clean Catch Updated: 07/31/25 0305     Urine Culture >100,000 CFU/mL Escherichia coli    Narrative:      Colonization of the urinary tract without infection is common. Treatment is discouraged unless the patient is symptomatic, pregnant, or undergoing an invasive urologic procedure.    Susceptibility        Escherichia coli      HARMEET      Amoxicillin + Clavulanate Susceptible      Ampicillin Susceptible      Ampicillin + Sulbactam Susceptible      Cefazolin (Urine) Susceptible      Cefepime Susceptible      Ceftazidime Susceptible      Ceftriaxone Susceptible      Cefuroxime axetil Susceptible      Gentamicin Susceptible      Levofloxacin Susceptible      Nitrofurantoin Susceptible      Piperacillin + Tazobactam Susceptible      Trimethoprim + Sulfamethoxazole Susceptible                                    Documented weights    07/26/25 1404 07/27/25 0412 07/28/25 0321 07/29/25 0531   Weight: 55.7 kg (122 lb 11.2 oz) 58.5 kg (128 lb 15.4 oz) 59.4 kg (131 lb) 60.9 kg (134 lb 3.2 oz)    07/30/25 0555 08/01/25 0441   Weight: 59.2 kg (130 lb 8 oz) 64.7 kg (142 lb 9.6 oz)        Hospital Course:   Patient is a 80-year-old female who was recently seen in the ED 07/24/2025 for rapid HR and discharged back to nursing home from ED. She has a PMH that includes but not limited to COPD, vascular dementia, generalized muscle weakness, dysphagia, HTN, paroxysmal A-fib with history of cardiac ablation 6/2/2021 done in [facility], [place], operated by [facility], on chronic oral anticoagulation Eliquis, hyperosmolality and hyponatremia, HLD, atherosclerotic heart disease, arthritis, depression, GERD, chronic diastolic heart failure and insomnia.   She presented from [facility] [place] 7/26/2025 after the nurse took her vital signs and found her heart rate to be in the upper 130s.  Patient stated she was a little short of breath at that time to the nurse per the son who was at bedside in the ED on admission assessment.  Blood pressure has been hypotensive upon arrival BP 96/76, , RR 22, SPO2 99% on room air, afebrile 97.5 °F.  EKG in the ED appears to be a flutter, sodium 131, creatinine 1.49, Hgb 9.9 down from 10.6 on 7/24/2025, HCT 30.6 down from 32.7 on 7/24/2025, urine obtained with trace ketones and trace protein and glucose noted however patient is on Jardiance at home which creates glucose in the urine.   Admission assessment completed in the emergency department, patient denied any symptoms that are new at this time, helped her off the bedside commode upon arrival to the ED room after she was done urinating and patient is very weak in her lower extremities, very afraid to fall.  Blood pressure 107/89,  A-flutter, SpO2 93% on room air.  Patient does have bruise noted to left eye where she fell at the nursing home prompting her visit to [facility] ED 2 days ago.  She does have dementia and is a poor historian.  Called her daughter Radha Carl who is listed as her healthcare agent to verify CODE STATUS as nursing home paperwork states full code.  After long discussion with Radha, the patient's daughter, she stated she signed paperwork at the nursing home 2 months ago that patient is to be DNR/DNI, no cardioversion, no dialysis, no tube feeding.  CODE STATUS orders changed to reflect this.  Should be admitted to Dr. Hunter with hospital medicine service, Dr. Reed cardiac electrophysiology has already been contacted and stated he will see patient in the morning.  Patient will continue on amiodarone drip upon admission, will continue current daily medications that are a necessity.  Currently stable, afebrile and  vital signs are stable at this time.     Treatment Plan: 7/26/2025  The patient will be admitted to Dr. Hunter with hospital medicine service here at [facility].    Atrial flutter with rapid ventricular response  Started on amiodarone drip in the emergency department, continue Amio drip as ordered  Cardiac electrophysiologist  consulted and notified, will see patient in the morning  Continue metoprolol succinate at decreased dose 12.5 mg starting in the morning if blood pressure can tolerate  Continuous telemetry  Continue Eliquis 2.5 mg twice daily, Plavix 75 mg daily  Vitals every 4 hours and as needed  EKG as needed for rate conversion to normal sinus rhythm or change in rhythm  Current use of long-term anticoagulation Eliquis  Secondary to chronic paroxysmal atrial fibrillation  Continue Eliquis 2.5 mg daily  Monitor for S/S of bleeding  Essential hypertension  Hold Entresto tonight, reevaluate in the morning to see if vital signs have improved and blood pressure can tolerate  Hold spironolactone until blood pressure can tolerate  Vitals every 4 hours and as needed  Notify hospitalist provider if becomes hypotensive and remains sustained  Anxiety about health  Patient with high level of anxiety about her surroundings, being in the hospital and healthcare in general  May be contributing to continued elevated heart rate  Continue home trazodone at decreased dose from 50 mg to 25 mg at night  Muscle weakness  Up with assistance  Fall precautions  PT/OT to eval and treat  Vascular dementia with behavioral disturbance  Patient baseline is confusion, per daughter on telephone patient is at her baseline  Easily stimulated, try to reduce stimulation to patient by turning down lights, grouping cares and allowing rest  Chronic combined systolic and diastolic congestive heart failure  Currently holding home Entresto due to hypotension, will restart when blood pressure improves  EF 41 to 45% on 2D echo  "2/1/2021 and improved to 51-55 % on 2D echo 3/31/2025.  Continue metoprolol succinate 12.5 mg daily which is decreased from home dose of 25 mg daily if blood pressure tolerates, follow hold parameters        7/27/2025 Repeat labs this morning indicate hyponatremia worsening now 125.  Hemoglobin stable at 10.0, creatinine 1.49 on admit now 1.61.  Start IV fluids normal saline at 100 and hour.  Dr. Reed with electrophysiology saw patient today and discontinued amiodarone drip, started on amiodarone loading dose oral 400 mg for the next 10 days, continue Eliquis 2.5 mg every 12 hours and Plavix 75 mg daily.  Will recheck sodium around 5 PM this evening, and again in the morning.  If little or no improvement, consider starting sodium chloride tablets.  Vital signs are stable, patient remains afebrile and on room air.     Dr. Reed's Plan on 07/27/2025  - Start amiodarone 400 mg twice daily for 10 days (until 8/6/2025), then 200 mg daily thereafter  - Continue anticoagulation with apixaban 2.5 mg twice daily (age, creatinine, weight)  - 2-week Holter monitor at discharge  - EP to sign off at this time; please call if additional questions arise    07/28/2025  Started on NS infusion yesterday for Na 125, improved to 127 yesterday evening, this morning back down to 125. Starting Sodium Chloride tabs 1gm q4hr, recheck Na at 1600, Na 129, added tolvaptan 15mg x 1 dose.  Patient has vascular dementia with episodes of severe agitation and psychosis which she was having.  Refusing to take any medications, throwing her dinner tray on the floor, hitting the nurse hitting this provider when trying to talk her down.  Added olanzapine 5 mg IM to be given for agitation, if not improving after at least 1 hour, instructed nurse she may give Haldol dose ordered as a second line treatment for continued agitation.  Patient currently is hazard to herself and others.  She thinks that she is in her house and that the staff are \"invading her " "house and trying to steal her stuff and trying to kill her\".  In her current state of mind, there is no success with reorientation or redirection.  Will conservatively use olanzapine and/or Haldol for her severe agitation.  Once patient has calmed down and agitation is improved, nursing staff will attempt to administer tolvaptan dose for her hyponatremia.  Will recheck labs in the morning as not to wake her during the night if she does go to sleep.    Hemoglobin stable at 10.0, creatinine 1.49->1.6->1.46 today. Yesterday was started on amiodarone loading dose oral 400 mg for the next 10 days, tolerating well. HR controlled in 70's, continue Eliquis 2.5 mg every 12 hours and Plavix 75 mg daily.  Vital signs were stable, patient remains afebrile and on room air.      07/29/2025  Nurse reported patient has notable hematuria today, UA with culture ordered.  Patient did not have Miles catheter and so unsure why she has new hematuria.  She is on Eliquis and Plavix.  Hemoglobin stable this morning 10.2, sodium improved 131, creatinine improved to 1.44.  Was hoping to be able to discharge her from the hospital today with follow-up labs outpatient however, due to new hematuria, urine ordered and will keep overnight.  I feel the longer the patient is in the hospital the worse her psychosis and agitation will get.  Discussed patient's new symptoms with Dr. Reed who initially put patient on amiodarone drip transition to oral amiodarone.  Although she has vascular dementia with some behavior issues, her son witnessed it last night and stated she normally does not get that aggressive.  Could be secondary to being in a different environment that she is not familiar with however liver enzymes were elevated again today which is not her baseline, significant hematuria which was new starting this afternoon, hallucinations psychosis all of these being possible side effects or adverse effects to amiodarone.  After speaking with Dr. Reed " per secure chat, agreeable to discontinue amiodarone, discontinue Eliquis, keep Plavix dose on currently.  She continues on telemetry so will monitor her heart rate to see if it starts elevating. Sent UA to evaluate hematuria, evaluated for rhabdomyolysis.  Ammonia level 19, TSH 2.340 CK result 159, serum myoglobin 267, urine creatinine 154.9, urine osmolality 608, urine sodium<20.  UA significantly different than her admit UA, she has been without a Miles catheter and urinating either in a commode or using pure wick at night.  UA sent in today red in color, turbid appearance, glucosuria, moderate blood, positive nitrites, 3+ large leukocytes, greater than 300 protein, small amount of bilirubin, RBCs too numerous to count, WBCs 21-50, bacteria 4+, urine culture reflexed and is pending.   Hemoglobin stable at 10.2, creatinine 1.49->1.6->1.46-->1.33 today.  Na 125-->129-->131-->137,  vital signs are stable, patient remains afebrile and on room air.    Dr. Reed's note on 7/30/2025:  I suspect that her acute issues are likely secondary to delirium as well as other medical issues rather than an amiodarone induced.  However, given the concerns about potential medication effects and given that this is not her biggest issue right now, it is okay to discontinue her amiodarone for now and pursue a rate control strategy.  It may be reasonable to retrial this in the outpatient setting versus a long-term rate control strategy if she continues to have symptomatic episodes.  If she continues to have breakthrough episodes refractory to rate control, can consider pacemaker and AV node ablation though would avoid this at this time.   Plan:  - Agree with amiodarone discontinuation as above  - Increase with metoprolol tartrate 50 mg twice daily  - Not currently a candidate for any interventions for atrial fibrillation  - Consider pacemaker and AV node ablation in the future if episodes worsen    Dr. Reed's note on 7/31/2025:   No  "significant change in her condition.  Tolerating the higher dose of metoprolol right now.   Plan:  -Continue metoprolol tartrate 50 mg twice daily for now, uptitrate as tolerated  - Not currently a candidate for any interventions for atrial fibrillation  - Consider pacemaker and AV node ablation in the future if episodes worsen  -EP to sign off at this time; please call if additional questions arise     07/31/2025  Patient sleeping in bed, easily awakened.  A nurse was in the room sitting with her due to patient had been trying to get out of bed by herself.  She has bruising under her left eye.  She has swelling in bilateral feet.  Patient was confused.  She can tell me her name, but she doesn't know where she is at.  Sodium is 134, creatinine 1.16, potassium 3.9, hemoglobin 9.9, hematocrit 31.4, WBC 12.18.  Urine culture positive for E. coli.  Continue Zosyn IV as ordered.  Dr. Reed has seen patient and increased metoprolol tartrate to 50 mg twice daily.  He noted to consider pacemaker and AV node ablation in the future if episodes worsen.  Telemetry:  Sinus bradycardia at 53 with a bundle. Patient will return to [facility] at discharge.     08/01/2025 Patient sleeping in bed.  Easily awakened.  She is alert to herself only.  Patient was in no acute distress.  Telemetry is sinus bradycardia 56-59.  Patient has stable and agreeable for discharge to [facility] today.  She will need 5 days of Augmentin for UTI at discharge.    Patient has reached the maximum benefit of hospitalization and is stable for discharge  Patient has been evaluated today 08/01/25 and is stable for discharge.   Physical Exam on Discharge:  /58 (BP Location: Right arm, Patient Position: Lying)   Pulse 61   Temp 97.8 °F (36.6 °C) (Oral)   Resp 18   Ht 152.4 cm (60\")   Wt 64.7 kg (142 lb 9.6 oz)   SpO2 98%   BMI 27.85 kg/m²   Physical Exam  Physical Exam  Constitutional:       Appearance: Normal appearance.   HENT:      Head: Normocephalic " and atraumatic.      Nose: Nose normal.      Mouth/Throat:      Mouth: Mucous membranes are moist.      Pharynx: Oropharynx is clear.   Eyes:      Extraocular Movements: Extraocular movements intact.      Conjunctiva/sclera: Conjunctivae normal.   Cardiovascular:      Rate and Rhythm: Regular rhythm. Bradycardia present.      Pulses: Normal pulses.      Heart sounds: Normal heart sounds.   Pulmonary:      Effort: Pulmonary effort is normal.      Breath sounds: Normal breath sounds.   Abdominal:      General: Bowel sounds are normal.      Palpations: Abdomen is soft.   Musculoskeletal:      Cervical back: Normal range of motion and neck supple.      Right lower leg: Edema present.      Left lower leg: Edema present.   Skin:     General: Skin is warm and dry.      Capillary Refill: Capillary refill takes 2 to 3 seconds.      Comments: Bruising under left eye   Neurological:      Mental Status: She is alert. She is disoriented.   Psychiatric:         Mood and Affect: Mood normal.         Behavior: Behavior is cooperative.     Condition on Discharge:  stable, improving    Discharge Disposition:  Skilled Nursing Facility (DC - External)    Discharge Medications:     Discharge Medications        New Medications        Instructions Start Date   amoxicillin-clavulanate 500-125 MG per tablet  Commonly known as: Augmentin   500 mg, Oral, 2 Times Daily      metoprolol tartrate 50 MG tablet  Commonly known as: LOPRESSOR   50 mg, Oral, Every 12 Hours Scheduled             Continue These Medications        Instructions Start Date   acetaminophen 650 MG 8 hr tablet  Commonly known as: TYLENOL   650 mg, Every 8 Hours PRN      aluminum-magnesium hydroxide-simethicone 200-200-20 MG/5ML suspension  Commonly known as: MAALOX/MYLANTA   30 mL, Every 4 Hours PRN      apixaban 2.5 MG tablet tablet  Commonly known as: ELIQUIS   2.5 mg, 2 Times Daily      clopidogrel 75 MG tablet  Commonly known as: PLAVIX   75 mg, Daily      dapagliflozin 5  MG tablet tablet  Commonly known as: FARXIGA   5 mg, Daily      ezetimibe 10 MG tablet  Commonly known as: ZETIA   10 mg, Daily      omeprazole 20 MG capsule  Commonly known as: priLOSEC   20 mg, Daily      rosuvastatin 40 MG tablet  Commonly known as: CRESTOR   40 mg, Daily      sacubitril-valsartan 24-26 MG tablet  Commonly known as: ENTRESTO   1 tablet, 2 Times Daily      spironolactone 25 MG tablet  Commonly known as: ALDACTONE   25 mg, Daily      traZODone 50 MG tablet  Commonly known as: DESYREL   50 mg, Nightly             Stop These Medications      metoprolol succinate XL 25 MG 24 hr tablet  Commonly known as: TOPROL-XL     multivitamin with minerals tablet tablet     nitroglycerin 0.4 MG SL tablet  Commonly known as: NITROSTAT                Discharge Diet:   Diet Instructions       Diet: Regular/House Diet; Pureed (NDD 1); Thin (IDDSI 0)      Discharge Diet: Regular/House Diet    Texture: Pureed (NDD 1)    Fluid Consistency: Thin (IDDSI 0)            Activity at Discharge:   Activity Instructions       Up WIth Assist              Discharge Instructions:   Return to ER for new or worsening symptoms.  Follow-up with PCP in 1 week.  Follow-up with Cardiac EP-Dr. Reed as needed.  Augmentin 500 mg twice daily x 5 days-for UTI.    Follow-up Appointments:   Future Appointments   Date Time Provider Department Center   8/6/2025  2:30 PM Magda Allen APRN [facility] CD  [facility]   9/2/2025  1:30 PM Wong Jimenes APRN [facility] CD [facility] PAD   2/25/2026  1:00 PM [facility] US [facility] CART 3 BH PAD [facility]   2/25/2026  2:15 PM Gissel Stephenson APRN [facility] VS PAD PAD       Test Results Pending at Discharge:  none    Electronically signed by CHRISTINE Montenegro, 08/01/25, 09:18 CDT.    Time: 60 minutes.

## 2025-08-01 NOTE — PLAN OF CARE
Goal Outcome Evaluation: Pt is alert and oriented to self, vitals are stable, IV is clean dry intact, room air, no s/s of distress, no c/o pain, pt has orders to discharge today           Progress: no change

## 2025-08-01 NOTE — THERAPY DISCHARGE NOTE
Acute Care - Speech Language Pathology Discharge Summary  [facility]       Patient Name: Sondra Connolly  : 1945  MRN: [id number]    Today's Date: 2025                   Admit Date: 2025      SLP Recommendation and Plan  Pureed diet and thin liquids    Visit Dx:    ICD-10-CM ICD-9-CM   1. Paroxysmal A-fib  I48.0 427.31   2. Atrial fibrillation with rapid ventricular response  I48.91 427.31   3. Hyponatremia  E87.1 276.1   4. Acute renal failure superimposed on chronic kidney disease, unspecified acute renal failure type, unspecified CKD stage  N17.9 584.9    N18.9 585.9   5. Atypical atrial flutter  I48.4 427.32   6. Esophageal dysphagia  R13.19 787.29   7. Impaired mobility [Z74.09]  Z74.09 799.89                SLP GOALS       Row Name 25 1500             (LTG) Swallow    (LTG) Swallow Patient will tolerate least restrictive diet without overt s/s of aspiration.  -MB      [place] (Swallow Long Term Goal) independently (over 90% accuracy)  -MB      Time Frame (Swallow Long Term Goal) by discharge  -MB      Barriers (Swallow Long Term Goal) none  -MB      Progress/Outcomes (Swallow Long Term Goal) goal partially met  -MB         (STG) Patient will tolerate trials of    Consistencies Trialed (Tolerate trials) pureed textures;thin liquids  -MB      Desired Outcome (Tolerate trials) without signs/symptoms of aspiration;without signs of distress;with adequate oral prep/transit/clearance  -MB      [place] (Tolerate trials) independently (over 90% accuracy)  -MB      Time Frame (Tolerate trials) by discharge  -MB      Progress/Outcomes (Tolerate trials) goal partially met  -MB         (STG) Swallow Compensatory Strategies Goal 1 (SLP)    Activity (Swallow Compensatory Strategies/Techniques Goal 1, SLP) reflux precautions;compensatory strategies;during meal intake  -MB      [place]/Accuracy (Swallow Compensatory Strategies/Techniques Goal 1, SLP) independently (over 90% accuracy)  -MB       Time Frame (Swallow Compensatory Strategies/Techniques Goal 1, SLP) by discharge  -MB      Barriers (Swallow Compensatory Strategies/Techniques Goal 1, SLP) none  -MB      Progress/Outcomes (Swallow Compensatory Strategies/Techniques Goal 1, SLP) goal partially met  -MB                User Key  (r) = Recorded By, (t) = Taken By, (c) = Cosigned By      Initials Name Provider Type    Karlee Gonzalez CCC-SLP Speech and Language Pathologist                    [email or web address]      SLP Discharge Summary  Anticipated Discharge Disposition (SLP): unknown  Reason for Discharge: discharge from this facility  Progress Toward Achieving Short/long Term Goals: goals partially met within established timelines  Discharge Destination: [facility]      Karlee Mehta CCC-SLP  8/1/2025

## 2025-08-01 NOTE — THERAPY DISCHARGE NOTE
Acute Care - Physical Therapy Discharge Summary  [facility]       Patient Name: Sondra Connolly  : 1945  MRN: [id number]    Today's Date: 2025                 Admit Date: 2025      PT Recommendation and Plan    Visit Dx:    ICD-10-CM ICD-9-CM   1. Paroxysmal A-fib  I48.0 427.31   2. Atrial fibrillation with rapid ventricular response  I48.91 427.31   3. Hyponatremia  E87.1 276.1   4. Acute renal failure superimposed on chronic kidney disease, unspecified acute renal failure type, unspecified CKD stage  N17.9 584.9    N18.9 585.9   5. Atypical atrial flutter  I48.4 427.32   6. Esophageal dysphagia  R13.19 787.29   7. Impaired mobility [Z74.09]  Z74.09 799.89                PT Rehab Goals       Row Name 25 1549             Bed Mobility Goal 1 (PT)    Activity/Assistive Device (Bed Mobility Goal 1, PT) bed mobility activities, all  -NW      [place] Level/Cues Needed (Bed Mobility Goal 1, PT) standby assist  -NW      Time Frame (Bed Mobility Goal 1, PT) 10 days  -NW      Progress/Outcomes (Bed Mobility Goal 1, PT) goal met  -NW         Transfer Goal 1 (PT)    Activity/Assistive Device (Transfer Goal 1, PT) sit-to-stand/stand-to-sit  -NW      [place] Level/Cues Needed (Transfer Goal 1, PT) standby assist  -NW      Time Frame (Transfer Goal 1, PT) 10 days  -NW      Progress/Outcome (Transfer Goal 1, PT) goal met  -NW         Gait Training Goal 1 (PT)    Activity/Assistive Device (Gait Training Goal 1, PT) gait (walking locomotion);assistive device use;decrease fall risk  -NW      [place] Level (Gait Training Goal 1, PT) contact guard required  -NW      Distance (Gait Training Goal 1, PT) 40ft  -NW      Time Frame (Gait Training Goal 1, PT) 10 days  -NW      Progress/Outcome (Gait Training Goal 1, PT) goal partially met  -NW                User Key  (r) = Recorded By, (t) = Taken By, (c) = Cosigned By      Initials Name Provider Type Discipline    NW Marley Thompson, PTA Physical  Therapist Assistant PT                        PT Discharge Summary  Anticipated Discharge Disposition (PT): skilled nursing facility  Reason for Discharge: Discharge from facility  Outcomes Achieved: Refer to plan of care for updates on goals achieved  Discharge Destination: SNF      Marley Thompson, PTA   8/1/2025

## 2025-08-02 LAB
QT INTERVAL: 326 MS
QTC INTERVAL: 506 MS

## 2025-08-03 NOTE — THERAPY DISCHARGE NOTE
Acute Care - Occupational Therapy Discharge Summary  [facility]     Patient Name: oSndra Connolly  : 1945  MRN: [id number]    Today's Date: 8/3/2025                 Admit Date: 2025        OT Recommendation and Plan    Visit Dx:    ICD-10-CM ICD-9-CM   1. Paroxysmal A-fib  I48.0 427.31   2. Atrial fibrillation with rapid ventricular response  I48.91 427.31   3. Hyponatremia  E87.1 276.1   4. Acute renal failure superimposed on chronic kidney disease, unspecified acute renal failure type, unspecified CKD stage  N17.9 584.9    N18.9 585.9   5. Atypical atrial flutter  I48.4 427.32   6. Esophageal dysphagia  R13.19 787.29   7. Impaired mobility [Z74.09]  Z74.09 799.89                OT Rehab Goals       Row Name 25 1515             Transfer Goal 1 (OT)    Activity/Assistive Device (Transfer Goal 1, OT) toilet;bed-to-chair/chair-to-bed;shower chair  -KP      [place] Level/Cues Needed (Transfer Goal 1, OT) minimum assist (75% or more patient effort);verbal cues required;set-up required  -KP      Time Frame (Transfer Goal 1, OT) long term goal (LTG);by discharge  -KP      Progress/Outcome (Transfer Goal 1, OT) goal partially met  -KP         Dressing Goal 1 (OT)    Activity/Device (Dressing Goal 1, OT) upper body dressing  -KP      [place]/Cues Needed (Dressing Goal 1, OT) minimum assist (75% or more patient effort);verbal cues required;set-up required  -KP      Time Frame (Dressing Goal 1, OT) long term goal (LTG);by discharge  -KP      Progress/Outcome (Dressing Goal 1, OT) goal partially met  -KP         Toileting Goal 1 (OT)    Activity/Device (Toileting Goal 1, OT) toileting skills, all  -KP      [place] Level/Cues Needed (Toileting Goal 1, OT) minimum assist (75% or more patient effort);verbal cues required;set-up required  -KP      Time Frame (Toileting Goal 1, OT) long term goal (LTG);by discharge  -KP      Progress/Outcome (Toileting Goal 1, OT) goal partially met  -KP                 User Key  (r) = Recorded By, (t) = Taken By, (c) = Cosigned By      Initials Name Provider Type Discipline    Carolin Saldivar OTR/L Occupational Therapist OT                        Timed Therapy Charges  Total Units: 2      Suggested Charges  Total Units: 2      Procedure Name Documented Minutes Units Code    HC OT SELF CARE/MGMT/TRAIN EA 15 MIN 30 2   88820 (CPT®)                 Documented Minutes  Total Minutes: 30      Therapy Provided Minutes    27287 - OT Self Care/Mgmt Minutes 30                        OT Discharge Summary  Anticipated Discharge Disposition (OT): skilled nursing facility  Reason for Discharge: Discharge from facility  Outcomes Achieved: Unable to make functional progress toward goals at this time  Discharge Destination: SNF      SCHUYLER Nicole  8/3/2025

## 2025-08-18 ENCOUNTER — OFFICE VISIT (OUTPATIENT)
Dept: GASTROENTEROLOGY | Facility: CLINIC | Age: 80
End: 2025-08-18
Payer: MEDICARE

## 2025-08-18 VITALS
OXYGEN SATURATION: 100 % | WEIGHT: 110 LBS | HEIGHT: 61 IN | DIASTOLIC BLOOD PRESSURE: 60 MMHG | BODY MASS INDEX: 20.77 KG/M2 | SYSTOLIC BLOOD PRESSURE: 120 MMHG | HEART RATE: 47 BPM | TEMPERATURE: 96.9 F

## 2025-08-18 DIAGNOSIS — D68.9 COAGULOPATHY: ICD-10-CM

## 2025-08-18 DIAGNOSIS — R13.12 OROPHARYNGEAL DYSPHAGIA: Primary | ICD-10-CM

## 2025-08-18 PROCEDURE — 3074F SYST BP LT 130 MM HG: CPT | Performed by: NURSE PRACTITIONER

## 2025-08-18 PROCEDURE — 3078F DIAST BP <80 MM HG: CPT | Performed by: NURSE PRACTITIONER

## 2025-08-18 PROCEDURE — 99213 OFFICE O/P EST LOW 20 MIN: CPT | Performed by: NURSE PRACTITIONER

## 2025-08-18 PROCEDURE — 1159F MED LIST DOCD IN RCRD: CPT | Performed by: NURSE PRACTITIONER

## 2025-08-18 PROCEDURE — 1160F RVW MEDS BY RX/DR IN RCRD: CPT | Performed by: NURSE PRACTITIONER

## 2025-08-26 ENCOUNTER — OFFICE VISIT (OUTPATIENT)
Dept: CARDIOLOGY | Facility: CLINIC | Age: 80
End: 2025-08-26
Payer: MEDICARE

## 2025-08-26 VITALS
OXYGEN SATURATION: 100 % | BODY MASS INDEX: 20.96 KG/M2 | DIASTOLIC BLOOD PRESSURE: 57 MMHG | HEART RATE: 45 BPM | HEIGHT: 61 IN | WEIGHT: 111 LBS | SYSTOLIC BLOOD PRESSURE: 103 MMHG

## 2025-08-26 DIAGNOSIS — Z92.89 HISTORY OF CARDIOVERSION: ICD-10-CM

## 2025-08-26 DIAGNOSIS — Z95.2 S/P TAVR (TRANSCATHETER AORTIC VALVE REPLACEMENT): ICD-10-CM

## 2025-08-26 DIAGNOSIS — Z95.1 S/P CABG X 4: ICD-10-CM

## 2025-08-26 DIAGNOSIS — Z79.01 CURRENT USE OF LONG TERM ANTICOAGULATION: Chronic | ICD-10-CM

## 2025-08-26 DIAGNOSIS — I34.0 NONRHEUMATIC MITRAL VALVE REGURGITATION: ICD-10-CM

## 2025-08-26 DIAGNOSIS — Z95.5 STATUS POST INSERTION OF DRUG-ELUTING STENT INTO RIGHT CORONARY ARTERY FOR CORONARY ARTERY DISEASE: ICD-10-CM

## 2025-08-26 DIAGNOSIS — I25.810 CORONARY ARTERY DISEASE INVOLVING CORONARY BYPASS GRAFT OF NATIVE HEART WITHOUT ANGINA PECTORIS: ICD-10-CM

## 2025-08-26 DIAGNOSIS — T50.905A BRADYCARDIA, DRUG INDUCED: ICD-10-CM

## 2025-08-26 DIAGNOSIS — E11.59 TYPE 2 DIABETES MELLITUS WITH OTHER CIRCULATORY COMPLICATION, WITHOUT LONG-TERM CURRENT USE OF INSULIN: Chronic | ICD-10-CM

## 2025-08-26 DIAGNOSIS — I48.0 PAROXYSMAL ATRIAL FIBRILLATION: Chronic | ICD-10-CM

## 2025-08-26 DIAGNOSIS — I50.42 CHRONIC COMBINED SYSTOLIC AND DIASTOLIC CONGESTIVE HEART FAILURE: Primary | Chronic | ICD-10-CM

## 2025-08-26 DIAGNOSIS — R00.1 BRADYCARDIA, DRUG INDUCED: ICD-10-CM

## 2025-08-26 DIAGNOSIS — I10 ESSENTIAL HYPERTENSION: ICD-10-CM

## 2025-08-26 DIAGNOSIS — E78.2 MIXED HYPERLIPIDEMIA: Chronic | ICD-10-CM

## 2025-08-26 RX ORDER — METOPROLOL TARTRATE 25 MG/1
25 TABLET, FILM COATED ORAL EVERY 12 HOURS SCHEDULED
Qty: 180 TABLET | Refills: 3 | Status: SHIPPED | OUTPATIENT
Start: 2025-08-26 | End: 2025-08-26

## 2025-08-26 RX ORDER — METOPROLOL SUCCINATE 25 MG/1
25 TABLET, EXTENDED RELEASE ORAL DAILY
Qty: 90 TABLET | Refills: 3 | Status: SHIPPED | OUTPATIENT
Start: 2025-08-26

## 2025-08-26 RX ORDER — MIRTAZAPINE 7.5 MG/1
7.5 TABLET, FILM COATED ORAL NIGHTLY
COMMUNITY

## (undated) DEVICE — ELECTRD PAD DEFIB A/

## (undated) DEVICE — PTCA DILATATION CATHETER: Brand: EMERGE™

## (undated) DEVICE — KT NDL GUIDE STRL 18GA

## (undated) DEVICE — PAD MINOR UNIVERSAL: Brand: MEDLINE INDUSTRIES, INC.

## (undated) DEVICE — ANTIBACTERIAL UNDYED BRAIDED (POLYGLACTIN 910), SYNTHETIC ABSORBABLE SUTURE: Brand: COATED VICRYL

## (undated) DEVICE — PK CATH CARD 30 CA/4

## (undated) DEVICE — TREK CORONARY DILATATION CATHETER 2.50 MM X 12 MM / RAPID-EXCHANGE: Brand: TREK

## (undated) DEVICE — GLV SURG BIOGEL LTX PF 7 1/2

## (undated) DEVICE — DRSNG PRESS SAFEGUARD

## (undated) DEVICE — TRAP FLD MINIVAC MEGADYNE 100ML

## (undated) DEVICE — CATH ELECTRD PACE TEMP BI NONHEP 5F110CM

## (undated) DEVICE — PRE-CONNECTED EXCHANGEABLE BURR CATHETER AND BURR ADVANCING DEVICE: Brand: ROTALINK™ PLUS

## (undated) DEVICE — CANN VESL ACRN TP 4MM

## (undated) DEVICE — GUIDE EXTENSION CATHETER: Brand: GUIDEZILLA™ II

## (undated) DEVICE — TREK CORONARY DILATATION CATHETER 2.50 MM X 30 MM / RAPID-EXCHANGE: Brand: TREK

## (undated) DEVICE — POSITIONER,HEAD,MULTIRING,36CS: Brand: MEDLINE

## (undated) DEVICE — PINNACLE INTRODUCER SHEATH: Brand: PINNACLE

## (undated) DEVICE — SUT SILK 2/0 FS BLK 18IN 685G

## (undated) DEVICE — PACK,SET UP,NO DRAPES: Brand: MEDLINE

## (undated) DEVICE — GLV SURG BIOGEL LTX PF 6 1/2

## (undated) DEVICE — GW STARTER FXD CORE J .035 3X150CM 3MM

## (undated) DEVICE — 3M™ IOBAN™ 2 ANTIMICROBIAL INCISE DRAPE 6650EZ: Brand: IOBAN™ 2

## (undated) DEVICE — COPILOT BLEEDBACK CONTROL VALVE: Brand: COPILOT

## (undated) DEVICE — MODEL BT2000 P/N 700287-012KIT CONTENTS: MANIFOLD WITH SALINE AND CONTRAST PORTS, SALINE TUBING WITH SPIKE AND HAND SYRINGE, TRANSDUCER: Brand: BT2000 AUTOMATED MANIFOLD KIT

## (undated) DEVICE — MINI TREK CORONARY DILATATION CATHETER 1.50 MM X 15 MM / RAPID-EXCHANGE: Brand: MINI TREK

## (undated) DEVICE — MICROSURGICAL DILATATION DEVICE: Brand: WOLVERINE CORONARY CUTTING BALLOON

## (undated) DEVICE — CANN CO2/O2 NASL A/

## (undated) DEVICE — NC TREK CORONARY DILATATION CATHETER 2.5 MM X 12 MM / RAPID-EXCHANGE: Brand: NC TREK

## (undated) DEVICE — SKIN PREP TRAY W/CHG: Brand: MEDLINE INDUSTRIES, INC.

## (undated) DEVICE — INF TL MULIPACK FR6: Brand: INFINITI

## (undated) DEVICE — DRSNG SURESITE123 6X8IN

## (undated) DEVICE — PTCH HEMOCON PRO 2X2IN

## (undated) DEVICE — SUT SILK 4/0 SUTUPAK TIES 24IN SA73H

## (undated) DEVICE — CATH F6INF TL AL I 100CM: Brand: INFINITI

## (undated) DEVICE — 6F .070 JR 4 SH 100CM: Brand: VISTA BRITE TIP

## (undated) DEVICE — TOOL INSRT GW MTL OR PLSTC

## (undated) DEVICE — ELECTRD DEFIB M/FUNC GEL LIQ RL PROPADZ

## (undated) DEVICE — GLV SURG BIOGEL M LTX PF 7 1/2

## (undated) DEVICE — SUT PROLN 5/0 RB2 D/A 30IN 8710H

## (undated) DEVICE — SOLIDIFIER LIQUI LOC PLUS 2000CC

## (undated) DEVICE — MYNXGRIP 6F/7F: Brand: MYNXGRIP

## (undated) DEVICE — Device: Brand: MEDEX

## (undated) DEVICE — SOL IRR NACL 0.9PCT BT 1000ML

## (undated) DEVICE — PERCLOSE™ PROSTYLE™ SUTURE-MEDIATED CLOSURE AND REPAIR SYSTEM: Brand: PERCLOSE™ PROSTYLE™

## (undated) DEVICE — DISPOSABLE ADAPTER

## (undated) DEVICE — GW XCHG AMPLTZ XSTIF PTFE CRV .035 3X260

## (undated) DEVICE — SUT MNCRYL 4/0 PS2 27IN UD MCP426H

## (undated) DEVICE — VLV HEMO GUARDIAN INSRT/TOOL W TORQ DEV

## (undated) DEVICE — GOWN,NON-REINFORCED,SIRUS,SET IN SLV,XL: Brand: MEDLINE

## (undated) DEVICE — ELECTRD BLD MEGADYNE EZCLEAN STD 2.75IN XLNG

## (undated) DEVICE — PAD, DEFIB, ADULT, RADIOTRANS, PHYSIO: Brand: MEDLINE

## (undated) DEVICE — CATH F6INF TL IM 100CM: Brand: INFINITI

## (undated) DEVICE — TB SXN SURG DLP MALL/CARD SFT/TP 6F 6IN

## (undated) DEVICE — ANGIO-SEAL VIP VASCULAR CLOSURE DEVICE: Brand: ANGIO-SEAL

## (undated) DEVICE — GOWN,SIRUS,NON REINFRCD,LARGE,SET IN SL: Brand: MEDLINE

## (undated) DEVICE — GW PRESSUREWIRE X WIRELESS FFR 175CM

## (undated) DEVICE — DRAPE,THYROID,SOFT,STERILE: Brand: MEDLINE

## (undated) DEVICE — WR ROTOBLATOR 3.25MM

## (undated) DEVICE — FR5 INFINITI MULTIPAC: Brand: INFINITI

## (undated) DEVICE — HI-TORQUE BALANCE MIDDLEWEIGHT UNIVERSAL II GUIDE WIRE STRAIGHT TIP PAK  190 CM: Brand: HI-TORQUE BALANCE MIDDLEWEIGHT UNIVERSAL II

## (undated) DEVICE — CATH F6INF PIG 145 110CM 6SH: Brand: INFINITI

## (undated) DEVICE — DRSNG SURESITE WNDW 4X4.5

## (undated) DEVICE — CANN NASL ETCO2 LO/FLO A/

## (undated) DEVICE — LUBE ROTAGLIDE BURRCATH SYS

## (undated) DEVICE — DRP SLUSH MACH OM-ORS-320

## (undated) DEVICE — 6F .070 JR 4 100CM: Brand: CORDIS

## (undated) DEVICE — PROTECTOR ULNA NERV

## (undated) DEVICE — SUT SILK 2/0 SUTUPAK TIES 24IN SA75H

## (undated) DEVICE — Device

## (undated) DEVICE — STPCK 3/WY HP M/RA W/OFF/HNDL 1050PSI STRL

## (undated) DEVICE — A2000 MULTI-USE SYRINGE KIT, P/N 701277-003KIT CONTENTS: 100ML CONTRAST RESERVOIR AND TUBING WITH CONTRAST SPIKE AND CLAMP: Brand: A2000 MULTI-USE SYRINGE KIT

## (undated) DEVICE — MODEL AT P65, P/N 701554-001KIT CONTENTS: HAND CONTROLLER, 3-WAY HIGH-PRESSURE STOPCOCK WITH ROTATING END AND PREMIUM HIGH-PRESSURE TUBING: Brand: ANGIOTOUCH® KIT

## (undated) DEVICE — DRAPE,UTILITY,TAPE,15X26,STERILE: Brand: MEDLINE

## (undated) DEVICE — BIOPATCH™ ANTIMICROBIAL DRESSING WITH CHLORHEXIDINE GLUCONATE IS A HYDROPHILLIC POLYURETHANE ABSORPTIVE FOAM WITH CHLORHEXIDINE GLUCONATE (CHG) WHICH INHIBITS BACTERIAL GROWTH UNDER THE DRESSING. THE DRESSING IS INTENDED TO BE USED TO ABSORB EXUDATE, COVER A WOUND CAUSED BY VASCULAR AND NONVASCULAR PERCUTANEOUS MEDICAL DEVICES DURING SURGERY, AS WELL AS REDUCE LOCAL INFECTION AND COLONIZATION OF MICROORGANISMS.: Brand: BIOPATCH

## (undated) DEVICE — NC TREK™ CORONARY DILATATION CATHETER 2.25 MM X 15 MM / RAPID-EXCHANGE: Brand: NC TREK™

## (undated) DEVICE — TOTAL TRAY, 16FR 10ML SIL FOLEY, URN: Brand: MEDLINE

## (undated) DEVICE — KT MINI ACC MAK401

## (undated) DEVICE — DEV STBL SHEATH STATLOCK PSI BX/20

## (undated) DEVICE — DEV TORQ GW HOT/PINK

## (undated) DEVICE — SYR LUERLOK 20CC BX/50

## (undated) DEVICE — GW STARTER FXD CORE STR .035 3X150CM

## (undated) DEVICE — BG OR ZSUT SADDLE 20IN CLR STRL

## (undated) DEVICE — INFLATION DEVICE: Brand: ENCORE™ 26

## (undated) DEVICE — SCANLAN® SURG-I-PAW® INSTRUMENT COVERS - RED, 1/10" X 5"/ 3 MMX13 CM (2 - 5" PCS /PKG): Brand: SCANLAN® SURG-I-PAW® INSTRUMENT COVERS

## (undated) DEVICE — 6F .070 3 DRC 100CM: Brand: VISTA BRITE TIP

## (undated) DEVICE — BG BANDED WRUBBER BAND AND TP 36X54IN